# Patient Record
Sex: MALE | Race: WHITE | Employment: FULL TIME | URBAN - METROPOLITAN AREA
[De-identification: names, ages, dates, MRNs, and addresses within clinical notes are randomized per-mention and may not be internally consistent; named-entity substitution may affect disease eponyms.]

---

## 2021-10-22 ENCOUNTER — TRANSFERRED RECORDS (OUTPATIENT)
Dept: HEALTH INFORMATION MANAGEMENT | Facility: CLINIC | Age: 31
End: 2021-10-22
Payer: COMMERCIAL

## 2021-10-26 ENCOUNTER — TRANSFERRED RECORDS (OUTPATIENT)
Dept: HEALTH INFORMATION MANAGEMENT | Facility: CLINIC | Age: 31
End: 2021-10-26
Payer: COMMERCIAL

## 2021-10-26 LAB — EJECTION FRACTION: NORMAL %

## 2021-12-13 ENCOUNTER — TRANSFERRED RECORDS (OUTPATIENT)
Dept: HEALTH INFORMATION MANAGEMENT | Facility: CLINIC | Age: 31
End: 2021-12-13
Payer: COMMERCIAL

## 2021-12-30 DIAGNOSIS — Z01.810 PRE-OPERATIVE CARDIOVASCULAR EXAMINATION: ICD-10-CM

## 2021-12-30 DIAGNOSIS — I71.20 THORACIC AORTIC ANEURYSM WITHOUT RUPTURE (H): Primary | ICD-10-CM

## 2021-12-30 PROBLEM — Z85.47 HISTORY OF TESTICULAR CANCER: Status: ACTIVE | Noted: 2021-09-30

## 2021-12-30 PROBLEM — Q23.81 BICUSPID AORTIC VALVE: Status: ACTIVE | Noted: 2021-12-02

## 2021-12-30 PROBLEM — I10 HYPERTENSION: Status: ACTIVE | Noted: 2021-12-30

## 2021-12-30 RX ORDER — ASCORBIC ACID 100 MG
1 TABLET,CHEWABLE ORAL EVERY MORNING
Status: ON HOLD | COMMUNITY
End: 2022-03-01

## 2021-12-30 RX ORDER — METOPROLOL SUCCINATE 25 MG/1
25 TABLET, EXTENDED RELEASE ORAL EVERY MORNING
Status: ON HOLD | COMMUNITY
Start: 2021-10-27 | End: 2022-03-01

## 2021-12-30 NOTE — PROGRESS NOTES
Patient called regarding a referral for Dr. Velasco from Dr. Rohit Crooks DO [facility] from [place] for a ascending aortic aneurysm/aortic root repair valve sparring. Patient scheduled for a clinic with with Dr. Velasco on 1/10/21.   Patient informed about the length of surgery, hospital stay and length of recovery. Patient educated on sternal precautions.  Patient verbalized understanding and agreed to the plan.

## 2022-01-05 NOTE — TELEPHONE ENCOUNTER
FUTURE VISIT INFORMATION      SURGERY INFORMATION:    TBD // RECS in epic//  appt delores Manriquez from [facility]    Consult: ov 21    RECORDS REQUESTED FROM:       Primary Care Provider: Tejal Spicer CNP    Pertinent Medical History: bicuspid aortic valve, hypertension    Most recent EKG+ Tracin21    Most recent ECHO: 21-[facility]

## 2022-01-06 ENCOUNTER — PREP FOR PROCEDURE (OUTPATIENT)
Dept: CARDIOLOGY | Facility: CLINIC | Age: 32
End: 2022-01-06
Payer: COMMERCIAL

## 2022-01-06 DIAGNOSIS — Q25.43 AORTIC ROOT ANEURYSM: Primary | ICD-10-CM

## 2022-01-10 ENCOUNTER — OFFICE VISIT (OUTPATIENT)
Dept: CARDIOLOGY | Facility: CLINIC | Age: 32
End: 2022-01-10
Attending: SURGERY
Payer: COMMERCIAL

## 2022-01-10 ENCOUNTER — VIRTUAL VISIT (OUTPATIENT)
Dept: SURGERY | Facility: CLINIC | Age: 32
End: 2022-01-10
Payer: COMMERCIAL

## 2022-01-10 ENCOUNTER — PRE VISIT (OUTPATIENT)
Dept: SURGERY | Facility: CLINIC | Age: 32
End: 2022-01-10

## 2022-01-10 ENCOUNTER — LAB (OUTPATIENT)
Dept: LAB | Facility: CLINIC | Age: 32
End: 2022-01-10
Payer: COMMERCIAL

## 2022-01-10 ENCOUNTER — ANCILLARY PROCEDURE (OUTPATIENT)
Dept: ULTRASOUND IMAGING | Facility: CLINIC | Age: 32
End: 2022-01-10
Attending: SURGERY
Payer: COMMERCIAL

## 2022-01-10 ENCOUNTER — ANESTHESIA EVENT (OUTPATIENT)
Dept: SURGERY | Facility: CLINIC | Age: 32
End: 2022-01-10

## 2022-01-10 ENCOUNTER — ANCILLARY PROCEDURE (OUTPATIENT)
Dept: GENERAL RADIOLOGY | Facility: CLINIC | Age: 32
End: 2022-01-10
Attending: SURGERY
Payer: COMMERCIAL

## 2022-01-10 VITALS
DIASTOLIC BLOOD PRESSURE: 88 MMHG | SYSTOLIC BLOOD PRESSURE: 132 MMHG | HEIGHT: 75 IN | OXYGEN SATURATION: 95 % | HEART RATE: 71 BPM | BODY MASS INDEX: 39.17 KG/M2 | WEIGHT: 315 LBS

## 2022-01-10 DIAGNOSIS — Z01.810 PRE-OPERATIVE CARDIOVASCULAR EXAMINATION: ICD-10-CM

## 2022-01-10 DIAGNOSIS — Q25.43 AORTIC ROOT ANEURYSM: Primary | ICD-10-CM

## 2022-01-10 DIAGNOSIS — Z01.818 PRE-OP EVALUATION: Primary | ICD-10-CM

## 2022-01-10 LAB
ABO/RH(D): NORMAL
ALBUMIN SERPL-MCNC: 4.5 G/DL (ref 3.4–5)
ALBUMIN UR-MCNC: NEGATIVE MG/DL
ALP SERPL-CCNC: 64 U/L (ref 40–150)
ALT SERPL W P-5'-P-CCNC: 42 U/L (ref 0–70)
ANION GAP SERPL CALCULATED.3IONS-SCNC: 10 MMOL/L (ref 3–14)
ANTIBODY SCREEN: NEGATIVE
APPEARANCE UR: CLEAR
APTT PPP: 32 SECONDS (ref 22–38)
AST SERPL W P-5'-P-CCNC: 33 U/L (ref 0–45)
BACTERIA #/AREA URNS HPF: ABNORMAL /HPF
BILIRUB SERPL-MCNC: 0.5 MG/DL (ref 0.2–1.3)
BILIRUB UR QL STRIP: NEGATIVE
BUN SERPL-MCNC: 18 MG/DL (ref 7–30)
CALCIUM SERPL-MCNC: 9.4 MG/DL (ref 8.5–10.1)
CHLORIDE BLD-SCNC: 105 MMOL/L (ref 94–109)
CO2 SERPL-SCNC: 27 MMOL/L (ref 20–32)
COLOR UR AUTO: YELLOW
CREAT SERPL-MCNC: 1.02 MG/DL (ref 0.66–1.25)
ERYTHROCYTE [DISTWIDTH] IN BLOOD BY AUTOMATED COUNT: 11.9 % (ref 10–15)
GFR SERPL CREATININE-BSD FRML MDRD: >90 ML/MIN/1.73M2
GLUCOSE BLD-MCNC: 87 MG/DL (ref 70–99)
GLUCOSE UR STRIP-MCNC: NEGATIVE MG/DL
HCT VFR BLD AUTO: 49.4 % (ref 40–53)
HGB BLD-MCNC: 16.5 G/DL (ref 13.3–17.7)
HGB UR QL STRIP: NEGATIVE
INR PPP: 1 (ref 0.85–1.15)
KETONES UR STRIP-MCNC: NEGATIVE MG/DL
LEUKOCYTE ESTERASE UR QL STRIP: NEGATIVE
MCH RBC QN AUTO: 29.6 PG (ref 26.5–33)
MCHC RBC AUTO-ENTMCNC: 33.4 G/DL (ref 31.5–36.5)
MCV RBC AUTO: 89 FL (ref 78–100)
NITRATE UR QL: NEGATIVE
PH UR STRIP: 6 [PH] (ref 5–7)
PLATELET # BLD AUTO: 284 10E3/UL (ref 150–450)
POTASSIUM BLD-SCNC: 3.9 MMOL/L (ref 3.4–5.3)
PREALB SERPL IA-MCNC: 27 MG/DL (ref 15–45)
PROT SERPL-MCNC: 8 G/DL (ref 6.8–8.8)
RBC # BLD AUTO: 5.57 10E6/UL (ref 4.4–5.9)
RBC URINE: 2 /HPF
SODIUM SERPL-SCNC: 142 MMOL/L (ref 133–144)
SP GR UR STRIP: 1.02 (ref 1–1.03)
SPECIMEN EXPIRATION DATE: NORMAL
SQUAMOUS EPITHELIAL: <1 /HPF
UROBILINOGEN UR STRIP-MCNC: NORMAL MG/DL
WBC # BLD AUTO: 6.6 10E3/UL (ref 4–11)
WBC URINE: 1 /HPF

## 2022-01-10 PROCEDURE — 80053 COMPREHEN METABOLIC PANEL: CPT | Performed by: PATHOLOGY

## 2022-01-10 PROCEDURE — G0463 HOSPITAL OUTPT CLINIC VISIT: HCPCS

## 2022-01-10 PROCEDURE — 93880 EXTRACRANIAL BILAT STUDY: CPT | Performed by: RADIOLOGY

## 2022-01-10 PROCEDURE — 85027 COMPLETE CBC AUTOMATED: CPT | Performed by: PATHOLOGY

## 2022-01-10 PROCEDURE — 86901 BLOOD TYPING SEROLOGIC RH(D): CPT | Mod: 90 | Performed by: PATHOLOGY

## 2022-01-10 PROCEDURE — 85610 PROTHROMBIN TIME: CPT | Performed by: PATHOLOGY

## 2022-01-10 PROCEDURE — 81001 URINALYSIS AUTO W/SCOPE: CPT | Performed by: PATHOLOGY

## 2022-01-10 PROCEDURE — 71046 X-RAY EXAM CHEST 2 VIEWS: CPT | Mod: GC | Performed by: RADIOLOGY

## 2022-01-10 PROCEDURE — 99202 OFFICE O/P NEW SF 15 MIN: CPT | Mod: 95 | Performed by: PHYSICIAN ASSISTANT

## 2022-01-10 PROCEDURE — 86850 RBC ANTIBODY SCREEN: CPT | Mod: 90 | Performed by: PATHOLOGY

## 2022-01-10 PROCEDURE — 99000 SPECIMEN HANDLING OFFICE-LAB: CPT | Performed by: PATHOLOGY

## 2022-01-10 PROCEDURE — 99204 OFFICE O/P NEW MOD 45 MIN: CPT | Performed by: SURGERY

## 2022-01-10 PROCEDURE — 86900 BLOOD TYPING SEROLOGIC ABO: CPT | Mod: 90 | Performed by: PATHOLOGY

## 2022-01-10 PROCEDURE — 85730 THROMBOPLASTIN TIME PARTIAL: CPT | Performed by: PATHOLOGY

## 2022-01-10 PROCEDURE — 84134 ASSAY OF PREALBUMIN: CPT | Performed by: PATHOLOGY

## 2022-01-10 PROCEDURE — 36415 COLL VENOUS BLD VENIPUNCTURE: CPT | Performed by: PATHOLOGY

## 2022-01-10 RX ORDER — FAMOTIDINE 20 MG/1
20 TABLET, FILM COATED ORAL
Status: CANCELLED | OUTPATIENT
Start: 2022-01-10

## 2022-01-10 RX ORDER — ASPIRIN 81 MG/1
81 TABLET, CHEWABLE ORAL
Status: CANCELLED | OUTPATIENT
Start: 2022-01-10

## 2022-01-10 RX ORDER — DEXMEDETOMIDINE HYDROCHLORIDE 4 UG/ML
0.2-1.2 INJECTION, SOLUTION INTRAVENOUS CONTINUOUS
Status: CANCELLED | OUTPATIENT
Start: 2022-01-28

## 2022-01-10 RX ORDER — CEFAZOLIN SODIUM IN 0.9 % NACL 3 G/100 ML
3 INTRAVENOUS SOLUTION, PIGGYBACK (ML) INTRAVENOUS SEE ADMIN INSTRUCTIONS
Status: CANCELLED | OUTPATIENT
Start: 2022-01-10

## 2022-01-10 RX ORDER — ASPIRIN 81 MG/1
162 TABLET, CHEWABLE ORAL
Status: CANCELLED | OUTPATIENT
Start: 2022-01-10

## 2022-01-10 RX ORDER — LIDOCAINE 40 MG/G
CREAM TOPICAL
Status: CANCELLED | OUTPATIENT
Start: 2022-01-10

## 2022-01-10 RX ORDER — CEFAZOLIN SODIUM IN 0.9 % NACL 3 G/100 ML
3 INTRAVENOUS SOLUTION, PIGGYBACK (ML) INTRAVENOUS
Status: CANCELLED | OUTPATIENT
Start: 2022-01-10

## 2022-01-10 RX ORDER — GABAPENTIN 300 MG/1
300 CAPSULE ORAL
Status: CANCELLED | OUTPATIENT
Start: 2022-01-10

## 2022-01-10 RX ORDER — CHLORHEXIDINE GLUCONATE ORAL RINSE 1.2 MG/ML
10 SOLUTION DENTAL ONCE
Status: CANCELLED | OUTPATIENT
Start: 2022-01-10 | End: 2022-01-10

## 2022-01-10 RX ORDER — ACETAMINOPHEN 325 MG/1
975 TABLET ORAL ONCE
Status: CANCELLED | OUTPATIENT
Start: 2022-01-10 | End: 2022-01-10

## 2022-01-10 RX ORDER — PHENYLEPHRINE HCL IN 0.9% NACL 50MG/250ML
.1-6 PLASTIC BAG, INJECTION (ML) INTRAVENOUS CONTINUOUS
Status: CANCELLED | OUTPATIENT
Start: 2022-01-28

## 2022-01-10 ASSESSMENT — PAIN SCALES - GENERAL
PAINLEVEL: NO PAIN (0)
PAINLEVEL: NO PAIN (0)

## 2022-01-10 ASSESSMENT — MIFFLIN-ST. JEOR: SCORE: 2703.45

## 2022-01-10 ASSESSMENT — LIFESTYLE VARIABLES: TOBACCO_USE: 0

## 2022-01-10 NOTE — H&P
Pre-Operative H & P     CC:  Preoperative exam to assess for increased cardiopulmonary risk while undergoing surgery and anesthesia.    Date of Encounter: 1/10/2022  Primary Care Physician:  Tejal Spicer     Reason for visit:   Encounter Diagnosis   Name Primary?     Pre-op evaluation Yes         HPI  Chuy Varner is a 31 year old male who presents for pre-operative H & P in preparation for Aortic Valve Sparring root replacement possible aortic root replacement and any associated procedures with Dr. Velasco and Dr. Hannah on 22 at [facility]. Patient is being evaluated for comorbid conditions of obesity    Mr. Kuhn recently saw his PCP and reported family history of aortic disease. Imaging revealed normally functioning AV with aortic aneurysm measuring 53 mm. He was then referred to cardiology for further evaluation. Recommendation was made for valve sparing resection of the ascending aorta. The patient was then sent to the  for evaluation. He denies related symptoms.  He is now scheduled for the above procedure.     History is obtained from the patient and chart review      Hx of abnormal bleeding or anti-platelet use: denies    Menstrual history: No LMP for male patient.    Prior to Admission Medications  Current Outpatient Medications   Medication Sig Dispense Refill     metoprolol succinate ER (TOPROL-XL) 25 MG 24 hr tablet Take 25 mg by mouth every morning        Multiple Vitamin (QUINTABS) TABS Take 1 tablet by mouth every morning          Family History  Family History   Problem Relation Age of Onset     Anesthesia Reaction No family hx of      Deep Vein Thrombosis (DVT) No family hx of        The complete review of systems is negative other than noted in the HPI or here.     Anesthesia Pre-Procedure Evaluation    Patient: Chuy Varner   MRN: [id number] : 1990        Preoperative Diagnosis: * No surgery found *    Procedure :   Video  Visit       No past medical history on file.   No past surgical history on file.   No Known Allergies   Social History     Tobacco Use     Smoking status: Never Smoker     Smokeless tobacco: Never Used   Substance Use Topics     Alcohol use: Yes     Alcohol/week: 3.0 standard drinks     Types: 3 Standard drinks or equivalent per week      Wt Readings from Last 1 Encounters:   01/10/22 (!) 166.5 kg (367 lb)        Anesthesia Evaluation            ROS/MED HX  ENT/Pulmonary:  - neg pulmonary ROS  (-) tobacco use   Neurologic:  - neg neurologic ROS     Cardiovascular:     (+) -----valvular problems/murmurs bicuspid AV, aortic root aneurysm. Previous cardiac testing   Echo: Date: 11/2021 Results:    Stress Test: Date: Results:    ECG Reviewed: Date: Results:    Cath: Date: Results:      METS/Exercise Tolerance:     Hematologic:  - neg hematologic  ROS  (-) history of blood clots and history of blood transfusion   Musculoskeletal:  - neg musculoskeletal ROS     GI/Hepatic:  - neg GI/hepatic ROS  (-) GERD   Renal/Genitourinary:  - neg Renal ROS     Endo:     (+) Obesity (BMI 46),     Psychiatric/Substance Use:  - neg psychiatric ROS     Infectious Disease:  - neg infectious disease ROS     Malignancy:  - neg malignancy ROS     Other:               OUTSIDE LABS:  CBC:   Lab Results   Component Value Date    WBC 6.6 01/10/2022    HGB 16.5 01/10/2022    HCT 49.4 01/10/2022     01/10/2022     BMP:   Lab Results   Component Value Date     01/10/2022    POTASSIUM 3.9 01/10/2022    CHLORIDE 105 01/10/2022    CO2 27 01/10/2022    BUN 18 01/10/2022    CR 1.02 01/10/2022    GLC 87 01/10/2022     COAGS:   Lab Results   Component Value Date    PTT 32 01/10/2022    INR 1.00 01/10/2022     POC: No results found for: BGM, HCG, HCGS  HEPATIC:   Lab Results   Component Value Date    ALBUMIN 4.5 01/10/2022    PROTTOTAL 8.0 01/10/2022    ALT 42 01/10/2022    AST 33 01/10/2022    ALKPHOS 64 01/10/2022    BILITOTAL 0.5 01/10/2022      OTHER:   Lab Results   Component Value Date    MAYE 9.4 01/10/2022          Virtual visit -  No vitals were obtained       Physical Exam  Constitutional: Awake, alert, cooperative, no apparent distress, and appears stated age.  HENT: Normocephalic  Respiratory: non labored breathing   Neurologic: Awake, alert, oriented to name, place and time.   Neuropsychiatric: Calm, cooperative. Normal affect.       PRIOR LABS/DIAGNOSTIC STUDIES:   All labs and imaging personally reviewed       EKG/ stress test - if available please see in ROS above   No results found.  No flowsheet data found.    The patient's records and results personally reviewed by this provider.     Outside records reviewed from: care everywhere      ASSESSMENT and PLAN    Chuy Kuhn is a 31 year old male scheduled for Aortic Valve Sparring root replacement possible aortic root replacement and any associated procedures on 1/28/22 by Dr. Velasco and Dr. Hannah in treatment of aortic root aneurysm.  PAC referral for risk assessment and optimization for anesthesia:      Pre-operative considerations:   1.  Cardiac:  Functional status- METS 4 prior to aortic aneurysm diagnosis- reports since that time he has been restricting activity per cardiology recommendations.   ~aortic root aneurysm with the above procedure planned      2.  Pulm:   MICHELE risk: high, has not been tested in the past  ~non smoker      3.  GI:  Risk of PONV score = 2.  If > 2, anti-emetic intervention recommended.      4.   Endo: BMI 46    5. Access: h/o difficult IV access needing US    6. Onc: h/o testicular cancer s/p orchiectomy      VTE risk: 0.5%     Patient is optimized and is acceptable candidate for the proposed procedure.  No further diagnostic evaluation is needed.      Final plan per anesthesiologist on day of surgery.      ** Patient's visit was done virtually today.  A full physical exam was not completed.  Please refer to the physical examination documented by the  anesthesiologist in the anesthesia record on the day of surgery. **     Arrival time, NPO, shower and medication instructions provided by nursing staff today.      Video-Visit Details    Type of service:  Video Visit    Patient verbally consented to video service today: YES      Video Start Time: 1622  Video End Time (time video stopped): 1633    Originating Location (pt. Location): Home    Distant Location (provider location): home    Mode of Communication:  Video Conference via [employer]      On the day of service:     Prep time: 7 minutes  Visit time: 11 minutes  Documentation time: 8 minutes  ------------------------------------------  Total time: 26 minutes      Selene Nunez PA-C  Preoperative Assessment Center  [facility]  Clinic and Surgery Center  Phone: [phone]  Fax: [phone]

## 2022-01-10 NOTE — PATIENT INSTRUCTIONS
Preparing for Your Surgery      Name:  Chuy Varner   MRN:  [id number]   :  1990   Today's Date:  1/10/2022       Arriving for surgery:  Surgery date:  22  Arrival time:  5:30AM    Restrictions due to COVID 19:       One visitor is allowed in the Pre Op area.       When you go into surgery, one visitor is allowed to wait in the Surgery Waiting Room       (provided there is enough space to social distance).         In hospital patients are allowed 1 visitor per day       The visitor may change daily     Visiting Hours: 8 am - 8:30 pm   No ill visitors.   All visitors must wear face mask.    [employer] parking is available for anyone with mobility limitations or disabilities.  ([place]  24 hours/ 7 days a week; [place]  7 am- 3:30 pm, Mon- Fri)    Please come to:     [facility] [address]  [address] [place] [address]    -   Parking is available in the Patient Visitor Ramp on [facility].     -   When entering the hospital you will be asked COVID screening questions, you will then be directed to Registration.  Registration will direct you to the 3rd floor Surgery waiting room.     -   Please ask if you need an escort or a wheelchair to the Surgery Waiting Room.  Preop number- [phone]     What can I eat or drink?  -  You may eat and drink normally for up to 8 hours before your surgery. (Until 22, 11:30PM)  -  You may have clear liquids until 2 hours before surgery. (Until 22, 5:30AM)    Examples of clear liquids:  Water  Clear broth  Juices (apple, white grape, white cranberry  and cider) without pulp  Noncarbonated, powder based beverages  (lemonade and León-Aid)  Sodas (Sprite, 7-Up, ginger ale and seltzer)  Coffee or tea (without milk or cream)  Gatorade    -  No Alcohol for at least 24 hours before surgery     Which medicines can I take?     Hold Multivitamins for 10 days before surgery.  Hold Supplements  for 10 days before surgery.  Hold Ibuprofen (Advil, Motrin) for 10 days before surgery--unless otherwise directed by surgeon.  Hold Naproxen (Aleve) for 10 days before surgery.      -  PLEASE TAKE these medications the day of surgery:    Metoprolol    How do I prepare myself?  - Please take 2 showers before surgery using Scrubcare or Hibiclens soap.    Use this soap only from the neck to your toes.     Leave the soap on your skin for one minute--then rinse thoroughly.      You may use your own shampoo and conditioner; no other hair products.   - Please remove all jewelry and body piercings.  - No lotions, deodorants or fragrance.  - Bring your ID and insurance card.    -If you have a Deep Brain Stimulator, Spinal Cord Stimulator or any neuro stimulator device---you must bring the remote control to the hospital     - All patients are required to have a Covid-19 test within 4 days of surgery/procedure.      -Patients will be contacted by the [facility] scheduling team within 1 week of surgery to make an appointment.      - Patients may call the Scheduling team at [phone] if they have not been scheduled within 4 days of  surgery.          Questions or Concerns:    - For any questions regarding the day of surgery or your hospital stay, please contact the Pre Admission Nursing Office at [phone].       - If you have health changes between today and your surgery please call your surgeon.       For questions after surgery please call your surgeons office.

## 2022-01-10 NOTE — NURSING NOTE
Chief Complaint   Patient presents with     New Patient     Aortic root/ascending aortic aneurysm/valve sparring   Vitals were taken and medications reconciled.    Ace Grady, EMT  2:52 PM

## 2022-01-10 NOTE — ANESTHESIA PREPROCEDURE EVALUATION
Anesthesia Pre-Procedure Evaluation    Patient: Chuy Varner   MRN: [id number] : 1990        Preoperative Diagnosis: * No surgery found *    Procedure :   Video Visit       No past medical history on file.   No past surgical history on file.   No Known Allergies   Social History     Tobacco Use     Smoking status: Never Smoker     Smokeless tobacco: Never Used   Substance Use Topics     Alcohol use: Yes     Alcohol/week: 3.0 standard drinks     Types: 3 Standard drinks or equivalent per week      Wt Readings from Last 1 Encounters:   01/10/22 (!) 166.5 kg (367 lb)        Anesthesia Evaluation   Pt has had prior anesthetic.     No history of anesthetic complications       ROS/MED HX  ENT/Pulmonary:     (+) MICHELE risk factors, snores loudly, hypertension, obese,  (-) tobacco use   Neurologic:  - neg neurologic ROS     Cardiovascular:     (+) hypertension-----valvular problems/murmurs bicuspid AV, aortic root aneurysm. Previous cardiac testing   Echo: Date: 2021 Results:  Left Ventricle:    The left ventricle is normal in size.    There is no left ventricular hypertrophy.    Left ventricular systolic function is normal.      The estimated ejection fraction is 65-70%.      Left ventricular segmental wall motion is normal.    Right Ventricle:      The right ventricle is normal in size.      Normal right ventricular systolic function.    Left Atrial:      The left atrium is normal in size.      No mass or thrombus formation in the left atrium.    Right Atrial:      The right atrium is normal in size.    Atrial Septum:      There is no evidence of ASD/PFO.    Atrial Appendage:      Left atrial appendage is free of thrombus formation.    Aortic Valve:      The aortic valve is bicuspid.      There is no  aortic stenosis.      There is no aortic regurgitation.      There is fusion of the noncoronary and left coronary leaflets.    Pulmonary Valve:      The pulmonic valve is structurally normal.      There is mild  pulmonic regurgitation.    Mitral Valve:      The mitral valve leaflets are structurally normal.      No mitral annular calcification.      There is mild mitral regurgitation.      There is no mitral stenosis.    Pericardium:      There is no pericardial effusion visualized.    Aorta:      Moderate to severe dilatation of the ascending thoracic aorta with    diameter    up to 55 mm.   Stress Test:  Date: Results:    ECG Reviewed:  Date: Results:    Cath:  Date: Results:      METS/Exercise Tolerance: 4 - Raking leaves, gardening Comment: Normal activity up until diagnosis 10/2021, now limited per precautions   Hematologic:  - neg hematologic  ROS  (-) history of blood clots and history of blood transfusion   Musculoskeletal:  - neg musculoskeletal ROS     GI/Hepatic:  - neg GI/hepatic ROS  (-) GERD   Renal/Genitourinary:  - neg Renal ROS     Endo:     (+) Obesity (BMI 46),     Psychiatric/Substance Use:  - neg psychiatric ROS     Infectious Disease:  - neg infectious disease ROS     Malignancy:   (+) Malignancy, History of Other.Other CA testicular cancer Remission status post Surgery.    Other:               OUTSIDE LABS:  CBC:   Lab Results   Component Value Date    WBC 6.6 01/10/2022    HGB 16.5 01/10/2022    HCT 49.4 01/10/2022     01/10/2022     BMP:   Lab Results   Component Value Date     01/10/2022    POTASSIUM 3.9 01/10/2022    CHLORIDE 105 01/10/2022    CO2 27 01/10/2022    BUN 18 01/10/2022    CR 1.02 01/10/2022    GLC 87 01/10/2022     COAGS:   Lab Results   Component Value Date    PTT 32 01/10/2022    INR 1.00 01/10/2022     POC: No results found for: BGM, HCG, HCGS  HEPATIC:   Lab Results   Component Value Date    ALBUMIN 4.5 01/10/2022    PROTTOTAL 8.0 01/10/2022    ALT 42 01/10/2022    AST 33 01/10/2022    ALKPHOS 64 01/10/2022    BILITOTAL 0.5 01/10/2022     OTHER:   Lab Results   Component Value Date    MAYE 9.4 01/10/2022             PAC Discussion and Assessment    ASA Classification:  3  Case is suitable for: [place]  Anesthetic techniques and relevant risks discussed: GA                  PAC Resident/NP Anesthesia Assessment: Chuy Kuhn is a 31 year old male scheduled for Aortic Valve Sparring root replacement possible aortic root replacement and any associated procedures on 1/28/22 by Dr. Velasco and Dr. Hannah in treatment of aortic root aneurysm.  PAC referral for risk assessment and optimization for anesthesia:      Pre-operative considerations:   1.  Cardiac:  Functional status- METS 4 prior to aortic aneurysm diagnosis- reports since that time he has been restricting activity per cardiology recommendations.   ~aortic root aneurysm with the above procedure planned      2.  Pulm:   MICHELE risk: high, has not been tested in the past  ~non smoker      3.  GI:  Risk of PONV score = 2.  If > 2, anti-emetic intervention recommended.      4.   Endo: BMI 46    5. Access: h/o difficult IV access needing US    6. Onc: h/o testicular cancer s/p orchiectomy      VTE risk: 0.5%     Patient is optimized and is acceptable candidate for the proposed procedure.  No further diagnostic evaluation is needed.     **Physical exam and vital signs not completed today as this visit was scheduled as a virtual visit during Covid 19 pandemic. Physical exam should be completed the DOS in pre-op**     Final plan per anesthesiologist on day of surgery.       **For further details of assessment, testing, and physical exam please see H and P completed on same date.         NATIVIDAD Fischer PA-C

## 2022-01-10 NOTE — NURSING NOTE
Patient seen today for consultation for aortic valve sparing root vs aortic root repair.     Surgery procedure explained to patient and spouse and all questions and concerns were answered and addressed.     Valve choices were discussed with the patient, mechanical and bioprosthetic. If valve is to be replaced, mechanical valve will be the first choice; patient aware of long term anticoagulation that is associated with mechanical valves.     Reviewed pre surgery tests and procedures which were performed.       Pre surgery preparation folder with instructions for surgery preparation and recovery given to the patient during the clinical visit. Patient instructed to schedule a PCR covid test 4 days or less prior to surgery date; informed to have a dental clearance letter filled out by a dentist and faxed to [facility] prior to surgery date.      Patient and spouse verbalized understanding of all instructions and will call with any questions or concerns.       Enzo Wilson RNCC  Cardiothoracic Surgery   [facility]  O) [phone]  F) [phone]

## 2022-01-10 NOTE — LETTER
1/10/2022      RE: Chuy Varner  [address] [place] [facility] [address]       Dear Colleague,    Thank you for the opportunity to participate in the care of your patient, Chuy Varner, at the [facility] [place] at [facility]. Please see a copy of my visit note below.    HPI  Chuy Varner is a 31 year old male who was referred to em for evaluation of aortic and ascending aortic aneurysm.     Mr. Kuhn recently saw his PCP and reported family history of aortic disease. Imaging revealed normally functioning AV with aortic aneurysm measuring 53 mm. He was then referred to cardiology for further evaluation. Recommendation was made for valve sparing resection of the ascending aorta. The patient was then sent to the  for evaluation. He denies related symptoms.     History is obtained from the patient and chart review        Hx of abnormal bleeding or anti-platelet use: denies     Menstrual history: No LMP for male patient.     Prior to Admission Medications  Active Medications          Current Outpatient Medications   Medication Sig Dispense Refill     metoprolol succinate ER (TOPROL-XL) 25 MG 24 hr tablet Take 25 mg by mouth every morning          Multiple Vitamin (QUINTABS) TABS Take 1 tablet by mouth every morning                 Family History  Family History         Family History   Problem Relation Age of Onset     Anesthesia Reaction No family hx of       Deep Vein Thrombosis (DVT) No family hx of           Review of systems  10 point review of systems within normal other than mentioned elsewhere.         Physical Exam  Constitutional: Awake, alert, cooperative, no apparent distress, and appears stated age.  HENT: Normocephalic  Cardiac: S1 S2, no murmurs  Respiratory: non labored breathing , bilateral breath sounds  Neurologic: Awake, alert, oriented to name, place and time.   Neuropsychiatric: Calm, cooperative. Normal affect.   Skin:  normal  EYES: normal  ENT: normal     PRIOR LABS/DIAGNOSTIC STUDIES:   All labs and imaging personally reviewed         OUTSIDE LABS:  CBC:         Lab Results   Component Value Date     WBC 6.6 01/10/2022     HGB 16.5 01/10/2022     HCT 49.4 01/10/2022      01/10/2022      BMP:         Lab Results   Component Value Date      01/10/2022     POTASSIUM 3.9 01/10/2022     CHLORIDE 105 01/10/2022     CO2 27 01/10/2022     BUN 18 01/10/2022     CR 1.02 01/10/2022     GLC 87 01/10/2022      COAGS:         Lab Results   Component Value Date     PTT 32 01/10/2022     INR 1.00 01/10/2022      POC: No results found for: BGM, HCG, HCGS  HEPATIC:         Lab Results   Component Value Date     ALBUMIN 4.5 01/10/2022     PROTTOTAL 8.0 01/10/2022     ALT 42 01/10/2022     AST 33 01/10/2022     ALKPHOS 64 01/10/2022     BILITOTAL 0.5 01/10/2022      OTHER:         Lab Results   Component Value Date     MAYE 9.4 01/10/2022           EKG/ stress test - if available please see in ROS above   No results found.  No flowsheet data found.   ECHO: normal LV function, trileaflet aortic valve with no stenosis or regurgitation. Dilated aorta.    CT ANGIOGRAM  Thoracic aorta  Sinuses of Valsalva: moderately to severely dilated measuring 49 x 47 x 45 mm.    Sinotubular ridge: moderately dilated measuring  46 x 44 cm    Proximal ascendng aorta:  severely dilated measuring 55 x 55 CM at the level of the PA bifurcation.    Outside records reviewed from: care everywhere and ts personally reviewed by me.        ASSESSMENT and PLAN     Chuy Kuhn is a 31 year old male with an asymptomatic aortic root aneurysm (5 cm) and an ascending aortic aneurysm (5.5 cm) with a likely trileaflet aortic valve.  I agree with the recommendation to consider aortic valve sparing root replacement. I discussed the risks and benefits of surgery with patient including risks of death, bleeding, stroke, infection, renal failure and arrhythmias. He is also  willing to have a mechanical valve if needed.      Please do not hesitate to contact me if you have any questions/concerns.     Sincerely,     Nickolas Velasco MD

## 2022-01-10 NOTE — PROGRESS NOTES
Chuy is a 31 year old who is being evaluated via a billable video visit.      How would you like to obtain your AVS? Mail a copy  If the video visit is dropped, the invitation should be resent by: Text to cell phone: [phone]       HPI         Review of Systems         Physical Exam

## 2022-01-10 NOTE — PATIENT INSTRUCTIONS
You were seen today in the [facility]                     Cardiothoracic Surgery Clinic    Your surgeon was Dr Nickolas Velasco recommends:    1. Covid PCR test 4 days prior to surgery  2. Dental clearance faxed over to Enzo Wilson  [phone]      Please feel free to call me with any questions or concerns.    Enzo Wilson, RN Care Coordinator  Cardiothoracic Surgery Division  [facility]  [phone]

## 2022-01-17 DIAGNOSIS — Z11.59 ENCOUNTER FOR SCREENING FOR OTHER VIRAL DISEASES: Primary | ICD-10-CM

## 2022-01-26 ENCOUNTER — ANESTHESIA EVENT (OUTPATIENT)
Dept: SURGERY | Facility: CLINIC | Age: 32
End: 2022-01-26
Payer: COMMERCIAL

## 2022-01-26 ASSESSMENT — LIFESTYLE VARIABLES: TOBACCO_USE: 0

## 2022-01-26 NOTE — ANESTHESIA PREPROCEDURE EVALUATION
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   Past Surgical History:   Procedure Laterality Date     ORCHIECTOMY SCROTAL        No Known Allergies   Social History     Tobacco Use     Smoking status: Never Smoker     Smokeless tobacco: Never Used   Substance Use Topics     Alcohol use: Yes     Alcohol/week: 3.0 standard drinks     Types: 3 Standard drinks or equivalent per week      Wt Readings from Last 1 Encounters:   22 (!) 165.9 kg (365 lb 11.9 oz)        Anesthesia Evaluation   Pt has had prior anesthetic. Type: General.    No history of anesthetic complications       ROS/MED HX  ENT/Pulmonary:     (+) MICHELE risk factors, snores loudly, hypertension, obese,  (-) tobacco use   Neurologic:  - neg neurologic ROS  (-) no CVA and no TIA   Cardiovascular:     (+) hypertension-----valvular problems/murmurs bicuspid AV, aortic root aneurysm. Previous cardiac testing   Echo: Date: 2021 Results:  Left Ventricle:    The left ventricle is normal in size.    There is no left ventricular hypertrophy.      Left ventricular systolic function is normal.      The estimated ejection fraction is 65-70%.      Left ventricular segmental wall motion is normal.    Right Ventricle:      The right ventricle is normal in size.      Normal right ventricular systolic function.    Left Atrial:      The left atrium is normal in size.      No mass or thrombus formation in the left atrium.    Right Atrial:      The right atrium is normal in size.    Atrial Septum:      There is no evidence of ASD/PFO.    Atrial Appendage:      Left atrial appendage is free of thrombus formation.    Aortic Valve:      The aortic valve is bicuspid.      There is  no  aortic stenosis.      There is no aortic regurgitation.      There is fusion of the noncoronary and left coronary leaflets.    Pulmonary Valve:      The pulmonic valve is structurally normal.      There is mild pulmonic regurgitation.    Mitral Valve:      The mitral valve leaflets are structurally normal.      No mitral annular calcification.      There is mild mitral regurgitation.      There is no mitral stenosis.    Pericardium:      There is no pericardial effusion visualized.    Aorta:      Moderate to severe dilatation of the ascending thoracic aorta with    diameter    up to 55 mm.   Stress Test:  Date: Results:    ECG Reviewed:  Date: 1/28/22 Results:  NSR  Cath:  Date: Results:   (-) CAD   METS/Exercise Tolerance: 4 - Raking leaves, gardening Comment: Normal activity up until diagnosis 10/2021, now limited per precautions   Hematologic:  - neg hematologic  ROS  (-) history of blood clots and history of blood transfusion   Musculoskeletal:  - neg musculoskeletal ROS     GI/Hepatic:  - neg GI/hepatic ROS  (-) GERD   Renal/Genitourinary:  - neg Renal ROS     Endo:     (+) Obesity (BMI 46),  (-) Type II DM and thyroid disease   Psychiatric/Substance Use:  - neg psychiatric ROS     Infectious Disease: Comment: COVID NEGATIVE 1/25/22 - neg infectious disease ROS     Malignancy:   (+) Malignancy, History of Other.Other CA testicular cancer Remission status post Surgery.    Other:  - neg other ROS          Physical Exam    Airway        Mallampati: III   TM distance: > 3 FB   Neck ROM: full   Mouth opening: > 3 cm    Respiratory Devices and Support         Dental  no notable dental history         Cardiovascular   cardiovascular exam normal       Rhythm and rate: regular and normal     Pulmonary   pulmonary exam normal        breath sounds clear to auscultation           OUTSIDE LABS:  CBC:   Lab Results   Component Value Date    WBC 6.6 01/10/2022    HGB 16.5 01/10/2022    HCT 49.4 01/10/2022      01/10/2022     BMP:   Lab Results   Component Value Date     01/10/2022    POTASSIUM 3.9 01/10/2022    CHLORIDE 105 01/10/2022    CO2 27 01/10/2022    BUN 18 01/10/2022    CR 1.02 01/10/2022    GLC 89 01/28/2022    GLC 87 01/10/2022     COAGS:   Lab Results   Component Value Date    PTT 32 01/10/2022    INR 1.00 01/10/2022     POC: No results found for: BGM, HCG, HCGS  HEPATIC:   Lab Results   Component Value Date    ALBUMIN 4.5 01/10/2022    PROTTOTAL 8.0 01/10/2022    ALT 42 01/10/2022    AST 33 01/10/2022    ALKPHOS 64 01/10/2022    BILITOTAL 0.5 01/10/2022     OTHER:   Lab Results   Component Value Date    MAYE 9.4 01/10/2022       Anesthesia Plan    ASA Status:  4   NPO Status:  NPO Appropriate    Anesthesia Type: General.     - Airway: ETT   Induction: Intravenous.   Maintenance: Balanced.   Techniques and Equipment:     - Airway: Video-Laryngoscope     - Lines/Monitors: Arterial Line, Central Line, BIS, NIRS, LIVAN, PAC            LIVAN Absolute Contra-indication: NONE            LIVAN Relative Contra-indication: NONE     - Blood: Cell Saver, T&S     - Drips/Meds: Norepi, Epinephrine     Consents    Anesthesia Plan(s) and associated risks, benefits, and realistic alternatives discussed. Questions answered and patient/representative(s) expressed understanding.    - Discussed:     - Discussed with:  Patient      - Extended Intubation/Ventilatory Support Discussed: Yes.      - Patient is DNR/DNI Status: No    Use of blood products discussed: Yes.     - Discussed with: Patient.     - Consented: consented to blood products            Reason for refusal: other.     Postoperative Care    Pain management: Multi-modal analgesia.   PONV prophylaxis: Ondansetron (or other 5HT-3), Dexamethasone or Solumedrol     Comments:    Other Comments: 31 year old male with PMH of possible bicuspid aortic valve, thoracic aortic aneurysm without rupture, hypertension, obesity, and testicular cancer s/p orchiectomy presents to the OR  for valve-sparing aortic root replacement. Plan for pre-induction arterial line, GETA, LIVAN, CVC and PAC.            PAC Discussion and Assessment    ASA Classification: 3  Case is suitable for: [place]  Anesthetic techniques and relevant risks discussed: GA                  PAC Resident/NP Anesthesia Assessment: Chuy Kuhn is a 31 year old male scheduled for Aortic Valve Sparring root replacement possible aortic root replacement and any associated procedures on 1/28/22 by Dr. Velasco and Dr. Hannah in treatment of aortic root aneurysm.  PAC referral for risk assessment and optimization for anesthesia:      Pre-operative considerations:   1.  Cardiac:  Functional status- METS 4 prior to aortic aneurysm diagnosis- reports since that time he has been restricting activity per cardiology recommendations.   ~aortic root aneurysm with the above procedure planned      2.  Pulm:   MICHELE risk: high, has not been tested in the past  ~non smoker      3.  GI:  Risk of PONV score = 2.  If > 2, anti-emetic intervention recommended.      4.   Endo: BMI 46    5. Access: h/o difficult IV access needing US    6. Onc: h/o testicular cancer s/p orchiectomy      VTE risk: 0.5%     Patient is optimized and is acceptable candidate for the proposed procedure.  No further diagnostic evaluation is needed.     **Physical exam and vital signs not completed today as this visit was scheduled as a virtual visit during Covid 19 pandemic. Physical exam should be completed the DOS in pre-op**     Final plan per anesthesiologist on day of surgery.       **For further details of assessment, testing, and physical exam please see H and P completed on same date.                                               Adam Ford MD

## 2022-01-26 NOTE — PROGRESS NOTES
HPI  Chuy Varner is a 31 year old male who was referred to em for evaluation of aortic and ascending aortic aneurysm.     Mr. Kuhn recently saw his PCP and reported family history of aortic disease. Imaging revealed normally functioning AV with aortic aneurysm measuring 53 mm. He was then referred to cardiology for further evaluation. Recommendation was made for valve sparing resection of the ascending aorta. The patient was then sent to the U for evaluation. He denies related symptoms.     History is obtained from the patient and chart review        Hx of abnormal bleeding or anti-platelet use: denies     Menstrual history: No LMP for male patient.     Prior to Admission Medications  Active Medications          Current Outpatient Medications   Medication Sig Dispense Refill     metoprolol succinate ER (TOPROL-XL) 25 MG 24 hr tablet Take 25 mg by mouth every morning          Multiple Vitamin (QUINTABS) TABS Take 1 tablet by mouth every morning                 Family History  Family History         Family History   Problem Relation Age of Onset     Anesthesia Reaction No family hx of       Deep Vein Thrombosis (DVT) No family hx of           Review of systems  10 point review of systems within normal other than mentioned elsewhere.         Physical Exam  Constitutional: Awake, alert, cooperative, no apparent distress, and appears stated age.  HENT: Normocephalic  Cardiac: S1 S2, no murmurs  Respiratory: non labored breathing , bilateral breath sounds  Neurologic: Awake, alert, oriented to name, place and time.   Neuropsychiatric: Calm, cooperative. Normal affect.   Skin: normal  EYES: normal  ENT: normal     PRIOR LABS/DIAGNOSTIC STUDIES:   All labs and imaging personally reviewed         OUTSIDE LABS:  CBC:         Lab Results   Component Value Date     WBC 6.6 01/10/2022     HGB 16.5 01/10/2022     HCT 49.4 01/10/2022      01/10/2022      BMP:         Lab Results   Component Value Date       01/10/2022     POTASSIUM 3.9 01/10/2022     CHLORIDE 105 01/10/2022     CO2 27 01/10/2022     BUN 18 01/10/2022     CR 1.02 01/10/2022     GLC 87 01/10/2022      COAGS:         Lab Results   Component Value Date     PTT 32 01/10/2022     INR 1.00 01/10/2022      POC: No results found for: BGM, HCG, HCGS  HEPATIC:         Lab Results   Component Value Date     ALBUMIN 4.5 01/10/2022     PROTTOTAL 8.0 01/10/2022     ALT 42 01/10/2022     AST 33 01/10/2022     ALKPHOS 64 01/10/2022     BILITOTAL 0.5 01/10/2022      OTHER:         Lab Results   Component Value Date     MAYE 9.4 01/10/2022           EKG/ stress test - if available please see in ROS above   No results found.  No flowsheet data found.   ECHO: normal LV function, trileaflet aortic valve with no stenosis or regurgitation. Dilated aorta.    CT ANGIOGRAM  Thoracic aorta  Sinuses of Valsalva: moderately to severely dilated measuring 49 x 47 x 45 mm.    Sinotubular ridge: moderately dilated measuring  46 x 44 cm    Proximal ascendng aorta:  severely dilated measuring 55 x 55 CM at the level of the PA bifurcation.    Outside records reviewed from: [facility] everywhere and ts personally reviewed by me.        ASSESSMENT and PLAN     Chuy Kuhn is a 31 year old male with an asymptomatic aortic root aneurysm (5 cm) and an ascending aortic aneurysm (5.5 cm) with a likely trileaflet aortic valve.  I agree with the recommendation to consider aortic valve sparing root replacement. I discussed the risks and benefits of surgery with patient including risks of death, bleeding, stroke, infection, renal failure and arrhythmias. He is also willing to have a mechanical valve if needed.

## 2022-01-26 NOTE — H&P
CV ICU H&P  01/28/2022      CO-MORBIDITIES:   Patient Active Problem List   Diagnosis     Bicuspid aortic valve     History of testicular cancer     Hypertension     Thoracic aortic aneurysm without rupture (H)       ASSESSMENT: Chuy Varner is a 31 year old male with PMH of possible bicuspid aortic valve, thoracic aortic aneurysm without rupture, hypertension, obesity, and testicular cancer s/p orchiectomy who underwent Bentall procedure with mechanical valve on 1/28 with Dr. Velasco ad Dr. Hannah.    INTRAOPERATIVE REPORT:  Easy mask, easy intubation  BL ES catheters  R internal jugular MAC/[place]  L rad art line  Bypass 180 minutes  No issues on post bypass LIVAN  No pressors out of OR    PLAN:  Neuro/ pain/ sedation:  # Acute Postoperative pain  - Monitor neurological status. Notify the MD for any acute changes in exam.  - Pain: fentanyl gtt. Scheduled tylenol and gabapentin. PRN tylenol, oxycodone, robaxin  - Sedation: propofol gtt, precedex gtt    Pulmonary care:   # Postoperative ventilation management  - Intubated, ventilated  - Titrate supplemental oxygen to maintain saturation above 92%.  - Pulmonary hygiene: Incentive spirometer every 15- 30 minutes when awake, flutter valve, C&DB    Cardiovascular:    #  Thoracic aortic aneurysm s/p Bentall procedure on 1/28 with Dr. Velasco and Dr. Hannah  # History of hypertension  # Likely bicuspid aortic valve (12/  Recent echo on 10/26/21 with LVEF of 60-65%.  CT vascular 12/13/21 showing proximal ascending aorta is severely dilated (55 x 55mm in maximum dimension) and likely bicuspid aortic valve.  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  - Monitor hemodynamic status  - Goal MAP>65, SBP<110  - Statin hold  - BB hold  - ASA: start tomorrow  - Epi, norepi, vaso gtt; wean as tolerated  - Holding PTA meds:  metoprolol succinate 25mg daily  - Nicardipine gtt PRN    GI care/ Nutrition:   # Obesity (BMI 46)  - NPO  - PPI  - Continue bowel regimen: miralax, senna    Renal/ Fluid Balance/ Electrolytes:   BL creat appears to be ~ 1.0  - Strict I/O, daily weights  - Avoid/limit nephrotoxins as able  - Replete lytes PRN per protocol    Endocrine:    # Stress induced hyperglycemia  - Insulin gtt  - Goal BG <180 for optimal healing    ID/ Antibiotics:  # Stress induced leukocytosis  - To complete perioperative regimen  - Continue to monitor fever curve, WBC and inflammatory markers as appropriate    Heme/Onc:     # Stress induced leukocytosis  # Acute blood loss anemia  # Acute blood loss thrombocytopenia  # History of testicular cancer s/p orchiectomy  No s/sx active bleeding  - Continue to monitor  - CBC     MSK/ Skin:  # Sternotomy  # Surgical Incision  - Sternal precautions  - Postoperative incision management per protocol  - PT/OT/CR    Prophylaxis:    - Mechanical prophylaxis for DVT  - Chemical DVT prophylaxis - start subcutaneous heparin tomorrow  - PPI    Lines/ tubes/ drains:  - L radial arterial Line, ETT, CTs, PA catheter, RIJ Mac, durant, bilateral ES catheters    Disposition:  - CVICU    Patient seen, findings and plan discussed with CVICU staff Dr. Alonzo Poon, MS4    I was present with the medical student who participated in the service and in the documentation of the note. I have verified the history and personally performed the physical exam and medical decision making. I agree with the assessment and plan of care as documented in the note.    Tom Adair MD  Anesthesiology, PGY3  ====================================    HPI:   Chuy Varner is a 31 year old male with PMH of possible bicuspid aortic valve, thoracic aortic aneurysm without rupture, hypertension, obesity, and testicular cancer s/p orchiectomy. He recently saw his PCP and reported family history of aortic disease (he is  asymptomatic). Imaging revealed normally functioning AV with aortic aneurysm measuring 53 mm. He was then referred to cardiology for further evaluation with recommended surgical intervention. His cardiac functional status: METS 4 prior to aortic aneurysm diagnosis- reports since that time he has been restricting activity per cardiology recommendations. He underwent Bentall with mechanical valve on 1/28 with Dr. Rod Hannah.      CT vascular 12/13/21:  1. The proximal ascending aorta is severely dilated measuring 55 x 55mm in maximum dimension.  2. The aorta returns to normal size at the level of the arch.  3. No dissection, penetrating ulcer, or intramural hematoma in the visualized thoracic aorta.  4. No significant CAD in a right dominant coronary system.  5. Mild concentric LVH.  6. The aortic valve is likely bicuspid    TTE 10/26/21:  LV normal in size with normal systolic function. LVEF 60-65%.  LV segmental wall motion normal  Aortic valve likely tri leaflet with no stenosis or regurgitation.  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  Proximal ascending aorta is dilated, measuring 4.6cm with an index of 1.52cm/m2.      PAST MEDICAL HISTORY: No past medical history on file.    PAST SURGICAL HISTORY:   Past Surgical History:   Procedure Laterality Date     ORCHIECTOMY SCROTAL         FAMILY HISTORY:   Family History   Problem Relation Age of Onset     Anesthesia Reaction No family hx of      Deep Vein Thrombosis (DVT) No family hx of        SOCIAL HISTORY:   Social History     Tobacco Use     Smoking status: Never Smoker     Smokeless tobacco: Never Used   Substance Use Topics     Alcohol use: Yes     Alcohol/week: 3.0 standard drinks     Types: 3 Standard drinks or equivalent per week         OBJECTIVE:   1. VITAL SIGNS:      Vitals:    01/28/22 0714 01/28/22 0719 01/28/22 0724 01/28/22 0728   BP: (!) 129/95 (!) 135/94 124/88 124/88   Pulse:  93 89 92 88   Resp: 18 14 15 14   Temp:       TempSrc:       SpO2: 100% 99% 99% 99%   Weight:       Height:           2. INTAKE/ OUTPUT:     Intake/Output Summary (Last 24 hours) at 1/28/2022 1435  Last data filed at 1/28/2022 1403  Gross per 24 hour   Intake 3154 ml   Output 2100 ml   Net 1054 ml       3. PHYSICAL EXAMINATION:     General: sedated, comfortable  Neuro: sedated  Resp: intubated, ventilated  CV: S1, S2, RRR, no m/r/g   Abdomen: Soft, non-distended, non-tender  Incisions: c/d/i  Extremities: warm and well perfused  CT: To suction, serosang output, no airleak    4. INVESTIGATIONS:   Arterial Blood Gases   No lab results found in last 7 days.  Complete Blood Count   No lab results found in last 7 days.  Basic Metabolic Panel  No lab results found in last 7 days.  Liver Function Tests  No lab results found in last 7 days.  Pancreatic Enzymes  No lab results found in last 7 days.  Coagulation Profile  No lab results found in last 7 days.      5. RADIOLOGY:   No results found for this or any previous visit (from the past 24 hour(s)).    =========================================

## 2022-01-28 ENCOUNTER — APPOINTMENT (OUTPATIENT)
Dept: GENERAL RADIOLOGY | Facility: CLINIC | Age: 32
End: 2022-01-28
Attending: SURGERY
Payer: COMMERCIAL

## 2022-01-28 ENCOUNTER — HOSPITAL ENCOUNTER (INPATIENT)
Facility: CLINIC | Age: 32
LOS: 35 days | Discharge: NURSING, REHAB OR LONG-TERM CARE | End: 2022-03-04
Attending: SURGERY | Admitting: SURGERY
Payer: COMMERCIAL

## 2022-01-28 ENCOUNTER — ANESTHESIA (OUTPATIENT)
Dept: SURGERY | Facility: CLINIC | Age: 32
End: 2022-01-28
Payer: COMMERCIAL

## 2022-01-28 DIAGNOSIS — I63.40 CEREBRAL INFARCTION DUE TO EMBOLISM OF CEREBRAL ARTERY (H): ICD-10-CM

## 2022-01-28 DIAGNOSIS — I71.20 THORACIC AORTIC ANEURYSM WITHOUT RUPTURE (H): ICD-10-CM

## 2022-01-28 DIAGNOSIS — Z98.890 S/P AORTIC ANEURYSM REPAIR: Primary | ICD-10-CM

## 2022-01-28 DIAGNOSIS — Z86.79 S/P AORTIC ANEURYSM REPAIR: Primary | ICD-10-CM

## 2022-01-28 DIAGNOSIS — Q25.43 AORTIC ROOT ANEURYSM: ICD-10-CM

## 2022-01-28 LAB
ABO/RH(D): NORMAL
ALBUMIN SERPL-MCNC: 3.1 G/DL (ref 3.4–5)
ALP SERPL-CCNC: 44 U/L (ref 40–150)
ALT SERPL W P-5'-P-CCNC: 28 U/L (ref 0–70)
ANION GAP SERPL CALCULATED.3IONS-SCNC: 7 MMOL/L (ref 3–14)
ANTIBODY SCREEN: NEGATIVE
APTT PPP: 28 SECONDS (ref 22–38)
APTT PPP: 31 SECONDS (ref 22–38)
AST SERPL W P-5'-P-CCNC: 50 U/L (ref 0–45)
ATRIAL RATE - MUSE: 93 BPM
BASE EXCESS BLDA CALC-SCNC: -0.2 MMOL/L (ref -9–1.8)
BASE EXCESS BLDA CALC-SCNC: -0.2 MMOL/L (ref -9–1.8)
BASE EXCESS BLDA CALC-SCNC: -0.5 MMOL/L (ref -9.6–2)
BASE EXCESS BLDA CALC-SCNC: -0.6 MMOL/L (ref -9–1.8)
BASE EXCESS BLDA CALC-SCNC: -0.7 MMOL/L (ref -9.6–2)
BASE EXCESS BLDA CALC-SCNC: -0.7 MMOL/L (ref -9–1.8)
BASE EXCESS BLDA CALC-SCNC: -1.1 MMOL/L (ref -9–1.8)
BASE EXCESS BLDA CALC-SCNC: -1.2 MMOL/L (ref -9.6–2)
BASE EXCESS BLDA CALC-SCNC: -1.3 MMOL/L (ref -9–1.8)
BASE EXCESS BLDA CALC-SCNC: -2 MMOL/L (ref -9.6–2)
BASE EXCESS BLDA CALC-SCNC: -2.5 MMOL/L (ref -9.6–2)
BASE EXCESS BLDA CALC-SCNC: -2.7 MMOL/L (ref -9.6–2)
BASE EXCESS BLDA CALC-SCNC: -3.3 MMOL/L (ref -9.6–2)
BASE EXCESS BLDA CALC-SCNC: -3.4 MMOL/L (ref -9.6–2)
BASE EXCESS BLDA CALC-SCNC: -4.6 MMOL/L (ref -9–1.8)
BASE EXCESS BLDA CALC-SCNC: 0.4 MMOL/L (ref -9.6–2)
BASE EXCESS BLDV CALC-SCNC: -0.1 MMOL/L (ref -7.7–1.9)
BASE EXCESS BLDV CALC-SCNC: 1.1 MMOL/L (ref -7.7–1.9)
BASE EXCESS BLDV CALC-SCNC: 1.3 MMOL/L (ref -8.1–1.9)
BASE EXCESS BLDV CALC-SCNC: 1.7 MMOL/L (ref -7.7–1.9)
BASE EXCESS BLDV CALC-SCNC: 3.1 MMOL/L (ref -7.7–1.9)
BILIRUB SERPL-MCNC: 0.6 MG/DL (ref 0.2–1.3)
BLD PROD TYP BPU: NORMAL
BLOOD COMPONENT TYPE: NORMAL
BUN SERPL-MCNC: 18 MG/DL (ref 7–30)
CA-I BLD-MCNC: 4.2 MG/DL (ref 4.4–5.2)
CA-I BLD-MCNC: 4.4 MG/DL (ref 4.4–5.2)
CA-I BLD-MCNC: 4.5 MG/DL (ref 4.4–5.2)
CA-I BLD-MCNC: 4.6 MG/DL (ref 4.4–5.2)
CA-I BLD-MCNC: 4.7 MG/DL (ref 4.4–5.2)
CA-I BLD-MCNC: 4.7 MG/DL (ref 4.4–5.2)
CA-I BLD-MCNC: 4.9 MG/DL (ref 4.4–5.2)
CALCIUM SERPL-MCNC: 7.9 MG/DL (ref 8.5–10.1)
CF REDUC 30M P MA P HEP LENFR BLD TEG: 0.1 % (ref 0–8)
CF REDUC 60M P MA LENFR BLD TEG: 2.5 % (ref 0–15)
CF REDUC 60M P MA LENFR BLD TEG: 3.5 % (ref 0–15)
CF REDUC 60M P MA P HEPASE LENFR BLD TEG: 0 % (ref 0–15)
CFT BLD TEG: 1.7 MINUTE (ref 1–3)
CFT BLD TEG: 1.8 MINUTE (ref 1–3)
CFT P HPASE BLD TEG: 1.6 MINUTE (ref 1–3)
CHLORIDE BLD-SCNC: 112 MMOL/L (ref 94–109)
CI (COAGULATION INDEX)(Z) NON NATIVE: 0.4 (ref -3–3)
CI (COAGULATION INDEX)(Z) NON NATIVE: 0.6 (ref -3–3)
CI (COAGULATION INDEX)(Z): -1.1 (ref -3–3)
CLOT ANGLE BLD TEG: 65.9 DEGREES (ref 53–72)
CLOT ANGLE BLD TEG: 66.7 DEGREES (ref 53–72)
CLOT ANGLE P HPASE BLD TEG: 68.9 DEGREES (ref 53–72)
CLOT INIT BLD TEG: 4.7 MINUTE (ref 5–10)
CLOT INIT BLD TEG: 5.4 MINUTE (ref 5–10)
CLOT INIT P HPASE BLD TEG: 5 MINUTE (ref 5–10)
CLOT LYSIS 30M P MA LENFR BLD TEG: 0.2 % (ref 0–8)
CLOT LYSIS 30M P MA LENFR BLD TEG: 0.5 % (ref 0–8)
CLOT STRENGTH BLD TEG: 6.6 KD/SC (ref 4.5–11)
CLOT STRENGTH BLD TEG: 7.6 KD/SC (ref 4.5–11)
CLOT STRENGTH P HPASE BLD TEG: 3.9 KD/SC (ref 4.5–11)
CO2 SERPL-SCNC: 24 MMOL/L (ref 20–32)
CODING SYSTEM: NORMAL
CREAT SERPL-MCNC: 1.2 MG/DL (ref 0.66–1.25)
CROSSMATCH: NORMAL
CROSSMATCH: NORMAL
DIASTOLIC BLOOD PRESSURE - MUSE: NORMAL MMHG
ERYTHROCYTE [DISTWIDTH] IN BLOOD BY AUTOMATED COUNT: 12.1 % (ref 10–15)
ERYTHROCYTE [DISTWIDTH] IN BLOOD BY AUTOMATED COUNT: 12.2 % (ref 10–15)
FIBRINOGEN PPP-MCNC: 159 MG/DL (ref 170–490)
GFR SERPL CREATININE-BSD FRML MDRD: 83 ML/MIN/1.73M2
GLUCOSE BLD-MCNC: 111 MG/DL (ref 70–99)
GLUCOSE BLD-MCNC: 123 MG/DL (ref 70–99)
GLUCOSE BLD-MCNC: 125 MG/DL (ref 70–99)
GLUCOSE BLD-MCNC: 133 MG/DL (ref 70–99)
GLUCOSE BLD-MCNC: 133 MG/DL (ref 70–99)
GLUCOSE BLD-MCNC: 149 MG/DL (ref 70–99)
GLUCOSE BLD-MCNC: 166 MG/DL (ref 70–99)
GLUCOSE BLD-MCNC: 176 MG/DL (ref 70–99)
GLUCOSE BLD-MCNC: 192 MG/DL (ref 70–99)
GLUCOSE BLD-MCNC: 195 MG/DL (ref 70–99)
GLUCOSE BLD-MCNC: 200 MG/DL (ref 70–99)
GLUCOSE BLDC GLUCOMTR-MCNC: 123 MG/DL (ref 70–99)
GLUCOSE BLDC GLUCOMTR-MCNC: 125 MG/DL (ref 70–99)
GLUCOSE BLDC GLUCOMTR-MCNC: 134 MG/DL (ref 70–99)
GLUCOSE BLDC GLUCOMTR-MCNC: 135 MG/DL (ref 70–99)
GLUCOSE BLDC GLUCOMTR-MCNC: 146 MG/DL (ref 70–99)
GLUCOSE BLDC GLUCOMTR-MCNC: 146 MG/DL (ref 70–99)
GLUCOSE BLDC GLUCOMTR-MCNC: 167 MG/DL (ref 70–99)
GLUCOSE BLDC GLUCOMTR-MCNC: 89 MG/DL (ref 70–99)
GLUCOSE BLDC GLUCOMTR-MCNC: 95 MG/DL (ref 70–99)
GLUCOSE BLDC GLUCOMTR-MCNC: 98 MG/DL (ref 70–99)
HCO3 BLD-SCNC: 21 MMOL/L (ref 21–28)
HCO3 BLD-SCNC: 25 MMOL/L (ref 21–28)
HCO3 BLDA-SCNC: 23 MMOL/L (ref 21–28)
HCO3 BLDA-SCNC: 24 MMOL/L (ref 21–28)
HCO3 BLDA-SCNC: 25 MMOL/L (ref 21–28)
HCO3 BLDA-SCNC: 26 MMOL/L (ref 21–28)
HCO3 BLDA-SCNC: 26 MMOL/L (ref 21–28)
HCO3 BLDV-SCNC: 27 MMOL/L (ref 21–28)
HCO3 BLDV-SCNC: 28 MMOL/L (ref 21–28)
HCO3 BLDV-SCNC: 28 MMOL/L (ref 21–28)
HCO3 BLDV-SCNC: 29 MMOL/L (ref 21–28)
HCO3 BLDV-SCNC: 29 MMOL/L (ref 21–28)
HCT VFR BLD AUTO: 37.6 % (ref 40–53)
HCT VFR BLD AUTO: 40 % (ref 40–53)
HGB BLD-MCNC: 12.2 G/DL (ref 13.3–17.7)
HGB BLD-MCNC: 12.4 G/DL (ref 13.3–17.7)
HGB BLD-MCNC: 12.6 G/DL (ref 13.3–17.7)
HGB BLD-MCNC: 12.7 G/DL (ref 13.3–17.7)
HGB BLD-MCNC: 12.8 G/DL (ref 13.3–17.7)
HGB BLD-MCNC: 12.9 G/DL (ref 13.3–17.7)
HGB BLD-MCNC: 12.9 G/DL (ref 13.3–17.7)
HGB BLD-MCNC: 13.4 G/DL (ref 13.3–17.7)
HGB BLD-MCNC: 13.4 G/DL (ref 13.3–17.7)
HGB BLD-MCNC: 13.7 G/DL (ref 13.3–17.7)
HGB BLD-MCNC: 14.5 G/DL (ref 13.3–17.7)
HGB BLD-MCNC: 14.5 G/DL (ref 13.3–17.7)
HOLD SPECIMEN: NORMAL
INR PPP: 1.38 (ref 0.85–1.15)
INR PPP: 1.53 (ref 0.85–1.15)
INTERPRETATION ECG - MUSE: NORMAL
ISSUE DATE AND TIME: NORMAL
ISSUE DATE AND TIME: NORMAL
LACTATE BLD-SCNC: 1 MMOL/L
LACTATE BLD-SCNC: 1.2 MMOL/L
LACTATE BLD-SCNC: 1.3 MMOL/L
LACTATE BLD-SCNC: 1.4 MMOL/L
LACTATE BLD-SCNC: 1.4 MMOL/L
LACTATE BLD-SCNC: 1.7 MMOL/L
LACTATE BLD-SCNC: 1.8 MMOL/L
LACTATE BLD-SCNC: 2.2 MMOL/L
LACTATE BLD-SCNC: 3.6 MMOL/L
LACTATE BLD-SCNC: 4.1 MMOL/L
LACTATE SERPL-SCNC: 1.4 MMOL/L (ref 0.7–2)
LACTATE SERPL-SCNC: 3.1 MMOL/L (ref 0.7–2)
MAGNESIUM SERPL-MCNC: 2.8 MG/DL (ref 1.6–2.3)
MCF BLD TEG: 56.9 MM (ref 50–70)
MCF BLD TEG: 60.5 MM (ref 50–70)
MCF P HPASE BLD TEG: 43.6 MM (ref 50–70)
MCH RBC QN AUTO: 30.5 PG (ref 26.5–33)
MCH RBC QN AUTO: 30.6 PG (ref 26.5–33)
MCHC RBC AUTO-ENTMCNC: 33.5 G/DL (ref 31.5–36.5)
MCHC RBC AUTO-ENTMCNC: 34.3 G/DL (ref 31.5–36.5)
MCV RBC AUTO: 90 FL (ref 78–100)
MCV RBC AUTO: 91 FL (ref 78–100)
O2/TOTAL GAS SETTING VFR VENT: 100 %
O2/TOTAL GAS SETTING VFR VENT: 50 %
O2/TOTAL GAS SETTING VFR VENT: 60 %
O2/TOTAL GAS SETTING VFR VENT: 70 %
O2/TOTAL GAS SETTING VFR VENT: 85 %
O2/TOTAL GAS SETTING VFR VENT: 85 %
O2/TOTAL GAS SETTING VFR VENT: 95 %
O2/TOTAL GAS SETTING VFR VENT: 95 %
OXYHGB MFR BLD: 91 % (ref 92–100)
OXYHGB MFR BLD: 92 % (ref 92–100)
OXYHGB MFR BLD: 93 % (ref 92–100)
OXYHGB MFR BLD: 94 % (ref 92–100)
OXYHGB MFR BLD: 95 % (ref 92–100)
OXYHGB MFR BLD: 96 % (ref 92–100)
OXYHGB MFR BLD: 97 % (ref 92–100)
OXYHGB MFR BLDA: 95 % (ref 92–100)
OXYHGB MFR BLDA: 95 % (ref 92–100)
OXYHGB MFR BLDA: 98 % (ref 92–100)
OXYHGB MFR BLDV: 64 % (ref 70–75)
OXYHGB MFR BLDV: 64 % (ref 70–75)
OXYHGB MFR BLDV: 66 % (ref 70–75)
OXYHGB MFR BLDV: 74 % (ref 70–75)
OXYHGB MFR BLDV: 75 % (ref 92–100)
P AXIS - MUSE: 24 DEGREES
PCO2 BLD: 38 MM HG (ref 35–45)
PCO2 BLD: 42 MM HG (ref 35–45)
PCO2 BLD: 42 MM HG (ref 35–45)
PCO2 BLD: 44 MM HG (ref 35–45)
PCO2 BLD: 46 MM HG (ref 35–45)
PCO2 BLD: 48 MM HG (ref 35–45)
PCO2 BLD: 48 MM HG (ref 35–45)
PCO2 BLDA: 40 MM HG (ref 35–45)
PCO2 BLDA: 42 MM HG (ref 35–45)
PCO2 BLDA: 43 MM HG (ref 35–45)
PCO2 BLDA: 44 MM HG (ref 35–45)
PCO2 BLDA: 46 MM HG (ref 35–45)
PCO2 BLDA: 49 MM HG (ref 35–45)
PCO2 BLDA: 49 MM HG (ref 35–45)
PCO2 BLDA: 50 MM HG (ref 35–45)
PCO2 BLDA: 52 MM HG (ref 35–45)
PCO2 BLDV: 51 MM HG (ref 40–50)
PCO2 BLDV: 52 MM HG (ref 40–50)
PCO2 BLDV: 53 MM HG (ref 40–50)
PCO2 BLDV: 55 MM HG (ref 40–50)
PCO2 BLDV: 56 MM HG (ref 40–50)
PH BLD: 7.33 [PH] (ref 7.35–7.45)
PH BLD: 7.33 [PH] (ref 7.35–7.45)
PH BLD: 7.35 [PH] (ref 7.35–7.45)
PH BLD: 7.35 [PH] (ref 7.35–7.45)
PH BLD: 7.37 [PH] (ref 7.35–7.45)
PH BLD: 7.38 [PH] (ref 7.35–7.45)
PH BLD: 7.38 [PH] (ref 7.35–7.45)
PH BLDA: 7.29 [PH] (ref 7.35–7.45)
PH BLDA: 7.3 [PH] (ref 7.35–7.45)
PH BLDA: 7.31 [PH] (ref 7.35–7.45)
PH BLDA: 7.32 [PH] (ref 7.35–7.45)
PH BLDA: 7.33 [PH] (ref 7.35–7.45)
PH BLDA: 7.34 [PH] (ref 7.35–7.45)
PH BLDA: 7.35 [PH] (ref 7.35–7.45)
PH BLDA: 7.37 [PH] (ref 7.35–7.45)
PH BLDA: 7.41 [PH] (ref 7.35–7.45)
PH BLDV: 7.31 [PH] (ref 7.32–7.43)
PH BLDV: 7.32 [PH] (ref 7.32–7.43)
PH BLDV: 7.33 [PH] (ref 7.32–7.43)
PH BLDV: 7.35 [PH] (ref 7.32–7.43)
PH BLDV: 7.37 [PH] (ref 7.32–7.43)
PHOSPHATE SERPL-MCNC: 3.4 MG/DL (ref 2.5–4.5)
PLATELET # BLD AUTO: 154 10E3/UL (ref 150–450)
PLATELET # BLD AUTO: 205 10E3/UL (ref 150–450)
PO2 BLD: 129 MM HG (ref 80–105)
PO2 BLD: 65 MM HG (ref 80–105)
PO2 BLD: 68 MM HG (ref 80–105)
PO2 BLD: 70 MM HG (ref 80–105)
PO2 BLD: 76 MM HG (ref 80–105)
PO2 BLD: 79 MM HG (ref 80–105)
PO2 BLD: 98 MM HG (ref 80–105)
PO2 BLDA: 231 MM HG (ref 80–105)
PO2 BLDA: 260 MM HG (ref 80–105)
PO2 BLDA: 311 MM HG (ref 80–105)
PO2 BLDA: 342 MM HG (ref 80–105)
PO2 BLDA: 356 MM HG (ref 80–105)
PO2 BLDA: 424 MM HG (ref 80–105)
PO2 BLDA: 505 MM HG (ref 80–105)
PO2 BLDA: 80 MM HG (ref 80–105)
PO2 BLDA: 82 MM HG (ref 80–105)
PO2 BLDV: 33 MM HG (ref 25–47)
PO2 BLDV: 33 MM HG (ref 25–47)
PO2 BLDV: 36 MM HG (ref 25–47)
PO2 BLDV: 42 MM HG (ref 25–47)
PO2 BLDV: 42 MM HG (ref 25–47)
POTASSIUM BLD-SCNC: 3.9 MMOL/L (ref 3.4–5.3)
POTASSIUM BLD-SCNC: 4 MMOL/L (ref 3.5–5)
POTASSIUM BLD-SCNC: 4 MMOL/L (ref 3.5–5)
POTASSIUM BLD-SCNC: 4.1 MMOL/L (ref 3.5–5)
POTASSIUM BLD-SCNC: 4.2 MMOL/L (ref 3.5–5)
POTASSIUM BLD-SCNC: 4.3 MMOL/L (ref 3.5–5)
POTASSIUM BLD-SCNC: 4.5 MMOL/L (ref 3.4–5.3)
POTASSIUM BLD-SCNC: 4.5 MMOL/L (ref 3.4–5.3)
POTASSIUM BLD-SCNC: 4.5 MMOL/L (ref 3.5–5)
POTASSIUM BLD-SCNC: 4.6 MMOL/L (ref 3.5–5)
POTASSIUM BLD-SCNC: 4.7 MMOL/L (ref 3.5–5)
POTASSIUM BLD-SCNC: 4.8 MMOL/L (ref 3.5–5)
POTASSIUM BLD-SCNC: 5 MMOL/L (ref 3.5–5)
PR INTERVAL - MUSE: 164 MS
PROT SERPL-MCNC: 5.9 G/DL (ref 6.8–8.8)
QRS DURATION - MUSE: 110 MS
QT - MUSE: 366 MS
QTC - MUSE: 455 MS
R AXIS - MUSE: 9 DEGREES
RBC # BLD AUTO: 4.13 10E6/UL (ref 4.4–5.9)
RBC # BLD AUTO: 4.47 10E6/UL (ref 4.4–5.9)
SODIUM BLD-SCNC: 140 MMOL/L (ref 133–144)
SODIUM BLD-SCNC: 141 MMOL/L (ref 133–144)
SODIUM BLD-SCNC: 141 MMOL/L (ref 133–144)
SODIUM BLD-SCNC: 142 MMOL/L (ref 133–144)
SODIUM SERPL-SCNC: 143 MMOL/L (ref 133–144)
SPECIMEN EXPIRATION DATE: NORMAL
SYSTOLIC BLOOD PRESSURE - MUSE: NORMAL MMHG
T AXIS - MUSE: -1 DEGREES
UNIT ABO/RH: NORMAL
UNIT NUMBER: NORMAL
UNIT STATUS: NORMAL
UNIT TYPE ISBT: 6200
VENTRICULAR RATE- MUSE: 93 BPM
WBC # BLD AUTO: 13.7 10E3/UL (ref 4–11)
WBC # BLD AUTO: 14.4 10E3/UL (ref 4–11)

## 2022-01-28 PROCEDURE — 88305 TISSUE EXAM BY PATHOLOGIST: CPT | Mod: TC | Performed by: SURGERY

## 2022-01-28 PROCEDURE — 85027 COMPLETE CBC AUTOMATED: CPT

## 2022-01-28 PROCEDURE — 82330 ASSAY OF CALCIUM: CPT

## 2022-01-28 PROCEDURE — 272N000001 HC OR GENERAL SUPPLY STERILE: Performed by: SURGERY

## 2022-01-28 PROCEDURE — 258N000003 HC RX IP 258 OP 636: Performed by: STUDENT IN AN ORGANIZED HEALTH CARE EDUCATION/TRAINING PROGRAM

## 2022-01-28 PROCEDURE — 85396 CLOTTING ASSAY WHOLE BLOOD: CPT | Performed by: SURGERY

## 2022-01-28 PROCEDURE — 94002 VENT MGMT INPAT INIT DAY: CPT

## 2022-01-28 PROCEDURE — 250N000024 HC ISOFLURANE, PER MIN: Performed by: SURGERY

## 2022-01-28 PROCEDURE — 33863 ASCENDING AORTIC GRAFT: CPT | Performed by: SURGERY

## 2022-01-28 PROCEDURE — 250N000011 HC RX IP 250 OP 636: Performed by: STUDENT IN AN ORGANIZED HEALTH CARE EDUCATION/TRAINING PROGRAM

## 2022-01-28 PROCEDURE — 999N000045 HC STATISTIC DAILY SWAN MONITORING

## 2022-01-28 PROCEDURE — 250N000009 HC RX 250: Performed by: STUDENT IN AN ORGANIZED HEALTH CARE EDUCATION/TRAINING PROGRAM

## 2022-01-28 PROCEDURE — 250N000009 HC RX 250: Performed by: SURGERY

## 2022-01-28 PROCEDURE — 250N000013 HC RX MED GY IP 250 OP 250 PS 637: Performed by: SURGERY

## 2022-01-28 PROCEDURE — 99291 CRITICAL CARE FIRST HOUR: CPT | Mod: 24 | Performed by: INTERNAL MEDICINE

## 2022-01-28 PROCEDURE — C1763 CONN TISS, NON-HUMAN: HCPCS | Performed by: SURGERY

## 2022-01-28 PROCEDURE — 82805 BLOOD GASES W/O2 SATURATION: CPT

## 2022-01-28 PROCEDURE — 5A1221Z PERFORMANCE OF CARDIAC OUTPUT, CONTINUOUS: ICD-10-PCS | Performed by: SURGERY

## 2022-01-28 PROCEDURE — 82330 ASSAY OF CALCIUM: CPT | Performed by: SURGERY

## 2022-01-28 PROCEDURE — 250N000011 HC RX IP 250 OP 636: Performed by: SURGERY

## 2022-01-28 PROCEDURE — 85610 PROTHROMBIN TIME: CPT | Performed by: SURGERY

## 2022-01-28 PROCEDURE — 93010 ELECTROCARDIOGRAM REPORT: CPT | Mod: 59 | Performed by: INTERNAL MEDICINE

## 2022-01-28 PROCEDURE — 02RX0JZ REPLACEMENT OF THORACIC AORTA, ASCENDING/ARCH WITH SYNTHETIC SUBSTITUTE, OPEN APPROACH: ICD-10-PCS | Performed by: SURGERY

## 2022-01-28 PROCEDURE — 360N000079 HC SURGERY LEVEL 6, PER MIN: Performed by: SURGERY

## 2022-01-28 PROCEDURE — 85018 HEMOGLOBIN: CPT | Performed by: SURGERY

## 2022-01-28 PROCEDURE — 84132 ASSAY OF SERUM POTASSIUM: CPT

## 2022-01-28 PROCEDURE — 83605 ASSAY OF LACTIC ACID: CPT | Performed by: SURGERY

## 2022-01-28 PROCEDURE — 999N000157 HC STATISTIC RCP TIME EA 10 MIN

## 2022-01-28 PROCEDURE — 85730 THROMBOPLASTIN TIME PARTIAL: CPT | Performed by: SURGERY

## 2022-01-28 PROCEDURE — 93503 INSERT/PLACE HEART CATHETER: CPT

## 2022-01-28 PROCEDURE — 4A133B3 MONITORING OF ARTERIAL PRESSURE, PULMONARY, PERCUTANEOUS APPROACH: ICD-10-PCS | Performed by: STUDENT IN AN ORGANIZED HEALTH CARE EDUCATION/TRAINING PROGRAM

## 2022-01-28 PROCEDURE — 410N000004: Performed by: SURGERY

## 2022-01-28 PROCEDURE — 71045 X-RAY EXAM CHEST 1 VIEW: CPT | Mod: 26 | Performed by: RADIOLOGY

## 2022-01-28 PROCEDURE — 4A1239Z MONITORING OF CARDIAC OUTPUT, PERCUTANEOUS APPROACH: ICD-10-PCS | Performed by: STUDENT IN AN ORGANIZED HEALTH CARE EDUCATION/TRAINING PROGRAM

## 2022-01-28 PROCEDURE — 258N000003 HC RX IP 258 OP 636: Performed by: SURGERY

## 2022-01-28 PROCEDURE — 84132 ASSAY OF SERUM POTASSIUM: CPT | Performed by: SURGERY

## 2022-01-28 PROCEDURE — 271N000002 HC RX 271: Performed by: STUDENT IN AN ORGANIZED HEALTH CARE EDUCATION/TRAINING PROGRAM

## 2022-01-28 PROCEDURE — 85384 FIBRINOGEN ACTIVITY: CPT | Performed by: SURGERY

## 2022-01-28 PROCEDURE — 82803 BLOOD GASES ANY COMBINATION: CPT

## 2022-01-28 PROCEDURE — 85014 HEMATOCRIT: CPT | Performed by: SURGERY

## 2022-01-28 PROCEDURE — 83735 ASSAY OF MAGNESIUM: CPT | Performed by: SURGERY

## 2022-01-28 PROCEDURE — 84100 ASSAY OF PHOSPHORUS: CPT | Performed by: SURGERY

## 2022-01-28 PROCEDURE — 82805 BLOOD GASES W/O2 SATURATION: CPT | Performed by: SURGERY

## 2022-01-28 PROCEDURE — 200N000002 HC R&B ICU UMMC

## 2022-01-28 PROCEDURE — 74018 RADEX ABDOMEN 1 VIEW: CPT | Mod: 26 | Performed by: RADIOLOGY

## 2022-01-28 PROCEDURE — 84295 ASSAY OF SERUM SODIUM: CPT

## 2022-01-28 PROCEDURE — 84295 ASSAY OF SERUM SODIUM: CPT | Performed by: SURGERY

## 2022-01-28 PROCEDURE — P9041 ALBUMIN (HUMAN),5%, 50ML: HCPCS

## 2022-01-28 PROCEDURE — 02HQ32Z INSERTION OF MONITORING DEVICE INTO RIGHT PULMONARY ARTERY, PERCUTANEOUS APPROACH: ICD-10-PCS | Performed by: STUDENT IN AN ORGANIZED HEALTH CARE EDUCATION/TRAINING PROGRAM

## 2022-01-28 PROCEDURE — 82805 BLOOD GASES W/O2 SATURATION: CPT | Performed by: STUDENT IN AN ORGANIZED HEALTH CARE EDUCATION/TRAINING PROGRAM

## 2022-01-28 PROCEDURE — 999N000141 HC STATISTIC PRE-PROCEDURE NURSING ASSESSMENT: Performed by: SURGERY

## 2022-01-28 PROCEDURE — 999N000155 HC STATISTIC RAPCV CVP MONITORING

## 2022-01-28 PROCEDURE — 999N000015 HC STATISTIC ARTERIAL MONITORING DAILY

## 2022-01-28 PROCEDURE — 272N000085 HC PACK CELL SAVER CSP: Performed by: SURGERY

## 2022-01-28 PROCEDURE — 250N000009 HC RX 250

## 2022-01-28 PROCEDURE — 88305 TISSUE EXAM BY PATHOLOGIST: CPT | Mod: 26 | Performed by: PATHOLOGY

## 2022-01-28 PROCEDURE — 82810 BLOOD GASES O2 SAT ONLY: CPT

## 2022-01-28 PROCEDURE — C1713 ANCHOR/SCREW BN/BN,TIS/BN: HCPCS | Performed by: SURGERY

## 2022-01-28 PROCEDURE — P9037 PLATE PHERES LEUKOREDU IRRAD: HCPCS | Performed by: SURGERY

## 2022-01-28 PROCEDURE — 278N000051 HC OR IMPLANT GENERAL: Performed by: SURGERY

## 2022-01-28 PROCEDURE — 86923 COMPATIBILITY TEST ELECTRIC: CPT | Performed by: SURGERY

## 2022-01-28 PROCEDURE — 999N000065 XR ABDOMEN PORT 1 VIEWS

## 2022-01-28 PROCEDURE — 250N000011 HC RX IP 250 OP 636

## 2022-01-28 PROCEDURE — 93010 ELECTROCARDIOGRAM REPORT: CPT | Mod: 76 | Performed by: INTERNAL MEDICINE

## 2022-01-28 PROCEDURE — 410N000003 HC PER-PERFUSION 1ST 30 MIN: Performed by: SURGERY

## 2022-01-28 PROCEDURE — 93005 ELECTROCARDIOGRAM TRACING: CPT

## 2022-01-28 PROCEDURE — 36415 COLL VENOUS BLD VENIPUNCTURE: CPT | Performed by: STUDENT IN AN ORGANIZED HEALTH CARE EDUCATION/TRAINING PROGRAM

## 2022-01-28 PROCEDURE — 71045 X-RAY EXAM CHEST 1 VIEW: CPT

## 2022-01-28 PROCEDURE — 02RF0JZ REPLACEMENT OF AORTIC VALVE WITH SYNTHETIC SUBSTITUTE, OPEN APPROACH: ICD-10-PCS | Performed by: SURGERY

## 2022-01-28 PROCEDURE — 272N000088 HC PUMP APP ADULT PERFUSION: Performed by: SURGERY

## 2022-01-28 PROCEDURE — 83605 ASSAY OF LACTIC ACID: CPT | Performed by: STUDENT IN AN ORGANIZED HEALTH CARE EDUCATION/TRAINING PROGRAM

## 2022-01-28 PROCEDURE — 86901 BLOOD TYPING SEROLOGIC RH(D): CPT | Performed by: STUDENT IN AN ORGANIZED HEALTH CARE EDUCATION/TRAINING PROGRAM

## 2022-01-28 PROCEDURE — 999N000065 XR CHEST PORT 1 VIEW

## 2022-01-28 PROCEDURE — 83605 ASSAY OF LACTIC ACID: CPT

## 2022-01-28 PROCEDURE — 370N000017 HC ANESTHESIA TECHNICAL FEE, PER MIN: Performed by: SURGERY

## 2022-01-28 DEVICE — COR-KNOT MINI® COMBO KIT
Type: IMPLANTABLE DEVICE | Site: HEART | Status: FUNCTIONAL
Brand: COR-KNOT MINI®

## 2022-01-28 DEVICE — BILEAFLET MECHANICAL HEART VALVE ATTACHED TO A VASCULAR PROSTHESIS IMPLANTED IN THE AORTIC POSITION
Type: IMPLANTABLE DEVICE | Site: HEART | Status: FUNCTIONAL
Brand: ON-X ASCENDING AORTIC PROSTHESIS

## 2022-01-28 DEVICE — COR-KNOT® QUICK LOAD® SINGLES
Type: IMPLANTABLE DEVICE | Site: HEART | Status: FUNCTIONAL
Brand: COR-KNOT® QUICK LOAD®

## 2022-01-28 DEVICE — XENOSURE BIOLOGIC PATCH, 6CM X 8CM, EIFU
Type: IMPLANTABLE DEVICE | Site: HEART | Status: FUNCTIONAL
Brand: XENOSURE BIOLOGIC PATCH

## 2022-01-28 DEVICE — COR-KNOT® QUICK LOAD® 6-POUCH
Type: IMPLANTABLE DEVICE | Site: HEART | Status: FUNCTIONAL
Brand: COR-KNOT® QUICK LOAD®

## 2022-01-28 RX ORDER — PHENYLEPHRINE HCL IN 0.9% NACL 50MG/250ML
.1-6 PLASTIC BAG, INJECTION (ML) INTRAVENOUS CONTINUOUS
Status: DISCONTINUED | OUTPATIENT
Start: 2022-01-28 | End: 2022-01-28

## 2022-01-28 RX ORDER — CEFAZOLIN SODIUM IN 0.9 % NACL 3 G/100 ML
3 INTRAVENOUS SOLUTION, PIGGYBACK (ML) INTRAVENOUS
Status: COMPLETED | OUTPATIENT
Start: 2022-01-28 | End: 2022-01-28

## 2022-01-28 RX ORDER — HEPARIN SODIUM 5000 [USP'U]/.5ML
5000 INJECTION, SOLUTION INTRAVENOUS; SUBCUTANEOUS EVERY 8 HOURS
Status: DISCONTINUED | OUTPATIENT
Start: 2022-01-29 | End: 2022-01-30

## 2022-01-28 RX ORDER — HYDROMORPHONE HYDROCHLORIDE 1 MG/ML
0.2 INJECTION, SOLUTION INTRAMUSCULAR; INTRAVENOUS; SUBCUTANEOUS
Status: DISCONTINUED | OUTPATIENT
Start: 2022-01-28 | End: 2022-02-03

## 2022-01-28 RX ORDER — DEXMEDETOMIDINE HYDROCHLORIDE 4 UG/ML
0.2-1.2 INJECTION, SOLUTION INTRAVENOUS CONTINUOUS
Status: DISCONTINUED | OUTPATIENT
Start: 2022-01-28 | End: 2022-01-28

## 2022-01-28 RX ORDER — DEXMEDETOMIDINE HYDROCHLORIDE 4 UG/ML
.2-.7 INJECTION, SOLUTION INTRAVENOUS CONTINUOUS
Status: DISCONTINUED | OUTPATIENT
Start: 2022-01-28 | End: 2022-01-31

## 2022-01-28 RX ORDER — HEPARIN SODIUM 1000 [USP'U]/ML
INJECTION, SOLUTION INTRAVENOUS; SUBCUTANEOUS PRN
Status: DISCONTINUED | OUTPATIENT
Start: 2022-01-28 | End: 2022-01-28

## 2022-01-28 RX ORDER — NICARDIPINE HYDROCHLORIDE 0.2 MG/ML
0-15 INJECTION INTRAVENOUS CONTINUOUS
Status: DISCONTINUED | OUTPATIENT
Start: 2022-01-28 | End: 2022-01-30

## 2022-01-28 RX ORDER — ONDANSETRON 4 MG/1
4 TABLET, ORALLY DISINTEGRATING ORAL EVERY 6 HOURS PRN
Status: DISCONTINUED | OUTPATIENT
Start: 2022-01-28 | End: 2022-03-04 | Stop reason: HOSPADM

## 2022-01-28 RX ORDER — FLUMAZENIL 0.1 MG/ML
0.2 INJECTION, SOLUTION INTRAVENOUS
Status: DISCONTINUED | OUTPATIENT
Start: 2022-01-28 | End: 2022-01-28

## 2022-01-28 RX ORDER — NALOXONE HYDROCHLORIDE 0.4 MG/ML
0.2 INJECTION, SOLUTION INTRAMUSCULAR; INTRAVENOUS; SUBCUTANEOUS
Status: DISCONTINUED | OUTPATIENT
Start: 2022-01-28 | End: 2022-02-26

## 2022-01-28 RX ORDER — FIBRINOGEN (HUMAN) 700-1300MG
1100 KIT INTRAVENOUS
Status: DISCONTINUED | OUTPATIENT
Start: 2022-01-28 | End: 2022-01-28

## 2022-01-28 RX ORDER — FAMOTIDINE 20 MG/1
20 TABLET, FILM COATED ORAL
Status: DISCONTINUED | OUTPATIENT
Start: 2022-01-28 | End: 2022-01-28

## 2022-01-28 RX ORDER — CALCIUM GLUCONATE 20 MG/ML
2 INJECTION, SOLUTION INTRAVENOUS
Status: ACTIVE | OUTPATIENT
Start: 2022-01-28 | End: 2022-01-31

## 2022-01-28 RX ORDER — CALCIUM GLUCONATE 20 MG/ML
1 INJECTION, SOLUTION INTRAVENOUS
Status: ACTIVE | OUTPATIENT
Start: 2022-01-28 | End: 2022-01-31

## 2022-01-28 RX ORDER — CEFAZOLIN SODIUM 2 G/100ML
2 INJECTION, SOLUTION INTRAVENOUS EVERY 8 HOURS
Status: COMPLETED | OUTPATIENT
Start: 2022-01-28 | End: 2022-01-29

## 2022-01-28 RX ORDER — LIDOCAINE HYDROCHLORIDE 20 MG/ML
INJECTION, SOLUTION INFILTRATION; PERINEURAL PRN
Status: DISCONTINUED | OUTPATIENT
Start: 2022-01-28 | End: 2022-01-28

## 2022-01-28 RX ORDER — LABETALOL HYDROCHLORIDE 5 MG/ML
20 INJECTION, SOLUTION INTRAVENOUS
Status: DISCONTINUED | OUTPATIENT
Start: 2022-01-28 | End: 2022-02-02

## 2022-01-28 RX ORDER — PANTOPRAZOLE SODIUM 40 MG/1
40 TABLET, DELAYED RELEASE ORAL DAILY
Status: DISCONTINUED | OUTPATIENT
Start: 2022-01-29 | End: 2022-02-12

## 2022-01-28 RX ORDER — PROPOFOL 10 MG/ML
INJECTION, EMULSION INTRAVENOUS PRN
Status: DISCONTINUED | OUTPATIENT
Start: 2022-01-28 | End: 2022-01-28

## 2022-01-28 RX ORDER — POLYETHYLENE GLYCOL 3350 17 G/17G
17 POWDER, FOR SOLUTION ORAL DAILY
Status: DISCONTINUED | OUTPATIENT
Start: 2022-01-29 | End: 2022-02-09 | Stop reason: CLARIF

## 2022-01-28 RX ORDER — NALOXONE HYDROCHLORIDE 0.4 MG/ML
0.4 INJECTION, SOLUTION INTRAMUSCULAR; INTRAVENOUS; SUBCUTANEOUS
Status: DISCONTINUED | OUTPATIENT
Start: 2022-01-28 | End: 2022-02-26

## 2022-01-28 RX ORDER — ACETAMINOPHEN 325 MG/1
975 TABLET ORAL EVERY 8 HOURS
Status: DISPENSED | OUTPATIENT
Start: 2022-01-28 | End: 2022-01-31

## 2022-01-28 RX ORDER — ASPIRIN 81 MG/1
162 TABLET, CHEWABLE ORAL
Status: DISCONTINUED | OUTPATIENT
Start: 2022-01-28 | End: 2022-01-28

## 2022-01-28 RX ORDER — NALOXONE HYDROCHLORIDE 0.4 MG/ML
0.2 INJECTION, SOLUTION INTRAMUSCULAR; INTRAVENOUS; SUBCUTANEOUS
Status: DISCONTINUED | OUTPATIENT
Start: 2022-01-28 | End: 2022-01-28

## 2022-01-28 RX ORDER — NALOXONE HYDROCHLORIDE 0.4 MG/ML
0.4 INJECTION, SOLUTION INTRAMUSCULAR; INTRAVENOUS; SUBCUTANEOUS
Status: DISCONTINUED | OUTPATIENT
Start: 2022-01-28 | End: 2022-01-28

## 2022-01-28 RX ORDER — GABAPENTIN 300 MG/1
300 CAPSULE ORAL
Status: COMPLETED | OUTPATIENT
Start: 2022-01-28 | End: 2022-01-28

## 2022-01-28 RX ORDER — NICOTINE POLACRILEX 4 MG
15-30 LOZENGE BUCCAL
Status: DISCONTINUED | OUTPATIENT
Start: 2022-01-28 | End: 2022-02-09

## 2022-01-28 RX ORDER — HYDRALAZINE HYDROCHLORIDE 20 MG/ML
10 INJECTION INTRAMUSCULAR; INTRAVENOUS EVERY 30 MIN PRN
Status: DISCONTINUED | OUTPATIENT
Start: 2022-01-28 | End: 2022-02-02

## 2022-01-28 RX ORDER — METHOCARBAMOL 750 MG/1
750 TABLET, FILM COATED ORAL EVERY 6 HOURS PRN
Status: DISCONTINUED | OUTPATIENT
Start: 2022-01-28 | End: 2022-03-04 | Stop reason: HOSPADM

## 2022-01-28 RX ORDER — SODIUM CHLORIDE, SODIUM LACTATE, POTASSIUM CHLORIDE, CALCIUM CHLORIDE 600; 310; 30; 20 MG/100ML; MG/100ML; MG/100ML; MG/100ML
INJECTION, SOLUTION INTRAVENOUS CONTINUOUS PRN
Status: DISCONTINUED | OUTPATIENT
Start: 2022-01-28 | End: 2022-01-28

## 2022-01-28 RX ORDER — ACETAMINOPHEN 325 MG/1
975 TABLET ORAL ONCE
Status: COMPLETED | OUTPATIENT
Start: 2022-01-28 | End: 2022-01-28

## 2022-01-28 RX ORDER — OXYCODONE HYDROCHLORIDE 5 MG/1
5 TABLET ORAL EVERY 4 HOURS PRN
Status: DISCONTINUED | OUTPATIENT
Start: 2022-01-28 | End: 2022-02-26

## 2022-01-28 RX ORDER — ACETAMINOPHEN 325 MG/1
650 TABLET ORAL EVERY 4 HOURS PRN
Status: DISCONTINUED | OUTPATIENT
Start: 2022-01-31 | End: 2022-02-05

## 2022-01-28 RX ORDER — SODIUM CHLORIDE, SODIUM GLUCONATE, SODIUM ACETATE, POTASSIUM CHLORIDE AND MAGNESIUM CHLORIDE 526; 502; 368; 37; 30 MG/100ML; MG/100ML; MG/100ML; MG/100ML; MG/100ML
250 INJECTION, SOLUTION INTRAVENOUS EVERY 10 MIN PRN
Status: DISCONTINUED | OUTPATIENT
Start: 2022-01-28 | End: 2022-02-08

## 2022-01-28 RX ORDER — BISACODYL 10 MG
10 SUPPOSITORY, RECTAL RECTAL DAILY PRN
Status: DISCONTINUED | OUTPATIENT
Start: 2022-01-28 | End: 2022-03-04 | Stop reason: HOSPADM

## 2022-01-28 RX ORDER — MUPIROCIN 20 MG/G
0.5 OINTMENT TOPICAL 2 TIMES DAILY
Status: DISCONTINUED | OUTPATIENT
Start: 2022-01-28 | End: 2022-02-01 | Stop reason: CLARIF

## 2022-01-28 RX ORDER — PROCHLORPERAZINE MALEATE 5 MG
10 TABLET ORAL EVERY 6 HOURS PRN
Status: DISCONTINUED | OUTPATIENT
Start: 2022-01-28 | End: 2022-03-04 | Stop reason: HOSPADM

## 2022-01-28 RX ORDER — SODIUM CHLORIDE, SODIUM GLUCONATE, SODIUM ACETATE, POTASSIUM CHLORIDE AND MAGNESIUM CHLORIDE 526; 502; 368; 37; 30 MG/100ML; MG/100ML; MG/100ML; MG/100ML; MG/100ML
INJECTION, SOLUTION INTRAVENOUS CONTINUOUS PRN
Status: DISCONTINUED | OUTPATIENT
Start: 2022-01-28 | End: 2022-01-28

## 2022-01-28 RX ORDER — AMOXICILLIN 250 MG
1 CAPSULE ORAL 2 TIMES DAILY
Status: DISCONTINUED | OUTPATIENT
Start: 2022-01-28 | End: 2022-02-09 | Stop reason: CLARIF

## 2022-01-28 RX ORDER — FENTANYL CITRATE 50 UG/ML
25-50 INJECTION, SOLUTION INTRAMUSCULAR; INTRAVENOUS
Status: DISCONTINUED | OUTPATIENT
Start: 2022-01-28 | End: 2022-01-28

## 2022-01-28 RX ORDER — NOREPINEPHRINE BITARTRATE 0.06 MG/ML
.01-.6 INJECTION, SOLUTION INTRAVENOUS CONTINUOUS
Status: DISCONTINUED | OUTPATIENT
Start: 2022-01-28 | End: 2022-01-28

## 2022-01-28 RX ORDER — ASPIRIN 81 MG/1
81 TABLET, CHEWABLE ORAL DAILY
Status: DISCONTINUED | OUTPATIENT
Start: 2022-01-29 | End: 2022-03-04 | Stop reason: HOSPADM

## 2022-01-28 RX ORDER — DEXTROSE MONOHYDRATE 25 G/50ML
25-50 INJECTION, SOLUTION INTRAVENOUS
Status: DISCONTINUED | OUTPATIENT
Start: 2022-01-28 | End: 2022-02-09

## 2022-01-28 RX ORDER — CHLORHEXIDINE GLUCONATE ORAL RINSE 1.2 MG/ML
10 SOLUTION DENTAL ONCE
Status: COMPLETED | OUTPATIENT
Start: 2022-01-28 | End: 2022-01-28

## 2022-01-28 RX ORDER — PROPOFOL 10 MG/ML
5-75 INJECTION, EMULSION INTRAVENOUS CONTINUOUS
Status: DISCONTINUED | OUTPATIENT
Start: 2022-01-28 | End: 2022-02-13

## 2022-01-28 RX ORDER — OXYCODONE HYDROCHLORIDE 10 MG/1
10 TABLET ORAL EVERY 4 HOURS PRN
Status: DISCONTINUED | OUTPATIENT
Start: 2022-01-28 | End: 2022-02-26

## 2022-01-28 RX ORDER — ONDANSETRON 2 MG/ML
4 INJECTION INTRAMUSCULAR; INTRAVENOUS EVERY 6 HOURS PRN
Status: DISCONTINUED | OUTPATIENT
Start: 2022-01-28 | End: 2022-02-26

## 2022-01-28 RX ORDER — HYDROMORPHONE HYDROCHLORIDE 1 MG/ML
0.4 INJECTION, SOLUTION INTRAMUSCULAR; INTRAVENOUS; SUBCUTANEOUS
Status: DISCONTINUED | OUTPATIENT
Start: 2022-01-28 | End: 2022-02-03

## 2022-01-28 RX ORDER — PROTAMINE SULFATE 10 MG/ML
INJECTION, SOLUTION INTRAVENOUS PRN
Status: DISCONTINUED | OUTPATIENT
Start: 2022-01-28 | End: 2022-01-28

## 2022-01-28 RX ORDER — ASPIRIN 81 MG/1
81 TABLET, CHEWABLE ORAL
Status: DISCONTINUED | OUTPATIENT
Start: 2022-01-28 | End: 2022-01-28

## 2022-01-28 RX ORDER — LIDOCAINE 40 MG/G
CREAM TOPICAL
Status: DISCONTINUED | OUTPATIENT
Start: 2022-01-28 | End: 2022-03-04 | Stop reason: HOSPADM

## 2022-01-28 RX ORDER — FIBRINOGEN (HUMAN) 700-1300MG
1100 KIT INTRAVENOUS
Status: COMPLETED | OUTPATIENT
Start: 2022-01-28 | End: 2022-01-28

## 2022-01-28 RX ORDER — CEFAZOLIN SODIUM IN 0.9 % NACL 3 G/100 ML
3 INTRAVENOUS SOLUTION, PIGGYBACK (ML) INTRAVENOUS SEE ADMIN INSTRUCTIONS
Status: DISCONTINUED | OUTPATIENT
Start: 2022-01-28 | End: 2022-01-28

## 2022-01-28 RX ORDER — PROPOFOL 10 MG/ML
INJECTION, EMULSION INTRAVENOUS CONTINUOUS PRN
Status: DISCONTINUED | OUTPATIENT
Start: 2022-01-28 | End: 2022-01-28

## 2022-01-28 RX ORDER — LIDOCAINE 40 MG/G
CREAM TOPICAL
Status: DISCONTINUED | OUTPATIENT
Start: 2022-01-28 | End: 2022-01-28

## 2022-01-28 RX ADMIN — FENTANYL CITRATE 400 MCG: 50 INJECTION INTRAMUSCULAR; INTRAVENOUS at 07:47

## 2022-01-28 RX ADMIN — SODIUM CHLORIDE 1.25 G/HR: 900 INJECTION, SOLUTION INTRAVENOUS at 09:13

## 2022-01-28 RX ADMIN — SODIUM CHLORIDE, POTASSIUM CHLORIDE, SODIUM LACTATE AND CALCIUM CHLORIDE: 600; 310; 30; 20 INJECTION, SOLUTION INTRAVENOUS at 07:39

## 2022-01-28 RX ADMIN — ROCURONIUM BROMIDE 50 MG: 50 INJECTION, SOLUTION INTRAVENOUS at 12:11

## 2022-01-28 RX ADMIN — PROTAMINE SULFATE 50 MG: 10 INJECTION, SOLUTION INTRAVENOUS at 12:32

## 2022-01-28 RX ADMIN — PROTAMINE SULFATE 50 MG: 10 INJECTION, SOLUTION INTRAVENOUS at 12:35

## 2022-01-28 RX ADMIN — FENTANYL CITRATE 50 MCG: 50 INJECTION INTRAMUSCULAR; INTRAVENOUS at 07:17

## 2022-01-28 RX ADMIN — ROCURONIUM BROMIDE 100 MG: 50 INJECTION, SOLUTION INTRAVENOUS at 07:47

## 2022-01-28 RX ADMIN — NOREPINEPHRINE BITARTRATE 0.04 MCG/KG/MIN: 1 INJECTION, SOLUTION, CONCENTRATE INTRAVENOUS at 12:14

## 2022-01-28 RX ADMIN — Medication 3 G: at 12:15

## 2022-01-28 RX ADMIN — PHENYLEPHRINE HYDROCHLORIDE 100 MCG: 10 INJECTION INTRAVENOUS at 07:47

## 2022-01-28 RX ADMIN — SODIUM BICARBONATE 50 MEQ: 84 INJECTION INTRAVENOUS at 17:19

## 2022-01-28 RX ADMIN — PROTAMINE SULFATE 50 MG: 10 INJECTION, SOLUTION INTRAVENOUS at 12:36

## 2022-01-28 RX ADMIN — SODIUM CHLORIDE 7.5 G: 900 INJECTION, SOLUTION INTRAVENOUS at 07:57

## 2022-01-28 RX ADMIN — NICARDIPINE HYDROCHLORIDE 2.5 MG/HR: 0.2 INJECTION, SOLUTION INTRAVENOUS at 15:52

## 2022-01-28 RX ADMIN — NICARDIPINE HYDROCHLORIDE 2.5 MG/HR: 0.2 INJECTION INTRAVENOUS at 15:52

## 2022-01-28 RX ADMIN — ROCURONIUM BROMIDE 50 MG: 50 INJECTION, SOLUTION INTRAVENOUS at 09:23

## 2022-01-28 RX ADMIN — EPINEPHRINE 0.01 MCG/KG/MIN: 1 INJECTION PARENTERAL at 20:10

## 2022-01-28 RX ADMIN — ACETAMINOPHEN 975 MG: 325 TABLET, FILM COATED ORAL at 06:37

## 2022-01-28 RX ADMIN — PROTAMINE SULFATE 50 MG: 10 INJECTION, SOLUTION INTRAVENOUS at 12:34

## 2022-01-28 RX ADMIN — PROPOFOL 150 MG: 10 INJECTION, EMULSION INTRAVENOUS at 07:47

## 2022-01-28 RX ADMIN — FENTANYL CITRATE 100 MCG: 50 INJECTION INTRAMUSCULAR; INTRAVENOUS at 08:46

## 2022-01-28 RX ADMIN — PROTAMINE SULFATE 20 MG: 10 INJECTION, SOLUTION INTRAVENOUS at 12:26

## 2022-01-28 RX ADMIN — HYDROMORPHONE HYDROCHLORIDE 0.2 MG: 1 INJECTION, SOLUTION INTRAMUSCULAR; INTRAVENOUS; SUBCUTANEOUS at 17:47

## 2022-01-28 RX ADMIN — FENTANYL CITRATE 250 MCG: 50 INJECTION INTRAMUSCULAR; INTRAVENOUS at 12:11

## 2022-01-28 RX ADMIN — HUMAN INSULIN 3 UNITS/HR: 100 INJECTION, SOLUTION SUBCUTANEOUS at 12:17

## 2022-01-28 RX ADMIN — DEXMEDETOMIDINE HYDROCHLORIDE 0.5 MCG/KG/HR: 4 INJECTION, SOLUTION INTRAVENOUS at 15:38

## 2022-01-28 RX ADMIN — PROTAMINE SULFATE 50 MG: 10 INJECTION, SOLUTION INTRAVENOUS at 12:30

## 2022-01-28 RX ADMIN — DEXMEDETOMIDINE HYDROCHLORIDE 0.7 MCG/KG/HR: 4 INJECTION, SOLUTION INTRAVENOUS at 19:30

## 2022-01-28 RX ADMIN — CHLORHEXIDINE GLUCONATE 10 ML: 1.2 SOLUTION ORAL at 06:38

## 2022-01-28 RX ADMIN — PROTAMINE SULFATE 50 MG: 10 INJECTION, SOLUTION INTRAVENOUS at 12:31

## 2022-01-28 RX ADMIN — HYDROMORPHONE HYDROCHLORIDE 0.5 MG: 1 INJECTION, SOLUTION INTRAMUSCULAR; INTRAVENOUS; SUBCUTANEOUS at 13:47

## 2022-01-28 RX ADMIN — DEXMEDETOMIDINE HYDROCHLORIDE 0.3 MCG/KG/HR: 400 INJECTION INTRAVENOUS at 12:15

## 2022-01-28 RX ADMIN — PROTAMINE SULFATE 50 MG: 10 INJECTION, SOLUTION INTRAVENOUS at 12:28

## 2022-01-28 RX ADMIN — CEFAZOLIN SODIUM 2 G: 2 INJECTION, SOLUTION INTRAVENOUS at 19:21

## 2022-01-28 RX ADMIN — PROTAMINE SULFATE 50 MG: 10 INJECTION, SOLUTION INTRAVENOUS at 12:29

## 2022-01-28 RX ADMIN — EPINEPHRINE 0.03 MCG/KG/MIN: 1 INJECTION PARENTERAL at 12:14

## 2022-01-28 RX ADMIN — MIDAZOLAM 2 MG: 1 INJECTION INTRAMUSCULAR; INTRAVENOUS at 07:16

## 2022-01-28 RX ADMIN — LIDOCAINE HYDROCHLORIDE 100 MG: 20 INJECTION, SOLUTION INFILTRATION; PERINEURAL at 07:47

## 2022-01-28 RX ADMIN — PROPOFOL 50 MCG/KG/MIN: 10 INJECTION, EMULSION INTRAVENOUS at 14:00

## 2022-01-28 RX ADMIN — Medication 3 G: at 08:20

## 2022-01-28 RX ADMIN — PROPOFOL 10 MCG/KG/MIN: 10 INJECTION, EMULSION INTRAVENOUS at 20:10

## 2022-01-28 RX ADMIN — FENTANYL CITRATE 250 MCG: 50 INJECTION INTRAMUSCULAR; INTRAVENOUS at 12:40

## 2022-01-28 RX ADMIN — HYDROMORPHONE HYDROCHLORIDE 0.4 MG: 1 INJECTION, SOLUTION INTRAMUSCULAR; INTRAVENOUS; SUBCUTANEOUS at 19:42

## 2022-01-28 RX ADMIN — Medication: at 16:14

## 2022-01-28 RX ADMIN — ROCURONIUM BROMIDE 50 MG: 50 INJECTION, SOLUTION INTRAVENOUS at 08:43

## 2022-01-28 RX ADMIN — SODIUM CHLORIDE, SODIUM GLUCONATE, SODIUM ACETATE, POTASSIUM CHLORIDE AND MAGNESIUM CHLORIDE: 526; 502; 368; 37; 30 INJECTION, SOLUTION INTRAVENOUS at 07:46

## 2022-01-28 RX ADMIN — PROTAMINE SULFATE 100 MG: 10 INJECTION, SOLUTION INTRAVENOUS at 12:37

## 2022-01-28 RX ADMIN — MUPIROCIN 0.5 G: 20 OINTMENT TOPICAL at 20:20

## 2022-01-28 RX ADMIN — GABAPENTIN 300 MG: 300 CAPSULE ORAL at 06:37

## 2022-01-28 RX ADMIN — PROTAMINE SULFATE 50 MG: 10 INJECTION, SOLUTION INTRAVENOUS at 12:33

## 2022-01-28 RX ADMIN — FIBRINOGEN (HUMAN) 1100 MG: KIT INTRAVENOUS at 13:36

## 2022-01-28 RX ADMIN — HUMAN INSULIN 1 UNITS/HR: 100 INJECTION, SOLUTION SUBCUTANEOUS at 15:38

## 2022-01-28 RX ADMIN — ROCURONIUM BROMIDE 50 MG: 50 INJECTION, SOLUTION INTRAVENOUS at 13:12

## 2022-01-28 RX ADMIN — DEXMEDETOMIDINE HYDROCHLORIDE 0.7 MCG/KG/HR: 4 INJECTION, SOLUTION INTRAVENOUS at 22:50

## 2022-01-28 RX ADMIN — FENTANYL CITRATE 200 MCG: 50 INJECTION INTRAMUSCULAR; INTRAVENOUS at 13:27

## 2022-01-28 RX ADMIN — HEPARIN SODIUM 60000 UNITS: 1000 INJECTION INTRAVENOUS; SUBCUTANEOUS at 08:59

## 2022-01-28 RX ADMIN — PROTAMINE SULFATE 30 MG: 10 INJECTION, SOLUTION INTRAVENOUS at 12:27

## 2022-01-28 RX ADMIN — HYDROMORPHONE HYDROCHLORIDE 0.5 MG: 1 INJECTION, SOLUTION INTRAMUSCULAR; INTRAVENOUS; SUBCUTANEOUS at 13:45

## 2022-01-28 RX ADMIN — SUGAMMADEX 400 MG: 100 INJECTION, SOLUTION INTRAVENOUS at 14:22

## 2022-01-28 ASSESSMENT — ACTIVITIES OF DAILY LIVING (ADL)
ADLS_ACUITY_SCORE: 3
ADLS_ACUITY_SCORE: 11
ADLS_ACUITY_SCORE: 3
ADLS_ACUITY_SCORE: 11
ADLS_ACUITY_SCORE: 3
ADLS_ACUITY_SCORE: 7
ADLS_ACUITY_SCORE: 11
ADLS_ACUITY_SCORE: 3
ADLS_ACUITY_SCORE: 11

## 2022-01-28 ASSESSMENT — MIFFLIN-ST. JEOR: SCORE: 2699.63

## 2022-01-28 NOTE — OR NURSING
Pt received a preop block with no s/s complications.  VSS on 2L nasal cannula.  Pt received 2mg Versed and 50mcg Fentanyl.

## 2022-01-28 NOTE — ANESTHESIA PROCEDURE NOTES
Erector spinae Procedure Note    Pre-Procedure   Staff -        Anesthesiologist:  Bereket Rios MD       Resident/Fellow: Michael De La Cruz MD       Performed By: resident       Location: pre-op       Procedure Start/Stop Times: 1/28/2022 7:07 AM and 1/28/2022 7:30 AM       Pre-Anesthestic Checklist: patient identified, IV checked, site marked, risks and benefits discussed, informed consent, monitors and equipment checked, pre-op evaluation, at physician/surgeon's request and post-op pain management  Timeout:       Correct Patient: Yes        Correct Procedure: Yes        Correct Site: Yes        Correct Position: Yes        Correct Laterality: Yes        Site Marked: Yes  Procedure Documentation  Procedure: Erector spinae       Diagnosis: POST OPERATIVE PAIN       Laterality: bilateral       Patient Position: prone       Skin prep: Chloraprep       Insertion Site: T5-6.       Needle Type: Touhy needle       Needle Gauge: 20.        Needle Length (Inches): 3.13        Catheter: 18 G.         Catheter threaded easily.         Threaded 11 cm at skin.         Ultrasound guided       1. Ultrasound was used to identify targeted nerve, plexus, vascular marker, or fascial plane and place a needle adjacent to it in real-time.       2. Ultrasound was used to visualize the spread of anesthetic in close proximity to the above referenced structure.       3. A permanent image is entered into the patient's record.    Assessment/Narrative         The placement was negative for: blood aspirated, painful injection and site bleeding       Paresthesias: No.     Bolus given via..        Secured via Tegaderm and Dermabond.        Complications: none     Comments:  Bilateral erector spinae catheters at T5-6 threaded to 11cm, in space 3cm

## 2022-01-28 NOTE — LETTER
Health Information Management Services               Recipient:  ROHIT THOMASON  [facility]      Sender:  ECHO Davalos  [phone]        Date: February 25, 2022  Patient Name:  Chuy Varner  Routing Message:    Please review for TCU placement. Pt will be medically ready to discharge early next week.  IV bivalirudin will be discontinued prior to discharge; bridging to warafin. Pt will have 24 hr supervision/assistance once he returns home. Thank you!          The documents accompanying this notice contain confidential information belonging to the sender.  This information is intended only for the use of the individual or entity named above.  The authorized recipient of this information is prohibited from disclosing this information to any other party and is required to destroy the information after its stated need has been fulfilled, unless otherwise required by state law.      If you are not the intended recipient, you are hereby notified that any disclosure, copy, distribution or action taken in reliance on the contents of these documents is strictly prohibited.  If you have received this document in error, please return it by fax to [phone] with a note on the cover sheet explaining why you are returning it (e.g. not your patient, not your provider, etc.).  If you need further assistance, please call [facility] Centralized Transcription at [phone].  Documents may also be returned by mail to [employer], , [address], [place], [place] [address].

## 2022-01-28 NOTE — PLAN OF CARE
Admitted/transferred from: OR  Reason for admission/transfer: Post-op care  2 RN skin assessment: completed by Wen CAMARA and Luis Carlos REID   Result of skin assessment and interventions/actions: Chest incision, rash to FA, back and chest, chest tubes, and lines.   Height, weight, drug calc weight: Done  Patient belongings (see Flowsheet)  MDRO education added to care plan: N/A  ?     Major Shift Events:  Pt admitted to 4E following AVR and aortic root replacement.     Labs/Protocols: High K, Mag and Phos protocols. No replacement needed this shift. Lactic acid down trending. 1 amp bicarb given per MD order.   Neuro: Sedation weaned to off. Follows commands. Moves all extremities. PERRLA. Afebrile. Bilateral ES catheters at 7. PRN dilaudid given x1 with improvement.   Cardiac: SR. SBP goal <110. Mechanical valve click. Palpable pulses throughout. FREDA Numbers CVP: 12, PA 27/13, SVO2 66, CI 2.7, SVR: 785.   Respiratory: CMV 60%, 600, 16, 8. Pressure support 7/5 1720-present. Lung sounds clear/diminished. Chest tubes x3, all to -20 suction with minimal dark red output.   GI/: NPO. Bowel sounds not audible. God[place] in place with adequate urine output.   Skin: Chest incision with wound vac. Chest tube sites. Rash to chest, forearms and back, baseline per pt's S.O.   LDAs: R internal jugular MAC with [place]. L PIV. L radial A line.   GTTs: None.  Diet: NPO pending bedside swallow eval.   Activity: Bedrest.   Teaching: Patient and girlfriend Debbie educated on plan of care and cares throughout shift.     Plan: Continue to pressure support with hopes to extubate this evening.   For vital signs and complete assessments, please see documentation flowsheets.

## 2022-01-28 NOTE — PROGRESS NOTES
S: pt post op from procedure, plan for fast track, has not received bolus intraop.    O: patient intubated but sedation weaning.    A/P: 10ml 0.25% bupivacaine per catheter bolus, 20ml total at 1500

## 2022-01-28 NOTE — LETTER
Health Information Management Services               Recipient:  ROHIT GARIBAY  [facility]        Sender:  ECHO Davalos  [phone]        Date: February 25, 2022  Patient Name:  Chuy Varner  Routing Message:    Please review for TCU placement. Pt will be medically ready to discharge early next week.  IV bivalirudin will be discontinued prior to discharge; bridging to warafin. Pt will have 24 hr supervision/assistance once he returns home. Thank you!          The documents accompanying this notice contain confidential information belonging to the sender.  This information is intended only for the use of the individual or entity named above.  The authorized recipient of this information is prohibited from disclosing this information to any other party and is required to destroy the information after its stated need has been fulfilled, unless otherwise required by state law.      If you are not the intended recipient, you are hereby notified that any disclosure, copy, distribution or action taken in reliance on the contents of these documents is strictly prohibited.  If you have received this document in error, please return it by fax to [phone] with a note on the cover sheet explaining why you are returning it (e.g. not your patient, not your provider, etc.).  If you need further assistance, please call [facility] Centralized Transcription at [phone].  Documents may also be returned by mail to [employer], , [address], [place], [place] [address].

## 2022-01-28 NOTE — ANESTHESIA PROCEDURE NOTES
Arterial Line Procedure Note    Pre-Procedure   Staff -        Anesthesiologist:  Adam Ford MD       Resident/Fellow: Suad Julien MD       Performed By: resident       Location: OR       Pre-Anesthestic Checklist: patient identified, IV checked, risks and benefits discussed, informed consent, monitors and equipment checked, pre-op evaluation and at physician/surgeon's request  Procedure   Procedure: arterial line       Laterality: left       Insertion Site: radial.  Sterile Prep        Standard elements of sterile barrier followed       Skin prep: Chloraprep  Insertion/Injection        Technique: ultrasound guided        1. Ultrasound was used to evaluate the access site.       2. Artery evaluated via ultrasound for patency/adequacy.       3. Using real-time ultrasound the needle/catheter was observed entering the artery/vein.       Catheter Type/Size: 20 G, 12 cm  Narrative        Tegaderm dressing used.       Complications: None apparent,        Arterial waveform: Yes        IBP within 10% of NIBP: Yes

## 2022-01-28 NOTE — LETTER
Health Information Management Services               Recipient:  ROHIT BECKWITH  [facility]        Sender:  ECHO Davalos [phone]        Date: February 25, 2022  Patient Name:  Chuy Varner  Routing Message:    Please review for TCU placement. Pt will be medically ready to discharge early next week.  IV bivalirudin will be discontinued prior to discharge; bridging to warafin. Pt will have 24 hr supervision/assistance once he returns home. Thank you!          The documents accompanying this notice contain confidential information belonging to the sender.  This information is intended only for the use of the individual or entity named above.  The authorized recipient of this information is prohibited from disclosing this information to any other party and is required to destroy the information after its stated need has been fulfilled, unless otherwise required by state law.      If you are not the intended recipient, you are hereby notified that any disclosure, copy, distribution or action taken in reliance on the contents of these documents is strictly prohibited.  If you have received this document in error, please return it by fax to [phone] with a note on the cover sheet explaining why you are returning it (e.g. not your patient, not your provider, etc.).  If you need further assistance, please call [facility] Centralized Transcription at [phone].  Documents may also be returned by mail to [employer], , [address], [place], [place] [address].

## 2022-01-28 NOTE — ANESTHESIA PROCEDURE NOTES
Perioperative LIVAN Procedure Note    Staff -        Anesthesiologist:  Adam Ford MD       Resident/Fellow: Stanislaw Robles MD       Performed By: anesthesiologist and fellow  Preanesthesia Checklist:  Patient identified, IV assessed, risks and benefits discussed, monitors and equipment assessed, procedure being performed at surgeon's request and anesthesia consent obtained.    LIVAN Probe Insertion    Probe Status PRE Insertion: NO obvious damage  Probe type:  Adult 3D  Bite block used:   Oral Airway  Insertion Technique: Easy, no oropharyngeal manipulation  Insertion complications: None obvious  Billing Report:LIVAN report by Anesthesiologist (See Separate Report note)  Probe Status POST Removal: NO obvious damage    LIVAN Report  General Procedure Information  Images for this study have been archived.  Modalities: 2D, 3D, CW Doppler, PW Doppler and Color flow mapping  Echocardiographic and Doppler Measurements  Right Ventricle:  Cavity size normal.   Hypertrophy not present.   Thrombus not present.    Global function normal.     Left Ventricle:  Cavity size normal.   Hypertrophy not present.   Thrombus not present.   Global Function normal.   Ejection Fraction 60%.      Ventricular Regional Function:  1- Basal Anteroseptal:  normal  2- Basal Anterior:  normal  3- Basal Anterolateral:  normal  4- Basal Inferolateral:  normal  5- Basal Inferior:  normal  6- Basal Inferoseptal:  normal  7- Mid Anteroseptal:  normal  8- Mid Anterior:  normal  9- Mid Anterolateral:  normal  10- Mid Inferolateral:  normal  11- Mid Inferior:  normal  12- Mid Inferoseptal:  normal  13- Apical Anterior:  normal  14- Apical Lateral:  normal  15- Apical Inferior:  normal  16- Apical Septal:  normal  17- Independence:  normal    Valves  Aortic Valve: Annulus normal.  Stenosis not present.  Area: 4.6 cm .  Mean Gradient: 2 mmHg.  Regurgitation absent.  Leaflets bicuspid.  Leaflet motions normal.    Mitral Valve: Annulus normal.  Stenosis not  present.  Regurgitation absent.  Leaflets normal.  Leaflet motions normal.    Tricuspid Valve: Annulus normal.  Stenosis not present.  Regurgitation absent.  Leaflets normal.  Leaflet motions normal.    Pulmonic Valve: Annulus normal.  Stenosis not present.  Regurgitation absent.      Aorta: Ascending Aorta: Size aneurysmal.  Diameter 5.4 cm.  Dissection not present.  Plaque thickness less than 3 mm.  Mobile plaque not present.    Aortic Arch: Size dilated.   Dissection not present.   Plaque thickness less than 3 mm.   Mobile plaque not present.    Descending Aorta: Size normal.   Dissection not present.   Plaque thickness less than 3 mm.   Mobile plaque not present.        Right Atrium:  Size normal.   Spontaneous echo contrast not present.   Thrombus not present.   Tumor not present.   Device not present.     Left Atrium: Size normal.  Spontaneous echo contrast not present.  Thrombus not present.  Tumor not present.  Device not present.    Left atrial appendage normal.   Other Atria Findings:  MICHELE velocity > 40 cm/s  Atrial Septum: Intra-atrial septal morphology normal.     Ventricular Septum: Intra-ventricular septum morphology normal.     Diastolic Function Measurements:  Diastolic Dysfunction Grade= indeterminate. E= ms. A= ms.  E/A Ratio=  2.  DT=  151 ms.  S/D= .  IVRT= ms.  Other Findings:   Pericardium:  normal. Pleural Effusion:  none. Pulmonary Arteries:  normal. Pulmonary Venous Flow:  normal. No coronary sinus catheter present.   Post Intervention Findings  Procedure(s) performed:  Ascending Aorta Repair and Aortic Valve Repair/Replace. Global function:  Unchanged. Regional wall motion: Unchanged. Surgeon(s) notified of all postintervention findings: Yes. Aortic Valve: Valve replaced with mechanical valve.  Ascending Aorta:  Ascending aorta repaired with graft.       Echocardiogram Comments  Echocardiogram comments:   PRE-CPB:  Normal biventricular systolic function. Normal diastolic function for age.  Bicuspid AV with fusion of the RCC and NCC without stenosis or regurgitation. Severely aneursymal ascending aorta. No pericardial effusion. No aortic dissection. All findings communicated with surgical team.    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 Valve leaflets open appropriately; mean gradient 7mmHg. Global LV/RV function unchanged. No new RWMAs. No pericardial effusion. All findings communicated with surgical team.  .

## 2022-01-28 NOTE — LETTER
180 Medical Catheter Order    ATTENTION: Del[facility] Program: Medical Prescription [facility]    Tu Dean male 1990  [address]  [facility] [address]  [phone]    Start Date: 2022  Length of Need: {Length of Need:594190}  Primary ICD-10 Diagnosis: {Primary ICD-10 :808261}  HCPC Code/Product Description: {HCPC Code/Product Description :377641}  Lubricant HCPC Code: {Lubricant HCPC Code :594482}  FR Size: {FR Size :109538}  Length:{Length :146269}  Frequency of Use: {Frequency of Use :514235}  Quantity to Dispense for Catheters: {Quantity to Dispense for Catheters :087025}  Qnty to Dispense for Lubricant:{Quantity to Dispense for Lubricant :163734}  90 Day Supply: {90 Day Supply :586915}  Catheter Brand: {Catheter Brand :979468}  Number of Refills: {Number of Refills :774089}  Other Products or Special Instructions: ***    (Signed electronically to expedite mailing)  Electronically Approved By: 2022 1:05 PM Data Unavailable    Insurance:   Payor/Plan Subscr  Sex Relation Sub. Ins. ID Effective Group Num   1. BCBS - BCBS O* TU DEAN 1990 Male Self [id number]                                   [address] [address]         Additional Notes: S: pt post op from procedure, plan for fast track, has not received bolus intraop.    O: patient intubated but sedation weaning.    A/P: 10ml 0.25% bupivacaine per catheter bolus, 20ml total at 1500    Pain Service Progress Note  [facility]  Date: 2022      Patient Name: Tu Dean  MRN: [id number]  Age: 31 year old  Sex: male      Assessment: 31 year old male with PMH of possible bicuspid aortic valve, thoracic aortic aneurysm without rupture, hypertension, obesity, and testicular cancer s/p orchiectomy    Procedure: Bentall procedure with mechanical valve     Date of Surgery: 2022    Date of T5-6 Erector Spinae Catheter Placement:  "1/28/2022    Plan/Recommendations:  1. Regional Anesthesia/Analgesia  -Continuous Catheter Type/Site: bilateral erector spinae (ES) T5-6  Continue 0.2% ropivacaine  Continuous Infusion at 14 mL/hr    Plan to maintain catheters, max of 14 days    2. Anticoagulation  Heparin 5,000 units subcutaneous Q8H  -Please contact Inpatient Pain Service (pager [phone]) before ordering or making any medication changes       3. Multimodal Analgesia  - Acetaminophen  - Consider Robaxin   For breakthrough  - Oxycodone 5-10mg q4  - Hydromorphone 0.2-0.4 q2    Pain Service will continue to follow.    Discussed with attending anesthesiologist      Overnight Events: NAEO    Tubes/Drains: Yes      Subjective: intubated, sedated, nods when asked if he is in pain  Nausea: No  Vomiting: No  Pruritus: No  Symptoms of LAST: No    Pain Location:  incision    Pain Intensity:    Pain at Rest: NA   Pain with Activity: NA  Comfort Goal: NA   Baseline Pain: NA   Satisfied with your level of pain control: NA    Diet: NPO for Medical/Clinical Reasons Except for: Other; Specify: NPO until Extubated  Advance Diet as Tolerated: Clear Liquid Diet    Relevant Labs:  Recent Labs   Lab Test 01/29/22  0323 01/28/22  1447   INR  --  1.38*    205   PTT  --  31   BUN 21 18       Physical Exam:  Vitals: /88   Pulse 63   Temp 36.7  C (98  F) (Oral)   Resp 17   Ht 1.905 m (6' 3\")   Wt (!) 165.2 kg (364 lb 3.2 oz)   SpO2 94%   BMI 45.52 kg/m      Physical Exam:   Orientation:  Alert, oriented, and in no acute distress: No  Sedation: Yes    Motor Examination:  5/5 Strength in lower extremities: Yes    Sensory Level:   Decrease in sensation: Yes    Catheter Site:   Catheter entry site is clean/dry/intact: Yes    Tender: No      Relevant Medications:  Current Pain Medications:  Medications related to Pain Management (From now, onward)    Start     Dose/Rate Route Frequency Ordered Stop    01/31/22 0000  acetaminophen (TYLENOL) tablet 650 mg         " "650 mg Oral EVERY 4 HOURS PRN 01/28/22 1435      01/29/22 0800  polyethylene glycol (MIRALAX) Packet 17 g         17 g Oral DAILY 01/28/22 1435 01/29/22 0800  aspirin (ASA) chewable tablet 81 mg         81 mg Oral or NG Tube DAILY 01/28/22 1435 01/28/22 2000  senna-docusate (SENOKOT-S/PERICOLACE) 8.6-50 MG per tablet 1 tablet         1 tablet Oral 2 TIMES DAILY 01/28/22 1435 01/28/22 2000  propofol (DIPRIVAN) infusion         5-75 mcg/kg/min × 165.9 kg (Dosing Weight)  5-74.7 mL/hr  Intravenous CONTINUOUS 01/28/22 1958 01/28/22 1600  ropivacaine 0.2% (NAROPIN) 750 mL in ON-Q C-Bloc select flow ([id number] holds 600-750 mL) dual cath disposable pump         14 mL/hr  Irrigation CONTINUOUS 01/28/22 1533      01/28/22 1500  dexmedetomidine (PRECEDEX) 400 mcg in 0.9% sodium chloride 100 mL         0.2-0.7 mcg/kg/hr × 165.9 kg  8.3-29 mL/hr  Intravenous CONTINUOUS 01/28/22 1435 01/28/22 1500  acetaminophen (TYLENOL) tablet 975 mg         975 mg Oral EVERY 8 HOURS 01/28/22 1435 01/31/22 1559    01/28/22 1500  fentaNYL (SUBLIMAZE) infusion          mcg/hr  0.5-2 mL/hr  Intravenous CONTINUOUS 01/28/22 1438      01/28/22 1435  HYDROmorphone (PF) (DILAUDID) injection 0.2 mg        \"Or\" Linked Group Details    0.2 mg Intravenous EVERY 2 HOURS PRN 01/28/22 1435      01/28/22 1435  HYDROmorphone (PF) (DILAUDID) injection 0.4 mg        \"Or\" Linked Group Details    0.4 mg Intravenous EVERY 2 HOURS PRN 01/28/22 1435      01/28/22 1435  oxyCODONE (ROXICODONE) tablet 5 mg        \"Or\" Linked Group Details    5 mg Oral EVERY 4 HOURS PRN 01/28/22 1435      01/28/22 1435  oxyCODONE IR (ROXICODONE) tablet 10 mg        \"Or\" Linked Group Details    10 mg Oral EVERY 4 HOURS PRN 01/28/22 1435      01/28/22 1435  magnesium hydroxide (MILK OF MAGNESIA) suspension 30 mL         30 mL Oral DAILY PRN 01/28/22 1435      01/28/22 1435  bisacodyl (DULCOLAX) Suppository 10 mg         10 mg Rectal DAILY PRN 01/28/22 1435      " 01/28/22 1435  methocarbamol (ROBAXIN) tablet 750 mg         750 mg Oral EVERY 6 HOURS PRN 01/28/22 1435      01/28/22 1434  lidocaine 1 % 0.1-1 mL         0.1-1 mL Other EVERY 1 HOUR PRN 01/28/22 1435      01/28/22 1434  lidocaine (LMX4) kit          Topical EVERY 1 HOUR PRN 01/28/22 1435            Primary Service Contacted with Recommendations? No    Eliana Mcbride MD  1/29/2022    Time/Communication:  I personally spent 20 minutes with greater than 50% in consultation, education, counseliing and coordination of care.  Also discussed with RN.    Contact Info (24 hour job code pager is the last 4 digits) For in-house use only:   Job code ID: [id number]   [place] 0599  Peds [phone]  [place] phone: dial * * * [phone], enter jobcode ID, then enter call-back number.    Text: Use [email or web address] on the Intranet <Paging/Directory> tab and enter Jobcode ID.   If no call back at any time, contact the hospital  and ask for Regional Anesthesia attending or backup     Attestation signed by Adi Barrera DO at 1/29/2022  2:08 PM:  Agree with the note above for this shared patient visit  Adi Barrera DO    CLINICAL NUTRITION SERVICES - BRIEF NOTE    Received provider consult for nutrition education with comments post op cardiovascular surgery (automatic consult on post-op order set). S/p Bentall procedure with mechanical valve on 1/28. Nutrition education not indicated.    RD will follow per LOS protocol or if re-consulted.     Margret Whaley RD, , LD  Weekend/Holiday RD Pager: [phone]         CV ICU PROGRESS NOTE  01/29/2022      CO-MORBIDITIES:   Aortic root aneurysm (H)    ASSESSMENT: Chuy Varner is a 31 year old male with a PMHx s/f bicuspid aortic valve, thoracic aortic aneurysm without rupture, hypertension, obesity, and testicular cancer s/p orchiectomy who underwent Bentall procedure with mechanical valve on 1/28 with Dr. Rod Hannah.    TODAY'S PROGRESS:   - Lasix 20 BID  - PS and  extubate to BiPAP if FiO2 needs improve with diuresis  - SubQ heparin  - Aspirin    PLAN:  Neuro/ pain/ sedation:  # Acute Postoperative pain  - Monitor neurological status. Notify the MD for any acute changes in exam.  - Pain: fentanyl gtt. Scheduled tylenol and gabapentin. PRN tylenol, oxycodone, robaxin  - Sedation: propofol gtt, precedex gtt     Pulmonary care:   # Postoperative ventilation management  # Hypoxemic respiratory failure  Initially hypercarbic postoperatively, increasing FiO2 requirements. CXR w/ pulmonary edema.  - Intubated, ventilated  - Titrate supplemental oxygen to maintain saturation above 92%.  - Pulmonary hygiene: Incentive spirometer every 15- 30 minutes when awake, flutter valve, C&DB  - Lasix 20mg BID today  - PS and possible extubation pending improving FiO2 requirements     Cardiovascular:    #  Thoracic aortic aneurysm s/p Bentall w/ Jerry valve on 1/28 with Dr. Velasco and Dr. Hannah  # History of hypertension  # Bicuspid aortic valve  Recent echo on 10/26/21 with LVEF of 60-65%.  CT vascular 12/13/21 showing proximal ascending aorta is severely dilated (55 x 55mm in maximum dimension) and likely bicuspid aortic valve.  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  - Monitor hemodynamic status  - Goal MAP>65, SBP<110  - Statin hold  - BB hold  - ASA  - Off all pressors  - Holding PTA meds: metoprolol succinate 25mg daily  - Labetalol PRN to maintain SBP <110     GI care/ Nutrition:   # Obesity (BMI 46)  - NPO  - PPI  - Continue bowel regimen: miralax, senna    Renal/ Fluid Balance/ Electrolytes:   BL creat appears to be ~ 1.0  - Strict I/O, daily weights  - Avoid/limit nephrotoxins as able  - Replete lytes PRN per protocol     Endocrine:    # Stress induced hyperglycemia  - Insulin gtt  - Goal BG <180 for optimal healing     ID/  Antibiotics:  # Stress induced leukocytosis  - To complete perioperative regimen  - Continue to monitor fever curve, WBC and inflammatory markers as appropriate     Heme/Onc:     # Stress induced leukocytosis  # Acute blood loss anemia  # Acute blood loss thrombocytopenia  # History of testicular cancer s/p orchiectomy  No s/sx active bleeding  - Continue to monitor  - CBC      MSK/ Skin:  # Sternotomy  # Surgical Incision  - Sternal precautions  - Postoperative incision management per protocol  - PT/OT/CR     Prophylaxis:    - Mechanical prophylaxis for DVT  - Chemical DVT prophylaxis - start subcutaneous heparin tomorrow  - PPI     Lines/ tubes/ drains:  - L radial arterial Line, ETT, CTs, PA catheter, RIJ Mac, durant, bilateral ES catheters     Disposition:  - CVICU   Patient seen, findings and plan discussed with CV ICU staff, Dr. Rosado and CVTS staff Dr. Hannah.    -----------------------------------  Tom Adair MD  Anesthesia Resident, PGY3    ====================================    SUBJECTIVE:   Worsening FiO2 requirements overnight, now on FiO2 of 80%. No other acute events.    OBJECTIVE:   1. VITAL SIGNS:   Temp:  [98  F (36.7  C)-99.2  F (37.3  C)] 99  F (37.2  C)  Pulse:  [61-90] 63  Resp:  [13-26] 17  MAP:  [65 mmHg-94 mmHg] 84 mmHg  Arterial Line BP: ()/(57-80) 114/69  FiO2 (%):  [50 %-100 %] 90 %  SpO2:  [90 %-100 %] 95 %  Ventilation Mode: CMV/AC  (Continuous Mandatory Ventilation/ Assist Control)  FiO2 (%): 90 %  Rate Set (breaths/minute): 16 breaths/min  Tidal Volume Set (mL): 600 mL  PEEP (cm H2O): 8 cmH2O  Pressure Support (cm H2O): 7 cmH2O  Oxygen Concentration (%): 80 %  Resp: 17      2. INTAKE/ OUTPUT:   I/O last 3 completed shifts:  In: 4208.92 [I.V.:2919.92; Other:546; NG/GT:135; IV Piggyback:150]  Out: 3960 [Urine:2580; Blood:1000; Chest Tube:380]    3. PHYSICAL EXAMINATION:   General: Sedated, arouses to voice  Neuro: Sedated, follows commands and moves all  extremities  Resp: Lungs CTA bilaterally. Mechanically ventilated  CV: RRR  Abdomen: Soft, Non-distended, Non-tender  Incisions: C/D/I  Extremities: warm and well perfused    4. INVESTIGATIONS:   Arterial Blood Gases   Recent Labs   Lab 01/29/22  0655 01/29/22  0323 01/28/22  2326 01/28/22  2211   PH 7.41 7.39 7.38 7.38   PCO2 43 46* 42 42   PO2 84 73* 68* 65*   HCO3 27 28 25 25     Complete Blood Count   Recent Labs   Lab 01/29/22  0323 01/28/22  1447 01/28/22  1346 01/28/22  1246   WBC 11.0 13.7*  --  14.4*   HGB 13.6 13.7 13.4 12.6*    205  --  154     Basic Metabolic Panel  Recent Labs   Lab 01/29/22  0743 01/29/22  0659 01/29/22  0608 01/29/22  0331 01/29/22 0323 01/28/22  2114 01/28/22  2113 01/28/22  1451 01/28/22  1447 01/28/22  1346 01/28/22  1239   0000   NA  --   --   --   --  143  --   --   --  143 142 142  --    POTASSIUM  --   --   --   --  3.9  --  3.9  --  4.5  4.5 4.3 4.2  --    CHLORIDE  --   --   --   --  114*  --   --   --  112*  --   --   --    CO2  --   --   --   --  27  --   --   --  24  --   --   --    BUN  --   --   --   --  21  --   --   --  18  --   --   --    CR  --   --   --   --  0.83  --   --   --  1.20  --   --   --    * 137* 150* 144* 144*   < >  --    < > 133* 133* 166*   < >    < > = values in this interval not displayed.     Liver Function Tests  Recent Labs   Lab 01/29/22  0323 01/28/22  1447 01/28/22  1246   AST 45 50*  --    ALT 26 28  --    ALKPHOS 42 44  --    BILITOTAL 0.5 0.6  --    ALBUMIN 3.0* 3.1*  --    INR  --  1.38* 1.53*     Pancreatic Enzymes  No lab results found in last 7 days.  Coagulation Profile  Recent Labs   Lab 01/28/22  1447 01/28/22  1246   INR 1.38* 1.53*   PTT 31 28         5. RADIOLOGY:   Recent Results (from the past 24 hour(s))   XR Chest Port 1 View    Narrative    Exam: XR CHEST PORT 1 VIEW, 1/28/2022 3:53 PM    Indication: Post Op CVTS Surgery    Comparison: 1/10/2022    Findings:   Median sternotomy. Endotracheal tube in the upper  thoracic trachea.  [place]elda catheter tip in the central right pulmonary artery.  Prosthetic aortic valve is in place. Mediastinal chest tube is in  place. Left basilar chest tube in place.     Small amount of pneumopericardium. No significant pneumothorax. Low  lung volume with bibasilar atelectasis. Heart within normal limits in  size. No significant pulmonary edema.      Impression    Impression: Postoperative chest.  1. Support devices as above.  2. Likely postoperative pneumopericardium.  3. No pneumothorax.  4. Bibasilar atelectasis.    BENTLEY ANTONIO MD         SYSTEM ID:  [id number]   XR Abdomen Port 1 View    Narrative    Exam: XR ABDOMEN PORT 1 VIEWS, 1/29/2022 12:18 AM    Indication: OG placement    Comparison: Serial same day radiographs.    Findings:   Portable semiupright AP view of the abdomen. Lung bases with bibasilar  atelectasis, prominent cardiac silhouette. Partially visualized median  sternotomy with intact wires. Enteric tube tip and sidehole project  over the gastric bubble. Mediastinal drains.  No evidence of large volume intra-abdominal free air. Nonobstructive  bowel gas pattern. No definite pneumatosis or portal venous gas. No  suspicious calcifications.      Impression    Impression:   1. Enteric tube tip and sidehole project over the gastric bubble.  2. Stable post surgical changes of the chest and additional support  devices.  3. Partially visualized bibasilar atelectasis.    I have personally reviewed the examination and initial interpretation  and I agree with the findings.    BENTLEY ANTONIO MD         SYSTEM ID:  [id number]   XR Chest Port 1 View    Narrative    Exam: XR CHEST PORT 1 VIEW, 1/29/2022 12:20 AM    Indication: Post Op CVTS Surgery    Comparison: 1/28/2022    Findings:   Portable semiupright AP view of the chest. Stable postsurgical changes  with intact median sternotomy. Endotracheal tube extends over the  midthoracic trachea, unchanged. Right IJ [place]elda catheter  tip  projects over the main pulmonary artery. Prosthetic aortic valve.  Mediastinal drain. Enteric tube courses below left hemidiaphragm with  tip collimated out of field of view.    Midline trachea. Stable mildly enlarged cardiac silhouette with small  amount of pneumopericardium. Low lung volumes with mildly decreased  bibasilar atelectasis. No appreciable pneumothorax. Mixed perihilar  opacities are stable. No acute findings in the upper abdomen.      Impression    Impression:   1. No significant change in postoperative pneumopericardium. No  pneumothorax.  2. Perihilar pulmonary edema and/or atelectasis. Infection cannot be  excluded.  3. Bibasilar atelectasis.  4. Stable satisfactory position of support devices.    I have personally reviewed the examination and initial interpretation  and I agree with the findings.    BENTLEY ANTONIO MD         SYSTEM ID:  [id number]   XR Chest Port 1 View    Impression    RESIDENT PRELIMINARY INTERPRETATION  IMPRESSION:  1. Stable endotracheal tube and right IJ [place]-Nelda catheter.  2. Low lung volumes with unchanged perihilar interstitial prominent  opacities, likely pulmonary edema and/or atelectasis.  3. Increased small left and possible trace right pleural effusions.       =========================================          Attestation signed by Samanta Rosado MD at 1/30/2022 12:41 PM:  Physician Attestation   Chuy was seen and evaluated by me on 1/29/22.  He was in critical condition as the result of acute hypoxic respiratory fialure.    His condition is now Critical.      Supportive interventions provided and/or ordered by me include reviewing vital signs, laboratory, hemodynamic and imaging data, adjusting life saving medications and serially assessing the patient, managing airway and vent    I agree with the resident/fellow's assessment and plan of care.     Total Critical Care time spent by me, excluding procedures, was  40 minutes    Samanta Rosado MD    Pain  Service Progress Note  [facility]  Date: January 30, 2022    Patient Name: Chuy Varner  MRN: [id number]  Age: 31 year old  Sex: male    Assessment: 31 year old male with PMH of possible bicuspid aortic valve, thoracic aortic aneurysm without rupture, hypertension, obesity, and testicular cancer s/p orchiectomy     Procedure: Bentall procedure with mechanical valve      Date of Surgery: 1/28/2022     Date of T5-6 Erector Spinae Catheter Placement: 1/28/2022     Plan/Recommendations:  1. Regional Anesthesia/Analgesia  - Continuous Catheter Type/Site: bilateral erector spinae (ES) T5-6  - Continue 0.2% ropivacaine  - Continuous Infusion at 14 mL/hr  - Plan to maintain catheters, max of 14 days  - No AM bolus administered     2. Anticoagulation  - Heparin 5,000 units subcutaneous Q8H  - Please contact Inpatient Pain Service (pager [phone]) before ordering or making any medication changes        3. Multimodal Analgesia  - Acetaminophen  - Consider Robaxin   - For breakthrough:      - Oxycodone 5-10mg q4      - Hydromorphone 0.2-0.4 q2     Pain Service will continue to follow.  Discussed with attending anesthesiologist, Dr. Adi Barrera    Overnight Events: Development of ARDS and fever (102.2F). CT PE negative.     Tubes/Drains: Yes  CT    Subjective: Intubated, sedated  Nausea: No  Vomiting: No  Pruritus: No  Symptoms of LAST: No    Pain Location:  N/A    Pain Intensity:    Pain at Rest: NA   Pain with Activity: NA  Comfort Goal: NA   Baseline Pain: NA   Satisfied with your level of pain control: NA    Diet: NPO for Medical/Clinical Reasons Except for: Other; Specify: NPO until Extubated  Advance Diet as Tolerated: Clear Liquid Diet    Relevant Labs:  Recent Labs   Lab Test 01/30/22  0312 01/29/22  0323 01/28/22  1447   INR  --   --  1.38*      < > 205   PTT  --   --  31   BUN 26   < > 18    < > = values in this interval not displayed.       Physical Exam:  Vitals: /88   Pulse 93   " Temp (!) 39  C (102.2  F)   Resp 16   Ht 1.905 m (6' 3\")   Wt (!) 165.2 kg (364 lb 3.2 oz)   SpO2 99%   BMI 45.52 kg/m      Physical Exam:   Orientation:  Alert, oriented, and in no acute distress: No  Sedation: Yes    Motor Examination:  5/5 Strength in lower extremities: No    Sensory Level:   Decrease in sensation: No    Catheter Site:   Catheter entry site is clean/dry/intact: Yes    Tender: No      Relevant Medications:  Current Pain Medications:  Medications related to Pain Management (From now, onward)    Start     Dose/Rate Route Frequency Ordered Stop    01/31/22 0000  acetaminophen (TYLENOL) tablet 650 mg         650 mg Oral EVERY 4 HOURS PRN 01/28/22 1435      01/30/22 0530  fentaNYL (SUBLIMAZE) infusion          mcg/hr  0.5-4 mL/hr  Intravenous CONTINUOUS 01/30/22 0513      01/29/22 0800  polyethylene glycol (MIRALAX) Packet 17 g         17 g Oral DAILY 01/28/22 1435      01/29/22 0800  aspirin (ASA) chewable tablet 81 mg         81 mg Oral or NG Tube DAILY 01/28/22 1435      01/28/22 2000  senna-docusate (SENOKOT-S/PERICOLACE) 8.6-50 MG per tablet 1 tablet         1 tablet Oral 2 TIMES DAILY 01/28/22 1435      01/28/22 2000  propofol (DIPRIVAN) infusion         5-75 mcg/kg/min × 165.9 kg (Dosing Weight)  5-74.7 mL/hr  Intravenous CONTINUOUS 01/28/22 1958      01/28/22 1600  ropivacaine 0.2% (NAROPIN) 750 mL in ON-Q C-Bloc select flow ([id number] holds 600-750 mL) dual cath disposable pump         14 mL/hr  Irrigation CONTINUOUS 01/28/22 1533      01/28/22 1500  dexmedetomidine (PRECEDEX) 400 mcg in 0.9% sodium chloride 100 mL         0.2-0.7 mcg/kg/hr × 165.9 kg  8.3-29 mL/hr  Intravenous CONTINUOUS 01/28/22 1435      01/28/22 1500  acetaminophen (TYLENOL) tablet 975 mg         975 mg Oral EVERY 8 HOURS 01/28/22 1435 01/31/22 1559    01/28/22 1435  HYDROmorphone (PF) (DILAUDID) injection 0.2 mg        \"Or\" Linked Group Details    0.2 mg Intravenous EVERY 2 HOURS PRN 01/28/22 1435      01/28/22 " "1435  HYDROmorphone (PF) (DILAUDID) injection 0.4 mg        \"Or\" Linked Group Details    0.4 mg Intravenous EVERY 2 HOURS PRN 01/28/22 1435      01/28/22 1435  oxyCODONE (ROXICODONE) tablet 5 mg        \"Or\" Linked Group Details    5 mg Oral EVERY 4 HOURS PRN 01/28/22 1435      01/28/22 1435  oxyCODONE IR (ROXICODONE) tablet 10 mg        \"Or\" Linked Group Details    10 mg Oral EVERY 4 HOURS PRN 01/28/22 1435      01/28/22 1435  magnesium hydroxide (MILK OF MAGNESIA) suspension 30 mL         30 mL Oral DAILY PRN 01/28/22 1435      01/28/22 1435  bisacodyl (DULCOLAX) Suppository 10 mg         10 mg Rectal DAILY PRN 01/28/22 1435      01/28/22 1435  methocarbamol (ROBAXIN) tablet 750 mg         750 mg Oral EVERY 6 HOURS PRN 01/28/22 1435      01/28/22 1434  lidocaine 1 % 0.1-1 mL         0.1-1 mL Other EVERY 1 HOUR PRN 01/28/22 1435      01/28/22 1434  lidocaine (LMX4) kit          Topical EVERY 1 HOUR PRN 01/28/22 1435            Primary Service Contacted with Recommendations? No    Jean Angulo MD  1/30/2022    Time/Communication:  I personally spent 10 minutes with greater than 50% in consultation, education, counseliing and coordination of care.  Also discussed with RN.    Contact Info (24 hour job code pager is the last 4 digits) For in-house use only:   Job code ID: [id number]   [facility] 0599  Peds 0602  [employer] phone: dial * * * [phone], enter jobcode ID, then enter call-back number.    Text: Use [email or web address] on the Intranet <Paging/Directory> tab and enter Jobcode ID.   If no call back at any time, contact the hospital  and ask for Regional Anesthesia attending or backup     Attestation signed by Adi Barrera DO at 1/30/2022 12:25 PM:  Physician Attestation   I agree with the information in this note.    Adi Barrera      CV ICU PROGRESS NOTE  01/30/2022      CO-MORBIDITIES:   Aortic root aneurysm (H)    ASSESSMENT: Chuy YESSICA EdmondsTelly is a 31 year old male with a PMHx s/f bicuspid aortic " valve, thoracic aortic aneurysm without rupture, hypertension, obesity, and testicular cancer s/p orchiectomy who underwent Bentall procedure with mechanical valve on 1/28 with Dr. Velasco and Dr. Hannah.     TODAY'S PROGRESS:   - Wean flolan and FiO2 as able  - Follow cultures  - COVID negative, follow up respiratory panel  - Vancomycin/Zosyn  - Straight rate heparin  - Goal I=O    PLAN:  Neuro/ pain/ sedation:  # Acute Postoperative pain  - Monitor neurological status. Notify the MD for any acute changes in exam.  - Pain: fentanyl gtt. Scheduled tylenol and gabapentin. PRN tylenol, oxycodone, robaxin  - Sedation: propofol gtt, precedex gtt     Pulmonary care:   # Postoperative ventilation management  # Acute hypoxemic respiratory failure  Worsening FiO2 requirements overnight, started on Flolan  - Intubated, ventilated  - Titrate supplemental oxygen to maintain saturation above 90%.  - Pulmonary hygiene: Incentive spirometer every 15- 30 minutes when awake, flutter valve, C&DB  - Wean flolan and FiO2 as able  - Lung protective ventilation     Cardiovascular:    # Thoracic aortic aneurysm s/p Bentall w/ [place] valve on 1/28 with Dr. Velasco and Dr. Hannah  # History of hypertension  # Bicuspid aortic valve  Recent echo on 10/26/21 with LVEF of 60-65%.  CT vascular 12/13/21 showing proximal ascending aorta is severely dilated (55 x 55mm in maximum dimension) and likely bicuspid aortic valve.  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  - Monitor hemodynamic status  - Goal MAP>65, SBP<110  - Statin hold  - BB hold  - ASA  - Wean NE gtt as able  - Holding PTA meds: metoprolol succinate 25mg daily  - CT PE 1/30 - negative for acute PE, marked dependent atelectasis R>L  - Labetalol PRN to maintain SBP <110     GI care/ Nutrition:   # Obesity (BMI 46)  - Trickle  feeds  - PPI  - Continue bowel regimen: miralax, senna    Renal/ Fluid Balance/ Electrolytes:   BL creat appears to be ~ 1.0  - Strict I/O, daily weights  - Avoid/limit nephrotoxins as able  - Replete lytes PRN per protocol     Endocrine:    # Stress induced hyperglycemia  - Insulin gtt  - Goal BG <180 for optimal healing     ID/ Antibiotics:  # Stress induced leukocytosis  # Possible ventilator associated pneumonia  Febrile to 102 overnight and some hypotension but in the setting of diuresis.  - To complete perioperative regimen  - Continue to monitor fever curve, WBC and inflammatory markers as appropriate  - Started on Vancomycin and Zosyn 1/30  - COVID pcr negative 1/30  - Viral panel pending  - Respiratory, blood, and urine cultures 1/30 pending     Heme/Onc:     # Stress induced leukocytosis  # Acute blood loss anemia  # Acute blood loss thrombocytopenia  # History of testicular cancer s/p orchiectomy  No s/sx active bleeding  - Continue to monitor  - CBC daily     MSK/ Skin:  # Sternotomy  # Surgical Incision  - Sternal precautions  - Postoperative incision management per protocol  - PT/OT/CR     Prophylaxis:    - Mechanical prophylaxis for DVT  - Chemical DVT prophylaxis - start subcutaneous heparin tomorrow  - PPI     Lines/ tubes/ drains:  - L radial arterial Line, ETT, CTs, PA catheter, RIJ Mac, durant, bilateral ES catheters     Disposition:  - CVICU     Patient seen, findings and plan discussed with CV ICU staff, Dr. Rosado and CVTS staff Dr. Velasco.    -----------------------------------  Tom Adair MD  Anesthesia Resident, PGY3    ====================================    SUBJECTIVE:   Worsening FiO2 requirements overnight, now on FiO2 of 100%, flolan. Started broad spectrum abx and norepi gtt.    OBJECTIVE:   1. VITAL SIGNS:   Temp:  [99  F (37.2  C)-102.4  F (39.1  C)] 102.4  F (39.1  C)  Pulse:  [62-96] 91  Resp:  [16-30] 19  MAP:  [57 mmHg-87 mmHg] 65 mmHg  Arterial Line BP: ()/(42-72)  103/48  FiO2 (%):  [60 %-100 %] 100 %  SpO2:  [85 %-100 %] 100 %  Ventilation Mode: CMV/AC  (Continuous Mandatory Ventilation/ Assist Control)  FiO2 (%): 100 %  Rate Set (breaths/minute): 16 breaths/min  Tidal Volume Set (mL): (S) 550 mL  PEEP (cm H2O): 10 cmH2O  Pressure Support (cm H2O): 7 cmH2O  Oxygen Concentration (%): 100 %  Resp: 19      2. INTAKE/ OUTPUT:   I/O last 3 completed shifts:  In: 2229.68 [I.V.:1429.68; NG/GT:550; IV Piggyback:250]  Out: 3934 [Urine:3064; Emesis/NG output:500; Chest Tube:370]    3. PHYSICAL EXAMINATION:   General: Sedated  Neuro: Sedated, follows commands and moves all extremities when weaned  Resp: Mechanical breath sounds  CV: RRR  Abdomen: Soft, Non-distended, Non-tender  Incisions: C/D/I  Extremities: warm and well perfused    4. INVESTIGATIONS:   Arterial Blood Gases   Recent Labs   Lab 01/30/22  0636 01/30/22  0547 01/30/22  0515 01/30/22  0313   PH 7.44 7.45 7.46* 7.48*   PCO2 36 40 36 40   PO2 91 63* 51* 52*   HCO3 25 28 26 30*     Complete Blood Count   Recent Labs   Lab 01/30/22  0312 01/29/22  0323 01/28/22  1447 01/28/22  1346 01/28/22  1246   WBC 11.5* 11.0 13.7*  --  14.4*   HGB 13.0* 13.6 13.7 13.4 12.6*    189 205  --  154     Basic Metabolic Panel  Recent Labs   Lab 01/30/22  0607 01/30/22  0514 01/30/22  0315 01/30/22  0312 01/29/22  1658 01/29/22  1656 01/29/22  0331 01/29/22  0323 01/28/22  2114 01/28/22  2113 01/28/22  1451 01/28/22  1447 01/28/22  1346   0000   NA  --   --   --  144  --   --   --  143  --   --   --  143 142  --    POTASSIUM  --   --   --  3.3*  --  3.6  --  3.9  --  3.9  --  4.5  4.5 4.3  --    CHLORIDE  --   --   --  113*  --   --   --  114*  --   --   --  112*  --   --    CO2  --   --   --  26  --   --   --  27  --   --   --  24  --   --    BUN  --   --   --  26  --   --   --  21  --   --   --  18  --   --    CR  --   --   --  1.15  --   --   --  0.83  --   --   --  1.20  --   --    GLC 96 103* 125* 130*   < >  --    < > 144*   < >   --    < > 133* 133*   < >    < > = values in this interval not displayed.     Liver Function Tests  Recent Labs   Lab 01/30/22  0312 01/29/22  0323 01/28/22  1447 01/28/22  1246   AST 31 45 50*  --    ALT 24 26 28  --    ALKPHOS 40 42 44  --    BILITOTAL 0.7 0.5 0.6  --    ALBUMIN 2.8* 3.0* 3.1*  --    INR  --   --  1.38* 1.53*     Pancreatic Enzymes  No lab results found in last 7 days.  Coagulation Profile  Recent Labs   Lab 01/28/22  1447 01/28/22  1246   INR 1.38* 1.53*   PTT 31 28         5. RADIOLOGY:   Recent Results (from the past 24 hour(s))   XR Chest Port 1 View    Narrative    EXAM: XR CHEST PORT 1 VIEW  1/29/2022 8:45 AM     HISTORY:  increasing FiO2 requirements       COMPARISON:  Chest x-ray 1/28/2022    FINDINGS: AP radiograph of the chest. Endotracheal tube projecting  over the high thoracic trachea. Right IJ [place]elda catheter with tip  overlying the main pulmonary artery. Partially visualized enteric tube  with tip overlying the stomach. Postoperative chest with intact median  sternotomy wires.    Stable borderline enlargement of the cardiomediastinal silhouette. Low  lung volumes. Small left and possible trace right pleural effusions.  Pulmonary vasculature is indistinct. Perihilar interstitial prominent  opacities. No pneumothorax. Visualized upper abdomen is unremarkable.      Impression    IMPRESSION:  1. Stable endotracheal tube and right IJ [place]-Nelda catheter.  2. Low lung volumes with unchanged perihilar interstitial prominent  opacities, likely pulmonary edema and/or atelectasis.  3. Increased small left and possible trace right pleural effusions.    I have personally reviewed the examination and initial interpretation  and I agree with the findings.    BENTLEY ANTONIO MD         SYSTEM ID:  [id number]   XR Chest Port 1 View    Impression    RESIDENT PRELIMINARY INTERPRETATION  Impression:   1. Worsening of low lung volumes, small left greater than right  pleural effusions, and perihilar  opacities compatible with pulmonary  edema and/or atelectasis. Infection cannot be excluded.  2. Endotracheal tube has been advanced with tip projecting over mid  thoracic trachea. Good position.  3. Stable additional support devices and postsurgical changes.   CT Chest Pulmonary Embolism w Contrast    Impression    RESIDENT PRELIMINARY INTERPRETATION  IMPRESSION:   1. Exam is negative for acute pulmonary embolism.  No evidence of  right heart strain or increased right heart pressures.       2. Marked dependent atelectasis in the right than left lungs. Occult  infection cannot be excluded.    3. Postsurgical changes of median sternotomy and aortic valve repair.  Satisfactory position of support devices as detailed.       In the event of a positive result for acute pulmonary embolism  resulting in right heart strain, consider calling the   [facility] hospital  for PERT (Pulmonary Embolism Response Team)  Activation?    PERT -- Pulmonary Embolism Response Team (Multidisciplinary team  including cardiology, interventional radiology, critical care,  hematology)       =========================================          Attestation signed by Samanta Rosado MD at 2/7/2022 11:39 PM:  Physician Attestation   Chuy was seen and evaluated by me on 1/30/2022.  He was in critical condition as the result of acute hypoxic respiratory failure.    His condition is now Critical.      Supportive interventions provided and/or ordered by me include reviewing vital signs, laboratory, hemodynamic and imaging data, adjusting life saving medications and serially assessing the patient, managing airway and vent    I agree with the resident/fellow's assessment and plan of care.     Total Critical Care time spent by me, excluding procedures, was  40 minutes    Samanta Rosado MD      CV ICU PROGRESS NOTE  01/31/2022      CO-MORBIDITIES:   Aortic root aneurysm (H)    ASSESSMENT: Chuy Varner is a 31 year old male with a PMHx s/f  bicuspid aortic valve, thoracic aortic aneurysm without rupture, hypertension, obesity, and testicular cancer s/p orchiectomy who underwent Bentall procedure with mechanical valve on 1/28 with Dr. Velasco and Dr. Hannah.    OVERNIGHT EVENTS:  TMax of 102.4 with FiO2 requirements of 90%. CXR showing worsening bilateral pleural effusions and perihilar opacities. Respiratory panel negative. Awaiting blood, respiratory, and urine cultures. Urine is cloudy and pink-tinged with sediment. Vent: CMV/AC 16, 550, 10, 90%. Propofol @ 50, fentanyl @ 100, levo @ 0.01.    TODAY'S PROGRESS:   - Add Nitric  - Wean levo as able  - Follow blood, respiratory, and urine cultures from 1/30  - Repeat cultures today  - Continue Vancomycin, transition from zosyn to meropenem  - Start Dexamethasone 20 mg daily x5 days (followed by 10mg x5 days snd taper)  - IV lasix 40 BID with goal net 500 to 1L negative I/O  - Low intensity heparin gtt today  - NJ placement  - Daily lactate  - Trial APRV ventilation  - Q6h ABG    PLAN:  Neuro/ pain/ sedation:  # Acute Postoperative pain  - Monitor neurological status. Notify the MD for any acute changes in exam.  - Pain: fentanyl gtt. Scheduled tylenol. PRN tylenol, oxycodone, robaxin  - Sedation: propofol gtt     Pulmonary care:   # Postoperative ventilation management  # Acute hypoxemic respiratory failure  # ARDS  Worsening FiO2 requirements , started on Flolan 1/30  - Intubated, ventilated  - Titrate supplemental oxygen to maintain saturation above 90%  - Pulmonary hygiene: Incentive spirometer every 15- 30 minutes when awake, flutter valve, C&DB  - Wean flolan and FiO2 as able  - Lung protective ventilation   - Trialing APRV w/ FiO2 70%, [place] 0.2, pLow 0.2, pHigh 2.8, T Low 0.7  - Dexamethasone 20 mg daily x5 days, 10 mg daily x5, slow taper following     Cardiovascular:    # Ascending aortic aneurysm s/p Bentall w/ [place] valve on 1/28 with Dr. Velasco and Dr. Hannah  # Bicuspid aortic valve  #  History of thoracic aortic aneurysm without rupture  # History of hypertension  Recent echo on 10/26/21 with LVEF of 60-65%.  CT vascular 12/13/21 showing proximal ascending aorta is severely dilated (55 x 55mm in maximum dimension) and likely bicuspid aortic valve.  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  CT PE 1/30 - negative for acute PE, marked dependent atelectasis R>L.  - Monitor hemodynamic status  - Goal MAP>65, SBP<110  - Start statin  - BB hold  - ASA  - Wean levo gtt as able  - Holding PTA meds: metoprolol succinate 25mg daily  - Labetalol PRN to maintain SBP <110     GI care/ Nutrition:   # Obesity (BMI 46)  - NJ placement, titrate tube feeds to goal following  - PPI  - Continue bowel regimen: miralax, senna    Renal/ Fluid Balance/ Electrolytes:   BL creat appears to be ~ 1.0  - Strict I/O, daily weights  - Goal net -1L today- diurese  - Avoid/limit nephrotoxins as able  - Replete lytes PRN per protocol  - Lasix 40 BID, goal -500 to -1L net     Endocrine:    # Stress induced hyperglycemia, improving  - Insulin gtt  - Goal BG <180 for optimal healing     ID/ Antibiotics:  # Stress induced leukocytosis, resolved  # Possible ventilator associated pneumonia  Febrile to 102 overnight. Covid pcr, respiratory panel, and respiratory cultures negative 1/30.  - Continue to monitor fever curve, WBC and inflammatory markers  - Started on Vancomycin and Zosyn 1/30, transition off zosyn to meropenem w/ concern for further kidney injury and drug fever  - Blood and urine cultures 1/30 pending     Heme/Onc:     # Stress induced leukocytosis, resolved  # Acute blood loss anemia  # Acute blood loss thrombocytopenia  # History of testicular cancer s/p orchiectomy  No s/sx active bleeding  - Continue to monitor  - CBC daily  - Increase to low intensity heparin  gtt today     MSK/ Skin:  # Sternotomy  # Surgical Incision  - Sternal precautions  - Postoperative incision management per protocol  - PT/OT/CR     Prophylaxis:    - Mechanical prophylaxis for DVT  - Chemical DVT prophylaxis- heparin gtt  - PPI     Lines/ tubes/ drains:  - PA catheter (1/28)  - RIJ MAC (1/28)  - Left radial arterial line (1/28)  - Left PIV (1/28)  - Godfrey (1/28)  - 3 CTs (2 anterior mediastinal, 1 left mediastinal) (1/28)  - OG (2 days)  - ETT (1/28)  - Bilateral ES catheters (1/28)     Disposition:  - CVICU     Patient seen, findings and plan discussed with CV ICU staff, Dr. Valles, CVTS fellow Dr. Pichardo and CVTS staff Dr. Velasco.    Michelle Poon, MS4    I was present with the medical student who participated in the service and in the documentation of the note. I have verified the history and personally performed the physical exam and medical decision making. I agree with the assessment and plan of care as documented in the note.    Tom Adair MD  Anesthesiology, PGY3    ====================================    SUBJECTIVE:   Worsening FiO2 requirements and findings on CXR, now on FiO2 of 90%, flolan. Continuing broad spectrum abx. Awaiting blood and urine cultures.    OBJECTIVE:   1. VITAL SIGNS:   Temp:  [99.3  F (37.4  C)-102.7  F (39.3  C)] 102.6  F (39.2  C)  Pulse:  [] 100  Resp:  [16-18] 16  MAP:  [62 mmHg-80 mmHg] 68 mmHg  Arterial Line BP: ()/(47-66) 99/54  FiO2 (%):  [75 %-100 %] 100 %  SpO2:  [89 %-100 %] 95 %  Ventilation Mode: CMV/AC  (Continuous Mandatory Ventilation/ Assist Control)  FiO2 (%): 100 %  Rate Set (breaths/minute): 16 breaths/min  Tidal Volume Set (mL): 550 mL  PEEP (cm H2O): 10 cmH2O  Oxygen Concentration (%): 90 %  Resp: 16      2. INTAKE/ OUTPUT:   I/O last 3 completed shifts:  In: 4208.83 [I.V.:2903.83; NG/GT:1305]  Out: 2220 [Urine:1960; Emesis/NG output:50; Chest Tube:210]    3. PHYSICAL EXAMINATION:   General: Sedated  Neuro: Sedated, opening  eyes to voice, tracking, pupils equal and reactive  Resp: Coarse BS bilaterally, decreased BS over RLL  CV: RRR, no r/m/g apprecaited, temporary pacemaker on standby  Abdomen: Soft, Non-distended  Incisions: C/D/I  Extremities: warm and well perfused    4. INVESTIGATIONS:   Arterial Blood Gases   Recent Labs   Lab 01/31/22  0439 01/31/22 0000 01/30/22 2201 01/30/22  1756   PH 7.41 7.40 7.41 7.40   PCO2 38 40 44 37   PO2 64* 72* 60* 80   HCO3 25 25 28 23     Complete Blood Count   Recent Labs   Lab 01/31/22  0332 01/30/22  0312 01/29/22  0323 01/28/22  1447   WBC 10.1 11.5* 11.0 13.7*   HGB 11.5* 13.0* 13.6 13.7   * 165 189 205     Basic Metabolic Panel  Recent Labs   Lab 01/31/22  0338 01/31/22 0332 01/30/22  2359 01/30/22 2202 01/30/22  2005 01/30/22  1759 01/30/22  1730 01/30/22  1455 01/30/22 0315 01/30/22 0312 01/29/22  0331 01/29/22  0323 01/28/22  1451 01/28/22  1447   NA  --  143  --   --   --   --   --   --   --  144  --  143  --  143   POTASSIUM  --  3.9  --  3.4  --   --  3.8 3.4   < > 3.3*   < > 3.9   < > 4.5  4.5   CHLORIDE  --  111*  --   --   --   --   --   --   --  113*  --  114*  --  112*   CO2  --  27  --   --   --   --   --   --   --  26  --  27  --  24   BUN  --  26  --   --   --   --   --   --   --  26  --  21  --  18   CR  --  1.20  --   --   --   --   --   --   --  1.15  --  0.83  --  1.20   * 108* 99  --  106*   < >  --   --    < > 130*   < > 144*   < > 133*    < > = values in this interval not displayed.     Liver Function Tests  Recent Labs   Lab 01/31/22  0332 01/30/22  0312 01/29/22  0323 01/28/22  1447 01/28/22  1246   AST 27 31 45 50*  --    ALT 20 24 26 28  --    ALKPHOS 46 40 42 44  --    BILITOTAL 0.7 0.7 0.5 0.6  --    ALBUMIN 2.4* 2.8* 3.0* 3.1*  --    INR  --   --   --  1.38* 1.53*     Pancreatic Enzymes  No lab results found in last 7 days.  Coagulation Profile  Recent Labs   Lab 01/28/22  1447 01/28/22  1246   INR 1.38* 1.53*   PTT 31 28         5. RADIOLOGY:    Recent Results (from the past 24 hour(s))   XR Chest Port 1 View    Narrative    EXAM: XR CHEST PORT 1 VIEW  1/29/2022 8:45 AM     HISTORY:  increasing FiO2 requirements       COMPARISON:  Chest x-ray 1/28/2022    FINDINGS: AP radiograph of the chest. Endotracheal tube projecting  over the high thoracic trachea. Right IJ [place]-Nelda catheter with tip  overlying the main pulmonary artery. Partially visualized enteric tube  with tip overlying the stomach. Postoperative chest with intact median  sternotomy wires.    Stable borderline enlargement of the cardiomediastinal silhouette. Low  lung volumes. Small left and possible trace right pleural effusions.  Pulmonary vasculature is indistinct. Perihilar interstitial prominent  opacities. No pneumothorax. Visualized upper abdomen is unremarkable.      Impression    IMPRESSION:  1. Stable endotracheal tube and right IJ [place]-Nelda catheter.  2. Low lung volumes with unchanged perihilar interstitial prominent  opacities, likely pulmonary edema and/or atelectasis.  3. Increased small left and possible trace right pleural effusions.    I have personally reviewed the examination and initial interpretation  and I agree with the findings.    BENTLEY ANTONIO MD         SYSTEM ID:  [id number]   XR Chest Port 1 View    Impression    RESIDENT PRELIMINARY INTERPRETATION  Impression:   1. Worsening of low lung volumes, small left greater than right  pleural effusions, and perihilar opacities compatible with pulmonary  edema and/or atelectasis. Infection cannot be excluded.  2. Endotracheal tube has been advanced with tip projecting over mid  thoracic trachea. Good position.  3. Stable additional support devices and postsurgical changes.   CT Chest Pulmonary Embolism w Contrast    Impression    RESIDENT PRELIMINARY INTERPRETATION  IMPRESSION:   1. Exam is negative for acute pulmonary embolism.  No evidence of  right heart strain or increased right heart pressures.       2. Marked dependent  atelectasis in the right than left lungs. Occult  infection cannot be excluded.    3. Postsurgical changes of median sternotomy and aortic valve repair.  Satisfactory position of support devices as detailed.       In the event of a positive result for acute pulmonary embolism  resulting in right heart strain, consider calling the   [facility] hospital  for PERT (Pulmonary Embolism Response Team)  Activation?    PERT -- Pulmonary Embolism Response Team (Multidisciplinary team  including cardiology, interventional radiology, critical care,  hematology)       =========================================          Attestation signed by Josselin Valles MD at 2/13/2022  6:15 PM (Updated):  Physician Attestation   I, Josselin Valles MD, saw this patient with the resident and agree with the resident/fellow's findings and plan of care as documented in the note.      I personally reviewed vital signs, medications, labs, and imaging.    Parekh findings: Chuy Varner is a 31 year old male with a PMHx s/f bicuspid aortic valve, thoracic aortic aneurysm without rupture, hypertension, obesity, and testicular cancer s/p orchiectomy who underwent Bentall procedure with mechanical valve on 1/28 with Dr. Velasco and Dr. Hannah.    Active problems and current treatments include:    1. Vaso-plegic versus cardiogenic versus hypovolemic shock s/p Bentall procedure with mechanical valve on 1/28. H/o HTN. On low dose NE for low MAP and low SVR. CI adequate. Plan: Wean NE as possible. TTE to asesss for cardiac causes of hypoxemia.     2. Hypoxemic respiratory failure likely ARDS, versus PNA, versus hypervolemia, versus TRALI versus intracardiac Right to left shunt. Morbid obesity with probably untreated MICHELE history. COVID-19.Mycoplasma, Parainfluenza and Influenza Negative. CTA negative for PE on 1/30. Was switched to APRV by RT this AM with minimal to no improvement and possible worsening of oxygenation. On Flolan, Christian addedd this  AM. Plan: Conventional ARDS mechanical ventilation,  (6 ml/kg ideal BW) , RR 18, PEEP 14, recruitment maneuvers Q 4 hours, add scheduled Lasix, Start dexamethasone 20 mg daily for 5 days with slow taper, recheck Tracheal aspirate, escalate to meropenem and vancomycin, albuterol and mucomyst. Continue Flolan and Christian. Agree with no prone position yet in setting of recent open heart surgery.     3. Suspicion new infection. Febrile and pan-cultured on 22. Respiratory viral panel negative. Negative MRS.  Febrile this AM. Plan: Pan-culture again this AM. Escalete to Meropenem from Zosyn this AM. Continue Vancomycin until all cultures are resulted. Monitor Lactic acid.     4. Anemia and thrombocytopenia of surgical blood loss. No current signs of bleeding. On straight rate Heparin infusion. Plan: Increase to low intensity Heparin infusion. Monitor CBC and CT output.     5. Anasarca and oliguria. Good BUN/Cr. Mild metabolic alkalosis, probably chronic CO2 retention in untreated MICHELE. Plan: Increase diuretic dose. Goal negative 500 to 1 L daily.     6. Mild protein caloric malnutrition.  po intake and signs of generalized edema, including pulmonary edema. Plan: Place NJ. Nutrition consult. Start enteral feeding.     I personally managed the ventilator, hemodynamics, sedation,  analgesia, metabolic abnormalities and nutritional status. The patient has risks of imminent respiratory or hemodynamic deterioration that requires my close attention and intervention.      The history and the 10 points review of systems are included in the note below.    I agree with the resident assessment and plan. I spent 72 minutes of critical care time exclusive of the procedures time, evaluating and managing this patient, discussing with the consultants and the patient's family.       Josselin Valles MD  Date of Service (when I saw the patient): 22    CLINICAL NUTRITION SERVICES - ASSESSMENT NOTE     Nutrition  Prescription  Recommendations:  Total fluids/water flushes per MD    Malnutrition Status:  Patient does not meet two of the established criteria necessary for diagnosing malnutrition    Interventions by Registered Dietitian (RD):  Vital high protein via nasoduodenal tube @ 55 mL/hr for 24 hrs + 2  pkts Prosource QID providing 1320 mL formula, 1640 kcal (15 kcal/kg), 203 g protein (1.9 g/kg), 147 g CHO, 30 g fat, 0 g fiber, and 1104 mL free water. (note, pt receiving addtl kcal from propofol)     - 30 mL water flushes q 4 hrs for patency  - Initiate @ 15 mL/hr for 8 hours. Advance by 15 mL every 8 hours to goal rate if K>3, phos >2, and Mg >1.5  - Certavite multivitamin    Future Recommendations:  If pt's full needs being met via tube feeding (ie when propofol discontinued), recommend Osmolite 1.5 @ 65 mL/hr for 24 hours + 2 pkts Prosource five times daily providing 1560 mL, 2740 kcal (25 kcal/kg), 208 g protein (1.9 g/kg), 318 g CHO, 76 g fat, 0 g fiber, and 1189 mL free water.     REASON FOR ASSESSMENT  Chuy Varner is a/an 31 year old male assessed by the dietitian for Provider Order - Registered Dietitian to Assess and Order TF per Medical Nutrition Therapy Protocol and place a post-pyloric feeding tube    CLINICAL HISTORY  PMH of possible bicuspid aortic valve, thoracic aortic aneurysm without rupture, hypertension, obesity, and testicular cancer s/p orchiectomy who underwent Bentall procedure with mechanical valve on 1/28    NUTRITION HISTORY   Pt not known to clinical nutrition services prior to this admission.    OGT placed 1/28/22. Nasoduodenal tube placed 1/31.    Pt's significant other, Debbie, reported no recent weight loss or reduction in appetite.    CURRENT NUTRITION ORDERS  Diet: NPO  Intake/Tolerance: NA    LABS  Labs reviewed  K 3.9 (WNL)  Phos 3.2 (WNL)  Mg 2.6 (H)  Lactic acid 0.7 (trending down from 1.3 on 1/29)  Triglycerides 220 (H)  Creatinine 1.2 (WNL)    MEDICATIONS  Medications  "reviewed  Propofol (if at continuous rate of 35 mL, providing ~900 kcal/day)  Ancef  Lasix  Miralax and senokot  Neutra-phos  Klor-con  Vancomycin  Norepinephrine    ANTHROPOMETRICS  Height: 190.5 cm (6' 3\")  Most Recent Weight: (!) 165.2 kg (364 lb 3.2 oz)  IBW: 89 kg  BMI: Obesity Grade III BMI >40  Weight History:  Wt Readings from Last 10 Encounters:   01/29/22 (!) 165.2 kg (364 lb 3.2 oz)   01/10/22 (!) 166.5 kg (367 lb)   10/27/21 164.7 kg (363 lb)    Dosing Weight: 108 kg (adjusted based on current weight 165.2 kg and IBW)    ASSESSED NUTRITION NEEDS  Estimated Energy Needs: 2150 - 2700 kcals/day (20 - 25+ kcals/kg)  Justification: Maintenance and Post-op  Estimated Protein Needs: 160 - 215 grams protein/day (1.5 - 2 grams of pro/kg)  Justification: Increased needs  Estimated Fluid Needs: 1 mL/kcal  Justification: Maintenance and Per provider pending fluid status    PHYSICAL FINDINGS  See malnutrition section below.    MALNUTRITION  % Intake: No decreased intake noted  % Weight Loss: None noted  Subcutaneous Fat Loss: None observed  Muscle Loss: None observed  Fluid Accumulation/Edema: pulmonary edema   Malnutrition Diagnosis: Patient does not meet two of the established criteria necessary for diagnosing malnutrition    NUTRITION DIAGNOSIS  Inadequate oral intake vs increased needs related to intubation s/p Bentall procedure as evidenced by pt reliant on TF to meet 100% assessed needs    INTERVENTIONS  Implementation  Collaboration with other providers regarding feeding tube placement  Enteral Nutrition - Initiate  Feeding tube flush    Goals  Total avg nutritional intake to meet a minimum of 20 kcal/kg and 1.5 g PRO/kg daily (per dosing wt 108 kg).    Monitoring/Evaluation  Progress toward goals will be monitored and evaluated per protocol.    Briana Palmer  Dietetic Intern    I have read and agree with the above assessment and recommendations.   Nannette Silva, RD, LD  [id number]  Pgr: [phone]         Pain Service " "Progress Note  [facility]  Date: January 31, 2022      Patient Name: Chuy Varner  MRN: [id number]  Age: 31 year old  Sex: male      Assessment:  31 year old male with PMH of possible bicuspid aortic valve, thoracic aortic aneurysm without rupture, hypertension, obesity, testicular cancer s/p orchiectomy    Procedure: Bentall procedure with mechanical valve    Date of Surgery: 1/28/2022    Date of bilateral erector spinae (ES) T5-6 Placement: 1/28/2022    Plan/Recommendations:  1. Regional Anesthesia/Analgesia  -Continuous Catheter Type/Site: bilateral erector spinae (ES) T5-6    Continue 0.2% ropivacaine     Continuous Infusion at 7 mL/hr via each catheter, total infusion rate of 14 mL/hr    Plan to maintain catheters while chest tubes in place, max of 7 days    2. Anticoagulation  Receiving heparin IV infusion at 1,200 units/hr and \"no plan for warfarin in the near future\" per primary service  -Please contact Inpatient Pain Service (pager [phone]) before ordering or making any medication changes     3. Multimodal Analgesia  - per primary service    Pain Service will continue to follow.    Discussed with attending anesthesiologist      Overnight Events: CMV, Tmax (24hrs) 102.7      Tubes/Drains: Yes  Chest tubes x 3, Godfrey catheter    Subjective: On propofol and fentanyl. Opens eyes to voice. Follows commands. No pain behaviors with repositioning    Symptoms of LAST: No objective symptoms      Diet: NPO for Medical/Clinical Reasons Except for: Other; Specify: NPO until Extubated  Advance Diet as Tolerated: Clear Liquid Diet    Relevant Labs:  Recent Labs   Lab Test 01/31/22  0939 01/31/22  0332 01/29/22  0323 01/28/22  1447   INR  --   --   --  1.38*   * 133*   < > 205   PTT  --   --   --  31   BUN  --  26   < > 18    < > = values in this interval not displayed.     CBC RESULTS: Recent Labs   Lab Test 01/31/22  0939   WBC 10.9   RBC 3.77*   HGB 11.5*   HCT 36.1*   MCV 96   MCH 30.5 " "  MCHC 31.9   RDW 12.5   *       Physical Exam:  Vitals: /88   Pulse 94   Temp (!) 102.7  F (39.3  C)   Resp 16   Ht 1.905 m (6' 3\")   Wt (!) 165.2 kg (364 lb 3.2 oz)   SpO2 90%   BMI 45.52 kg/m      Physical Exam:   Orientation:  Opens eyes, cooperative with repositioning, NAD    Motor Examination:  5/5 Strength in lower extremities: Yes moves all extremities    Catheter Site:   Catheter entry site is clean/dry/intact: Yes    Tender: No      Relevant Medications:  Current Pain Medications:  Medications related to Pain Management (From now, onward)    Start     Dose/Rate Route Frequency Ordered Stop    01/31/22 0000  acetaminophen (TYLENOL) tablet 650 mg         650 mg Oral EVERY 4 HOURS PRN 01/28/22 1435      01/30/22 1129  fentaNYL (SUBLIMAZE) 50 mcg/mL bolus from infusion pump 25-50 mcg         25-50 mcg Intravenous EVERY 1 HOUR PRN 01/30/22 1129      01/30/22 0530  fentaNYL (SUBLIMAZE) infusion          mcg/hr  0.5-4 mL/hr  Intravenous CONTINUOUS 01/30/22 0513      01/29/22 0800  polyethylene glycol (MIRALAX) Packet 17 g         17 g Oral DAILY 01/28/22 1435      01/29/22 0800  aspirin (ASA) chewable tablet 81 mg         81 mg Oral or NG Tube DAILY 01/28/22 1435      01/28/22 2000  senna-docusate (SENOKOT-S/PERICOLACE) 8.6-50 MG per tablet 1 tablet         1 tablet Oral 2 TIMES DAILY 01/28/22 1435 01/28/22 2000  propofol (DIPRIVAN) infusion         5-75 mcg/kg/min × 165.9 kg (Dosing Weight)  5-74.7 mL/hr  Intravenous CONTINUOUS 01/28/22 1958      01/28/22 1600  ropivacaine 0.2% (NAROPIN) 750 mL in ON-Q C-Bloc select flow ([id number] holds 600-750 mL) dual cath disposable pump         14 mL/hr  Irrigation CONTINUOUS 01/28/22 1533      01/28/22 1435  HYDROmorphone (PF) (DILAUDID) injection 0.2 mg        \"Or\" Linked Group Details    0.2 mg Intravenous EVERY 2 HOURS PRN 01/28/22 1435      01/28/22 1435  HYDROmorphone (PF) (DILAUDID) injection 0.4 mg        \"Or\" Linked Group Details    0.4 " "mg Intravenous EVERY 2 HOURS PRN 01/28/22 1435      01/28/22 1435  oxyCODONE (ROXICODONE) tablet 5 mg        \"Or\" Linked Group Details    5 mg Oral EVERY 4 HOURS PRN 01/28/22 1435      01/28/22 1435  oxyCODONE IR (ROXICODONE) tablet 10 mg        \"Or\" Linked Group Details    10 mg Oral EVERY 4 HOURS PRN 01/28/22 1435      01/28/22 1435  magnesium hydroxide (MILK OF MAGNESIA) suspension 30 mL         30 mL Oral DAILY PRN 01/28/22 1435      01/28/22 1435  bisacodyl (DULCOLAX) Suppository 10 mg         10 mg Rectal DAILY PRN 01/28/22 1435      01/28/22 1435  methocarbamol (ROBAXIN) tablet 750 mg         750 mg Oral EVERY 6 HOURS PRN 01/28/22 1435      01/28/22 1434  lidocaine 1 % 0.1-1 mL         0.1-1 mL Other EVERY 1 HOUR PRN 01/28/22 1435      01/28/22 1434  lidocaine (LMX4) kit          Topical EVERY 1 HOUR PRN 01/28/22 1435            Primary Service Contacted with Recommendations? Yes    Bree Patricio, APRN CNP  1/31/2022    Time/Communication:  I personally spent 15 minutes with greater than 50% in consultation, education, counseliing and coordination of care.  Also discussed with RN, CVICU Service    Contact Info (24 hour job code pager is the last 4 digits) For in-house use only:   Job code ID: [id number]   [facility] [place] [id number]  [facility] [address]  [email or web address] phone: dial * * * [phone], enter jobcode ID, then enter call-back number.    Text: Use [employer] on the Intranet <Paging/Directory> tab and enter Jobcode ID.   If no call back at any time, contact the hospital  and ask for Regional Anesthesia attending or backup         CV ICU PROGRESS NOTE  02/01/2022      CO-MORBIDITIES:   Aortic root aneurysm (H)    ASSESSMENT: Chuy Varner is a 31 year old male with a PMHx s/f bicuspid aortic valve, thoracic aortic aneurysm without rupture, hypertension, obesity, and testicular cancer s/p orchiectomy who underwent Bentall procedure with mechanical valve on 1/28 with Dr. Velasco and Dr. Hannah.    OVERNIGHT " EVENTS:  TMax of 102.7. Repeat blood and respiratory cultures pending. CXR showing improved pulmonary edema. Was net +1.6L yesterday (received a total of 140 IV lasix) with a urine output of 1.98L. Lung re-recruitment by RT x3 with improved saturation during these times. Vent CMV 18, 500, 14, 65%. NE @ 0.01, propofol @ 40, fentanyl @ 100, heparin @ 1800, insulin @ 3 U/hr.    TODAY'S PROGRESS:   - Wean levo as able  - Wean O2 as able  - Follow blood and respiratory cultures from 1/31  - IV lasix 80 TID with goal net negative 500 to 1L  - Advance TFs  - Wean nitric and FiO2 as able  - Continue Dexamethasone 20 mg daily x5 days (followed by 10mg x5 days snd taper)  - Continue meropenem x7 days, discontinue vancomycin  - Daily lactate    PLAN:  Neuro/ pain/ sedation:  # Acute Postoperative pain  - Monitor neurological status. Notify the MD for any acute changes in exam.  - Pain: fentanyl gtt. Scheduled tylenol. PRN tylenol, oxycodone, robaxin  - Sedation: propofol gtt  - RAAS -1 to -2 for better vent compliance     Pulmonary care:   # Postoperative ventilation management  # Acute hypoxemic respiratory failure likely ARDS vs. PNA vs. Hypervolemia vs. TRALI  Started on Flolan 1/30. COVID-19.Mycoplasma, Parainfluenza and Influenza Negative. CTA negative for PE on 1/30.  - Lung protective ventilation   - CMV: 18, 500, 14, 65%  - Titrate supplemental oxygen to maintain saturation above 90%  - Wean flolan and FiO2 as able  - Continue Dexamethasone 20 mg daily x5 days, 10 mg daily x5, slow taper following (started 1/31)  - Pulmonary hygiene: Incentive spirometer every 15- 30 minutes when awake, flutter valve, C&DB  - Wean nitric     Cardiovascular:    # Ascending aortic aneurysm s/p Bentall w/ Jerry valve on 1/28 with Dr. Velasco and Dr. Hannah  # Bicuspid aortic valve  # History of thoracic aortic aneurysm without rupture  # History of hypertension  Recent echo on 10/26/21 with LVEF of 60-65%.  CT vascular 12/13/21 showing  proximal ascending aorta is severely dilated (55 x 55mm in maximum dimension) and likely bicuspid aortic valve.  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  CT PE 1/30 - negative for acute PE, marked dependent atelectasis R>L.  - Monitor hemodynamic status  - Goal MAP>65, SBP<110  - Start statin  - BB hold  - ASA  - Wean levo gtt as able  - Holding PTA meds: metoprolol succinate 25mg daily  - Labetalol PRN to maintain SBP <110 once off pressors     GI care/ Nutrition:   # Obesity (BMI 46)  - NJ placement, advance to goal  - PPI  - Continue bowel regimen: miralax, senna    Renal/ Fluid Balance/ Electrolytes:   BL creat appears to be ~ 1.0  - Strict I/O, daily weights  - Lasix 80 TID, goal -500 to -1L net  - Avoid/limit nephrotoxins as able  - Replete lytes PRN per protocol     Endocrine:    # Stress induced hyperglycemia, improving  - Insulin gtt  - Goal BG <180 for optimal healing     ID/ Antibiotics:  # Stress induced leukocytosis, resolved  # Possible ventilator associated pneumonia  Febrile and pan-cultured on 1/30/22. Covid pcr, respiratory panel, and respiratory cultures negative 1/30.  - Continue to monitor fever curve, WBC and inflammatory markers  - Continue meropenem x7 days  - Discontinue vancomycin  - Blood and respiratory cultures 1/31 pending     Heme/Onc:     # Stress induced leukocytosis, resolved  # Acute blood loss anemia  # Acute blood loss thrombocytopenia, improving  # History of testicular cancer s/p orchiectomy  No s/sx active bleeding  - Continue to monitor  - CBC daily  - Continue low intensity heparin gtt     MSK/ Skin:  # Sternotomy  # Surgical Incision  - Sternal precautions  - Postoperative incision management per protocol  - PT/OT/CR     Prophylaxis:    - Mechanical prophylaxis for DVT  - Chemical DVT prophylaxis- heparin gtt  -  PPI     Lines/ tubes/ drains:  - PA catheter (1/28)  - RIJ MAC (1/28)  - Left radial arterial line (1/28)  - Left PIV (1/28)  - Godfrey (1/28)  - 3 CTs (2 anterior mediastinal, 1 left mediastinal) (1/28)  - OG (2 days)  - ETT (1/28)  - Bilateral ES catheters (1/28)  - Nasoduodenal (1/31)     Disposition:  - CVICU     Patient seen, findings and plan discussed with CV ICU staff, Dr. Valles and CVTS staff Dr. Velasco.    Michelle Poon, MS4    I was present with the medical student who participated in the service and in the documentation of the note. I have verified the history and personally performed the physical exam and medical decision making. I agree with the assessment and plan of care as documented in the note.    Tom Adair MD  Anesthesiology, PGY3    ====================================    SUBJECTIVE:   Improving FiO2 requirements and findings on CXR, now on FiO2 of 65%. Continuing broad spectrum abx. Awaiting repeat blood and respiratory cultures.      OBJECTIVE:   1. VITAL SIGNS:   Temp:  [97  F (36.1  C)-102.7  F (39.3  C)] 99.7  F (37.6  C)  Pulse:  [] 89  Resp:  [11-20] 19  MAP:  [59 mmHg-108 mmHg] 77 mmHg  Arterial Line BP: ()/(47-85) 117/61  FiO2 (%):  [65 %-100 %] 65 %  SpO2:  [88 %-100 %] 95 %  Ventilation Mode: CMV/AC  (Continuous Mandatory Ventilation/ Assist Control)  FiO2 (%): 65 %  Rate Set (breaths/minute): 18 breaths/min  Tidal Volume Set (mL): 500 mL  PEEP (cm H2O): 14 cmH2O  Oxygen Concentration (%): 65 %  Resp: 19      2. INTAKE/ OUTPUT:   I/O last 3 completed shifts:  In: 3753.14 [I.V.:2833.14; NG/GT:675]  Out: 2812 [Urine:2332; Emesis/NG output:350; Chest Tube:130]    3. PHYSICAL EXAMINATION:   General: Sedated, intubated, laying in bed on back  Neuro: Sedated, opening eyes to voice, tracking  Resp: Intubated, at 65% FIO2  CV: RRR, no r/m/g apprecaited, temporary pacemaker on standby  Abdomen: Soft, Non-distended  Incisions: C/D/I  Extremities: warm and well perfused    4.  INVESTIGATIONS:   Arterial Blood Gases   Recent Labs   Lab 02/01/22  0330 01/31/22  2210 01/31/22  2046 01/31/22  1642   PH 7.38 7.35 7.36 7.35   PCO2 50* 50* 46* 47*   PO2 110* 72* 66* 83   HCO3 30* 28 26 26     Complete Blood Count   Recent Labs   Lab 02/01/22  0326 01/31/22  0939 01/31/22  0332 01/30/22 0312   WBC 12.5* 10.9 10.1 11.5*   HGB 10.1* 11.5* 11.5* 13.0*    139* 133* 165     Basic Metabolic Panel  Recent Labs   Lab 02/01/22  0805 02/01/22  0702 02/01/22  0608 02/01/22  0501 02/01/22  0344 02/01/22 0326 01/31/22 2053 01/31/22  1933 01/31/22  1642 01/31/22  1347 01/31/22  1033 01/31/22  0939 01/31/22  0338 01/31/22 0332 01/30/22  0315 01/30/22  0312   NA  --   --   --   --   --  142  --  142  --   --   --   --   --  143  --  144   POTASSIUM  --   --   --   --   --  3.9  --  4.0  --  3.8  --  3.9  --  3.9   < > 3.3*   CHLORIDE  --   --   --   --   --  109  --  111*  --   --   --   --   --  111*  --  113*   CO2  --   --   --   --   --  28  --  26  --   --   --   --   --  27  --  26   BUN  --   --   --   --   --  34*  --  28  --   --   --   --   --  26  --  26   CR  --   --   --   --   --  1.14  --  1.26*  --   --   --   --   --  1.20  --  1.15   * 126* 143* 132*   < > 156*   < > 196*   < >  --    < >  --    < > 108*   < > 130*    < > = values in this interval not displayed.     Liver Function Tests  Recent Labs   Lab 02/01/22  0326 01/31/22  0332 01/30/22  0312 01/29/22  0323 01/28/22  1447 01/28/22  1447 01/28/22  1246   AST 18 27 31 45   < > 50*  --    ALT 20 20 24 26   < > 28  --    ALKPHOS 54 46 40 42   < > 44  --    BILITOTAL 0.3 0.7 0.7 0.5   < > 0.6  --    ALBUMIN 2.3* 2.4* 2.8* 3.0*   < > 3.1*  --    INR  --   --   --   --   --  1.38* 1.53*    < > = values in this interval not displayed.     Pancreatic Enzymes  No lab results found in last 7 days.  Coagulation Profile  Recent Labs   Lab 01/28/22  1447 01/28/22  1246   INR 1.38* 1.53*   PTT 31 28         5. RADIOLOGY:   Recent  Results (from the past 24 hour(s))   XR Chest Port 1 View    Narrative    EXAM: XR CHEST PORT 1 VIEW  1/29/2022 8:45 AM     HISTORY:  increasing FiO2 requirements       COMPARISON:  Chest x-ray 1/28/2022    FINDINGS: AP radiograph of the chest. Endotracheal tube projecting  over the high thoracic trachea. Right IJ Redway-Nelda catheter with tip  overlying the main pulmonary artery. Partially visualized enteric tube  with tip overlying the stomach. Postoperative chest with intact median  sternotomy wires.    Stable borderline enlargement of the cardiomediastinal silhouette. Low  lung volumes. Small left and possible trace right pleural effusions.  Pulmonary vasculature is indistinct. Perihilar interstitial prominent  opacities. No pneumothorax. Visualized upper abdomen is unremarkable.      Impression    IMPRESSION:  1. Stable endotracheal tube and right IJ Redway-Nelda catheter.  2. Low lung volumes with unchanged perihilar interstitial prominent  opacities, likely pulmonary edema and/or atelectasis.  3. Increased small left and possible trace right pleural effusions.    I have personally reviewed the examination and initial interpretation  and I agree with the findings.    BENTLEY ANTONIO MD         SYSTEM ID:  [id number]   XR Chest Port 1 View    Impression    RESIDENT PRELIMINARY INTERPRETATION  Impression:   1. Worsening of low lung volumes, small left greater than right  pleural effusions, and perihilar opacities compatible with pulmonary  edema and/or atelectasis. Infection cannot be excluded.  2. Endotracheal tube has been advanced with tip projecting over mid  thoracic trachea. Good position.  3. Stable additional support devices and postsurgical changes.   CT Chest Pulmonary Embolism w Contrast    Impression    RESIDENT PRELIMINARY INTERPRETATION  IMPRESSION:   1. Exam is negative for acute pulmonary embolism.  No evidence of  right heart strain or increased right heart pressures.       2. Marked dependent  atelectasis in the right than left lungs. Occult  infection cannot be excluded.    3. Postsurgical changes of median sternotomy and aortic valve repair.  Satisfactory position of support devices as detailed.       In the event of a positive result for acute pulmonary embolism  resulting in right heart strain, consider calling the   [facility] hospital  for PERT (Pulmonary Embolism Response Team)  Activation?    PERT -- Pulmonary Embolism Response Team (Multidisciplinary team  including cardiology, interventional radiology, critical care,  hematology)       =========================================          Attestation signed by Josselin Valles MD at 2/13/2022  6:15 PM (Updated):  Physician Attestation   I, Josselin Valles MD, saw this patient with the resident and agree with the resident/fellow's findings and plan of care as documented in the note.      I personally reviewed vital signs, medications, labs, and imaging.    Parekh findings:  Chuy Varner is a 31 year old male with a PMHx s/f bicuspid aortic valve, thoracic aortic aneurysm without rupture, hypertension, obesity, and testicular cancer s/p orchiectomy who underwent Bentall procedure with mechanical valve on 1/28 with Dr. Velasco and Dr. Hannah.     Active problems and current treatments include:     1. Vaso-plegic versus cardiogenic versus hypovolemic shock s/p Bentall procedure with mechanical valve on 1/28. H/o HTN. On low dose NE for low MAP and low SVR. CI adequate. Plan: Wean NE as possible to gaola CI>2, SvO2 >50%.      2. Hypoxemic respiratory failure likely ARDS, versus PNA, versus hypervolemia, versus TRALI versus intracardiac Right to left shunt. Morbid obesity with probably untreated MICHELE history. COVID-19.Mycoplasma, Parainfluenza and Influenza Negative. CTA negative for PE on 1/30. On Flolan and Christian. Attempted to wean Christian on 2/1/22, unable to due to poor oxygenation. Plan: Conventional ARDS mechanical ventilation,  (6 ml/kg  ideal BW) , RR 18, recruitment maneuvers Q 4 hours, increase PEEP 16 and Lasix dose.  Continue Dexamethasone 20 mg daily for 5 days with slow taper, albuterol and mucomyst. Continue Meropenem and stop Vancomycin,  Agree with no prone position yet in setting of recent open heart surgery.      3. Suspicion new infection. Febrile on 22 and 22, pan-cultured. Respiratory viral panel negative. Negative MRSA nasal swab. All cultures negative to date. Plan: Continue Meropenem. Stop Vancomycin.     4. Anemia and thrombocytopenia of surgical blood loss. No current signs of bleeding. On Heparin infusion. Plan: Continue low intensity Heparin infusion. Monitor CBC and CT output.      5. Anasarca and oliguria. Good BUN/Cr. Mild metabolic alkalosis, probably chronic CO2 retention in untreated MICHELE. Plan: Increase diuretic dose. Goal negative 500 to 1 L daily. Monitor kidney function.     6. Mild protein caloric malnutrition.  po intake and signs of generalized edema, including pulmonary edema. Has NJ. Plan: Advance enteral feeding.      I personally managed the ventilator, hemodynamics, sedation,  analgesia, metabolic abnormalities and nutritional status. The patient has risks of imminent respiratory or hemodynamic deterioration that requires my close attention and intervention.       The history and the 10 points review of systems are included in the note below.     I agree with the resident assessment and plan. I spent 63 minutes of critical care time exclusive of the procedures time, evaluating and managing this patient, discussing with the consultants and the patient's family.        Josselin Valles MD  Date of Service (when I saw the patient): 22    Pain Service Progress Note  [facility]  Date: 2022      Patient Name: Chuy Varner  MRN: [id number]  Age: 31 year old  Sex: male      Assessment:  31 year old male with PMH of possible bicuspid aortic valve,  "thoracic aortic aneurysm without rupture, hypertension, obesity, testicular cancer s/p orchiectomy    Procedure: Bentall procedure with mechanical valve    Date of Surgery: 1/28/2022    Date of bilateral erector spinae (ES) T5-6 Placement: 1/28/2022    Plan/Recommendations:  1. Regional Anesthesia/Analgesia  -Continuous Catheter Type/Site: bilateral erector spinae (ES) T5-6    Continue 0.2% ropivacaine    Continuous Infusion at 7 mL/hr via each catheter, total infusion rate of 14 mL/hr    Plan to maintain catheter while chest tubes in place, max of 7 days    2. Anticoagulation    Heparin IV infusion at 1950 units/hr per primary servicre    Please contact Inpatient Pain Service (pager [phone]) before ordering or making any medication changes     3. Multimodal Analgesia  - per CVICU team    Pain Service will continue to follow.    Discussed with attending anesthesiologist      Overnight Events: remains intubated and sedated    Tubes/Drains: Yes  Chest tubes x 3    Subjective:  Bedside RN reports when sedation lightened Chuy opened eyes, followed commands, and is moving upper and lower extremities. No pain behaviors at rest and with movement  Symptoms of LAST: No objective symptoms of LAST      Diet: NPO for Medical/Clinical Reasons Except for: Other; Specify: NPO until Extubated  Advance Diet as Tolerated: Clear Liquid Diet  Adult Formula Drip Feeding: Continuous Vital High Protein; Nasoduodenal tube; Goal Rate: 65; mL/hr; Medication - Feeding Tube Flush Frequency: At least 15-30 mL water before and after medication administration and with tube clogging; Amount to Sen...    Relevant Labs:  Recent Labs   Lab Test 02/01/22  0326 01/29/22  0323 01/28/22  1447   INR  --   --  1.38*      < > 205   PTT  --   --  31   BUN 34*   < > 18    < > = values in this interval not displayed.       Physical Exam:  Vitals: /88   Pulse 93   Temp 100.2  F (37.9  C)   Resp 21   Ht 1.905 m (6' 3\")   Wt (!) 165.5 kg (364 lb " "13.8 oz)   SpO2 94%   BMI 45.60 kg/m      Physical Exam:   Orientation:  Intubated, on CMV, sedated on propofol drip, IV fentanyl drip for pain    Motor Examination:  Strength in lower extremities: Yes, moving all extremities     Catheter Site:   Catheter entry site is clean/dry/intact: Yes    Tender: No      Relevant Medications:  Current Pain Medications:  Medications related to Pain Management (From now, onward)    Start     Dose/Rate Route Frequency Ordered Stop    01/31/22 0000  acetaminophen (TYLENOL) tablet 650 mg         650 mg Oral EVERY 4 HOURS PRN 01/28/22 1435      01/30/22 1129  fentaNYL (SUBLIMAZE) 50 mcg/mL bolus from infusion pump 25-50 mcg         25-50 mcg Intravenous EVERY 1 HOUR PRN 01/30/22 1129      01/30/22 0530  fentaNYL (SUBLIMAZE) infusion          mcg/hr  0.5-4 mL/hr  Intravenous CONTINUOUS 01/30/22 0513      01/29/22 0800  polyethylene glycol (MIRALAX) Packet 17 g         17 g Oral DAILY 01/28/22 1435      01/29/22 0800  aspirin (ASA) chewable tablet 81 mg         81 mg Oral or NG Tube DAILY 01/28/22 1435      01/28/22 2000  senna-docusate (SENOKOT-S/PERICOLACE) 8.6-50 MG per tablet 1 tablet         1 tablet Oral 2 TIMES DAILY 01/28/22 1435 01/28/22 2000  propofol (DIPRIVAN) infusion         5-75 mcg/kg/min × 165.9 kg (Dosing Weight)  5-74.7 mL/hr  Intravenous CONTINUOUS 01/28/22 1958 01/28/22 1600  ropivacaine 0.2% (NAROPIN) 750 mL in ON-Q C-Bloc select flow ([id number] holds 600-750 mL) dual cath disposable pump         14 mL/hr  Irrigation CONTINUOUS 01/28/22 1533      01/28/22 1435  HYDROmorphone (PF) (DILAUDID) injection 0.2 mg        \"Or\" Linked Group Details    0.2 mg Intravenous EVERY 2 HOURS PRN 01/28/22 1435      01/28/22 1435  HYDROmorphone (PF) (DILAUDID) injection 0.4 mg        \"Or\" Linked Group Details    0.4 mg Intravenous EVERY 2 HOURS PRN 01/28/22 1435      01/28/22 1435  oxyCODONE (ROXICODONE) tablet 5 mg        \"Or\" Linked Group Details    5 mg Oral EVERY " "4 HOURS PRN 01/28/22 1435      01/28/22 1435  oxyCODONE IR (ROXICODONE) tablet 10 mg        \"Or\" Linked Group Details    10 mg Oral EVERY 4 HOURS PRN 01/28/22 1435      01/28/22 1435  magnesium hydroxide (MILK OF MAGNESIA) suspension 30 mL         30 mL Oral DAILY PRN 01/28/22 1435      01/28/22 1435  bisacodyl (DULCOLAX) Suppository 10 mg         10 mg Rectal DAILY PRN 01/28/22 1435      01/28/22 1435  methocarbamol (ROBAXIN) tablet 750 mg         750 mg Oral EVERY 6 HOURS PRN 01/28/22 1435      01/28/22 1434  lidocaine 1 % 0.1-1 mL         0.1-1 mL Other EVERY 1 HOUR PRN 01/28/22 1435      01/28/22 1434  lidocaine (LMX4) kit          Topical EVERY 1 HOUR PRN 01/28/22 1435            Primary Service Contacted with Recommendations? No    Bree Patricio, APRN CNP  2/1/2022    Time/Communication:  I personally spent 15 minutes with greater than 50% in consultation, evaluation, coordination of care.  Discussed with significant other at bedside and RN      Contact Info (24 hour job code pager is the last 4 digits) For in-house use only:   Job code ID: [id number]   [facility] 0599  [facility] 0602  [employer] phone: dial * * * [phone], enter jobcode ID, then enter call-back number.    Text: Use [employer] on the Intranet <Paging/Directory> tab and enter Jobcode ID.   If no call back at any time, contact the hospital  and ask for Regional Anesthesia attending or backup       SPIRITUAL HEALTH SERVICES  SPIRITUAL ASSESSMENT Progress Note  [facility] ([place]) 4E     REFERRAL SOURCE: LOS    Pt unable to communicate d/t intubation.  Pt's girlfriend Debbie at bedside, and was receptive to  visit.  Debbie reported pt is \"getting better,\" and talked about her hopes for his recovery.   introduced spiritual health services and let her know that we would support her and pt as much as they needed throughout stay.  Debbie said she would reach out if she had further needs.    PLAN: SHS will remain available "     Nandini Coleman  Pager: [phone]      Antimicrobial Stewardship Team Note    Antimicrobial Stewardship Program - A joint venture between [facility] and  Physicians to optimize antibiotic management.  NOT a formal consult - Restricted Antimicrobial Review     Patient: Chuy Varner  MRN: [id number]  Allergies: Patient has no known allergies.    Brief Summary: Chuy Varner is a 31 year old male with history of possible bicuspid aortic valve, thoracic aortic aneurysm without rupture, HTN, obesity, and testicular cancer s/p orchiectomy. 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 Patient was admitted to CVICU for post-operative management.     Post-op course complicated by acute hypoxic respiratory failure with CXR showing pulmonary edema. Patient developed fever (tmax 102.4F) with worsening respiratory failure on 1/30. Cultures were sent and empiric Zosyn and vancomycin were initiated for possible ventilator associated pneumonia. CT chest negative for PE, with lower lung volumes and moderate bibasilar atelectasis/consolidation. Occult infection cannot be excluded. Blood cultures NGTD x2 days, ET sputum culture with normal michele, nasal MRSA PCR, COVID-19 PCR, and respiratory panel negative. UA negative. Zosyn was escalated to meropenem on 1/31 due to ongoing fever and concern for kidney injury with vancomycin. Repeat blood and sputum cultures pending. UA negative.     ARDS being managed with lung protective ventilation, epoprostenol, dexamethasone slow taper, and diuresis. 2/1 CXR shows mildly improved perihilar and basilar prominent interstitial and airspace opacities with stable small bilateral pleural effusions and left basilar opacity, likely a combination of atelectasis and pleural fluid. Vancomycin stopped today.         Active Anti-infective Medications   (From admission, onward)                 Start     Stop    01/31/22 0900  meropenem  1  g,   Intravenous,   EVERY 8 HOURS        Ventilator-Associated Pneumonia        --                  Assessment: Possible ventilator associated pneumonia  Patient remains in critical condition on mechanical ventilation as the result of acute hypoxic respiratory failure: source likely ARDS vs. VAP vs. hypervolemia vs. TRALI. Empiric broad spectrum antibiotics were initiated for possible VAP given fever and worsening respiratory failure. Blood and endotracheal sputum culture are negative for bacteria with repeat cultures pending. Vancomycin was stopped today given negative nares MRSA PCR test and normal michele in sputum culture. Reasonable to continue empiric GNR coverage for possible VAP given improving fever curve and pressor support. However, patient does not have an indication for carbapenem therapy (i.e. current or prior history of MDR Pseudomonas or ESBL-producing GNRs). We recommend deescalating meropenem back to Zosyn to complete a duration of 7 days for possible VAP. This would not be considered Zosyn failure given < 48-hour exposure. Patient is now off vancomycin reducing risk for DIA with combination therapy with Zosyn.     Recommendations:  1. Deescalate meropenem to Zosyn to complete 7 days (~end date 2/6) for possible VAP     Pharmacy took the following actions: Called/paged provider,Electronic note created.    Discussed with ID Staff: DO Ly Dumont, PharmD, [facility]  Pager: [phone]    Vital Signs/Clinical Features:  Vitals  Report        01/30 0700  01/31 0659 01/31 0700  02/01 0659 02/01 0700  02/01 1200   Most Recent      Temp ( F) 99.3 -  102.7    97 -  102.7    99.1 -  100.4     100.2 (37.9) 02/01 1145    Pulse 78 -  103    79 -  111    79 -  96     81 02/01 1145    Resp 16 -  19    11 -  20    19 -  22     19 02/01 1200    SpO2 (%) 89 -  100    88 -  100    90 -  97     90 02/01 1145            Labs  Estimated Creatinine Clearance: 155.2 mL/min (based on SCr of 1.14  mg/dL).  Recent Labs   Lab Test 01/28/22  1447 01/29/22  0323 01/30/22  0312 01/31/22  0332 01/31/22  1933 02/01/22  0326   CR 1.20 0.83 1.15 1.20 1.26* 1.14       Recent Labs   Lab Test 01/28/22  1447 01/29/22  0323 01/30/22  0312 01/31/22  0332 01/31/22  0939 02/01/22  0326   WBC 13.7* 11.0 11.5* 10.1 10.9 12.5*   HGB 13.7 13.6 13.0* 11.5* 11.5* 10.1*   HCT 40.0 40.7 39.4* 35.9* 36.1* 31.7*   MCV 90 90 91 95 96 95    189 165 133* 139* 165       Recent Labs   Lab Test 01/10/22  1216 01/28/22  1447 01/29/22  0323 01/30/22  0312 01/31/22  0332 02/01/22  0326   BILITOTAL 0.5 0.6 0.5 0.7 0.7 0.3   ALKPHOS 64 44 42 40 46 54   ALBUMIN 4.5 3.1* 3.0* 2.8* 2.4* 2.3*   AST 33 50* 45 31 27 18   ALT 42 28 26 24 20 20       Recent Labs   Lab Test 01/29/22  0323 01/30/22  0313 01/30/22  0547 01/30/22  1545 01/31/22  0332 02/01/22  0326   LACT 1.3 1.0 1.1 0.8 0.7 1.4       Recent Labs   Lab Test 01/31/22  0939   VANCOMYCIN 4.2       Culture Results:  7-Day Micro Results       Procedure Component Value Units Date/Time    Respiratory Aerobic Bacterial Culture with Gram Stain [[id number]] Collected: 01/31/22 1147    Order Status: Completed Lab Status: Preliminary result Updated: 02/01/22 1009    Specimen: Aspirate from Endotracheal      Culture 50,000-100,000 CFU/mL Normal michele    Blood Culture Hand, Right [[id number]]  (Normal) Collected: 01/31/22 1102    Order Status: Completed Lab Status: Preliminary result Updated: 02/01/22 0105    Specimen: Blood from Hand, Right      Culture No growth after 12 hours    Blood Culture Line, venous     Order Status: Sent Lab Status: No result     Specimen: Blood from Line, venous     Blood Culture Line, venous     Order Status: Canceled Lab Status: No result     Specimen: Blood from Line, venous     Blood Culture Peripheral Blood     Order Status: Canceled Lab Status: No result     Specimen: Peripheral Blood     Blood Culture Peripheral Blood     Order Status: Canceled Lab Status: No  result     Specimen: Peripheral Blood     Blood Culture Peripheral Blood     Order Status: Canceled Lab Status: No result     Specimen: Peripheral Blood     Respiratory Panel PCR - NP Swab [[id number]]  (Normal) Collected: 01/30/22 0951    Order Status: Completed Lab Status: Final result Updated: 01/30/22 1449    Specimen: Swab from Nasopharyngeal      Adenovirus Not Detected     Coronavirus Not Detected     Comment: This test detects Coronavirus 229E, HKU1, NL63 and OC43 but does not distinguish between them. It does not detect MERS ( Respiratory Syndrome), SARS (Severe Acute Respiratory Syndrome) or 2019-nCoV (Novel 2019) Coronavirus.        Human Metapneumovirus Not Detected     Human Rhin/Enterovirus Not Detected     Influenza A Not Detected     Influenza A, H1 Not Detected     Influenza A 2009 H1N1 Not Detected     Influenza A, H3 Not Detected     Influenza B Not Detected     Parainfluenza Virus 1 Not Detected     Parainfluenza Virus 2 Not Detected     Parainfluenza Virus 3 Not Detected     Parainfluenza Virus 4 Not Detected     Respiratory Syncytial Virus A Not Detected     Respiratory Syncytial Virus B Not Detected     Chlamydia Pneumoniae Not Detected     Mycoplasma Pneumoniae Not Detected    Narrative:      The ePlex Respiratory Viral Panel is a qualitative nucleic acid, multiplex, in vitro diagnostic test for the simultaneous detection and identification of multiple respiratory viral and bacterial nucleic acids in nasopharyngeal swabs collected in viral transport media from individual exhibiting signs and symptoms of respiratory infection. The assay has received FDA approval for the testing of nasopharyngeal (NP) swabs only. This test has been verified and is performed by the Infectious Diseases Diagnostic Laboratory at [facility]. This test is used for clinical purposes and should not be regarded as investigational or for research. This laboratory is certified under the Clinical  Laboratory Improvement Amendments of 1988 (CLIA-88) as qualified to perform high complexity clinical laboratory testing.    MRSA/MSSA PCR [[id number]] Collected: 01/30/22 0543    Order Status: Completed Lab Status: Final result Updated: 01/30/22 0725    Specimen: Swab from Nares, Bilateral      MRSA Target DNA Negative     SA Target DNA Positive    Narrative:      The XMPie  Xpert SA Nasal Complete assay performed in the [employer]  Dx System is a qualitative in vitro diagnostic test designed for rapid detection of Staphylococcus aureus (SA) and methicillin-resistant Staphylococcus aureus (MRSA) from nasal swabs in patients at risk for nasal colonization. The test utilizes automated real-time polymerase chain reaction (PCR) to detect MRSA/SA DNA. The Xpert SA Nasal Complete assay is intended to aid in the prevention and control of MRSA/SA infections in healthcare settings. The assay is not intended to diagnose, guide or monitor treatment for MRSA/SA infections, or provide results of susceptibility to methicillin. A negative result does not preclude MRSA/SA nasal colonization.     Blood Culture Peripheral Blood [[id number]]  (Normal) Collected: 01/30/22 0534    Order Status: Completed Lab Status: Preliminary result Updated: 02/01/22 0702    Specimen: Peripheral Blood      Culture No growth after 2 days    Blood Culture Peripheral Blood [[id number]]  (Normal) Collected: 01/30/22 0534    Order Status: Completed Lab Status: Preliminary result Updated: 02/01/22 0702    Specimen: Peripheral Blood      Culture No growth after 2 days    Respiratory Aerobic Bacterial Culture with Gram Stain [[id number]] Collected: 01/30/22 0528    Order Status: Completed Lab Status: Final result Updated: 02/01/22 0929    Specimen: Sputum from Endotracheal      Culture 1+ Normal michele     Gram Stain Result >25 PMNs/low power field      3+ Mixed michele            Recent Labs   Lab Test 01/10/22  1154 01/30/22  0519 01/31/22  1038    URINEPH 6.0 6.0 5.0   NITRITE Negative Negative Negative   LEUKEST Negative Negative Negative   WBCU 1 0 1             Recent Labs   Lab Test 22  0951   IFLUA Not Detected   FLUAH1 Not Detected   FLUAH3 Not Detected   FZ8512 Not Detected   IFLUB Not Detected   RSVA Not Detected   RSVB Not Detected   PIV1 Not Detected   PIV2 Not Detected   PIV3 Not Detected   HMPV Not Detected             Imaging: Echo Complete    Result Date: 2022  [id number]^ABRIL^ROLA^TOBIN  [facility] [address] [place] [id number]  Name: TU DEAN MRN: [id number] : 1990 Study Date: 2022 12:24 PM Age: 31 yrs Gender: Male Patient Location: [facility] Reason For Study: Shock Ordering Physician: RLOA SAMUELS Performed By: Berto Gómez  BSA: 2.8 m2 Height: 75 in Weight: 364 lb HR: 107 BP: 110/63 mmHg ______________________________________________________________________________ Procedure Complete Portable Echo Adult. Technically difficult study.Extremely poor acoustic windows. Optison (NDC #2569-8033-76) given intravenously. Patient was given 9 ml mixture of 3 ml Optison and 6 ml saline. 0 ml wasted. ______________________________________________________________________________ Interpretation Summary Technically difficult study.Extremely poor acoustic windows. S/p Bentall procedure using On-X Ascending Aortic Prosthesis with Vascutek Gelweave Valsa Graft size 27-29MM on 2022. Global and regional left ventricular function is hyperkinetic with an EF of 65-70%. Right ventricular function, chamber size, wall motion, and thickness are normal. The mean gradient across the aortic valve is11 mmHg. Dilation of the inferior vena cava is present with abnormal respiratory variation in diameter. No pericardial fluid.Organizing material noted along RV free wall.  ______________________________________________________________________________ Left Ventricle S/p Bentall procedure using On-X Ascending Aortic Prosthesis with Vascutek Gelweave Valsa Graft size 27-29MM on . Global and regional left ventricular function is hyperkinetic with an EF of 65-70%. Left ventricular size is normal. Mild concentric wall thickening consistent with left ventricular hypertrophy is present. Diastolic function not assessed due to tachycardia. No regional wall motion abnormalities are seen.  Right Ventricle Right ventricular function, chamber size, wall motion, and thickness are normal.  Atria The atria cannot be assessed.  Mitral Valve The mitral valve is normal.  Aortic Valve The valve leaflets are not well visualized. The mean gradient across the aortic valve is11 mmHg.  Tricuspid Valve The tricuspid valve is normal.  Vessels Dilation of the inferior vena cava is present with abnormal respiratory variation in diameter.  Pericardium No pericardial fluid.Organizing material noted along RV free wall.  ______________________________________________________________________________ MMode/2D Measurements & Calculations IVSd: 1.2 cm LVIDd: 4.2 cm LVIDs: 2.5 cm LVPWd: 1.5 cm FS: 40.0 % LV mass(C)d: 206.2 grams LV mass(C)dI: 72.9 grams/m2 LVOT diam: 2.5 cm LVOT area: 4.7 cm2 RWT: 0.73  Doppler Measurements & Calculations Ao V2 max: 220.0 cm/sec Ao max P.0 mmHg Ao V2 mean: 151.0 cm/sec Ao mean P.0 mmHg Ao V2 VTI: 28.8 cm HOLLIE(I,D): 2.6 cm2 HOLLIE(V,D): 2.5 cm2 LV V1 max P.6 mmHg LV V1 max: 118.0 cm/sec LV V1 VTI: 15.9 cm SV(LVOT): 75.0 ml SI(LVOT): 26.5 ml/m2 AV Aguila Ratio (DI): 0.54 HOLLIE Index (cm2/m2): 0.92  ______________________________________________________________________________ Report approved by: Beverly Roach 2022 02:06 PM       XR Chest Port 1 View    Result Date: 2022  EXAM: XR CHEST PORT 1 VIEW  2022 1:05 AM HISTORY:  S/P AVR, CT in place   COMPARISON:   Chest radiograph 1/31/2022 FINDINGS: Single view of the chest. Postsurgical changes of the chest with intact median sternotomy wires. Aortic valve replacement. Endotracheal tube tip projects over the midthoracic trachea. Gastric tube tip projects over the stomach. Second enteric tube courses below the level of the diaphragm and projects below the field-of-view. Right IJ [place]-Nelda catheter tip projects over the proximal right pulmonary artery. Mediastinal drains and left basilar chest tube in place. Stable partially obscured cardiac silhouette due to pleural pulmonary pathology. Small bilateral pleural effusions. No pneumothorax. Mildly improved perihilar and bibasilar predominant mixed interstitial and airspace opacities. Left basilar opacity. Osseous structures are unchanged.     IMPRESSION: 1. Mildly improved perihilar and basilar prominent interstitial and airspace opacities. 2. Stable small bilateral pleural effusions. 3. Stable support devices. 4. Stable left basilar opacity, likely a combination of atelectasis and pleural fluid. I have personally reviewed the examination and initial interpretation and I agree with the findings. KIMMIE CANTRELL MD   SYSTEM ID:  [id number]    XR Chest Port 1 View    Result Date: 1/31/2022  Chest one view portable HISTORY: Evaluate worsening hypoxemia COMPARISON STUDY: Same day at 01 40 FINDINGS: Cardiac silhouette is borderline enlarged. Aortic valve replacement. 3 mediastinal drains . Right IJ Topmost-Nelda catheter tip in the right pulmonary artery. Endotracheal tube tip in the mid trachea. Median sternotomy wires. Interstitial opacities bilaterally. Bibasilar atelectasis and consolidation. Enteric tube tip in stomach.     IMPRESSION: Increased pulmonary edema and bibasilar atelectasis and consolidation. TAE BRISCOE MD   SYSTEM ID:  [id number]    XR Chest Port 1 View    Result Date: 1/31/2022  EXAM: XR CHEST PORT 1 VIEW  1/31/2022 1:50 AM HISTORY:  S/P AVR, CT in place    COMPARISON:  Chest radiograph 1/30/2022 FINDINGS: Single view of the chest. Postsurgical change of the chest with intact median sternotomy wires. Prosthetic aortic valve. Endotracheal tube tip projects over the midthoracic trachea. Enteric tube sidehole and tip project over the stomach. Right IJ [place]-Nelda catheter tip projects over the mid pulmonary artery. Stable mediastinal drains and left basilar chest tube. Trachea is midline. Stable partially obscured cardiomediastinal silhouette. Small bilateral pleural effusions, similar to prior. No pneumothorax. Low lung volumes. Bilateral perihilar and bibasilar predominant mixed interstitial and airspace opacities. The visualized upper abdomen is unremarkable.     IMPRESSION: 1. Stable perihilar and bibasilar predominant mixed interstitial and airspace opacities likely representing edema and/or atelectasis. 2. Stable small bilateral pleural effusions. 3. Stable support devices. I have personally reviewed the examination and initial interpretation and I agree with the findings. АННА SMITH MD   SYSTEM ID:  [id number]    XR Chest Port 1 View    Result Date: 1/30/2022  Exam: XR CHEST PORT 1 VIEW, 1/30/2022 3:19 AM Indication: S/P AVR, CT in place Comparison: 1/29/2022 Findings: Portable semiupright AP view of the chest. Endotracheal tube projecting over the midthoracic trachea, slightly advanced. Right IJ [place]-Nelda catheter tip projects over the main pulmonary artery. Partially visualized enteric tube, tip and sidehole projecting over left upper quadrant. Stable post surgical changes with intact median sternotomy. Mediastinal drains. Cardiac silhouette is prominent midline trachea. Increased small left greater than right pleural effusions with adjacent streaky opacities. Increased perihilar and left retrocardiac mixed interstitial and patchy airspace opacities. No appreciable pneumothorax. No acute findings in the upper abdomen. Low lung volumes.     Impression: 1.  Lower lung volumes, small left greater than right pleural effusions, and perihilar opacities compatible with pulmonary edema and/or atelectasis. 2. Endotracheal tube has been advanced with tip projecting over mid thoracic trachea, about 3.7 cm above the deysi. 3. Stable additional support devices and postsurgical changes. I have personally reviewed the examination and initial interpretation and I agree with the findings. MICAH VIERA MD   SYSTEM ID:  [id number]    XR Chest Port 1 View    Result Date: 1/29/2022  EXAM: XR CHEST PORT 1 VIEW  1/29/2022 8:45 AM HISTORY:  increasing FiO2 requirements   COMPARISON:  Chest x-ray 1/28/2022 FINDINGS: AP radiograph of the chest. Endotracheal tube projecting over the high thoracic trachea. Right IJ [place]-Nelda catheter with tip overlying the main pulmonary artery. Partially visualized enteric tube with tip overlying the stomach. Postoperative chest with intact median sternotomy wires. Stable borderline enlargement of the cardiomediastinal silhouette. Low lung volumes. Small left and possible trace right pleural effusions. Pulmonary vasculature is indistinct. Perihilar interstitial prominent opacities. No pneumothorax. Visualized upper abdomen is unremarkable.     IMPRESSION: 1. Stable endotracheal tube and right IJ [place]-Nelda catheter. 2. Low lung volumes with unchanged perihilar interstitial prominent opacities, likely pulmonary edema and/or atelectasis. 3. Increased small left and possible trace right pleural effusions. I have personally reviewed the examination and initial interpretation and I agree with the findings. BENTLEY ANTONIO MD   SYSTEM ID:  [id number]    XR Chest Port 1 View    Result Date: 1/29/2022  Exam: XR CHEST PORT 1 VIEW, 1/29/2022 12:20 AM Indication: Post Op CVTS Surgery Comparison: 1/28/2022 Findings: Portable semiupright AP view of the chest. Stable postsurgical changes with intact median sternotomy. Endotracheal tube extends over the midthoracic trachea,  unchanged. Right IJ [place]-Nelda catheter tip projects over the main pulmonary artery. Prosthetic aortic valve. Mediastinal drain. Enteric tube courses below left hemidiaphragm with tip collimated out of field of view. Midline trachea. Stable mildly enlarged cardiac silhouette with small amount of pneumopericardium. Low lung volumes with mildly decreased bibasilar atelectasis. No appreciable pneumothorax. Mixed perihilar opacities are stable. No acute findings in the upper abdomen.     Impression: 1. No significant change in postoperative pneumopericardium. No pneumothorax. 2. Perihilar pulmonary edema and/or atelectasis. Infection cannot be excluded. 3. Bibasilar atelectasis. 4. Stable satisfactory position of support devices. I have personally reviewed the examination and initial interpretation and I agree with the findings. BENTLEY ANTONIO MD   SYSTEM ID:  [id number]    XR Chest Port 1 View    Result Date: 1/28/2022  Exam: XR CHEST PORT 1 VIEW, 1/28/2022 3:53 PM Indication: Post Op CVTS Surgery Comparison: 1/10/2022 Findings: Median sternotomy. Endotracheal tube in the upper thoracic trachea. [place]-Nelda catheter tip in the central right pulmonary artery. Prosthetic aortic valve is in place. Mediastinal chest tube is in place. Left basilar chest tube in place. Small amount of pneumopericardium. No significant pneumothorax. Low lung volume with bibasilar atelectasis. Heart within normal limits in size. No significant pulmonary edema.     Impression: Postoperative chest. 1. Support devices as above. 2. Likely postoperative pneumopericardium. 3. No pneumothorax. 4. Bibasilar atelectasis. BENTLEY ANTONIO MD   SYSTEM ID:  [id number]    XR Abdomen Port 1 View    Result Date: 1/31/2022  Exam: XR ABDOMEN PORT 1 VIEWS, 1/31/2022 2:41 PM Indication: Verify small bowel feeding tube bedside placement Comparison: Abdominal x-ray 1/28/2022, chest x-ray 1/31/2022, CT 1/30/2022 Findings: Portable supine AP radiograph of the abdomen. The  tip of the feeding tube projects over the distal duodenum. The tip and sidehole of the NG/OG tube project over the stomach. Nonobstructive bowel gas pattern. No visualized pneumatosis or portal venous gas. Partially visualized chest tube/mediastinal drains, median sternotomy wires, and [place]-Nelda catheter. Bibasilar opacities are better evaluated on same-day chest x-ray.     Impression: 1. The tip of the feeding tube projects over the distal duodenum. 2. Nonobstructive bowel gas pattern. I have personally reviewed the examination and initial interpretation and I agree with the findings. KAREN FUNEZ MD   SYSTEM ID:  [id number]    XR Abdomen Port 1 View    Result Date: 1/29/2022  Exam: XR ABDOMEN PORT 1 VIEWS, 1/29/2022 12:18 AM Indication: OG placement Comparison: Serial same day radiographs. Findings: Portable semiupright AP view of the abdomen. Lung bases with bibasilar atelectasis, prominent cardiac silhouette. Partially visualized median sternotomy with intact wires. Enteric tube tip and sidehole project over the gastric bubble. Mediastinal drains. No evidence of large volume intra-abdominal free air. Nonobstructive bowel gas pattern. No definite pneumatosis or portal venous gas. No suspicious calcifications.     Impression: 1. Enteric tube tip and sidehole project over the gastric bubble. 2. Stable post surgical changes of the chest and additional support devices. 3. Partially visualized bibasilar atelectasis. I have personally reviewed the examination and initial interpretation and I agree with the findings. BENTLEY ANTONIO MD   SYSTEM ID:  [id number]    CT Chest Pulmonary Embolism w Contrast    Result Date: 1/30/2022  EXAM: CTA pulmonary angiogram, 1/30/2022 4:35 AM HISTORY: PE suspected, high prob; [id number] procedure with mechanical valve. PE vs COVID pneumonitis? COMPARISON: 1/30/2020. However 1/29/2022 radiograph. TECHNIQUE: Volumetric CT images obtained through the chest with contrast. Coronal and axial MIP  reformatted images obtained. Three-dimensional (3D) post-processed angiographic images were reconstructed, archived to PACS and used in interpretation of this study. CONTRAST: iopamidol (ISOVUE-370) solution 77 mL    ml isovue 370 IV. FINDINGS: Lines and tubes: Right IJ [place]-Nelda catheter tip terminates in the left main pulmonary artery. 2 anterior mediastinal drains. Left/pericardial drain. Enteric tube tip and sidehole terminate in the stomach. Endotracheal tube tip terminates in the mid thoracic trachea. Vascular: There is good contrast opacification of the pulmonary arterial vasculature. No pulmonary embolus.  The heart is prominent q. Prosthetic aortic valve. No thoracic aortic aneurysm. Remaining Chest: Inspissated secretions about the distal endotracheal tube. No significant peripheral mucous plugging is appreciated. Significant right greater than left dependent atelectasis with air bronchograms. Mild superimposed patchy groundglass and micronodular opacities. No pleural effusion or pneumothorax. No significant thoracic lymphadenopathy. Expected postoperative gas in the anterior mediastinum. Median sternotomy with intact wires. Upper Abdomen: Limited evaluation demonstrates no acute findings. Bones/Soft Tissues: No acute abnormalities or suspicious lesions.     IMPRESSION: 1. Exam is negative for acute pulmonary embolism.  No evidence of right heart strain or increased right heart pressures. 2. Lower lung volumes with moderate bibasilar atelectasis/consolidation. Occult infection cannot be excluded. 3. Postsurgical changes of median sternotomy and cardiothoracic surgery with pneumomediastinum. Support devices as detailed.  In the event of a positive result for acute pulmonary embolism resulting in right heart strain, consider calling the [facility] hospital  for PERT (Pulmonary Embolism Response Team) Activation? PERT -- Pulmonary Embolism Response Team (Multidisciplinary team including cardiology,  interventional radiology, critical care, hematology) I have personally reviewed the examination and initial interpretation and I agree with the findings. MICAH VIERA MD   SYSTEM ID:  [id number]    POC US Guidance Needle Placement    Result Date: 1/28/2022  Bilateral erector spinae catheters        CV ICU PROGRESS NOTE  02/02/2022      CO-MORBIDITIES:   Aortic root aneurysm (H)    ASSESSMENT: Chuy Varner is a 31 year old male with a PMHx s/f bicuspid aortic valve, thoracic aortic aneurysm without rupture, hypertension, obesity, and testicular cancer s/p orchiectomy who underwent Bentall procedure with mechanical valve on 1/28 with Dr. Velasco and Dr. Hannah.    OVERNIGHT EVENTS:  NAEOs. CMV: 18, 500, 18, 75%. Total of 260 IV lasix with 3L urine output (net even). CXR improved lung volumes with mild perihilar and retrocardiac opacities. Fentanyl @ 125, propofol @ 50, heparin @ 2550.    TODAY'S PROGRESS:   - Consider Proning (RAAS goal -4 to -5) vs prone  - Wean flolan and FiO2 as able  - Follow blood and respiratory cultures from 1/31  - Continue IV lasix 80 TID with goal net negative 500 to 1L  - Continue Dexamethasone 20 mg daily x5 days (followed by 10mg x5 days snd taper)  - Continue meropenem x 7 days  - Daily lactate  - Afternoon BMP, Mag, Phos    PLAN:  Neuro/ pain/ sedation:  # Acute Postoperative pain  - Monitor neurological status. Notify the MD for any acute changes in exam.  - Pain: fentanyl gtt. Scheduled tylenol. PRN tylenol, oxycodone, robaxin  - Sedation: propofol gtt  - RAAS -4 to -5 if proning      Pulmonary care:   # Postoperative ventilation management  # Acute hypoxemic respiratory failure likely ARDS vs. PNA vs. Hypervolemia vs. TRALI  Started on Flolan 1/30. COVID-19.Mycoplasma, Parainfluenza and Influenza Negative. CTA negative for PE on 1/30.  - Prone today  - Possible bronch today  - Lung protective ventilation   - CMV: 18, 500, 18, 75%  - Titrate supplemental oxygen to maintain  saturation above 90%  - Wean flolan and FiO2 as able  - Continue Dexamethasone 20 mg daily x5 days, 10 mg daily x5, slow taper following (started 1/31)  - Pulmonary hygiene: Incentive spirometer every 15- 30 minutes when awake, flutter valve, C&DB     Cardiovascular:    # Ascending aortic aneurysm s/p Bentall w/ Jerry valve on 1/28 with Dr. Velasco and Dr. Hannah  # Bicuspid aortic valve  # History of thoracic aortic aneurysm without rupture  # History of hypertension  Recent echo on 10/26/21 with LVEF of 60-65%.  CT vascular 12/13/21 showing proximal ascending aorta is severely dilated (55 x 55mm in maximum dimension) and likely bicuspid aortic valve.  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  CT PE 1/30 - negative for acute PE, marked dependent atelectasis R>L.  - Monitor hemodynamic status  - Goal MAP>65, SBP<110  - Start statin  - BB hold  - ASA  - Holding PTA meds: metoprolol succinate 25mg daily  - Labetalol PRN to maintain SBP <110 once off pressors     GI care/ Nutrition:   # Obesity (BMI 46)  - NJ placement, advance to goal  - PPI  - Continue bowel regimen: miralax, senna    Renal/ Fluid Balance/ Electrolytes:   BL creat appears to be ~ 1.0  - Strict I/O, daily weights  - Lasix 80 TID, goal -500 to -1L net  - Avoid/limit nephrotoxins as able  - Replete lytes PRN per protocol  - Scheduled afternoon lytes     Endocrine:    # Stress induced hyperglycemia, improving  - Insulin gtt  - Goal BG <180 for optimal healing     ID/ Antibiotics:  # Stress induced leukocytosis, resolved  # Possible ventilator associated pneumonia  Febrile and pan-cultured on 1/30/22. Covid pcr, respiratory panel, and respiratory cultures negative 1/30. Re-cultured 1/31: respiratory and blood cultures NGTD.  - Continue to monitor fever curve, WBC and inflammatory markers  -  Continue meropenem x7 days (started 1/31)  - Blood and respiratory cultures 1/31 pending     Heme/Onc:     # Stress induced leukocytosis, resolved  # Acute blood loss anemia  # Acute blood loss thrombocytopenia, resolved  # History of testicular cancer s/p orchiectomy  No s/sx active bleeding  - Continue to monitor  - CBC daily  - Continue low intensity heparin gtt     MSK/ Skin:  # Sternotomy  # Surgical Incision  - Sternal precautions  - Postoperative incision management per protocol  - PT/OT/CR     Prophylaxis:    - Mechanical prophylaxis for DVT  - Chemical DVT prophylaxis- heparin gtt  - PPI     Lines/ tubes/ drains:  - PA catheter (1/28)  - RIJ MAC (1/28)  - Left radial arterial line (1/28)  - Left PIV (1/28)  - Godfrey (1/28)  - 3 CTs (2 anterior mediastinal, 1 left mediastinal) (1/28)  - OG (1/28)  - Nasoduodenal (1/31)  - ETT (1/28)  - Bilateral ES catheters (1/28)     Disposition:  - CVICU     Patient seen, findings and plan discussed with CV ICU staff, Dr. Valles and CVTS staff Dr. Velasco.    Michelle Poon, MS4    I was present with the medical student who participated in the service and in the documentation of the note. I have verified the history and personally performed the physical exam and medical decision making. I agree with the assessment and plan of care as documented in the note.    Tom Adair MD  Anesthesiology, PGY3    ====================================    SUBJECTIVE:   Improving CXR, now on CMV: 18, 500, 18, 75%. Continuing broad spectrum abx. Awaiting repeat blood and respiratory cultures.      OBJECTIVE:   1. VITAL SIGNS:   Temp:  [99.5  F (37.5  C)-100.9  F (38.3  C)] 99.5  F (37.5  C)  Pulse:  [] 68  Resp:  [18-24] 18  MAP:  [61 mmHg-100 mmHg] 68 mmHg  Arterial Line BP: ()/(46-77) 110/51  FiO2 (%):  [65 %-100 %] 75 %  SpO2:  [83 %-100 %] 96 %  Ventilation Mode: CMV/AC  (Continuous Mandatory Ventilation/ Assist Control)  FiO2 (%): 75 %  Rate Set (breaths/minute): 18  breaths/min  Tidal Volume Set (mL): 500 mL  PEEP (cm H2O): 18 cmH2O  Oxygen Concentration (%): 75 %  Resp: 18      2. INTAKE/ OUTPUT:   I/O last 3 completed shifts:  In: 4215.2 [I.V.:2495.2; NG/GT:720]  Out: 4509 [Urine:3739; Emesis/NG output:650; Chest Tube:120]    3. PHYSICAL EXAMINATION:   General: Sedated, intubated, laying in bed on back  Neuro: Sedated, opening eyes to voice  Resp: Intubated, at 75% FIO2  CV: RRR, temporary pacemaker on standby  Abdomen: Soft, Non-distended  Incisions: C/D/I  Extremities: warm and well perfused, SCDs in place    4. INVESTIGATIONS:   Arterial Blood Gases   Recent Labs   Lab 02/02/22  0358 02/01/22  2201 02/01/22  1846 02/01/22  1624   PH 7.41 7.40 7.40 7.39   PCO2 50* 49* 46* 49*   PO2 71* 75* 77* 59*   HCO3 32* 30* 29* 29*     Complete Blood Count   Recent Labs   Lab 02/02/22  0357 02/01/22  0326 01/31/22  0939 01/31/22  0332   WBC 12.8* 12.5* 10.9 10.1   HGB 9.4* 10.1* 11.5* 11.5*    165 139* 133*     Basic Metabolic Panel  Recent Labs   Lab 02/02/22  0804 02/02/22  0704 02/02/22  0610 02/02/22  0452 02/02/22  0357 02/01/22  1629 02/01/22  1624 02/01/22  1453 02/01/22  1354 02/01/22  0805 02/01/22  0748 02/01/22  0344 02/01/22  0326   NA  --   --   --   --  143  --  143  --  142  --   --   --  142   POTASSIUM  --   --   --   --  3.7  --  4.0  --  4.0  --  3.8  --  3.9   CHLORIDE  --   --   --   --  109  --  110*  --  108  --   --   --  109   CO2  --   --   --   --  29  --  27  --  28  --   --   --  28   BUN  --   --   --   --  47*  --  44*  --  43*  --   --   --  34*   CR  --   --   --   --  1.24  --  1.35*  --  1.37*  --   --   --  1.14   * 129* 132* 126* 138*   < > 166*   < > 159*   < >  --    < > 156*    < > = values in this interval not displayed.     Liver Function Tests  Recent Labs   Lab 02/02/22  0357 02/01/22  0326 01/31/22  0332 01/30/22  0312 01/29/22  0323 01/28/22  1447 01/28/22  1246   AST 15 18 27 31   < > 50*  --    ALT 20 20 20 24   < > 28  --     ALKPHOS 51 54 46 40   < > 44  --    BILITOTAL 0.2 0.3 0.7 0.7   < > 0.6  --    ALBUMIN 2.2* 2.3* 2.4* 2.8*   < > 3.1*  --    INR  --   --   --   --   --  1.38* 1.53*    < > = values in this interval not displayed.     Pancreatic Enzymes  No lab results found in last 7 days.  Coagulation Profile  Recent Labs   Lab 01/28/22  1447 01/28/22  1246   INR 1.38* 1.53*   PTT 31 28         5. RADIOLOGY:   Recent Results (from the past 24 hour(s))   XR Chest Port 1 View    Narrative    EXAM: XR CHEST PORT 1 VIEW  1/29/2022 8:45 AM     HISTORY:  increasing FiO2 requirements       COMPARISON:  Chest x-ray 1/28/2022    FINDINGS: AP radiograph of the chest. Endotracheal tube projecting  over the high thoracic trachea. Right IJ [place]-Nelda catheter with tip  overlying the main pulmonary artery. Partially visualized enteric tube  with tip overlying the stomach. Postoperative chest with intact median  sternotomy wires.    Stable borderline enlargement of the cardiomediastinal silhouette. Low  lung volumes. Small left and possible trace right pleural effusions.  Pulmonary vasculature is indistinct. Perihilar interstitial prominent  opacities. No pneumothorax. Visualized upper abdomen is unremarkable.      Impression    IMPRESSION:  1. Stable endotracheal tube and right IJ [place]-Nelda catheter.  2. Low lung volumes with unchanged perihilar interstitial prominent  opacities, likely pulmonary edema and/or atelectasis.  3. Increased small left and possible trace right pleural effusions.    I have personally reviewed the examination and initial interpretation  and I agree with the findings.    BENTLEY ANTONIO MD         SYSTEM ID:  [id number]   XR Chest Port 1 View    Impression    RESIDENT PRELIMINARY INTERPRETATION  Impression:   1. Worsening of low lung volumes, small left greater than right  pleural effusions, and perihilar opacities compatible with pulmonary  edema and/or atelectasis. Infection cannot be excluded.  2. Endotracheal tube has  been advanced with tip projecting over mid  thoracic trachea. Good position.  3. Stable additional support devices and postsurgical changes.   CT Chest Pulmonary Embolism w Contrast    Impression    RESIDENT PRELIMINARY INTERPRETATION  IMPRESSION:   1. Exam is negative for acute pulmonary embolism.  No evidence of  right heart strain or increased right heart pressures.       2. Marked dependent atelectasis in the right than left lungs. Occult  infection cannot be excluded.    3. Postsurgical changes of median sternotomy and aortic valve repair.  Satisfactory position of support devices as detailed.       In the event of a positive result for acute pulmonary embolism  resulting in right heart strain, consider calling the   [facility] hospital  for PERT (Pulmonary Embolism Response Team)  Activation?    PERT -- Pulmonary Embolism Response Team (Multidisciplinary team  including cardiology, interventional radiology, critical care,  hematology)       =========================================          Attestation signed by Josselin Valles MD at 2/13/2022  6:16 PM (Updated):  Physician Attestation   I, Josselin Valles MD, saw this patient with the resident and agree with the resident/fellow's findings and plan of care as documented in the note.      I personally reviewed vital signs, medications, labs, and imaging.    Parekh findings: Chuy Varner is a 31 year old male with a PMHx s/f bicuspid aortic valve, thoracic aortic aneurysm without rupture, hypertension, obesity, and testicular cancer s/p orchiectomy who underwent Bentall procedure with mechanical valve on 1/28 with Dr. Velasco and Dr. Hannah.     Active problems and current treatments include:     1. S/p Bentall procedure with mechanical valve on 1/28. H/o HTN. Vaso plegic shock resolved. On low dose NE for low MAP and low SVR. CI adequate. Plan: Continue Heparin infusion.      2. Severe hypoxemic respiratory failure likely ARDS, versus PNA, versus  hypervolemia, versus TRALI versus intracardiac Right to left shunt. Morbid obesity with probably untreated MICHELE history. COVID-19, Mycoplasma, Parainfluenza and Influenza Negative. CTA negative for PE on . On Flolan and Christian. Attempted to wean Christian on 22, unable to due to poor oxygenation. Plan: Prone positioning. ARDS mechanical ventilation,  (6 ml/kg ideal BW) , RR 18, recruitment maneuvers Q 4 hours, PEEP 16 and Lasix dose.  Continue Dexamethasone 20 mg daily for 5 days with slow taper, albuterol and mucomyst. Continue Meropenem for a 7 day empiric course. Consult Pulmonary for additional suggestions. Continue Flolan and Christian.      3. Suspicion new infection. Febrile on 22 and 22, pan-cultured. Respiratory viral panel negative. Negative MRSA nasal swab. All cultures negative to date. Vancomycin stopped on 22. Plan: Continue Meropenem. n.     4. Anemia and thrombocytopenia of surgical blood loss. No current signs of bleeding. On Heparin infusion. Plan: Continue low intensity Heparin infusion. Monitor CBC and CT output.      5. Anasarca and oliguria. Good BUN/Cr. Mild metabolic alkalosis, probably chronic CO2 retention in untreated MICHELE. Plan: Continue loop diuretic to goal negative 500 to 1 L daily. Monitor kidney function.     6. Mild protein caloric malnutrition.  po intake and signs of generalized edema, including pulmonary edema. Has NJ. Plan: Continue enteral feeding.      I personally managed the ventilator, hemodynamics, sedation,  analgesia, metabolic abnormalities and nutritional status. The patient has risks of imminent respiratory or hemodynamic deterioration that requires my close attention and intervention.       The history and the 10 points review of systems are included in the note below.     I agree with the resident assessment and plan. I spent 58 minutes of critical care time exclusive of the procedures time, evaluating and managing this patient, discussing with  the consultants and the patient's family.     Josselin Valles MD  Date of Service (when I saw the patient): 02/02/22    Pt flipped to prone position at 09:30 Am, and tolerated well.     Pain Service Progress Note  [facility]  Date: February 2, 2022      Patient Name: Chuy Varner  MRN: [id number]  Age: 31 year old  Sex: male      Assessment:  31 year old male with PMH of possible bicuspid aortic valve, thoracic aortic aneurysm without rupture, hypertension, obesity, testicular cancer s/p orchiectomy    Procedure: Bentall procedure with mechanical valve    Date of Surgery: 1/28/2022    Date of bilateral erector spinae (ES) T5-6 Placement: 1/28/2022  Catheter Day #5.     Plan/Recommendations:  1. Regional Anesthesia/Analgesia  -Continuous Catheter Type/Site: bilateral erector spinae (ES) T5-6    Plan for catheter as soon as able Okay to continue Ropivacaine 0.2% at 7 mL/hr each catheter.      Recommendation to primary team: Pause heparin drip for 5 hours as soon as appropriate.  Plan to remove nerve catheters 4 hrs after infusion paused, then one hour later the bedside RN can resume the infusion. Discussed with CVICU and they will update our service when planning to pause heparin drip.         3. Multimodal Analgesia  - per primary service    Pain Service will continue to follow.    Discussed with attending anesthesiologist      Overnight Events: Remains intubated, sedated    Tubes/Drains: Yes  Chest tubes x 3, Godfrey    Subjective: No pain behaviors with repositioned to prone position this AM. No objective symptoms of LAST    Diet: NPO for Medical/Clinical Reasons Except for: Other; Specify: NPO until Extubated  Advance Diet as Tolerated: Clear Liquid Diet  Adult Formula Drip Feeding: Continuous Vital High Protein; Nasoduodenal tube; Goal Rate: 55; mL/hr; Medication - Feeding Tube Flush Frequency: At least 15-30 mL water before and after medication administration and with tube  "clogging; Amount to Sen...    Relevant Labs:  Recent Labs   Lab Test 02/02/22  0357 01/29/22  0323 01/28/22  1447   INR  --   --  1.38*      < > 205   PTT  --   --  31   BUN 47*   < > 18    < > = values in this interval not displayed.       Physical Exam:  Vitals: /88   Pulse 75   Temp 99.7  F (37.6  C)   Resp 18   Ht 1.905 m (6' 3\")   Wt (!) 168.7 kg (371 lb 14.7 oz)   SpO2 97%   BMI 46.49 kg/m      Physical Exam:   Orientation:  Sedation: Yes, on Propofol 75 mcg/kg/min and fentanyl 125 mcg/hr    Motor Examination:  Unable to assess due to sedation    Catheter Site:   Catheter entry site is clean/dry/intact: Yes        Relevant Medications:  Current Pain Medications:  Medications related to Pain Management (From now, onward)    Start     Dose/Rate Route Frequency Ordered Stop    01/31/22 0000  acetaminophen (TYLENOL) tablet 650 mg         650 mg Oral EVERY 4 HOURS PRN 01/28/22 1435      01/30/22 1129  fentaNYL (SUBLIMAZE) 50 mcg/mL bolus from infusion pump 25-50 mcg         25-50 mcg Intravenous EVERY 1 HOUR PRN 01/30/22 1129      01/30/22 0530  fentaNYL (SUBLIMAZE) infusion          mcg/hr  0.5-4 mL/hr  Intravenous CONTINUOUS 01/30/22 0513      01/29/22 0800  polyethylene glycol (MIRALAX) Packet 17 g         17 g Oral DAILY 01/28/22 1435      01/29/22 0800  aspirin (ASA) chewable tablet 81 mg         81 mg Oral or NG Tube DAILY 01/28/22 1435      01/28/22 2000  senna-docusate (SENOKOT-S/PERICOLACE) 8.6-50 MG per tablet 1 tablet         1 tablet Oral 2 TIMES DAILY 01/28/22 1435      01/28/22 2000  propofol (DIPRIVAN) infusion         5-75 mcg/kg/min × 165.9 kg (Dosing Weight)  5-74.7 mL/hr  Intravenous CONTINUOUS 01/28/22 1958 01/28/22 1600  ropivacaine 0.2% (NAROPIN) 750 mL in ON-Q C-Bloc select flow ([id number] holds 600-750 mL) dual cath disposable pump         14 mL/hr  Irrigation CONTINUOUS 01/28/22 1533      01/28/22 1435  HYDROmorphone (PF) (DILAUDID) injection 0.2 mg        \"Or\" " "Linked Group Details    0.2 mg Intravenous EVERY 2 HOURS PRN 01/28/22 1435      01/28/22 1435  HYDROmorphone (PF) (DILAUDID) injection 0.4 mg        \"Or\" Linked Group Details    0.4 mg Intravenous EVERY 2 HOURS PRN 01/28/22 1435      01/28/22 1435  oxyCODONE (ROXICODONE) tablet 5 mg        \"Or\" Linked Group Details    5 mg Oral EVERY 4 HOURS PRN 01/28/22 1435      01/28/22 1435  oxyCODONE IR (ROXICODONE) tablet 10 mg        \"Or\" Linked Group Details    10 mg Oral EVERY 4 HOURS PRN 01/28/22 1435      01/28/22 1435  magnesium hydroxide (MILK OF MAGNESIA) suspension 30 mL         30 mL Oral DAILY PRN 01/28/22 1435      01/28/22 1435  bisacodyl (DULCOLAX) Suppository 10 mg         10 mg Rectal DAILY PRN 01/28/22 1435      01/28/22 1435  methocarbamol (ROBAXIN) tablet 750 mg         750 mg Oral EVERY 6 HOURS PRN 01/28/22 1435      01/28/22 1434  lidocaine 1 % 0.1-1 mL         0.1-1 mL Other EVERY 1 HOUR PRN 01/28/22 1435      01/28/22 1434  lidocaine (LMX4) kit          Topical EVERY 1 HOUR PRN 01/28/22 1435            Primary Service Contacted with Recommendations? Yes    Bree Patricio, APRN CNP  2/2/2022    Time/Communication:  I personally spent 15 minutes with greater than 50% in consultation and coordination of care.  Also discussed with RN, CVICU team      Contact Info (24 hour job code pager is the last 4 digits) For in-house use only:   Job code ID: [email or web address] [id number]   [place] 0599  Peds [id number]  [place] phone: dial * * * [phone], enter jobcode ID, then enter call-back number.    Text: Use [employer] on the Intranet <Paging/Directory> tab and enter Jobcode ID.   If no call back at any time, contact the hospital  and ask for Regional Anesthesia attending or backup         CV ICU PROGRESS NOTE  02/03/2022      CO-MORBIDITIES:   Aortic root aneurysm (H)    ASSESSMENT: Chuy Varner is a 31 year old male with a PMHx s/f bicuspid aortic valve, thoracic aortic aneurysm without rupture, hypertension, " obesity, and testicular cancer s/p orchiectomy who underwent Bentall procedure with mechanical valve on 1/28 with Dr. Velasco and Dr. Hannah.    OVERNIGHT EVENTS:  NAEOs. CMV: 18, 500, 14, 30% when proned. Supine around 0400 with increase in PEEP to 18. Total of 300 IV lasix with 4.6L urine output (net 0.4 L). Fentanyl @ 200, propofol @ 75, heparin @ 2850.    TODAY'S PROGRESS:   - Prone 14-18 hours a day (PEEP of 14 when prone)  - Consider changing propofol to versed in setting of elevated triglycerides  - Wean flolan and FiO2 as able  - Wean nitric oxide to 10, then down one q2h w/ abg  - Continue Advair, mucomyst, and dubonebs  - Continue IV lasix 80 q6h with goal net negative 500 to 1L  - Continue Dexamethasone 20 mg daily x5 days (followed by 10mg x5 days snd taper)  - Continue meropenem x 7 days  - Daily lactate    PLAN:  Neuro/ pain/ sedation:  # Acute Postoperative pain  - Monitor neurological status. Notify the MD for any acute changes in exam.  - Pain: fentanyl gtt. Scheduled tylenol. PRN tylenol, oxycodone, robaxin  - Sedation: propofol gtt, consider changing to versed in setting of elevated triglycerides  - RAAS -4 to -5 if proning      Pulmonary care:   # Postoperative ventilation management  # Acute hypoxemic respiratory failure likely ARDS vs. PNA vs. Hypervolemia vs. TRALI  Started on Flolan 1/30. COVID-19.Mycoplasma, Parainfluenza and Influenza Negative. CTA negative for PE on 1/30.  - Pulmonology saw 2/2, appreciate recs   > prone (14 to 18 hrs per day)   > PEEP 14   > may need to increase PEEP slightly when supine   > as he wakes up more, may benefit from volumes closer to 600 ml for comfort and synchrony (8 ml/kg of his IBW), for now we set it to 530   > pulmonary toilet ie QID duonebs and percussive therapy ie metanebs if possible (may be difficult with recent chest surgery)   > agree with borad antibiotics to treat pneumonia per the CV ICU team  - Wean nitric oxide  - Titrate supplemental  oxygen to maintain saturation above 90%  - Wean nitric and FiO2 as able  - Continue flolan  - Continue Dexamethasone 20 mg daily x5 days, 10 mg daily x5, slow taper following (started 1/31)  - Pulmonary hygiene: Incentive spirometer every 15- 30 minutes when awake, flutter valve, C&DB     Cardiovascular:    # Ascending aortic aneurysm s/p Bentall w/ [place] valve on 1/28 with Dr. Velasco and Dr. Hannah  # Bicuspid aortic valve  # History of thoracic aortic aneurysm without rupture  # History of hypertension  Recent echo on 10/26/21 with LVEF of 60-65%.  CT vascular 12/13/21 showing proximal ascending aorta is severely dilated (55 x 55mm in maximum dimension) and likely bicuspid aortic valve.  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  CT PE 1/30 - negative for acute PE, marked dependent atelectasis R>L.  - Monitor hemodynamic status  - Goal MAP>65, SBP<110  - Statin hold  - BB hold  - ASA  - Holding PTA meds: metoprolol succinate 25mg daily  - Labetalol PRN to maintain SBP <110 once off pressors     GI care/ Nutrition:   # Obesity (BMI 46)  - NJ, advance to goal  - PPI  - Continue bowel regimen: miralax, senna    Renal/ Fluid Balance/ Electrolytes:   BL creat appears to be ~ 1.0  - Strict I/O, daily weights  - Lasix 80 q6h, goal -500 to -1L net  - Avoid/limit nephrotoxins as able  - Replete lytes PRN per protocol  - Scheduled afternoon lytes  - Scheduled potassium replacement     Endocrine:    # Stress induced hyperglycemia, improving  - Insulin gtt  - Goal BG <180 for optimal healing     ID/ Antibiotics:  # Stress induced leukocytosis, resolved  # Possible ventilator associated pneumonia  Febrile and pan-cultured on 1/30/22. Covid pcr, respiratory panel, and respiratory cultures negative 1/30. Re-cultured 1/31: respiratory and blood cultures NGTD.  - Continue  to monitor fever curve, WBC and inflammatory markers  - Continue meropenem x7 days (started 1/31)     Heme/Onc:     # Stress induced leukocytosis, resolved  # Acute blood loss anemia  # Acute blood loss thrombocytopenia, resolved  # History of testicular cancer s/p orchiectomy  No s/sx active bleeding  - Continue to monitor  - CBC daily  - Continue low intensity heparin gtt     MSK/ Skin:  # Sternotomy  # Surgical Incision  - Sternal precautions  - Postoperative incision management per protocol  - PT/OT/CR     Prophylaxis:    - Mechanical prophylaxis for DVT  - Chemical DVT prophylaxis- heparin gtt  - PPI     Lines/ tubes/ drains:  - PA catheter (1/28)  - RIJ MAC (1/28)  - Left radial arterial line (1/28)  - Left PIV (1/28)  - Godfrey (1/28)  - 3 CTs (2 anterior mediastinal, 1 left mediastinal) (1/28)  - OG (1/28)  - Nasoduodenal (1/31)  - ETT (1/28)  - Bilateral ES catheters (1/28)     Disposition:  - CVICU    Patient seen, findings and plan discussed with CV ICU staff, Dr. Valles and CVTS staff Dr. Velasco.    Michelle Poon, MS4    I was present with the medical student who participated in the service and in the documentation of the note. I have verified the history and personally performed the physical exam and medical decision making. I agree with the assessment and plan of care as documented in the note.    Tom Adair MD  Anesthesiology, PGY3    ====================================    SUBJECTIVE:   NAEOs. Proned yesterday, plan to prone for 14-18 hours per day. Continuing Advair, mucomyst, and duonebs. Continue to diurese.      OBJECTIVE:   1. VITAL SIGNS:   Temp:  [99.5  F (37.5  C)-100.9  F (38.3  C)] 100.4  F (38  C)  Pulse:  [69-96] 94  Resp:  [18-22] 20  MAP:  [65 mmHg-90 mmHg] 80 mmHg  Arterial Line BP: (103-137)/(46-70) 137/57  FiO2 (%):  [30 %-75 %] 30 %  SpO2:  [91 %-100 %] 96 %  Ventilation Mode: CMV/AC  (Continuous Mandatory Ventilation/ Assist Control)  FiO2 (%): 30 %  Rate Set  (breaths/minute): 18 breaths/min  Tidal Volume Set (mL): 500 mL  PEEP (cm H2O): 14 cmH2O  Oxygen Concentration (%): 50 %  Resp: 20      2. INTAKE/ OUTPUT:   I/O last 3 completed shifts:  In: 5272.72 [I.V.:3162.72; NG/GT:695]  Out: 5905 [Urine:4735; Emesis/NG output:1050; Chest Tube:120]    3. PHYSICAL EXAMINATION:   General: Sedated, intubated, laying supine  Neuro: Sedated, opening eyes to voice  Resp: Intubated, at 50% FIO2  CV: RRR, temporary pacemaker on standby  Abdomen: Soft, Non-distended  Incisions: C/D/I  Extremities: warm and well perfused, SCDs in place    4. INVESTIGATIONS:   Arterial Blood Gases   Recent Labs   Lab 02/03/22  0549 02/03/22  0313 02/02/22  2140 02/02/22  1527   PH 7.43 7.45 7.43 7.41   PCO2 52* 50* 50* 51*   PO2 79* 62* 64* 223*   HCO3 34* 34* 33* 32*     Complete Blood Count   Recent Labs   Lab 02/03/22  0312 02/02/22  0357 02/01/22  0326 01/31/22  0939   WBC 14.9* 12.8* 12.5* 10.9   HGB 9.8* 9.4* 10.1* 11.5*    210 165 139*     Basic Metabolic Panel  Recent Labs   Lab 02/03/22  0758 02/03/22  0550 02/03/22  0321 02/03/22  0312 02/02/22  2354 02/02/22  2345 02/02/22  1159 02/02/22  1102 02/02/22  0452 02/02/22  0357 02/01/22  1629 02/01/22  1624   NA  --   --   --  143  --   --   --  142  --  143  --  143   POTASSIUM  --   --   --  3.5  --  3.4  --  3.9  3.9  --  3.7  --  4.0   CHLORIDE  --   --   --  107  --   --   --  107  --  109  --  110*   CO2  --   --   --  32  --   --   --  31  --  29  --  27   BUN  --   --   --  47*  --   --   --  50*  --  47*  --  44*   CR  --   --   --  1.07  --   --   --  1.24  --  1.24  --  1.35*   GLC 98 113* 117* 123*   < >  --    < > 157*   < > 138*   < > 166*    < > = values in this interval not displayed.     Liver Function Tests  Recent Labs   Lab 02/03/22  0312 02/02/22  0357 02/01/22  0326 01/31/22  0332 01/29/22  0323 01/28/22  1447 01/28/22  1246   AST 26 15 18 27   < > 50*  --    ALT 34 20 20 20   < > 28  --    ALKPHOS 67 51 54 46   < > 44   --    BILITOTAL 0.3 0.2 0.3 0.7   < > 0.6  --    ALBUMIN 2.3* 2.2* 2.3* 2.4*   < > 3.1*  --    INR  --   --   --   --   --  1.38* 1.53*    < > = values in this interval not displayed.     Pancreatic Enzymes  No lab results found in last 7 days.  Coagulation Profile  Recent Labs   Lab 01/28/22  1447 01/28/22  1246   INR 1.38* 1.53*   PTT 31 28         5. RADIOLOGY:   Recent Results (from the past 24 hour(s))   XR Chest Port 1 View    Narrative    EXAM: XR CHEST PORT 1 VIEW  1/29/2022 8:45 AM     HISTORY:  increasing FiO2 requirements       COMPARISON:  Chest x-ray 1/28/2022    FINDINGS: AP radiograph of the chest. Endotracheal tube projecting  over the high thoracic trachea. Right IJ [place] catheter with tip  overlying the main pulmonary artery. Partially visualized enteric tube  with tip overlying the stomach. Postoperative chest with intact median  sternotomy wires.    Stable borderline enlargement of the cardiomediastinal silhouette. Low  lung volumes. Small left and possible trace right pleural effusions.  Pulmonary vasculature is indistinct. Perihilar interstitial prominent  opacities. No pneumothorax. Visualized upper abdomen is unremarkable.      Impression    IMPRESSION:  1. Stable endotracheal tube and right IJ [place] catheter.  2. Low lung volumes with unchanged perihilar interstitial prominent  opacities, likely pulmonary edema and/or atelectasis.  3. Increased small left and possible trace right pleural effusions.    I have personally reviewed the examination and initial interpretation  and I agree with the findings.    BENTLEY ANTONIO MD         SYSTEM ID:  [id number]   XR Chest Port 1 View    Impression    RESIDENT PRELIMINARY INTERPRETATION  Impression:   1. Worsening of low lung volumes, small left greater than right  pleural effusions, and perihilar opacities compatible with pulmonary  edema and/or atelectasis. Infection cannot be excluded.  2. Endotracheal tube has been advanced with tip  projecting over mid  thoracic trachea. Good position.  3. Stable additional support devices and postsurgical changes.   CT Chest Pulmonary Embolism w Contrast    Impression    RESIDENT PRELIMINARY INTERPRETATION  IMPRESSION:   1. Exam is negative for acute pulmonary embolism.  No evidence of  right heart strain or increased right heart pressures.       2. Marked dependent atelectasis in the right than left lungs. Occult  infection cannot be excluded.    3. Postsurgical changes of median sternotomy and aortic valve repair.  Satisfactory position of support devices as detailed.       In the event of a positive result for acute pulmonary embolism  resulting in right heart strain, consider calling the   [facility] hospital  for PERT (Pulmonary Embolism Response Team)  Activation?    PERT -- Pulmonary Embolism Response Team (Multidisciplinary team  including cardiology, interventional radiology, critical care,  hematology)       =========================================          Attestation signed by Josselin Valles MD at 2/13/2022  6:16 PM (Updated):  Physician Attestation   I, Josselin Valles MD, saw this patient with the resident and agree with the resident/fellow's findings and plan of care as documented in the note.      I personally reviewed vital signs, medications, labs, and imaging.    Parekh findings: Chuy Varner is a 31 year old male with a PMHx s/f bicuspid aortic valve, thoracic aortic aneurysm without rupture, hypertension, obesity, and testicular cancer s/p orchiectomy who underwent Bentall procedure with mechanical valve on 1/28 with Dr. Velasco and Dr. Hannah.     Active problems and current treatments include:     1. S/p Bentall procedure with mechanical valve on 1/28. H/o HTN. Vaso plegic shock resolved. Plan: Continue Heparin infusion.      2. Severe hypoxemic respiratory failure likely ARDS, versus PNA, versus hypervolemia, versus TRALI versus intracardiac Right to left shunt versus  severe V/Q missmatch. Morbid obesity with probably untreated MICHELE history. COVID-19, Mycoplasma, Parainfluenza and Influenza Negative. CTA negative for PE on . On Flolan and Christian. Attempted to wean Christian on 22, unable to due to poor oxygenation. On Dexamethasone 20 mg daily for 5 days with slow taper, albuterol and mucomyst.Plan: Prone positioning. ARDS mechanical ventilation,  (6 ml/kg ideal BW) , RR 18, recruitment maneuvers Q 4 hours, PEEP 114 when prone, 16 when supine.    Continue Meropenem for a 7 day empiric course. Continue Flolan. Wean Christian. Increase Lasix dose. Keep RASS goal -4-5 when prone.      3. Suspicion new infection. Febrile on 22 and 22, pan-cultured. Respiratory viral panel negative. Negative MRSA nasal swab. All cultures negative to date. Vancomycin stopped on 22. Plan: Continue Meropenem.      4. Anemia and thrombocytopenia of surgical blood loss. No current signs of bleeding. On Heparin infusion. Plan: Continue low intensity Heparin infusion. Monitor CBC and CT output.      5. Anasarca. Good BUN/Cr. Mild metabolic alkalosis, probably chronic CO2 retention in untreated MICHELE. Plan: Increase loop diuretic to goal negative 500 to 1 L daily. Monitor kidney function.     6. Mild protein caloric malnutrition.  po intake and signs of generalized edema, including pulmonary edema. Has NJ. Plan: Continue enteral feeding.      I personally managed the ventilator, hemodynamics, sedation,  analgesia, metabolic abnormalities and nutritional status. The patient has risks of imminent respiratory or hemodynamic deterioration that requires my close attention and intervention.       The history and the 10 points review of systems are included in the note below.     I agree with the resident assessment and plan. I spent 57 minutes of critical care time exclusive of the procedures time, evaluating and managing this patient, discussing with the consultants and the patient's family.  "    Josseiln Valles MD  Date of Service (when I saw the patient): 02/03/22    Pulmonary Progress Note    Assessment  # Acute Hypoxemic Respiratory Failure due to Shunt Physiology  # Pulmonary Shunting from Bilateral Lower Lobe Atelectasis vs. Pneumonia    31 year old male with hx of of likely bicuspid aortic valve, thoracic aortic aneurysm without rupture, underwent Bentall procedure with mechanical valve on 1/28. Post-op course c/b hypoxic respiratory failure despite PEEP titration. CTA (1/30) with no PE but showed bilateral lower lobe consolidations vs atelectasis.    Profound hypoxemia is due to shunt physiology (was unresponsive to volume and PEEP changes). He does not meet criteria for ARDS, the remainder of his lung tissue is normal. Improving now with prone positioning.     Recommendations  - continue to prone per protocol  - vent management per intensive care team  - pulmonary to sign off    Discussed with Dr. Mike Neri MD  Pulmonary & Critical Care Fellow    -----------------------------------------------    Subjective / Interval Events : Still having fevers. CI up to 2.8-3. Micro with normal michele.    Objective  /88   Pulse 92   Temp 100.2  F (37.9  C)   Resp 20   Ht 1.905 m (6' 3\")   Wt (!) 168 kg (370 lb 6 oz)   SpO2 94%   BMI 46.29 kg/m      General: Sedated, intubated, laying in bed on stomach  Lungs: Intubated - coarse at bases  Heart: Regular rate and rhythm  Abdomen: Unable to assess  Extremities: Warm and well perfused  Skin: no concerning rashes or lesions  Neurologic: Sedated, not responsive to voice     Labs and imaging reviewed      Attestation signed by Zena Mckeon MD at 2/15/2022  2:00 PM:  Pulmonary Attending Staff:  I have discussed Mr. Varner's case with Dr. Neri-Pulmonary/Critical Care Fellow; reviewed the patient's available radiographic and laboratory data and examined him.  I agree with the findings, assessment and recommendations as outlined " "below by Dr. Neri.    Patient improved. Good lung compliance and lung process not c/w ARDS.  Will sign off.      Zena Mckeon MD  #[id number]  2/2/2022    Pain Service Progress Note  [facility]  Date: February 3, 2022      Patient Name: Chuy Varner  MRN: [id number]  Age: 31 year old  Sex: male      Assessment:  31 year old male with PMH of possible bicuspid aortic valve, thoracic aortic aneurysm without rupture, hypertension, obesity, testicular cancer s/p orchiectomy    Procedure: Bentall procedure with mechanical valve    Date of Surgery: 1/28/2022    Date of bilateral erector spinae (ES) T5-6 Placement: 1/28/2022  Catheter Day #6    Plan/Recommendations:  1. Regional Anesthesia/Analgesia  - Heparin IV infusion held at 1145  - Bilateral erector spinae (ES) T5-6 catheters removed without complication,dark tips intact.  Insertion sites c/d/i. Small bandages applied.        2. Anticoagulation  Okay to resume heparin drip in one hour - bedside RN aware    Pain Service will sign off.    Discussed with attending anesthesiologist      Overnight Events: remains intubated, sedated.      Interval history: On CMV, sedated.  No pain behaviors, no objective symptoms of LAST.      Diet: NPO for Medical/Clinical Reasons Except for: Other; Specify: NPO until Extubated  Adult Formula Drip Feeding: Continuous Vital High Protein; Nasoduodenal tube; Goal Rate: 55; mL/hr; Medication - Feeding Tube Flush Frequency: At least 15-30 mL water before and after medication administration and with tube clogging; Amount to Sen...    Relevant Labs:  Recent Labs   Lab Test 02/03/22  1351 02/03/22  0312 01/29/22  0323 01/28/22  1447   INR  --   --   --  1.38*   PLT  --  250   < > 205   PTT  --   --   --  31   BUN 43* 47*   < > 18    < > = values in this interval not displayed.       Physical Exam:  Vitals: /88   Pulse 79   Temp 100  F (37.8  C)   Resp 19   Ht 1.905 m (6' 3\")   Wt (!) 168 kg (370 lb 6 oz)   " "SpO2 94%   BMI 46.29 kg/m      Physical Exam:   Orientation:  Relevant Medications:  Current Pain Medications:  Medications related to Pain Management (From now, onward)    Start     Dose/Rate Route Frequency Ordered Stop    02/03/22 1450  HYDROmorphone (DILAUDID) injection 0.2 mg        \"Or\" Linked Group Details    0.2 mg Intravenous EVERY 2 HOURS PRN 02/03/22 1450      02/03/22 1450  HYDROmorphone (DILAUDID) injection 0.4 mg        \"Or\" Linked Group Details    0.4 mg Intravenous EVERY 2 HOURS PRN 02/03/22 1450      01/31/22 0000  acetaminophen (TYLENOL) tablet 650 mg         650 mg Oral EVERY 4 HOURS PRN 01/28/22 1435      01/30/22 1129  fentaNYL (SUBLIMAZE) 50 mcg/mL bolus from infusion pump 25-50 mcg         25-50 mcg Intravenous EVERY 1 HOUR PRN 01/30/22 1129      01/30/22 0530  fentaNYL (SUBLIMAZE) infusion          mcg/hr  0.5-4 mL/hr  Intravenous CONTINUOUS 01/30/22 0513      01/29/22 0800  polyethylene glycol (MIRALAX) Packet 17 g         17 g Oral DAILY 01/28/22 1435      01/29/22 0800  aspirin (ASA) chewable tablet 81 mg         81 mg Oral or NG Tube DAILY 01/28/22 1435      01/28/22 2000  senna-docusate (SENOKOT-S/PERICOLACE) 8.6-50 MG per tablet 1 tablet         1 tablet Oral 2 TIMES DAILY 01/28/22 1435      01/28/22 2000  propofol (DIPRIVAN) infusion         5-75 mcg/kg/min × 165.9 kg (Dosing Weight)  5-74.7 mL/hr  Intravenous CONTINUOUS 01/28/22 1958      01/28/22 1600  [Held by provider]  ropivacaine 0.2% (NAROPIN) 750 mL in ON-Q C-Bloc select flow ([id number] holds 600-750 mL) dual cath disposable pump        (Held by provider since Thu 2/3/2022 at 1253 by Bree Patricio APRN CNP.Hold Reason: Other.Hold Comments: Bilateral nerve block catheter infusions stopped at 1030 AM today.)    14 mL/hr  Irrigation CONTINUOUS 01/28/22 1533      01/28/22 1435  oxyCODONE (ROXICODONE) tablet 5 mg        \"Or\" Linked Group Details    5 mg Oral EVERY 4 HOURS PRN 01/28/22 1435      01/28/22 1435  oxyCODONE " "IR (ROXICODONE) tablet 10 mg        \"Or\" Linked Group Details    10 mg Oral EVERY 4 HOURS PRN 01/28/22 1435      01/28/22 1435  magnesium hydroxide (MILK OF MAGNESIA) suspension 30 mL         30 mL Oral DAILY PRN 01/28/22 1435      01/28/22 1435  bisacodyl (DULCOLAX) Suppository 10 mg         10 mg Rectal DAILY PRN 01/28/22 1435      01/28/22 1435  methocarbamol (ROBAXIN) tablet 750 mg         750 mg Oral EVERY 6 HOURS PRN 01/28/22 1435      01/28/22 1434  lidocaine 1 % 0.1-1 mL         0.1-1 mL Other EVERY 1 HOUR PRN 01/28/22 1435      01/28/22 1434  lidocaine (LMX4) kit          Topical EVERY 1 HOUR PRN 01/28/22 1435            Primary Service Contacted with Recommendations? Yes    Bree Patricio, APRN CNP  2/3/2022      Contact Info (24 hour job code pager is the last 4 digits) For in-house use only:   Job code ID: [email or web address] [id number]   [place] [address]  [facility] [address]  Opternative phone: dial [phone], enter jobcode ID, then enter call-back number.    Text: Use [employer] on the Intranet <Paging/Directory> tab and enter Jobcode ID.   If no call back at any time, contact the hospital  and ask for Regional Anesthesia attending or backup         CV ICU PROGRESS NOTE  02/04/2022      CO-MORBIDITIES:   Aortic root aneurysm (H)    ASSESSMENT: Chuy Varner is a 31 year old male with a PMHx s/f bicuspid aortic valve, thoracic aortic aneurysm without rupture, hypertension, obesity, and testicular cancer s/p orchiectomy who underwent Bentall procedure with mechanical valve on 1/28 with Dr. Velasco and Dr. Hannah.    OVERNIGHT EVENTS:  Hypertensive when proned. Given PRN labetalol and hydralazine without improvement. Started on nicardipine gtt and is now maxed out. Was proned at at 1000 2/3 until 0400 2/4. PEEP of 14 when proned, 18 when supine. WBC increased to 23 from 14.9 yesterday and he had a Tmax of 102. CXR, respiratory panel, and BCs ordered. Fentanyl @ 200, propofol @ 75, heparin @ 3000.    TODAY'S PROGRESS: "   - Consult ID  - Start esmolol gtt, stop nicardipine  - Continue PRN hydralazine and labetalol to keep SBP<130  - Prone 14-18 hours a day (PEEP of 14 when prone)  - Pull CTs  - Wean Nitric, consider flolan wean after  - Continue Advair, mucomyst, and dubonebs  - Continue IV lasix 80 q6h with goal net negative 500 to 1L  - Continue Dexamethasone 20 mg daily x5 days (followed by 10mg x5 days snd taper)  - Continue meropenem x 7 days  - Daily lactate    PLAN:  Neuro/ pain/ sedation:  # Acute Postoperative pain  - Monitor neurological status. Notify the MD for any acute changes in exam.  - Pain: fentanyl gtt. Scheduled tylenol. PRN tylenol, oxycodone, robaxin  - Sedation: propofol gtt, consider changing to versed in setting of elevated triglycerides  - RAAS -4 to -5 if proning      Pulmonary care:   # Postoperative ventilation management  # Acute hypoxemic respiratory failure likely ARDS vs. PNA vs. Hypervolemia vs. TRALI  Started on Flolan 1/30. COVID-19.Mycoplasma, Parainfluenza and Influenza Negative. CTA negative for PE on 1/30.  WBC 23.0 2/4 (14.9 the day before). CXR, respiratory panel, and BCs ordered.  - Follow BCs and respiratory panel (2/4)  - Pulmonology saw 2/2, appreciate recs   > prone (14 to 18 hrs per day)   > PEEP 14   > may need to increase PEEP slightly when supine   > as he wakes up more, may benefit from volumes closer to 600 ml for comfort and synchrony (8 ml/kg of his IBW), for now we set it to 530   > pulmonary toilet ie QID duonebs and percussive therapy ie metanebs if possible (may be difficult with recent chest surgery)   > agree with borad antibiotics to treat pneumonia per the CV ICU team  - Stop nitric oxide  - Titrate supplemental oxygen to maintain saturation above 90%  - Wean flolan and FiO2 as able  - Continue Dexamethasone 20 mg daily x5 days, 10 mg daily x5, slow taper following (started 1/31)  - Pulmonary hygiene: Incentive spirometer every 15- 30 minutes when awake, flutter valve,  C&DB     Cardiovascular:    # Ascending aortic aneurysm s/p Bentall w/ [place] valve on 1/28 with Dr. Velasco and Dr. Hannah  # Bicuspid aortic valve  # History of thoracic aortic aneurysm without rupture  # History of hypertension  Recent echo on 10/26/21 with LVEF of 60-65%.  CT vascular 12/13/21 showing proximal ascending aorta is severely dilated (55 x 55mm in maximum dimension) and likely bicuspid aortic valve.  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  CT PE 1/30 - negative for acute PE, marked dependent atelectasis R>L.  - Start epmolol gtt, stop nicardipine gtt  - Monitor hemodynamic status  - Goal MAP>65, SBP <`30  - Statin hold  - BB hold  - ASA  - Holding PTA meds: metoprolol succinate 25mg daily  - Labetalol and hydralazine PRN to maintain SBP <130     GI care/ Nutrition:   # Obesity (BMI 46)  - NJ, advance to goal  - PPI  - Continue bowel regimen: miralax, senna    Renal/ Fluid Balance/ Electrolytes:   BL creat appears to be ~ 1.0  - Strict I/O, daily weights  - Lasix 80 q6h, goal -500 to -1L net  - Avoid/limit nephrotoxins as able  - Replete lytes PRN per protocol  - Scheduled afternoon lytes  - Scheduled potassium replacement     Endocrine:    # Stress induced hyperglycemia, improving  - Insulin gtt  - Goal BG <180 for optimal healing     ID/ Antibiotics:  # Stress induced leukocytosis, resolved  # Possible ventilator associated pneumonia  Febrile and pan-cultured on 1/30/22. Covid pcr, respiratory panel, and respiratory cultures negative 1/30. Re-cultured 1/31: respiratory and blood cultures NGTD. Re-cultured again 2/4 when WBC increased to 23.  - Follow respiratory panel and BC results 2/4  - Continue to monitor fever curve, WBC and inflammatory markers  - Continue meropenem x7 days (started 1/31)  - Consult ID     Heme/Onc:     # Stress  induced leukocytosis  # Acute blood loss anemia  # Acute blood loss thrombocytopenia, resolved  # History of testicular cancer s/p orchiectomy  No s/sx active bleeding  - Continue to monitor  - CBC daily  - Continue heparin gtt (hold for CT removal)     MSK/ Skin:  # Sternotomy  # Surgical Incision  - Sternal precautions  - Postoperative incision management per protocol  - PT/OT/CR     Prophylaxis:    - Mechanical prophylaxis for DVT  - Chemical DVT prophylaxis- heparin gtt (hold for CT removal)  - PPI     Lines/ tubes/ drains:  - PA catheter (1/28)  - RIJ MAC (1/28)  - Left radial arterial line (1/28)  - Left PIV (1/28)  - Godfrey (1/28)  - 3 CTs (2 anterior mediastinal, 1 left mediastinal) (1/28) to pull today  - OG (1/28)  - Nasoduodenal (1/31)  - ETT (1/28)  - Bilateral ES catheters (1/28)     Disposition:  - CVICU    Patient seen, findings and plan discussed with CV ICU staff, Dr. Han and CVTS staff Dr. Velasco.    Michelle Poon, MS4    I was present with the medical student who participated in the service and in the documentation of the note. I have verified the history and personally performed the physical exam and medical decision making. I agree with the assessment and plan of care as documented in the note.    Tom Adair MD  Anesthesiology, PGY3    ====================================    SUBJECTIVE:   Hypertensive when proned. Treating with esmolol gtt and PRN labetalol and hydralazine. Was proned for 18 hours yesterday. Elevated WBCs, CXR, respiratory panel, and BCs ordered. Continue to diurese.      OBJECTIVE:   1. VITAL SIGNS:   Temp:  [98.8  F (37.1  C)-102  F (38.9  C)] 99.3  F (37.4  C)  Pulse:  [] 96  Resp:  [19-23] 21  BP: (104)/(59) 104/59  MAP:  [52 mmHg-115 mmHg] 83 mmHg  Arterial Line BP: ()/(42-87) 143/58  FiO2 (%):  [40 %-60 %] 60 %  SpO2:  [89 %-100 %] 99 %  Ventilation Mode: CMV/AC  (Continuous Mandatory Ventilation/ Assist Control)  FiO2 (%): 60 %  Rate Set  (breaths/minute): 18 breaths/min  Tidal Volume Set (mL): 530 mL  PEEP (cm H2O): (S) 16 cmH2O  Oxygen Concentration (%): (S) 60 %  Resp: 21      2. INTAKE/ OUTPUT:   I/O last 3 completed shifts:  In: 5197.42 [I.V.:3007.42; NG/GT:870]  Out: 5930 [Urine:4450; Emesis/NG output:1350; Stool:100; Chest Tube:30]    3. PHYSICAL EXAMINATION:   General: Sedated, intubated, laying supine  Neuro: Sedated, opening eyes to voice  Resp: Intubated, at 60% FIO2  CV: RRR, temporary pacemaker on standby  Abdomen: Soft, Non-distended  Incisions: C/D/I  Extremities: warm and well perfused, SCDs in place    4. INVESTIGATIONS:   Arterial Blood Gases   Recent Labs   Lab 02/04/22  0502 02/03/22  2357 02/03/22  2203 02/03/22  1556   PH 7.47* 7.49* 7.45 7.42   PCO2 46* 44 50* 54*   PO2 89 85 66* 80   HCO3 33* 33* 35* 35*     Complete Blood Count   Recent Labs   Lab 02/04/22  0545 02/04/22  0132 02/03/22  0312 02/02/22  0357   WBC 21.1* 23.0* 14.9* 12.8*   HGB 10.0* 11.0* 9.8* 9.4*    289 250 210     Basic Metabolic Panel  Recent Labs   Lab 02/04/22  0809 02/04/22  0658 02/04/22  0619 02/04/22  0545 02/04/22  0156 02/04/22  0132 02/03/22  2309 02/03/22  2204 02/03/22  1358 02/03/22  1351 02/03/22  0321 02/03/22  0312   NA  --   --   --  145*  145*  --  144  --   --   --  143  --  143   POTASSIUM  --   --   --  3.6  3.6  --  3.4  --  3.7  --  3.9  --  3.5   CHLORIDE  --   --   --  108  108  --  107  --   --   --  106  --  107   CO2  --   --   --  30  30  --  29  --   --   --  33*  --  32   BUN  --   --   --  51*  51*  --  52*  --   --   --  43*  --  47*   CR  --   --   --  1.03  1.03  --  1.07  --   --   --  0.96  --  1.07   * 121* 100* 126*  126*   < > 137*   < >  --    < > 157*   < > 123*    < > = values in this interval not displayed.     Liver Function Tests  Recent Labs   Lab 02/04/22  0545 02/04/22  0132 02/03/22  0312 02/02/22  0357 01/29/22  0323 01/28/22  1447 01/28/22  1246   AST 52* 55* 26 15   < > 50*  --    ALT  55 54 34 20   < > 28  --    ALKPHOS 71 81 67 51   < > 44  --    BILITOTAL 0.5 0.5 0.3 0.2   < > 0.6  --    ALBUMIN 2.2* 2.5* 2.3* 2.2*   < > 3.1*  --    INR  --   --   --   --   --  1.38* 1.53*    < > = values in this interval not displayed.     Pancreatic Enzymes  No lab results found in last 7 days.  Coagulation Profile  Recent Labs   Lab 01/28/22  1447 01/28/22  1246   INR 1.38* 1.53*   PTT 31 28         5. RADIOLOGY:   Recent Results (from the past 24 hour(s))   XR Chest Port 1 View    Narrative    EXAM: XR CHEST PORT 1 VIEW  1/29/2022 8:45 AM     HISTORY:  increasing FiO2 requirements       COMPARISON:  Chest x-ray 1/28/2022    FINDINGS: AP radiograph of the chest. Endotracheal tube projecting  over the high thoracic trachea. Right IJ [place]-Nelda catheter with tip  overlying the main pulmonary artery. Partially visualized enteric tube  with tip overlying the stomach. Postoperative chest with intact median  sternotomy wires.    Stable borderline enlargement of the cardiomediastinal silhouette. Low  lung volumes. Small left and possible trace right pleural effusions.  Pulmonary vasculature is indistinct. Perihilar interstitial prominent  opacities. No pneumothorax. Visualized upper abdomen is unremarkable.      Impression    IMPRESSION:  1. Stable endotracheal tube and right IJ [place]-Nelda catheter.  2. Low lung volumes with unchanged perihilar interstitial prominent  opacities, likely pulmonary edema and/or atelectasis.  3. Increased small left and possible trace right pleural effusions.    I have personally reviewed the examination and initial interpretation  and I agree with the findings.    BENTLEY ANTONIO MD         SYSTEM ID:  [id number]   XR Chest Port 1 View    Impression    RESIDENT PRELIMINARY INTERPRETATION  Impression:   1. Worsening of low lung volumes, small left greater than right  pleural effusions, and perihilar opacities compatible with pulmonary  edema and/or atelectasis. Infection cannot be  excluded.  2. Endotracheal tube has been advanced with tip projecting over mid  thoracic trachea. Good position.  3. Stable additional support devices and postsurgical changes.   CT Chest Pulmonary Embolism w Contrast    Impression    RESIDENT PRELIMINARY INTERPRETATION  IMPRESSION:   1. Exam is negative for acute pulmonary embolism.  No evidence of  right heart strain or increased right heart pressures.       2. Marked dependent atelectasis in the right than left lungs. Occult  infection cannot be excluded.    3. Postsurgical changes of median sternotomy and aortic valve repair.  Satisfactory position of support devices as detailed.       In the event of a positive result for acute pulmonary embolism  resulting in right heart strain, consider calling the   [facility] hospital  for PERT (Pulmonary Embolism Response Team)  Activation?    PERT -- Pulmonary Embolism Response Team (Multidisciplinary team  including cardiology, interventional radiology, critical care,  hematology)       =========================================          Attestation signed by Toribio Han MD at 2/4/2022  4:05 PM:  Critical Care Services Progress Note:     Chuy Varner remains critically ill with acute respiratory failure s/p aortic aneurysm repair, was proned for ARDS     I personally examined and evaluated the patient today.   The patient s prognosis today is critical  I have evaluated all laboratory values and imaging studies from the past 24 hours.  Key findings and decisions made today included obese lying in bed supine, CV RRR  I personally managed the will obtain pm ABG, before considering proning wean NO as tolerated, also wean PEEP and FiO2 as tolerated  Consults ongoing and ordered none  Procedures that will happen today are: none  All treatments were placed at my direction.  I formulated today s plan with the house staff team or resident(s) and agree with the findings and plan in the associated note.       The above plans  and care have been discussed with wife and all questions and concerns were addressed.     I spent a total of 32 minutes (excluding procedure time) personally providing and directing critical care services at the bedside and on the critical care unit for Chuy Varner.        Toribio Han MD         Care Management Follow Up    Length of Stay (days): 7    Expected Discharge Date:  TBD     Patient plan of care discussed at interdisciplinary rounds: Yes    Anticipated Discharge Disposition: TBD     Anticipated Discharge Services: TBD  Anticipated Discharge DME:  TBD    Additional Information:  Pt remain in ICU vented and sedated.  RNCC visited pt soDebbie for support.  Debbie stated the team have been updating her well about the plan of care.  Pt and Debbie lives in [facility].  Debbie is staying locally at the [facility].  RNCC will cont to follow plan of care.      Claude Owen RN, PHN, BSN  4A and 4E/ ICU  Care Coordinator  Phone: [phone]  Pager: [phone]    To get in touch with weekend & Holiday on call RN Care Coordinator  Page [phone] or Care Coordinator Job code/pager- [phone]        CVTS Progress Note    Re: Chest tube management      Heparin held for two hours.   Removed TPW without resistance.  Waited 30 minutes and pulled Meds and pleural without any complications.   Okay to resume heparin in one hour.   Follow up CXR without any pneumo and improving diffuse opacities.     Qi Umaña DNP, CNP  [facility] Cardiothoracic Surgery  O: [phone]  Pgr [phone]         CV ICU PROGRESS NOTE  02/05/2022      CO-MORBIDITIES:   Aortic root aneurysm (H)    ASSESSMENT: Chuy Varner is a 31 year old male with a PMHx s/f bicuspid aortic valve, thoracic aortic aneurysm without rupture, hypertension, obesity, and testicular cancer s/p orchiectomy who underwent Bentall procedure with mechanical valve on 1/28 with Dr. Velasco and Dr. Hannah.    OVERNIGHT EVENTS:  Hypertensive when proned. Given PRN  labetalol and hydralazine without improvement. Started on nicardipine gtt and is now maxed out. Was proned at at 1000 2/3 until 0400 2/4. PEEP of 14 when proned, 18 when supine. WBC increased to 23 from 14.9 yesterday and he had a Tmax of 102. CXR, respiratory panel, and BCs ordered. Fentanyl @ 200, propofol @ 75, heparin @ 3000.    TODAY'S PROGRESS:   - Consult ID  - Decrease lasix to 40  - Supinate at 8 pm, leave supine overnight if tolerating  - Goal net even  - Versed gtt  - Veletri decreased to 15    PLAN:  Neuro/ pain/ sedation:  # Acute Postoperative pain  - Monitor neurological status. Notify the MD for any acute changes in exam.  - Pain: fentanyl gtt. Scheduled tylenol. PRN tylenol, oxycodone, robaxin  - Sedation: propofol gtt, versed gtt  - RAAS -4 to -5 if proning      Pulmonary care:   # Postoperative ventilation management  # Acute hypoxemic respiratory failure likely ARDS vs. PNA vs. Hypervolemia vs. TRALI  Started on Flolan 1/30. COVID-19.Mycoplasma, Parainfluenza and Influenza Negative. CTA negative for PE on 1/30.  WBC 23.0 2/4 (14.9 the day before). CXR, respiratory panel, and BCs ordered.  - Follow BCs and respiratory panel (2/4)  - Pulmonology saw 2/2, appreciate recs   > prone (14 to 18 hrs per day)   > PEEP 14   > may need to increase PEEP slightly when supine   > as he wakes up more, may benefit from volumes closer to 600 ml for comfort and synchrony (8 ml/kg of his IBW), for now we set it to 530   > pulmonary toilet ie QID duonebs and percussive therapy ie metanebs if possible (may be difficult with recent chest surgery)   > agree with borad antibiotics to treat pneumonia per the CV ICU team  - Titrate supplemental oxygen to maintain saturation above 90%  - Wean flolan and FiO2 as able  - Continue Dexamethasone 20 mg daily x5 days, 10 mg daily x5, slow taper following (started 1/31)  - Prone today, supinate tonight     Cardiovascular:    # Ascending aortic aneurysm s/p Bentall w/ [place] valve  on 1/28 with Dr. Velasco and Dr. Hannah  # Bicuspid aortic valve  # History of thoracic aortic aneurysm without rupture  # History of hypertension  Recent echo on 10/26/21 with LVEF of 60-65%.  CT vascular 12/13/21 showing proximal ascending aorta is severely dilated (55 x 55mm in maximum dimension) and likely bicuspid aortic valve.  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  CT PE 1/30 - negative for acute PE, marked dependent atelectasis R>L.  - Start epmolol gtt, stop nicardipine gtt  - Monitor hemodynamic status  - Goal MAP>65, SBP <`30  - Statin hold  - BB hold  - ASA  - Holding PTA meds: metoprolol succinate 25mg daily  - Labetalol and hydralazine PRN to maintain SBP <130     GI care/ Nutrition:   # Obesity (BMI 46)  - NJ, advance to goal  - PPI  - Continue bowel regimen: miralax, senna    Renal/ Fluid Balance/ Electrolytes:   BL creat appears to be ~ 1.0  - Strict I/O, daily weights  - Lasix 40 q6h, goal net even  - Avoid/limit nephrotoxins as able  - Replete lytes PRN per protocol  - Scheduled potassium replacement     Endocrine:    # Stress induced hyperglycemia, improving  - Insulin gtt  - Goal BG <180 for optimal healing     ID/ Antibiotics:  # Stress induced leukocytosis, resolved  # Possible ventilator associated pneumonia  Febrile and pan-cultured on 1/30/22. Covid pcr, respiratory panel, and respiratory cultures negative 1/30. Re-cultured 1/31: respiratory and blood cultures NGTD. Re-cultured again 2/4 when WBC increased to 23.  - Follow respiratory panel and BC results 2/4  - Continue to monitor fever curve, WBC and inflammatory markers  - Continue meropenem x7 days (started 1/31)  - Consult ID 2/4: send C dif, blood cultures, repeat COVID, keep meropenem through tomorrow     Heme/Onc:     # Stress induced leukocytosis  # Acute blood loss  anemia  # Acute blood loss thrombocytopenia, resolved  # History of testicular cancer s/p orchiectomy  No s/sx active bleeding  - Continue to monitor  - CBC daily  - Continue heparin gtt (hold for CT removal)     MSK/ Skin:  # Sternotomy  # Surgical Incision  - Sternal precautions  - Postoperative incision management per protocol  - PT/OT/CR     Prophylaxis:    - Mechanical prophylaxis for DVT  - Chemical DVT prophylaxis- heparin gtt (hold for CT removal)  - PPI     Lines/ tubes/ drains:  - RIJ MAC (1/28)  - Left radial arterial line (1/28)  - Left PIV (1/28)  - Godfrey (1/28)  - OG (1/28)  - Nasoduodenal (1/31)  - ETT (1/28)  - Bilateral ES catheters (1/28)     Disposition:  - CVICU    Patient seen, findings and plan discussed with CV ICU staff, Dr. Han and CVTS staff Dr. Miller.    Richard Shannon MD, PhD, PGY-3  General Surgery    ====================================    SUBJECTIVE:   Proned overnight for low O2, doing better this morning.     OBJECTIVE:   1. VITAL SIGNS:   Temp:  [96.8  F (36  C)-100  F (37.8  C)] 99.7  F (37.6  C)  Pulse:  [72-96] 88  Resp:  [19-22] 21  BP: (105-108)/(54-58) 107/58  MAP:  [59 mmHg-99 mmHg] 78 mmHg  Arterial Line BP: ()/(37-75) 116/64  FiO2 (%):  [50 %-100 %] 50 %  SpO2:  [22 %-100 %] 97 %  Ventilation Mode: CMV/AC  (Continuous Mandatory Ventilation/ Assist Control)  FiO2 (%): 50 %  Rate Set (breaths/minute): 18 breaths/min  Tidal Volume Set (mL): 530 mL  PEEP (cm H2O): 14 cmH2O  Oxygen Concentration (%): 50 %  Resp: 21      2. INTAKE/ OUTPUT:   I/O last 3 completed shifts:  In: 5496.02 [I.V.:3231.02; NG/GT:750]  Out: 4320 [Urine:3570; Emesis/NG output:650; Stool:100]    3. PHYSICAL EXAMINATION:   General: Sedated, intubated, laying prone  Neuro: Sedated  Resp: Intubated, mechanically ventilated  CV: RRR on telemetry, temporary pacemaker on standby  Abdomen: Soft, Non-distended  Incisions: C/D/I  Extremities: warm and well perfused, SCDs in place    4. INVESTIGATIONS:    Arterial Blood Gases   Recent Labs   Lab 02/05/22  0547 02/05/22  0400 02/05/22  0231 02/04/22  2356   PH 7.43 7.41 7.43 7.44   PCO2 51* 54* 48* 46*   PO2 100 125* 94 64*   HCO3 34* 34* 32* 32*     Complete Blood Count   Recent Labs   Lab 02/05/22  0359 02/04/22 2000 02/04/22  0545 02/04/22  0132   WBC 21.3* 17.8* 21.1* 23.0*   HGB 9.6* 9.3* 10.0* 11.0*    249 271 289     Basic Metabolic Panel  Recent Labs   Lab 02/05/22  0703 02/05/22  0547 02/05/22 0359 02/04/22  2103 02/04/22  1946 02/04/22  1418 02/04/22  1406 02/04/22  0619 02/04/22  0545 02/04/22  0156 02/04/22 0132   NA  --   --  144  144  --   --   --  143  --  145*  145*  --  144   POTASSIUM  --   --  3.8  3.8  --  3.9  --  3.8  --  3.6  3.6  --  3.4   CHLORIDE  --   --  106  106  --   --   --  106  --  108  108  --  107   CO2  --   --  32  32  --   --   --  31  --  30  30  --  29   BUN  --   --  72*  72*  --   --   --  54*  --  51*  51*  --  52*   CR  --   --  1.34*  1.34*  --   --   --  1.00  --  1.03  1.03  --  1.07   * 114* 131*  131*  119*   < > 140*   < > 173*   < > 126*  126*   < > 137*    < > = values in this interval not displayed.     Liver Function Tests  Recent Labs   Lab 02/05/22 0359 02/04/22 0545 02/04/22 0132 02/03/22  0312   AST 41 52* 55* 26   ALT 62 55 54 34   ALKPHOS 68 71 81 67   BILITOTAL 0.4 0.5 0.5 0.3   ALBUMIN 2.5* 2.2* 2.5* 2.3*     Pancreatic Enzymes  No lab results found in last 7 days.  Coagulation Profile  No lab results found in last 7 days.    Attestation signed by Toribio Han MD at 2/5/2022  2:02 PM:  Critical Care Services Progress Note:     Chuy Varner remains critically ill with acute postprocedural respiratory failure, hypotension, acute lung injury, and hypokalemia.  Of note the patient was placed in the prone position overnight for hypoxemia.  His oxygenation has improved in the prone position and the patient will remain in the prone position throughout the day.     I  personally examined and evaluated the patient today.   The patient s prognosis today is critical  I have evaluated all laboratory values and imaging studies from the past 24 hours.  Key findings and decisions made today included obese male lying in the prone position, CV tachycardic but regular.  Currently on full ventilatory support.  I personally managed the weaning of the patient's FiO2 as titrating of PEEP.  Given patient's marginal PF ratio decision was made to increase PEEP and wean FiO2 as tolerated.  The patient will remain in the prone position today.  Patient's triglycerides are noted to be elevated.  We will start the patient on Versed infusion and titrate up while weaning propofol down.  Consults ongoing and ordered are appreciate infectious disease consult, will repeat Covid, blood cultures x2.  Procedures that will happen today are: None  All treatments were placed at my direction.  I formulated today s plan with the house staff team or resident(s) and agree with the findings and plan in the associated note.       The above plans and care have been discussed with patient's wife including the need for prone position and starting the patient on Versed.  And all questions and concerns were addressed.     I spent a total of 32 minutes (excluding procedure time) personally providing and directing critical care services at the bedside and on the critical care unit for Chuy Varner.        Toribio Han MD           CV ICU PROGRESS NOTE  02/06/2022      CO-MORBIDITIES:   Aortic root aneurysm (H)    ASSESSMENT: Chuy Varner is a 31 year old male with a PMHx s/f bicuspid aortic valve, thoracic aortic aneurysm without rupture, hypertension, obesity, and testicular cancer s/p orchiectomy who underwent Bentall procedure with mechanical valve on 1/28 with Dr. Velasco and Dr. Hannah.    TODAY'S PROGRESS:   - Decrease lasix to 40 q12  - Metoprolol 12.5 BID  - Veletri down to 5  - May be able to wean PEEP  -  Lighten versed  - Fluid goal net even    PLAN:  Neuro/ pain/ sedation:  # Acute Postoperative pain  - Monitor neurological status. Notify the MD for any acute changes in exam.  - Pain: fentanyl gtt. Scheduled tylenol. PRN tylenol, oxycodone, robaxin  - Sedation: propofol gtt, versed gtt  - RAAS -4 to -5 if proning, 0 to -1 when supine     Pulmonary care:   # Postoperative ventilation management  # Acute hypoxemic respiratory failure likely ARDS vs. PNA vs. Hypervolemia vs. TRALI  COVID-19.Mycoplasma, Parainfluenza and Influenza Negative. CTA negative for PE on 1/30.  WBC 23.0 2/4 (14.9 the day before). CXR, respiratory panel, and BCs ordered.  - Follow BCs and respiratory panel (2/4)  - Pulmonology saw 2/2, appreciate recs   > prone (14 to 18 hrs per day)   > PEEP 14   > may need to increase PEEP slightly when supine   > as he wakes up more, may benefit from volumes closer to 600 ml for comfort and synchrony (8 ml/kg of his IBW), for now we set it to 530   > pulmonary toilet ie QID duonebs and percussive therapy ie metanebs if possible (may be difficult with recent chest surgery)   > agree with borad antibiotics to treat pneumonia per the CV ICU team  - Titrate supplemental oxygen to maintain saturation above 90%  - Wean veletri to 5  - Continue Dexamethasone 20 mg daily x5 days, 10 mg daily x5, slow taper following (started 1/31)  - Prone today, supinate tonight     Cardiovascular:    # Ascending aortic aneurysm s/p Bentall w/ [place] valve on 1/28 with Dr. Velasco and Dr. Hannah  # Bicuspid aortic valve  # History of thoracic aortic aneurysm without rupture  # History of hypertension  Recent echo on 10/26/21 with LVEF of 60-65%.  CT vascular 12/13/21 showing proximal ascending aorta is severely dilated (55 x 55mm in maximum dimension) and likely bicuspid aortic valve.  TTE 10/26/21 showing dilation of the arotic root at the sinus of Valsalva (4.3cm with an index of 1.42 cm/m2) and dilated proximal ascending aorta is  dilated, measuring 4.6cm with an index of 1.52cm/m2.  CT PE 1/30 - negative for acute PE, marked dependent atelectasis R>L.  - Start epmolol gtt, stop nicardipine gtt  - Monitor hemodynamic status  - Goal MAP>65, SBP <`30  - Statin hold  - ASA 81  - Holding PTA meds: metoprolol succinate 25mg daily- resumed at 12.5 BID  - Labetalol and hydralazine PRN to maintain SBP <130     GI care/ Nutrition:   # Obesity (BMI 46)  - NJ, advance to goal  - PPI  - Continue bowel regimen: miralax, senna    Renal/ Fluid Balance/ Electrolytes:   BL creat appears to be ~ 1.0  - Strict I/O, daily weights  - Lasix 40 q12h, goal net even  - Avoid/limit nephrotoxins as able  - Replete lytes PRN per protocol  - Scheduled potassium replacement     Endocrine:    # Stress induced hyperglycemia, improving  - Insulin gtt  - Goal BG <180 for optimal healing     ID/ Antibiotics:  # Stress induced leukocytosis, resolved  # Possible ventilator associated pneumonia  Febrile and pan-cultured on 1/30/22. Covid pcr, respiratory panel, and respiratory cultures negative 1/30. Re-cultured 1/31: respiratory and blood cultures NGTD. Re-cultured again 2/4 when WBC increased to 23.  - Follow respiratory panel and BC results 2/4  - Continue to monitor fever curve, WBC and inflammatory markers  - Continue meropenem x7 days (started 1/31)  - Consult ID 2/4: send C dif, blood cultures, repeat COVID, keep meropenem through today     Heme/Onc:     # Stress induced leukocytosis  # Acute blood loss anemia  # Acute blood loss thrombocytopenia, resolved  # History of testicular cancer s/p orchiectomy  No s/sx active bleeding  - Continue to monitor  - CBC daily  - Continue heparin gtt     MSK/ Skin:  # Sternotomy  # Surgical Incision  - Sternal precautions  - Postoperative incision management per protocol  - PT/OT/CR     Prophylaxis:    - Mechanical prophylaxis for DVT  - Chemical DVT prophylaxis- heparin gtt  - PPI     Lines/ tubes/ drains:  - TOMEKA LANG (1/28)  - Left  radial arterial line (1/28)  - Left PIV (1/28)  - Godfrey (1/28)  - OG (1/28)  - Nasoduodenal (1/31)  - ETT (1/28)  - Bilateral ES catheters (1/28)     Disposition:  - CVICU    Patient seen, findings and plan discussed with CV ICU staff, Dr. Han and CVTS staff Dr. Miller.    Richard Shannon MD, PhD, PGY-3  General Surgery    ====================================    SUBJECTIVE:   Did well supine overnight. Tolerated a lung recruitment maneuver yesterday.     OBJECTIVE:   1. VITAL SIGNS:   Temp:  [100.4  F (38  C)-101.5  F (38.6  C)] 100.4  F (38  C)  Pulse:  [] 100  Resp:  [15-22] 20  BP: ()/(54-66) 93/54  MAP:  [65 mmHg-98 mmHg] 90 mmHg  Arterial Line BP: ()/(58-84) 132/70  FiO2 (%):  [40 %-50 %] 40 %  SpO2:  [86 %-100 %] 87 %  Ventilation Mode: CMV/AC  (Continuous Mandatory Ventilation/ Assist Control)  FiO2 (%): 40 %  Rate Set (breaths/minute): 18 breaths/min  Tidal Volume Set (mL): 530 mL  PEEP (cm H2O): 14 cmH2O  Oxygen Concentration (%): 40 %  Resp: 20      2. INTAKE/ OUTPUT:   I/O last 3 completed shifts:  In: 4639.96 [I.V.:2564.96; NG/GT:810]  Out: 6210 [Urine:5435; Emesis/NG output:725; Stool:50]    3. PHYSICAL EXAMINATION:   General: Sedated, intubated, laying supine  Neuro: Sedated  Resp: Intubated, mechanically ventilated  CV: RRR on telemetry, temporary pacemaker on standby  Abdomen: Soft, Non-distended  Incisions: C/D/I   Extremities: warm and well perfused, SCDs in place    4. INVESTIGATIONS:   Arterial Blood Gases   Recent Labs   Lab 02/06/22  0857 02/06/22  0354 02/06/22  0129 02/05/22 2206   PH 7.48* 7.49* 7.46* 7.41   PCO2 49* 48* 51* 57*   PO2 87 64* 81 93   HCO3 36* 37* 36* 36*     Complete Blood Count   Recent Labs   Lab 02/06/22  0354 02/05/22  0359 02/04/22 2000 02/04/22  0545   WBC 17.1* 21.3* 17.8* 21.1*   HGB 9.0* 9.6* 9.3* 10.0*    292 249 271     Basic Metabolic Panel  Recent Labs   Lab 02/06/22  0906 02/06/22  0654 02/06/22  0603 02/06/22  0510  02/06/22  0354 02/05/22  2359 02/05/22  2206 02/05/22  1625 02/05/22  1328 02/05/22  1055 02/05/22  0849 02/05/22  0547 02/05/22  0359 02/04/22  1418 02/04/22  1406   NA  --   --   --   --  146*  146*  --   --   --  144  --   --   --  144  144  --  143   POTASSIUM  --   --   --   --  4.1  4.1  --  4.8  --  4.5  --  4.3  --  3.8  3.8   < > 3.8   CHLORIDE  --   --   --   --  110*  110*  --   --   --  107  --   --   --  106  106  --  106   CO2  --   --   --   --  33*  33*  --   --   --  34*  --   --   --  32  32  --  31   BUN  --   --   --   --  56*  56*  --   --   --  68*  --   --   --  72*  72*  --  54*   CR  --   --   --   --  1.07  1.07  --   --   --  1.26*  --   --   --  1.34*  1.34*  --  1.00   * 133* 124* 114* 128*  128*  120*   < > 111*   < > 170*   < >  --    < > 131*  131*  119*   < > 173*    < > = values in this interval not displayed.     Liver Function Tests  Recent Labs   Lab 02/06/22 0354 02/05/22 0359 02/04/22  0545 02/04/22  0132   AST 52* 44  41 52* 55*   ALT 80* 62  62 55 54   ALKPHOS 67 69  68 71 81   BILITOTAL 0.4 0.6  0.4 0.5 0.5   ALBUMIN 2.4* 2.6*  2.5* 2.2* 2.5*     Pancreatic Enzymes  No lab results found in last 7 days.  Coagulation Profile  No lab results found in last 7 days.    Attestation signed by Toribio Han MD at 2/6/2022  2:14 PM:  Critical Care Services Progress Note:     Chuy Varner remains critically ill with postoperative respiratory failure with hypoxemia, hyperkalemia, acute blood loss anemia     I personally examined and evaluated the patient today.   The patient s prognosis today is critical  I have evaluated all laboratory values and imaging studies from the past 24 hours.  Key findings and decisions made today included obese man lying in bed, CV regular rate and rhythm remains on full ventilatory support 50% 14 of PEEP.  I personally managed the {attempted to wean patient's PEEP today.  I personally decrease the patient's PEEP twice to 12  however the patient desaturated both times requiring high levels of PEEP.  Would leave the patient on 14 of PEEP at this time and continue with aggressive diuresis.  Would recommend standing dose of Lasix.  Consults ongoing and ordered are none  Procedures that will happen today are: None  All treatments were placed at my direction.  I formulated today s plan with the house staff team or resident(s) and agree with the findings and plan in the associated note.       The above plans and care have been discussed with with the wife discussing attempts to wean patient PEEP today and need for possible increased diuresis and all questions and concerns were addressed.     I spent a total of 32 minutes (excluding procedure time) personally providing and directing critical care services at the bedside and on the critical care unit for Chuy Varner.        Toribio Han MD           CV ICU PROGRESS NOTE  02/07/2022      CO-MORBIDITIES:   Aortic root aneurysm (H)    ASSESSMENT: Chuy Varner is a 31 year old male with a PMHx s/f bicuspid aortic valve, thoracic aortic aneurysm without rupture, hypertension, obesity, and testicular cancer s/p orchiectomy who underwent Bentall procedure with mechanical valve on 1/28 with Dr. Velasco and Dr. Hannah. Course complicated by postop hypoxemic respiratory failure.    TODAY'S PROGRESS:   - Turn flolan off  - Wean FiO2 and PEEP as able  - Lighten versed as able  - Fluid goal net even - will diurese as necessary  - Lantus 30    PLAN:  Neuro/ pain/ sedation:  # Acute Postoperative pain  - Monitor neurological status. Notify the MD for any acute changes in exam.  - Pain: fentanyl gtt. Scheduled tylenol. PRN tylenol, oxycodone, robaxin  - Sedation: propofol gtt, versed gtt  - RAAS -4 to -5 if proning, 0 to -1 when supine     Pulmonary care:   # Postoperative ventilation management  # Acute hypoxemic respiratory failure ARDS vs. PNA vs. Hypervolemia vs. TRALI  COVID-19.Mycoplasma,  Parainfluenza and Influenza Negative. CTA negative for PE on 1/30.  WBC 23.0 2/4 (14.9 the day before). CXR, respiratory panel, and BCs ordered.  CXR not consistent with ARDS. Likely element of severe V/Q mismatch and atelectasis.  - Follow BCs and respiratory panel (2/4)  - Pulmonology saw 2/2, appreciate recs   > prone (14 to 18 hrs per day)   > PEEP 14   > may need to increase PEEP slightly when supine   > as he wakes up more, may benefit from volumes closer to 600 ml for comfort and synchrony (8 ml/kg of his IBW), for now we set it to 530   > pulmonary toilet ie QID duonebs and percussive therapy ie metanebs if possible (may be difficult with recent chest surgery)   > agree with borad antibiotics to treat pneumonia per the CV ICU team  - Titrate supplemental oxygen to maintain saturation above 90%  - Wean off flolan  - Continue Dexamethasone 20 mg daily x5 days, 10 mg daily x5, slow taper following (started 1/31)       Cardiovascular:    # Ascending aortic aneurysm s/p Bentall w/ Jerry valve on 1/28 with Dr. Velasco and Dr. Hannah  # Bicuspid aortic valve  # History of thoracic aortic aneurysm without rupture  # History of hypertension  Recent echo on 10/26/21 with LVEF of 60-65%.  CT vascular 12/13/21 showing proximal ascending aorta is severely dilated (55 x 55mm in maximum dimension) and likely bicuspid aortic valve.  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  CT PE 1/30 - negative for acute PE, marked dependent atelectasis R>L.  - Start epmolol gtt, stop nicardipine gtt  - Monitor hemodynamic status  - Goal MAP>65, SBP <130  - Statin hold  - ASA 81  - Holding PTA meds: metoprolol succinate 25mg daily- resumed at 12.5 BID  - Labetalol and hydralazine PRN to maintain SBP <130     GI care/ Nutrition:   # Obesity (BMI 46)  - NJ, advance to goal  - PPI  -  Continue bowel regimen: miralax, senna    Renal/ Fluid Balance/ Electrolytes:   BL creat appears to be ~ 1.0  - Strict I/O, daily weights  - Lasix 40 q12h, goal net even  - Avoid/limit nephrotoxins as able  - Replete lytes PRN per protocol  - Scheduled potassium replacement     Endocrine:    # Stress induced hyperglycemia, improving  - Insulin gtt  - Lantus 30  - Goal BG <180 for optimal healing     ID/ Antibiotics:  # Stress induced leukocytosis, resolved  # Possible ventilator associated pneumonia  Febrile and pan-cultured on 1/30/22. Covid pcr, respiratory panel, and respiratory cultures negative 1/30. Re-cultured 1/31: respiratory and blood cultures NGTD. Re-cultured again 2/4 when WBC increased to 23.  - Follow respiratory panel and BC results 2/4  - Continue to monitor fever curve, WBC and inflammatory markers  - Continue meropenem x7 days (started 1/31)  - Consult ID 2/4: send C dif, blood cultures, repeat COVID, keep meropenem through today  - Consider pan scan tomorrow     Heme/Onc:     # Stress induced leukocytosis  # Acute blood loss anemia  # Acute blood loss thrombocytopenia, resolved  # History of testicular cancer s/p orchiectomy  No s/sx active bleeding  - Continue to monitor  - CBC daily  - Continue heparin gtt     MSK/ Skin:  # Sternotomy  # Surgical Incision  - Sternal precautions  - Postoperative incision management per protocol  - PT/OT/CR     Prophylaxis:    - Mechanical prophylaxis for DVT  - Chemical DVT prophylaxis- heparin gtt  - PPI     Lines/ tubes/ drains:  - RIJ MAC (1/28)  - Left radial arterial line (1/28)  - Left PIV (1/28)  - Godfrey (1/28)  - OG (1/28)  - Nasoduodenal (1/31)  - ETT (1/28)     Disposition:  - CVICU    Patient seen, findings and plan discussed with CV ICU staff, Dr. Rosado and CVTS staff Dr. Brownlee.    Tom Adair MD  Anesthesiology PGY3    ====================================    SUBJECTIVE:   Did well supine overnight. Tolerated a lung recruitment  maneuver yesterday.     OBJECTIVE:   1. VITAL SIGNS:   Temp:  [100.4  F (38  C)-102  F (38.9  C)] 100.6  F (38.1  C)  Pulse:  [] 92  Resp:  [20-27] 22  BP: ()/(54-71) 123/66  MAP:  [65 mmHg-98 mmHg] 80 mmHg  Arterial Line BP: ()/(58-80) 121/63  FiO2 (%):  [40 %-50 %] 50 %  SpO2:  [87 %-100 %] 95 %  Ventilation Mode: CMV/AC  (Continuous Mandatory Ventilation/ Assist Control)  FiO2 (%): 50 %  Rate Set (breaths/minute): 18 breaths/min  Tidal Volume Set (mL): 530 mL  PEEP (cm H2O): 14 cmH2O  Oxygen Concentration (%): 50 %  Resp: 22      2. INTAKE/ OUTPUT:   I/O last 3 completed shifts:  In: 4455.4 [I.V.:2365.4; NG/GT:770]  Out: 5790 [Urine:5090; Emesis/NG output:450; Stool:250]    3. PHYSICAL EXAMINATION:   General: Sedated, intubated, laying supine  Neuro: Sedated  Resp: Intubated, mechanically ventilated  CV: RRR on telemetry, temporary pacemaker on standby  Abdomen: Soft, Non-distended  Incisions: C/D/I   Extremities: warm and well perfused, SCDs in place    4. INVESTIGATIONS:   Arterial Blood Gases   Recent Labs   Lab 02/07/22  0334 02/06/22  2141 02/06/22  1630 02/06/22  0857   PH 7.48* 7.51* 7.48* 7.48*   PCO2 43 42 45 49*   PO2 72* 70* 71* 87   HCO3 32* 33* 33* 36*     Complete Blood Count   Recent Labs   Lab 02/07/22  0334 02/06/22  0354 02/05/22  0359 02/04/22 2000   WBC 18.8* 17.1* 21.3* 17.8*   HGB 9.4* 9.0* 9.6* 9.3*    276 292 249     Basic Metabolic Panel  Recent Labs   Lab 02/07/22  0547 02/07/22  0339 02/07/22  0334 02/07/22  0144 02/06/22  1615 02/06/22  1352 02/06/22  0510 02/06/22  0354 02/05/22  2359 02/05/22  2206 02/05/22  1625 02/05/22  1328   NA  --   --  147*  --   --  146*  --  146*  146*  --   --   --  144   POTASSIUM  --   --  4.3  --   --  4.6  --  4.1  4.1  --  4.8  --  4.5   CHLORIDE  --   --  113*  --   --  111*  --  110*  110*  --   --   --  107   CO2  --   --  31  --   --  32  --  33*  33*  --   --   --  34*   BUN  --   --  56*  --   --  56*  --  56*  56*   --   --   --  68*   CR  --   --  0.85  --   --  0.95  --  1.07  1.07  --   --   --  1.26*   * 125* 129* 119*   < > 156*  148*   < > 128*  128*  120*   < > 111*   < > 170*    < > = values in this interval not displayed.     Liver Function Tests  Recent Labs   Lab 02/07/22  0334 02/06/22  0354 02/05/22  0359 02/04/22  0545   AST 58* 52* 44  41 52*   * 80* 62  62 55   ALKPHOS 80 67 69  68 71   BILITOTAL 0.5 0.4 0.6  0.4 0.5   ALBUMIN 2.6* 2.4* 2.6*  2.5* 2.2*     Pancreatic Enzymes  No lab results found in last 7 days.  Coagulation Profile  No lab results found in last 7 days.    Attestation signed by Samanta Rosado MD at 2/7/2022 11:37 PM:  Physician Attestation   Chuy was seen and evaluated by me on 02/07/22.  He was in critical condition as the result of acute hypoxic respiratory failure.    His condition is now Critical.      Supportive interventions provided and/or ordered by me include reviewing vital signs, laboratory, hemodynamic and imaging data, adjusting life saving medications and serially assessing the patient, managing airway and vent    I agree with the resident/fellow's assessment and plan of care.    Currently not c/w ARDS. Hypoxia likely was 2/2 shunting with significant bibasilar atelectasis. Other areas of the lung well aerated. This improved signficiantly with proning. Now supine on decreasing O2 and PEEP requirements. Today PEEP down to 7 and FiO2 at 45% with a SaO2 of 93%. Plan to wake up and trial spontaneous breathing/extubation. Flolan off today. If fever does not carroll overnight will consider CT scan tomorrow.     Total Critical Care time spent by me, excluding procedures, was  40 minutes    Samanta Rosado MD    CLINICAL NUTRITION SERVICES - REASSESSMENT NOTE     Nutrition Prescription    RECOMMENDATIONS FOR MDs/PROVIDERS TO ORDER:  None at this time    Malnutrition Status:  Patient does not meet two of the established criteria necessary for diagnosing  malnutrition    Recommendations already ordered by Registered Dietitian (RD):  Continue EN as ordered    Future/Additional Recommendations:  Monitor need to meet 100% needs via EN - recommendations in note form 1/31     EVALUATION OF THE PROGRESS TOWARD GOALS   Diet: NPO  Nutrition Support: Vital HP @ 55 mL/hr + 2  pkts Prosource QID via NDT to provide 1320 mL formula, 1640 kcal (15 kcal/kg), 203 g protein (1.9 g/kg), 147 g CHO, 30 g fat, 0 g fiber, and 1104 mL free water. (note, pt receiving addtl kcal from propofol)  Intake: TF meeting 100% needs     NEW FINDINGS  GI/Nutrition: Propofol running 30 mL/hr providing ~800 kcal/day - per rounds, likely increasing propofol. No changes to tube feeding regimen. Last BM 2/7.  - Lantus started.    Weight:  02/07/22  163.3 kg (360 lb) Abnormal    02/06/22  164.1 kg (361 lb 11.2 oz) Abnormal    02/05/22  164.7 kg (363 lb) Abnormal    02/03/22  168 kg (370 lb 6 oz) Abnormal    02/02/22  168.7 kg (371 lb 14.7 oz) Abnormal    02/01/22  165.5 kg (364 lb 13.8 oz) Abnormal    01/31/22  164.8 kg (363 lb 5.1 oz) Abnormal    1/10/22 166.5 kg  10/27/21 164.7 kg    Labs:  K 4.3, phos 3.3, Mg 2.5 - all WNL  Urea nitrogen 56 (H)  , AST 58 - both high  Creatinine 0.85 (WNL) - trending down  GFR <90 (WNL)  2/5: lactic acid 1.1 - consistent    Skin: Per WOC note on 2/4, Pressure Injury: on columbella bilateral nares, hospital acquired, This is a Medical Device Related Pressure Injury (MDRPI) due to Bridle string. Pressure Injury is Stage Deep Tissue Pressure Injury (DTPI)  And mucosal in bilateral nares. Contributing factor of the pressure injury: pressure, immobility and moisture    MALNUTRITION  % Intake: No decreased intake noted - EN  % Weight Loss: Weight loss does not meet criteria  Subcutaneous Fat Loss: None observed  Muscle Loss: None observed  Fluid Accumulation/Edema: pulmonary edema  Malnutrition Diagnosis: Patient does not meet two of the established criteria  necessary for diagnosing malnutrition    Previous Goals   Total avg nutritional intake to meet a minimum of 20 kcal/kg and 1.5 g PRO/kg daily (per dosing wt 108 kg).  Evaluation: Met    Previous Nutrition Diagnosis  Inadequate oral intake vs increased needs related to intubation s/p Bentall procedure as evidenced by pt reliant on TF to meet 100% assessed needs  Evaluation: No change    CURRENT NUTRITION DIAGNOSIS  Inadequate oral intake vs increased needs related to intubation s/p Bentall procedure as evidenced by pt reliant on TF to meet 100% assessed needs    INTERVENTIONS  Implementation  Enteral Nutrition - continue as ordered    Goals  Total avg nutritional intake to meet a minimum of 20 kcal/kg and 1.5 g PRO/kg daily (per dosing wt 108 kg).    Monitoring/Evaluation  Progress toward goals will be monitored and evaluated per protocol.    Briana Palmer  Dietetic Intern    Attestation signed by Nandini oHlguin RD at 2/7/2022  2:48 PM:  I have read and agree with the interventions and recommendations below.     Nannette Holguin RD, LD  [id number]  Pgr: [phone]       [facility] General Infectious Disease Progress Note      Patient:  Chuy Varner, Date of birth 1990, Medical record number [id number]  Date of Visit:  02/07/2022         Assessment and Recommendations:   Recommendations:  -   Discontinue the empiric meropenem -- he has completed a ful one week+ day course without evident benefit or positive Microbiology.  (He is not pre-operatively immunosuppressed, so ongoing broad spectrum empiric antimicrobial coverage in the absence of severe sepsis or a focal indication / specific pathogen is unneeded.)  - Agree with plan to repeat CT scan to re-evaluate his post-operative thorax soon.  - Would not give additional empiric antimicrobial drugs without a well-defined specific target (in the absence of galloping septic hemodynamic decompensation).    General ID will follow with  you.    Axel Castillo MD  Pager [phone]    Assessment:  A 31 year old gentleman with a history of possible bicuspid aortic valve, thoracic aortic aneurysm without rupture, hypertension, obesity (with a BMI of 46), and testicular cancer s/p orchiectomy who was admitted to the [facility] Cardiovascular Thoracic Surgery service on 1/28/22 following scheduled Bentall procedure with mechanical valve replacement on 1/28/22 for an incidentally discovered 5.3 cm aortic aneurysm.  He has remained post-operatively intubated and sedated on a ventilator at moderately high settings in the CVICU since that surgery, with acute respiratory failure and chest radiology showing persistent pulmonary edema / ARDS like changes.  He has also been persistently febrile (in the 100 - 103+ degree F range) since 1/29/22 midday with a recently rising leukocytosis up to 21.1 today (versus a markel WBC of 10.9 on 1/31/22) despite ongoing empiric meropenem / broad-spectrum antibiotic therapy since 1/30/22 -- both of uncertain cause.  Transplant ID was consulted 2/4/22 regarding his persistent fever and leukocytosis.    ID issues:    - Cryptogenic persistent fever and rising leukocytosis:  There is no present evidence for an active bacterial infection beyond the persisting fever and the rising leukocytosis.  He lacks evidence for an active bacteremia (with negative blood cultures x 5 -- all drawn peripherally), pneumonia (with negative ETT sputum Gram stains / cultures x 3 and chest x-rays without new focal infiltrates), or UTI (with negative urinalyses x 3), although he is a potential set-up for any of these nosocomial conditions given a central line, intubation / sedation, and the presence of a Godfrey catheter.  He is broadly covered for any bacterial infection anywhere with meropenem (which leaves little by way of holes in its bacterial spectrum of coverage).  Given the time course and rising leukocytosis despite broad antibiotic coverage, a  loculated infection within the surgical field is possible.  With a rising WBC, recent extensive antibiotic therapy, and a rectal tube, C difficile infection needs to be ruled out.  There is no evidence of a candidemia by blood cultures.  With the rising leukocytosis and in the absence of any underlying immunocompromise, other infectious causes of fever beyond bacterial causes or candidemia are unlikely, including other fungal, atypical bacterial, mycobacterial, viral, tick borne, zoonotic, or STD infections are quite unlikely.  (There is no likely recent pertinent exposure history.)  The chest radiology is not suggestive of any invasive fungal or other atypical infection and a 1/30/22 nares respiratory virus PCR was also negative.  Non-infectious causes of fever might include a drug fever (perhaps meropenem or propofol) and daily dexamethasone 20 mg doses could still in part drive the leukocytosis.  The meropenem which was empirically started on 1/30/22 (preceded by 24 hours of Zosyn / vancomycin which has roughly the same broad antimicrobial spectrum) does not seem to be providing any benefit in regard to his fever and leukocytosis -- holding that to a seven day course would be optimal.  So far, there has been no positive Microbiology:  Blood cultures from 2/4/22 x 2, 2/5/22 x 2, and 2/7/22 x 2 are without growth to date.  2/5/22 C difficile and influenza / RSV / SARS CoV-2 PCR assays were negative.  An ETT sputum culture from this 2/7/22 is pending but one from 2/4/22 was without growth.    - Diffuse bilateral pulmonary interstitial infiltrates, acute respiratory failure:  The chest radiology seems most consistent with ARDS plus pulmonary edema.    - QTc interval:  356 msec on 2/4/22 EKG.  - Immunization status:  Tdap received 4/30/17.  No [facility] recorded Covid-19 vaccination but negative screening PCRs on 1/22/22 and 1/30/22.  Otherwise not up to date per [facility] registry.  - Isolation status: Routine.        Interval  History:   Mr. Varner remained febrile (in the 101 / 102 degree range) / hypothermic through 2/6/22 midnight but may hae defervesced to some degree (T max 100.6 degrees F) over the past 24 hours, although ongoing proning until yesterday makes that uncertain.  His leukocytosis persists at 17.8 today,  His CRP this AM is down to 86.0, improved from 150 on 2/5/22.  He remains on empiric meropenem since 2/1/22.  For ARDS / post-operative respiratory failure he remains sedated and intubated on high ventilator settings (FiO2 0.50, PEEP 14) but those have improved and he has tolerated being supine for longer periods over the past 24 hours.  Flolan is weaning.  Review of systems is unobtainable.  He has been hemodynamically stable over the past 24 hours.  There has been no positive Microbiology:  Blood cultures from 2/4/22 x 2, 2/5/22 x 2, and 2/7/22 x 2 are without growth to date.  2/5/22 C difficile and influenza / RSV / SARS CoV-2 PCR assays were negative.  An ETT sputum culture from this AM is pending but one from 2/4/22 was without growth.  Chest x-ray today shows unchanged ARDS versus the past several days.        History of Infectious Disease Illness (copied from the 2/4/22 General ID Consult Note):   Mr. Varner is a 31 year old gentleman with a history of possible bicuspid aortic valve, thoracic aortic aneurysm without rupture, hypertension, obesity (with a BMI of 46), and testicular cancer s/p orchiectomy. With a family history of aortic valve disease, although he was asymptomatic, his primary physician obtained cardiac imaging which revealed a normally functioning aortic valve but with an aortic aneurysm measuring 53 mm  for which he was referred to Cardiology who recommended surgery for the aneurysm. He was underwent a scheduled surgical Bentall procedure with mechanical valve replacement on 1/28/22 and has been post-operatively hospitalized in the CVICU since then on the Cardiovascular Thoracic Surgery service.   His initial early post-operative course was unremarkable, but his initial chest radiology showed pulmonary edema and he developed worsened hypoxia later the night after the surgery which persisted and required nitric oxide and Flolan.  He required proning while on the ventilator on 2/2/22 AM and that has continued intermittently every day.  He also received an initial five days of dexamethasone 20 mg daily followed by taper as well as multiple furosemide doses and has required ongoing pain control management by continuous catheter through the Anesthesiology Pain Service.  Pulmonary Consult was consulted on 2/2/22 for management of ventilation in the setting of ARDS.  In the past ~ 48 hours, he has had difficulties with hypertension when proned.  He began spiking low-grade post-operative fevers in the 100 degree range on 1/29/22 afternoon which catherine to 102+ degrees on 1/30 - 31/22.  The fevers remitted into the 100-degree range again 2/1 - 3/22 (while perhaps on a cooling blanket) but he spiked another fever of 103.0 degrees F again at 3 AM this morning.  Meanwhile, his peripheral WBC has climbed from a markel of 10.1 on 1/31/22 AM (after initial post-operative steroid rise) up to 23.0 this early morning, despite ongoing broad-spectrum empiric antibiotic coverage with meropenem (since 1/31/22 AM, preceded by 24 hours of Zosyn / IV vancomycin) for a concern for possible sepsis.  The infectious work-up to date has included five (1/30/22 x 2, 1/31/22, 2/4/22 x 2) blood cultures without growth, a negative 1/30/22 nares MRSA PCR screen, negative 1/30/22 and 1/31/22 ETT sputum cultures with a 2/4/22 ETT sputum culture pending (Gram stain > 25 PMNs / lpf, no organisms seen), negative 1/30/22 nares respiratory virus PCR panel and SARS Cov-2 PCR, negative 1/30/22, 1/31/22, and 2/4/22 urinalyses, and recurrently negative (essentially daily) lactic acid assays.  Serial chest x-rays show diffuse interstitial opacities most  suggestive for pulmonary edema (versus extensive atelectasis versus ARDS).  He has some bilateral nares mucosal injury due to a bridle string for which he was evaluated by the [facility] RN today.  This afternoon, he has newly had some hypothermia.  A serum creatinine kinase level this morning was elevated at 1,711 and AST has been slightly elevated (52 this AM), but other LFTs have been normal.  Infectious Disease is consulted regarding the fever and leukocytosis.    Review of Systems:  Patient is present intubated, sedated, proned, so current ROS is unobtainable.    CONSTITUTIONAL:  Post-operative fevers 100 - 102+ degrees starting 1/29/22, with leukocytosis..  No reported pre-admission fatigue or anorexia.  EYES: No eye pain, visual changes, or scleral icterus.  ENT:  No recorded preadmission rhinorrhea, sinus pain, otalgia, hearing loss, tinnitus, sore throat, or oral pain.  LYMPH:  Anasarca.  RESPIRATORY:  As per HPI -- acute respiratory failure, ventilated / proned.  CARDIOVASCULAR:  As per HPI.  Hypertensive when proned.  GASTROINTESTINAL:  No known abdominal pain, nausea, vomiting, diarrhea or constipation.  GENITOURINARY:  Godfrey.  HEME:  Acute leukocytosis.  Mild post-operative anemia following 1/28/22 surgery.  No easy bruising.  ENDOCRINE:  On insulin drip.  MUSCULOSKELETAL:  No recorded pre-admission myalgias / arthralgias.  SKIN:  No rash or pruritus.  NEURO:  Sedated.  PSYCH:  Sedated.    Past Medical History:    Bicuspid aortic valve     History of testicular cancer     Hypertension     Thoracic aortic aneurysm without rupture (H)     Past Surgical History:   Procedure Laterality Date     ORCHIECTOMY SCROTAL       REPLACE VALVE AORTIC N/A 1/28/2022    Procedure: Median Sternotomy.  Cardiopulmonary bypass pump.  Bentall procedure using On-X Ascending Aortic Prosthesis with Vascutek Gelweave Valsa Graft  size 27-29MM .  Intraoperative transesophageal echocardiogram per anesthesia;  Surgeon: Nickolas Velasco MD;   Location: [facility] OR     Family History   Problem Relation Age of Onset     Anesthesia Reaction No family hx of      Deep Vein Thrombosis (DVT) No family hx of      Social History     Tobacco Use     Smoking status: Never Smoker     Smokeless tobacco: Never Used   Substance Use Topics     Alcohol use: Yes     Alcohol/week: 3.0 standard drinks     Types: 3 Standard drinks or equivalent per week     Drug use: Never     Immunization History   Administered Date(s) Administered     Tdap (Adacel,Boostrix) 04/30/2017     Patient Active Problem List   Diagnosis     Bicuspid aortic valve     History of testicular cancer     Hypertension     Thoracic aortic aneurysm without rupture (H)     Aortic root aneurysm (H)          Current Medications & Allergies:       aspirin  81 mg Oral or NG Tube Daily     dexamethasone  10 mg Intravenous Daily     [Held by provider] furosemide  40 mg Intravenous Q12H     insulin glargine  30 Units Subcutaneous QAM AC     metoprolol tartrate  12.5 mg Oral BID     multivitamins w/minerals  15 mL Per Feeding Tube Daily     pantoprazole  40 mg Oral or NG Tube Daily    Or     pantoprazole  40 mg Oral Daily     polyethylene glycol  17 g Oral Daily     potassium chloride  40 mEq Oral BID     protein modular  2 packet Per Feeding Tube 4x Daily     senna-docusate  1 tablet Oral BID     sodium chloride (PF)  3 mL Intracatheter Q8H     Infusions/Drips:      dextrose       epoprostenol (VELETRI) 20 mcg/mL in sterile water inhalation solution Stopped (02/07/22 1154)     fentaNYL 150 mcg/hr (02/07/22 1804)     heparin 3,150 Units/hr (02/07/22 1804)     insulin regular 4 Units/hr (02/07/22 1804)     midazolam Stopped (02/07/22 1154)     propofol (DIPRIVAN) infusion 40 mcg/kg/min (02/07/22 1804)     BETA BLOCKER NOT PRESCRIBED       No Known Allergies         Physical Exam:     Patient Vitals for the past 24 hrs:   Temp Temp src Pulse Resp SpO2 Weight   02/07/22 1800 100  F (37.8  C) -- 86 -- 93 % --   02/07/22 5890  99.9  F (37.7  C) -- 87 -- 93 % --   02/07/22 1730 100  F (37.8  C) -- 89 -- 93 % --   02/07/22 1715 100  F (37.8  C) -- 87 -- 93 % --   02/07/22 1700 100  F (37.8  C) -- 89 -- 93 % --   02/07/22 1645 100  F (37.8  C) -- 90 -- 93 % --   02/07/22 1630 100  F (37.8  C) -- 86 -- 99 % --   02/07/22 1615 100  F (37.8  C) -- 89 -- 94 % --   02/07/22 1600 100  F (37.8  C) Bladder 90 20 95 % --   02/07/22 1545 100  F (37.8  C) -- 87 -- 94 % --   02/07/22 1530 100.2  F (37.9  C) -- 85 -- 92 % --   02/07/22 1515 100.2  F (37.9  C) -- 87 -- 93 % --   02/07/22 1500 100.4  F (38  C) -- 88 -- 92 % --   02/07/22 1445 100.4  F (38  C) -- 88 -- 92 % --   02/07/22 1430 (!) 100.6  F (38.1  C) -- 90 -- 92 % --   02/07/22 1415 (!) 100.6  F (38.1  C) -- 91 -- 92 % --   02/07/22 1400 (!) 100.6  F (38.1  C) -- 91 -- 93 % --   02/07/22 1345 (!) 100.8  F (38.2  C) -- 90 -- 92 % --   02/07/22 1330 (!) 100.8  F (38.2  C) -- 92 -- 92 % --   02/07/22 1315 (!) 100.9  F (38.3  C) -- 93 -- 93 % --   02/07/22 1300 (!) 100.9  F (38.3  C) -- 93 -- 93 % --   02/07/22 1245 (!) 100.9  F (38.3  C) -- 93 -- 93 % --   02/07/22 1230 (!) 100.8  F (38.2  C) -- 92 -- 93 % --   02/07/22 1215 (!) 100.8  F (38.2  C) -- 94 -- 93 % --   02/07/22 1200 (!) 100.8  F (38.2  C) Bladder 87 25 99 % --   02/07/22 1145 (!) 100.6  F (38.1  C) -- 92 -- 94 % --   02/07/22 1130 (!) 100.6  F (38.1  C) -- 94 -- 94 % --   02/07/22 1115 (!) 100.8  F (38.2  C) -- 86 -- 94 % --   02/07/22 1100 (!) 100.8  F (38.2  C) -- 86 -- 93 % --   02/07/22 1045 (!) 100.6  F (38.1  C) -- 88 -- 93 % --   02/07/22 1030 (!) 100.6  F (38.1  C) -- 90 -- 92 % --   02/07/22 1015 100.4  F (38  C) -- 93 -- 90 % --   02/07/22 1000 (!) 100.6  F (38.1  C) -- 95 -- 90 % --   02/07/22 0945 (!) 100.6  F (38.1  C) -- 92 -- 92 % --   02/07/22 0930 (!) 100.6  F (38.1  C) -- 93 -- 92 % --   02/07/22 0915 (!) 100.6  F (38.1  C) -- 92 -- 92 % --   02/07/22 0900 (!) 100.6  F (38.1  C) -- 92 -- 93 % --   02/07/22 0845  100.4  F (38  C) -- 92 -- 94 % --   02/07/22 0830 100.4  F (38  C) -- 96 -- 95 % --   02/07/22 0815 100.4  F (38  C) -- 101 -- 94 % --   02/07/22 0800 100.4  F (38  C) Bladder 93 -- 95 % --   02/07/22 0745 (!) 100.6  F (38.1  C) -- 92 -- 95 % --   02/07/22 0730 (!) 100.6  F (38.1  C) -- 93 -- 94 % --   02/07/22 0715 (!) 100.8  F (38.2  C) -- 93 -- 94 % --   02/07/22 0700 (!) 100.8  F (38.2  C) -- 93 22 94 % --   02/07/22 0600 (!) 101.1  F (38.4  C) -- 97 21 95 % --   02/07/22 0507 -- -- -- -- 100 % --   02/07/22 0500 (!) 101.3  F (38.5  C) -- 109 26 95 % --   02/07/22 0400 (!) 101.3  F (38.5  C) Bladder 108 22 94 % --   02/07/22 0300 (!) 101.3  F (38.5  C) -- 111 25 94 % --   02/07/22 0200 (!) 101.5  F (38.6  C) -- 109 26 94 % --   02/07/22 0114 -- -- -- 25 -- --   02/07/22 0100 (!) 101.7  F (38.7  C) -- 112 25 96 % --   02/07/22 0000 (!) 101.8  F (38.8  C) Bladder 114 27 94 % (!) 163.3 kg (360 lb)   02/06/22 2300 (!) 102  F (38.9  C) -- 113 27 95 % --   02/06/22 2200 (!) 102  F (38.9  C) -- 115 27 94 % --   02/06/22 2100 (!) 101.5  F (38.6  C) -- 109 26 95 % --   02/06/22 2000 (!) 101.1  F (38.4  C) Bladder 109 26 91 % --   02/06/22 1900 (!) 101.1  F (38.4  C) -- 106 24 93 % --     Ranges for vital signs over the past 24 hours:   Temp:  [99.9  F (37.7  C)-102  F (38.9  C)] 100  F (37.8  C)  Pulse:  [] 86  Resp:  [20-27] 20  MAP:  [66 mmHg-97 mmHg] 79 mmHg  Arterial Line BP: (100-143)/(53-75) 121/62  FiO2 (%):  [45 %-50 %] 50 %  SpO2:  [90 %-100 %] 93 %  Vitals:    02/05/22 0400 02/06/22 0400 02/07/22 0000   Weight: (!) 164.7 kg (363 lb) (!) 164.1 kg (361 lb 11.2 oz) (!) 163.3 kg (360 lb)   Ventilation Mode: CMV/AC  (Continuous Mandatory Ventilation/ Assist Control)  FiO2 (%): 50 %  Rate Set (breaths/minute): 18 breaths/min  Tidal Volume Set (mL): 530 mL  PEEP (cm H2O): 14 cmH2O  Oxygen Concentration (%): 50 %  Resp: 22    Intake/Output Summary (Last 24 hours) at 2/7/2022 1850  Last data filed at 2/7/2022  1800  Gross per 24 hour   Intake 3227.7 ml   Output 5035 ml   Net -1807.3 ml     Physical Examination:  GENERAL:  Intubated, sedated, WDWN, 31 year old man supine in NAD on the ventilator.  HEAD:  NCAT.  EYES:  PERRL, anicteric sclerae.  ENT:  No otorrhea.  Oral ETT.  Right nares NJ and OG tubes present.  Mild nares pressure wounds healing.  NECK:  Passively supple.  Right internal jugular central catheter line site lacks inflammation.  LYMPH:  No cervical lymphadenopathy.  LUNGS:  Coarse rhonchi / UAW noise to auscultation bilaterally.  CARDIOVASCULAR:  RRR, normal S1, S2, without murmur. Sternotomy incision.  Old chest tube sites lack inflammation.  ABDOMEN:  Normal bowel sounds, soft, nondistended.  Rectal tube present.  :  Godrfey with sarah urine.  EXTREMITIES:  Distally warm, no edema.  SKIN:  No acute rash or lesion.  Right internal jugular, arterial, and peripheral IV line sites lack inflammation.  NEUROLOGIC:  Sedated, unresponsive.         Laboratory Data:     Metabolic Studies       Recent Labs   Lab Test 02/07/22  1757 02/07/22  1541 02/07/22  1359 02/07/22  0339 02/07/22  0334 02/05/22  1055 02/05/22  0849 02/05/22  0547 02/05/22  0400 02/05/22  0359 02/04/22  0619 02/04/22  0545   NA  --   --  147*  --  147*   < >  --   --   --  144  144   < > 145*  145*   POTASSIUM  --   --  5.0  --  4.3   < > 4.3  --   --  3.8  3.8   < > 3.6  3.6   CHLORIDE  --   --  112*  --  113*   < >  --   --   --  106  106   < > 108  108   CO2  --   --  30  --  31   < >  --   --   --  32  32   < > 30  30   ANIONGAP  --   --  5  --  3   < >  --   --   --  6  6   < > 7  7   BUN  --   --  61*  --  56*   < >  --   --   --  72*  72*   < > 51*  51*   CR  --   --  0.95  --  0.85   < >  --   --   --  1.34*  1.34*   < > 1.03  1.03   GFRESTIMATED  --   --  >90  --  >90   < >  --   --   --  73  73   < > >90  >90   *   < > 148*   < > 129*   < >  --    < >  --  131*  131*  119*   < > 126*  126*   MAYE  --   --  8.8   --  9.4   < >  --   --   --  8.9  8.9   < > 8.6  8.6   PHOS  --   --   --   --  3.3   < >  --   --   --  5.6*  --  4.0   MAG  --   --   --   --  2.5   < >  --   --   --  2.8*   < > 2.9*   LACT  --   --   --   --   --   --  1.1  --  0.7  --   --   --    CKT  --   --   --   --   --   --   --   --   --  1,371*  --  1,711*    < > = values in this interval not displayed.     Hepatic Studies    Recent Labs   Lab Test 02/07/22 0334 02/06/22 0354 02/05/22 0359   BILITOTAL 0.5 0.4 0.6  0.4   DBIL  --   --  0.2   ALKPHOS 80 67 69  68   PROTTOTAL 7.0 6.5* 6.7*  6.7*   ALBUMIN 2.6* 2.4* 2.6*  2.5*   AST 58* 52* 44  41   * 80* 62  62     Pancreatitis testing    Recent Labs   Lab Test 02/05/22  0359   TRIG 208*     Hematology Studies   Recent Labs   Lab Test 02/07/22 0334 02/06/22  0354 02/05/22  0359 02/04/22 2000   WBC 18.8* 17.1* 21.3* 17.8*   HGB 9.4* 9.0* 9.6* 9.3*   HCT 30.8* 29.5* 31.3* 29.5*    276 292 249     Clotting Studies    Recent Labs   Lab Test 01/28/22  1447 01/28/22  1246 01/10/22  1216   INR 1.38* 1.53* 1.00   PTT 31 28 32     Arterial Blood Gas Testing    Recent Labs   Lab Test 02/07/22  1534 02/07/22  0958 02/07/22  0334 02/06/22  2141 02/06/22  1630   PH 7.43 7.43 7.48* 7.51* 7.48*   PCO2 48* 45 43 42 45   PO2 71* 105 72* 70* 71*   HCO3 32* 30* 32* 33* 33*   O2PER 45 50 50 50 50     Urine Studies     Recent Labs   Lab Test 02/07/22  0804 02/04/22  0502 01/31/22  1038 01/30/22  0519 01/10/22  1154   URINEPH 5.5 5.5 5.0 6.0 6.0   NITRITE Negative Negative Negative Negative Negative   LEUKEST Negative Small* Negative Negative Negative   WBCU  --  5 1 0 1     Medication levels    Recent Labs   Lab Test 01/31/22  0939   VANCOMYCIN 4.2     Microbiology:    Last Culture results with specimen source  Culture   Date Value Ref Range Status   02/05/2022 No growth after 2 days  Preliminary   02/05/2022 No growth after 2 days  Preliminary   02/04/2022 No growth after 3 days  Preliminary    02/04/2022 No growth after 3 days  Preliminary   02/04/2022 No Growth  Final   01/31/2022 50,000-100,000 CFU/mL Normal michele  Final   01/31/2022 No Growth  Final   01/30/2022 No Growth  Final   01/30/2022 No Growth  Final   01/30/2022 1+ Normal michele  Final         Last check of C difficile  C Difficile Toxin B by PCR   Date Value Ref Range Status   02/05/2022 Negative Negative Final     Comment:     A negative result does not exclude actual disease due to C. difficile and may be due to improper collection, handling and storage of the specimen or the number of organisms in the specimen is below the detection limit of the assay.       Virology:    Coronavirus-19 testing    Recent Labs   Lab Test 02/05/22  1313 01/30/22  0311 01/25/22  0852 11/16/21  0856 09/29/21  1541   [id number] Negative Negative  --   --   --    COVIDPCREXT  --   --  Negative Negative Negative     Respiratory virus (non-coronavirus-19) testing    Recent Labs   Lab Test 02/05/22  1313 01/30/22  0951   IFLUA  --  Not Detected   INFZA Negative  --    FLUAH1  --  Not Detected   LE0311  --  Not Detected   FLUAH3  --  Not Detected   IFLUB  --  Not Detected   INFZB Negative  --    PIV1  --  Not Detected   PIV2  --  Not Detected   PIV3  --  Not Detected   PIV4  --  Not Detected   IRSV Negative  --    RSVA  --  Not Detected   RSVB  --  Not Detected   HMPV  --  Not Detected   ADENOV  --  Not Detected   CORONA  --  Not Detected     Imaging:  Recent Results (from the past 48 hour(s))   XR Chest Port 1 View    Narrative    EXAM: XR CHEST PORT 1 VIEW  2/6/2022 4:25 AM     HISTORY:  ards       COMPARISON:  2/5/2022    FINDINGS: Single view of the chest. Postsurgical changes of the chest  with intact median sternotomy wires. Prosthetic aortic valve.  Endotracheal tube tip projects over the midthoracic trachea.  Gastric  tube tip projects over the stomach. Second enteric tube courses below  the level of the diaphragm and projects beyond the  field-of-view.  Esophageal temperature probe projects over the mid esophagus. Right IJ  central venous catheter tip projects near the brachiocephalic  confluence.    Trachea is midline. Stable size of the cardiac silhouette. No  significant pleural effusion or pneumothorax. Increased right basilar  airspace opacity. Persistent retrocardiac opacification the visualized  upper abdomen is unremarkable..      Impression    IMPRESSION:   1.  Increased right basilar opacity which may represent atelectasis,  edema and/or infection. Persistent retrocardiac opacification.  2. Stable support devices.    I have personally reviewed the examination and initial interpretation  and I agree with the findings.    DUNG JOE MD         SYSTEM ID:  [id number]   XR Chest Port 1 View    Narrative    Exam: XR CHEST PORT 1 VIEW, 2/7/2022 1:25 AM    Indication: ards    Comparison: Chest x-ray 2/6/2022    Findings:   Surgical changes of the chest with intact median sternotomy wires. ET  tube tip projects over the midthoracic trachea. Esophageal temperature  probe projects over the high thoracic esophagus. Gastric tube tip  projects over the stomach. Feeding tube courses past the level of the  diaphragm and excluded from the field-of-view. Unchanged right IJ  central venous catheter tip near the innominate confluence. Aortic  valve prosthesis noted.    Unchanged cardiac mediastinal silhouette. No pneumothorax or pleural  effusions. Pulmonary vasculature remains indistinct. Decrease in right  basilar bandlike atelectasis. Additional improved aeration in the left  lung base. Unchanged mid to upper lung field perihilar interstitial  opacities.      Impression    Impression:   1. Support devices as above including right IJ catheter tip at the  innominate vein confluence.  2. Decreased bibasilar atelectasis.  3. Unchanged perihilar edema/atelectasis.    I have personally reviewed the examination and initial interpretation  and I agree with  the findings.    АННА SMITH MD         SYSTEM ID:  [id number]     2/7 CXR:  1. Support devices as above including right IJ catheter tip at the innominate vein confluence.  2. Decreased bibasilar atelectasis.  3. Unchanged perihilar edema/atelectasis.  2/6 CXR:  Increased right basilar opacity which may represent atelectasis,   edema and/or infection. Persistent retrocardiac opacification.  2/5 CXR:  Decreased perihilar and bibasilar predominant pulmonary opacities likely representing improving pulmonary edema and/or atelectasis.  2/4 CXR:  1. No appreciable pneumothorax or pneumomediastinum following mediastinal drain removal.  2. Decreased diffuse interstitial opacities, likely representing improving atelectasis versus pulmonary edema.  3. Unchanged streaky bibasilar atelectasis and trace left pleural   Effusion.  2/4 CXR:  Endotracheal tube.  Continued low lung volumes with streaky bibasilar opacities, likely atelectasis.  Indistinct pulmonary vasculature with diffuse interstitial opacities, likely pulmonary edema.  2/3 CXR:  Stable small bilateral pleural effusions.  Stable perihilar/retrocardiac and left basilar opacities, slightly increased in the right lung opacities, representing an overall stable mild increase in postsurgical atelectasis/edema. Continued follow-up to exclude infectious component.     1/31 TTE:  Technically difficult study. Extremely poor acoustic windows.  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  Global and regional left ventricular function is hyperkinetic with an EF of 65-70%.  Right ventricular function, chamber size, wall motion, and thickness are normal.  The mean gradient across the aortic valve is11 mmHg.  Dilation of the inferior vena cava is present with abnormal respiratory variation in diameter.  No pericardial fluid.Organizing material noted along RV free wall.    12/13/21 Chest / abdm CT  angiogram:  1. The proximal ascending aorta is severely dilated measuring 55 x 55mm in maximum dimension.  2. The aorta returns to normal size at the level of the arch.  3. No dissection, penetrating ulcer, or intramural hematoma in the visualized thoracic aorta.  4. No significant CAD in a right dominant coronary system.  5. Mild concentric LVH.  6. The aortic valve is likely bicuspid.  10/26/21 TTE:  LV normal in size with normal systolic function. LVEF 60-65%.  LV segmental wall motion normal.  Aortic valve likely tri leaflet with no stenosis or regurgitation.  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  Proximal ascending aorta is dilated, measuring 4.6cm with an index of 1.52cm/m2.    Major Shift Events:  Increase in oxygen requirements. On 80% FiO2 and PEEP of 10, up from 50% PEEP 7 from beginning of shift. Dilaudid 0.4 mg x1 while decreasing propofol to attempt spontaneous breathing trial. Propofol increased when oxygen requirements increased as we are unable to perform spontaneous breathing trial. Night physician made aware of increased oxygen demand. Labetalol 20 mg given x1 for SBP in 150s with improvement to goal of <130 SBP from labetalol order.     Neuro: Still not following commands. Pupils equal, round, and briskly reactive. Opens eyes to pain. Does not withdraw extremities to pain but does cough occasionally with stimulation like turns or suctioning.   Cardiac: Sinus rhythm throughout night, briefly sinus tach before labetalol administration. Midline incision looks good with wound vac still intact. No drainage noted in wound vac cannister over night.   Respiratory: Increased oxygen demand. CMV 80%/PEEP 10/Rate 18/Vt 530 with peak pressures riding around 20-25. Secretions are thicker than before but remain clear. Able to suction moderate amount of secretions both orally and through the ETT.   GI: Normoactive bowel sounds. Rectal tube in place  with 200 mL output overnight of liquid stool. Small amount of leakage around rectal tube noted during bath. Tube feeding continues at 55 mL/hr through NJ tube and patient is tolerating well. OG hooked to low intermittent suction.   : Godfrey in place with good urine output throughout the night. Dark yellow, clear.   Vascular: Radial, post tibial, and dorsalis pedis pulses are strong. Extremities are warm and appropriately colored.   Skin: Midline incision to chest with wound vac in place. Pressure sore on nose near right nare from NJ tube. Pressure sore right cheek. Covered old chest tube sites. Coccyx looks good, no blanchable redness noted.   Endocrine: Patient remains on insulin drip. Currently running at 2.5 units/hr. Blood glucose ranging from  throughout night, mostly in the low 100 range. Will receive Lantus 30 units this AM with morning meds.   Lines: 3 new PIVs started overnight in right forearm & right wrist (20G x2 in forearm & 18G in wrist). Prior 20G PIV in right forearm discontinued due to bloody drainage from site. 18G PIV remains in left forearm, dressing changed as it was not intact. Double lumen CVC in RIJ. Left radial arterial line, dressing changed as it was not fully intact. NJ @ 98 for enteral feedings. OG to LIS. ETT 26 @ lip. Godfrey catheter. Rectal tube.     Plan: Wean O2 as tolerated. Possibly pull CVC as patient is not on any vasopressors or vesicants. Decrease sedation as tolerated to perform neuro assessment. Discuss discontinuing bowel meds with day team due to diarrhea requiring rectal tube.     For vital signs and complete assessments, please see documentation flowsheets.       CV ICU PROGRESS NOTE  02/08/2022      CO-MORBIDITIES:   Aortic root aneurysm (H)    ASSESSMENT: Chuy Varner is a 31 year old male with a PMHx s/f bicuspid aortic valve, thoracic aortic aneurysm without rupture, hypertension, obesity, and testicular cancer s/p orchiectomy who underwent Bentall procedure  with mechanical valve on 1/28 with Dr. Velasco and Dr. Hannah. Course complicated by postop hypoxemic respiratory failure.    TODAY'S PROGRESS:   - Wean FiO2 and PEEP as able  - Lighten versed as able  - Fluid goal net even - will diurese as necessary. To reassess fluid status this afternoon.  - Lantus 30 BID  - RAAS goal -3 to -4    PLAN:  Neuro/ pain/ sedation:  # Acute Postoperative pain  - Monitor neurological status. Notify the MD for any acute changes in exam.  - Pain: fentanyl gtt. Scheduled tylenol. PRN tylenol, oxycodone, robaxin  - Sedation: propofol gtt, versed gtt  - RAAS -3 to -4     Pulmonary care:   # Postoperative ventilation management  # Acute hypoxemic respiratory failure ARDS vs. PNA vs. Hypervolemia vs. TRALI  COVID-19.Mycoplasma, Parainfluenza and Influenza Negative. CTA negative for PE on 1/30.  WBC 23.0 2/4 (14.9 the day before). CXR, respiratory panel, and BCs ordered.  CXR not consistent with ARDS. Likely element of severe V/Q mismatch and atelectasis.  Sudden drop in sats overnight 2/8 requiring FiO2 80%  - Follow BCs and respiratory panel (2/4)  - Pulmonology saw 2/2, appreciate recs   > prone (14 to 18 hrs per day)   > PEEP 14   > may need to increase PEEP slightly when supine   > as he wakes up more, may benefit from volumes closer to 600 ml for comfort and synchrony (8 ml/kg of his IBW), for now we set it to 530   > pulmonary toilet ie QID duonebs and percussive therapy ie metanebs if possible (may be difficult with recent chest surgery)   > agree with borad antibiotics to treat pneumonia per the CV ICU team  - Titrate supplemental oxygen to maintain saturation above 90%  - Continue Dexamethasone 20 mg daily x5 days, 10 mg daily x5, slow taper following (started 1/31)       Cardiovascular:    # Ascending aortic aneurysm s/p Bentall w/ Jerry valve on 1/28 with Dr. Velasco and Dr. Hannah  # Bicuspid aortic valve  # History of thoracic aortic aneurysm without rupture  # History of  hypertension  Recent echo on 10/26/21 with LVEF of 60-65%.  CT vascular 12/13/21 showing proximal ascending aorta is severely dilated (55 x 55mm in maximum dimension) and likely bicuspid aortic valve.  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  CT PE 1/30 - negative for acute PE, marked dependent atelectasis R>L.  - Monitor hemodynamic status  - Goal MAP>65, SBP <130  - Statin hold  - ASA 81  - Holding PTA meds: metoprolol succinate 25mg daily- resumed at 12.5 BID  - Labetalol and hydralazine PRN to maintain SBP <130     GI care/ Nutrition:   # Obesity (BMI 46)  - NJ, advance to goal  - PPI  - Continue bowel regimen: miralax, senna    Renal/ Fluid Balance/ Electrolytes:   BL creat appears to be ~ 1.0  - Strict I/O, daily weights  - Lasix 40 q12h, goal net even  - Avoid/limit nephrotoxins as able  - Replete lytes PRN per protocol  - Scheduled potassium replacement     Endocrine:    # Stress induced hyperglycemia, improving  - Insulin gtt  - Lantus 30  - Goal BG <180 for optimal healing     ID/ Antibiotics:  # Stress induced leukocytosis, resolved  # Possible ventilator associated pneumonia  Febrile and pan-cultured on 1/30/22. Covid pcr, respiratory panel, and respiratory cultures negative 1/30. Re-cultured 1/31: respiratory and blood cultures NGTD. Re-cultured again 2/4 when WBC increased to 23.  - Follow respiratory panel and BC results 2/4  - Continue to monitor fever curve, WBC and inflammatory markers  - Meropenem x7 days - completed 2/7  - Consult ID 2/4: send C dif, blood cultures, repeat COVID  - Consider pan scan if fever worsening     Heme/Onc:     # Stress induced leukocytosis  # Acute blood loss anemia  # Acute blood loss thrombocytopenia, resolved  # History of testicular cancer s/p orchiectomy  No s/sx active bleeding  - Continue to monitor  -  CBC daily  - Continue heparin gtt     MSK/ Skin:  # Sternotomy  # Surgical Incision  - Sternal precautions  - Postoperative incision management per protocol  - PT/OT/CR     Prophylaxis:    - Mechanical prophylaxis for DVT  - Chemical DVT prophylaxis- heparin gtt  - PPI     Lines/ tubes/ drains:  - RIJ MAC (1/28)  - Left radial arterial line (1/28)  - Left PIV (1/28)  - Godfrey (1/28)  - OG (1/28)  - Nasoduodenal (1/31)  - ETT (1/28)     Disposition:  - CVICU    Patient seen, findings and plan discussed with CV ICU staff, Dr. Rosado and CVTS staff Dr. Velasco.    Tom Adair MD  Anesthesiology PGY3    ====================================    SUBJECTIVE:   Sudden drop in sats overnight. Initial concern for mucus plug w/ increasing secretions - no evidence on CXR.     OBJECTIVE:   1. VITAL SIGNS:   Temp:  [99.9  F (37.7  C)-100.9  F (38.3  C)] 100.8  F (38.2  C)  Pulse:  [84-99] 97  Resp:  [18-25] 19  MAP:  [66 mmHg-103 mmHg] 83 mmHg  Arterial Line BP: (100-162)/(53-81) 120/66  FiO2 (%):  [45 %-80 %] 70 %  SpO2:  [88 %-99 %] 91 %  Ventilation Mode: CMV/AC  (Continuous Mandatory Ventilation/ Assist Control)  FiO2 (%): 70 %  Rate Set (breaths/minute): 18 breaths/min  Tidal Volume Set (mL): 530 mL  PEEP (cm H2O): 10 cmH2O  Oxygen Concentration (%): 80 %  Resp: 19      2. INTAKE/ OUTPUT:   I/O last 3 completed shifts:  In: 4152.25 [I.V.:2012.25; NG/GT:930]  Out: 4190 [Urine:3540; Emesis/NG output:450; Stool:200]    3. PHYSICAL EXAMINATION:   General: Sedated, intubated, laying supine  Neuro: Sedated  Resp: Intubated, mechanically ventilated  CV: RRR on telemetry, temporary pacemaker on standby  Abdomen: Soft, Non-distended  Incisions: C/D/I   Extremities: warm and well perfused, SCDs in place    4. INVESTIGATIONS:   Arterial Blood Gases   Recent Labs   Lab 02/08/22  1045 02/08/22  0841 02/08/22  0634 02/08/22  0416   PH 7.40 7.40 7.39 7.40   PCO2 50* 49* 51* 48*   PO2 73* 65* 85 70*   HCO3 31* 30* 31* 30*      Complete Blood Count   Recent Labs   Lab 02/08/22 0415 02/07/22  0334 02/06/22  0354 02/05/22  0359   WBC 17.8* 18.8* 17.1* 21.3*   HGB 9.3* 9.4* 9.0* 9.6*    320 276 292     Basic Metabolic Panel  Recent Labs   Lab 02/08/22  0959 02/08/22  0840 02/08/22  0617 02/08/22  0420 02/08/22 0415 02/07/22  2352 02/07/22  2235 02/07/22  1541 02/07/22  1359 02/07/22  0339 02/07/22  0334   NA  --   --   --   --  144  --  146*  --  147*  --  147*   POTASSIUM  --   --   --   --  4.4  --  4.8  --  5.0  --  4.3   CHLORIDE  --   --   --   --  112*  --  112*  --  112*  --  113*   CO2  --   --   --   --  31  --  29  --  30  --  31   BUN  --   --   --   --  52*  --  60*  --  61*  --  56*   CR  --   --   --   --  0.79  --  0.82  --  0.95  --  0.85   * 122* 128* 116* 123*   < > 119*   < > 148*   < > 129*    < > = values in this interval not displayed.     Liver Function Tests  Recent Labs   Lab 02/08/22 0415 02/07/22 0334 02/06/22 0354 02/05/22  0359   AST 46* 58* 52* 44  41   * 128* 80* 62  62   ALKPHOS 80 80 67 69  68   BILITOTAL 0.4 0.5 0.4 0.6  0.4   ALBUMIN 2.5* 2.6* 2.4* 2.6*  2.5*     Pancreatic Enzymes  No lab results found in last 7 days.  Coagulation Profile  No lab results found in last 7 days.    Attestation signed by Samanta Rosado MD at 2/10/2022  9:07 PM:  Physician Attestation   Chuy was seen and evaluated by me on 2/8/2022.  He was in critical condition as the result of acute respiratory failure.    His condition is now Critical.      Supportive interventions provided and/or ordered by me include reviewing vital signs, laboratory, hemodynamic and imaging data, adjusting life saving medications and serially assessing the patient, managing airway and vent    I agree with the resident/fellow's assessment and plan of care.     Total Critical Care time spent by me, excluding procedures, was  40 minutes    Samanta Rosado MD    Admitted/transferred from: 4E  Reason for  admission/transfer: lateral tx  Patient status upon admission/transfer: on ventilator 70%, vitally stable, sedated  Interventions: settled in room, checked drips, skin check  Plan: wean sedation and ventilator as tolerated  2 RN skin assessment: completed by Marni LIM and Pradeep LIM  Result of skin assessment and interventions/actions: R radha PI, R cheek skin tear, medial sternotomy wound vac with black foam, CT sites covered with dressings that are CDI  Height, weight, drug calc weight: Done  Patient belongings (see Flowsheet - Adult Profile for details): sent with patient, in room  MDRO education (if applicable): educated girlfrienmony Lewis of reason for transfer, plan for stay        CV ICU PROGRESS NOTE  02/09/2022      CO-MORBIDITIES:   Aortic root aneurysm (H)    ASSESSMENT: Chuy Varner is a 31 year old male with a PMHx s/f bicuspid aortic valve, thoracic aortic aneurysm without rupture, hypertension, obesity, and testicular cancer s/p orchiectomy who underwent Bentall procedure with mechanical valve on 1/28 with Dr. Velasco and Dr. Hannah. Course complicated by postop hypoxemic respiratory failure.    TODAY'S PROGRESS:   - Wean FiO2 and PEEP as able  - 40 Lasix BID  - RAAS 0 -1  - Discontinue decadron  - Discontinue metoprolol, start Coreg 6.25 BID  - Decrease free water to 30 q4h  - Remove A line and central line  - Pan scan including sinuses    PLAN:  Neuro/ pain/ sedation:  # Acute Postoperative pain  - Monitor neurological status. Notify the MD for any acute changes in exam.  - Pain: fentanyl gtt. Scheduled tylenol. PRN tylenol, oxycodone, robaxin  - Sedation: propofol gtt  - RAAS -0 to -1     Pulmonary care:   # Postoperative ventilation management  # Acute hypoxemic respiratory failure ARDS vs. PNA vs. Hypervolemia vs. TRALI  COVID-19.Mycoplasma, Parainfluenza and Influenza Negative. CTA negative for PE on 1/30.  WBC 23.0 2/4 (14.9 the day before). CXR, respiratory panel, and BCs ordered.  CXR not  consistent with ARDS. Likely element of severe V/Q mismatch and atelectasis.  Sudden drop in sats overnight 2/8 requiring FiO2 80%  - Follow BCs and respiratory panel (2/4)  - Pulmonology saw 2/2, appreciate recs   > prone (14 to 18 hrs per day)   > PEEP 14   > may need to increase PEEP slightly when supine   > as he wakes up more, may benefit from volumes closer to 600 ml for comfort and synchrony (8 ml/kg of his IBW), for now we set it to 530   > pulmonary toilet ie QID duonebs and percussive therapy ie metanebs if possible (may be difficult with recent chest surgery)   > agree with borad antibiotics to treat pneumonia per the CV ICU team  - Titrate supplemental oxygen to maintain saturation above 90%  - Discontinue Dexamethasone  - Up to chair today    Cardiovascular:    # Ascending aortic aneurysm s/p Bentall w/ Molt valve on 1/28 with Dr. Velasco and Dr. Hannah  # Bicuspid aortic valve  # History of thoracic aortic aneurysm without rupture  # History of hypertension  Recent echo on 10/26/21 with LVEF of 60-65%.  CT vascular 12/13/21 showing proximal ascending aorta is severely dilated (55 x 55mm in maximum dimension) and likely bicuspid aortic valve.  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  CT PE 1/30 - negative for acute PE, marked dependent atelectasis R>L.  - Monitor hemodynamic status  - Goal MAP>65, SBP <130  - Statin hold  - ASA 81  - Holding PTA meds: metoprolol succinate 25mg daily- transition to Coreg 6.25  - Labetalol and hydralazine PRN to maintain SBP <130     GI care/ Nutrition:   # Obesity (BMI 46)  - NJ, advance to goal  - PPI  - Continue bowel regimen: miralax, senna    Renal/ Fluid Balance/ Electrolytes:   BL creat appears to be ~ 1.0  - Strict I/O, daily weights  - Lasix 40 q12h, goal net even  - Avoid/limit nephrotoxins as able  -  Replete lytes PRN per protocol  - Scheduled potassium replacement     Endocrine:    # Stress induced hyperglycemia, improving  - HDSSI  - Lantus 30  - Goal BG <180 for optimal healing     ID/ Antibiotics:  # Stress induced leukocytosis, resolved  # Possible ventilator associated pneumonia  Febrile and pan-cultured on 1/30/22. Covid pcr, respiratory panel, and respiratory cultures negative 1/30. Re-cultured 1/31: respiratory and blood cultures NGTD. Re-cultured again 2/4 when WBC increased to 23.  - Follow respiratory panel and BC results 2/4  - Continue to monitor fever curve, WBC and inflammatory markers  - Meropenem x7 days - completed 2/7  - Consult ID 2/4: send C dif, blood cultures, repeat COVID  - CT C/A/P and sinuses   - Remove A line and central line for at least 24 hours     Heme/Onc:     # Stress induced leukocytosis  # Acute blood loss anemia  # Acute blood loss thrombocytopenia, resolved  # History of testicular cancer s/p orchiectomy  No s/sx active bleeding  - Continue to monitor  - CBC daily  - Continue heparin gtt     MSK/ Skin:  # Sternotomy  # Surgical Incision  - Sternal precautions  - Postoperative incision management per protocol  - PT/OT/CR     Prophylaxis:    - Mechanical prophylaxis for DVT  - Chemical DVT prophylaxis- heparin gtt  - PPI     Lines/ tubes/ drains:  - Left PIV (1/28)  - Godfrey (1/28)  - OG (1/28)  - Nasoduodenal (1/31)  - ETT (1/28)     Disposition:  - CVICU    Patient seen, findings and plan discussed with CV ICU staff, Dr. Rosado and CVTS staff Dr. Velasco.    Tom Adair MD  Anesthesiology PGY3    ====================================    SUBJECTIVE:   NAEO. Stable O2 requirements while supine.    OBJECTIVE:   1. VITAL SIGNS:   Temp:  [100  F (37.8  C)-101.3  F (38.5  C)] 100  F (37.8  C)  Pulse:  [] 100  Resp:  [19-23] 20  BP: (148)/(76) 148/76  MAP:  [69 mmHg-96 mmHg] 93 mmHg  Arterial Line BP: ()/(56-82) 118/82  FiO2 (%):  [60 %-70 %] 65 %  SpO2:  [91  %-98 %] 98 %  Ventilation Mode: CMV/AC  (Continuous Mandatory Ventilation/ Assist Control)  FiO2 (%): 65 %  Rate Set (breaths/minute): 18 breaths/min  Tidal Volume Set (mL): 620 mL  PEEP (cm H2O): 10 cmH2O  Oxygen Concentration (%): 65 %  Resp: 20      2. INTAKE/ OUTPUT:   I/O last 3 completed shifts:  In: 4473.17 [I.V.:2128.17; NG/GT:1080]  Out: 5335 [Urine:4935; Emesis/NG output:350; Stool:50]    3. PHYSICAL EXAMINATION:   General: Sedated, intubated  Neuro: Sedated, following commands when weaned  Resp: Intubated, mechanically ventilated  CV: RRR on telemetry  Abdomen: Soft, Non-distended  Incisions: C/D/I   Extremities: warm and well perfused, SCDs in place    4. INVESTIGATIONS:   Arterial Blood Gases   Recent Labs   Lab 02/09/22  0357 02/08/22  2211 02/08/22  1622 02/08/22  1045   PH 7.47* 7.46* 7.46* 7.40   PCO2 41 42 42 50*   PO2 80 71* 72* 73*   HCO3 30* 30* 30* 31*     Complete Blood Count   Recent Labs   Lab 02/09/22  0333 02/08/22  0415 02/07/22  0334 02/06/22  0354   WBC 18.6* 17.8* 18.8* 17.1*   HGB 9.5* 9.3* 9.4* 9.0*    246 320 276     Basic Metabolic Panel  Recent Labs   Lab 02/09/22  0431 02/09/22  0333 02/09/22  0330 02/09/22  0229 02/08/22  1509 02/08/22  1353 02/08/22  0420 02/08/22  0415 02/07/22  2352 02/07/22  2235   NA  --  141  141  --   --   --  142  --  144  --  146*   POTASSIUM  --  3.9  3.9  --   --   --  5.0  --  4.4  --  4.8   CHLORIDE  --  106  106  --   --   --  108  --  112*  --  112*   CO2  --  27  27  --   --   --  30  --  31  --  29   BUN  --  52*  52*  --   --   --  51*  --  52*  --  60*   CR  --  0.75  0.75  --   --   --  0.85  --  0.79  --  0.82   * 123*  123* 125* 107*   < > 152*   < > 123*   < > 119*    < > = values in this interval not displayed.     Liver Function Tests  Recent Labs   Lab 02/09/22  0333 02/08/22  0415 02/07/22  0334 02/06/22  0354   AST 32 46* 58* 52*   * 141* 128* 80*   ALKPHOS 79 80 80 67   BILITOTAL 0.4 0.4 0.5 0.4   ALBUMIN  2.7* 2.5* 2.6* 2.4*     Pancreatic Enzymes  No lab results found in last 7 days.  Coagulation Profile  No lab results found in last 7 days.    Attestation signed by Samanta Rosado MD at 2/10/2022  9:02 PM:  Physician Attestation   Chuy was seen and evaluated by me on 2/9/22.  He was in critical condition as the result of acute respiratory failure.    His condition is now Stable.    Supportive interventions provided and/or ordered by me include reviewing vital signs, laboratory, hemodynamic and imaging data, adjusting life saving medications and serially assessing the patient, managing airway and vent    I agree with the resident/fellow's assessment and plan of care.    Hypoxia secondary to shunting with bibasilar atelectasis. Proning seemed to be helpful. No indication to prone now. Not c/w ARDS. Plan is aggressive mobilization into chair over the next several days to recruit basilar alveoli in the hope that the patient can be extubated rather than go to trach as bridge to liberation from the vent. Stable hemodynamically but still with fevers. Abx holiday. If febrile tomorrow pan CT scan.     Total Critical Care time spent by me, excluding procedures, was  40 minutes    Samanta Rosado MD    [facility] General Infectious Disease Progress Note -- Sign Off     Patient:  Chuy Varner, Date of birth 1990, Medical record number [id number]  Date of Visit:  02/09/2022         Assessment and Recommendations:   Recommendations:  - Since he is not immunosuppressed and lacks evidence of active infection at present, would not give additional empiric antimicrobial drugs without a well-defined focal target / isolated pathogen (in the absence of galloping septic hemodynamic decompensation), especially since his is not .    Since he appears to be free of active infection at this time, General ID will sign off now.  Thanks for allowing us to participate in the care of this gentleman.   Please page if additional ID related questions or concerns arise.    Axel Castillo MD  Pager [phone]    Assessment:  A 31 year old gentleman with a history of possible bicuspid aortic valve, thoracic aortic aneurysm without rupture, hypertension, obesity (with a BMI of 46), and testicular cancer s/p orchiectomy who was admitted to the [facility] Cardiovascular Thoracic Surgery service on 1/28/22 following scheduled Bentall procedure with mechanical valve replacement on 1/28/22 for an incidentally discovered 5.3 cm aortic aneurysm.  He has remained post-operatively intubated and sedated on a ventilator at moderately high settings in the CVICU since that surgery, with acute respiratory failure and chest radiology showing persistent pulmonary edema / ARDS like changes.  He has also been persistently febrile (in the 100 - 103+ degree F range) since 1/29/22 midday with a recently rising leukocytosis up to 21.1 today (versus a markel WBC of 10.9 on 1/31/22) despite ongoing empiric meropenem / broad-spectrum antibiotic therapy since 1/30/22 -- both of uncertain cause.  Transplant ID was consulted 2/4/22 regarding his persistent fever and leukocytosis.    ID issues:    - Cryptogenic persistent fever and rising leukocytosis:  There is no present evidence for an active bacterial infection beyond the persisting fever and the rising leukocytosis.  He lacks evidence for an active bacteremia (with negative blood cultures x 5 -- all drawn peripherally), pneumonia (with negative ETT sputum Gram stains / cultures x 3 and chest x-rays without new focal infiltrates), or UTI (with negative urinalyses x 3), although he is a potential set-up for any of these nosocomial conditions given a central line, intubation / sedation, and the presence of a Godfrey catheter.  He was broadly covered for any bacterial infection anywhere with meropenem through 2/7/22 (which left little by way of holes in its bacterial spectrum of coverage) without any  evident benefit.  Given the time course and rising leukocytosis despite broad antibiotic coverage, a loculated infection within the surgical field is possible -- a repeat chest CT scan is pending from 2/9/22.  With a rising WBC, recent extensive antibiotic therapy, and a rectal tube, C difficile infection was ruled out but a negative 2/5/22 PCR  There is no evidence of a candidemia by blood cultures.  With the rising leukocytosis and in the absence of any underlying immunocompromise, other infectious causes of fever beyond bacterial causes or candidemia are unlikely, including other fungal, atypical bacterial, mycobacterial, viral, tick borne, zoonotic, or STD infections are quite unlikely.  (There is no likely recent pertinent exposure history.)  His chest radiology is not suggestive of any invasive fungal or other atypical infection and a 1/30/22 nares respiratory virus PCR was also negative.  Non-infectious causes of fever might include a drug fever (perhaps propofol) and daily dexamethasone 20 mg doses could still in part be driving the leukocytosis.  Empiric meropenem was started on 1/30/22 (preceded by 24 hours of Zosyn / vancomycin which has roughly the same broad antimicrobial spectrum) and given for a one week course through 2/7/22 without any evident benefit in regard to his fever and leukocytosis.  So far, there has been no positive Microbiology:  Blood cultures from 2/4/22 x 2, 2/5/22 x 2, and 2/7/22 x 2 are without growth to date.  2/5/22 C difficile and influenza / RSV / SARS CoV-2 PCR assays were negative.  ETT sputum cultures from 2/7/22 and 2/4/22 are without growth.  Overall, he appears to lacks any identifiable active infection.    - Diffuse bilateral pulmonary interstitial infiltrates, acute respiratory failure:  The chest radiology seems most consistent with pulmonary edema plus atelectasis (and perhaps some component of ARDS) -- diuresis is being pursued.    - QTc interval:  356 msec on 2/4/22  EKG.  - Immunization status:  Tdap received 4/30/17.  No [facility] recorded Covid-19 vaccination but negative screening PCRs on 1/22/22 and 1/30/22.  Otherwise not up to date per [facility] registry.  - Isolation status: Routine.        Interval History:   Mr. Varner is less febrile (T max 101.3 degrees F) over the past 48 hours, with possibly a further decreasing temperature today, despite being off all antimicrobials since 2/7/22 PM when empiric meropenem (1/31 - 2/7/22) was discontinued.  His peripheral leukocytosis is stabel in the 17 - 18K range over the past four days.  He remains intubated and quite sedated although now consistently supine, but his ventilator settings are being weaned (FiO2 0.55, PEEP 5), although he did desaturate last night requiring an acute increase in his FiO2 (which has come down since).  He has been hemodynamically stable without pressor support.  He is being diuresed but his weight remains at about his admission weight.  A review of systems is unobtainable.  There is no positive Microbiology:  Blood cultures from 2/4/22 x 2, 2/5/22 x 2, and 2/7/22 x 2 are without growth to date.  2/5/22 C difficile and influenza / RSV / SARS CoV-2 PCR assays were negative.  Both 2/4/22 and 2/7/22 ETT sputum cultures are without growth.  Chest x-ray today shows unchanged ARDS versus the past several days.  Today's 2/9/22 chest x-ray shows unchanged diffuse (more lower bilateral) infiltrates of atelectasis or edema.  A 2/9/22 sinus CT scan does not show obvious sinusitis.  A 2/9/22 chest / abdomen CT is pending.        History of Infectious Disease Illness (copied from the 2/4/22 General ID Consult Note):   Mr. Varner is a 31 year old gentleman with a history of possible bicuspid aortic valve, thoracic aortic aneurysm without rupture, hypertension, obesity (with a BMI of 46), and testicular cancer s/p orchiectomy. With a family history of aortic valve disease, although he was asymptomatic, his primary physician  obtained cardiac imaging which revealed a normally functioning aortic valve but with an aortic aneurysm measuring 53 mm  for which he was referred to Cardiology who recommended surgery for the aneurysm. He was underwent a scheduled surgical Bentall procedure with mechanical valve replacement on 1/28/22 and has been post-operatively hospitalized in the CVICU since then on the Cardiovascular Thoracic Surgery service.  His initial early post-operative course was unremarkable, but his initial chest radiology showed pulmonary edema and he developed worsened hypoxia later the night after the surgery which persisted and required nitric oxide and Flolan.  He required proning while on the ventilator on 2/2/22 AM and that has continued intermittently every day.  He also received an initial five days of dexamethasone 20 mg daily followed by taper as well as multiple furosemide doses and has required ongoing pain control management by continuous catheter through the Anesthesiology Pain Service.  Pulmonary Consult was consulted on 2/2/22 for management of ventilation in the setting of ARDS.  In the past ~ 48 hours, he has had difficulties with hypertension when proned.  He began spiking low-grade post-operative fevers in the 100 degree range on 1/29/22 afternoon which catherine to 102+ degrees on 1/30 - 31/22.  The fevers remitted into the 100-degree range again 2/1 - 3/22 (while perhaps on a cooling blanket) but he spiked another fever of 103.0 degrees F again at 3 AM this morning.  Meanwhile, his peripheral WBC has climbed from a markel of 10.1 on 1/31/22 AM (after initial post-operative steroid rise) up to 23.0 this early morning, despite ongoing broad-spectrum empiric antibiotic coverage with meropenem (since 1/31/22 AM, preceded by 24 hours of Zosyn / IV vancomycin) for a concern for possible sepsis.  The infectious work-up to date has included five (1/30/22 x 2, 1/31/22, 2/4/22 x 2) blood cultures without growth, a negative  1/30/22 nares MRSA PCR screen, negative 1/30/22 and 1/31/22 ETT sputum cultures with a 2/4/22 ETT sputum culture pending (Gram stain > 25 PMNs / lpf, no organisms seen), negative 1/30/22 nares respiratory virus PCR panel and SARS Cov-2 PCR, negative 1/30/22, 1/31/22, and 2/4/22 urinalyses, and recurrently negative (essentially daily) lactic acid assays.  Serial chest x-rays show diffuse interstitial opacities most suggestive for pulmonary edema (versus extensive atelectasis versus ARDS).  He has some bilateral nares mucosal injury due to a bridle string for which he was evaluated by the [facility] RN today.  This afternoon, he has newly had some hypothermia.  A serum creatinine kinase level this morning was elevated at 1,711 and AST has been slightly elevated (52 this AM), but other LFTs have been normal.  Infectious Disease is consulted regarding the fever and leukocytosis.    Review of Systems:  ROS is unobtainable due to intubattion and sedated.    Past Medical History:    Bicuspid aortic valve     History of testicular cancer     Hypertension     Thoracic aortic aneurysm without rupture (H)     Past Surgical History:   Procedure Laterality Date     ORCHIECTOMY SCROTAL       REPLACE VALVE AORTIC N/A 1/28/2022    Procedure: Median Sternotomy.  Cardiopulmonary bypass pump.  Bentall procedure using On-X Ascending Aortic Prosthesis with Vascutek Gelweave Valsa Graft  size 27-29MM .  Intraoperative transesophageal echocardiogram per anesthesia;  Surgeon: Nickolas Velasco MD;  Location:  OR     Social History     Tobacco Use     Smoking status: Never Smoker     Smokeless tobacco: Never Used   Substance Use Topics     Alcohol use: Yes     Alcohol/week: 3.0 standard drinks     Types: 3 Standard drinks or equivalent per week     Drug use: Never          Current Medications & Allergies:  No present antimicrobials.       aspirin  81 mg Oral or NG Tube Daily     carvedilol  6.25 mg Oral or Feeding Tube BID w/meals     furosemide  40  mg Intravenous Q8H     insulin aspart  1-10 Units Subcutaneous Q4H     insulin glargine  30 Units Subcutaneous BID     multivitamins w/minerals  15 mL Per Feeding Tube Daily     pantoprazole  40 mg Oral or NG Tube Daily    Or     pantoprazole  40 mg Oral Daily     polyethylene glycol  17 g Oral Daily     protein modular  2 packet Per Feeding Tube 4x Daily     senna-docusate  1 tablet Oral BID     sodium chloride (PF)  3 mL Intracatheter Q8H     Infusions/Drips:      dextrose       fentaNYL 100 mcg/hr (02/09/22 1039)     heparin 3,300 Units/hr (02/09/22 1145)     propofol (DIPRIVAN) infusion 50 mcg/kg/min (02/09/22 1144)     BETA BLOCKER NOT PRESCRIBED       No Known Allergies         Physical Exam:     Patient Vitals for the past 24 hrs:   BP Temp Temp src Pulse Resp SpO2 Weight   02/09/22 1217 (!) 158/85 -- -- 101 -- -- --   02/09/22 1100 (!) 151/87 99.7  F (37.6  C) -- 101 14 100 % --   02/09/22 1030 (!) 148/88 -- -- -- -- -- --   02/09/22 1000 -- 100  F (37.8  C) -- 108 25 90 % --   02/09/22 0900 -- -- -- 101 -- 93 % --   02/09/22 0800 -- 99.5  F (37.5  C) Bladder 100 19 97 % --   02/09/22 0751 (!) 148/76 -- -- 100 -- -- --   02/09/22 0600 -- 100  F (37.8  C) -- 89 20 98 % --   02/09/22 0500 -- 100.4  F (38  C) -- 92 20 97 % --   02/09/22 0400 -- (!) 100.6  F (38.1  C) Bladder 94 20 95 % --   02/09/22 0300 -- (!) 100.6  F (38.1  C) -- 95 20 98 % (!) 165.1 kg (364 lb)   02/09/22 0200 -- (!) 100.9  F (38.3  C) -- 98 20 98 % --   02/09/22 0100 -- (!) 101.3  F (38.5  C) -- 99 20 96 % --   02/09/22 0000 -- (!) 101.3  F (38.5  C) Bladder 100 20 97 % --   02/08/22 2300 -- (!) 100.8  F (38.2  C) -- 98 -- 96 % --   02/08/22 2200 -- (!) 100.6  F (38.1  C) -- 97 -- 96 % --   02/08/22 2100 -- 100.4  F (38  C) -- 87 -- 95 % --   02/08/22 2000 -- 100.2  F (37.9  C) Bladder 85 20 95 % --   02/08/22 1900 -- 100  F (37.8  C) -- 91 -- 97 % --   02/08/22 1800 -- 100.4  F (38  C) -- 86 -- 96 % --   02/08/22 1700 -- 100.4  F (38  C)  -- 87 -- 95 % --   02/08/22 1600 -- (!) 100.6  F (38.1  C) Bladder 87 20 94 % --   02/08/22 1500 -- 100  F (37.8  C) -- 92 -- 94 % --   02/08/22 1400 -- (!) 100.8  F (38.2  C) -- 92 21 92 % --   02/08/22 1300 -- (!) 100.9  F (38.3  C) -- 96 22 91 % --     Ranges for vital signs over the past 24 hours:   Temp:  [99.5  F (37.5  C)-101.3  F (38.5  C)] 99.7  F (37.6  C)  Pulse:  [] 101  Resp:  [14-25] 14  BP: (148-158)/(76-88) 158/85  MAP:  [69 mmHg-95 mmHg] 95 mmHg  Arterial Line BP: ()/(56-82) 134/73  FiO2 (%):  [65 %] 65 %  SpO2:  [90 %-100 %] 100 %  Vitals:    02/07/22 0000 02/08/22 0600 02/09/22 0300   Weight: (!) 163.3 kg (360 lb) (!) 165.3 kg (364 lb 6.7 oz) (!) 165.1 kg (364 lb)   Ventilation Mode: (S) CPAP/PS  (Continuous positive airway pressure with Pressure Support)  FiO2 (%): 65 %  Rate Set (breaths/minute): 18 breaths/min  Tidal Volume Set (mL): 620 mL  PEEP (cm H2O): 5 cmH2O  Pressure Support (cm H2O): 10 cmH2O  Oxygen Concentration (%): 65 %  Resp: 14    Intake/Output Summary (Last 24 hours) at 2/9/2022 1243  Last data filed at 2/9/2022 1200  Gross per 24 hour   Intake 3955.2 ml   Output 4870 ml   Net -914.8 ml     Physical Examination:  GENERAL:  Intubated, sedated, WDWN, 31 year old man supine in NAD on the ventilator.  HEAD:  NCAT.  EYES:  PERRL, anicteric sclerae.  ENT:  No otorrhea.  Oral ETT.  Right nares NJ and OG tubes present.  Mild nares pressure wounds healing.  NECK:  Passively supple.  Right internal jugular central catheter line site lacks inflammation.  LYMPH:  No cervical lymphadenopathy.  LUNGS:  Coarse rhonchi / UAW noise to auscultation bilaterally.  CARDIOVASCULAR:  RRR, normal S1, S2, without murmur. Sternotomy incision.  Old chest tube sites lack inflammation.  ABDOMEN:  Normal bowel sounds, soft, nondistended.  Rectal tube present.  :  Godfrey with sarah urine.  EXTREMITIES:  Distally warm, no edema.  SKIN:  No acute rash or lesion.  Right internal jugular, arterial,  and peripheral IV line sites lack inflammation.  NEUROLOGIC:  Sedated, non-interactive.         Laboratory Data:     Metabolic Studies       Recent Labs   Lab Test 02/09/22  1227 02/09/22  0431 02/09/22  0333 02/08/22  1509 02/08/22  1353 02/07/22  0339 02/07/22  0334 02/05/22  1055 02/05/22  0849 02/05/22  0547 02/05/22  0400 02/05/22  0359 02/04/22  0619 02/04/22  0545   NA  --   --  141  141  --  142   < > 147*   < >  --   --   --  144  144   < > 145*  145*   POTASSIUM  --   --  3.9  3.9  --  5.0   < > 4.3   < > 4.3  --   --  3.8  3.8   < > 3.6  3.6   CHLORIDE  --   --  106  106  --  108   < > 113*   < >  --   --   --  106  106   < > 108  108   CO2  --   --  27  27  --  30   < > 31   < >  --   --   --  32  32   < > 30  30   ANIONGAP  --   --  8  8  --  4   < > 3   < >  --   --   --  6  6   < > 7  7   BUN  --   --  52*  52*  --  51*   < > 56*   < >  --   --   --  72*  72*   < > 51*  51*   CR  --   --  0.75  0.75  --  0.85   < > 0.85   < >  --   --   --  1.34*  1.34*   < > 1.03  1.03   GFRESTIMATED  --   --  >90  >90  --  >90   < > >90   < >  --   --   --  73  73   < > >90  >90   *   < > 123*  123*   < > 152*   < > 129*   < >  --    < >  --  131*  131*  119*   < > 126*  126*   MAYE  --   --  9.0  9.0  --  9.0   < > 9.4   < >  --   --   --  8.9  8.9   < > 8.6  8.6   PHOS  --   --   --   --   --   --  3.3   < >  --   --   --  5.6*  --  4.0   MAG  --   --  2.3  --  2.6   < > 2.5   < >  --   --   --  2.8*   < > 2.9*   LACT  --   --   --   --   --   --   --   --  1.1  --  0.7  --   --   --    CKT  --   --   --   --   --   --   --   --   --   --   --  1,371*  --  1,711*    < > = values in this interval not displayed.     Hepatic Studies    Recent Labs   Lab Test 02/09/22  0333 02/08/22  0415 02/06/22  0354 02/05/22  0359   BILITOTAL 0.4 0.4   < > 0.6  0.4   DBIL  --   --   --  0.2   ALKPHOS 79 80   < > 69  68   PROTTOTAL 7.3 7.0   < > 6.7*  6.7*   ALBUMIN 2.7* 2.5*   < > 2.6*   2.5*   AST 32 46*   < > 44  41   * 141*   < > 62  62    < > = values in this interval not displayed.     Pancreatitis testing    Recent Labs   Lab Test 02/08/22  0415   TRIG 129     Hematology Studies   Recent Labs   Lab Test 02/09/22  0333 02/08/22  0415 02/07/22  0334 02/06/22  0354   WBC 18.6* 17.8* 18.8* 17.1*   HGB 9.5* 9.3* 9.4* 9.0*   HCT 31.5* 31.7* 30.8* 29.5*    246 320 276     Clotting Studies    Recent Labs   Lab Test 01/28/22  1447 01/28/22  1246 01/10/22  1216   INR 1.38* 1.53* 1.00   PTT 31 28 32     Arterial Blood Gas Testing    Recent Labs   Lab Test 02/09/22  0357 02/08/22  2211 02/08/22  1622 02/08/22  1045 02/08/22  0841   PH 7.47* 7.46* 7.46* 7.40 7.40   PCO2 41 42 42 50* 49*   PO2 80 71* 72* 73* 65*   HCO3 30* 30* 30* 31* 30*   O2PER 30  65 65 65 70 60     Urine Studies     Recent Labs   Lab Test 02/07/22  0804 02/04/22  0502 01/31/22  1038 01/30/22  0519 01/10/22  1154   URINEPH 5.5 5.5 5.0 6.0 6.0   NITRITE Negative Negative Negative Negative Negative   LEUKEST Negative Small* Negative Negative Negative   WBCU  --  5 1 0 1     Medication levels    Recent Labs   Lab Test 01/31/22  0939   VANCOMYCIN 4.2     Microbiology:    Last Culture results with specimen source  Culture   Date Value Ref Range Status   02/07/2022 No growth after 2 days  Preliminary   02/07/2022 No Growth  Final   02/07/2022 No growth after 2 days  Preliminary   02/05/2022 No growth after 3 days  Preliminary   02/05/2022 No growth after 3 days  Preliminary   02/04/2022 No Growth  Final   02/04/2022 No Growth  Final   02/04/2022 No Growth  Final   01/31/2022 50,000-100,000 CFU/mL Normal michele  Final   01/31/2022 No Growth  Final   01/30/2022 No Growth  Final   01/30/2022 No Growth  Final   01/30/2022 1+ Normal michele  Final         Last check of C difficile  C Difficile Toxin B by PCR   Date Value Ref Range Status   02/05/2022 Negative Negative Final     Comment:     A negative result does not exclude actual  disease due to C. difficile and may be due to improper collection, handling and storage of the specimen or the number of organisms in the specimen is below the detection limit of the assay.       Virology:    Coronavirus-19 testing    Recent Labs   Lab Test 02/05/22  1313 01/30/22  0311 01/25/22  0852 11/16/21  0856 09/29/21  1541   [id number] Negative Negative  --   --   --    COVIDPCREXT  --   --  Negative Negative Negative     Respiratory virus (non-coronavirus-19) testing    Recent Labs   Lab Test 02/05/22  1313 01/30/22  0951   IFLUA  --  Not Detected   INFZA Negative  --    FLUAH1  --  Not Detected   CW5494  --  Not Detected   FLUAH3  --  Not Detected   IFLUB  --  Not Detected   INFZB Negative  --    PIV1  --  Not Detected   PIV2  --  Not Detected   PIV3  --  Not Detected   PIV4  --  Not Detected   IRSV Negative  --    RSVA  --  Not Detected   RSVB  --  Not Detected   HMPV  --  Not Detected   ADENOV  --  Not Detected   CORONA  --  Not Detected     Imaging:  Recent Results (from the past 48 hour(s))   XR Chest Port 1 View    Narrative    Exam: XR CHEST PORT 1 VIEW, 2/8/2022 1:15 AM    Indication: ards    Comparison: Chest x-ray 2/7/2022, 1:24 AM    Findings:   Endotracheal tube tip projects over the mid to lower thoracic trachea.  Gastric tube tip projects over the stomach. Feeding tube courses past  the level of the diaphragm with tip excluded from the field-of-view.  Esophageal temperature probe tip projects over the mid thoracic  esophagus. Right IJ central venous catheter tip projects over the high  SVC/confluence.    Enlarged cardiomediastinal silhouette. No pneumothorax. New small left  pleural effusion. Unchanged low lung volumes with prominent  interstitial opacities. Increased left basilar opacification. Aortic  valve prosthesis noted.      Impression    Impression:   1. ET tube tip projects over the mid to lower thoracic trachea.  Otherwise additional support devices are unchanged as above.  2. Small  left pleural effusion and increased atelectasis.  3. Unchanged low lung volumes with prominent interstitial markings  likely representing atelectasis or edema.    I have personally reviewed the examination and initial interpretation  and I agree with the findings.    RAUDEL DYER MD         SYSTEM ID:  [id number]   XR Chest Port 1 View    Narrative    EXAM: XR CHEST PORT 1 VIEW  2/8/2022 8:24 AM     HISTORY:  Acute desat, assess for signs of mucus plug       COMPARISON:  Chest x-ray 2/8/2022 at 1:11 AM.    FINDINGS:   AP portable chest. Median sternotomy wires appear intact. Aortic valve  replacement. Endotracheal tube projects over the midthoracic trachea.  Enteric tube courses below the diaphragm, with tip inferiorly out of  view. NG/gastric catheter projects over the stomach. Esophageal  temperature probe over the upper/mid thoracic esophagus. Right IJ  catheter sheath. Stable low lung volumes. No significant appreciable  pleural effusions. No pneumothorax. Slight increase in the  perihilar/retrocardiac opacities. No new focal opacities. Unremarkable  limited upper abdomen radiographically.      Impression    IMPRESSION:  1. Stable postsurgical changes of aortic valve replacement with mild  increase in the postsurgical pulmonary edema/atelectasis. A follow-up  exclude infectious component.  2. No significant pleural effusions appreciated. No new focal  opacities. No pneumothorax.  3. Stable positioning of support devices.    I have personally reviewed the examination and initial interpretation  and I agree with the findings.    TAE BRISCOE MD         SYSTEM ID:  [id number]   XR Chest Port 1 View    Narrative    Exam: XR CHEST PORT 1 VIEW, 2/9/2022 8:46 AM    Indication: s/p Bentall    Comparison: Yesterday    Findings:   Endotracheal tube in the mid thoracic trachea. Right IJ sheath tip in  the low right internal jugular vein. Temperature probe in the upper  esophagus. Enteric tube tip and sidehole project over  the fundus of  the stomach. Feeding tube seen coursing through the mediastinum. Tip  projects off the film. Prosthetic aortic valve.    Continued perihilar and bibasilar opacities indicative of atelectasis.  Edema could have a similar appearance. Heart is enlarged but stable.      Impression    Impression:   1. Stable support devices  2. Unchanged perihilar and lower lobe opacities indicative of  atelectasis. Edema would be in the differential.    BENTLEY ANTONIO MD         SYSTEM ID:  [id number]     2/9 Chest / abdm CT:  Pending.  2/9 Sinus CT:  No suspicious osseous lesion or soft tissue infection.  Paranasal sinus and mastoid air cell effusions are nonspecific and could be seen in an intubated patient, however sinusitis cannot be excluded.  2/9 CXR:  Unchanged perihilar and lower lobe opacities indicative of atelectasis. Edema would be in the differential.  2/8 CXRs:  Stable postsurgical changes of aortic valve replacement with mild increase in the postsurgical pulmonary edema/atelectasis. A follow-up exclude infectious component.  No significant pleural effusions appreciated. No new focal opacities. No pneumothorax.  Stable positioning of support devices.  Unchanged low lung volumes with prominent interstitial markings likely representing atelectasis or edema.  2/7 CXR:  Support devices as above including right IJ catheter tip at the innominate vein confluence.  Decreased bibasilar atelectasis.  Unchanged perihilar edema/atelectasis.  2/6 CXR:  Increased right basilar opacity which may represent atelectasis,   edema and/or infection. Persistent retrocardiac opacification.  2/5 CXR:  Decreased perihilar and bibasilar predominant pulmonary opacities likely representing improving pulmonary edema and/or atelectasis.  2/4 CXR:  1. No appreciable pneumothorax or pneumomediastinum following mediastinal drain removal.  2. Decreased diffuse interstitial opacities, likely representing improving atelectasis versus pulmonary  edema.  3. Unchanged streaky bibasilar atelectasis and trace left pleural   Effusion.  2/4 CXR:  Endotracheal tube.  Continued low lung volumes with streaky bibasilar opacities, likely atelectasis.  Indistinct pulmonary vasculature with diffuse interstitial opacities, likely pulmonary edema.  2/3 CXR:  Stable small bilateral pleural effusions.  Stable perihilar/retrocardiac and left basilar opacities, slightly increased in the right lung opacities, representing an overall stable mild increase in postsurgical atelectasis/edema. Continued follow-up to exclude infectious component.     1/31 TTE:  Technically difficult study. Extremely poor acoustic windows.  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  Global and regional left ventricular function is hyperkinetic with an EF of 65-70%.  Right ventricular function, chamber size, wall motion, and thickness are normal.  The mean gradient across the aortic valve is11 mmHg.  Dilation of the inferior vena cava is present with abnormal respiratory variation in diameter.  No pericardial fluid.Organizing material noted along RV free wall.    12/13/21 Chest / abdm CT angiogram:  1. The proximal ascending aorta is severely dilated measuring 55 x 55mm in maximum dimension.  2. The aorta returns to normal size at the level of the arch.  3. No dissection, penetrating ulcer, or intramural hematoma in the visualized thoracic aorta.  4. No significant CAD in a right dominant coronary system.  5. Mild concentric LVH.  6. The aortic valve is likely bicuspid.  10/26/21 TTE:  LV normal in size with normal systolic function. LVEF 60-65%.  LV segmental wall motion normal.  Aortic valve likely tri leaflet with no stenosis or regurgitation.  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  Proximal ascending aorta is dilated, measuring 4.6cm with an index of  1.52cm/m2.      CV ICU PROGRESS NOTE  02/10/2022      CO-MORBIDITIES:   Aortic root aneurysm (H)    ASSESSMENT: Chuy Varner is a 31 year old male with a PMHx s/f bicuspid aortic valve, thoracic aortic aneurysm without rupture, hypertension, obesity, and testicular cancer s/p orchiectomy who underwent Bentall procedure with mechanical valve on 1/28 with Dr. Velasco and Dr. Hannah. Course complicated by postop hypoxemic respiratory failure.    TODAY'S PROGRESS:   - Wean FiO2 and PEEP as able  - 40 Lasix BID  - RAAS 0 -1  - Increase Coreg 12.5 BID  - Up to chair  - PS  - PO hydralazine 25 TID    PLAN:  Neuro/ pain/ sedation:  # Acute Postoperative pain  - Monitor neurological status. Notify the MD for any acute changes in exam.  - Pain: fentanyl gtt. Scheduled tylenol. PRN tylenol, oxycodone, robaxin  - Sedation: propofol gtt  - RAAS -0 to -1     Pulmonary care:   # Postoperative ventilation management  # Acute hypoxemic respiratory failure ARDS vs. PNA vs. Hypervolemia vs. TRALI  COVID-19.Mycoplasma, Parainfluenza and Influenza Negative. CTA negative for PE on 1/30.  WBC 23.0 2/4 (14.9 the day before). CXR, respiratory panel, and BCs ordered.  CXR not consistent with ARDS. Likely element of severe V/Q mismatch and atelectasis.  Sudden drop in sats overnight 2/8 requiring FiO2 80%  - Follow BCs and respiratory panel (2/4)  - Pulmonology saw 2/2, appreciate recs   > prone (14 to 18 hrs per day)   > PEEP 14   > may need to increase PEEP slightly when supine   > as he wakes up more, may benefit from volumes closer to 600 ml for comfort and synchrony (8 ml/kg of his IBW), for now we set it to 530   > pulmonary toilet ie QID duonebs and percussive therapy ie metanebs if possible (may be difficult with recent chest surgery)   > agree with borad antibiotics to treat pneumonia per the CV ICU team  - Titrate supplemental oxygen to maintain saturation above 90%  - Up to chair today    Cardiovascular:    # Ascending aortic  aneurysm s/p Bentall w/ [place] valve on 1/28 with Dr. Velasco and Dr. Hannah  # Bicuspid aortic valve  # History of thoracic aortic aneurysm without rupture  # History of hypertension  Recent echo on 10/26/21 with LVEF of 60-65%.  CT vascular 12/13/21 showing proximal ascending aorta is severely dilated (55 x 55mm in maximum dimension) and likely bicuspid aortic valve.  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  CT PE 1/30 - negative for acute PE, marked dependent atelectasis R>L.  - Monitor hemodynamic status  - Goal MAP>65, SBP <130  - Statin hold  - ASA 81  - Holding PTA meds: metoprolol succinate 25mg daily  - Increase Coreg 12.5 BID  - PO Hydralazine 25 TID  - Labetalol and hydralazine PRN to maintain SBP <130     GI care/ Nutrition:   # Obesity (BMI 46)  - NJ, advance to goal  - PPI  - Continue bowel regimen: miralax, senna    Renal/ Fluid Balance/ Electrolytes:   BL creat appears to be ~ 1.0  - Strict I/O, daily weights  - Lasix 40 q12h, goal net negative 1L  - Avoid/limit nephrotoxins as able  - Replete lytes PRN per protocol  - Scheduled potassium replacement     Endocrine:    # Stress induced hyperglycemia, improving  - HDSSI  - Lantus 30  - Goal BG <180 for optimal healing     ID/ Antibiotics:  # Stress induced leukocytosis, resolved  # Possible ventilator associated pneumonia  Febrile and pan-cultured on 1/30/22. Covid pcr, respiratory panel, and respiratory cultures negative 1/30. Re-cultured 1/31: respiratory and blood cultures NGTD. Re-cultured again 2/4 when WBC increased to 23.  - Follow respiratory panel and BC results 2/4  - Continue to monitor fever curve, WBC and inflammatory markers  - Meropenem x7 days - completed 2/7  - Consult ID 2/4: send C dif, blood cultures, repeat COVID  - CT C/A/P and sinuses   - Remove A line and central  line  - Augmentin PO if spiking fever for possible sinusitis     Heme/Onc:     # Stress induced leukocytosis  # Acute blood loss anemia  # Acute blood loss thrombocytopenia, resolved  # History of testicular cancer s/p orchiectomy  No s/sx active bleeding  - Continue to monitor  - CBC daily  - Heparin resistant  - 4T is 4 - HIT panel, transition to Bival, b/l LE duplex US     MSK/ Skin:  # Sternotomy  # Surgical Incision  - Sternal precautions  - Postoperative incision management per protocol  - PT/OT/CR     Prophylaxis:    - Mechanical prophylaxis for DVT  - Chemical DVT prophylaxis- bival  - PPI     Lines/ tubes/ drains:  - Left PIV (1/28)  - Godfrey (2/10)  - OG (1/28)  - Nasoduodenal (1/31)  - ETT (1/28)     Disposition:  - CVICU    Patient seen, findings and plan discussed with CV ICU staff, Dr. Rosado and CVTS staff Dr. Velasco.    Tom Adair MD  Anesthesiology PGY3    ====================================    SUBJECTIVE:   NAEO. Stable O2 requirements overnight. Godfrey replace    OBJECTIVE:   1. VITAL SIGNS:   Temp:  [99.4  F (37.4  C)-100.3  F (37.9  C)] 100.3  F (37.9  C)  Pulse:  [] 97  Resp:  [12-25] 12  BP: (112-165)/() 112/50  MAP:  [95 mmHg] 95 mmHg  Arterial Line BP: (134)/(73) 134/73  FiO2 (%):  [55 %-65 %] 65 %  SpO2:  [90 %-100 %] 97 %  Ventilation Mode: CMV/AC  (Continuous Mandatory Ventilation/ Assist Control)  FiO2 (%): 65 %  Rate Set (breaths/minute): 12 breaths/min  Tidal Volume Set (mL): 1000 mL  PEEP (cm H2O): 10 cmH2O  Pressure Support (cm H2O): 10 cmH2O  Oxygen Concentration (%): 65 %  Resp: 12      2. INTAKE/ OUTPUT:   I/O last 3 completed shifts:  In: 3976.34 [I.V.:2156.34; NG/GT:610]  Out: 4525 [Urine:4025; Emesis/NG output:400; Stool:100]    3. PHYSICAL EXAMINATION:   General: Sedated, intubated  Neuro: Sedated, arouses appropriately when weaned  Resp: Intubated, mechanically ventilated, CTA  CV: RRR  Abdomen: Soft, Non-distended  Incisions: C/D/I   Extremities: warm  and well perfused, SCDs in place    4. INVESTIGATIONS:   Arterial Blood Gases   Recent Labs   Lab 02/10/22  0436 02/09/22  2242 02/09/22  1239 02/09/22  0357   PH 7.43 7.41 7.37 7.47*   PCO2 44 48* 49* 41   PO2 103 55* 84 80   HCO3 29* 30* 28 30*     Complete Blood Count   Recent Labs   Lab 02/10/22  0548 02/09/22  0333 02/08/22  0415 02/07/22  0334   WBC 16.7* 18.6* 17.8* 18.8*   HGB 9.2* 9.5* 9.3* 9.4*   * 220 246 320     Basic Metabolic Panel  Recent Labs   Lab 02/10/22  0548 02/10/22  0323 02/10/22  0019 02/09/22  2100 02/09/22  1752 02/09/22  0431 02/09/22  0333 02/08/22  1509 02/08/22  1353     --   --   --  139  --  141  141  --  142   POTASSIUM 3.8  --   --   --  4.7  --  3.9  3.9  --  5.0   CHLORIDE 104  --   --   --  106  --  106  106  --  108   CO2 27  --   --   --  28  --  27  27  --  30   BUN 66*  --   --   --  50*  --  52*  52*  --  51*   CR 1.02  --   --   --  0.78  --  0.75  0.75  --  0.85   * 136* 114* 142* 151*   < > 123*  123*   < > 152*    < > = values in this interval not displayed.     Liver Function Tests  Recent Labs   Lab 02/10/22  0548 02/09/22  0333 02/08/22  0415 02/07/22  0334   AST 21 32 46* 58*   ALT 89* 122* 141* 128*   ALKPHOS 75 79 80 80   BILITOTAL 0.5 0.4 0.4 0.5   ALBUMIN 2.6* 2.7* 2.5* 2.6*     Pancreatic Enzymes  No lab results found in last 7 days.  Coagulation Profile  No lab results found in last 7 days.    Attestation signed by Samanta Rosado MD at 2/10/2022  9:06 PM (Updated):  Physician Attestation   Chuy was seen and evaluated by me on 2/7/2022.  He was in critical condition as the result of acute respiratory failure.    His condition is now Critical.      Supportive interventions provided and/or ordered by me include reviewing vital signs, laboratory, hemodynamic and imaging data, adjusting life saving medications and serially assessing the patient, managing airway and vent    I agree with the resident/fellow's assessment and plan of  care.    Hypoxia secondary to shunting with bibasilar atelectasis. Proning seemed to be helpful. No indication to prone now. Not c/w ARDS. Plan is aggressive mobilization into chair over the next several days to recruit basilar alveoli in the hope that the patient can be extubated rather than go to trach as bridge to liberation from the vent. Stable hemodynamically but still with fevers. Abx holiday yesterday. Still febrile. Pan CT showed improvement in basilar consolidations and sinusitis. Will treat with Augmentin.     I spoke with Nickolas Velasco about the hemopericardium. In the context of hemodynamic stability will simply monitor without intervention.  Total Critical Care time spent by me, excluding procedures, was  40 minutes    Samanta Rosado MD       02/10/22 1053   Quick Adds   Type of Visit Initial Occupational Therapy Evaluation  (Cardiac Rehab)   Living Environment   People in home significant other   Current Living Arrangements house   Home Accessibility stairs to enter home;stairs within home   Number of Stairs, Main Entrance 1   Number of Stairs, Within Home, Primary greater than 10 stairs   Stair Railings, Within Home, Primary railings safe and in good condition   Transportation Anticipated car, drives self;family or friend will provide   Living Environment Comments Pt intubated/sedated. PLOF and home environment gathered from pt's girlfriend. Pt lives with his girlfriend Debbie in a house w/ 1-2 steps to enter and a flight of stairs to the basement where laundry is located. Pt does not need to access basement, girlfriend does laundry.    Self-Care   Usual Activity Tolerance good   Current Activity Tolerance poor   Equipment Currently Used at Home none   Activity/Exercise/Self-Care Comment Pt was IND w/ ADLs at baseline.    Instrumental Activities of Daily Living (IADL)   Previous Responsibilities meal prep;housekeeping;shopping;yardwork;medication management;finances;driving;work   IADL Comments Pt  "is IND w/ IADLs performance. Pt and girlfriend share IADL responsibilities. Pt is primarily responsible for yardwork. Pt works as a  (desk job).   Disability/Function   Hearing Difficulty or Deaf no   Wear Glasses or Blind no   Concentrating, Remembering or Making Decisions Difficulty no   Difficulty Communicating no   Difficulty Eating/Swallowing no   Walking or Climbing Stairs Difficulty no   Dressing/Bathing Difficulty no   Toileting issues no   Doing Errands Independently Difficulty (such as shopping) no   Fall history within last six months no   Change in Functional Status Since Onset of Current Illness/Injury yes  (Impaired ADL/IADL performance)   General Information   Onset of Illness/Injury or Date of Surgery 01/28/22   Referring Physician Curly Stark, NP   Patient/Family Therapy Goal Statement (OT) Unable to state. Pt's girlfriend hoping pt will not need tracheostomy.   Additional Occupational Profile Info/Pertinent History of Current Problem Per chart: \"Chuy Varner is a 31 year old male with a PMHx s/f bicuspid aortic valve, thoracic aortic aneurysm without rupture, hypertension, obesity, and testicular cancer s/p orchiectomy who underwent Bentall procedure with mechanical valve on 1/28 with Dr. Velasco and Dr. Hannah. Course complicated by postop hypoxemic respiratory failure.\"   Left Upper Extremity (Weight-bearing Status) partial weight-bearing (PWB)  (10 lb limit per sternal precautions)   Right Upper Extremity (Weight-bearing Status) partial weight-bearing (PWB)  (10 lb limit per sternal precautions)   Left Lower Extremity (Weight-bearing Status) full weight-bearing (FWB)   Right Lower Extremity (Weight-bearing Status) full weight-bearing (FWB)   General Observations and Info Activity: Ambulate w/ assist   Cognitive Status Examination   Cognitive Status Comments Pt intubated/sedated, unable to follow commands   Visual Perception   Impact of Vision Impairment on Function (Vision) " Will further assess.   Sensory   Sensory Comments Will further assess.   Pain Assessment   Patient Currently in Pain No   Integumentary/Edema   Integumentary/Edema Comments Pt w/ 1-2+ pitting edema in feet. Will monitor.   Range of Motion Comprehensive   General Range of Motion no range of motion deficits identified   Comment, General Range of Motion No ROM deficits in BUE/BLE during PROM   Strength Comprehensive (MMT)   Comment, General Manual Muscle Testing (MMT) Assessment Will further assess   Bed Mobility   Comment (Bed Mobility) Total assist   Transfers   Transfer Comments Total assist   Bathing Assessment   [place] Level (Bathing) dependent (less than 25% patient effort)   Upper Body Dressing Assessment   [place] Level (Upper Body Dressing) dependent (less than 25% patient effort)   Lower Body Dressing Assessment   [place] Level (Lower Body Dressing) dependent (less than 25% patient effort)   Grooming Assessment   [place] Level (Grooming) dependent (less than 25% patient effort)   Eating/Self Feeding   [place] Level (Feeding) dependent (less than 25% patient effort)   Toileting   [place] Level (Toileting) dependent (less than 25% patient effort)   Clinical Impression   Criteria for Skilled Therapeutic Interventions Met (OT) yes;meets criteria;skilled treatment is necessary   OT Diagnosis Impaired ADL/IADL performance   OT Problem List-Impairments impacting ADL problems related to;activity tolerance impaired;strength;post-surgical precautions   Assessment of Occupational Performance 3-5 Performance Deficits   Identified Performance Deficits dressing, bathing, functional mobility, work   Planned Therapy Interventions (OT) ADL retraining;IADL retraining;balance training;cognition;fine motor coordination training;stretching;strengthening;transfer training;home program guidelines;progressive activity/exercise;risk factor education   Intervention Comments education in sternal  precautions during ADL performance   Clinical Decision Making Complexity (OT) moderate complexity   Therapy Frequency (OT) 3x/week  (Will increase frequency when pt more alert)   Predicted Duration of Therapy x2 weeks   Anticipated Equipment Needs Upon Discharge (OT)   (TBD)   Risk & Benefits of therapy have been explained evaluation/treatment results reviewed;care plan/treatment goals reviewed;risks/benefits reviewed;current/potential barriers reviewed;participants voiced agreement with care plan;participants included;spouse/significant other   Comment-Clinical Impression Pt presents today well below baseline for ADL performance, currently intubated/sedated, medical team focused on reducing need for trach. OT/CR will follow at low frequency to prevent ROM loss, will increase frequency when pt alert and following commands.   OT Discharge Planning    OT Discharge Recommendation (DC Rec) Transitional Care Facility   OT Rationale for DC Rec Pt intubated/sedated, not medically appropriate for discharge at this time.   OT Brief overview of current status  OT lift, Ax2-3   Total Evaluation Time (Minutes)   Total Evaluation Time (Minutes) 5       Care Management Follow Up    Length of Stay (days): 13    Expected Discharge Date:       Concerns to be Addressed: Insurance Information       Additional Information:  RNCC received message from patients insurance [employer] with the following information    Intake assisting with discharge needs: [phone]  Marilyn POWELL: [phone]  Benefit checks: [phone]      CARINA Romero RN, BSN  5B RN Care Coordinator  [phone] phone  [phone] pager    Weekend or Holiday Care Coordinator [phone] pager  Job Code [id number]      CV ICU PROGRESS NOTE  02/11/2022      CO-MORBIDITIES:   Aortic root aneurysm (H)    ASSESSMENT: Chuy Varner is a 31 year old male with a PMHx s/f bicuspid aortic valve, thoracic aortic aneurysm without rupture, hypertension,  obesity, and testicular cancer s/p orchiectomy who underwent Bentall procedure with mechanical valve on 1/28 with Dr. Velasco and Dr. Hannah. Course complicated by postop hypoxemic respiratory failure.    TODAY'S PROGRESS:   - Wean FiO2 and PEEP as able  - 40 Lasix BID  - RAAS 0 -1  - Increase Coreg 25 BID  - Up to chair  - PS  - PO hydralazine 50 q6h --> can increase to 75 this evening if not meeting goals    PLAN:  Neuro/ pain/ sedation:  # Acute Postoperative pain  - Monitor neurological status. Notify the MD for any acute changes in exam.  - Pain: fentanyl gtt. Scheduled tylenol. PRN tylenol, oxycodone, robaxin  - Sedation: propofol gtt  - RAAS -0 to -1     Pulmonary care:   # Postoperative ventilation management  # Acute hypoxemic respiratory failure ARDS vs. PNA vs. Hypervolemia vs. TRALI  COVID-19.Mycoplasma, Parainfluenza and Influenza Negative. CTA negative for PE on 1/30.  WBC 23.0 2/4 (14.9 the day before). CXR, respiratory panel, and BCs ordered.  CXR not consistent with ARDS. Likely element of severe V/Q mismatch and atelectasis.  Sudden drop in sats overnight 2/8 requiring FiO2 80%  - Follow BCs and respiratory panel (2/4)  - Pulmonology saw 2/2, appreciate recs   > prone (14 to 18 hrs per day)   > PEEP 14   > may need to increase PEEP slightly when supine   > as he wakes up more, may benefit from volumes closer to 600 ml for comfort and synchrony (8 ml/kg of his IBW), for now we set it to 530   > pulmonary toilet ie QID duonebs and percussive therapy ie metanebs if possible (may be difficult with recent chest surgery)   > agree with borad antibiotics to treat pneumonia per the CV ICU team  - Titrate supplemental oxygen to maintain saturation above 90%  - Up to chair today    Cardiovascular:    # Ascending aortic aneurysm s/p Bentall w/ [place] valve on 1/28 with Dr. Velasco and Dr. Hannah  # Bicuspid aortic valve  # History of thoracic aortic aneurysm without rupture  # History of hypertension  Recent  echo on 10/26/21 with LVEF of 60-65%.  CT vascular 12/13/21 showing proximal ascending aorta is severely dilated (55 x 55mm in maximum dimension) and likely bicuspid aortic valve.  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  CT PE 1/30 - negative for acute PE, marked dependent atelectasis R>L.  - Monitor hemodynamic status  - Goal MAP>65, SBP <130  - Statin hold  - ASA 81  - Holding PTA meds: metoprolol succinate 25mg daily  - Increase Coreg 25 BID  - PO Hydralazine 50 q6h  - Labetalol and hydralazine PRN to maintain SBP <130     GI care/ Nutrition:   # Obesity (BMI 46)  - [place], advance to goal  - PPI  - Continue bowel regimen: miralax, senna    Renal/ Fluid Balance/ Electrolytes:   BL creat appears to be ~ 1.0  - Strict I/O, daily weights  - Lasix 40 q12h, goal net negative 1L  - Avoid/limit nephrotoxins as able  - Replete lytes PRN per protocol  - Scheduled potassium replacement     Endocrine:    # Stress induced hyperglycemia, improving  - HDSSI  - Lantus 30  - Goal BG <180 for optimal healing     ID/ Antibiotics:  # Stress induced leukocytosis, resolved  # Possible ventilator associated pneumonia  Febrile and pan-cultured on 1/30/22. Covid pcr, respiratory panel, and respiratory cultures negative 1/30. Re-cultured 1/31: respiratory and blood cultures NGTD. Re-cultured again 2/4 when WBC increased to 23.  - Follow respiratory panel and BC results 2/4  - Continue to monitor fever curve, WBC and inflammatory markers  - Meropenem x7 days - completed 2/7  - Consult ID 2/4: send C dif, blood cultures, repeat COVID  - CT C/A/P and sinuses   - Remove A line and central line  - Augmentin PO if spiking fever for possible sinusitis     Heme/Onc:     # Stress induced leukocytosis  # Acute blood loss anemia  # Acute blood loss thrombocytopenia, resolved  # History of  testicular cancer s/p orchiectomy  # RLE DVT  No s/sx active bleeding  - Continue to monitor  - CBC daily  - Heparin resistant  - 4T is 4 - HIT panel, transition to Bival, b/l LE duplex US with RLE occlusive thrombus at gastrocnemius. Had brief episode of hypoxia today, can consider CT PE if acute change in oxygenation.     MSK/ Skin:  # Sternotomy  # Surgical Incision  - Sternal precautions  - Postoperative incision management per protocol  - PT/OT/CR     Prophylaxis:    - Mechanical prophylaxis for DVT  - Chemical DVT prophylaxis- bival  - PPI     Lines/ tubes/ drains:  - Left PIV (1/28)  - Godfrey (2/10)  - OG (1/28)  - Nasoduodenal (1/31)  - ETT (1/28)     Disposition:  - CVICU    Patient seen, findings and plan discussed with CV ICU staff, Dr. Rosado and CVTS staff Dr. Velasco.    Tom Adair MD  Anesthesiology PGY3    ====================================    SUBJECTIVE:   NAEO. Some brief self resolved hypoxia this a.m. when moving to bed this a.m.    OBJECTIVE:   1. VITAL SIGNS:   Temp:  [98.5  F (36.9  C)-101.6  F (38.7  C)] 99.9  F (37.7  C)  Pulse:  [] 122  Resp:  [15-28] 24  BP: (101-155)/(48-85) 128/67  FiO2 (%):  [40 %-100 %] 70 %  SpO2:  [87 %-98 %] 95 %  Ventilation Mode: CMV/AC  (Continuous Mandatory Ventilation/ Assist Control)  FiO2 (%): 70 %  Rate Set (breaths/minute): 12 breaths/min  Tidal Volume Set (mL): 680 mL  PEEP (cm H2O): 10 cmH2O  Pressure Support (cm H2O): 5 cmH2O  Oxygen Concentration (%): 70 %  Resp: 24      2. INTAKE/ OUTPUT:   I/O last 3 completed shifts:  In: 3623.73 [I.V.:1621.73; NG/GT:682]  Out: 5800 [Urine:5175; Emesis/NG output:225; Stool:400]    3. PHYSICAL EXAMINATION:   General: Sedated, intubated  Neuro: Sedated, following commands when weaned.  Resp: Intubated, CTA bilaterally  CV: RRR  Abdomen: Soft, Non-distended  Incisions: C/D/I   Extremities: warm and well perfused, SCDs in place    4. INVESTIGATIONS:   Arterial Blood Gases   Recent Labs   Lab  02/10/22  0828 02/10/22  0436 02/09/22  2242 02/09/22  1239   PH 7.38 7.43 7.41 7.37   PCO2 50* 44 48* 49*   PO2 86 103 55* 84   HCO3 30* 29* 30* 28     Complete Blood Count   Recent Labs   Lab 02/11/22  0512 02/10/22  0548 02/09/22 0333 02/08/22 0415   WBC 19.7* 16.7* 18.6* 17.8*   HGB 9.8* 9.2* 9.5* 9.3*    138* 220 246     Basic Metabolic Panel  Recent Labs   Lab 02/11/22  0759 02/11/22  0512 02/11/22  0432 02/10/22  2351 02/10/22  2049 02/10/22  0817 02/10/22  0548 02/09/22  2100 02/09/22  1752 02/09/22 0431 02/09/22  0333   NA  --  141  --   --   --   --  138  --  139  --  141  141   POTASSIUM  --  3.7  --   --  3.9  --  3.8  --  4.7  --  3.9  3.9   CHLORIDE  --  106  --   --   --   --  104  --  106  --  106  106   CO2  --  28  --   --   --   --  27  --  28  --  27  27   BUN  --  69*  --   --   --   --  66*  --  50*  --  52*  52*   CR  --  0.84  --   --   --   --  1.02  --  0.78  --  0.75  0.75   * 168* 160* 140*  --    < > 144*   < > 151*   < > 123*  123*    < > = values in this interval not displayed.     Liver Function Tests  Recent Labs   Lab 02/11/22  0512 02/10/22  0548 02/09/22  0333 02/08/22  0415   AST 20 21 32 46*   ALT 74* 89* 122* 141*   ALKPHOS 79 75 79 80   BILITOTAL 0.4 0.5 0.4 0.4   ALBUMIN 2.8* 2.6* 2.7* 2.5*     Pancreatic Enzymes  No lab results found in last 7 days.  Coagulation Profile  Recent Labs   Lab 02/11/22  0512 02/10/22  1354   PTT 74* 57*       Attestation signed by Samanta Rosado MD at 2/12/2022  3:39 PM:  Physician Attestation   Chuy was seen and evaluated by me on 2/11/22.  He was in critical condition as the result of acute hypoxic respiratory failure.    His condition is now Critical.      Supportive interventions provided and/or ordered by me include reviewing vital signs, laboratory, hemodynamic and imaging data, adjusting life saving medications and serially assessing the patient, managing airway and vent    I agree with the resident/fellow's  assessment and plan of care.     Total Critical Care time spent by me, excluding procedures, was  50 minutes    Samanta Rosado MD    WO Nurse Inpatient Pressure Injury Assessment   Reason for consultation: Evaluate and treat nasal pressure injury       ASSESSMENT  Pressure Injury: on columbella bilateral nares  , hospital acquired ,   This is a Medical Device Related Pressure Injury (MDRPI) due to Bridle string  Pressure Injury is Stage 2  And mucosal in bilateral nares   Contributing factor of the pressure injury: pressure, immobility and moisture  Status: improving       TREATMENT PLAN  Septum: BID cleanse with saline and apply a thin layer of vaseline over scab. Make sure there is no tension on bridle string.  If needing to prone again, place mepilex lite and optifoam over area   Punch out area in zflow pillow to accommodate medical devices      Orders Written     WO Nurse follow-up plan:twice weekly  Nursing to notify the Provider(s) and re-consult the WO Nurse if wound(s) deteriorates or new skin concern.    Patient History  According to provider note(s):  Acute Hypoxemic Respiratory Failure due to Shunt Physiology  # Pulmonary Shunting from Bilateral Lower Lobe Atelectasis vs. Pneumonia     Chuy Varner is a 31 year old male with PMH of likely bicuspid aortic valve, thoracic aortic aneurysm without rupture, hypertension, obesity, and testicular cancer s/p orchiectomy who underwent Bentall procedure with mechanical valve on 1/28 with Dr. Velasco ad Dr. Hannah. Patient was admitted to CVICU for post-operative management; post-op course complicated by acute hypoxic respiratory failure despite PEEP titration.     Objective Data  Containment of urine/stool: Indwelling catheter and Internal fecal management    Current Diet/ Nutrition:  Orders Placed This Encounter      NPO for Medical/Clinical Reasons Except for: Other; Specify: NPO until Extubated      Output:   I/O last 3 completed shifts:  In: 3623.73  [I.V.:1621.73; NG/GT:682]  Out: 5800 [Urine:5175; Emesis/NG output:225; Stool:400]    Risk Assessment:   Sensory Perception: 2-->very limited  Moisture: 3-->occasionally moist  Activity: 2-->chairfast  Mobility: 2-->very limited  Nutrition: 3-->adequate  Friction and Shear: 2-->potential problem  Ben Score: 14      Labs:   Recent Labs   Lab 02/11/22  0512 02/09/22  0333 02/08/22  0415   ALBUMIN 2.8*   < > 2.5*   HGB 9.8*   < > 9.3*   WBC 19.7*   < > 17.8*   CRP  --   --  86.0*    < > = values in this interval not displayed.       Physical Exam  Skin inspection: focused septum/columbella/bilateral nares           Date of last Photo 2/4 and 2/11  Wound History: proned  Measurements (length x width x depth, in cm) 0.5 cm x 1 cm  x  0.1 cm   Wound Base: 100% superficial scab   Palpation of the wound bed: normal   Periwound skin: intact  Color: normal and consistent with surrounding tissue  Temperature: normal   Drainage:, none  Description of drainage: none  Odor: none  Pain: unable to assess due to  sedation ,     Interventions  Current support surface: Standard  ICU mattress  Current off-loading measures: Heel off-loading boot(s) and Pillows  Repositioning aid: Z-jimi positioner(s) and Pillows  Visual inspection of wound(s) completed   Tube Securement: as per protocol  Wound Care: was not done per plan of care.  Supplies: floor stock  Educated provided: importance of repositioning, plan of care, Hygiene and Off-loading pressure  Education provided to: spouse and nurse  Discussed importance of:off-loading pressure to wound  Discussed plan of care with Family and Nurse    Yancy Carter RN CWOCN       CV ICU PROGRESS NOTE  02/12/2022      CO-MORBIDITIES:   Aortic root aneurysm (H)    ASSESSMENT: Chuy Varner is a 31 year old male with a PMHx s/f bicuspid aortic valve, thoracic aortic aneurysm without rupture, hypertension, obesity, and testicular cancer s/p orchiectomy who underwent Bentall procedure with  mechanical valve on 1/28 with Dr. Velasco and Dr. Hannah. Course complicated by postop hypoxemic respiratory failure.    TODAY'S PROGRESS:   - Encourage UOOB activity  - Increase Coreg  - Increase lantus dosing  - Aggressively treat hypokalemia  - Pan culture + c dif, new durant for increasing leukocytosis     PLAN:  Neuro/ pain/ sedation:  # Acute Postoperative pain  - Monitor neurological status. Notify the MD for any acute changes in exam.  - Pain: fentanyl gtt. Scheduled tylenol. PRN tylenol, oxycodone, robaxin   - Sedation: propofol gtt (currently off)  - RAAS -0 to -1  - denies pain, continue laura + prns      Pulmonary care:   # Postoperative ventilation management  # Acute hypoxemic respiratory failure ARDS vs. PNA vs. Hypervolemia vs. TRALI  COVID-19.Mycoplasma, Parainfluenza and Influenza Negative. CTA negative for PE on 1/30.  CXR not consistent with ARDS. Likely element of severe V/Q mismatch and atelectasis.  Sudden drop in sats overnight 2/8 requiring FiO2 80%  - Follow BCs and respiratory panel (2/4)  - Pulmonology saw 2/2, appreciate recs   > prone (14 to 18 hrs per day)   > PEEP 14   > may need to increase PEEP slightly when supine  - Titrate supplemental oxygen to maintain saturation above 90%  - Up to chair today. Aggressive pulm weaning, consideration for trach early next week if not liberated in the coming days     Cardiovascular:    # Ascending aortic aneurysm s/p Bentall w/ [place] valve on 1/28 with Dr. Velasco and Dr. Hannah  # Bicuspid aortic valve  # History of thoracic aortic aneurysm without rupture  # History of hypertension  Recent echo on 10/26/21 with LVEF of 60-65%.  CT vascular 12/13/21 showing proximal ascending aorta is severely dilated (55 x 55mm in maximum dimension) and likely bicuspid aortic valve.  TTE 10/26/21 showing dilation of the arotic root at the sinus of Valsalva (4.3cm with an index of 1.42 cm/m2) and dilated proximal ascending aorta is dilated, measuring 4.6cm with an  index of 1.52cm/m2.  CT PE 1/30 - negative for acute PE, marked dependent atelectasis R>L.  - Monitor hemodynamic status  - Goal MAP>65, SBP <130  - Statin hold  - ASA 81  - Holding PTA meds: metoprolol succinate 25mg daily  - Increase Coreg 25 BID increase to 50 BID   - PO Hydralazine 50 q6h  - Labetalol and hydralazine PRN to maintain SBP <130     GI care/ Nutrition:   # Obesity (BMI 46)  - [place], advance to goal  - PPI  - Continue bowel regimen: miralax, senna    Renal/ Fluid Balance/ Electrolytes:   BL creat appears to be ~ 1.0  - Strict I/O, daily weights  - no additional lasix today   - Avoid/limit nephrotoxins as able  - Replete lytes PRN per protocol  - Scheduled potassium replacement  - additional 60 po potassium      Endocrine:    # Stress induced hyperglycemia, improving  - HDSSI  - Lantus 30 increase to 40mg daily  - Goal BG <180 for optimal healing     ID/ Antibiotics:  # Stress induced leukocytosis, resolved  # Possible ventilator associated pneumonia  Febrile and pan-cultured on 1/30/22. Covid pcr, respiratory panel, and respiratory cultures negative 1/30. Re-cultured 1/31: respiratory and blood cultures NGTD. Re-cultured again 2/4 when WBC increased to 23.  - Continue to monitor fever curve, WBC and inflammatory markers  - Meropenem x7 days - completed 2/7  - Consult ID 2/4: send C dif, blood cultures, repeat COVID  - CT C/A/P and sinuses 2/11  - Augmentin PO for sinusititis  - Fevers + increasing leukocytosis. add lactate, pan culture, new durant,  c dif pending.      Heme/Onc:     # Stress induced leukocytosis  # Acute blood loss anemia  # Acute blood loss thrombocytopenia, resolved  # History of testicular cancer s/p orchiectomy  # RLE DVT  No s/sx active bleeding  - Continue to monitor  - CBC daily  - Heparin resistant  - Bivalirudin gtt  - 4T is 4 - HIT panel, transition to Bival, b/l LE duplex US with RLE occlusive thrombus at gastrocnemius. Had brief episode of hypoxia today, can consider CT PE if  acute change in oxygenation.     MSK/ Skin:  # Sternotomy  # Surgical Incision  - Sternal precautions  - Postoperative incision management per protocol  - PT/OT/CR  - pressure wound pending WOC evaluation      Prophylaxis:    - Mechanical prophylaxis for DVT  - Chemical DVT prophylaxis- bival  - PPI     Lines/ tubes/ drains:  - Left PIV (1/28)  - Godfrey   - OG (1/28)  - Nasoduodenal (1/31)  - ETT (1/28)     Disposition:  - [facility]    Patient seen, findings and plan discussed with CV ICU staff, Dr. Rosado and CVTS staff Dr. Velasco.    Nakita Weeks MD   General Surgery PGY3  [phone]      ====================================    SUBJECTIVE:   Up to chair this morning, more alert. Appears to be tracking. Girlfriend reports he was interacting with her earlier in morning.     OBJECTIVE:   1. VITAL SIGNS:   Temp:  [98.8  F (37.1  C)-102  F (38.9  C)] 102  F (38.9  C)  Pulse:  [] 106  Resp:  [17-25] 22  BP: ()/(44-75) 128/63  FiO2 (%):  [45 %-50 %] 45 %  SpO2:  [91 %-95 %] 93 %  Ventilation Mode: CMV/AC  (Continuous Mandatory Ventilation/ Assist Control)  FiO2 (%): 45 %  Rate Set (breaths/minute): 12 breaths/min  Tidal Volume Set (mL): 680 mL  PEEP (cm H2O): 10 cmH2O  Pressure Support (cm H2O): 8 cmH2O  Oxygen Concentration (%): 45 %  Resp: 22      2. INTAKE/ OUTPUT:   I/O last 3 completed shifts:  In: 3364.18 [I.V.:1332.18; NG/GT:767]  Out: 3898 [Urine:3373; Emesis/NG output:325; Stool:200]    3. PHYSICAL EXAMINATION:   General: Up to chair, comfortable  Neuro: following commands when weaned.  Resp: Intubated, CTA bilaterally  CV: RRR  Abdomen: Soft, Non-distended  Incisions: C/D/I   Extremities: warm and well perfused, SCDs in place    4. INVESTIGATIONS:   Arterial Blood Gases   Recent Labs   Lab 02/12/22  0434 02/10/22  0828 02/10/22  0436 02/09/22  2242   PH 7.44 7.38 7.43 7.41   PCO2 45 50* 44 48*   PO2 74* 86 103 55*   HCO3 30* 30* 29* 30*     Complete Blood Count   Recent Labs   Lab  02/12/22 0456 02/11/22  0512 02/10/22  0548 02/09/22  0333   WBC 30.1* 19.7* 16.7* 18.6*   HGB 9.3* 9.8* 9.2* 9.5*    166 138* 220     Basic Metabolic Panel  Recent Labs   Lab 02/12/22  0846 02/12/22  0456 02/12/22  0426 02/12/22  0026 02/11/22  0759 02/11/22  0512 02/10/22  2351 02/10/22  2049 02/10/22  0817 02/10/22  0548 02/09/22  2100 02/09/22  1752   NA  --  146*  --   --   --  141  --   --   --  138  --  139   POTASSIUM  --  3.1*  --   --   --  3.7  --  3.9  --  3.8  --  4.7   CHLORIDE  --  110*  --   --   --  106  --   --   --  104  --  106   CO2  --  27  --   --   --  28  --   --   --  27  --  28   BUN  --  92*  --   --   --  69*  --   --   --  66*  --  50*   CR  --  1.22  --   --   --  0.84  --   --   --  1.02  --  0.78   * 179* 171* 169*   < > 168*   < >  --    < > 144*   < > 151*    < > = values in this interval not displayed.     Liver Function Tests  Recent Labs   Lab 02/12/22  0456 02/11/22  0512 02/10/22  0548 02/09/22  0333   AST 31 20 21 32   ALT 71* 74* 89* 122*   ALKPHOS 77 79 75 79   BILITOTAL 0.6 0.4 0.5 0.4   ALBUMIN 2.8* 2.8* 2.6* 2.7*     Pancreatic Enzymes  No lab results found in last 7 days.  Coagulation Profile  Recent Labs   Lab 02/12/22 0456 02/11/22  0512 02/10/22  1354   PTT 41* 74* 57*       Attestation signed by Samanta Rosado MD at 2/12/2022  3:36 PM:  Physician Attestation   Chuy was seen and evaluated by me on 02/12/22.  He was in critical condition as the result of acute hypoxic respiratory failure.    His condition is now Critical.      Supportive interventions provided and/or ordered by me include reviewing vital signs, laboratory, hemodynamic and imaging data, adjusting life saving medications and serially assessing the patient, managing airway and vent    Today is the first day that Chuy is significantly more alert which his girlfriend also endorses. Eyes open, looking at me and others. Partially following simple commands. Also with significantly  reduced O2 requirements and doing well on pressure support. His significant bibasilar atelectasis with good aeration of the remaining portions of the lung aren't c/w ARDS rather shunting physiology. Sitting in the chair has much improved this as expected. He has been diuresing well which has also helped. His bicarb is not surprisingly creeping up from contraction alkalosis and we will give him diamox today so that he might more easily be liberated from the vent. If he cannot be extubated tomorrow we will go forward with tracheostomy which I suspect will allow him to much more easily and rapidly weaned from the vent.    The second problem is ongoing fevers. He does have a positive HIT screen with positive DVTs and the clot burden may explain his fevers and leukocytosis. He completed a course of  broad specturm antibiotics several days ago. He has had a CT scan of sinuses, chest, belly and pelvis with only possible sinusitis as a source. He is getting augmentin for this given his hemodynamic stability. Cdiff 2/5 was negative. We will send again today. Also, due for covid swab. We will continue to watch.       Total Critical Care time spent by me, excluding procedures, was  60 minutes    Samanta Rosado MD      CV ICU PROGRESS NOTE  02/13/2022      CO-MORBIDITIES:   Aortic root aneurysm (H)    ASSESSMENT: Chuy Varner is a 31 year old male with a PMHx s/f bicuspid aortic valve, thoracic aortic aneurysm without rupture, hypertension, obesity, and testicular cancer s/p orchiectomy who underwent Bentall procedure with mechanical valve on 1/28 with Dr. Velasco and Dr. Hannah. Course complicated by postop hypoxemic respiratory failure.    TODAY'S PROGRESS:   - Add trazadone, melatonin for sleep hygiene  - Decrease hydralazine to TID frequency   - Increase lantus to 50mg daily     PLAN:  Neuro/ pain/ sedation:  # Acute Postoperative pain  - Monitor neurological status. Notify the MD for any acute changes in exam.  -  Pain: fentanyl gtt. Scheduled tylenol. PRN tylenol, oxycodone, robaxin   - Sedation: propofol gtt (currently off)  - RAAS -0 to -1  - denies pain, continue laura + prns   - melatonin trazadone qhs     Pulmonary care:   # Postoperative ventilation management  # Acute hypoxemic respiratory failure ARDS vs. PNA vs. Hypervolemia vs. TRALI  COVID-19.Mycoplasma, Parainfluenza and Influenza Negative. CTA negative for PE on 1/30.  CXR not consistent with ARDS. Likely element of severe V/Q mismatch and atelectasis.  Sudden drop in sats overnight 2/8 requiring FiO2 80%  - Follow BCs and respiratory panel (2/4)  - Pulmonology saw 2/2, appreciate recs   > prone (14 to 18 hrs per day)   > PEEP 14   > may need to increase PEEP slightly when supine  - Titrate supplemental oxygen to maintain saturation above 90%  - Up to chair today. Aggressive pulm weaning, consideration for trach early next week    Cardiovascular:    # Ascending aortic aneurysm s/p Bentall w/ [place] valve on 1/28 with Dr. Velasco and Dr. Hannah  # Bicuspid aortic valve  # History of thoracic aortic aneurysm without rupture  # History of hypertension  Recent echo on 10/26/21 with LVEF of 60-65%.  CT vascular 12/13/21 showing proximal ascending aorta is severely dilated (55 x 55mm in maximum dimension) and likely bicuspid aortic valve.  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  CT PE 1/30 - negative for acute PE, marked dependent atelectasis R>L.  - Monitor hemodynamic status  - Goal MAP>65, SBP <130  - Statin hold  - ASA 81  - Holding PTA meds: metoprolol succinate 25mg daily  - Increase Coreg 25 BID increase to 50 BID   - PO Hydralazine 50 q6h  - Labetalol and hydralazine PRN to maintain SBP <130  - some hypotension with meds   - decrease hydralazine to TID      GI care/ Nutrition:   # Obesity (BMI 46)  -  NJ, advance to goal  - PPI  - Continue bowel regimen: miralax, senna    Renal/ Fluid Balance/ Electrolytes:   BL creat appears to be ~ 1.0  - Strict I/O, daily weights  - no additional lasix today   - Avoid/limit nephrotoxins as able  - Replete lytes PRN per protocol  - Scheduled potassium replacement     Endocrine:    # Stress induced hyperglycemia, improving  - HDSSI  - Lantus increase to 50mg daily   - Goal BG <180 for optimal healing     ID/ Antibiotics:  # Stress induced leukocytosis, resolved  # Possible ventilator associated pneumonia  Febrile and pan-cultured on 1/30/22. Covid pcr, respiratory panel, and respiratory cultures negative 1/30. Re-cultured 1/31: respiratory and blood cultures NGTD. Re-cultured again 2/4 when WBC increased to 23.  - Continue to monitor fever curve, WBC and inflammatory markers  - Meropenem x7 days - completed 2/7  - Consult ID 2/4: send C dif, blood cultures, repeat COVID  - CT C/A/P and sinuses 2/11  - Augmentin PO for sinusititis would cover UA+  - Fevers + increasing leukocytosis.  - Will discuss with ID any additional recommendations      Heme/Onc:     # Stress induced leukocytosis  # Acute blood loss anemia  # Acute blood loss thrombocytopenia, resolved  # History of testicular cancer s/p orchiectomy  # RLE DVT  # HIIT positive   No s/sx active bleeding  - Continue to monitor  - CBC daily  - Heparin resistant  - Bivalirudin gtt  - 4T is 4 - HIT panel, transition to Bival, b/l LE duplex US with RLE occlusive thrombus at gastrocnemius.     MSK/ Skin:  # Sternotomy  # Surgical Incision  # Buttock pressure injury   - Sternal precautions  - Postoperative incision management per protocol  - PT/OT/CR  - WOCN consulted for pressure injury      Prophylaxis:    - Mechanical prophylaxis for DVT  - Chemical DVT prophylaxis- bival  - PPI     Lines/ tubes/ drains:  - Left PIV (1/28)  - Godfrey   - OG (1/28)  - Nasoduodenal (1/31)  - ETT (1/28)  - Rectal tube      Disposition:  -  [facility]ICU    Patient seen, findings and plan discussed with CV ICU staff, Dr. Rosado and CVTS staff Dr. Velasco.    Nakita Weeks MD   General Surgery PGY3  [phone]      ====================================    SUBJECTIVE:   Some increased swelling/edema to RLE, does not appear to be causing any more pain. Continues to be febrile.     OBJECTIVE:   1. VITAL SIGNS:   Temp:  [101.2  F (38.4  C)-102  F (38.9  C)] 101.8  F (38.8  C)  Pulse:  [] 98  Resp:  [15-29] 20  BP: ()/(49-73) 98/54  FiO2 (%):  [45 %-60 %] 55 %  SpO2:  [89 %-95 %] 94 %  Ventilation Mode: (S) CPAP/PS  (Continuous positive airway pressure with Pressure Support)  FiO2 (%): 55 %  Rate Set (breaths/minute): 12 breaths/min  Tidal Volume Set (mL): 680 mL  PEEP (cm H2O): 8 cmH2O  Pressure Support (cm H2O): 8 cmH2O  Oxygen Concentration (%): 55 %  Resp: 20      2. INTAKE/ OUTPUT:   I/O last 3 completed shifts:  In: 3081.6 [I.V.:726.6; NG/GT:1035]  Out: 5290 [Urine:4460; Emesis/NG output:130; Stool:700]    3. PHYSICAL EXAMINATION:   General: Up to chair, comfortable. Tracking conversation with eyes  Neuro: following commands  Resp: Intubated, CTA bilaterally  CV: RRR  Abdomen: Soft, Non-distended  Incisions: C/D/I   Extremities: warm and well perfused, SCDs in place    4. INVESTIGATIONS:   Arterial Blood Gases   Recent Labs   Lab 02/12/22  0434 02/10/22  0828 02/10/22  0436 02/09/22  2242   PH 7.44 7.38 7.43 7.41   PCO2 45 50* 44 48*   PO2 74* 86 103 55*   HCO3 30* 30* 29* 30*     Complete Blood Count   Recent Labs   Lab 02/13/22  0532 02/12/22  0456 02/11/22  0512 02/10/22  0548   WBC 28.7* 30.1* 19.7* 16.7*   HGB 9.1* 9.3* 9.8* 9.2*    184 166 138*     Basic Metabolic Panel  Recent Labs   Lab 02/13/22  0758 02/13/22  0532 02/13/22  0350 02/13/22  0004 02/12/22  2002 02/12/22  1716 02/12/22  1312 02/12/22  1026 02/12/22  0846 02/12/22  0456 02/11/22  0759 02/11/22  0512 02/10/22  0817 02/10/22  0548   NA  --  152*  --   --   --   --    --   --   --  146*  --  141  --  138   POTASSIUM  --  3.5  --   --   --  3.9  --  3.5  --  3.1*  --  3.7   < > 3.8   CHLORIDE  --  116*  --   --   --   --   --   --   --  110*  --  106  --  104   CO2  --  28  --   --   --   --   --   --   --  27  --  28  --  27   BUN  --  114*  --   --   --   --   --   --   --  92*  --  69*  --  66*   CR  --  1.39*  --   --   --   --   --   --   --  1.22  --  0.84  --  1.02   * 189* 185* 168*   < >  --    < >  --    < > 179*   < > 168*   < > 144*    < > = values in this interval not displayed.     Liver Function Tests  Recent Labs   Lab 02/13/22  0532 02/12/22  0456 02/11/22  0512 02/10/22  0548   AST 39 31 20 21   ALT 79* 71* 74* 89*   ALKPHOS 76 77 79 75   BILITOTAL 0.7 0.6 0.4 0.5   ALBUMIN 2.8* 2.8* 2.8* 2.6*     Pancreatic Enzymes  No lab results found in last 7 days.  Coagulation Profile  Recent Labs   Lab 02/13/22  0532 02/12/22 2031 02/12/22 0456 02/11/22  0512   PTT 47* 74* 41* 74*       Attestation signed by Samanta Rosado MD at 2/14/2022  4:44 PM:  Physician Attestation   Chuy was seen and evaluated by me on 2/13/22.  He was in critical condition as the result of acute hypoxic respiratory failure.    His condition is now Critical.      Supportive interventions provided and/or ordered by me include reviewing vital signs, laboratory, hemodynamic and imaging data, adjusting life saving medications and serially assessing the patient, managing airway and vent    I agree with the resident/fellow's assessment and plan of care.    Please see yesterday's attestation for details of ongoing problems. In short, continuing out of bed to chair has significantly improved patient's oxygenation and allowing for  decreased sedation. Mechanics of breathing are still a bit concerning for extubation today and he seems generally weak. His mental status is also not conducive to extubation today. Trach tomorrow or Tuesday. He will likely do very well given his ability to do  pressure support trials for the past several days.     Total Critical Care time spent by me, excluding procedures, was  40  minutes    Samanta Rosado MD      CV ICU PROGRESS NOTE  February 14, 2022      CO-MORBIDITIES:   Aortic root aneurysm (H)    ASSESSMENT: Chuy Varner is a 31 year old male with a PMHx s/f bicuspid aortic valve, thoracic aortic aneurysm without rupture, hypertension, obesity, and testicular cancer s/p orchiectomy who underwent Bentall procedure with mechanical valve on 1/28 with Dr. Velasco and Dr. Hannah. Course complicated by postop hypoxemic respiratory failure, HIT positivity, remains intubated, now minimally sedated, off pressors.    TODAY'S PROGRESS:   - Extubation trial today   - pulling 600+ mL TV, adequate RSBI on pressure support, squeezing hand to commands  - Repleting free water deficit via NJ   - but holding feeds for extubation as above  - Patient's partner noted a rash overnight, resolved with benadryl, vanc + zosyn had been started prophylactically by night team, discontinue this AM    PLAN:  Neuro/ pain/ sedation:  # Acute Postoperative pain  - Monitor neurological status. Notify the MD for any acute changes in exam.  - Pain: well controlled on current regimen   - Scheduled tylenol   - PRN tylenol, dilaudid, robaxin, oxycodone  - Sedation: none  - RASS goal 0 to -1  - Sleep hygiene:   - melatonin trazadone qhs     Pulmonary care:   # Postoperative ventilation management  # Acute hypoxemic respiratory failure (ARDS vs. PNA vs. Hypervolemia vs. TRALI vs. Severe atelectasis)  COVID-19, Mycoplasma, Parainfluenza, and Influenza Negative. CTA negative for PE on 1/30.  CXR not consistent with ARDS. Likely element of severe V/Q mismatch and atelectasis.  Sudden drop in sats overnight on 2/8 requiring FiO2 80%.  - Possibly c/b PE given acute FiO2 increase, HIT positivity   - remains on bivalirudin  - Follow BCs and respiratory panels (2/12)   - Growing staph epi in blood and  sputum   - Only 1/2 bottles of blood, on one side -- likely contaminant  - Titrate supplemental oxygen to maintain saturation above 90%  - Extubation trial 02/14/22 as above     Cardiovascular:    # Ascending aortic aneurysm s/p Bentall w/ [place] valve on 1/28 with Dr. Velasco and Dr. Hannah  # Bicuspid aortic valve  # History of thoracic aortic aneurysm without rupture  # History of hypertension  Recent echo on 10/26/21 with LVEF of 60-65%.  Post-op echo with LVEF >60%  CT PE 1/30 - negative for acute PE, marked dependent atelectasis R>L, but since found to have DVT, and HIT +  - Goal MAP>65, SBP <130  - ASA 81mg  - Holding PTA meds: metoprolol succinate 25mg daily  - Management of HTN   - Increase Coreg maxed at 50 BID   - Hydralazine 75 mg q8   - Labetalol and hydralazine PRN to maintain SBP <130     GI / Nutrition:   # Obesity (BMI 46)  # Nutrition  - NJ feeds at goal   - holding for extubation  - PPI  - Continue bowel regimen: miralax, senna  # Elevated ALT  - Newly elevated ALT, but ALP and AST remain normal, CT liver on 02/09 was unremarkable, although stones were noted int he gallbladder (no cholecystitis)  - Monitor LFTs every day for now    Renal/ Fluid Balance/ Electrolytes:   BL creat appears to be ~ 1.0  - Strict I/O, daily weights  - Avoid/limit nephrotoxins as able  - Replete lytes PRN per protocol   - Scheduled potassium replacement (repleted potassium 40 mEq this AM post-BMP)  # Hypernatremia  Free water deficit 11.7 L (02/14)  - correct 200 free water q2 via NJ tube   - hold for now in anticipation of extubation  - is not on medications/drips that could be switched to hypotonic solution     Endocrine:    # Stress induced hyperglycemia, improving  - HDSSI  - Lantus 50mg daily   - keep same, since he won't be NPO for too long before extubation trial this afternoon  - Goal BG <180 for optimal healing     ID/ Antibiotics:  # Stress induced leukocytosis, resolved  # Possible ventilator associated  pneumonia  Febrile and pan-cultured on 1/30/22. Pan-scanned 02/09 with sinuses. Found small fluid collection in sinus.  Covid pcr, respiratory panel, and respiratory cultures all negative 1/30.   Re-cultured 1/31: respiratory and blood cultures NGTD.   Re-cultured again 2/4 when WBC increased to 23. NGTD.  Re-cultured 02/12: 1 tube of staph epidermidis and respiratory culture on 02/10 with staph epidermidis (likely contaminant)  - Continue to monitor fever curve, WBC and inflammatory markers  - Meropenem x7 days - completed 2/7  - Consult ID 2/4: send C dif, blood cultures, repeat COVID   - COVID negative   - C. Diff negative   - cultures NGTD other than noted above  - CT C/A/P and sinuses 2/11   - Augmentin PO for sinusititis would cover UA+  - Fevers + increasing leukocytosis.   - Possible that fever worsened by untreated DVT d/t HIT +   - RLE has occluded popliteal vein, nonocclusive thrombus in femoral   - LLE is clear of thrombus  - Could consider re-consult to ID if fevers, leukocytosis reoccur     Heme/Onc:     # Stress induced leukocytosis  # Acute blood loss anemia  # Acute blood loss thrombocytopenia, resolved  # History of testicular cancer s/p orchiectomy  # RLE DVT  # HIT positive   No s/sx active bleeding. Has RLE DVT as above.  - Continue to monitor  - CBC daily  - Heparin resistant   - Bivalirudin gtt     MSK/ Skin:  # Sternotomy  # Surgical Incision  # Buttock pressure injury   - Sternal precautions  - Postoperative incision management per protocol  - PT/OT/CR  - WOCN consulted for pressure injury   - CTAP did not show stranding c/f infection    - has small sacral ulcer     Prophylaxis:    - Mechanical prophylaxis for DVT  - Chemical DVT prophylaxis- bivalirudin  - PPI  - HOB to 30 degrees     Lines/ tubes/ drains:  - Left PIV (1/28)  - Godfrey   - OG (1/28)  - Nasoduodenal (1/31)  - ETT (1/28)  - Rectal tube      Disposition:  - CVICU     Patient seen, findings and plan discussed with CV ICU  staff    Xander Garcia, MS4    Nakita Weeks MD   General Surgery PGY3  [phone]      ====================================    SUBJECTIVE:   When visiting room, patient was with eyes open, directing gaze. Was asked if he was experiencing pain, able to shake head 'no.'    OBJECTIVE:   1. VITAL SIGNS:   Temp:  [98.6  F (37  C)-101.8  F (38.8  C)] 100.5  F (38.1  C)  Pulse:  [] 99  Resp:  [18-25] 19  BP: ()/(49-68) 115/60  FiO2 (%):  [40 %-55 %] 40 %  SpO2:  [92 %-98 %] 93 %  Ventilation Mode: CMV/AC  (Continuous Mandatory Ventilation/ Assist Control)  FiO2 (%): 40 %  Rate Set (breaths/minute): 12 breaths/min  Tidal Volume Set (mL): 680 mL  PEEP (cm H2O): 8 cmH2O  Pressure Support (cm H2O): 8 cmH2O  Oxygen Concentration (%): 55 %  Resp: 19      2. INTAKE/ OUTPUT:   I/O last 3 completed shifts:  In: 3432.6 [I.V.:1237.6; NG/GT:875]  Out: 5240 [Urine:3890; Stool:1350]    3. PHYSICAL EXAMINATION:   General: intubated male patient seated in chair  Neuro: RASS 0 to -1, directs gaze, squeezes hand weakly to command, nods head yes and no, denies pain  Resp: Intubated, mechanically ventilated, pulling adequate tidal volumes with low RSBI on pressure support  CV: RRR  Abdomen: Soft, Non-distended, Non-tender  Incisions: Covered, but surrounding tissue is nonerythematous, dry, well-healing  Extremities: warm and well perfused    4. INVESTIGATIONS:   Arterial Blood Gases   Recent Labs   Lab 02/12/22  0434 02/10/22  0828 02/10/22  0436 02/09/22  2242   PH 7.44 7.38 7.43 7.41   PCO2 45 50* 44 48*   PO2 74* 86 103 55*   HCO3 30* 30* 29* 30*     Complete Blood Count   Recent Labs   Lab 02/14/22  0418 02/13/22  0532 02/12/22  0456 02/11/22  0512   WBC 18.5* 28.7* 30.1* 19.7*   HGB 8.5* 9.1* 9.3* 9.8*    223 184 166     Basic Metabolic Panel  Recent Labs   Lab 02/14/22  0418 02/14/22  0415 02/14/22  0005 02/13/22  1954 02/13/22  1630 02/13/22  1404 02/13/22  0758 02/13/22  0532 02/12/22  2002  02/12/22 1716 02/12/22 0846 02/12/22 0456   *  --   --   --   --  153*  --  152*  --   --   --  146*   POTASSIUM 3.1*  --   --   --   --  3.5  --  3.5  --  3.9   < > 3.1*   CHLORIDE 126*  --   --   --   --  119*  --  116*  --   --   --  110*   CO2 26  --   --   --   --  30  --  28  --   --   --  27   *  --   --   --   --  121*  --  114*  --   --   --  92*   CR 1.31*  --   --   --   --  1.38*  --  1.39*  --   --   --  1.22   * 174* 166* 180*   < > 205*   < > 189*   < >  --    < > 179*    < > = values in this interval not displayed.     Liver Function Tests  Recent Labs   Lab 02/14/22 0418 02/13/22 0532 02/12/22 0456 02/11/22 0512   AST 58* 39 31 20   * 79* 71* 74*   ALKPHOS 73 76 77 79   BILITOTAL 0.7 0.7 0.6 0.4   ALBUMIN 2.4* 2.8* 2.8* 2.8*     Pancreatic Enzymes  No lab results found in last 7 days.  Coagulation Profile  Recent Labs   Lab 02/14/22 0418 02/13/22 0532 02/12/22 2031 02/12/22 0456   PTT 59* 47* 74* 41*         5. RADIOLOGY:   No results found for this or any previous visit (from the past 24 hour(s)).    =========================================          Attestation signed by Geoffrey Gordon MD at 2/20/2022  2:02 PM:  Chuy Varner is a 31 year old male with a pmhx sig for bicuspid av and asc ao aneurysm, htn, obesity, and testicular ca s/p rsection who was admitted 1/28/2022 for a Bentall with mechanical AV with Dr. MCADAMS and Dr. ZAVALETA. Course complicated by post op hypoxemic respiratory failure and HIT.      Events since last note: Fever 101.8 2/13 at 8a migratory rash resolved with benadryl    I personally examined the patient, reviewed vital signs, medications, laboratory values, imaging studies, and consults from the past 24 hours. The care plan was developed with the fellow/ANEL and I agree with the findings and plan out lined below with the following additions or exceptions.     Neuro: wean sedation off, melatonin and trazadone for sleep, pain  with scheduled tylenol and prns  Pulm: 12/680/8/55 wean to ps will tiral extubation to bipap if needed  CV: HD hydral 75 tid, coreg 50 bid, aspirin 81mg  bival  for hit and av mechanical av.goal sbp <130  GI: AST 58  AP 73 Tbili 0.7 Last BM 2/14 hold tf for possible extubation, NJ at goal otherwise, br in place as needed  Renal: Cr: 131 good uop net neg 2.2 L and 510 since midnight na 158 way behind on free water at least 12 L will begin to replace and likely have to diuresis in the mean time as well fwf at 200 q2 and will add by D5W if needed K 3.1 will replace  Id:wbc 18.5 coming down fever yesterday am started on vanc and zosyn discharge this am rash as well of uncertain etiology ctm  Endo: BG goal <200, BG have ranged from 170 to 200  hgb a1c sent insulin gtt as needed   Heme: stable; hgb:8.5 (9.1) plt: 244 PTT 59 will need to start warfarin soon HIT on bival RLE occlusion    Prophy: bowel reg as appropriate, PPI, peridex, bival gtt for DVT prophy  Dispo: will remain in the ICU for continued care    I have personally spent a total of 47 minutes providing critical care services excluding procedure, teaching and ECMO management time for Chuy Varner who has a high probability of life-threatening deterioration due to the conditions listed above which required my direct attention, intervention, and personal management.       Geoffrey Gordon MD  Critical Care Cardiology  [phone] (c)     February 14, 2022      CLINICAL NUTRITION SERVICES - REASSESSMENT NOTE     Nutrition Prescription    Malnutrition Status:    Moderate malnutrition in the context of acute illness    Interventions by Registered Dietitian (RD):  Increased TF regimen to better meet caloric needs:   TwoCal HN @ 65 mL/hr (1560 mL/day) + Prosource (2 pkt TID) to provide 3360 kcals (32 kcal/kg/day, 70% MREE), 197 g PRO (1.9 g/kg/day), 1092 mL H2O, 342 g CHO and 8 g Fiber daily.    Future Recommendations:  Repeat metabolic cart study as  appropriate  If loose stools (>500 ml/day) consider adding banatrol        EVALUATION OF THE PROGRESS TOWARD GOALS   Diet: NPO (intubated)   Nutrition Support: Vital HP @ 55 mL/hr + Prosource (2 pkts QID) via NDT to provide 1320 mL formula, 1640 kcal (15 kcal/kg), 203 g protein (1.9 g/kg), 147 g CHO, 30 g fat, 0 g fiber, and 1104 mL free water. Micro/Nx:    Intake: Met 40% calorie needs, 95% protein needs over past 5-days (from TF + propofol).      NEW FINDINGS   GI/Nutrition:  Pt currently receiving feeds at goal rate. No longer receiving significant kcal from propofol. Stool output variable (200-1750 ml/day). Overall, pt has lost 14 kg (8%) in the past 2 weeks. New driest weight of 152 kg today. Suspect weight loss r/t combination of nutrition inadequacy and fluctuation in fluid status.     Obtained metabolic cart study on 2/11 @ 1647. MREE = 4871 kcal/day (equiv to 46 kcal/kg) with RQ = 0.69. In the 24 hours preceding the study, pt received 2179 kcals (equiv to 21 kcal/kg or 45% MREE) from TF/modulars/propofol. RQ is within physiologic range and logical given provisions received prior to study. Based on study results, will aim to meet 60-80% MREE. See updated needs below.    UPDATED ASSESSED NUTRITION NEEDS   Based on dosing weight of 105 kg (adjusted for obesity, based on IBW of 89 kg and driest weight of 152 kg on 2/14):   Estimated Energy Needs: 2900 - 3900 kcal/day (28 - 37 kcal/kg/day)   Justification: 60-80% MREE  Estimated Protein Needs: 155 - 210 g protein/day (1.5 - 2 g/kg/day)   Justification: Increased needs, obesity       Renal: Lasix, held today. K+ 3.1 Na 158 with  ml q2h, Cr 1.3 and .    Skin: Stage 2 pressure injury on columbella/bilateral nares is improving per WOC RN. No additional nutritional interventions at this time.      MALNUTRITION  % Intake: </= 50% for >/= 5 days (severe)  % Weight Loss: 8% in <1 month (severe)  Subcutaneous Fat Loss: Orbital region: Mild  Muscle Loss:  Difficult to assess w/ body habitus   Fluid Accumulation/Edema: Mild  Malnutrition Diagnosis: Moderate malnutrition in the context of acute illness    Previous Goals   Total avg nutritional intake to meet a minimum of 20 kcal/kg and 1.5 g PRO/kg daily (per dosing wt 108 kg).  Evaluation: Met protein, but not caloric goals.     Previous Nutrition Diagnosis  Inadequate oral intake  Evaluation: No change    CURRENT NUTRITION DIAGNOSIS  Inadequate energy intake related to inadequate enteral infusion in setting of increased nutrition needs as evidenced by metabolic cart study, received ~40% energy needs on avg over past 5 days (from TF + propofol)       INTERVENTIONS  See interventions at top of progress note    Goals  Total avg nutritional intake to meet a minimum of 28 kcal/kg (60% MREE) and 1.5 g PRO/kg daily (per dosing wt 105 kg).    Monitoring/Evaluation  Progress toward goals will be monitored and evaluated per protocol.    Nannette Holguin, RD, LD  [id number]  Pgr: [phone]           Patient extubated to HFNC 45L/60% per MD order. Tolerated well. Raspy voice, but not acute signs of distress. Will continue to monitor.    [facility] Nurse Inpatient Pressure Injury Assessment   Reason for consultation: Evaluate and treat nasal pressure injury       ASSESSMENT  Pressure Injury: on columbella bilateral nares  , hospital acquired ,   This is a Medical Device Related Pressure Injury (MDRPI) due to Bridle string  Pressure Injury is Stage 2  And mucosal in bilateral nares   Contributing factor of the pressure injury: pressure, immobility and moisture  Proning  Status: improving on Right, resolved on Left    New:  Right buttock superficial abrasions from unknown source  Status:  healing   TREATMENT PLAN  Septum: BID cleanse with saline and apply a thin layer of vaseline over wound. Make sure there is no tension on bridle string.  Orders Reviewed and Updated     Right buttock wounds  Apply criticaid paste to wounds twice daily and as needed      WO Nurse follow-up plan:twice weekly  Nursing to notify the Provider(s) and re-consult the WOC Nurse if wound(s) deteriorates or new skin concern.    Patient History  According to provider note(s):  Acute Hypoxemic Respiratory Failure due to Shunt Physiology  # Pulmonary Shunting from Bilateral Lower Lobe Atelectasis vs. Pneumonia     Chuy Varner is a 31 year old male with PMH of likely bicuspid aortic valve, thoracic aortic aneurysm without rupture, hypertension, obesity, and testicular cancer s/p orchiectomy who underwent Bentall procedure with mechanical valve on 1/28 with Dr. Velasco ad Dr. Hannah. Patient was admitted to CVICU for post-operative management; post-op course complicated by acute hypoxic respiratory failure despite PEEP titration.     Objective Data  Containment of urine/stool: Indwelling catheter and Internal fecal management    Current Diet/ Nutrition:  Orders Placed This Encounter      NPO for Medical/Clinical Reasons Except for: Other; Specify: NPO until Extubated      Output:   I/O last 3 completed shifts:  In: 3702.6 [I.V.:1272.6; NG/GT:1385]  Out: 4675 [Urine:3325; Stool:1350]    Risk Assessment:   Sensory Perception: 1-->completely limited  Moisture: 3-->occasionally moist  Activity: 2-->chairfast  Mobility: 1-->completely immobile  Nutrition: 3-->adequate  Friction and Shear: 2-->potential problem  Ben Score: 12      Labs:   Recent Labs   Lab 02/14/22  0418 02/09/22  0333 02/08/22  0415   ALBUMIN 2.4*   < > 2.5*   HGB 8.5*   < > 9.3*   WBC 18.5*   < > 17.8*   CRP  --   --  86.0*    < > = values in this interval not displayed.       Physical Exam  Skin inspection: focused septum/columbella/bilateral nares           Date of last Photo 2/4 and 2/11and 2/14  Wound History: previously proned. Extubated today  Measurements (length x width x depth, in cm) 0.5 cm x 0.8 cm  x  0.1 cm   Wound Base: 100% superficial scab   Palpation of the wound bed: normal   Periwound skin:  intact  Color: normal and consistent with surrounding tissue  Temperature: normal   Drainage:, none  Description of drainage: none  Odor: none  Pain: mild,     Wound location:  Buttocks      Date of photo:  2/14/22  History:  Pt has a rectal tube with minimal bypassing of stool   Location:  Right fleshy buttock, approximately 2 cm superior to gluteal fold  Two identical 0.3 cm x 0.3 x 0.2 cm wounds with thin red fibrinous scabs  Drainage:  None  Pain:  none    Interventions  Current support surface: Standard  Low air loss mattress  Current off-loading measures: Heel off-loading boot(s) and Pillows  Repositioning aid: Pillows  Visual inspection of wound(s) completed   Tube Securement: as per protocol  Wound Care: was not done per plan of care.  Supplies: floor stock  Educated provided: importance of repositioning, plan of care, Hygiene and Off-loading pressure  Education provided to: spouse and nurse  Discussed importance of:off-loading pressure to wound  Discussed plan of care with Family and Nurse          CV ICU PROGRESS NOTE  February 15, 2022      CO-MORBIDITIES:   Aortic root aneurysm (H)     ASSESSMENT: Chuy Varner is a 31 year old male with a PMHx s/f bicuspid aortic valve, thoracic aortic aneurysm without rupture, hypertension, obesity, and testicular cancer s/p orchiectomy who underwent Bentall procedure with mechanical valve on 1/28 with Dr. Velasco and Dr. Hannah. Course complicated by postop hypoxemic respiratory failure, HIT positivity, now extubated onto high-flow, off pressors, with worsening hypernatremia.     TODAY'S PROGRESS:   - Started D5W IV overnight (150 mL/hr) for Na of 161  - Continues to have high UOP  - Giving hydrochlorothiazide and f/u UOP changes, Uosm, Serum osm, ordered FENa, Lucille , UCr    PLAN:  Neuro/ pain/ sedation:  # Acute Postoperative pain  - Monitor neurological status. Notify the MD for any acute changes in exam.  - Pain: well controlled on current regimen              -  Scheduled tylenol    - Discontinued for elevated LFTs              - PRN tylenol, dilaudid, robaxin, oxycodone  - Sedation: none  - RASS goal 0 to -1  - Sleep hygiene:              - Melatonin trazadone qhs     Pulmonary care:   # Postoperative ventilation management  # Acute hypoxemic respiratory failure (ARDS vs. PNA vs. Hypervolemia vs. TRALI vs. severe atelectasis)  COVID-19, Mycoplasma, Parainfluenza, and Influenza negative. CTA negative for PE on 1/30.  CXR not consistent with ARDS. Likely element of severe V/Q mismatch and atelectasis.  Sudden drop in sats overnight on 2/8 requiring FiO2 80%.  - Possibly c/b PE given acute FiO2 increase, HIT positivity              - Remains on bivalirudin  - Follow BCs and respiratory panels (2/12)              - Growing staph epi in blood and sputum              - Only 1/2 bottles of blood, on one side -- likely contaminant  - Keep on High Flow NC, but titrate down FiO2 as able to maintain SpO2 > 92%     Cardiovascular:    # Ascending aortic aneurysm s/p Bentall w/ Jerry valve on 1/28 with Dr. Velasco and Dr. Hannah  # Bicuspid aortic valve  # History of thoracic aortic aneurysm without rupture  # History of hypertension  Recent echo on 10/26/21 with LVEF of 60-65%.  Post-op echo with LVEF >60%  - Goal MAP>65, SBP <130  - ASA 81mg  - Holding PTA meds: metoprolol succinate 25mg daily  - Management of HTN              - Coreg maxed at 50 BID              - Hydralazine 75 mg q8              - Labetalol and hydralazine PRN to maintain SBP <130     GI / Nutrition:   # Obesity (BMI 46)  # Nutrition  - NJ feeds at goal  - PPI  - Continue bowel regimen: miralax, senna PRN (not scheduled with rectal tube)  # Elevated ALT  - Newly elevated ALT, but ALP and AST remain normal, CT liver on 02/09 was unremarkable, although stones were noted int he gallbladder (no cholecystitis)  - Monitor LFTs every day    Renal/ Fluid Balance/ Electrolytes:   BL creat appears to be ~ 1.0  - Strict I/O,  daily weights  - Avoid/limit nephrotoxins as able  - Replete lytes PRN per protocol              - Scheduled potassium replacement (repleted potassium 40 mEq this AM post-BMP)    # Hypernatremia  Free water deficit >13L (02/15)  - Hydrochlorothiazide (02/15) for possible diabetes insipidus (from renal injury)  - Correct 200 free water q2 via NJ tube  - Correct 150 mL D5W IV and f/u Na  - f/u Urine osms, Serum osms, Lucille, UCr, FENa     Endocrine:    # Stress induced hyperglycemia, improving  - HDSSI  - Lantus 50mg daily              - keep same, since he won't be NPO for too long before extubation trial this afternoon  - Goal BG <180 for optimal healing     ID/ Antibiotics:  # Stress induced leukocytosis, resolved  # Possible ventilator associated pneumonia  Febrile and pan-cultured on 1/30/22. Pan-scanned 02/09 with sinuses. Found small fluid collection in sinus.  Covid pcr, respiratory panel, and respiratory cultures all negative 1/30.   Re-cultured 1/31: respiratory and blood cultures NGTD.   Re-cultured again 2/4 when WBC increased to 23. NGTD.  Re-cultured 02/12: 1 tube of staph epidermidis and respiratory culture on 02/10 with staph epidermidis (likely contaminant)  - Continue to monitor fever curve, WBC and inflammatory markers  - Consult ID 2/4: send C dif, blood cultures, repeat COVID              - COVID negative              - C. diff negative              - Cultures NGTD other than noted above  - CT C/A/P and sinuses 2/11              - Augmentin PO for sinusititis; ends 02/17  - Could consider re-consult to ID if fevers, leukocytosis reoccur, but downtrending on Augmentin, bivalirudin (sinusitis and clot)     Heme/Onc:     # Stress induced leukocytosis  # Acute blood loss anemia  # Acute blood loss thrombocytopenia, resolved  # History of testicular cancer s/p orchiectomy  # RLE DVT  # HIT positive   No s/sx active bleeding. Has RLE DVT as above.  - Continue to monitor  - CBC daily  - Heparin resistant               - Bivalirudin gtt     MSK/ Skin:  # Sternotomy  # Surgical Incision  # Buttock pressure injury   - Sternal precautions  - Postoperative incision management per protocol  - PT/OT/CR  - WOCN consulted for pressure injury              - CTAP did not show stranding c/f infection               - Has small sacral ulcer     Prophylaxis:    - Mechanical prophylaxis for DVT  - Chemical DVT prophylaxis- bivalirudin  - PPI  - HOB to 30 degrees     Lines/ tubes/ drains:  - Left PIV (1/28)  - Godfrey (pull 02/15)  - Nasoduodenal (1/31)  - ETT (1/28)  - Rectal tube      Disposition:  - CVICU     Patient seen, findings and plan discussed with CV ICU staff     Xander Garcia, MS4     Resident/Fellow Attestation   I, Jean Angulo, was present with the medical student who participated in the service and in the documentation of the note.  I have verified the history and personally performed the physical exam and medical decision making.  I agree with the assessment and plan of care as documented in the note.      Jean Angulo MD  Anesthesiology, CA-2, PGY3      ====================================    SUBJECTIVE:   Patient declines pain this AM, nods yes and no to questions, directs gaze. States is not feeling tired from breathing on high flow, not having difficulty breathing    OBJECTIVE:   1. VITAL SIGNS:   Temp:  [36.4  C (97.6  F)-37.8  C (100.1  F)] 37.2  C (99  F)  Pulse:  [] 84  Resp:  [16-20] 18  BP: ()/(55-80) 108/65  FiO2 (%):  [60 %] 60 %  SpO2:  [92 %-96 %] 95 %  Ventilation Mode: CPAP/PS  (Continuous positive airway pressure with Pressure Support)  FiO2 (%): 60 %  Rate Set (breaths/minute): 12 breaths/min  Tidal Volume Set (mL): 680 mL  PEEP (cm H2O): 8 cmH2O  Pressure Support (cm H2O): 8 cmH2O  Oxygen Concentration (%): 40 %  Resp: 18      2. INTAKE/ OUTPUT:   I/O last 3 completed shifts:  In: 6795.1 [I.V.:2455.1; NG/GT:2845]  Out: 3735 [Urine:3535; Stool:200]    3. PHYSICAL EXAMINATION:    General: Sitting at an angle in bed, directs gaze to voice  Neuro: Directs gaze to voice, able to nod yes and no to questions, moves limbs spontaneously, does not speak in response to questions  Resp: Breathing non-labored on HFNC  CV: RRR on telemetry  Abdomen: Soft, Non-distended, Non-tender, rectal tube is draining stool  Incisions: c/d/i  Extremities: warm and well perfused    4. INVESTIGATIONS:   Arterial Blood Gases   Recent Labs   Lab 02/12/22  0434 02/10/22  0828 02/10/22  0436 02/09/22  2242   PH 7.44 7.38 7.43 7.41   PCO2 45 50* 44 48*   PO2 74* 86 103 55*   HCO3 30* 30* 29* 30*     Complete Blood Count   Recent Labs   Lab 02/15/22  0350 02/14/22  0418 02/13/22  0532 02/12/22  0456   WBC 15.5* 18.5* 28.7* 30.1*   HGB 8.4* 8.5* 9.1* 9.3*    244 223 184     Basic Metabolic Panel  Recent Labs   Lab 02/15/22  1837 02/15/22  1546 02/15/22  1341 02/15/22  1131 02/15/22  1029 02/15/22  0808 02/15/22  0619   *  --  158*  --  160*  --  160*   POTASSIUM 3.8  --  3.6  --  3.8  --  4.0   CHLORIDE 126*  --  128*  --  128*  --  129*   CO2 26  --  25  --  27  --  26   BUN 77*  --  85*  --  94*  --  96*   CR 0.96  --  1.01  --  1.17  --  1.11   * 183* 217* 168* 208*   < > 236*    < > = values in this interval not displayed.     Liver Function Tests  Recent Labs   Lab 02/15/22  0350 02/14/22 0418 02/13/22  0532 02/12/22  0456   * 58* 39 31   * 102* 79* 71*   ALKPHOS 83 73 76 77   BILITOTAL 0.4 0.7 0.7 0.6   ALBUMIN 2.4* 2.4* 2.8* 2.8*     Pancreatic Enzymes  No lab results found in last 7 days.  Coagulation Profile  Recent Labs   Lab 02/15/22  0350 02/14/22  0418 02/13/22  0532 02/12/22 2031   PTT 59* 59* 47* 74*         5. RADIOLOGY:   Recent Results (from the past 24 hour(s))   XR Chest Port 1 View    Narrative    EXAM: XR CHEST PORT 1 VW  2/15/2022 5:35 AM      HISTORY: Evaluate for interval change.    COMPARISON: Radiograph dated 2/11/2022    FINDINGS: AP portable semiupright  radiograph of the chest. New right  PICC with distal tip in the low SVC. Postsurgical changes of thoracic  surgery. Median sternotomy wires are intact. Cardiac valve  replacement. Partially visualized feeding tube is not well-visualized  although appears to course into the left upper quadrant and outside  the field of view.     Trachea is clear. Cardiac silhouette is enlarged and stable. Pulmonary  vasculature is prominent and relatively indistinct. No pneumothorax.  Trace bilateral pleural effusions. Perihilar and bibasilar predominant  opacities. Low lung volumes.      Impression    IMPRESSION:     1. Increased perihilar and bibasilar predominant opacities likely  represent pulmonary edema.  2. New right PICC with distal tip in the low SVC.    I have personally reviewed the examination and initial interpretation  and I agree with the findings.    KIMMIE CANTRELL MD         SYSTEM ID:  [id number]       =========================================          Attestation signed by Geoffrey Gordon MD at 2/20/2022  3:00 PM:  Chuy Varner is a 31 year old male with a pmhx sig for bicuspid av and asc ao aneurysm, htn, obesity, and testicular ca s/p rsection who was admitted 1/28/2022 for a Bentall with mechanical AV with Dr. MCADAMS and Dr. ZAVALETA. Course complicated by post op hypoxemic respiratory failure and HIT.      Events since last note: extubated, not speaking but doing well. Sodium continued to climb, started D5W at 150 per hour in addition to FWF brisk UOP continues, likely post atn diuresis with nephrogenic DI     I personally examined the patient, reviewed vital signs, medications, laboratory values, imaging studies, and consults from the past 24 hours. The care plan was developed with the fellow/ANEL and I agree with the findings and plan out lined below with the following additions or exceptions.     Neuro: sedation off, melatonin and trazadone for sleep, hypoactive delirium hypernatremia vs sedation  will send NH2  Pulm: wean HFNC extubated, IS flutter, oob to chair bipap if needed vbg no retaining  CV: HD hydral 75 tid, coreg 50 bid, aspirin 81mg  bival  for hit and av mechanical av.goal sbp <130 lacate 1.1  GI:   AP * Tbili 0.4 stop standing tylenol may be augmentin Last BM 2/15 NJ with tf br in place as needed  Renal: brisk UOP continues, likely post atn diuresis with nephrogenic DI Cr: 1.21 good uop net neg 25cc and +891 since midnight na 161-->158 fwf at 200 q2 +150 D5W q1 + hydrochlorothiazide if needed K 3.6  urine and serum osms pending  follow na qq4 hrs  Id:wbc 15.5 coming down afebrile remains on Augmentin for sinusitis ends 2/17   Endo: BG goal <200, BG have ranged from 170 to 200  hgb a1c sent insulin gtt as needed   Heme: stable; hgb:8.4 plt: 332 PTT 59 will need to start warfarin soon HIT on bival RLE occlusion    Prophy: bowel reg as appropriate, PPI, peridex, bival gtt for DVT prophy  Dispo: will remain in the ICU for continued care    I have personally spent a total of 40 minutes providing critical care services excluding procedure, teaching and ECMO management time for Chuy Varner who has a high probability of life-threatening deterioration due to the conditions listed above which required my direct attention, intervention, and personal management.       Geoffrey Gordon MD  Critical Care Cardiology  [phone] (c)     February 15, 2022    CLINICAL NUTRITION SERVICES - BRIEF NOTE   (see RD note on 2/14 for full assessment)    Loose stools (400-1750 ml/day), presumably r/t increased FWF, at least in part.  Will add supplemental fiber to benefit gut microbiome and in effort to thicken stools    Nutrition changes today:  Banatrol (1 pkt BID, 4 g fiber)     Nannette Holguin, KISHORE, LD  [id number]  Pgr: [phone]                   CV ICU PROGRESS NOTE  February 16, 2022      CO-MORBIDITIES:   Aortic root aneurysm (H)    ASSESSMENT: Chuy Varner is a 31 year old male with a PMHx s/f bicuspid  aortic valve, thoracic aortic aneurysm without rupture, hypertension, obesity, and testicular cancer s/p orchiectomy who underwent Bentall procedure with mechanical valve on 1/28 with Dr. Velasco and Dr. Hannah. 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    TODAY'S PROGRESS:   Overnight  - Measured a sodium at 144 overnight   - Changed free water NJ from 200 q2 --> 30 q2   - Held IV D5W   - Recheck was 154  Goals:  - Ammonia level today  - Neuro consult for aphasia  - Lymphedema wraps  - Possible RUQ U/S   - Will decide depending on what feeds we need to hold (tammy. free water)  - Correct HyperNa+ (resume NJ free water)   - Resume free water via NJ   - Continue to hold IV D5 until recheck this PM    PLAN:  Neuro/ pain/ sedation:  # Acute Postoperative pain  - Monitor neurological status. Notify the MD for any acute changes in exam.   - still does not speak, possible delirium per RN note   - check ammonia level -> 35 (normal)   - Neuro consult for possible aphasia, appreciate recs  - Pain: well controlled on current regimen              - PRN tylenol, dilaudid, robaxin, oxycodone    - No PRNs used  - Sedation: none  - RASS goal 0 to -1  - Sleep hygiene:               - Melatonin trazadone qhs    Pulmonary care:   # Postoperative ventilation management  # Acute hypoxemic respiratory failure (ARDS vs. PNA vs. Hypervolemia vs. TRALI vs. severe atelectasis)  COVID-19, Mycoplasma, Parainfluenza, and Influenza negative. CTA negative for PE on 1/30.  CXR not consistent with ARDS. Likely element of severe V/Q mismatch and atelectasis.  Sudden drop in sats overnight on 2/8 requiring FiO2 80%.  - Possibly c/b PE given acute FiO2 increase, HIT positivity              - Remains on bivalirudin  - Follow BCs and respiratory panels (2/12)              - Growing staph epi in blood and sputum              -  Only 1/2 bottles of blood, on one side - likely contaminant  - Keep on High Flow NC, but titrate down FiO2 as able to maintain SpO2 > 92%   - 45 LPM HFNC keep the LPM   - Titrated FiO2 down to 30%   - Continue to titrate down to 25%   - ensure PT/OT to see him    Cardiovascular:    # Ascending aortic aneurysm s/p Bentall w/ Jerry valve on 1/28 with Dr. Velasco and Dr. Hannah  # Bicuspid aortic valve  # History of thoracic aortic aneurysm without rupture  # History of hypertension  Recent echo on 10/26/21 with LVEF of 60-65%.  Post-op echo with LVEF >60%  - Goal MAP>65, SBP <130  - ASA 81mg  - Holding PTA meds: metoprolol succinate 25mg daily  - Management of HTN              - Coreg maxed at 50 BID              - Hydralazine 75 mg q8              - Labetalol and hydralazine PRN to maintain SBP <130    GI / Nutrition:   # Obesity (BMI 46)  # Nutrition  - NJ feeds at goal   - remain NPO while on HFNC (and potentially needing BiPAP intermittently)  - PPI  - Continue bowel regimen: miralax, senna PRN (not scheduled with rectal tube)  # Elevated ALT, AST  - Newly elevated ALT (02/15), now AST elevated, but ALP, CT liver on 02/09 was unremarkable, although stones were noted in the gallbladder (no cholecystitis)  - Monitor LFTs qd   - Discontinued acetaminophen   - Consider RUQ ultrasound    - depending on what needs to be held from NJ (tammy. Free water)    - no RUQ if need to hold free water    - LFT elevation could be from augmentin (ends on 02/17)   - Order ammonia level to inform AMS    Renal/ Fluid Balance/ Electrolytes:   BL creat appears to be ~ 1.0  - Strict I/O   - As close to net 0 or negative as possible (not having increasing O2 demands)  - Avoid/limit nephrotoxins as able  - Replete lytes PRN per protocol              - Scheduled potassium replacement (repleted potassium 40 mEq this AM post-BMP)  # Hypernatremia  - FENa < 1%, high UOP  - Uosm = 902 pre-hydrochlorothiazide   - Consider re-measure Uosm  post-hydrochlorothiazide   - re-dose hydrochlorothiazide 25 mg BID  - Continue NJ free water 200 q2   - reinitiate IV D5W at  150/hr pending f/u on Na this PM    Endocrine:    # Stress induced hyperglycemia, improving  - HDSSI  - Lantus 50mg daily              - keep same, since he won't be NPO for too long before extubation trial this afternoon  - Goal BG <180 for optimal healing    ID/ Antibiotics:  # Stress induced leukocytosis, resolved  # Possible ventilator associated pneumonia  Febrile and pan-cultured on 1/30/22. Pan-scanned 02/09 with sinuses. Found small fluid collection in sinus.  Covid pcr, respiratory panel, and respiratory cultures all negative 1/30.  Re-cultured 1/31: respiratory and blood cultures NGTD.  Re-cultured again 2/4 when WBC increased to 23. NGTD.  Re-cultured 02/12: 1 tube of staph epidermidis and respiratory culture on 02/10 with staph epidermidis (likely contaminant)   - Continue to monitor fever curve, WBC and inflammatory markers  - Consult ID 2/4: send C dif, blood cultures, repeat COVID              - COVID negative              - C. diff negative              - Cultures NGTD other than noted above  - CT C/A/P and sinuses 2/11              - Augmentin PO for sinusititis; ends 02/17  - Could consider re-consult to ID if fevers, leukocytosis reoccur, but downtrending on Augmentin, bivalirudin (sinusitis and clot)    Heme/Onc:     # Stress induced leukocytosis  # Acute blood loss anemia  # Acute blood loss thrombocytopenia, resolved  # History of testicular cancer s/p orchiectomy  # RLE DVT  # HIT positive   No s/sx active bleeding. Has RLE DVT as above.   - Lymphedema wraps  - CBC daily  - Heparin resistant              - Continue Bivalirudin gtt    MSK/ Skin:  # Sternotomy  # Surgical Incision  # Buttock pressure injury   - Sternal precautions  - Postoperative incision management per protocol  - PT/OT/CR   - Ensure that PT/OT sees him (02/16) for dispo  - WOCN consulted for pressure  injury   - Has known, small sacral ulcer              - CTAP did not show stranding c/f infection      Prophylaxis:    - Mechanical prophylaxis for DVT  - Chemical DVT prophylaxis- bivalirudin  - PPI - until 03/16 (30d from extubation) per institutional protocol  - HOB to 30 degrees     Lines/ tubes/ drains:  - PIV x 3  - Triple lumen PICC  - Condom urine cath  - Nasoduodenal (1/31)  - Rectal tube      Disposition:  - CVICU     Patient seen, findings and plan discussed with CV ICU staff     Xander Garcia, MS4    Resident/Fellow Attestation   I, Jean Angulo, was present with the medical student who participated in the service and in the documentation of the note.  I have verified the history and personally performed the physical exam and medical decision making.  I agree with the assessment and plan of care as documented in the note.      Jean Angulo MD  Anesthesiology, CA-2, PGY3    ====================================    SUBJECTIVE:   Patient is not having pain this AM. Slept better last night (got trazodone).    OBJECTIVE:   1. VITAL SIGNS:   Temp:  [36.8  C (98.2  F)-37.4  C (99.3  F)] 37.4  C (99.3  F)  Pulse:  [75-92] 89  Resp:  [16-22] 20  BP: ()/(58-77) 113/68  FiO2 (%):  [30 %-50 %] 30 %  SpO2:  [92 %-98 %] 96 %  FiO2 (%): 30 %  Resp: 20    2. INTAKE/ OUTPUT:   I/O last 3 completed shifts:  In: 5822.6 [I.V.:2317.6; NG/GT:2010]  Out: 4075 [Urine:3475; Stool:600]    3. PHYSICAL EXAMINATION:   General: Adult male, seated upright in chair, directs gaze, does not appear uncomfortable  Neuro: Eyes open spontaneously, directs gaze to speech, Nods yes and no to questions, follows commands (hold up 1 finger, hold up 2 fingers), unable to speak when asked (does not attempt to phonate), moves all extremities spontaneously and without focal deficits  Resp: Breathing on HFNC, nonlabored  CV: RRR on telemetry  Abdomen: Soft, Non-distended, Non-tender x 4   Incisions: CDI  Extremities: warm and well  perfused    4. INVESTIGATIONS:   Arterial Blood Gases   Recent Labs   Lab 02/12/22  0434 02/10/22  0828 02/10/22  0436 02/09/22  2242   PH 7.44 7.38 7.43 7.41   PCO2 45 50* 44 48*   PO2 74* 86 103 55*   HCO3 30* 30* 29* 30*     Complete Blood Count   Recent Labs   Lab 02/16/22  0315 02/15/22  0350 02/14/22 0418 02/13/22  0532   WBC 13.6* 15.5* 18.5* 28.7*   HGB 8.1* 8.4* 8.5* 9.1*    332 244 223     Basic Metabolic Panel  Recent Labs   Lab 02/16/22  1849 02/16/22  1734 02/16/22  1555 02/16/22  1408 02/16/22  1204 02/16/22  1027   * 145*  --  155*  --  154*   POTASSIUM 3.9 3.7  --  3.9  --  4.1   CHLORIDE 124* 117*  --  126*  --  126*   CO2 24 24  --  24  --  25   BUN 49* 45*  --  51*  --  54*   CR 0.76 0.70  --  0.74  --  0.83   * 374* 138* 151*   < > 173*    < > = values in this interval not displayed.     Liver Function Tests  Recent Labs   Lab 02/16/22  0315 02/15/22  0350 02/14/22  0418 02/13/22  0532   * 116* 58* 39   * 189* 102* 79*   ALKPHOS 87 83 73 76   BILITOTAL 0.4 0.4 0.7 0.7   ALBUMIN 2.3* 2.4* 2.4* 2.8*     Pancreatic Enzymes  No lab results found in last 7 days.  Coagulation Profile  Recent Labs   Lab 02/16/22  0523 02/15/22  0350 02/14/22  0418 02/13/22  0532   PTT 55* 59* 59* 47*         5. RADIOLOGY:   No results found for this or any previous visit (from the past 24 hour(s)).    =========================================          Attestation signed by Geoffrey Gordon MD at 2/20/2022  3:15 PM:  Chuy Varner is a 31 year old male with a pmhx sig for bicuspid av and asc ao aneurysm, htn, obesity, and testicular ca s/p rsection who was admitted 1/28/2022 for a Bentall with mechanical AV with Dr. MCADAMS and Dr. ZAVALETA. Course complicated by post op hypoxemic respiratory failure and HIT. Extubated 2/14      Events since last note: stopped IV free water and reduced fwf to 30 q4 due to na 144 but this was likely in error based on repeat na    I personally  examined the patient, reviewed vital signs, medications, laboratory values, imaging studies, and consults from the past 24 hours. The care plan was developed with the fellow/ANEL and I agree with the findings and plan out lined below with the following additions or exceptions.     Neuro: sedation off, melatonin and trazadone for sleep, hypoactive delirium hypernatremia vs sedation ammonia level pending seems nonfocal but will ct head and ask neurocrit to see pt consider ent if nonfocal and seems more phonation  Pulm: wean HFNC 30% and 45L continue to wean, IS flutter, oob to chair bipap if needed vbg no retaining  CV: HD hydral 75 tid, coreg 50 bid, aspirin 81mg  bival  for hit and av mechanical av.goal sbp <130   GI:   AP 87 Tbili 0.4 ? Augmentin Last BM 2/15 NJ with tf br in place as needed  Renal: brisk UOP continues, likely post atn diuresis with nephrogenic DI Cr:0.82  good uop net +3L and +1L since midnight but this is not accurate due to large unmeasured voids na 154 resume FW replacement + hydrochlorothiazide  and post osm K 4.1 follow na qq4 hrs  Id:wbc 13.6  coming down afebrile remains on Augmentin for sinusitis ends 2/17/today  Endo: BG goal <200, BG at goal hgb a1c sent insulin HDSSI +50 Lantus    Heme: stable; hgb:8.1 plt: 396 bival to warfarin INR 1.38 HIT --> RLE occlusion    Prophy: bowel reg as appropriate, PPI, peridex, bival gtt for DVT prophy  Dispo: will remain in the ICU for continued care    I have personally spent a total of 41 minutes providing critical care services excluding procedure, teaching and ECMO management time for Chuy JUAN Telly who has a high probability of life-threatening deterioration due to the conditions listed above which required my direct attention, intervention, and personal management.       Geoffrey Gordon MD  Critical Care Cardiology  [phone] (c)     February 16, 2022    [facility] Nurse Inpatient Pressure Injury Assessment   Reason for consultation:  Evaluate and treat nasal pressure injury       ASSESSMENT  Pressure Injury: on columbella bilateral nares  , hospital acquired ,   This is a Medical Device Related Pressure Injury (MDRPI) due to Bridle string  Pressure Injury is Stage 2  And mucosal in bilateral nares   Contributing factor of the pressure injury: pressure, immobility and moisture  Proning  Status: improving on Right, resolved on Left    New:  Right buttock superficial abrasions from unknown source  Status:  healing   TREATMENT PLAN  Septum: BID cleanse with saline and apply a thin layer of vaseline over wound. Make sure there is no tension on bridle string.  Orders Reviewed and Updated     Right buttock wounds  Apply criticaid paste to wounds twice daily and as needed     WOC Nurse follow-up plan:weekly  Nursing to notify the Provider(s) and re-consult the WOC Nurse if wound(s) deteriorates or new skin concern.    Patient History  According to provider note(s):  Acute Hypoxemic Respiratory Failure due to Shunt Physiology  # Pulmonary Shunting from Bilateral Lower Lobe Atelectasis vs. Pneumonia     Chuy Varner is a 31 year old male with PMH of likely bicuspid aortic valve, thoracic aortic aneurysm without rupture, hypertension, obesity, and testicular cancer s/p orchiectomy who underwent Bentall procedure with mechanical valve on 1/28 with Dr. Velasco ad Dr. Hannah. Patient was admitted to CVICU for post-operative management; post-op course complicated by acute hypoxic respiratory failure despite PEEP titration.     Objective Data  Containment of urine/stool: Indwelling catheter and Internal fecal management    Current Diet/ Nutrition:  Orders Placed This Encounter      NPO for Medical/Clinical Reasons Except for: Meds, Ice Chips      Output:   I/O last 3 completed shifts:  In: 7087.6 [I.V.:3697.6; NG/GT:1830]  Out: 3775 [Urine:3175; Stool:600]    Risk Assessment:   Sensory Perception: 2-->very limited  Moisture: 3-->occasionally moist  Activity:  2-->chairfast  Mobility: 2-->very limited  Nutrition: 3-->adequate  Friction and Shear: 2-->potential problem  Ben Score: 14      Labs:   Recent Labs   Lab 02/16/22  0315   ALBUMIN 2.3*   HGB 8.1*   WBC 13.6*       Physical Exam  Skin inspection: focused septum/columbella/bilateral nares           Date of last Photo 2/4 and 2/11and 2/14  Wound History: previously proned. Extubated now on high flow O2   Measurements (length x width x depth, in cm) 0.5 cm x 0.8 cm  x  0.1 cm   Wound Base: 100% superficial scab   Palpation of the wound bed: normal   Periwound skin: intact  Color: normal and consistent with surrounding tissue  Temperature: normal   Drainage:, none  Description of drainage: none  Odor: none  Pain: mild,     Wound location:  Buttocks      Date of photo:  2/14/22  History:  Pt has a rectal tube with minimal bypassing of stool   Location:  Right fleshy buttock, approximately 2 cm superior to gluteal fold  Two identical 0.3 cm x 0.3 x 0.2 cm wounds with thin red fibrinous scabs  Drainage:  None  Pain:  none    Interventions  Current support surface: Standard  Low air loss mattress  Current off-loading measures: Heel off-loading boot(s) and Pillows  Repositioning aid: Pillows  Visual inspection of wound(s) completed   Tube Securement: as per protocol  Wound Care: was not done per plan of care.  Supplies: floor stock  Educated provided: importance of repositioning, plan of care, Hygiene and Off-loading pressure  Education provided to: spouse and nurse  Discussed importance of:off-loading pressure to wound  Discussed plan of care with Family and Nurse        Major Shift Events:  02 weaned to 30% 35L, ot. Had head CT was stable on oxy mask in CT. Has been able to use urinal to pee and say a few words with max effort.  Plan: consult speech, lymphedema and have abdominal US  For vital signs and complete assessments, please see documentation flowsheets.         CV ICU PROGRESS NOTE  February 17,  2022      CO-MORBIDITIES:   Aortic root aneurysm (H)    ASSESSMENT: Chuy Varner is a 32 yo M with a PMHx of bicuspid aortic valve, thoracic aortic aneurysm without rupture, hypertension, obesity, and testicular cancer s/p orchiectomy who underwent Bentall procedure with mechanical valve on 1/28 with Dr. Velasco and Dr. Hannah. 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    TODAY'S PROGRESS:   Overnight:  - Na re-check was 152 (raised D5 to 125ml/hr)  - Subsequently lowered down to 50ml/hr D5  Goals:  - Neuro following   - CT w/left thalamic infarct (old) otherwise unremarkable   - Recommended speech therapy eval, & MRI  - Ensure lymphedema wraps are applied (consult in)  - Continue to correct HyperNa+  - Added respiratory therapy orders (flutter, metaneb)    PLAN:  Neuro/ pain/ sedation:  # Acute Postoperative pain  - Monitor neurological status. Notify the MD for any acute changes in exam.              - Neurology following, appreciate recs    - dysarthric on exam -> speech therapy eval    - CT head -> no acute path (L thalamic infarct, old)    - Neurology would like an MRI before he leaves the hospital/ICU (not now)    - ENT see him for scope (for possibility of laryngeal injury [intubated ~2.5 wk])  - Pain: remains well controlled on current regimen              - PRN tylenol, dilaudid, robaxin, oxycodone                          - still no PRNs used  - RASS goal 0 to -1  - Sleep hygiene:              - Melatonin, trazadone qhs    Pulmonary care:   # Acute hypoxemic respiratory failure (ARDS vs. PNA vs. Hypervolemia vs. TRALI vs. severe atelectasis) s/p extubation  - Extubated, breathing on Room Air   - conditional VBGs -- draw one if increased somnolence or distressed breathing   - metanebs (percussive therapy)   - flutter valve QID to encourage coughing, clearance of airways   - PT/OT are seeing  him, he's getting up to chair, sitting up    Cardiovascular:    # Ascending aortic aneurysm s/p Bentall w/ Jerry valve on 1/28 with Dr. Velasco and Dr. Hannah  # Hx of HTN  - Goal MAP>65, SBP <130   - ASA 81mg  - Holding PTA meds: metoprolol succinate 25mg daily  - Management of HTN              - Coreg 50 BID, Hydralazine 75 TID              - Labetalol and hydralazine PRN to maintain SBP <130    - Not needing PRNs, no HTN or HypoTN    GI care:   # Obesity (BMI 46)  # Nutrition  - NJ feeds at goal              - consider swallow study once AMS, breathing stabilize  # Diarrhea/Incontinence  Suspect caused by large amounts of free water via NJ vs augmentin vs recent colonization of c. diff   - rectal tube is a risk for injury in conscious patient   - try to use rectal bag for now   - can re-send C. Diff 02/19 if no improvement (no imodium for now)  # Elevated ALT, AST  # Cholelithiasis  Delong sign negative. Canceled RUQ U/S  - LFT elevation likely from augmentin (ends 02/18)  - Monitor LFTs qd    Renal/ Fluid Balance/ Electrolytes:   # I/O Management  BL creat appears to be ~ 1.0  - Strict I/O              - As close to net 0 or negative as possible   - UOP numbers have been inaccurate (condom cath leak)  - Avoid/limit nephrotoxins as able  - Replete lytes PRN per protocol9  # Hypernatremia  - Uosm = 902 pre-hydrochlorothiazide              - Consider re-measure Uosm post-hydrochlorothiazide              - Continue hydrochlorothiazide 25 mg BID  - Continue NJ free water 200 q2              - F/u on Na+ and adjust IV D5 as needed (at 100/hr)    Endocrine:    # Stress induced hyperglycemia, improving  - HDSSI  - Lantus 50mg daily  - Goal BG <180 for optimal healing    ID/ Antibiotics:  # Stress induced leukocytosis, resolved  # Possible ventilator associated pneumonia  Pan cultures and re-cultures all remained unrevealing. Fever likely from previously untreated DVT vs sinusitis.  - Continue to monitor fever curve, WBC  "and inflammatory markers  - Augmentin PO for sinusititis; ends 02/17  - Consider re-consult to ID if fevers, leukocytosis reoccur    Heme:     # History of testicular cancer s/p orchiectomy  # RLE DVT  # HIT positive   RLE DVT              - Lymphedema wraps, consult placed  - CBC daily  - Heparin resistant              - Continue Bivalirudin gtt    MSK/ Skin:  # Sternotomy  # Surgical Incision  # Buttock pressure injury   - Sternal precautions  - Postoperative incision management per protocol  - PT/OT  - WOCN consulted for pressure injury              - Has known, small sacral ulcer              - CTAP did not show stranding c/f infection      Prophylaxis:    - Mechanical prophylaxis for DVT  - Chemical DVT prophylaxis- bivalirudin  - PPI - until 03/16 (30d from extubation) per institutional protocol  - HOB to 30 degrees     Lines/ tubes/ drains:  - PIV x 3  - Triple lumen PICC  - Condom urine cath  - Nasoduodenal (1/31)  - Rectal tube      Disposition:  - CVICU    Patient seen, findings and plan discussed with CV ICU staff     Xander Garcia, MS4    Resident/Fellow Attestation   I, Jean Angulo, was present with the medical student who participated in the service and in the documentation of the note.  I have verified the history and personally performed the physical exam and medical decision making.  I agree with the assessment and plan of care as documented in the note.      Jean Angulo MD  Anesthesiology, CA-2, PGY3      ====================================    SUBJECTIVE:   Chuy is without pain overnight, sleeping soundly this AM. Per RN, able to nod yes and no to questions, was able to state that his name is \"Chuy.\"    OBJECTIVE:   1. VITAL SIGNS:   Temp:  [36.9  C (98.5  F)-37.7  C (99.8  F)] 37.7  C (99.8  F)  Pulse:  [81-96] 96  Resp:  [20-28] 28  BP: (105-131)/(52-82) 126/78  SpO2:  [91 %-100 %] 100 %  FiO2 (%): 30 %  Resp: 28      2. INTAKE/ OUTPUT:   I/O last 3 completed shifts:  In: 7205.09 " [I.V.:3045.09; NG/GT:2730]  Out: 2685 [Urine:2685]    3. PHYSICAL EXAMINATION:   General: adult male, sleeping/resting in bed  Neuro: opens eyes and directs eyes to voice, nods yes and no to questions, follows commands (raise 1 finger, raise 2 fingers), per RN was able to state his name, although labored/dysarthric  Resp: Breathing non-labored on Room Air! Lungs CTAB  CV: RRR, normal S1, S2, sinus on telemetry  Abdomen: Soft, Non-distended, Non-tender, Delong's sign is negative in the RUQ, no abdominal pain x4  Incisions: c/d/i  Extremities: warm and well perfused, the R calf is swollen as compared to the left, no discoloration or duskiness, similar to prior    4. INVESTIGATIONS:   Arterial Blood Gases   Recent Labs   Lab 02/12/22  0434   PH 7.44   PCO2 45   PO2 74*   HCO3 30*     Complete Blood Count   Recent Labs   Lab 02/17/22  0330 02/16/22  0315 02/15/22  0350 02/14/22  0418   WBC 13.8* 13.6* 15.5* 18.5*   HGB 9.1* 8.1* 8.4* 8.5*    396 332 244     Basic Metabolic Panel  Recent Labs   Lab 02/17/22  1736 02/17/22  1731 02/17/22  1410 02/17/22  1303 02/17/22  1001 02/17/22  0912 02/17/22  0630     --  144  --  148*  --  148*   POTASSIUM 4.1  --  4.5  --  4.1  --  4.1   CHLORIDE 110*  --  114*  --  117*  --  118*   CO2 23  --  23  --  23  --  24   BUN 36*  --  37*  --  38*  --  42*   CR 0.62*  --  0.68  --  0.70  --  0.65*   * 126* 168* 160* 166*   < > 168*    < > = values in this interval not displayed.     Liver Function Tests  Recent Labs   Lab 02/17/22  0330 02/16/22  0315 02/15/22  0350 02/14/22  0418   * 141* 116* 58*   * 311* 189* 102*   ALKPHOS 89 87 83 73   BILITOTAL 0.4 0.4 0.4 0.7   ALBUMIN 2.2* 2.3* 2.4* 2.4*     Pancreatic Enzymes  No lab results found in last 7 days.  Coagulation Profile  Recent Labs   Lab 02/17/22  0330 02/16/22  0523 02/15/22  0350 02/14/22  0418   PTT 60* 55* 59* 59*         5. RADIOLOGY:   Recent Results (from the past 24 hour(s))   XR Chest Port  1 View    Narrative    Exam: XR CHEST PORT 1 VIEW, 2/17/2022 9:40 AM    Comparison: 2/15/2022    History: Reassess atelectasis    Findings:  AP view the chest. Postsurgical chest with intact median sternotomy  wires. Right upper central ectatic with tip in the low SVC. Cardiac  valve replacement. Enteric tube traverses below the field-of-view.    Trachea is midline. Stable enlarged cardiac silhouette. Perihilar and  bibasilar predominant pulmonary opacities. Low lung volumes. There is  no pneumothorax. Trace bilateral pleural effusions. The upper abdomen  is unremarkable.      Impression    Impression:   Stable to slightly improved appearance of perihilar and bibasilar  predominant opacities that likely represent pulmonary edema.    I have personally reviewed the examination and initial interpretation  and I agree with the findings.    TAE BRISCOE MD         SYSTEM ID:  [id number]       =========================================          Attestation signed by Geoffrey Gordon MD at 2/20/2022  3:33 PM (Updated):  Chuy Varner is a 31 year old male with a pmhx sig for bicuspid av and asc ao aneurysm, htn, obesity, and testicular ca s/p rsection who was admitted 1/28/2022 for a Bentall with mechanical AV with Dr. MCADAMS and Dr. ZAVALETA. Course complicated by post op hypoxemic respiratory failure and HIT. Extubated 2/14      Events since last note: CT head with old stroke in the L thalamic no acute findings, not likely related to speech     I personally examined the patient, reviewed vital signs, medications, laboratory values, imaging studies, and consults from the past 24 hours. The care plan was developed with the fellow/ANEL and I agree with the findings and plan out lined below with the following additions or exceptions.      Neuro: melatonin and trazadone for sleep, hypoactive delirium hypernatremia vs sedation ammonia level normal ent to eval vocal cords for phonation issue, Given old stroke neuro would  like MRI/MRA prior to discharge, hold on that for now on asp and starting statin in setting of stroke  Pulm: wean HFNC 30% and 45L continue to wean, IS flutter, oob to chair bipap if needed vbg no retaining  CV: HD hydral 75 tid, coreg 50 bid, aspirin 81mg  bival  for hit and av mechanical av.goal sbp <130 starting atorva 40 qhs   GI:   AP * Tbili 0.4 ? Augmentin now stopped Last BM 2/15 NJ with tf br in place as needed  Renal: brisk UOP continues, likely post atn diuresis with nephrogenic DI Cr:0.77  good uop net +2.6L and +1.2L since midnight but this is not accurate due to large unmeasured voids na 149 resume FW replacement + hydrochlorothiazide  K 4.2 follow na q6hrs  Id:wbc 13.8  coming down afebrile s/p course of Augmentin for sinusitis   Endo: BG goal <200, BG at goal on HDSSI +50 Lantus    Heme: stable; hgb:9.1 plt: 448 bival to warfarin INR pending PTT 69 HIT --> RLE occlusion    Prophy: bowel reg as appropriate, PPI, peridex, bival gtt for DVT prophy  Dispo: will remain in the ICU for continued care     I have personally spent a total of 50 minutes providing critical care services excluding procedure, teaching and ECMO management time for Chuy Varner who has a high probability of life-threatening deterioration due to the conditions listed above which required my direct attention, intervention, and personal management.       Geoffrey Gordon MD  Critical Care Cardiology  [phone] (c)      February 17, 2022    Care Management Follow Up    Length of Stay (days): 20    Expected Discharge Date:   TBD     Concerns to be Addressed: all concerns addressed in this encounter     Patient plan of care discussed at interdisciplinary rounds: Yes    Anticipated Discharge Disposition: TBD     Anticipated Discharge Services: None  Anticipated Discharge DME:  TBD    Additional Information:  This writer received a call from [facility] care coordinator. She can assist in discharge planning if  needed.   If patient will be transitioning to a rehab facility- will need authorization.      Intake assisting with discharge needs/authorization for facility placement: [phone]  Marilyn POWELL: [phone]  Benefit checks: [phone]      Kacey Mckeon, RN  Float RN Care Coordinator  Pager [phone] (unit RNCC pager)     For Weekend & Holiday on call RN Care Coordinator:  (Home discharge with needs including home care, Assisted living facility returns, Durable medical equip, IV antibiotics)   [place]    Pager [phone] or dial [phone] and enter job code [phone] at prompt  [facility] (Sunday Only) Pager [phone]    For weekend social work needs, contact information below:  (Transitional care unit, Long-term care unit, Hospice, Counseling, Domestic violence,Vulnerable adult, Health Care Directive)  4A, 4C, 4E, 5A, 5B  Pager [phone]  6A, 6B, 6C, 6D   Pager [phone]  7A, 7B, 7C, 7D, 5C  Pager [phone]  [facility] (Saturday Only) Pager [phone]    After hours for all units- (only  is available -[phone]/everyday)  Pager [phone]             02/17/22 1253   General Information   Onset of Illness/Injury or Date of Surgery 01/28/22   Referring Physician Silvano Bagley MD   Patient/Family Therapy Goal Statement (SLP) Wants water    Pertinent History of Current Problem Chuy Varner is a 31 year old male with a PMHx s/f bicuspid aortic valve, thoracic aortic aneurysm without rupture, hypertension, obesity, and testicular cancer s/p orchiectomy who underwent Bentall procedure with mechanical valve on 1/28 with Dr. Velasco and Dr. Hannah. 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   General Observations Pt is alert but fatigued. Able to maintain alertness with cues and support of his s/o.    Past History of Dysphagia None PTA per pt and s/o report    Type of Evaluation   Type of Evaluation Swallow Evaluation   Oral Motor   Oral Musculature generally intact   Structural Abnormalities none present   Mucosal Quality dry;sticky   Dentition (Oral Motor)   Dentition (Oral Motor) adequate dentition   Facial Symmetry (Oral Motor)   Facial Symmetry (Oral Motor) WNL   Lip Function (Oral Motor)   Lip Range of Motion (Oral Motor) WNL   Tongue Function (Oral Motor)   Tongue ROM (Oral Motor) WNL   Vocal Quality/Secretion Management (Oral Motor)   Vocal Quality (Oral Motor) hoarse  (gargled speech)   Secretion Management (Oral Motor) oral suctioning required  (sticky and thick secretions)   General Swallowing Observations   Current Diet/Method of Nutritional Intake (General Swallowing Observations, NIS) NPO;nasogastric tube (NG)   Respiratory Support (General Swallowing Observations) supplemental oxygen;oxygen mask   Swallowing Evaluation Clinical swallow evaluation   Clinical Swallow Evaluation   Feeding Assistance dependent   Clinical Swallow Evaluation Textures Trialed thin liquids   Clinical Swallow Eval: Thin Liquid Texture Trial   Mode of Presentation, Thin Liquids spoon;straw;fed by clinician   Volume of Liquid or Food Presented 2 oz    Oral Phase of Swallow premature pharyngeal entry   Pharyngeal Phase of Swallow impaired;throat clearing   Diagnostic Statement Intermittent throat clearing with teaspoons and single straw sips of thin liquids. Suspect this is partially related to secretion management as thick secretions were visualized, some where able to be cleared however suspect more   Esophageal Phase of Swallow   Patient reports or presents with symptoms of esophageal dysphagia No   Swallowing Recommendations   Diet Consistency Recommendations ice chips only   Supervision Level for Intake 1:1 supervision needed   Mode of Delivery Recommendations bolus size, small;slow rate of intake   Swallowing Maneuver Recommendations effortful (hard) swallow;extra swallow    Monitoring/Assistance Required (Eating/Swallowing) stop eating activities when fatigue is present;monitor for cough or change in vocal quality with intake   Recommended Feeding/Eating Techniques (Swallow Eval) maintain upright posture during/after eating for 30 minutes;minimize distractions during oral intake;provide assist with feeding   Medication Administration Recommendations, Swallowing (SLP) Non oral means at this time    Instrumental Assessment Recommendations instrumental evaluation not recommended at this time  (pending clinical course )   General Therapy Interventions   Planned Therapy Interventions Dysphagia Treatment   Dysphagia treatment Oropharyngeal exercise training;Modified diet education;Instruction of safe swallow strategies   Clinical Impression   Criteria for Skilled Therapeutic Interventions Met (SLP Eval) Yes, treatment indicated   SLP Diagnosis Oropharyngeal dysphagia    Risks & Benefits of therapy have been explained evaluation/treatment results reviewed;care plan/treatment goals reviewed;participants voiced agreement with care plan;participants included;spouse/significant other;patient   Clinical Impression Comments Pt seen for clinical swallow evaluation. He is fatigued but able to participate. He has thick and sticky secretions, some of which were able to be cleared. Ice chips, teaspoons of thin, and single straw sips consumed. No overt s/sx of aspiration with ice chips. Intermittent throat clearing and coughing with thin liquids. However, it is difficult to determine if related to aspiration or secondary to secretion managment. This in combination with his mentation increases his risk for aspiration and no further trials were provided. Oropharyngeal dysphagia secondary to prolonged intubation and altered mentation with garbled speech. Continue NPO status - complete frequent oral cares and allow ice chips. Initiate SLP services for dysphagia.    SLP Discharge Planning   SLP Discharge  Recommendation Transitional Care Facility;Acute Rehab Center-Motivated patient will benefit from intensive, interdisciplinary therapy.  Anticipate will be able to tolerate 3 hours of therapy per day   SLP Rationale for DC Rec Dysphagia    SLP Brief overview of current status  Oropharyngeal dysphagia secondary to prolonged intubation and altered mentation with garbled speech. Continue NPO status - complete frequent oral cares and allow ice chips. Initiate SLP services for dysphagia.     Total Evaluation Time   Total Evaluation Time (Minutes) 13   SLP Goals   Therapy Frequency (SLP Eval) 5 times/wk   SLP Predicated Duration/Target Date for Goal Attainment 03/31/22   SLP Goals Swallow   SLP: Safely tolerate diet without signs/symptoms of aspiration Regular diet;Thin liquids              [facility]    Stroke Progress Note    Interval EventsPatient speech improved able to utter single words with great difficulty    HPI Summary    Chuy Varner is a 31 year old male w pmhx of bicuspid aortic valve, unraptured thoracic aortic aneurysm, HTN, obesity, testicular CA s/p orchiectomy, s/p Bentall procedure with a mechanical valve 1/28. Course complicated by intubation for postop hypoxemic resp failure, HIT, s/p extubation, primary team consulting for speech difficulty.     Since extubation patient has not been able to speak, per primary team follows commands and appears to understand, but is completely mute. Has a significant generalized weakness but no obvious laterality to exam, consulted for concern for infarct.       MRI/Head CT CTH chronic L thalamic lacunar infarct   Intracranial Vasculature -   Cervical Vasculature -      Echocardiogram S/p Bentall, EF 65-70%,    EKG/Telemetry SR   Other Testing Not Applicable      LDL  No lab value available in past 30 days   A1C  No lab value available in past 30 days   Troponin No lab value available in past 48 hrs      Impression  "    Difficulty speaking in setting of prolonged intubation, now improving; likely incidental chronic L thalamic infarct 2/2 small vessel disease in setting of hypertension.    L thalamic infarcts can cause aphasia, but patients overall presentation more consistent with hypophonia from intubation and overall deconditioning post cardiac surgery, infarct appears chronic. Should get basic workup and follow up op for the chronic infarct.     Plan  -MRI brain w wo, MRA H+N - if unable to get MRA promptly please order CTA H+N  -Aspirin 81mg every day   -Atorvastatin 40mg at bedtime  -Continue anticoag for mech valve  -Lipid panel, HbA1C  -Follow-up op stroke neuro    Patient Follow-up    - final recommendation pending work-up    We will continue to follow.     The Stroke Staff is Dr. Nicholas Roman MD  Vascular Neurology Fellow  To page me or covering stroke neurology team member, click here: [email or web address]   Choose \"On Call\" tab at top, then search dropdown box for \"Neurology Adult\", select location, press Enter, then look for stroke/neuro ICU/telestroke.    ______________________________________________________    Clinically Significant Risk Factors Present on Admission                  Medications   Scheduled Meds    amoxicillin-clavulanate  1 tablet Oral BID     aspirin  81 mg Oral or NG Tube Daily     banatrol plus  2 packet Per Feeding Tube BID     carvedilol  50 mg Oral or Feeding Tube BID     hydrALAZINE  75 mg Oral or Feeding Tube Q8H DIAMOND     hydrochlorothiazide  25 mg Oral BID     insulin aspart  1-10 Units Subcutaneous Q4H     insulin glargine  50 Units Subcutaneous Daily     melatonin  10 mg Oral or Feeding Tube QPM     multivitamins w/minerals  15 mL Per Feeding Tube Daily     pantoprazole  40 mg Oral or NG Tube Daily     protein modular  2 packet Per Feeding Tube TID     sodium chloride (PF)  10-40 mL Intracatheter Q8H       Infusion Meds    bivalirudin ANTICOAGULANT (ANGIOMAX) 250 mg/250 mL in 0.9% Sodium " Chloride 0.18 mg/kg/hr (02/17/22 1200)     dextrose       D5W 100 mL/hr at 02/17/22 1500     - MEDICATION INSTRUCTIONS -       BETA BLOCKER NOT PRESCRIBED         PRN Meds  acetaminophen, bisacodyl, artificial tears ophthalmic solution, dextrose, glucose **OR** dextrose **OR** glucagon, HOLD MEDICATION, hydrALAZINE, HYDROmorphone **OR** HYDROmorphone, labetalol, lidocaine 4%, lidocaine 4%, lidocaine (buffered or not buffered), lidocaine (buffered or not buffered), loperamide, magnesium hydroxide, methocarbamol, naloxone **OR** naloxone **OR** naloxone **OR** naloxone, - MEDICATION INSTRUCTIONS -, ondansetron **OR** ondansetron, oxyCODONE **OR** oxyCODONE, polyethylene glycol-propylene glycol PF, prochlorperazine **OR** prochlorperazine, BETA BLOCKER NOT PRESCRIBED, sodium chloride (PF), sodium chloride (PF), sodium chloride (PF), traZODone       PHYSICAL EXAMINATION  Temp:  [98.5  F (36.9  C)-99.3  F (37.4  C)] 99.2  F (37.3  C)  Pulse:  [81-93] 85  Resp:  [20-26] 24  BP: (105-131)/(52-82) 114/69  FiO2 (%):  [30 %] 30 %  SpO2:  [91 %-99 %] 96 %      Neurologic  Mental Status:  Awake and alert, follows commands, names and repeats with difficulty, choked quiet voice, improved from yesterday  Cranial Nerves:  visual fields intact to threat, PERRL, EOMI with normal smooth pursuit, facial sensation intact and symmetric, facial movements symmetric, hearing not formally tested but intact to conversation, tongue protrusion midline  Motor: deltoid 0/5 bilat, bicep and tricep 2/5 bilat LE 0/5 hip flex 2/5 distally  Sensory:  light touch sensation intact and symmetric throughout upper and lower extremities, no extinction on double simultaneous stimulation   Coordination: Unable to perfrom  Station/Gait:  deferred    Stroke Scales    NIHSS  Interval     Interval Comments     1a. Level of Consciousness 1-->Not alert, but arousable by minor stimulation to obey, answer, or respond   1b. LOC Questions 2-->Answers neither question  correctly   1c. LOC Commands 0-->Performs both tasks correctly   2.   Best Gaze 0-->Normal   3.   Visual 0-->No visual loss   4.   Facial Palsy 0-->Normal symmetrical movements   5a. Motor Arm, Left 3-->No effort against gravity, limb falls   5b. Motor Arm, Right 3-->No effort against gravity, limb falls   6a. Motor Leg, Left 3-->No effort against gravity, leg falls to bed immediately   6b. Motor Leg, right 3-->No effort against gravity, leg falls to bed immediately   7.   Limb Ataxia 0-->Absent   8.   Sensory 0-->Normal, no sensory loss   9.   Best Language 3-->Mute, global aphasia, no usable speech or auditory comprehension   10. Dysarthria 2-->Severe dysarthria, patients speech is so slurred as to be unintelligible in the absence of or out of proportion to any dysphasia, or is mute/anarthric   11. Extinction and Inattention  0-->No abnormality   Total 20 (02/16/22 1454)       Modified [place] Score (Pre-morbid)  0-No deficits    Imaging  I personally reviewed all imaging; relevant findings per HPI.     Lab Results Data   CBC  Recent Labs   Lab 02/17/22  0330 02/16/22  0315 02/15/22  0350   WBC 13.8* 13.6* 15.5*   RBC 3.13* 2.79* 2.82*   HGB 9.1* 8.1* 8.4*   HCT 32.1* 29.3* 29.7*    396 332     Basic Metabolic Panel    Recent Labs   Lab 02/17/22  1303 02/17/22  1001 02/17/22  0912 02/17/22  0630 02/17/22  0335 02/17/22  0330   NA  --  148*  --  148*  --  149*   POTASSIUM  --  4.1  --  4.1  --  4.1   CHLORIDE  --  117*  --  118*  --  120*   CO2  --  23  --  24  --  26   BUN  --  38*  --  42*  --  42*   CR  --  0.70  --  0.65*  --  0.77   * 166* 156* 168*   < > 165*   MAYE  --  8.8  --  8.7  --  8.8    < > = values in this interval not displayed.     Liver Panel  Recent Labs   Lab 02/17/22  0330 02/16/22  0315 02/15/22  0350   PROTTOTAL 6.7* 6.8 7.3   ALBUMIN 2.2* 2.3* 2.4*   BILITOTAL 0.4 0.4 0.4   ALKPHOS 89 87 83   * 141* 116*   * 311* 189*     INR    Recent Labs   Lab Test  01/28/22  1447 01/28/22  1246 01/10/22  1216   INR 1.38* 1.53* 1.00      Lipid Profile    Recent Labs   Lab Test 02/08/22  0415 02/05/22  0359 02/04/22  0545   TRIG 129 208* 219*     A1C  No lab results found.  Troponin I  No results for input(s): TROPONIN, GHTROP in the last 168 hours.       Attestation signed by Jesse Peterson MD at 2/17/2022  5:37 PM (Updated):  Physician Attestation   I, Jesse Peterson MD, saw this patient with the resident and agree with the resident/fellow's findings and plan of care as documented in the note.      I personally reviewed vital signs, medications, labs, and imaging.    Key findings: CTH obtained showed old left thalamic stroke. Etiology is likely small vessel disease.   -cont asa and anticoagulation per cardiology. These will also protect him from stroke  -MRI brain wwo and MRA head and neck when able. Please call stroke team after completion of studies.  -Will need follow up with Neurology on discharge in 2-3 months.    Jesse Peterson MD  Date of Service (when I saw the patient): 02/17/22      CV ICU PROGRESS NOTE  February 18, 2022      CO-MORBIDITIES:   Aortic root aneurysm (H)    ASSESSMENT: Chuy Varner is a 30 yo M with a PMHx of bicuspid aortic valve, thoracic aortic aneurysm without rupture, hypertension, obesity, and testicular cancer s/p orchiectomy who underwent Bentall procedure with mechanical valve on 1/28 with Dr. Velasco and Dr. Hannah. 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    TODAY'S PROGRESS:   Overnight:  - Had a fever at 101 overnight  - Papular rash/ small pustules appeared on chest last night  Goals:  - Transfer today  - Advance diet per speech therapy   - f/u w/speech therapy about reason for dysarthria/hoarseness    PLAN:    Neuro/ pain/ sedation:  # Acute Postoperative pain  - Monitor neurological status. Notify the MD for  any acute changes in exam.              - Neurology following, appreciate recs                          - dysarthric on exam -> speech therapy evaluation 02/17     - need to f/u with speech about their suspected reason for dysarthria                          - Neurology would like an MRI before he leaves the hospital (not now)     - Please f/u on this as inpatient after transfer                          - ENT scoped, no reason for long term dysarthria/problems with phonation  - Pain: remains well controlled on current regimen               - PRN tylenol, dilaudid, robaxin, oxycodone  - RASS goal 0 to -1  - Sleep hygiene:              - Melatonin, trazadone at bedtime  # Aged (pre-existing) Thalamic Infarct  - Not contributing to current dysarthria vs delirium vs deconditioning per Neuro  - Please start statin after transfer, before discharge (as long as LFTs remain WNL)   - Lipid panel, HgbA1c ordered per neuro for stroke Hx   - Start atorvastatin 40 at bedtime before discharge    Pulmonary care:   # Acute hypoxemic respiratory failure (ARDS vs. PNA vs. Hypervolemia vs. TRALI vs. severe atelectasis) s/p extubation  - Extubated, breathing on Room Air              - conditional VBGs -- draw one if increased somnolence or distressed breathing   - continue respiratory therapy    - metanebs (percussive therapy)               - flutter valve QID to encourage coughing, clearance of airways    Cardiovascular:    # Ascending aortic aneurysm s/p Bentall w/ [place] valve on 1/28 with Dr. Velasco and Dr. Hannah  # Hx of HTN  - Goal MAP>65, SBP <130   - ASA 81mg  - Holding PTA meds: metoprolol succinate 25mg daily  - Management of HTN              - Coreg 50 BID, Hydralazine 75 TID              - Labetalol and hydralazine PRN to maintain SBP <130    GI care:   # Obesity (BMI 46)  # Nutrition  - NJ feeds at goal              - can take food PO now per Speech Language   - Diet -> full liquid, mild thickness  #  Diarrhea/Incontinence  Suspect caused by large amounts of free water via NJ vs augmentin vs recent colonization of C. diff              - hold off on rectal tube as able (since he is now conscious)              - Receiving team can re-send C. Diff 02/19 if no improvement (no imodium for now)  # Elevated ALT, AST  # Cholelithiasis  Delong sign negative. Downtrending LFTs. Suspect elevations d/t Augmentin   - please f/u after transfer and restart statins as able    Renal/ Fluid Balance/ Electrolytes:   # I/O Management  BL creat appears to be ~ 1.0  - Strict I/O              - As close to net 0 or negative as possible               - UOP numbers have been inaccurate (condom cath leak)  - Avoid/limit nephrotoxins as able  - Replete lytes PRN per protocol  # Hypernatremia  - Uosm = 902 pre-hydrochlorothiazide; F/u Uosm < 600 (no effect)              - continue hydrochlorothiazide 25 mg BID to maintain correction of hyperNa+ (and patient remains net volume up)  - Continue NJ free water 30 q4              - F/u on Na+ and adjust IV D5 as needed (at 0.00/hr)     Endocrine:    # Stress induced hyperglycemia, improving  - HDSSI  - Lantus 50mg daily  - Goal BG <180 for optimal healing    ID/ Antibiotics:  # Stress induced leukocytosis, resolved  # Possible ventilator associated pneumonia  Pan cultures and re-cultures all remained unrevealing. Fever likely from previously untreated DVT vs sinusitis.  - Continue to monitor fever curve, WBC and inflammatory markers  - Augmentin PO for sinusititis; ends 02/17  - Consider re-consult to ID if fevers, leukocytosis reoccur  - Consider re-send C. Difficile (02/19)    Heme:     # History of testicular cancer s/p orchiectomy  # RLE DVT  # HIT positive   RLE DVT              - Lymphedema wraps, consult placed  - CBC daily  - Heparin resistant              - Continue Bivalirudin gtt    MSK/ Skin:  # Sternotomy  # Surgical Incision  # Buttock pressure injury   - Sternal precautions  -  Postoperative incision management per protocol  - PT/OT  - WOCN following for pressure injuries  # Maculopapular Rash  Patient has maculopapular rash per Exam below.  - Likely represents folliculitis, monitor for now     Prophylaxis:    - Mechanical prophylaxis for DVT  - Chemical DVT prophylaxis- bivalirudin  - PPI - until 03/16 (30d from extubation) per institutional protocol  - HOB to 30 degrees     Lines/ tubes/ drains:  - PIV x 3  - Triple lumen PICC  - Condom urine cath  - Nasoduodenal (1/31)  - rectal tube order in (not currently placed)     Disposition:  - Transfer     Xander Garcia, MS4    Resident/Fellow Attestation   I, Jean Angulo, was present with the medical student who participated in the service and in the documentation of the note.  I have verified the history and personally performed the physical exam and medical decision making.  I agree with the assessment and plan of care as documented in the note.      Jean Angulo MD  Anesthesiology, CA-2, PGY3    ====================================    SUBJECTIVE:   No acute overnight events.    OBJECTIVE:   1. VITAL SIGNS:   Temp:  [99.2  F (37.3  C)-101  F (38.3  C)] 99.7  F (37.6  C)  Pulse:  [81-99] 98  Resp:  [23-30] 26  BP: (110-131)/(62-83) 119/73  SpO2:  [90 %-100 %] 95 %  Resp: 26    2. INTAKE/ OUTPUT:   I/O last 3 completed shifts:  In: 5079.26 [I.V.:1874.26; NG/GT:1710]  Out: 3200 [Urine:3200]    3. PHYSICAL EXAMINATION:   General: adult male, sleeping/resting in bed  Neuro: opens eyes and directs eyes to voice, nods yes and no to questions, follows commands (raise 1 finger, raise 2 fingers), per RN was able to state his name, although labored/dysarthric  Resp: Breathing non-labored on Room Air! Lungs CTAB  CV: RRR, normal S1, S2, sinus on telemetry  Abdomen: Soft, Non-distended, Non-tender x 4  Skin: there is a papular rash/ small pustules largely concentrated around hair follicles which spans from the R shoulder, across the chest, to  the left shoulder; legs are clear, abdomen has scattered similar lesions around the belt area  Incisions: c/d/i  Extremities: warm and well perfused, the R calf is swollen as compared to the left, no discoloration or duskiness, similar to prior    4. INVESTIGATIONS:   Arterial Blood Gases   Recent Labs   Lab 02/12/22  0434   PH 7.44   PCO2 45   PO2 74*   HCO3 30*     Complete Blood Count   Recent Labs   Lab 02/18/22 0316 02/17/22  0330 02/16/22  0315 02/15/22  0350   WBC 17.0* 13.8* 13.6* 15.5*   HGB 10.3* 9.1* 8.1* 8.4*   * 448 396 332     Basic Metabolic Panel  Recent Labs   Lab 02/18/22  0605 02/18/22  0321 02/18/22 0316 02/18/22  0228 02/18/22  0022 02/17/22  2208     --  141 143  --  142   POTASSIUM 3.9  --  3.9 3.9  --  4.2   CHLORIDE 112*  --  110* 112*  --  111*   CO2 24  --  26 25  --  24   BUN 33*  --  32* 32*  --  39*   CR 0.66  --  0.65* 0.61*  --  0.72   * 144* 149* 160*   < > 159*    < > = values in this interval not displayed.     Liver Function Tests  Recent Labs   Lab 02/18/22 0316 02/17/22 0330 02/16/22  0315 02/15/22  0350   * 101* 141* 116*   * 263* 311* 189*   ALKPHOS 97 89 87 83   BILITOTAL 0.6 0.4 0.4 0.4   ALBUMIN 2.4* 2.2* 2.3* 2.4*     Pancreatic Enzymes  No lab results found in last 7 days.  Coagulation Profile  Recent Labs   Lab 02/18/22 0316 02/17/22  0330 02/16/22  0523 02/15/22  0350   PTT 55* 60* 55* 59*         5. RADIOLOGY:   Recent Results (from the past 24 hour(s))   XR Chest Port 1 View    Narrative    Exam: XR CHEST PORT 1 VIEW, 2/17/2022 9:40 AM    Comparison: 2/15/2022    History: Reassess atelectasis    Findings:  AP view the chest. Postsurgical chest with intact median sternotomy  wires. Right upper central ectatic with tip in the low SVC. Cardiac  valve replacement. Enteric tube traverses below the field-of-view.    Trachea is midline. Stable enlarged cardiac silhouette. Perihilar and  bibasilar predominant pulmonary opacities. Low  lung volumes. There is  no pneumothorax. Trace bilateral pleural effusions. The upper abdomen  is unremarkable.      Impression    Impression:   Stable to slightly improved appearance of perihilar and bibasilar  predominant opacities that likely represent pulmonary edema.    I have personally reviewed the examination and initial interpretation  and I agree with the findings.    TAE BRISCOE MD         SYSTEM ID:  [id number]       =========================================          Attestation signed by Geoffrey Gordon MD at 2/20/2022  3:32 PM (Updated):  Chuy Varner is a 31 year old male with a pmhx sig for bicuspid av and asc ao aneurysm, htn, obesity, and testicular ca s/p rsection who was admitted 1/28/2022 for a Bentall with mechanical AV with Dr. MCADAMS and Dr. ZAVALETA. Course complicated by post op hypoxemic respiratory failure and HIT. Extubated 2/14      Events since last note: NAEO fever 101 rash due to removing wound vac    I personally examined the patient, reviewed vital signs, medications, laboratory values, imaging studies, and consults from the past 24 hours. The care plan was developed with the fellow/ANEL and I agree with the findings and plan out lined below with the following additions or exceptions.     Neuro: melatonin and trazadone for sleep, hypoactive delirium hypernatremia vs sedation ammonia level normal ent to eval vocal cords for phonation issue, Given old stroke neuro would like MRI/MRA prior to discharge, hold on that for now on asp and starting statin in setting of stroke  Pulm: ra IS flutter, oob to chair bipap if needed vbg no retaining metanebs ordered  CV: HD hydral 75 tid, coreg 50 bid, aspirin 81mg  bival  for hit and av mechanical av.goal sbp <130 well controlled, atorva 40 qhs   GI: AST  135   AP * Tbili 0.6 may need RUQ due to fluctuation Last BM 2/15 NJ with tf br in place as needed  Renal:  Cr:0.65  good uop net +4.3L and +450cc since midnight but this is  not accurate due to large unmeasured voids na 141 resume FW replacement down to 30q4 by nj + hydrochlorothiazide 25bid  K 3.9   Id:wbc 17 RISING + fever to 101 afebrile s/p course of Augmentin diarrhea improving, low threshold to resend cdiff remove all lines able already has external cath, picc and pivs only  Endo: BG goal <200, BG at goal on HDSSI +50 Lantus    Heme: stable; hgb:10.3 plt: 469 bival to warfarin INR pending PTT 55 HIT --> RLE occlusion    Prophy: bowel reg as appropriate, PPI, peridex, bival gtt for DVT prophy  Dispo: will  place floor orders    I have personally spent a total of 54 minutes providing care excluding procedure, teaching and ECMO management time for Chuy Varner who required my direct attention, intervention, and personal management.       Geoffrey Gordon MD  Critical Care Cardiology  [phone] (c)     February 17, 2022      Cardiovascular Surgery Progress Note  02/19/2022         Assessment and Plan:     Chuy Varner is a 30 yo M with a PMHx of bicuspid aortic valve, thoracic aortic aneurysm without rupture, hypertension, obesity, and testicular cancer s/p orchiectomy who underwent Bentall procedure with mechanical valve on 1/28 with Dr. Velasco and Dr. Hannah. 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     Cardiovascular:   # Ascending aortic aneurysm s/p Bentall w/ Jerry valve on 1/28 with Dr. Velasco and Dr. Hannah  # Hx of HTN  - Goal MAP>65, SBP <130   - ASA 81mg  - Management of HTN              - Coreg 50 BID, Hydralazine 75 TID              - Labetalol and hydralazine PRN to maintain SBP <130  - Anticoagulatin for valve, will need coumadin with INR goal 2-3 for three months, then 1.5-2.0 for life, with ASA. Start Coumadin today, Continue Bival as bridge.     Chest tubes and TPW removed  2/4    Pulmonary:  # Acute hypoxemic respiratory failure (ARDS vs. PNA vs. Hypervolemia vs. TRALI vs. severe atelectasis) s/p extubation  - Extubated 2/14 to HFNC, now breathing on Room Air  - conditional VBGs -- draw one if increased somnolence or distressed breathing  - continue respiratory therapy                          - metanebs (percussive therapy)                          - flutter valve QID to encourage coughing, clearance of airways    Neurology / MSK:  Neuro/ pain/ sedation:  # Acute Postoperative pain  - Monitor neurological status. Notify the MD for any acute changes in exam.  - Neurology following, appreciate recs  - speech therapy evaluation 02/17 for dysarthric   # Dysarthria   - ENT scoped, no reason for long term dysarthria/problems with phonation  - Pain well controlled on PRN tylenol, dilaudid, robaxin, oxycodone  - Sleep hygiene: Melatonin, trazadone at bedtime  # Thalamic infarct on head CT 2/16, old, age unknown  - Not contributing to current dysarthria vs delirium vs deconditioning per Neuro  - Neurology would like an MRI before he leaves the hospital   - Per Neuro, start statin, Lipitor 40 mg daily pending LFT's               - Lipid panel, HgbA1c ordered per neuro for stroke Hx      / Renal:  -Hypernatremia   BL creat appears to be ~ 1.0  - I/O's inaccurate   - Avoid/limit nephrotoxins as able  - Replete lytes PRN per protocol  - Uosm = 902 pre-hydrochlorothiazide; F/u Uosm < 600 (no effect)  -  hydrochlorothiazide 25 mg BID last given 2/17 for correction of hyperNa+ and hypervolemia.   - Continue NJ free water 30 q4  - Na+ wnl, 143  - Pre-op weight 365 lbs 11 oz, today's wt 335 lbs 11 oz, up 7 lbs from yesterday     - Moderate amount of right MARIAM, generalized edema as well.   - Consult PT/OT for right lymphedema wrap.   Diuresis: Lasix 40 mg IV X1 and reevaluate this afternoon.     GI / FEN:   # Obesity (BMI 46)  # Nutrition  - NJ feeds at goal  - Speech following okay to take full  liquid, mild thickness 1/18  # Diarrhea/Incontinence  Suspect caused by large amounts of free water via NJ vs augmentin vs recent colonization of C. diff  - Consider repeat C. Diff if no improvement (no imodium for now)  # Elevated ALT, AST  - Monitor for now   # Cholelithiasis  Delong sign negative. Downtrending LFTs. Suspect elevations d/t Augmentin             Endocrine:  # Stress induced hyperglycemia, improving  - HDSSI  - Hgb A1c 5.8 2/18  - Lantus 50mg daily, plan to decrease as using less sliding scale insulin and good BG control   - Goal BG <180 for optimal healing  - Consider consulting Endocrinology Monday if persistent need for insulin with no history of DM     Infectious Disease:  # Stress induced leukocytosis, resolved  # Possible ventilator associated pneumonia  Pan cultures and re-cultures all remained unrevealing. Fever likely from previously untreated DVT vs sinusitis.  - Continue to monitor fever curve, WBC and inflammatory markers  - Augmentin PO for sinusititis; ended 02/17  - Consider re-consult to ID if fevers, leukocytosis reoccur  - WBC 16.9 (17.0), Tmax 99.9 last evening   - Consider re-send C. Difficile (02/19)  - CXR today     Hematology:   # History of testicular cancer s/p orchiectomy  # RLE DVT  # HIT positive   - Right LE Lymphedema wrap, reconsult PT/OT   - CBC daily, Hgb 10.5  - Continue Bivalirudin gtt as bridge to coumadin   - start coumadin with INR goal 2-3     Anticoagulation:   - ASA 81 mg daily   - Bival drip bridge to therapeutic coumadin, INR goal 2-3 for three months, then 1.5-2.0 lifelong     MSK/ Skin:  # Sternotomy  # Surgical Incision  # Buttock pressure injury   - Sternal precautions  - Postoperative incision management per protocol  - PT/OT  - WOCN following for pressure injuries  # Maculopapular Rash  Patient has maculopapular rash per Exam below.  - Likely represents folliculitis, monitor for now, improving        Prophylaxis:    Mechanical prophylaxis for DVT  -  "Chemical DVT prophylaxis- bivalirudin  - PPI - until 03/16 (30d from extubation) per institutional protocol  - HOB to 30 degrees    Disposition:   - Transferred to  on 2/18  - Therapies recommending discharge to TCU     Discussed with Dr Velasco through both written and verbal communication.      Qi Umaña, DNP, CNP  [facility] Cardiothoracic Surgery  O: [phone]  Pgr [phone]        Interval History:     No overnight events.  Pain is well managed on current regimen.   Tolerating diet and tube feeds, is passing flatus, + BM, has had one loose stool today. No nausea or vomiting.  Breathing well without complaints.   Working with therapies.   Denies chest pain, palpitations, dizziness, syncopal symptoms, fevers, chills, myalgias, or sternal popping/clicking.         Physical Exam:   Blood pressure 114/64, pulse 103, temperature 99.1  F (37.3  C), temperature source Axillary, resp. rate 26, height 1.905 m (6' 3\"), weight (!) 152.3 kg (335 lb 11.2 oz), SpO2 94 %.  Vitals:    02/16/22 2000 02/18/22 0600 02/19/22 0447   Weight: (!) 152 kg (335 lb 1.6 oz) 149.1 kg (328 lb 11.2 oz) (!) 152.3 kg (335 lb 11.2 oz)      MAPs: 80's    Gen: Alert, NAD. Significant other is at the bedside.   Neuro: opens eyes and directs eyes to voice, nods yes and no to questions, follows commands, although labored/dysarthric  CV: RRR, normal S1 S2, no murmurs, rubs or gallops. Crisp valve click.   Pulm: CTA, no wheezing or rhonchi, normal breathing on RA  Abd: nondistended, normal BS, soft, nontender  Ext: Large amount of RLE peripheral edema, 2+ pitting. Generalized edema.   Incision: clean, dry, intact, no erythema, sternum stable  Tubes/drain sites: clean and dry         Data:    Imaging:  reviewed recent imaging, no acute concerns    Labs:  BMP  Recent Labs   Lab 02/19/22  0807 02/19/22  0544 02/19/22  0450 02/19/22  0218 02/19/22  0013 02/18/22  1920 02/18/22  1208 02/18/22  1006   NA  --  143  --  142  --  143  --  143   POTASSIUM  --  " 3.8  --  3.8  --  3.8  --  3.5   CHLORIDE  --  113*  --  112*  --  112*  --  114*   MAYE  --  8.4*  --  8.5  --  8.7  --  8.1*   CO2  --  23  --  27  --  24  --  22   BUN  --  33*  --  35*  --  33*  --  32*   CR  --  0.63*  --  0.63*  --  0.64*  --  0.63*   * 135* 152* 134*   < > 138*   < > 158*    < > = values in this interval not displayed.     CBC  Recent Labs   Lab 02/19/22  0544 02/18/22  0316 02/17/22  0330 02/16/22  0315   WBC 16.9* 17.0* 13.8* 13.6*   RBC 3.49* 3.50* 3.13* 2.79*   HGB 10.5* 10.3* 9.1* 8.1*   HCT 35.1* 34.7* 32.1* 29.3*   * 99 103* 105*   MCH 30.1 29.4 29.1 29.0   MCHC 29.9* 29.7* 28.3* 27.6*   RDW 15.6* 14.8 15.2* 15.4*    469* 448 396     INR  No lab results found in last 7 days.   Hepatic Panel  Recent Labs   Lab 02/19/22  0544 02/18/22  0316 02/17/22  0330 02/16/22  0315   * 135* 101* 141*   * 228* 263* 311*   ALKPHOS 91 97 89 87   BILITOTAL 0.5 0.6 0.4 0.4   ALBUMIN 2.5* 2.4* 2.2* 2.3*     GLUCOSE:   Recent Labs   Lab 02/19/22  0807 02/19/22  0544 02/19/22  0450 02/19/22  0218 02/19/22  0013 02/18/22  1920   * 135* 152* 134* 142* 138*       19:00-23:00  A/O x4 with slow garbled speech. SR on tele and stating > 90% on RA. Adequate UOP with external catheter, and no BM during shift. TF running at goal of 65mL/hr in NG. Lift assist/A2 with repositioning. R. TL PICC SL in red and white lumen and infusing bivalirudin at .18 mg/kg/hr in grey lumen. No new skin issues noted. Continue POC.      Cardiovascular Surgery Progress Note  02/20/2022         Assessment and Plan:     Chuy Varner is a 30 yo M with a PMHx of bicuspid aortic valve, thoracic aortic aneurysm without rupture, hypertension, obesity, and testicular cancer s/p orchiectomy who underwent Bentall procedure with mechanical valve on 1/28 with Dr. Velasco and Dr. Hannah. 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     Cardiovascular:   # Ascending aortic aneurysm s/p Bentall w/ Jerry valve on 1/28 with Dr. Velasco and Dr. Hannah  # Hx of HTN  - Goal MAP>65, SBP <130   - ASA 81mg  - Management of HTN              - Coreg 50 BID, Hydralazine 75 TID   - start Lisinopril 5 mg daily and decrease Hydralazine to 50 mg TID, plan to titrate up Lisinopril and wean off hydralazine as able.               - Labetalol and hydralazine PRN to maintain SBP <130, has not needed in several days.   - Anticoagulatin for valve, will need coumadin with INR goal 2-3 for three months, then 1.5-2.0 for life, with ASA. Started Coumadin 2/19, Continue Bival as bridge.     Chest tubes and TPW removed 2/4    Pulmonary:  # Acute hypoxemic respiratory failure (ARDS vs. PNA vs. Hypervolemia vs. TRALI vs. severe atelectasis) s/p extubation  - Extubated 2/14 to HFNC, now breathing on Room Air  - conditional VBGs -- draw one if increased somnolence or distressed breathing  - continue respiratory therapy                          - metanebs (percussive therapy)                          - flutter valve QID to encourage coughing, clearance of airways    Neurology / MSK:  Neuro/ pain/ sedation:  # Acute Postoperative pain  - Monitor neurological status. Notify the MD for any acute changes in exam.  - Neurology following, appreciate recs  - speech therapy evaluation 02/17 for dysarthric   # Dysarthria - improving   - ENT scoped, no reason for long term dysarthria/problems with phonation  - Pain well controlled on PRN tylenol, dilaudid, robaxin, oxycodone,   - has not needed IV dilaudid for several days, will discontinue   - Sleep hygiene: Melatonin, trazadone prn at bedtime  # Thalamic infarct on head CT 2/16, old, age unknown  - Not contributing to current dysarthria vs delirium vs deconditioning per Neuro  - Neurology would like an MRI before he leaves the  hospital, would recheck with neurology as he had a head CT with evidence of old infarct as above.    - Per Neuro, start statin, Lipitor 40 mg daily pending LFT's               - Lipid panel, HgbA1c ordered per neuro for stroke Hx      / Renal:  -Hypernatremia   BL creat appears to be ~ 1.0  - I/O's inaccurate   - Avoid/limit nephrotoxins as able  - Replete lytes PRN per protocol  - Uosm = 902 pre-hydrochlorothiazide; F/u Uosm < 600 (no effect)  -  hydrochlorothiazide 25 mg BID last given 2/17 for correction of hyperNa+ and hypervolemia.   - Continue NJ free water 30 q4  - Na+ wnl, 142  - Pre-op weight 365 lbs 11 oz, today's wt 337 lbs 4 oz, trending up   - Moderate amount of right MARIAM, generalized edema as well.   - Re Consulted PT/OT for right lymphedema wrap 2/19.   Diuresis: repeat IV Lasix 40 mg IV BID today, reevaluate in am     GI / FEN:   # Obesity (BMI 46)  # Nutrition  - NJ feeds at goal  - Speech following okay to advance to soft and bite sized diet with mild thickness 1/20  - start calorie counts  # Diarrhea/Incontinence  Suspect caused by large amounts of free water via NJ vs augmentin vs recent colonization of C. diff  - Consider repeat C. Diff if no improvement (no imodium for now)  - Less frequent loose stools, but with rising WBC will check for Cdiff  # Elevated ALT, AST  - Monitor for now   # Cholelithiasis  Delong sign negative. Downtrending LFTs. Suspect elevations d/t Augmentin             Endocrine:  # Stress induced hyperglycemia, improving  - HDSSI  - Hgb A1c 5.8 2/18  - Lantus 50mg daily, plan to decrease as using less sliding scale insulin and good BG control   - Goal BG <180 for optimal healing  - -120's with decreasing need of sliding scale insulin   - Continue to monitor BG and plan to wean down Lantus as able  - Consider Calorie Counts if patient tolerating po intake    Infectious Disease:  # Stress induced leukocytosis  # Possible ventilator associated pneumonia  # Sinusitis,  treated with Augmentin   Pan cultures and re-cultures all remained unrevealing. Fever likely from previously untreated DVT vs sinusitis.  - Continue to monitor fever curve, WBC and inflammatory markers  - Pending lab results, consider re-consulting ID with fever and leukocytosis   - WBC 17.5 (16.9), Tmax 99.8 this am. Lactate 1.3  - Check C. Difficile, UA, and Procal today   - CXR today     Hematology:   # History of testicular cancer s/p orchiectomy  # RLE DVT  # HIT positive   - Right LE Lymphedema wrap, reconsult PT/OT   - CBC daily, Hgb 11.1  - Continue Bivalirudin gtt as bridge to coumadin   - started coumadin 2/19 with INR goal 2-3, pharmacy to dose   -      Anticoagulation:   - ASA 81 mg daily   - Bival drip bridge to therapeutic coumadin, INR goal 2-3 for three months, then 1.5-2.0 lifelong   - Follow Chromogenic Factor X, goal 20-40 while on Bival. Chromogenic Factor X - 97    MSK/ Skin:  # Sternotomy  # Surgical Incision  # Buttock pressure injury   - Sternal precautions  - Postoperative incision management per protocol  - PT/OT  - WOCN following for pressure injuries  # Maculopapular Rash  Patient has maculopapular rash per Exam below.  - Likely represents folliculitis, monitor for now, improving        Prophylaxis:    Mechanical prophylaxis for DVT  - Chemical DVT prophylaxis- bivalirudin  - PPI - until 03/16 (30d from extubation) per institutional protocol  - HOB to 30 degrees    Disposition:   - Transferred to  on 2/18  - Therapies recommending discharge to TCU     Discussed with Dr. Hannah and Dr Velasco through both written and verbal communication.      Qi Umaña, PRATIK, CNP  [facility] Cardiothoracic Surgery  O: [phone]  Pgr [phone]        Interval History:     No overnight events. Didn't sleep well.   Pain is well managed on current regimen.   Tolerating diet and tube feeds, is passing flatus, + BM, had three loose stool yesterday. No nausea or vomiting.  Breathing well without complaints.  "  Working with therapies.   Denies chest pain, palpitations, dizziness, syncopal symptoms, fevers, chills, myalgias, or sternal popping/clicking.         Physical Exam:   Blood pressure 119/64, pulse 95, temperature 99.8  F (37.7  C), temperature source Axillary, resp. rate 22, height 1.905 m (6' 3\"), weight (!) 153 kg (337 lb 4.8 oz), SpO2 96 %.  Vitals:    02/18/22 0600 02/19/22 0447 02/20/22 0600   Weight: 149.1 kg (328 lb 11.2 oz) (!) 152.3 kg (335 lb 11.2 oz) (!) 153 kg (337 lb 4.8 oz)      MAPs: 80's    Gen: Alert, NAD. Talking, more awake today. Significant other is at the bedside.   Neuro: Follows commands, very deconditioned   CV: RRR, normal S1 S2, no murmurs, rubs or gallops. Crisp valve click.   Pulm: CTA, no wheezing or rhonchi, normal breathing on RA  Abd: nondistended, normal BS, soft, nontender  Ext: Large amount of RLE peripheral edema, 2+ pitting and generalized edema. Has RLE wraps on.   Incision: clean, dry, intact, no erythema, sternum stable  Tubes/drain sites: clean and dry         Data:    Imaging:  reviewed recent imaging, no acute concerns    CXR 2/19/2022: Findings:   Portable upright AP view of the chest. Right upper extremity PICC and  enteric tube in similar position.  Low lung volumes. Stable enlargement of cardiac silhouette. No  significant change in perihilar and bibasilar predominant pulmonary  opacities. No appreciable pneumothorax. No significant pleural  Effusion.                                                               Impression: No significant change in perihilar and bibasilar prominent  pulmonary opacities likely representative of atelectasis and/or  pulmonary edema    Labs:  BMP  Recent Labs   Lab 02/20/22  0814 02/20/22  0746 02/20/22  0603 02/20/22  0451 02/20/22  0149 02/20/22  0045 02/19/22  2234   NA  --  142 140  --  141  --  140   POTASSIUM  --  3.9 3.9  --  3.7  --  3.9   CHLORIDE  --  112* 112*  --  112*  --  110*   MAYE  --  8.6 8.6  --  8.4*  --  8.6   CO2  " --  22 22  --  26  --  24   BUN  --  39* 39*  --  38*  --  43*   CR  --  0.56* 0.63*  --  0.62*  --  0.70   * 138* 137* 126* 122*   < > 134*    < > = values in this interval not displayed.     CBC  Recent Labs   Lab 02/20/22  0603 02/19/22  0544 02/18/22  0316 02/17/22  0330   WBC 17.5* 16.9* 17.0* 13.8*   RBC 3.67* 3.49* 3.50* 3.13*   HGB 11.1* 10.5* 10.3* 9.1*   HCT 36.8* 35.1* 34.7* 32.1*    101* 99 103*   MCH 30.2 30.1 29.4 29.1   MCHC 30.2* 29.9* 29.7* 28.3*   RDW 15.9* 15.6* 14.8 15.2*    414 469* 448     INR  Recent Labs   Lab 02/20/22  0603   INR 1.53*      Hepatic Panel  Recent Labs   Lab 02/20/22  0603 02/19/22  0544 02/18/22  0316 02/17/22  0330   * 142* 135* 101*   * 183* 228* 263*   ALKPHOS 91 91 97 89   BILITOTAL 0.5 0.5 0.6 0.4   ALBUMIN 2.5* 2.5* 2.4* 2.2*     GLUCOSE:   Recent Labs   Lab 02/20/22  0814 02/20/22  0746 02/20/22  0603 02/20/22  0451 02/20/22  0149 02/20/22  0045   * 138* 137* 126* 122* 121*       CLINICAL NUTRITION SERVICES - REASSESSMENT NOTE     Nutrition Prescription    RECOMMENDATIONS FOR MDs/PROVIDERS TO ORDER:  Monitor isaiah cts, ability to consume least 1700 Kcals and 125 gm protein (~60% est needs) before considering end of nutrition support.     Malnutrition Status:    Severe malnutrition in the context of acute illness.    Recommendations already ordered by Registered Dietitian (RD):  1. Calorie counts x 3 days 2/21-2/23      2. Begin ONS, Ensure Enlive, BID between meals     3. Modify to a cyclic TF regimen to stimulate appetite as below:   Two Isaiah HN (cycle) x  14 hours (18h00 to 08h00) @ 60 mL/hr  [840 mL]+ 2 pkt BID Prosource daily to provide: 1840 Kcals (18 Kcal/kg), 115 gm protein ( 1.1 gm/kg), 184 gm CHO, 5 gm fiber, and 588 mL free water daily.   + FWF 30 mL q4hrs while TF running    4. Banatrol 1 pkt BID. Adds 80 kcal, 20 g CHO, 4 g fiber    Future/Additional Recommendations:  Monitor PO intake, calorie counts, wt trends,  ability to further decrease tube feedings.     EVALUATION OF THE PROGRESS TOWARD GOALS   Diet: Regular, thin liquids  (advanced today from minced & moisted, mildly thick)    Nutrition Support: TwoCal HN @ 65 mL/hr (1560 mL/day) + Prosource (2 pkt TID) + Banatrol (1 pkt BID) to provide 3360 kcals (32 kcal/kg/day, 70% MREE), 197 g PRO (1.9 g/kg/day), 1092 mL H2O, 342 g CHO and 12 g Fiber daily.     Intake:     Per RN notes, poor appetite and needs encouragement to eat. Patient reports eating 1/3 - 1/2 of trays today. Says he is feeling full from tube feeding. Open to trying ONS between meals.    TF average 1462 mL/day over past week. With 2 pkt TID Prosource + Banatrol 1 pkt BID, total provisions = 3244 kcal/day (31 kcal/kg), 189 g protein (1.8 g/kg), 340 g CHO, 133 g fat, 11 g fiber daily.                                                                                                                                                                          NEW FINDINGS   -General:   Dysarthria improving per MD note 2/20  CT 2/16 found old thalamic stroke, per neuro not contributing to current dysarthria vs delirium vs deconditioning  Calorie counts ordered today    -Wt: Wt loss 2.8% in past week, 9.6% in past 2 weeks  02/21/22 0417 147.7 kg (325 lb 9.6 oz)   02/20/22 0600 153 kg (337 lb 4.8 oz) Abnormal    02/19/22 0447 152.3 kg (335 lb 11.2 oz) Abnormal    02/18/22 0600 149.1 kg (328 lb 11.2 oz)   02/16/22 2000 152 kg (335 lb 1.6 oz) Abnormal    02/15/22 2300 152 kg (335 lb) Abnormal    02/15/22 0000 153.1 kg (337 lb 8 oz) Abnormal    02/14/22 0000 151.9 kg (334 lb 12.8 oz) Abnormal    02/12/22 2000 154 kg (339 lb 9.6 oz) Abnormal    02/12/22 0400 156 kg (343 lb 14.4 oz) Abnormal    02/11/22 0400 159.9 kg (352 lb 9.6 oz) Abnormal    02/09/22 0300 165.1 kg (364 lb) Abnormal    02/08/22 0600 165.3 kg (364 lb 6.7 oz) Abnormal    02/07/22 0000 163.3 kg (360 lb) Abnormal    02/06/22 0400 164.1 kg (361 lb 11.2  oz) Abnormal    02/05/22 0400 164.7 kg (363 lb) Abnormal    02/03/22 0400 168 kg (370 lb 6 oz) Abnormal    02/02/22 0400 168.7 kg (371 lb 14.7 oz) Abnormal    02/01/22 0000 165.5 kg (364 lb 13.8 oz) Abnormal    01/31/22 1600 164.8 kg (363 lb 5.1 oz) Abnormal    01/29/22 0000 165.2 kg (364 lb 3.2 oz) Abnormal    01/28/22 0551 165.9 kg (365 lb 11.9 oz) Abnormal        -Labs:  BUN 35 (H), Cr 0.6 (L)  Na+, K+, Mg++, Phos all WNL   (H), AST 99(H)  Alk phos and Total bili WNL  Glucose 125, HgbA1c 5.8 on 2/18     -GI:   Less frequent loose stools  C.Diff negative 2/21    -Skin: Per WOC RN note 2/16: pressure injury on bilateral nares improved on right side, resolved on left. Right buttock superficial abrasions healing.    -Meds:   Prosource 2 pkts TID  Banatrol 1 pkt BID  Lasix  Lantus  Novolog q4  MVI  Protonix  Coumadin started 2/19   Replacement protocols K+, Mg++, Phos    -Diet/SLP: Advanced to regular today per SLP. Thin liquids.    -EN Access: NDT    MALNUTRITION  % Intake: </= 50% for >/= 5 days (severe)  % Weight Loss: > 2% in 1 week (severe)  Subcutaneous Fat Loss: None observed  Muscle Loss: None observed  Fluid Accumulation/Edema: None noted  Malnutrition Diagnosis: Severe malnutrition in the context of acute illness.    Previous Goals   Total avg nutritional intake to meet a minimum of 28 kcal/kg (60% MREE) and 1.5 g PRO/kg daily (per dosing wt 105 kg).  Evaluation: Met    Previous Nutrition Diagnosis  Inadequate energy intake related to inadequate enteral infusion in setting of increased nutrition needs as evidenced by metabolic cart study, received ~40% energy needs on avg over past 5 days (from TF + propofol)    Evaluation: Improving    CURRENT NUTRITION DIAGNOSIS  Inadequate oral intake related to difficulty swallowing post-extubation last week as evidenced by patient reliant on TF to meet needs, NPO status until 2/18, did not begin eating orally until  2/20.    INTERVENTIONS  Implementation  Collaboration with other nutrition professionals - NSA to document melissa cts  Collaboration with other providers - NATIVIDAD, bedside RN   Enteral Nutrition - Modify schedule to nocturnal/cyclic   Medical Food/supplement therapy - ONS BID     Goals  Total oral intake of at least 1700 Kcals and 125 gm protein (~60% est needs) before considering end of nutrition support.     Monitoring/Evaluation  Progress toward goals will be monitored and evaluated per protocol.    Shonna Mcfarlane  Dietetic Intern        Attestation signed by Bartolo Rosario RD at 2/21/2022  4:29 PM:  I have read & agree with the below nutrition assessment, intervention and evaluation.     Bartolo Rosario RDN, LD, CNSC  6B RD pager: [phone] 6L RD Phone: [phone]           Cardiovascular Surgery Progress Note  02/21/2022         Assessment and Plan:     Chuy Varner is a 32 yo M with a PMHx of bicuspid aortic valve, thoracic aortic aneurysm without rupture, hypertension, obesity, and testicular cancer s/p orchiectomy who underwent Bentall procedure with mechanical valve on 1/28 with Dr. Velasco and Dr. Hannah. 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     Cardiovascular:   # Ascending aortic aneurysm s/p Bentall w/ Barker valve on 1/28 with Dr. Velasco and Dr. Hannah  # Hx of HTN  - Goal MAP>65, SBP <130   - ASA 81mg  - Management of HTN              - Coreg 50 BID   -  2/20 started Lisinopril 5 mg daily and decreased Hydralazine to 50 mg TID, plan to titrate up Lisinopril and wean off hydralazine as able.    - Will stop hydralazine 2/21 and increase Lisinopril to 7.5 mg daily.               - Labetalol and hydralazine PRN to maintain SBP <130, has not needed in several days.   - Anticoagulatin for valve, will need coumadin with INR  goal 2-3 for three months, then 1.5-2.0 for life, with ASA. Started Coumadin 2/19, Continue Bival as bridge.     Chest tubes and TPW removed 2/4    Pulmonary:  # Acute hypoxemic respiratory failure (ARDS vs. PNA vs. Hypervolemia vs. TRALI vs. severe atelectasis) s/p extubation  - Extubated 2/14 to HFNC, now breathing on Room Air  - conditional VBGs -- draw one if increased somnolence or distressed breathing  - continue respiratory therapy                          - metanebs (percussive therapy)                          - flutter valve QID to encourage coughing, clearance of airways    Neurology / MSK:  Neuro/ pain/ sedation:  # Acute Postoperative pain  - Monitor neurological status. Notify the MD for any acute changes in exam.  - Neurology following, appreciate recs  - speech therapy evaluation 02/17 for dysarthric   # Dysarthria - improving   - ENT scoped, no reason for long term dysarthria/problems with phonation  - Pain well controlled on PRN tylenol, dilaudid, robaxin, oxycodone,   - has not needed IV dilaudid for several days, will discontinue   - Sleep hygiene: Melatonin, trazadone prn at bedtime  # Thalamic infarct on head CT 2/16, old, age unknown  - Not contributing to current dysarthria vs delirium vs deconditioning per Neuro  - Neurology would like an MRI before he leaves the hospital, would recheck with neurology as he had a head CT with evidence of old infarct as above.    - Per Neuro, start statin, Lipitor 40 mg daily pending LFT's               - Lipid panel, HgbA1c ordered per neuro for stroke Hx      / Renal:  -Hypernatremia   BL creat appears to be ~ 1.0  - I/O's inaccurate   - Avoid/limit nephrotoxins as able  - Replete lytes PRN per protocol  - Uosm = 902 pre-hydrochlorothiazide; F/u Uosm < 600 (no effect)  -  hydrochlorothiazide 25 mg BID last given 2/17 for correction of hyperNa+ and hypervolemia.   - Continue NJ free water 30 q4  - Na+ wnl  - Pre-op weight 365 lbs 11 oz, weight down today  147.7 kg  - Moderate amount of right MARIAM, generalized edema as well.   - Re Consulted PT/OT for right lymphedema wrap 2/19.   Diuresis: repeat IV Lasix 40 mg IV BID today to keep negative, got same 2/20, reevaluate in am.    GI / FEN:   # Obesity (BMI 46)  # Nutrition  - NJ feeds at goal  - Speech following okay to advance to soft and bite sized diet with mild thickness 1/20  - start calorie counts  # Diarrhea/Incontinence  Suspect caused by large amounts of free water via NJ vs augmentin vs recent colonization of C. diff  - Consider repeat C. Diff if no improvement (no imodium for now)  - Less frequent loose stools, Cdiff-negative 2/20  # Elevated ALT, AST  - Monitor for now, trending down, no abdominal pain.    # Cholelithiasis  Delong sign negative. Downtrending LFTs. Suspect elevations d/t Augmentin             Endocrine:  # Stress induced hyperglycemia, improving  - HDSSI  - Hgb A1c 5.8 2/18  - Lantus 50mg daily, plan to decrease as using less sliding scale insulin and good BG control   - Goal BG <180 for optimal healing  - -120's with decreasing need of sliding scale insulin   - Continue to monitor BG and plan to wean down Lantus as able  - Consider Calorie Counts if patient tolerating po intake    Infectious Disease:  # Stress induced leukocytosis  # Possible ventilator associated pneumonia  # Sinusitis, treated with Augmentin   Pan cultures and re-cultures all remained unrevealing. Fever likely from previously untreated DVT vs sinusitis.  - Continue to monitor fever curve, WBC and inflammatory markers  - Pending lab results, consider re-consulting ID with fever and leukocytosis   - WBC 15.9 and trending down, AF. Lactate 1.3  - Check C. Difficile, UA, and Procal today-unremarkable.    - CXR no change      Hematology:   # History of testicular cancer s/p orchiectomy  # RLE DVT  # HIT positive   - Right LE Lymphedema wrap, reconsult PT/OT   - CBC daily, Hgb 10 from 11.1, will recheck.   -  "Continue Bivalirudin gtt as bridge to coumadin   - started coumadin 2/19 with INR goal 2-3, pharmacy to dose   - Right upper extremity swelling, will get US bilaterally 2/21     Anticoagulation:   - ASA 81 mg daily   - Bival drip bridge to therapeutic coumadin, INR goal 2-3 for three months, then 1.5-2.0 lifelong   - Follow Chromogenic Factor X, goal 20-40 while on Bival. Chromogenic Factor X - 97    MSK/ Skin:  # Sternotomy  # Surgical Incision  # Buttock pressure injury   - Sternal precautions  - Postoperative incision management per protocol  - PT/OT  - WOCN following for pressure injuries  # Maculopapular Rash  Patient has maculopapular rash per Exam below.  - Likely represents folliculitis, monitor for now, improving        Prophylaxis:    Mechanical prophylaxis for DVT  - Chemical DVT prophylaxis- bivalirudin  - PPI - until 03/16 (30d from extubation) per institutional protocol  - HOB to 30 degrees    Disposition:   - Transferred to  on 2/18  - Therapies recommending discharge to TCU, likely by end of week.     Discussed with  Dr Velasco through both written and verbal communication.      Zuleika Kraft PA-C  Cardiothoracic Surgery  Pager [phone]  February 21, 2022        Interval History:     No overnight events.   Pain is well managed on current regimen.   Tolerating diet and tube feeds, is passing flatus, + BM, No nausea or vomiting.  Breathing well without complaints.   Working with therapies.   Denies chest pain, palpitations, dizziness, syncopal symptoms, fevers, chills, myalgias, or sternal popping/clicking.         Physical Exam:   Blood pressure 106/64, pulse 92, temperature 97.8  F (36.6  C), temperature source Oral, resp. rate 18, height 1.905 m (6' 3\"), weight 147.7 kg (325 lb 9.6 oz), SpO2 98 %.  Vitals:    02/19/22 0447 02/20/22 0600 02/21/22 0417   Weight: (!) 152.3 kg (335 lb 11.2 oz) (!) 153 kg (337 lb 4.8 oz) 147.7 kg (325 lb 9.6 oz)        Gen: Alert, NAD. Talking, more awake " today. Significant other is at the bedside.   Neuro: Follows commands, very deconditioned   CV: RRR, normal S1 S2, no murmurs, rubs or gallops. Crisp valve click.   Pulm: CTA, no wheezing or rhonchi, normal breathing on RA  Abd: nondistended, normal BS, soft, nontender  Ext: Large amount of RLE peripheral edema, 2+ pitting and generalized edema. Has RLE wraps on.   Incision: clean, dry, intact, no erythema, sternum stable  Tubes/drain sites: clean and dry         Data:    Imaging:  reviewed recent imaging, no acute concerns  2/19/22  9:41 AM [id number] [facility] Imaging        PACS Images     Show images for XR Chest Port 1 View    Study Result    Narrative & Impression   Exam: XR CHEST PORT 1 VIEW, 2/19/2022 9:41 AM     Indication: elevated WBC and follow up opacities     Comparison: 2/17/2022     Findings:   Portable upright AP view of the chest. Right upper extremity PICC and  enteric tube in similar position.     Low lung volumes. Stable enlargement of cardiac silhouette. No  significant change in perihilar and bibasilar predominant pulmonary  opacities. No appreciable pneumothorax. No significant pleural  effusion.                                                                      Impression: No significant change in perihilar and bibasilar prominent  pulmonary opacities likely representative of atelectasis and/or  pulmonary edema     I have personally reviewed the examination and initial interpretation  and I agree with the findings.     MICAH VIERA MD                 Labs:  BMP  Recent Labs   Lab 02/21/22  1228 02/21/22  0953 02/21/22  0756 02/21/22  0557 02/21/22  0425 02/21/22  0149 02/20/22  2330 02/20/22  2156   NA  --  139  --  139  --  139  --  139   POTASSIUM  --  3.7  --  4.5  --  3.5  --  3.5   CHLORIDE  --  109  --  110*  --  108  --  106   MAYE  --  8.2*  --  8.3*  --  8.2*  --  8.2*   CO2  --  23  --  24  --  27  --  24   BUN  --  35*  --  36*  --  36*  --  38*   CR  --  0.60*   --  0.59*  --  0.61*  --  0.67   * 125* 121* 102*   < > 123*   < > 123*    < > = values in this interval not displayed.     CBC  Recent Labs   Lab 02/21/22  0426 02/20/22  0603 02/19/22  0544 02/18/22  0316   WBC 15.9* 17.5* 16.9* 17.0*   RBC 3.40* 3.67* 3.49* 3.50*   HGB 10.0* 11.1* 10.5* 10.3*   HCT 33.3* 36.8* 35.1* 34.7*   MCV 98 100 101* 99   MCH 29.4 30.2 30.1 29.4   MCHC 30.0* 30.2* 29.9* 29.7*   RDW 15.9* 15.9* 15.6* 14.8    369 414 469*     INR  Recent Labs   Lab 02/21/22  0425 02/20/22  0603   INR 1.50* 1.53*      Hepatic Panel  Recent Labs   Lab 02/21/22  0149 02/20/22  0603 02/19/22  0544 02/18/22  0316   AST 99* 127* 142* 135*   * 167* 183* 228*   ALKPHOS 83 91 91 97   BILITOTAL 0.5 0.5 0.5 0.6   ALBUMIN 2.5* 2.5* 2.5* 2.4*     GLUCOSE:   Recent Labs   Lab 02/21/22  1228 02/21/22  0953 02/21/22  0756 02/21/22  0557 02/21/22  0425 02/21/22  0149   * 125* 121* 102* 114* 123*       Care Management Follow Up    Length of Stay (days): 24    Expected Discharge Date: 02/23/2022     Concerns to be Addressed: all concerns addressed in this encounter     Patient plan of care discussed at interdisciplinary rounds: No    Anticipated Discharge Disposition: LTACH, Acute Rehab     Anticipated Discharge Services: None  Anticipated Discharge DME:      Patient/family educated on Medicare website which has current facility and service quality ratings: no  Education Provided on the Discharge Plan:    Patient/Family in Agreement with the Plan:  (pt unable to talk d/t vent, GF answered questions)    Referrals Placed by CM/SW:    Private pay costs discussed: Not applicable    Additional Information:   following up to discuss ARU options. Met w/ pt and SO, Debbie, at bedside. Pt confused and ultimately had to discuss options with pt's SO outside of room as pt because anxious. Pt's only two options for ARU are [facility] and [facility]. SO asked questions about TCU as there are TCUs in  [place]. We discussed the difference between ARU and TCU level of care and that pt currently would benefit from ARU level of care. SO agrees but wants to talk more with pt and his mother. May need to involve pt's mother in discharge planning as she is next legal decision maker for pt.       Randee Guevara, [facility]            Cardiovascular Surgery Progress Note  02/22/2022         Assessment and Plan:     Chuy Varner is a 30 yo M with a PMHx of bicuspid aortic valve, thoracic aortic aneurysm without rupture, hypertension, obesity, and testicular cancer s/p orchiectomy who underwent Bentall procedure with mechanical valve on 1/28 with Dr. Velasco and Dr. Hannah. 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     Cardiovascular:   # Ascending aortic aneurysm s/p Bentall w/ Jerry valve on 1/28 with Dr. Velasco and Dr. Hannah  # Hx of HTN  - Goal MAP>65, SBP <130   - ASA 81mg  - Management of HTN              - Coreg 50 BID   -  2/20 started Lisinopril 5 mg daily and decreased Hydralazine to 50 mg TID, plan to titrate up Lisinopril and wean off hydralazine as able.    -Stopped hydralazine 2/21 and increased Lisinopril to 7.5 mg daily, b/p's 140's.    - Increase Lisinopril to 10 mg daily and titrate further as needed.               - Labetalol and hydralazine PRN to maintain SBP <130, has not needed in several days., will discontinue    - Anticoagulatin for valve, will need coumadin with INR goal 2-3 for three months, then 1.5-2.0 for life, with ASA. Started Coumadin 2/19, Continue Bival as bridge.     Chest tubes and TPW removed 2/4    Pulmonary:  # Acute hypoxemic respiratory failure (ARDS vs. PNA vs. Hypervolemia vs. TRALI vs. severe atelectasis) s/p extubation  - Extubated 2/14 to HFNC, now breathing on Room Air  - conditional VBGs --  draw one if increased somnolence or distressed breathing  - continue respiratory therapy                          - metanebs (percussive therapy)                          - flutter valve QID to encourage coughing, clearance of airways    Neurology / MSK:  Neuro/ pain/ sedation:  # Acute Postoperative pain  - Monitor neurological status. Notify the MD for any acute changes in exam.  - Neurology following, appreciate recs  - speech therapy evaluation 02/17 for dysarthric   # Dysarthria - improving   - ENT scoped, no reason for long term dysarthria/problems with phonation  - Pain well controlled on PRN tylenol, dilaudid, robaxin, oxycodone,   - has not needed IV dilaudid for several days, will discontinue   - Sleep hygiene: Melatonin, trazadone prn at bedtime  # Thalamic infarct on head CT 2/16, old, age unknown  - Not contributing to current dysarthria vs delirium vs deconditioning per Neuro  - Neurology would like an MRI before he leaves the hospital, would recheck with neurology as he had a head CT with evidence of old infarct as above.    - Per Neuro, start statin, Lipitor 40 mg daily pending LFT's               - Lipid panel, HgbA1c ordered per neuro for stroke Hx  # Symptoms of depression with insomnia   - reports feeling down and not being able to sleep despite melatonin and prn Trazodone   - Mental health consult, may need antidepressant. Patient and SO agreeable to MH visit.       / Renal:  -Hypernatremia   BL creat appears to be ~ 1.0  - I/O's inaccurate   - Avoid/limit nephrotoxins as able  - Replete lytes PRN per protocol  - Uosm = 902 pre-hydrochlorothiazide; F/u Uosm < 600 (no effect)  -  hydrochlorothiazide 25 mg BID last given 2/17 for correction of hyperNa+ and hypervolemia.   - Continue NJ free water 30 q4  - Na+ wnl  - Pre-op weight 365 lbs 11 oz, weight down today 325 lbs 6 oz  - Moderate amount of MARIAM, has wraps on with decreased edema. Has windy UE edema with windy non-occlusive DVTs- elevate both  arms and limit B/p's    - Re Consulted PT/OT for right lymphedema wrap 2/19.   Diuresis: Decrease IV Lasix to 40 mg po BID today to keep negative to net even.     GI / FEN:   # Obesity (BMI 46)  # Nutrition  - NJ feeds at goal, starting cycling TF's this am, 8P-8A  - Speech following okay to advanced to soft and bite sized diet with mild thickness 1/20  - started calorie counts 2/21  # Diarrhea/Incontinence  Suspect caused by large amounts of free water via NJ vs augmentin vs recent colonization of C. diff  - Consider repeat C. Diff if no improvement (no imodium for now)  - Less frequent loose stools, Cdiff-negative 2/20  # Elevated ALT, AST  - Monitor for now, trending down, no abdominal pain.    # Cholelithiasis  Downtrending LFTs. Suspect elevations d/t Augmentin             Endocrine:  # Stress induced hyperglycemia, improving  - HDSSI  - Hgb A1c 5.8 2/18  - Decrease Lantus to 40 U daily, plan to decrease as using less sliding scale insulin and good BG control   - Goal BG <180 for optimal healing  - -120's without any sliding scale insulin   - Continue to monitor BG    Infectious Disease:  # Stress induced leukocytosis  # Possible ventilator associated pneumonia  # Sinusitis, treated with Augmentin   Pan cultures and re-cultures all remained unrevealing. Fever likely from previously untreated DVT vs sinusitis.  - Continue to monitor fever curve, WBC and inflammatory markers  - WBC 14.2 (15.9) trending down, AF. Lactate 1.3  - Checked C. Difficile 2/21 negative, UA, and Procal today-unremarkable.    - CXR no change      Hematology:   # History of testicular cancer s/p orchiectomy  # RLE, Cesar UE DVT's  # HIT positive   - Right LE Lymphedema wrap, reconsult PT/OT   - CBC daily, Hgb 9.6, stable   - Continue Bivalirudin gtt as bridge to coumadin, following Chromogenic Factor X  - started coumadin 2/19 with INR goal 2-3, pharmacy to dose   - Right upper extremity US 2/21 with occlusive thrombus of one of the  "paired right brachial veins at the upper arm, with PICC in place, which was removed 2/21.   - LUE US 2/21 wotj occlusive superficial thrombus in left cephalic vein at the antecubital fossa. No evidence of left upper extremity deep venous thrombosis     Anticoagulation:   - ASA 81 mg daily   - Bival drip bridge to therapeutic coumadin, INR goal 2-3 for three months, then 1.5-2.0 lifelong   - Follow Chromogenic Factor X, goal 20-40 while on Bival. Chromogenic Factor X - 98    MSK/ Skin:  # Sternotomy  # Surgical Incision  # Buttock pressure injury   - Sternal precautions  - Postoperative incision management per protocol  - PT/OT  - WOCN following for pressure injuries  # Maculopapular Rash  Patient has maculopapular rash per Exam below.  - Likely represents folliculitis, monitor for now, improving        Prophylaxis:    Mechanical prophylaxis for DVT  - Chemical DVT prophylaxis- bivalirudin  - PPI - until 03/16 (30d from extubation) per institutional protocol  - HOB to 30 degrees    Disposition:   - Transferred to  on 2/18  - Therapies recommending discharge to TCU, possibly Thursday or Friday pending INR     Discussed with  Dr Velasco through both written and verbal communication.      Qi Umaña, DNP, CNP  [facility] Cardiothoracic Surgery  O: [phone]  Pgr [phone]          Interval History:     No overnight events.   Pain is well managed on current regimen.   Tolerating diet and tube feeds, is passing flatus, + BM, No nausea or vomiting.  Breathing well without complaints.   Working with therapies.   Denies chest pain, palpitations, dizziness, syncopal symptoms, fevers, chills, myalgias, or sternal popping/clicking.         Physical Exam:   Blood pressure (!) 146/70, pulse 100, temperature 98.4  F (36.9  C), temperature source Oral, resp. rate 18, height 1.905 m (6' 3\"), weight 147.6 kg (325 lb 6.4 oz), SpO2 95 %.  Vitals:    02/21/22 0417 02/22/22 0400 02/22/22 0453   Weight: 147.7 kg (325 lb 9.6 oz) (!) 155.6 " kg (343 lb) 147.6 kg (325 lb 6.4 oz)        Gen: Alert, NAD. Talking, more awake today. Significant other is at the bedside.   Neuro: Follows commands, very deconditioned   CV: RRR, normal S1 S2, no murmurs, rubs or gallops. Crisp valve click.   Pulm: CTA, no wheezing or rhonchi, normal breathing on RA  Abd: nondistended, normal BS, soft, nontender  Ext: Large amount of RLE peripheral edema, 2+ pitting and generalized edema. Has RLE wraps on.   Incision: clean, dry, intact, no erythema, sternum stable  Tubes/drain sites: clean and dry         Data:    Imaging:  reviewed recent imaging, no acute concerns  2/19/22  9:41 AM [id number] [facility] Imaging        PACS Images     Show images for XR Chest Port 1 View    Study Result    Narrative & Impression   Exam: XR CHEST PORT 1 VIEW, 2/19/2022 9:41 AM     Indication: elevated WBC and follow up opacities     Comparison: 2/17/2022     Findings:   Portable upright AP view of the chest. Right upper extremity PICC and  enteric tube in similar position.     Low lung volumes. Stable enlargement of cardiac silhouette. No  significant change in perihilar and bibasilar predominant pulmonary  opacities. No appreciable pneumothorax. No significant pleural  effusion.                                                                      Impression: No significant change in perihilar and bibasilar prominent  pulmonary opacities likely representative of atelectasis and/or  pulmonary edema     I have personally reviewed the examination and initial interpretation  and I agree with the findings.     MICAH VIERA MD                 Labs:  BMP  Recent Labs   Lab 02/22/22  0803 02/22/22  0636 02/22/22  0410 02/21/22  2358 02/21/22  1534 02/21/22  1350 02/21/22  1228 02/21/22  0953 02/21/22  0756 02/21/22  0557   NA  --  139  --   --   --  137  --  139  --  139   POTASSIUM  --  4.0  --   --   --  3.8  --  3.7  --  4.5   CHLORIDE  --  108  --   --   --  107  --  109  --  110*  "  MAYE  --  8.3*  --   --   --  8.5  --  8.2*  --  8.3*   CO2  --  25  --   --   --  25  --  23  --  24   BUN  --  32*  --   --   --  34*  --  35*  --  36*   CR  --  0.58*  --   --   --  0.62*  --  0.60*  --  0.59*   * 112* 117* 116*   < > 119*   < > 125*   < > 102*    < > = values in this interval not displayed.     CBC  Recent Labs   Lab 02/22/22  0636 02/21/22  1701 02/21/22  0426 02/20/22  0603 02/19/22  0544   WBC 14.2*  --  15.9* 17.5* 16.9*   RBC 3.23*  --  3.40* 3.67* 3.49*   HGB 9.6* 10.2* 10.0* 11.1* 10.5*   HCT 31.7*  --  33.3* 36.8* 35.1*   MCV 98  --  98 100 101*   MCH 29.7  --  29.4 30.2 30.1   MCHC 30.3*  --  30.0* 30.2* 29.9*   RDW 15.9*  --  15.9* 15.9* 15.6*     --  382 369 414     INR  Recent Labs   Lab 02/22/22  0636 02/21/22  0425 02/20/22  0603   INR 1.57* 1.50* 1.53*      Hepatic Panel  Recent Labs   Lab 02/22/22  0636 02/21/22  0149 02/20/22  0603 02/19/22  0544   AST 76* 99* 127* 142*   * 148* 167* 183*   ALKPHOS 82 83 91 91   BILITOTAL 0.3 0.5 0.5 0.5   ALBUMIN 2.6* 2.5* 2.5* 2.5*     GLUCOSE:   Recent Labs   Lab 02/22/22  0803 02/22/22  0636 02/22/22  0410 02/21/22  2358 02/21/22  1923 02/21/22  1534   * 112* 117* 116* 91 96       SPIRITUAL HEALTH SERVICES Progress Note  [facility] ([place]) 6B    Visited patient, Chuy, per length of stay. Chuy's significant other, Debbie, present.  Chuy declined spiritual health services but thanked me. Debbie says, she will \"stay right by his side.\"    Will continue to follow this patient as he remains on the unit.    Tyrone Anderson MDiv  Chaplain Resident  Pager [phone]    * [facility] remains available 24/7 for emergent requests/referrals, either by having the switchboard page the on-call  or by entering an ASAP/STAT consult in Epic (this will also page the on-call ). Routine Epic consults receive an initial response within 24 hours.*    Calorie Count  Intake recorded for: 2/21  Total Kcals: 106 Total Protein: " "12g  Kcals from Hospital Food: 106   Protein: 12g  Kcals from Outside Food (average):0 Protein: 0g  # Meals Ordered from Kitchen: 2  # Meals Recorded: 1 meal- 50% Herb baked chicken, 25% puree rice, green beans    # Supplements Recorded: no intake recorded          02/22/22 1352   Quick Adds   Type of Visit Initial PT Evaluation   Living Environment   People in Home significant other   Current Living Arrangements house   Home Accessibility stairs to enter home;stairs within home   Number of Stairs, Main Entrance 1   Number of Stairs, Within Home, Primary other (see comments)  (greater than 10)   Stair Railings, Within Home, Primary railings safe and in good condition   Transportation Anticipated car, drives self;family or friend will provide   Living Environment Comments Lives with girlfrienmony Lewis in house. Laundry in basement which girlfriend does. Has four dogs.   Self-Care   Usual Activity Tolerance good   Current Activity Tolerance fair   Equipment Currently Used at Home none   Fall history within last six months no   Activity/Exercise/Self-Care Comment Patient IND with ADLs, share cooking/cleaning with girlfriend, patient primarily completes yardwork. No reglar exercise but has stationary bike at home. 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    General Information   Onset of Illness/Injury or Date of Surgery 01/28/22   Referring Physician Ivory Wen RN   Patient/Family Therapy Goals Statement (PT) return home and to PLOF   Pertinent History of Current Problem (include personal factors and/or comorbidities that impact the POC) Per chart, \"Chuy Varner is a 32 yo M with a PMHx of bicuspid aortic valve, thoracic aortic aneurysm without rupture, hypertension, obesity, and testicular cancer s/p orchiectomy who underwent Bentall procedure with mechanical valve on 1/28 with Dr. Velasco and Dr. Hannah. 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 \"   Existing Precautions/Restrictions sternal   Cognition   Affect/Mental Status (Cognition) confused   Cognitive Status Comments pleasant but confused with some repeated questions.   Pain Assessment   Patient Currently in Pain Yes, see Vital Sign flowsheet   Posture    Posture Protracted shoulders;Forward head position   Range of Motion (ROM)   ROM Comment AROM limitations due to weakness   Strength (Manual Muscle Testing)   Strength Comments Generalized weakness, R leg weaker than L leg. Able to complete B SLR with Min A. Anticipate strength grossly <3/5   Bed Mobility   Comment, (Bed Mobility) supine<>sit dependent with OH lift   Transfers   Comment, (Transfers) OH lift for transfers   Gait/Stairs (Locomotion)   Comment, (Gait/Stairs) unable to ambulate   Balance   Balance Comments sat EOB for two minutes with CGA   Muscle Tone   Muscle Tone no deficits were identified   Clinical Impression   Criteria for Skilled Therapeutic Intervention Yes, treatment indicated   PT Diagnosis (PT) impaired functional mobility   Influenced by the following impairments weakness, deconditioning, balance impairments   Functional limitations due to impairments bed mobility, transfers, gait   Clinical Presentation (PT Evaluation Complexity) Evolving/Changing   Clinical Presentation Rationale clinical judgment   Clinical Decision Making (Complexity) moderate complexity   Planned Therapy Interventions (PT) balance training;bed mobility training;gait training;home exercise program;neuromuscular re-education;motor coordination training;patient/family education;postural re-education;ROM (range of motion);strengthening;stretching;transfer training   Anticipated Equipment Needs at Discharge (PT)   (TBD)   Risk & Benefits of therapy have been explained evaluation/treatment results reviewed;care plan/treatment goals " reviewed;risks/benefits reviewed;current/potential barriers reviewed;patient;spouse/significant other   PT Discharge Planning   PT Discharge Recommendation (DC Rec) Acute Rehab Center-Motivated patient will benefit from intensive, interdisciplinary therapy.  Anticipate will be able to tolerate 3 hours of therapy per day   PT Rationale for DC Rec Patient is well below PLOF, currently dependent with assist x2 with all functional mobility, lift dependent. Would benefit from intensive therapies to progress IND with functional mobility.    PT Brief overview of current status OH lift for transfers   Total Evaluation Time   Total Evaluation Time (Minutes) 10   Physical Therapy Goals   PT Frequency 6x/week   PT Predicated Duration/Target Date for Goal Attainment 03/08/22   PT Goals Bed Mobility;Transfers;Gait   PT: Bed Mobility Modified independent;Supine to/from sit;Rolling;Within precautions   PT: Transfers Modified independent;Sit to/from stand;Bed to/from chair;Assistive device;Within precautions   PT: Gait 50 feet;Supervision/stand-by assist;Within precautions;Assistive device         Cardiovascular Surgery Progress Note  02/23/2022         Assessment and Plan:     Chuy Varner is a 32 yo M with a PMHx of bicuspid aortic valve, thoracic aortic aneurysm without rupture, hypertension, obesity, and testicular cancer s/p orchiectomy who underwent Bentall procedure with mechanical valve on 1/28 with Dr. Velasco and Dr. Hannah. 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      Cardiovascular:   # Ascending aortic aneurysm s/p Bentall w/ [place] valve on 1/28 with Dr. Velasco and Dr. Hannah  # Hx of HTN  - Goal MAP>65, SBP <130   - Coreg 50 BID  Lisinopril 10mg daily (increase to 20mg 2/24)  - ASA 81mg  Atorvastatin 40mg   - Anticoagulatin for  valve, will need coumadin with INR goal 2-3 for three months, then 1.5-2.0 for life, with ASA. Started Coumadin 2/19, Continue Bival as bridge.   - Baseline TTE per Dr. Velasco 2/23     Chest tubes and TPW removed 2/4     Pulmonary:  # Acute hypoxemic respiratory failure (ARDS vs. PNA vs. Hypervolemia vs. TRALI vs. severe atelectasis) s/p extubation  - Extubated 2/14 to HFNC, now breathing on Room Air  - conditional VBGs - draw one if increased somnolence or distressed breathing  - continue respiratory therapy  - metanebs (percussive therapy)  - flutter valve QID to encourage coughing, clearance of airways     Neurology / MSK:  Neuro/ pain/ sedation:  # Acute Postoperative pain  - Monitor neurological status. Notify the MD for any acute changes in exam.  - Neurology following, appreciate recs  - Pain well controlled on PRN tylenol, dilaudid, robaxin, oxycodone,     # Dysarthria - improving   - speech therapy evaluation 02/17 for dysarthric  Reconsulted 2/23 or swallow evaluation  - ENT scoped, no reason for long term dysarthria/problems with phonation  - Sleep hygiene: Melatonin, trazadone prn at bedtime    # Thalamic infarct on head CT 2/16, old, age unknown  - Not contributing to current dysarthria vs delirium vs deconditioning per Neuro  - Neurology would like an MRI before he leaves the hospital, would recheck with neurology as he had a head CT with evidence of old infarct as above.    - Lipid panel, HgbA1c ordered per neuro for stroke Hx    # Symptoms of depression with insomnia   - reports feeling down and not being able to sleep despite melatonin and prn Trazodone   - Mental health consult, may need antidepressant. Patient and SO agreeable to MH visit.       / Renal:  -Hypernatremia, improving  BL creat appears to be ~ 1.0  - Daily weights and I/O's  - Avoid/limit nephrotoxins as able  - Replete lytes PRN per protocol  -  hydrochlorothiazide 25 mg BID last given 2/17 for correction of hyperNa+ and  hypervolemia.   - Continue NJ free water 30 q4  - Na+ wnl  - Pre-op weight 365 lbs 11 oz, weight down today 325 lbs 6 oz  - Moderate amount of MARIAM, has wraps on with decreased edema. Has windy UE edema with windy non-occlusive DVTs- elevate both arms and limit B/p's    - Re Consulted PT/OT for right lymphedema wrap 2/19.   Diuresis: Decrease IV Lasix to 40 mg po BID today to keep negative to net even.      GI / FEN:   # Obesity (BMI 46)  # Nutrition  - NJ feeds at goal, starting cycling TF's this am, 8P-8A  - Speech following okay to advanced to soft and bite sized diet with mild thickness 1/20  - started calorie counts 2/21    Addendum: Remove NJ Tube. Patient is tolerating regular diet without complication.    # Diarrhea/Incontinence  Suspect caused by large amounts of free water via NJ vs augmentin vs recent colonization of C. diff  - Consider repeat C. Diff if no improvement (no imodium for now)  - Less frequent loose stools, Cdiff-negative 2/20    # Elevated ALT, AST, imrpoving  - Monitor for now, trending down, no abdominal pain.      # Cholelithiasis  Downtrending LFTs. Suspect elevations d/t Augmentin    Endocrine:  # Stress induced hyperglycemia, improving  - HDSSI  - Hgb A1c 5.8 2/18  - Decrease Lantus to 40 U daily, plan to decrease as using less sliding scale insulin and good BG control   - Goal BG <180 for optimal healing  - -120's without any sliding scale insulin   - Continue to monitor BG     Infectious Disease:  # Stress induced leukocytosis  # Possible ventilator associated pneumonia  # Sinusitis, treated with Augmentin   Pan cultures and re-cultures all remained unrevealing. Fever likely from previously untreated DVT vs sinusitis.  - Continue to monitor fever curve, WBC and inflammatory markers  - WBC 11.3 (14.2) trending down, AF. Lactate 1.3  - Checked C. Difficile 2/21 negative, UA, and Procal today-unremarkable.    - CXR no change       Hematology:   # History of testicular cancer s/p  orchiectomy  # RLE, Cesar UE DVT's  # HIT positive   - Right LE Lymphedema wrap, reconsult PT/OT   - CBC daily, Hgb 9.6, stable   - Continue Bivalirudin gtt as bridge to coumadin, following Chromogenic Factor X  - started coumadin 2/19 with INR goal 2-3, pharmacy to dose   - Right upper extremity US 2/21 with occlusive thrombus of one of the paired right brachial veins at the upper arm, with PICC in place, which was removed 2/21.   - LUE US 2/21 wotj occlusive superficial thrombus in left cephalic vein at the antecubital fossa. No evidence of left upper extremity deep venous thrombosis     Anticoagulation:   - ASA 81 mg daily   - Bival drip bridge to therapeutic coumadin, INR goal 2-3 for three months, then 1.5-2.0 lifelong   - Follow Chromogenic Factor X, goal 20-40 while on Bival. Chromogenic Factor X - 98     MSK/ Skin:  # Sternotomy  # Surgical Incision  # Buttock pressure injury   - Sternal precautions  - Postoperative incision management per protocol  - PT/OT  - WOCN following for pressure injuries    # Maculopapular Rash, improving  Patient has maculopapular rash per Exam below.  - Likely represents folliculitis, monitor for now, improving         Prophylaxis:    Mechanical prophylaxis for DVT  - Chemical DVT prophylaxis- bivalirudin and Warfarin  - PPI - until 03/16 (30d from extubation) per institutional protocol  - HOB to 30 degrees     Disposition:   - Transferred to  on 2/18  - Therapies recommending discharge to TCU, possibly Thursday or Friday pending INR        Discussed with Surgeon, Dr. Velasco via written and verbal commnication.     Keila Churchill PA-c  Pager: [phone]  8:05 AM February 23, 2022           Interval History:     No overnight events.  States pain is well managed on current regimen. Slept well overnight.  Tolerating diet and tube feeds, is passing flatus, BM x 1. No nausea or vomiting.  Breathing well without complaints.   Working with therapies and ambulating in halls without  "assistance.   Denies chest pain, palpitations, dizziness, syncopal symptoms, fevers, chills, myalgias, or sternal popping/clicking.         Physical Exam:   Blood pressure 125/76, pulse 100, temperature 98.8  F (37.1  C), temperature source Oral, resp. rate 20, height 1.905 m (6' 3\"), weight 147.9 kg (326 lb 1.6 oz), SpO2 95 %.  Vitals:    02/22/22 0400 02/22/22 0453 02/23/22 0316   Weight: (!) 155.6 kg (343 lb) 147.6 kg (325 lb 6.4 oz) 147.9 kg (326 lb 1.6 oz)      Current Weight: 147.9 Kg Trend: -0.3 Kg  Admit weight: 165.9 Kg    Daily Fluid status; net loss: +657 (1950 O)  mL    Net loss SA: +6326 mL  MAPs: 82-91  LVEF: 65-70%    Gen: A&Ox4, NAD  Neuro: no focal deficits   CV: RRR, normal S1 S2, no murmurs, rubs or gallops. No appreciable JVD   Vascular: Peripheral pulses present Radial 2+, Dorsalis Pedis 2+, Posterior Tibialis 2+.   Pulm: CTA, no wheezing or rhonchi, normal breathing on RA  Abd: nondistended, normal BS, soft, nontender  Ext: negative peripheral edema, 0+ pitting. Warm.   Incision: clean, dry, intact, no erythema, sternum stable  Tubes/drain sites: dressing clean and dry         Data:    Imaging:  reviewed recent imaging    Chest x-ray  Interpretation:    Echocardiogram  Interpretation:    CT scan:    Labs:  CBC  Recent Labs   Lab 02/23/22  0616 02/22/22  0636 02/21/22  1701 02/21/22  0426 02/20/22  0603   WBC 11.3* 14.2*  --  15.9* 17.5*   RBC 3.31* 3.23*  --  3.40* 3.67*   HGB 9.6* 9.6* 10.2* 10.0* 11.1*   HCT 33.5* 31.7*  --  33.3* 36.8*   * 98  --  98 100   MCH 29.0 29.7  --  29.4 30.2   MCHC 28.7* 30.3*  --  30.0* 30.2*   RDW 15.5* 15.9*  --  15.9* 15.9*    344  --  382 369     CMP:  Last Comprehensive Metabolic Panel:  Sodium   Date Value Ref Range Status   02/23/2022 136 133 - 144 mmol/L Final     Potassium   Date Value Ref Range Status   02/23/2022 4.1 3.4 - 5.3 mmol/L Final   02/05/2022 4.3 3.4 - 5.3 mmol/L Final     Chloride   Date Value Ref Range Status   02/23/2022 108 " 94 - 109 mmol/L Final     Carbon Dioxide (CO2)   Date Value Ref Range Status   02/23/2022 21 20 - 32 mmol/L Final     Anion Gap   Date Value Ref Range Status   02/23/2022 7 3 - 14 mmol/L Final     Glucose   Date Value Ref Range Status   02/23/2022 110 (H) 70 - 99 mg/dL Final     GLUCOSE BY METER POCT   Date Value Ref Range Status   02/23/2022 104 (H) 70 - 99 mg/dL Final     Urea Nitrogen   Date Value Ref Range Status   02/23/2022 25 7 - 30 mg/dL Final     Creatinine   Date Value Ref Range Status   02/23/2022 0.58 (L) 0.66 - 1.25 mg/dL Final     GFR Estimate   Date Value Ref Range Status   02/23/2022 >90 >60 mL/min/1.73m2 Final     Comment:     Effective December 21, 2021 eGFRcr in adults is calculated using the 2021 CKD-EPI creatinine equation which includes age and gender (Renata et al., [place], DOI: 10.1056/TIWTwz8279229)     Calcium   Date Value Ref Range Status   02/23/2022 8.7 8.5 - 10.1 mg/dL Final     Bilirubin Total   Date Value Ref Range Status   02/23/2022 0.3 0.2 - 1.3 mg/dL Final     Alkaline Phosphatase   Date Value Ref Range Status   02/23/2022 85 40 - 150 U/L Final     ALT   Date Value Ref Range Status   02/23/2022 108 (H) 0 - 70 U/L Final     AST   Date Value Ref Range Status   02/23/2022 52 (H) 0 - 45 U/L Final     BMP  Recent Labs   Lab 02/23/22  0727 02/23/22  0616 02/23/22  0416 02/23/22  0048 02/22/22  0803 02/22/22  0636 02/21/22  1534 02/21/22  1350 02/21/22  1228 02/21/22  0953   NA  --  136  --   --   --  139  --  137  --  139   POTASSIUM  --  4.1  --   --   --  4.0  --  3.8  --  3.7   CHLORIDE  --  108  --   --   --  108  --  107  --  109   MAYE  --  8.7  --   --   --  8.3*  --  8.5  --  8.2*   CO2  --  21  --   --   --  25  --  25  --  23   BUN  --  25  --   --   --  32*  --  34*  --  35*   CR  --  0.58*  --   --   --  0.58*  --  0.62*  --  0.60*   * 110* 111* 88   < > 112*   < > 119*   < > 125*    < > = values in this interval not displayed.     INR  Recent Labs   Lab 02/23/22  0616  02/22/22  0636 02/21/22  0425 02/20/22  0603   INR 1.66* 1.57* 1.50* 1.53*      Hepatic Panel  Recent Labs   Lab 02/23/22  0616 02/22/22  0636 02/21/22  0149 02/20/22  0603   AST 52* 76* 99* 127*   * 130* 148* 167*   ALKPHOS 85 82 83 91   BILITOTAL 0.3 0.3 0.5 0.5   ALBUMIN 2.4* 2.6* 2.5* 2.5*     GLUCOSE:   Recent Labs   Lab 02/23/22  0727 02/23/22  0616 02/23/22  0416 02/23/22  0048 02/22/22 2017 02/22/22  1542   * 110* 111* 88 118* 111*         WO Nurse Inpatient Pressure Injury Assessment   Reason for consultation: Evaluate and treat nasal pressure injury       ASSESSMENT  Pressure Injury: on columbella bilateral nares  , hospital acquired ,   This is a Medical Device Related Pressure Injury (MDRPI) due to Bridle string  Pressure Injury is Stage 2  And mucosal in bilateral nares   Contributing factor of the pressure injury: pressure, immobility and moisture  Proning  Status: healed    New:  Right buttock superficial abrasions from unknown source  Status:  healed   TREATMENT PLAN  NA    WO Nurse follow-up plan:signing off  Nursing to notify the Provider(s) and re-consult the WO Nurse if wound(s) deteriorates or new skin concern.    Patient History  According to provider note(s):  Acute Hypoxemic Respiratory Failure due to Shunt Physiology  # Pulmonary Shunting from Bilateral Lower Lobe Atelectasis vs. Pneumonia     Chuy Varner is a 31 year old male with PMH of likely bicuspid aortic valve, thoracic aortic aneurysm without rupture, hypertension, obesity, and testicular cancer s/p orchiectomy who underwent Bentall procedure with mechanical valve on 1/28 with Dr. Velasco ad Dr. Hannah. Patient was admitted to CVICU for post-operative management; post-op course complicated by acute hypoxic respiratory failure despite PEEP titration.     Objective Data  Containment of urine/stool: Indwelling catheter and Internal fecal management    Current Diet/ Nutrition:  Orders Placed This Encounter       Regular Diet Adult Thin Liquids (level 0)      Output:   I/O last 3 completed shifts:  In: 2378.17 [P.O.:600; I.V.:698.17; NG/GT:240]  Out: 3600 [Urine:3600]    Risk Assessment:   Sensory Perception: 3-->slightly limited  Moisture: 3-->occasionally moist  Activity: 2-->chairfast  Mobility: 2-->very limited  Nutrition: 3-->adequate  Friction and Shear: 1-->problem  Ben Score: 14    Labs:   Recent Labs   Lab 02/23/22  0616 02/19/22  0544 02/18/22  0316   ALBUMIN 2.4*   < > 2.4*   HGB 9.6*   < > 10.3*   INR 1.66*   < >  --    WBC 11.3*   < > 17.0*   A1C  --   --  5.8*    < > = values in this interval not displayed.     Physical Exam  Skin inspection: focused septum/columbella/bilateral nares           Date of last Photo 2/4 and 2/11and 2/14 2/23  Wound History: previously proned. Extubated now on high flow O2       Wound location:  Buttocks      Date of photo:  2/14/22  History:  Pt has a rectal tube with minimal bypassing of stool   Location:  Right fleshy buttock, approximately 2 cm superior to gluteal fold      Interventions  Current support surface: Standard  Low air loss mattress  Current off-loading measures: Heel off-loading boot(s) and Pillows  Repositioning aid: Pillows  Visual inspection of wound(s) completed   Tube Securement: as per protocol  Wound Care: was not done per plan of care.  Supplies: floor stock  Educated provided: importance of repositioning, plan of care, Hygiene and Off-loading pressure  Education provided to: spouse and nurse  Discussed importance of:off-loading pressure to wound  Discussed plan of care with Family and Nurse    Samantha Choudhury RN CWOCN     Calorie Count  Intake recorded for: 2/22  Total Kcals: 523 Total Protein: 27g  Kcals from Hospital Food: 523   Protein: 27g  Kcals from Outside Food (average):0 Protein: 0g  # Meals Ordered from Kitchen: 2  # Meals Recorded: 1 meal-  100% lemon fruit ice, 50% grilled pollock, 25% half fruit platter  # Supplements Recorded: 100% 1  "ensure enlive       Per phone conversation with CVTS okay to discharge NJ tube.  Cecilia Goyal RN on 2/23/2022 at 1:16 PM      CLINICAL NUTRITION SERVICES - BRIEF NOTE      Nutrition Prescription     RECOMMENDATIONS FOR MDs/PROVIDERS TO ORDER:  Encourage PO intake, monitor calorie counts for adequacy.     Recommendations already ordered by Registered Dietitian (RD):  Continue Ensure Enlive BID between meals  Extend melissa counts 2/24-2/26  Discontinuing EN order per provider pulling FT     Future/Additional Recommendations:  Monitor PO intake, melissa counts, wt trends, hydration status.  Offer additional supplements/snacks as needed.    *Please see full assessment note from 02/21/22    New Findings:  Calorie counts: Average 315 kcal, 20 g protein (11% energy and 13% protein needs)  2/21: 106 kcal, 12g protein  2/22: 523 kcal, 27 g protein      Patient reports improved appetite, drinking supplements, trying to eat food but \"it's too salty\". Partner brought in food from outside yesterday, not noted in calorie count so actual intake may be higher than documented.    NJ removed this afternoon per CVTS team as pt tolerating diet, reports good appetite, and feels tube is inhibiting eating/swallowing.  Spoke with RN, discussed continuing to monitor and encourage PO intake for pt's high calorie needs.    Interventions  Collaboration with other providers - CVTS PA  Medical food supplement therapy - Continue Ensure Enlive BID between meals    RD to follow per protocol.    Shonna Mcfarlane  Dietetic Intern    And     Bartolo Rosario, KISHOREN, LD, CNSC  [address] RD pager: [phone] Phone: [phone]             Care Management Follow Up    Length of Stay (days): 26    Expected Discharge Date: 02/26/2022     Concerns to be Addressed: Discharge follow up for placement      Additional Information:  RNCC followed up with patients SO on if they had had time to discuss ARU options.     SO said that patient is determined to be closest to home and would " like to explore the TCU options that are closest to [place] even if the rehab may take longer. RNCC explained the differences in ARU to SO and why they are recommending ARU over TCU. SO understood but again relayed that patient is telling her he is going to be close to home. Currently 2.5 hours from their home.    SO shared that currently patients mom is at his home caring for their dogs. And has been notified that she must return to work this Monday. SO said that Sunday will be a rough day as she will need to leave the hospital and go home to [place] and is expecting patient to have a significantly hard time with this.       Sanya Adan, RN    Sanya Adan RN, BSN  5B RN Care Coordinator  [phone] phone  [phone] pager    Weekend or Holiday Care Coordinator [phone] pager  Job Code [phone]      Cardiovascular Surgery Progress Note  02/24/2022         Assessment and Plan:     Chuy Varner is a 30 yo M with a PMHx of bicuspid aortic valve, thoracic aortic aneurysm without rupture, hypertension, obesity, and testicular cancer s/p orchiectomy who underwent Bentall procedure with mechanical valve on 1/28 with Dr. Velasco and Dr. Hannah. 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      Cardiovascular:   # Ascending aortic aneurysm s/p Bentall w/ [place] valve on 1/28 with Dr. Velasco and Dr. Hannah  # Hx of HTN  - Goal MAP>65, SBP <130   - Coreg 50 BID  Lisinopril 10mg daily (increase to 20mg 2/24)  - ASA 81mg  Atorvastatin 40mg   - Anticoagulatin for valve, will need coumadin with INR goal 2-3 for three months, then 1.5-2.0 for life, with ASA. Started Coumadin 2/19, Continue Bival as bridge.   - Baseline TTE per Dr. Velasco 2/23     Chest tubes and TPW removed 2/4     Pulmonary:  # Acute hypoxemic respiratory failure (ARDS vs. PNA  vs. Hypervolemia vs. TRALI vs. severe atelectasis) s/p extubation  - Extubated 2/14 to HFNC, now breathing on Room Air  - conditional VBGs - draw one if increased somnolence or distressed breathing  - continue respiratory therapy  - metanebs (percussive therapy)  - flutter valve QID to encourage coughing, clearance of airways     Neurology / MSK:  Neuro/ pain/ sedation:  # Acute Postoperative pain  - Monitor neurological status. Notify the MD for any acute changes in exam.  - Neurology following, appreciate recs  - Pain well controlled on PRN tylenol, dilaudid, robaxin, oxycodone,      # Dysarthria - improving   - speech therapy evaluation 02/17 for dysarthric  Reconsulted 2/23 or swallow evaluation  - ENT scoped, no reason for long term dysarthria/problems with phonation  - Sleep hygiene: Melatonin, trazadone prn at bedtime     # Thalamic infarct on head CT 2/16, old, age unknown  - Not contributing to current dysarthria vs delirium vs deconditioning per Neuro  - Neurology would like an MRI: to be done outpatient per Dr. Velasco  - Lipid panel, HgbA1c ordered per neuro for stroke Hx     # Symptoms of depression with insomnia   - reports feeling down and not being able to sleep despite melatonin and prn Trazodone   - Mental health consult, may need antidepressant. Patient and SO agreeable to MH visit.       / Renal:  -Hypernatremia, improving  BL creat appears to be ~ 1.0  Most recent Creatinine: 0.58  - Daily weights and I/O's  - Avoid/limit nephrotoxins as able  Replete lytes PRN per protocol  - Pre-op weight 365 lbs  - Moderate amount of MARIAM, has wraps on with decreased edema. Has windy UE edema with windy non-occlusive DVTs- elevate both arms and limit B/p's    - Re Consulted PT/OT for right lymphedema wrap 2/19.   Diuresis: Furosemide 40mg bid with potassium supplementation      GI / FEN:   # Obesity (BMI 46)  # Nutrition  - Remove NJ Tube 2/23. Patient is tolerating regular diet without complication.  - Speech  following okay to advanced to soft and bite sized diet with mild thickness 1/20  - started calorie counts 2/24     # Diarrhea/Incontinence  Suspect caused by large amounts of free water via NJ vs augmentin vs recent colonization of C. diff  - Less frequent loose stools, Cdiff-negative 2/20     # Elevated ALT, AST, imrpoving  - Monitor for now, trending down, no abdominal pain.       # Cholelithiasis  Downtrending LFTs. Suspect elevations d/t Augmentin     Endocrine:  # Stress induced hyperglycemia, improving  - HDSSI  - Hgb A1c 5.8 2/18  - Decrease Lantus to 20 U daily, plan to decrease as using less sliding scale insulin and good BG control   - Goal BG <180 for optimal healing  - -120's without any sliding scale insulin   - Continue to monitor BG     Infectious Disease:  # Stress induced leukocytosis  # Possible ventilator associated pneumonia  # Sinusitis, treated with Augmentin   Pan cultures and re-cultures all remained unrevealing. Fever likely from previously untreated DVT vs sinusitis.  - Continue to monitor fever curve, WBC and inflammatory markers  - WBC 8.9 (11.2) trending down, AF. Lactate 1.3  - Checked C. Difficile 2/21 negative, UA, and Procal today-unremarkable.    - CXR no change       Hematology:   # History of testicular cancer s/p orchiectomy  # RLE, Cesar UE DVT's  # HIT positive   - Right LE Lymphedema wrap, reconsult PT/OT   - CBC daily, Hgb 10.3, stable   - Continue Bivalirudin gtt as bridge to coumadin, following Chromogenic Factor X  - started coumadin 2/19 with INR goal 2-3, pharmacy to dose   - Right upper extremity US 2/21 with occlusive thrombus of one of the paired right brachial veins at the upper arm, with PICC in place, which was removed 2/21.   - LUE US 2/21 wotj occlusive superficial thrombus in left cephalic vein at the antecubital fossa. No evidence of left upper extremity deep venous thrombosis     Anticoagulation:   - ASA 81 mg daily   - Bival drip bridge to therapeutic  "coumadin, INR goal 2-3 for three months, then 1.5-2.0 lifelong   - Follow Chromogenic Factor X, goal 20-40 while on Bival. Chromogenic Factor X - 85  - Most recent INR: 1.79     MSK/ Skin:  # Sternotomy  # Surgical Incision  # Buttock pressure injury   - Sternal precautions  - Postoperative incision management per protocol  - PT/OT  - WOCN following for pressure injuries     # Maculopapular Rash, improving  Patient has maculopapular rash per Exam below.  - Likely represents folliculitis, monitor for now, improving         Prophylaxis:    Mechanical prophylaxis for DVT  - Chemical DVT prophylaxis- bivalirudin and Warfarin  - PPI - until 03/16 (30d from extubation) per institutional protocol  - HOB to 30 degrees     Disposition:   - Transferred to  on 2/18  - Therapies recommending discharge to ARU, possibly Friday pending INR     Discussed with Surgeon, Dr. Velasco via written and verbal commnication.     Keila Churchill PA-c  Pager: [phone]  7:54 AM February 24, 2022           Interval History:     No overnight events.  States pain is well managed on current regimen. Slept well overnight.  Tolerating diet, is passing flatus, BM x 1. No nausea or vomiting.  Breathing well without complaints.   Working with therapies and ambulating in halls without assistance.   Denies chest pain, palpitations, dizziness, syncopal symptoms, fevers, chills, myalgias, or sternal popping/clicking.         Physical Exam:   Blood pressure 121/71, pulse 94, temperature (P) 99.3  F (37.4  C), temperature source (P) Axillary, resp. rate (P) 18, height 1.905 m (6' 3\"), weight 148.8 kg (328 lb), SpO2 99 %.  Vitals:    02/22/22 0453 02/23/22 0316 02/24/22 0500   Weight: 147.6 kg (325 lb 6.4 oz) 147.9 kg (326 lb 1.6 oz) 148.8 kg (328 lb)      Current Weight: 148.8 Kg Trend: +0.9 Kg  Admit weight: 165.9 Kg    Daily Fluid status; net loss: -3247.4  mL    Net loss SA: +3542 mL  MAPs: 80-90  LVEF: 60-65%    Gen: A&Ox4, NAD  Neuro: no focal deficits "   CV: RRR, normal S1 S2, no murmurs, rubs or gallops. No appreciable JVD  Vascular: Peripheral pulses present Radial 2+, Dorsalis Pedis 2+, Posterior Tibialis 2+.   Pulm: CTA, no wheezing or rhonchi, normal breathing on RA  Abd: nondistended, normal BS, soft, nontender  Ext: positive peripheral edema, 1+ pitting. Warm with lymphedema wraps on   Incision: clean, dry, intact, no erythema, sternum stable  Tubes/drain sites: dressing clean and dry         Data:    Imaging:  reviewed recent imaging    Chest x-ray  Interpretation:    Echocardiogram 2/23/22  Interpretation Summary  S/P Aortic root replacement with 27-29 mm On-X ascending aortic prosthesis  with Vascutek Gelweave Valsalva graft (Bentall procedure with coronary  implantation).on 1/31/2022.  Global and regional left ventricular function is normal with an EF of 60-65%.  Right ventricular function, chamber size, wall motion, and thickness are  normal.  A mechanical aortic valve is present.  Doppler interrogation of the aortic valve is normal.  The mean gradient across the aortic valve is7 mmHg.  Trivial pericardial effusion is present.    CT scan:    Labs:  CBC  Recent Labs   Lab 02/24/22  0453 02/23/22  0616 02/22/22  0636 02/21/22  1701 02/21/22  0426   WBC 8.9 11.3* 14.2*  --  15.9*   RBC 3.44* 3.31* 3.23*  --  3.40*   HGB 10.3* 9.6* 9.6* 10.2* 10.0*   HCT 34.1* 33.5* 31.7*  --  33.3*   MCV 99 101* 98  --  98   MCH 29.9 29.0 29.7  --  29.4   MCHC 30.2* 28.7* 30.3*  --  30.0*   RDW 15.6* 15.5* 15.9*  --  15.9*    293 344  --  382     CMP:  Last Comprehensive Metabolic Panel:  Sodium   Date Value Ref Range Status   02/24/2022 139 133 - 144 mmol/L Final     Potassium   Date Value Ref Range Status   02/24/2022 3.8 3.4 - 5.3 mmol/L Final   02/05/2022 4.3 3.4 - 5.3 mmol/L Final     Chloride   Date Value Ref Range Status   02/24/2022 108 94 - 109 mmol/L Final     Carbon Dioxide (CO2)   Date Value Ref Range Status   02/24/2022 27 20 - 32 mmol/L Final      Anion Gap   Date Value Ref Range Status   02/24/2022 4 3 - 14 mmol/L Final     Glucose   Date Value Ref Range Status   02/24/2022 93 70 - 99 mg/dL Final     GLUCOSE BY METER POCT   Date Value Ref Range Status   02/24/2022 93 70 - 99 mg/dL Final     Urea Nitrogen   Date Value Ref Range Status   02/24/2022 23 7 - 30 mg/dL Final     Creatinine   Date Value Ref Range Status   02/24/2022 0.58 (L) 0.66 - 1.25 mg/dL Final     GFR Estimate   Date Value Ref Range Status   02/24/2022 >90 >60 mL/min/1.73m2 Final     Comment:     Effective December 21, 2021 eGFRcr in adults is calculated using the 2021 CKD-EPI creatinine equation which includes age and gender (Renata et al., [place], DOI: 10.1056/TFKBny7007198)     Calcium   Date Value Ref Range Status   02/24/2022 8.7 8.5 - 10.1 mg/dL Final     Bilirubin Total   Date Value Ref Range Status   02/24/2022 0.4 0.2 - 1.3 mg/dL Final     Alkaline Phosphatase   Date Value Ref Range Status   02/24/2022 78 40 - 150 U/L Final     ALT   Date Value Ref Range Status   02/24/2022 96 (H) 0 - 70 U/L Final     AST   Date Value Ref Range Status   02/24/2022 31 0 - 45 U/L Final     BMP  Recent Labs   Lab 02/24/22  0730 02/24/22  0506 02/24/22  0453 02/24/22  0039 02/23/22  0727 02/23/22  0616 02/22/22  0803 02/22/22  0636 02/21/22  1534 02/21/22  1350   NA  --   --  139  --   --  136  --  139  --  137   POTASSIUM  --   --  3.8  --   --  4.1  --  4.0  --  3.8   CHLORIDE  --   --  108  --   --  108  --  108  --  107   MAYE  --   --  8.7  --   --  8.7  --  8.3*  --  8.5   CO2  --   --  27  --   --  21  --  25  --  25   BUN  --   --  23  --   --  25  --  32*  --  34*   CR  --   --  0.58*  --   --  0.58*  --  0.58*  --  0.62*   GLC 93 89 93 81   < > 110*   < > 112*   < > 119*    < > = values in this interval not displayed.     INR  Recent Labs   Lab 02/24/22  0453 02/23/22  0616 02/22/22  0636 02/21/22  0425   INR 1.79* 1.66* 1.57* 1.50*      Hepatic Panel  Recent Labs   Lab 02/24/22  0453  "02/23/22  0616 02/22/22  0636 02/21/22  0149   AST 31 52* 76* 99*   ALT 96* 108* 130* 148*   ALKPHOS 78 85 82 83   BILITOTAL 0.4 0.3 0.3 0.5   ALBUMIN 2.6* 2.4* 2.6* 2.5*     GLUCOSE:   Recent Labs   Lab 02/24/22  0730 02/24/22  0506 02/24/22  0453 02/24/22  0039 02/23/22  2334 02/23/22  1942   GLC 93 89 93 81 86 99         Care Management Follow Up    Length of Stay (days): 27    Expected Discharge Date: 02/27/2022     Concerns to be Addressed: Discharge planning  Patient plan of care discussed at interdisciplinary rounds: Yes    Anticipated Discharge Disposition:  ARU vs TCU     Anticipated Discharge Services: Inpatient rehabilitation vs post acute rehab    Anticipated Discharge DME:  TBD    Patient/family educated on Medicare website which has current facility and service quality ratings: Yes  Education Provided on the Discharge Plan:  Yes  Patient/Family in Agreement with the Plan:  Yes    Private pay costs discussed: Not at this time    Additional Information:  MERCEDES met with pt and S/O at bedside, with pt's mother on speaker phone, to discuss discharge options. SW explained the difference between ARU and TCU requirements, reiterated that IDT is still strongly recommending ARU. SW notified pt and family that SW asked [facility] to begin following pt this morning for potential placement. Pt agreed that \"it doesn't hurt to keep options open.\" Pt continues to prefer TCU and be as close to home as possible ([facility]). SW described barriers to TCU placement for pt, such as his young age and medical complexities. Pt stated that he wants to try TCU placement. SW provided both pt and S/O copies of 'Care Compare' lists of ARUs in the state and TCU within a 50 mi radius of [place], per pt's request. SW requested minimum of 5 TCU options, and any additional ARU preferences, for SW to send referrals to tomorrow; pt and S/O agreed.      ORVILLE Davalos, LSW  [facility]   Pager: [phone]        No " significant events occurred overnight.      Cardiovascular Surgery Progress Note  02/25/2022         Assessment and Plan:     Chuy Varner is a 32 yo M with a PMHx of bicuspid aortic valve, thoracic aortic aneurysm without rupture, hypertension, obesity, and testicular cancer s/p orchiectomy who underwent Bentall procedure with mechanical valve on 1/28 with Dr. Velasco and Dr. Hannah. 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      Cardiovascular:   # Ascending aortic aneurysm s/p Bentall w/ Jerry valve on 1/28 with Dr. Velasco and Dr. Hannah  # Hx of HTN  - Goal MAP>65, SBP <130  LVEF: 60-65%  - Coreg 50 BID  Lisinopril 10mg daily (increase to 20mg 2/24)  - ASA 81mg  Atorvastatin 40mg   - Anticoagulatin for valve, will need coumadin with INR goal 2-3 for three months, then 1.5-2.0 for life, with ASA. Started Coumadin 2/19, Continue Bival as bridge.   - Baseline TTE per Dr. Velasco 2/23     Chest tubes and TPW removed 2/4     Pulmonary:  # Acute hypoxemic respiratory failure (ARDS vs. PNA vs. Hypervolemia vs. TRALI vs. severe atelectasis) s/p extubation  - Extubated 2/14 to HFNC, now breathing on Room Air  - conditional VBGs - draw one if increased somnolence or distressed breathing  - continue respiratory therapy  - metanebs (percussive therapy)  - flutter valve QID to encourage coughing, clearance of airways     Neurology / MSK:  Neuro/ pain/ sedation:  # Acute Postoperative pain  - Monitor neurological status. Notify the MD for any acute changes in exam.  - Neurology following, appreciate recs  - Pain well controlled on PRN tylenol, dilaudid, robaxin, oxycodone,      # Dysarthria - improving   - speech therapy evaluation 02/17 for dysarthric  Reconsulted 2/23 or swallow evaluation  - ENT scoped, no reason for long term  dysarthria/problems with phonation  - Sleep hygiene: Melatonin, trazadone prn at bedtime     # Thalamic infarct on head CT 2/16, old, age unknown  - Not contributing to current dysarthria vs delirium vs deconditioning per Neuro  - Neurology would like an MRI: to be done outpatient per Dr. Velasco  - Lipid panel, HgbA1c ordered per neuro for stroke Hx     # Symptoms of depression with insomnia   - reports feeling down and not being able to sleep despite melatonin and prn Trazodone   - Mental health consult, may need antidepressant. Patient and SO agreeable to MH visit.       / Renal:  -Hypernatremia, improving  BL creat appears to be ~ 1.0  Most recent Creatinine: 0.58  - Daily weights and I/O's  - Avoid/limit nephrotoxins as able  Replete lytes PRN per protocol  - Pre-op weight 365 lbs  - Moderate amount of MARIAM, has wraps on with decreased edema. Has windy UE edema with windy non-occlusive DVTs- elevate both arms and limit B/p's    - Re Consulted PT/OT for right lymphedema wrap 2/19.   Diuresis: Furosemide 40mg bid with potassium supplementation      GI / FEN:   - Remove NJ Tube 2/23. Patient is tolerating regular diet without complication.  - Speech following okay to advanced to soft and bite sized diet with mild thickness 1/20  - started calorie counts 2/24     # Diarrhea/Incontinence  Suspect caused by large amounts of free water via NJ vs augmentin vs recent colonization of C. diff  - Less frequent loose stools, Cdiff-negative 2/20     # Elevated ALT, AST, imrpoving  - Monitor for now, trending down, no abdominal pain.       # Cholelithiasis  - Downtrending LFTs. Suspect elevations d/t Augmentin     Endocrine:  # Stress induced hyperglycemia, improving  # Obesity (BMI 46)  - HDSSI  - Hgb A1c 5.8 2/18  - Decrease Lantus to 20 U daily, plan to decrease as using less sliding scale insulin and good BG control   - Goal BG <180 for optimal healing  - -120's without any sliding scale insulin   - Continue to monitor  BG    # Mild-Moderate Protein-calorie Malnutrition  - Weight loss > 5% over 1 month  - Oral intake <75% for more than 7 days  -  Nutrition consulted for recommendations    Infectious Disease:  # Stress induced leukocytosis  # Possible ventilator associated pneumonia  # Sinusitis, treated with Augmentin   Pan cultures and re-cultures all remained unrevealing. Fever likely from previously untreated DVT vs sinusitis.  - Continue to monitor fever curve, WBC and inflammatory markers  - WBC WNL trending down, AF.  - Checked C. Difficile 2/21 negative, UA    - CXR no change       Hematology:   # History of testicular cancer s/p orchiectomy  # RLE, Cesar UE DVT's  # HIT positive   - Right LE Lymphedema wrap, reconsult PT/OT   - CBC daily, Hgb 9.9, stable   - Continue Bivalirudin gtt as bridge to coumadin, following Chromogenic Factor X  - started coumadin 2/19 with INR goal 2-3, pharmacy to dose   - Right upper extremity US 2/21 with occlusive thrombus of one of the paired right brachial veins at the upper arm, with PICC in place, which was removed 2/21.   - LUE US 2/21 wotj occlusive superficial thrombus in left cephalic vein at the antecubital fossa. No evidence of left upper extremity deep venous thrombosis     Anticoagulation:   - ASA 81 mg daily   - Bival drip bridge to therapeutic coumadin, INR goal 2-3 for three months, then 1.5-2.0 lifelong   - Follow Chromogenic Factor X, goal 20-40 while on Bival. Chromogenic Factor X - 79  - Most recent INR: 2.05. (Bivalirudin held for 4 hours and INR rechecked)     MSK/ Skin:  # Sternotomy  # Surgical Incision  # Buttock pressure injury   - Sternal precautions  - Postoperative incision management per protocol  - PT/OT  - WOCN following for pressure injuries     # Maculopapular Rash, improving  Patient has maculopapular rash per Exam below.  - Likely represents folliculitis, monitor for now, improving     # Sternal Clicking and Popping   - Occurs with deep inspiration, but not  "appreciated with coughing. Noted evening of 2/24  - Dr. Velasco aware: Non Contrast CT ordered     Prophylaxis:    Mechanical prophylaxis for DVT  - Chemical DVT prophylaxis- bivalirudin and Warfarin  - PPI - until 03/16 (30d from extubation) per institutional protocol  - HOB to 30 degrees     Disposition:   - Transferred to  on 2/18  - Therapies recommending discharge to ARU, possibly Friday pending INR     Discussed with Surgeon, Dr. Velasco via written and verbal commnication.     Keila Churchill PA-c  Pager: [phone]  8:11 AM February 25, 2022           Interval History:     Patient noticed some painless clicking and popping in his chest  States pain is well managed on current regimen. Slept well overnight.  Tolerating diet, is passing flatus, BM x 1. No nausea or vomiting.  Breathing well without complaints.   Working with therapies and ambulating in halls with assistance.   Denies chest pain, palpitations, dizziness, syncopal symptoms, fevers, chills, myalgias, or sternal popping/clicking.         Physical Exam:   Blood pressure 121/75, pulse 95, temperature 97.5  F (36.4  C), temperature source Oral, resp. rate 18, height 1.905 m (6' 3\"), weight 148.8 kg (328 lb), SpO2 96 %.  Vitals:    02/22/22 0453 02/23/22 0316 02/24/22 0500   Weight: 147.6 kg (325 lb 6.4 oz) 147.9 kg (326 lb 1.6 oz) 148.8 kg (328 lb)      Current Weight:*** Kg Trend: *** Kg  Admit weight: *** Kg    Daily Fluid status; net loss: -3502  mL    Net loss SA: +1377 mL  MAPs: 73-92  LVEF: 60-65%    Gen: A&Ox4, NAD  Neuro: no focal deficits   CV: RRR, normal S1 S2, no murmurs, rubs or gallops. No appreciable JVD   Vascular: Peripheral pulses present Radial 2+, Dorsalis Pedis 2+, Posterior Tibialis 2+.   Pulm: CTA, no wheezing or rhonchi, normal breathing on RA  Abd: nondistended, normal BS, soft, nontender  Ext: Positive peripheral edema, 1+ pitting. Warm.   Incision: clean, dry, intact, no erythema, sternum stable  Tubes/drain sites: dressing clean " and dry          Data:    Imaging:  reviewed recent imaging    Chest x-ray  Interpretation:    Echocardiogram  Interpretation:    CT scan:    Labs:  CBC  Recent Labs   Lab 02/25/22  0559 02/24/22  0453 02/23/22  0616 02/22/22  0636   WBC 8.3 8.9 11.3* 14.2*   RBC 3.39* 3.44* 3.31* 3.23*   HGB 9.9* 10.3* 9.6* 9.6*   HCT 32.9* 34.1* 33.5* 31.7*   MCV 97 99 101* 98   MCH 29.2 29.9 29.0 29.7   MCHC 30.1* 30.2* 28.7* 30.3*   RDW 15.4* 15.6* 15.5* 15.9*    317 293 344     CMP:  Last Comprehensive Metabolic Panel:  Sodium   Date Value Ref Range Status   02/25/2022 140 133 - 144 mmol/L Final     Potassium   Date Value Ref Range Status   02/25/2022 4.0 3.4 - 5.3 mmol/L Final   02/05/2022 4.3 3.4 - 5.3 mmol/L Final     Chloride   Date Value Ref Range Status   02/25/2022 107 94 - 109 mmol/L Final     Carbon Dioxide (CO2)   Date Value Ref Range Status   02/25/2022 26 20 - 32 mmol/L Final     Anion Gap   Date Value Ref Range Status   02/25/2022 7 3 - 14 mmol/L Final     Glucose   Date Value Ref Range Status   02/25/2022 87 70 - 99 mg/dL Final     GLUCOSE BY METER POCT   Date Value Ref Range Status   02/25/2022 82 70 - 99 mg/dL Final     Urea Nitrogen   Date Value Ref Range Status   02/25/2022 21 7 - 30 mg/dL Final     Creatinine   Date Value Ref Range Status   02/25/2022 0.60 (L) 0.66 - 1.25 mg/dL Final     GFR Estimate   Date Value Ref Range Status   02/25/2022 >90 >60 mL/min/1.73m2 Final     Comment:     Effective December 21, 2021 eGFRcr in adults is calculated using the 2021 CKD-EPI creatinine equation which includes age and gender (Renata rios al., NEJM, DOI: 10.1056/EUEVum2295905)     Calcium   Date Value Ref Range Status   02/25/2022 8.8 8.5 - 10.1 mg/dL Final     Bilirubin Total   Date Value Ref Range Status   02/25/2022 0.4 0.2 - 1.3 mg/dL Final     Alkaline Phosphatase   Date Value Ref Range Status   02/25/2022 88 40 - 150 U/L Final     ALT   Date Value Ref Range Status   02/25/2022 84 (H) 0 - 70 U/L Final     AST    Date Value Ref Range Status   02/25/2022 28 0 - 45 U/L Final     BMP  Recent Labs   Lab 02/25/22  0559 02/25/22  0430 02/24/22  2336 02/24/22 2000 02/24/22  0506 02/24/22  0453 02/23/22  0727 02/23/22  0616 02/22/22  0803 02/22/22  0636     --   --   --   --  139  --  136  --  139   POTASSIUM 4.0  --   --   --   --  3.8  --  4.1  --  4.0   CHLORIDE 107  --   --   --   --  108  --  108  --  108   MAYE 8.8  --   --   --   --  8.7  --  8.7  --  8.3*   CO2 26  --   --   --   --  27  --  21  --  25   BUN 21  --   --   --   --  23  --  25  --  32*   CR 0.60*  --   --   --   --  0.58*  --  0.58*  --  0.58*   GLC 87 82 81 82   < > 93   < > 110*   < > 112*    < > = values in this interval not displayed.     INR  Recent Labs   Lab 02/25/22  0559 02/24/22  0453 02/23/22  0616 02/22/22  0636   INR 2.05* 1.79* 1.66* 1.57*      Hepatic Panel  Recent Labs   Lab 02/25/22  0559 02/24/22  0453 02/23/22  0616 02/22/22  0636   AST 28 31 52* 76*   ALT 84* 96* 108* 130*   ALKPHOS 88 78 85 82   BILITOTAL 0.4 0.4 0.3 0.3   ALBUMIN 2.6* 2.6* 2.4* 2.6*     GLUCOSE:   Recent Labs   Lab 02/25/22  0559 02/25/22  0430 02/24/22 2336 02/24/22 2000 02/24/22  1923 02/24/22  1518   GLC 87 82 81 82 97 113*         Calorie Count  Intake recorded for: 2/24  Total Kcals: 408 Total Protein: 12g  Kcals from Hospital Food: 408   Protein: 12g  Kcals from Outside Food (average):0 Protein: 0g  # Meals Ordered from Kitchen: 3 meals  # Meals Recorded: 1 meal (100% ice cream, 90% broccoli, 75% fruit platter, 50% chicken tenders)  # Supplements Recorded: 0      Care Management Follow Up    Length of Stay (days): 28    Expected Discharge Date: 02/27/2022      Concerns to be Addressed: Discharge planning  Patient plan of care discussed at interdisciplinary rounds: Yes     Anticipated Discharge Disposition:  ARU vs TCU     Anticipated Discharge Services: Inpatient rehabilitation vs post acute rehab     Anticipated Discharge DME:  TBD     Patient/family  educated on Medicare website which has current facility and service quality ratings: Yes  Education Provided on the Discharge Plan:  Yes  Patient/Family in Agreement with the Plan:  Yes     Private pay costs discussed: Not at this time      Additional Information:  Pt and S/O provided 2 TCU options and one additional ARU option for SW to send referrals to. MERCEDES updated [facility] liaison, whose following pt for ARU. Pt's IV bivalirudin will be discontinued prior to discharge; bridging to warafin. S/O works full-time outside of the home, but pt's father will be staying with pt for an extended time, so pt will have 24 hr supervision/assistance once he returns home.    Referrals Placed by CM/SW:      ARU  [facility]  [address] [place] [address]  PH: [phone]  2/25: Referral faxed.  1230: SW received VM from Kacey, stating that their IRF is at full-capacity and currently 12 pts on wait list w/in their own hospital; could add pt to wait list, but there are no openings next week.     ARU  [facility] [place], [place] [address] [phone]  2/25: Kia Richards stated she paged out the PMR consult, hoping they will see him today; continuing to follow pt.      TCU  [place]  [address] [place] [address]  PH: [phone]  Fax:  2/25: SW left VM for Lorin, requesting a call back for referral fax #.    [facility]  [address] [place] [address]  PH: [phone]  Fax: [phone]  Admissions: [phone] (Shannan)  2/25: SW left VM for Desirae, requesting a call back for referral fax #.  1220: SW received VM from Shannan in Admissions, who provided fax # and her direct dial.    ORVILLE Davalos, [facility]   Pager: [phone]

## 2022-01-28 NOTE — ANESTHESIA CARE TRANSFER NOTE
Patient: Chuy Varner    Procedure: Procedure(s):  Median Sternotomy.  Cardiopulmonary bypass pump.  Bentall procedure using On-X Ascending Aortic Prosthesis with Vascutek Gelweave Valsa Graft  size 27-29MM .  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    Anesthesia Type:   General     Note:    Oropharynx: endotracheal tube in place  Level of Consciousness: iatrogenic sedation          Vital Signs Stable: post-procedure vital signs reviewed and stable  Report to RN Given: handoff report given  Patient transferred to: ICU  Comments: Patient transferred to ICU with ETT in situ and with appropriate monitors. VSS off pressers. No apparent complications from anesthesia. Sign out given to ICU team and all questions were answered prior to leaving.  ICU Handoff: Call for PAUSE to initiate/utilize ICU HANDOFF, Identified Patient, Identified Responsible Provider, Reviewed the Pertinent Medical History, Discussed Surgical Course, Reviewed Intra-OP Anesthesia Management and Issues during Anesthesia, Set Expectations for Post Procedure Period and Allowed Opportunity for Questions and Acknowledgement of Understanding      Vitals:  Vitals Value Taken Time   BP     Temp     Pulse     Resp     SpO2         Electronically Signed By: Suad Julien MD  January 28, 2022  2:46 PM

## 2022-01-28 NOTE — ANESTHESIA PROCEDURE NOTES
Central Line/PA Catheter Placement    Pre-Procedure   Staff -        Anesthesiologist:  Adam Ford MD       Resident/Fellow: Suad Julien MD       Performed By: resident       Location: OR       Pre-Anesthestic Checklist: patient identified, IV checked, site marked, risks and benefits discussed, informed consent, monitors and equipment checked, pre-op evaluation and at physician/surgeon's request    Procedure   Procedure: central line       Laterality: right       Insertion Site: internal jugular.       Patient Position: Trendelenburg  Sterile Prep        All elements of maximal sterile barrier technique followed       Patient Prep/Sterile Barriers: draped, hand hygiene, gloves , hat , mask , draped, gown, sterile gel and probe cover       Skin prep: Chloraprep  Insertion/Injection        Technique: ultrasound guided        1. Ultrasound was used to evaluate the access site.       2. Vein evaluated via ultrasound for patency/adequacy.       3. Using real-time ultrasound the needle/catheter was observed entering the artery/vein.       Introducer Type: 9 Fr, 2-lumen MAC        PA Catheter Type: CCO         Appropriate RV, RA and PA waveforms noted:  Yes            Withdrawn and Locked at cm: 50  Narrative         Secured by: suture       Tegaderm and Biopatch dressing used.       Complications: None apparent,        blood aspirated from all lumens,        All lumens flushed: Yes       Verification method: Ultrasound, LIVAN and Placement to be verified post-op

## 2022-01-28 NOTE — LETTER
Health Information Management Services               Recipient:  Barbi Campbell  [facility]      Sender:  ECHO Davalos  [phone]        Date: February 25, 2022  Patient Name:  Chuy Varner  Routing Message:    Please review pt for TCU placement. Anticipating pt will be medically ready to discharge on Monday.  IV bivalirudin will be discontinued prior to discharge; bridging to warafin. Pt will have 24 hr supervision/assistance once he returns home.  Thank you!          The documents accompanying this notice contain confidential information belonging to the sender.  This information is intended only for the use of the individual or entity named above.  The authorized recipient of this information is prohibited from disclosing this information to any other party and is required to destroy the information after its stated need has been fulfilled, unless otherwise required by state law.      If you are not the intended recipient, you are hereby notified that any disclosure, copy, distribution or action taken in reliance on the contents of these documents is strictly prohibited.  If you have received this document in error, please return it by fax to [phone] with a note on the cover sheet explaining why you are returning it (e.g. not your patient, not your provider, etc.).  If you need further assistance, please call [facility] Centralized Transcription at [phone].  Documents may also be returned by mail to [employer], , [facility] [address], [place], [place] [address].

## 2022-01-28 NOTE — LETTER
Health Information Management Services               Recipient:  ELEAZAR, ADMISSIONS        Sender:  ECHO Davalos  [phone]        Date: February 25, 2022  Patient Name:  Chuy Varner  Routing Message:    Please review for TCU placement. Pt will be medically ready to discharge early next week.  IV bivalirudin will be discontinued prior to discharge; bridging to warafin. Pt will have 24 hr supervision/assistance once he returns home. Thank you!          The documents accompanying this notice contain confidential information belonging to the sender.  This information is intended only for the use of the individual or entity named above.  The authorized recipient of this information is prohibited from disclosing this information to any other party and is required to destroy the information after its stated need has been fulfilled, unless otherwise required by state law.      If you are not the intended recipient, you are hereby notified that any disclosure, copy, distribution or action taken in reliance on the contents of these documents is strictly prohibited.  If you have received this document in error, please return it by fax to [phone] with a note on the cover sheet explaining why you are returning it (e.g. not your patient, not your provider, etc.).  If you need further assistance, please call [facility] Centralized Transcription at [phone].  Documents may also be returned by mail to [email or web address], , [address], [place], [place] [address].

## 2022-01-28 NOTE — ANESTHESIA PROCEDURE NOTES
Airway       Patient location during procedure: OR       Procedure Start/Stop Times: 1/28/2022 7:51 AM  Staff -        Anesthesiologist:  Adam Ford MD       Resident/Fellow: Suad Julien MD       Performed By: resident  Consent for Airway        Urgency: elective  Indications and Patient Condition       Indications for airway management: rai-procedural       Induction type:intravenous       Mask difficulty assessment: 2 - vent by mask + OA or adjuvant +/- NMBA    Final Airway Details       Final airway type: endotracheal airway       Successful airway: ETT - single  Endotracheal Airway Details        ETT size (mm): 8.0       Cuffed: yes       Successful intubation technique: video laryngoscopy       VL Blade Size: MAC 4       Grade View of Cords: 1       Adjucts: stylet       Position: Right       Measured from: gums/teeth       Secured at (cm): 24       Bite block used: Soft    Post intubation assessment        Placement verified by: capnometry, equal breath sounds and chest rise        Number of attempts at approach: 1       Secured with: pink tape       Ease of procedure: easy       Dentition: Intact

## 2022-01-28 NOTE — LETTER
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]  Admit Date/Time: 2022  5:33 AM  Discharge Date: 3/4/22 10am   Payor Source: Payor: BCBS / Plan: BCBS OUT OF STATE / Product Type: Indemnity /     Plan of Care Goals/Milestone Events:   Patient Goals:   Short-term go to acute rehab   Long-term get strong enough to go home             Reason for Communication Hand-off Referral: Multiple providers/specialties    Discharge Plan: [facility]       Concern for non-adherence with plan of care:   No   Discharge Needs Assessment:  Needs      Most Recent Value   Equipment Currently Used at Home none          Already enrolled in Tele-monitoring program and name of program:  no  Follow-up specialty is recommended: yes     Follow-up plan:    Future Appointments   Date Time Provider Department Center   3/4/2022 10:45 AM Maria M Paiz, OT [facility] O   3/4/2022 11:30 AM Melissa Boston, PT [facility] O   3/11/2022  2:30 PM [facility]       Any outstanding tests or procedures:        Radiology & Cardiology Orders     Future Labs/Procedures Complete By Expires    MR Brain w/o & w Contrast  2022 (Approximate) 2023    Process Instructions:    Administration of IV contrast (contrast agent, dose, and amount) will be tailored to this examination per the appropriate written protocol listed in the Protocol E-Book, or by the interpreting provider.         Referrals     Future Labs/Procedures    CARDIAC REHAB REFERRAL     Process Instructions:    Advance to Wellness and Exercise for Life (WEL) Program or to another maintenance exercise program upon completion of phase 2 cardiac rehab.    Weight mgt  program is only offered at [facility].    *This therapy referral will be filtered to a centralized scheduling office at [facility] and the patient will receive a call to schedule an appointment at a [facility] location most convenient for them. *        Comments:    Please be aware that coverage of these services is subject to the terms and limitations of your health insurance plan.  Call member services at your health plan with any benefit or coverage questions.     Order is sent electronically to central rehab scheduling. Call [phone] if you haven't been contacted regarding these appointments within 2 business days of discharge.      Adult Neurology  Referral     Comments:    Please be aware that coverage of these services is subject to the terms and limitations of your health insurance plan.  Call member services at your health plan with any benefit or coverage questions.  [facility] will call you to coordinate your care as prescribed by your provider. If you don't hear from a representative within 2 business days, please call [phone].    Occupational Therapy Adult Consult     Comments:    Evaluate and treat as clinically indicated.    Reason:  s/p Aortic Aneurysm repair    Occupational Therapy Adult Consult     Comments:    Evaluate and treat as clinically indicated.    Reason:  s/p Aortic aneurysm repair    Physical Therapy Adult Consult     Comments:    Evaluate and treat as clinically indicated.    Reason:  s/p Aortic Aneurysm repair    Physical Therapy Adult Consult     Comments:    Evaluate and treat as clinically indicated.    Reason:  s/p Aortic aneurysm repair          Supplies     Future Labs/Procedures    Anti-Embolism Stockings     Comments:    Bilateral below knee length.On in the morning, off at night    Compression Sleeve/Stocking Order     Process Instructions:    By signing this order, the Authorizing Provider is attesting that they have completed a  face-to-face evaluation on the patient to determine their need for this equipment in the last 60 days. A new face-to-face evaluation is required each time  A new prescription for one of the specified items is ordered.   If an additional provider completed the evaluation, please indicate their name below.     **As of 2018, an order requisition and face sheet will print for all DME orders. Please give printed order and face sheet to patient if not obtaining product from [facility] DME closet.     Comments:    Compression Sleeve/Stocking Documentation:   The patient needs compression stockings for lower extremity edema    I, the undersigned, certify that the above prescribed supplies are medically necessary for this patient and is both reasonable and necessary in reference to accepted standards of medical and necessary in reference to accepted standards of medical practice in the treatment of this patient's condition and is not prescribed as a convenience.          Key Recommendations:      Blake Romero    AVS/Discharge Summary is the source of truth; this is a helpful guide for improved communication of patient story

## 2022-01-29 ENCOUNTER — APPOINTMENT (OUTPATIENT)
Dept: GENERAL RADIOLOGY | Facility: CLINIC | Age: 32
End: 2022-01-29
Attending: SURGERY
Payer: COMMERCIAL

## 2022-01-29 LAB
ALBUMIN SERPL-MCNC: 3 G/DL (ref 3.4–5)
ALP SERPL-CCNC: 42 U/L (ref 40–150)
ALT SERPL W P-5'-P-CCNC: 26 U/L (ref 0–70)
ANION GAP SERPL CALCULATED.3IONS-SCNC: 2 MMOL/L (ref 3–14)
AST SERPL W P-5'-P-CCNC: 45 U/L (ref 0–45)
BASE EXCESS BLDA CALC-SCNC: -0.7 MMOL/L (ref -9–1.8)
BASE EXCESS BLDA CALC-SCNC: 1.2 MMOL/L (ref -9–1.8)
BASE EXCESS BLDA CALC-SCNC: 1.8 MMOL/L (ref -9–1.8)
BASE EXCESS BLDA CALC-SCNC: 2.3 MMOL/L (ref -9–1.8)
BASE EXCESS BLDA CALC-SCNC: 3.1 MMOL/L (ref -9–1.8)
BASE EXCESS BLDV CALC-SCNC: 3.7 MMOL/L (ref -7.7–1.9)
BASE EXCESS BLDV CALC-SCNC: 4.1 MMOL/L (ref -7.7–1.9)
BASE EXCESS BLDV CALC-SCNC: 4.9 MMOL/L (ref -7.7–1.9)
BASE EXCESS BLDV CALC-SCNC: 5.3 MMOL/L (ref -7.7–1.9)
BASE EXCESS BLDV CALC-SCNC: 6.2 MMOL/L (ref -7.7–1.9)
BILIRUB SERPL-MCNC: 0.5 MG/DL (ref 0.2–1.3)
BUN SERPL-MCNC: 21 MG/DL (ref 7–30)
CA-I BLD-MCNC: 4.8 MG/DL (ref 4.4–5.2)
CALCIUM SERPL-MCNC: 7.7 MG/DL (ref 8.5–10.1)
CHLORIDE BLD-SCNC: 114 MMOL/L (ref 94–109)
CO2 SERPL-SCNC: 27 MMOL/L (ref 20–32)
CREAT SERPL-MCNC: 0.83 MG/DL (ref 0.66–1.25)
ERYTHROCYTE [DISTWIDTH] IN BLOOD BY AUTOMATED COUNT: 12.4 % (ref 10–15)
GFR SERPL CREATININE-BSD FRML MDRD: >90 ML/MIN/1.73M2
GLUCOSE BLD-MCNC: 144 MG/DL (ref 70–99)
GLUCOSE BLDC GLUCOMTR-MCNC: 103 MG/DL (ref 70–99)
GLUCOSE BLDC GLUCOMTR-MCNC: 104 MG/DL (ref 70–99)
GLUCOSE BLDC GLUCOMTR-MCNC: 111 MG/DL (ref 70–99)
GLUCOSE BLDC GLUCOMTR-MCNC: 112 MG/DL (ref 70–99)
GLUCOSE BLDC GLUCOMTR-MCNC: 113 MG/DL (ref 70–99)
GLUCOSE BLDC GLUCOMTR-MCNC: 119 MG/DL (ref 70–99)
GLUCOSE BLDC GLUCOMTR-MCNC: 121 MG/DL (ref 70–99)
GLUCOSE BLDC GLUCOMTR-MCNC: 123 MG/DL (ref 70–99)
GLUCOSE BLDC GLUCOMTR-MCNC: 137 MG/DL (ref 70–99)
GLUCOSE BLDC GLUCOMTR-MCNC: 144 MG/DL (ref 70–99)
GLUCOSE BLDC GLUCOMTR-MCNC: 146 MG/DL (ref 70–99)
GLUCOSE BLDC GLUCOMTR-MCNC: 150 MG/DL (ref 70–99)
HCO3 BLD-SCNC: 23 MMOL/L (ref 21–28)
HCO3 BLD-SCNC: 25 MMOL/L (ref 21–28)
HCO3 BLD-SCNC: 27 MMOL/L (ref 21–28)
HCO3 BLD-SCNC: 27 MMOL/L (ref 21–28)
HCO3 BLD-SCNC: 28 MMOL/L (ref 21–28)
HCO3 BLDV-SCNC: 30 MMOL/L (ref 21–28)
HCO3 BLDV-SCNC: 30 MMOL/L (ref 21–28)
HCO3 BLDV-SCNC: 31 MMOL/L (ref 21–28)
HCT VFR BLD AUTO: 40.7 % (ref 40–53)
HGB BLD-MCNC: 13.6 G/DL (ref 13.3–17.7)
LACTATE SERPL-SCNC: 1.3 MMOL/L (ref 0.7–2)
MAGNESIUM SERPL-MCNC: 2.4 MG/DL (ref 1.6–2.3)
MCH RBC QN AUTO: 30.2 PG (ref 26.5–33)
MCHC RBC AUTO-ENTMCNC: 33.4 G/DL (ref 31.5–36.5)
MCV RBC AUTO: 90 FL (ref 78–100)
O2/TOTAL GAS SETTING VFR VENT: 60 %
O2/TOTAL GAS SETTING VFR VENT: 65 %
O2/TOTAL GAS SETTING VFR VENT: 70 %
O2/TOTAL GAS SETTING VFR VENT: 70 %
O2/TOTAL GAS SETTING VFR VENT: 80 %
O2/TOTAL GAS SETTING VFR VENT: 90 %
OXYHGB MFR BLD: 90 % (ref 92–100)
OXYHGB MFR BLD: 92 % (ref 92–100)
OXYHGB MFR BLD: 93 % (ref 92–100)
OXYHGB MFR BLD: 94 % (ref 92–100)
OXYHGB MFR BLD: 96 % (ref 92–100)
OXYHGB MFR BLDV: 58 % (ref 70–75)
OXYHGB MFR BLDV: 59 % (ref 70–75)
OXYHGB MFR BLDV: 60 % (ref 70–75)
OXYHGB MFR BLDV: 63 % (ref 70–75)
OXYHGB MFR BLDV: 67 % (ref 70–75)
PCO2 BLD: 33 MM HG (ref 35–45)
PCO2 BLD: 38 MM HG (ref 35–45)
PCO2 BLD: 38 MM HG (ref 35–45)
PCO2 BLD: 43 MM HG (ref 35–45)
PCO2 BLD: 46 MM HG (ref 35–45)
PCO2 BLDV: 47 MM HG (ref 40–50)
PCO2 BLDV: 50 MM HG (ref 40–50)
PCO2 BLDV: 52 MM HG (ref 40–50)
PH BLD: 7.39 [PH] (ref 7.35–7.45)
PH BLD: 7.41 [PH] (ref 7.35–7.45)
PH BLD: 7.44 [PH] (ref 7.35–7.45)
PH BLD: 7.45 [PH] (ref 7.35–7.45)
PH BLD: 7.46 [PH] (ref 7.35–7.45)
PH BLDV: 7.37 [PH] (ref 7.32–7.43)
PH BLDV: 7.39 [PH] (ref 7.32–7.43)
PH BLDV: 7.4 [PH] (ref 7.32–7.43)
PH BLDV: 7.41 [PH] (ref 7.32–7.43)
PH BLDV: 7.44 [PH] (ref 7.32–7.43)
PHOSPHATE SERPL-MCNC: 3.3 MG/DL (ref 2.5–4.5)
PLATELET # BLD AUTO: 189 10E3/UL (ref 150–450)
PO2 BLD: 55 MM HG (ref 80–105)
PO2 BLD: 60 MM HG (ref 80–105)
PO2 BLD: 64 MM HG (ref 80–105)
PO2 BLD: 73 MM HG (ref 80–105)
PO2 BLD: 84 MM HG (ref 80–105)
PO2 BLDV: 29 MM HG (ref 25–47)
PO2 BLDV: 30 MM HG (ref 25–47)
PO2 BLDV: 31 MM HG (ref 25–47)
PO2 BLDV: 33 MM HG (ref 25–47)
PO2 BLDV: 35 MM HG (ref 25–47)
POTASSIUM BLD-SCNC: 3.6 MMOL/L (ref 3.4–5.3)
POTASSIUM BLD-SCNC: 3.9 MMOL/L (ref 3.4–5.3)
PROT SERPL-MCNC: 5.6 G/DL (ref 6.8–8.8)
RBC # BLD AUTO: 4.5 10E6/UL (ref 4.4–5.9)
SODIUM SERPL-SCNC: 143 MMOL/L (ref 133–144)
WBC # BLD AUTO: 11 10E3/UL (ref 4–11)

## 2022-01-29 PROCEDURE — 250N000011 HC RX IP 250 OP 636: Performed by: SURGERY

## 2022-01-29 PROCEDURE — 82330 ASSAY OF CALCIUM: CPT | Performed by: SURGERY

## 2022-01-29 PROCEDURE — 71045 X-RAY EXAM CHEST 1 VIEW: CPT | Mod: 26 | Performed by: RADIOLOGY

## 2022-01-29 PROCEDURE — 250N000009 HC RX 250: Performed by: SURGERY

## 2022-01-29 PROCEDURE — 999N000157 HC STATISTIC RCP TIME EA 10 MIN

## 2022-01-29 PROCEDURE — 83735 ASSAY OF MAGNESIUM: CPT | Performed by: SURGERY

## 2022-01-29 PROCEDURE — 71045 X-RAY EXAM CHEST 1 VIEW: CPT

## 2022-01-29 PROCEDURE — 200N000002 HC R&B ICU UMMC

## 2022-01-29 PROCEDURE — 82805 BLOOD GASES W/O2 SATURATION: CPT | Performed by: STUDENT IN AN ORGANIZED HEALTH CARE EDUCATION/TRAINING PROGRAM

## 2022-01-29 PROCEDURE — 80053 COMPREHEN METABOLIC PANEL: CPT | Performed by: SURGERY

## 2022-01-29 PROCEDURE — 999N000045 HC STATISTIC DAILY SWAN MONITORING

## 2022-01-29 PROCEDURE — 250N000013 HC RX MED GY IP 250 OP 250 PS 637: Performed by: SURGERY

## 2022-01-29 PROCEDURE — 99291 CRITICAL CARE FIRST HOUR: CPT | Mod: 24 | Performed by: INTERNAL MEDICINE

## 2022-01-29 PROCEDURE — 85027 COMPLETE CBC AUTOMATED: CPT | Performed by: SURGERY

## 2022-01-29 PROCEDURE — 84100 ASSAY OF PHOSPHORUS: CPT | Performed by: SURGERY

## 2022-01-29 PROCEDURE — 94003 VENT MGMT INPAT SUBQ DAY: CPT

## 2022-01-29 PROCEDURE — 999N000155 HC STATISTIC RAPCV CVP MONITORING

## 2022-01-29 PROCEDURE — 250N000011 HC RX IP 250 OP 636: Performed by: STUDENT IN AN ORGANIZED HEALTH CARE EDUCATION/TRAINING PROGRAM

## 2022-01-29 PROCEDURE — 83605 ASSAY OF LACTIC ACID: CPT | Performed by: STUDENT IN AN ORGANIZED HEALTH CARE EDUCATION/TRAINING PROGRAM

## 2022-01-29 PROCEDURE — 84132 ASSAY OF SERUM POTASSIUM: CPT | Performed by: SURGERY

## 2022-01-29 PROCEDURE — 999N000015 HC STATISTIC ARTERIAL MONITORING DAILY

## 2022-01-29 PROCEDURE — 82805 BLOOD GASES W/O2 SATURATION: CPT | Performed by: SURGERY

## 2022-01-29 RX ORDER — FUROSEMIDE 10 MG/ML
60 INJECTION INTRAMUSCULAR; INTRAVENOUS ONCE
Status: COMPLETED | OUTPATIENT
Start: 2022-01-29 | End: 2022-01-29

## 2022-01-29 RX ORDER — FUROSEMIDE 10 MG/ML
20 INJECTION INTRAMUSCULAR; INTRAVENOUS ONCE
Status: DISCONTINUED | OUTPATIENT
Start: 2022-01-29 | End: 2022-01-29

## 2022-01-29 RX ORDER — IPRATROPIUM BROMIDE AND ALBUTEROL SULFATE 2.5; .5 MG/3ML; MG/3ML
3 SOLUTION RESPIRATORY (INHALATION) 2 TIMES DAILY
Status: DISCONTINUED | OUTPATIENT
Start: 2022-01-29 | End: 2022-01-29

## 2022-01-29 RX ORDER — FUROSEMIDE 10 MG/ML
40 INJECTION INTRAMUSCULAR; INTRAVENOUS ONCE
Status: COMPLETED | OUTPATIENT
Start: 2022-01-29 | End: 2022-01-29

## 2022-01-29 RX ORDER — FUROSEMIDE 10 MG/ML
20 INJECTION INTRAMUSCULAR; INTRAVENOUS ONCE
Status: COMPLETED | OUTPATIENT
Start: 2022-01-29 | End: 2022-01-29

## 2022-01-29 RX ORDER — POTASSIUM CHLORIDE 29.8 MG/ML
20 INJECTION INTRAVENOUS ONCE
Status: COMPLETED | OUTPATIENT
Start: 2022-01-29 | End: 2022-01-29

## 2022-01-29 RX ADMIN — FUROSEMIDE 20 MG: 10 INJECTION, SOLUTION INTRAVENOUS at 08:04

## 2022-01-29 RX ADMIN — DEXMEDETOMIDINE HYDROCHLORIDE 0.7 MCG/KG/HR: 4 INJECTION, SOLUTION INTRAVENOUS at 02:08

## 2022-01-29 RX ADMIN — PANTOPRAZOLE SODIUM 40 MG: 40 TABLET, DELAYED RELEASE ORAL at 07:36

## 2022-01-29 RX ADMIN — DEXMEDETOMIDINE HYDROCHLORIDE 0.7 MCG/KG/HR: 4 INJECTION, SOLUTION INTRAVENOUS at 19:17

## 2022-01-29 RX ADMIN — HUMAN INSULIN 0.5 UNITS/HR: 100 INJECTION, SOLUTION SUBCUTANEOUS at 09:03

## 2022-01-29 RX ADMIN — POLYETHYLENE GLYCOL 3350 17 G: 17 POWDER, FOR SOLUTION ORAL at 07:37

## 2022-01-29 RX ADMIN — DEXMEDETOMIDINE HYDROCHLORIDE 0.7 MCG/KG/HR: 4 INJECTION, SOLUTION INTRAVENOUS at 09:07

## 2022-01-29 RX ADMIN — OXYCODONE HYDROCHLORIDE 10 MG: 10 TABLET ORAL at 10:00

## 2022-01-29 RX ADMIN — SENNOSIDES AND DOCUSATE SODIUM 1 TABLET: 50; 8.6 TABLET ORAL at 07:36

## 2022-01-29 RX ADMIN — DEXMEDETOMIDINE HYDROCHLORIDE 0.7 MCG/KG/HR: 4 INJECTION, SOLUTION INTRAVENOUS at 22:17

## 2022-01-29 RX ADMIN — MUPIROCIN 0.5 G: 20 OINTMENT TOPICAL at 07:37

## 2022-01-29 RX ADMIN — PROPOFOL 10 MCG/KG/MIN: 10 INJECTION, EMULSION INTRAVENOUS at 05:39

## 2022-01-29 RX ADMIN — HEPARIN SODIUM 5000 UNITS: 5000 INJECTION, SOLUTION INTRAVENOUS; SUBCUTANEOUS at 19:27

## 2022-01-29 RX ADMIN — DEXMEDETOMIDINE HYDROCHLORIDE 0.7 MCG/KG/HR: 4 INJECTION, SOLUTION INTRAVENOUS at 05:39

## 2022-01-29 RX ADMIN — POTASSIUM CHLORIDE 20 MEQ: 29.8 INJECTION, SOLUTION INTRAVENOUS at 17:45

## 2022-01-29 RX ADMIN — DEXMEDETOMIDINE HYDROCHLORIDE 0.7 MCG/KG/HR: 4 INJECTION, SOLUTION INTRAVENOUS at 12:26

## 2022-01-29 RX ADMIN — ACETAMINOPHEN 975 MG: 325 TABLET, FILM COATED ORAL at 15:25

## 2022-01-29 RX ADMIN — SENNOSIDES AND DOCUSATE SODIUM 1 TABLET: 50; 8.6 TABLET ORAL at 19:27

## 2022-01-29 RX ADMIN — OXYCODONE HYDROCHLORIDE 10 MG: 10 TABLET ORAL at 15:25

## 2022-01-29 RX ADMIN — CEFAZOLIN SODIUM 2 G: 2 INJECTION, SOLUTION INTRAVENOUS at 04:07

## 2022-01-29 RX ADMIN — HEPARIN SODIUM 5000 UNITS: 5000 INJECTION, SOLUTION INTRAVENOUS; SUBCUTANEOUS at 12:08

## 2022-01-29 RX ADMIN — FUROSEMIDE 40 MG: 10 INJECTION, SOLUTION INTRAVENOUS at 13:30

## 2022-01-29 RX ADMIN — ASPIRIN 81 MG CHEWABLE TABLET 81 MG: 81 TABLET CHEWABLE at 07:36

## 2022-01-29 RX ADMIN — ACETAMINOPHEN 975 MG: 325 TABLET, FILM COATED ORAL at 07:36

## 2022-01-29 RX ADMIN — OXYCODONE HYDROCHLORIDE 10 MG: 10 TABLET ORAL at 00:06

## 2022-01-29 RX ADMIN — MUPIROCIN 0.5 G: 20 OINTMENT TOPICAL at 19:27

## 2022-01-29 RX ADMIN — FUROSEMIDE 60 MG: 10 INJECTION, SOLUTION INTRAVENOUS at 19:27

## 2022-01-29 RX ADMIN — CEFAZOLIN SODIUM 2 G: 2 INJECTION, SOLUTION INTRAVENOUS at 12:06

## 2022-01-29 RX ADMIN — DEXMEDETOMIDINE HYDROCHLORIDE 0.7 MCG/KG/HR: 4 INJECTION, SOLUTION INTRAVENOUS at 16:06

## 2022-01-29 RX ADMIN — ACETAMINOPHEN 975 MG: 325 TABLET, FILM COATED ORAL at 00:06

## 2022-01-29 RX ADMIN — PROPOFOL 10 MCG/KG/MIN: 10 INJECTION, EMULSION INTRAVENOUS at 22:17

## 2022-01-29 RX ADMIN — OXYCODONE HYDROCHLORIDE 5 MG: 5 TABLET ORAL at 04:07

## 2022-01-29 ASSESSMENT — ACTIVITIES OF DAILY LIVING (ADL)
ADLS_ACUITY_SCORE: 11

## 2022-01-29 ASSESSMENT — MIFFLIN-ST. JEOR: SCORE: 2692.63

## 2022-01-29 NOTE — PLAN OF CARE
Major Shift Events:  Pt diuresed with 60mg IV lasix this shift. FiO2 weaned down: 90% to 60%.  CCO monitoring initiated.     Labs/Protocols: K, Mag and Phos Protocols. K replaced x1 this shift. Lactic acid remains down trending.   Neuro: Sedated, RASS: -1/-2. Follows commands, moves all extremities. PERRLA. Bilateral ES catheters in place at 7 and 7, bolused by anesthesia at 0800. PRN oxycodone and robaxin given for pain management. T-Max: 100.4 (PA temp), scheduled tylenol given.   Cardiac: SR, HR 60s-80s. Temp pacer, VVI 60-standby only. SBP goal <110, MAP goal >65. FREDA Q 4 hours; CVP: 15-18, PA: 30s/10s, SVO2: 50s-60s, CI:2s, SVR: 700s-800s. Palpable pulses throughout.   Respiratory: CMV 60%, 600, 16, 10. Pressure supported 1630-present. SpO2 goal >90, PaO2 goal >60 per CVTS. Lung sounds clear/diminished. Chest tubes x3-mediastinal, all to -20 suction with minimal serosanguinous output.   GI/: OG for meds. Bowel sounds hypoactive. Godfrey in place with adequate urine output. 60mg IV lasix given this shift.   Skin: Midline chest incision with wound vac, no output. Rash to chest, back and forearms, present PTA per S.O. Chest tube sites, dressing CDI.   LDAs: R internal jugular MAC with [place]. L radial A-line. L PIV.   GTTs: Dex, Propofol, Insulin  Diet: NPO  Activity: Ax2 with lift. Repositioned Q 2 hours for skin integrity.   Teaching: Patient and S.O. educated on plan of care and cares throughout shift.     Plan: Continue diuresis. Continue to wean vent settings as tolerated. Possible extubation to BiPap tomorrow.   For vital signs and complete assessments, please see documentation flowsheets.

## 2022-01-29 NOTE — PLAN OF CARE
Major Shift Events: Overnight patient remains intubated d/t hypoxia. Neuro wise, following all commands when sedation lightened, wiggles toes, squeezing hands bilaterally. PO oxycodone given for pain control.Patient requiring increased in FIO2 to 90% (from 60%) and peep to 8 (from 5) overnight. MD CVTS - Cata, aware. Otherwise off all pressors. Afebrile. Adequate UO. Minimal CT output. BS stable on 1 units/hr.      Plan: Continue to monitor reporting changes to treatment team.    For vital signs and complete assessments, please see documentation flowsheets.

## 2022-01-29 NOTE — PROGRESS NOTES
CLINICAL NUTRITION SERVICES - BRIEF NOTE    Received provider consult for nutrition education with comments post op cardiovascular surgery (automatic consult on post-op order set). S/p Bentall procedure with mechanical valve on 1/28. Nutrition education not indicated.    RD will follow per LOS protocol or if re-consulted.     Margret Whaley RD, , LD  Weekend/Holiday RD Pager: [phone]

## 2022-01-29 NOTE — PROGRESS NOTES
"Pain Service Progress Note  [facility]  Date: January 29, 2022      Patient Name: Chuy Varner  MRN: [id number]  Age: 31 year old  Sex: male      Assessment: 31 year old male with PMH of possible bicuspid aortic valve, thoracic aortic aneurysm without rupture, hypertension, obesity, and testicular cancer s/p orchiectomy    Procedure: Bentall procedure with mechanical valve     Date of Surgery: 1/28/2022    Date of T5-6 Erector Spinae Catheter Placement: 1/28/2022    Plan/Recommendations:  1. Regional Anesthesia/Analgesia  -Continuous Catheter Type/Site: bilateral erector spinae (ES) T5-6  Continue 0.2% ropivacaine  Continuous Infusion at 14 mL/hr    Plan to maintain catheters, max of 14 days    2. Anticoagulation  Heparin 5,000 units subcutaneous Q8H  -Please contact Inpatient Pain Service (pager [phone]) before ordering or making any medication changes       3. Multimodal Analgesia  - Acetaminophen  - Consider Robaxin   For breakthrough  - Oxycodone 5-10mg q4  - Hydromorphone 0.2-0.4 q2    Pain Service will continue to follow.    Discussed with attending anesthesiologist      Overnight Events: NAEO    Tubes/Drains: Yes      Subjective: intubated, sedated, nods when asked if he is in pain  Nausea: No  Vomiting: No  Pruritus: No  Symptoms of LAST: No    Pain Location:  incision    Pain Intensity:    Pain at Rest: NA   Pain with Activity: NA  Comfort Goal: NA   Baseline Pain: NA   Satisfied with your level of pain control: NA    Diet: NPO for Medical/Clinical Reasons Except for: Other; Specify: NPO until Extubated  Advance Diet as Tolerated: Clear Liquid Diet    Relevant Labs:  Recent Labs   Lab Test 01/29/22  0323 01/28/22  1447   INR  --  1.38*    205   PTT  --  31   BUN 21 18       Physical Exam:  Vitals: /88   Pulse 63   Temp 36.7  C (98  F) (Oral)   Resp 17   Ht 1.905 m (6' 3\")   Wt (!) 165.2 kg (364 lb 3.2 oz)   SpO2 94%   BMI 45.52 kg/m      Physical Exam: " "  Orientation:  Alert, oriented, and in no acute distress: No  Sedation: Yes    Motor Examination:  5/5 Strength in lower extremities: Yes    Sensory Level:   Decrease in sensation: Yes    Catheter Site:   Catheter entry site is clean/dry/intact: Yes    Tender: No      Relevant Medications:  Current Pain Medications:  Medications related to Pain Management (From now, onward)    Start     Dose/Rate Route Frequency Ordered Stop    01/31/22 0000  acetaminophen (TYLENOL) tablet 650 mg         650 mg Oral EVERY 4 HOURS PRN 01/28/22 1435      01/29/22 0800  polyethylene glycol (MIRALAX) Packet 17 g         17 g Oral DAILY 01/28/22 1435 01/29/22 0800  aspirin (ASA) chewable tablet 81 mg         81 mg Oral or NG Tube DAILY 01/28/22 1435 01/28/22 2000  senna-docusate (SENOKOT-S/PERICOLACE) 8.6-50 MG per tablet 1 tablet         1 tablet Oral 2 TIMES DAILY 01/28/22 1435 01/28/22 2000  propofol (DIPRIVAN) infusion         5-75 mcg/kg/min × 165.9 kg (Dosing Weight)  5-74.7 mL/hr  Intravenous CONTINUOUS 01/28/22 1958 01/28/22 1600  ropivacaine 0.2% (NAROPIN) 750 mL in ON-Q C-Bloc select flow ([id number] holds 600-750 mL) dual cath disposable pump         14 mL/hr  Irrigation CONTINUOUS 01/28/22 1533      01/28/22 1500  dexmedetomidine (PRECEDEX) 400 mcg in 0.9% sodium chloride 100 mL         0.2-0.7 mcg/kg/hr × 165.9 kg  8.3-29 mL/hr  Intravenous CONTINUOUS 01/28/22 1435 01/28/22 1500  acetaminophen (TYLENOL) tablet 975 mg         975 mg Oral EVERY 8 HOURS 01/28/22 1435 01/31/22 1559    01/28/22 1500  fentaNYL (SUBLIMAZE) infusion          mcg/hr  0.5-2 mL/hr  Intravenous CONTINUOUS 01/28/22 1438 01/28/22 1435  HYDROmorphone (PF) (DILAUDID) injection 0.2 mg        \"Or\" Linked Group Details    0.2 mg Intravenous EVERY 2 HOURS PRN 01/28/22 1435      01/28/22 1435  HYDROmorphone (PF) (DILAUDID) injection 0.4 mg        \"Or\" Linked Group Details    0.4 mg Intravenous EVERY 2 HOURS PRN 01/28/22 1575      " "01/28/22 1435  oxyCODONE (ROXICODONE) tablet 5 mg        \"Or\" Linked Group Details    5 mg Oral EVERY 4 HOURS PRN 01/28/22 1435      01/28/22 1435  oxyCODONE IR (ROXICODONE) tablet 10 mg        \"Or\" Linked Group Details    10 mg Oral EVERY 4 HOURS PRN 01/28/22 1435      01/28/22 1435  magnesium hydroxide (MILK OF MAGNESIA) suspension 30 mL         30 mL Oral DAILY PRN 01/28/22 1435      01/28/22 1435  bisacodyl (DULCOLAX) Suppository 10 mg         10 mg Rectal DAILY PRN 01/28/22 1435      01/28/22 1435  methocarbamol (ROBAXIN) tablet 750 mg         750 mg Oral EVERY 6 HOURS PRN 01/28/22 1435      01/28/22 1434  lidocaine 1 % 0.1-1 mL         0.1-1 mL Other EVERY 1 HOUR PRN 01/28/22 1435      01/28/22 1434  lidocaine (LMX4) kit          Topical EVERY 1 HOUR PRN 01/28/22 1435            Primary Service Contacted with Recommendations? No    Eliana Mcbride MD  1/29/2022    Time/Communication:  I personally spent 20 minutes with greater than 50% in consultation, education, counseliing and coordination of care.  Also discussed with RN.    Contact Info (24 hour job code pager is the last 4 digits) For in-house use only:   Job code ID: [id number]   [facility] 0599  Peds 0602  [place] phone: dial * * * [phone], enter jobcode ID, then enter call-back number.    Text: Use [employer]COM on the Intranet <Paging/Directory> tab and enter Jobcode ID.   If no call back at any time, contact the hospital  and ask for Regional Anesthesia attending or backup   "

## 2022-01-29 NOTE — ADDENDUM NOTE
Addendum  created 01/28/22 2001 by Stanislaw Robles MD    Clinical Note Signed, Intraprocedure Blocks edited, Pend clinical note

## 2022-01-29 NOTE — PROGRESS NOTES
CV ICU PROGRESS NOTE  01/29/2022      CO-MORBIDITIES:   Aortic root aneurysm (H)    ASSESSMENT: Chuy Varner is a 31 year old male with a PMHx s/f bicuspid aortic valve, thoracic aortic aneurysm without rupture, hypertension, obesity, and testicular cancer s/p orchiectomy who underwent Bentall procedure with mechanical valve on 1/28 with Dr. Velasco ad Dr. Hannah.    TODAY'S PROGRESS:   - Lasix 20 BID  - PS and extubate to BiPAP if FiO2 needs improve with diuresis  - SubQ heparin  - Aspirin    PLAN:  Neuro/ pain/ sedation:  # Acute Postoperative pain  - Monitor neurological status. Notify the MD for any acute changes in exam.  - Pain: fentanyl gtt. Scheduled tylenol and gabapentin. PRN tylenol, oxycodone, robaxin  - Sedation: propofol gtt, precedex gtt     Pulmonary care:   # Postoperative ventilation management  # Hypoxemic respiratory failure  Initially hypercarbic postoperatively, increasing FiO2 requirements. CXR w/ pulmonary edema.  - Intubated, ventilated  - Titrate supplemental oxygen to maintain saturation above 92%.  - Pulmonary hygiene: Incentive spirometer every 15- 30 minutes when awake, flutter valve, C&DB  - Lasix 20mg BID today  - PS and possible extubation pending improving FiO2 requirements     Cardiovascular:    #  Thoracic aortic aneurysm s/p Bentall w/ [place] valve on 1/28 with Dr. Velasco and Dr. Hannah  # History of hypertension  # Bicuspid aortic valve  Recent echo on 10/26/21 with LVEF of 60-65%.  CT vascular 12/13/21 showing proximal ascending aorta is severely dilated (55 x 55mm in maximum dimension) and likely bicuspid aortic valve.  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  - Monitor hemodynamic status  - Goal MAP>65, SBP<110  - Statin hold  - BB hold  - ASA  - Off all pressors  - Holding PTA meds: metoprolol succinate 25mg  daily  - Labetalol PRN to maintain SBP <110     GI care/ Nutrition:   # Obesity (BMI 46)  - NPO  - PPI  - Continue bowel regimen: miralax, senna    Renal/ Fluid Balance/ Electrolytes:   BL creat appears to be ~ 1.0  - Strict I/O, daily weights  - Avoid/limit nephrotoxins as able  - Replete lytes PRN per protocol     Endocrine:    # Stress induced hyperglycemia  - Insulin gtt  - Goal BG <180 for optimal healing     ID/ Antibiotics:  # Stress induced leukocytosis  - To complete perioperative regimen  - Continue to monitor fever curve, WBC and inflammatory markers as appropriate     Heme/Onc:     # Stress induced leukocytosis  # Acute blood loss anemia  # Acute blood loss thrombocytopenia  # History of testicular cancer s/p orchiectomy  No s/sx active bleeding  - Continue to monitor  - CBC      MSK/ Skin:  # Sternotomy  # Surgical Incision  - Sternal precautions  - Postoperative incision management per protocol  - PT/OT/CR     Prophylaxis:    - Mechanical prophylaxis for DVT  - Chemical DVT prophylaxis - start subcutaneous heparin tomorrow  - PPI     Lines/ tubes/ drains:  - L radial arterial Line, ETT, CTs, PA catheter, RIJ Mac, durant, bilateral ES catheters     Disposition:  - CVICU   Patient seen, findings and plan discussed with CV ICU staff, Dr. Rosado and CVTS staff Dr. Hannah.    -----------------------------------  Tom Adair MD  Anesthesia Resident, PGY3    ====================================    SUBJECTIVE:   Worsening FiO2 requirements overnight, now on FiO2 of 80%. No other acute events.    OBJECTIVE:   1. VITAL SIGNS:   Temp:  [98  F (36.7  C)-99.2  F (37.3  C)] 99  F (37.2  C)  Pulse:  [61-90] 63  Resp:  [13-26] 17  MAP:  [65 mmHg-94 mmHg] 84 mmHg  Arterial Line BP: ()/(57-80) 114/69  FiO2 (%):  [50 %-100 %] 90 %  SpO2:  [90 %-100 %] 95 %  Ventilation Mode: CMV/AC  (Continuous Mandatory Ventilation/ Assist Control)  FiO2 (%): 90 %  Rate Set (breaths/minute): 16 breaths/min  Tidal Volume  Set (mL): 600 mL  PEEP (cm H2O): 8 cmH2O  Pressure Support (cm H2O): 7 cmH2O  Oxygen Concentration (%): 80 %  Resp: 17      2. INTAKE/ OUTPUT:   I/O last 3 completed shifts:  In: 4208.92 [I.V.:2919.92; Other:546; NG/GT:135; IV Piggyback:150]  Out: 3960 [Urine:2580; Blood:1000; Chest Tube:380]    3. PHYSICAL EXAMINATION:   General: Sedated, arouses to voice  Neuro: Sedated, follows commands and moves all extremities  Resp: Lungs CTA bilaterally. Mechanically ventilated  CV: RRR  Abdomen: Soft, Non-distended, Non-tender  Incisions: C/D/I  Extremities: warm and well perfused    4. INVESTIGATIONS:   Arterial Blood Gases   Recent Labs   Lab 01/29/22  0655 01/29/22  0323 01/28/22  2326 01/28/22  2211   PH 7.41 7.39 7.38 7.38   PCO2 43 46* 42 42   PO2 84 73* 68* 65*   HCO3 27 28 25 25     Complete Blood Count   Recent Labs   Lab 01/29/22  0323 01/28/22  1447 01/28/22  1346 01/28/22  1246   WBC 11.0 13.7*  --  14.4*   HGB 13.6 13.7 13.4 12.6*    205  --  154     Basic Metabolic Panel  Recent Labs   Lab 01/29/22  0743 01/29/22  0659 01/29/22  0608 01/29/22  0331 01/29/22  0323 01/28/22  2114 01/28/22  2113 01/28/22  1451 01/28/22  1447 01/28/22  1346 01/28/22  1239   0000   NA  --   --   --   --  143  --   --   --  143 142 142  --    POTASSIUM  --   --   --   --  3.9  --  3.9  --  4.5  4.5 4.3 4.2  --    CHLORIDE  --   --   --   --  114*  --   --   --  112*  --   --   --    CO2  --   --   --   --  27  --   --   --  24  --   --   --    BUN  --   --   --   --  21  --   --   --  18  --   --   --    CR  --   --   --   --  0.83  --   --   --  1.20  --   --   --    * 137* 150* 144* 144*   < >  --    < > 133* 133* 166*   < >    < > = values in this interval not displayed.     Liver Function Tests  Recent Labs   Lab 01/29/22  0323 01/28/22  1447 01/28/22  1246   AST 45 50*  --    ALT 26 28  --    ALKPHOS 42 44  --    BILITOTAL 0.5 0.6  --    ALBUMIN 3.0* 3.1*  --    INR  --  1.38* 1.53*     Pancreatic Enzymes  No lab  results found in last 7 days.  Coagulation Profile  Recent Labs   Lab 01/28/22  1447 01/28/22  1246   INR 1.38* 1.53*   PTT 31 28         5. RADIOLOGY:   Recent Results (from the past 24 hour(s))   XR Chest Port 1 View    Narrative    Exam: XR CHEST PORT 1 VIEW, 1/28/2022 3:53 PM    Indication: Post Op CVTS Surgery    Comparison: 1/10/2022    Findings:   Median sternotomy. Endotracheal tube in the upper thoracic trachea.  [place]-Nelda catheter tip in the central right pulmonary artery.  Prosthetic aortic valve is in place. Mediastinal chest tube is in  place. Left basilar chest tube in place.     Small amount of pneumopericardium. No significant pneumothorax. Low  lung volume with bibasilar atelectasis. Heart within normal limits in  size. No significant pulmonary edema.      Impression    Impression: Postoperative chest.  1. Support devices as above.  2. Likely postoperative pneumopericardium.  3. No pneumothorax.  4. Bibasilar atelectasis.    BENTLEY ANTONIO MD         SYSTEM ID:  [id number]   XR Abdomen Port 1 View    Narrative    Exam: XR ABDOMEN PORT 1 VIEWS, 1/29/2022 12:18 AM    Indication: OG placement    Comparison: Serial same day radiographs.    Findings:   Portable semiupright AP view of the abdomen. Lung bases with bibasilar  atelectasis, prominent cardiac silhouette. Partially visualized median  sternotomy with intact wires. Enteric tube tip and sidehole project  over the gastric bubble. Mediastinal drains.  No evidence of large volume intra-abdominal free air. Nonobstructive  bowel gas pattern. No definite pneumatosis or portal venous gas. No  suspicious calcifications.      Impression    Impression:   1. Enteric tube tip and sidehole project over the gastric bubble.  2. Stable post surgical changes of the chest and additional support  devices.  3. Partially visualized bibasilar atelectasis.    I have personally reviewed the examination and initial interpretation  and I agree with the findings.    BENTLEY  MD PACO         SYSTEM ID:  [id number]   XR Chest Port 1 View    Narrative    Exam: XR CHEST PORT 1 VIEW, 1/29/2022 12:20 AM    Indication: Post Op CVTS Surgery    Comparison: 1/28/2022    Findings:   Portable semiupright AP view of the chest. Stable postsurgical changes  with intact median sternotomy. Endotracheal tube extends over the  midthoracic trachea, unchanged. Right IJ [place]-Nelda catheter tip  projects over the main pulmonary artery. Prosthetic aortic valve.  Mediastinal drain. Enteric tube courses below left hemidiaphragm with  tip collimated out of field of view.    Midline trachea. Stable mildly enlarged cardiac silhouette with small  amount of pneumopericardium. Low lung volumes with mildly decreased  bibasilar atelectasis. No appreciable pneumothorax. Mixed perihilar  opacities are stable. No acute findings in the upper abdomen.      Impression    Impression:   1. No significant change in postoperative pneumopericardium. No  pneumothorax.  2. Perihilar pulmonary edema and/or atelectasis. Infection cannot be  excluded.  3. Bibasilar atelectasis.  4. Stable satisfactory position of support devices.    I have personally reviewed the examination and initial interpretation  and I agree with the findings.    BENTLEY ANTONIO MD         SYSTEM ID:  [id number]   XR Chest Port 1 View    Impression    RESIDENT PRELIMINARY INTERPRETATION  IMPRESSION:  1. Stable endotracheal tube and right IJ [place]-Nelda catheter.  2. Low lung volumes with unchanged perihilar interstitial prominent  opacities, likely pulmonary edema and/or atelectasis.  3. Increased small left and possible trace right pleural effusions.       =========================================

## 2022-01-29 NOTE — PHARMACY-ADMISSION MEDICATION HISTORY
Admission Medication History Completed by Pharmacy    See [facility] Admission Navigator for allergy information, preferred outpatient pharmacy, prior to admission medications and immunization status.     Medication History Sources:     Interview with patient's wife, abdi    Changes made to PTA medication list (reason):    Added: None    Deleted: None    Changed: None    Additional Information:    None    Prior to Admission medications    Medication Sig Last Dose Taking? Auth Provider   metoprolol succinate ER (TOPROL-XL) 25 MG 24 hr tablet Take 25 mg by mouth every morning  1/28/2022 at 0400 Yes Reported, Patient   Multiple Vitamin (QUINTABS) TABS Take 1 tablet by mouth every morning  Past Week at Unknown time Yes Reported, Patient       Date completed: 01/29/22    Medication history completed by: Honey Mckinney RPH

## 2022-01-30 ENCOUNTER — APPOINTMENT (OUTPATIENT)
Dept: CT IMAGING | Facility: CLINIC | Age: 32
End: 2022-01-30
Attending: SURGERY
Payer: COMMERCIAL

## 2022-01-30 ENCOUNTER — APPOINTMENT (OUTPATIENT)
Dept: GENERAL RADIOLOGY | Facility: CLINIC | Age: 32
End: 2022-01-30
Attending: SURGERY
Payer: COMMERCIAL

## 2022-01-30 LAB
ALBUMIN SERPL-MCNC: 2.8 G/DL (ref 3.4–5)
ALBUMIN UR-MCNC: 30 MG/DL
ALP SERPL-CCNC: 40 U/L (ref 40–150)
ALT SERPL W P-5'-P-CCNC: 24 U/L (ref 0–70)
ANION GAP SERPL CALCULATED.3IONS-SCNC: 5 MMOL/L (ref 3–14)
APPEARANCE UR: CLEAR
AST SERPL W P-5'-P-CCNC: 31 U/L (ref 0–45)
BACTERIA #/AREA URNS HPF: ABNORMAL /HPF
BASE EXCESS BLDA CALC-SCNC: -0.6 MMOL/L (ref -9–1.8)
BASE EXCESS BLDA CALC-SCNC: -1.3 MMOL/L (ref -9–1.8)
BASE EXCESS BLDA CALC-SCNC: 0.4 MMOL/L (ref -9–1.8)
BASE EXCESS BLDA CALC-SCNC: 0.5 MMOL/L (ref -9–1.8)
BASE EXCESS BLDA CALC-SCNC: 0.6 MMOL/L (ref -9–1.8)
BASE EXCESS BLDA CALC-SCNC: 1.1 MMOL/L (ref -9–1.8)
BASE EXCESS BLDA CALC-SCNC: 1.9 MMOL/L (ref -9–1.8)
BASE EXCESS BLDA CALC-SCNC: 2.6 MMOL/L (ref -9–1.8)
BASE EXCESS BLDA CALC-SCNC: 3 MMOL/L (ref -9–1.8)
BASE EXCESS BLDA CALC-SCNC: 3.5 MMOL/L (ref -9–1.8)
BASE EXCESS BLDA CALC-SCNC: 3.6 MMOL/L (ref -9–1.8)
BASE EXCESS BLDA CALC-SCNC: 5.6 MMOL/L (ref -9–1.8)
BASE EXCESS BLDV CALC-SCNC: 4.5 MMOL/L (ref -7.7–1.9)
BASE EXCESS BLDV CALC-SCNC: 4.6 MMOL/L (ref -7.7–1.9)
BASE EXCESS BLDV CALC-SCNC: 5.9 MMOL/L (ref -7.7–1.9)
BILIRUB SERPL-MCNC: 0.7 MG/DL (ref 0.2–1.3)
BILIRUB UR QL STRIP: NEGATIVE
BUN SERPL-MCNC: 26 MG/DL (ref 7–30)
C PNEUM DNA SPEC QL NAA+PROBE: NOT DETECTED
CA-I BLD-MCNC: 4.6 MG/DL (ref 4.4–5.2)
CALCIUM SERPL-MCNC: 7.8 MG/DL (ref 8.5–10.1)
CHLORIDE BLD-SCNC: 113 MMOL/L (ref 94–109)
CO2 SERPL-SCNC: 26 MMOL/L (ref 20–32)
COLOR UR AUTO: YELLOW
CREAT SERPL-MCNC: 1.15 MG/DL (ref 0.66–1.25)
ERYTHROCYTE [DISTWIDTH] IN BLOOD BY AUTOMATED COUNT: 12.4 % (ref 10–15)
FLUAV H1 2009 PAND RNA SPEC QL NAA+PROBE: NOT DETECTED
FLUAV H1 RNA SPEC QL NAA+PROBE: NOT DETECTED
FLUAV H3 RNA SPEC QL NAA+PROBE: NOT DETECTED
FLUAV RNA SPEC QL NAA+PROBE: NOT DETECTED
FLUBV RNA SPEC QL NAA+PROBE: NOT DETECTED
GFR SERPL CREATININE-BSD FRML MDRD: 87 ML/MIN/1.73M2
GLUCOSE BLD-MCNC: 130 MG/DL (ref 70–99)
GLUCOSE BLDC GLUCOMTR-MCNC: 101 MG/DL (ref 70–99)
GLUCOSE BLDC GLUCOMTR-MCNC: 102 MG/DL (ref 70–99)
GLUCOSE BLDC GLUCOMTR-MCNC: 103 MG/DL (ref 70–99)
GLUCOSE BLDC GLUCOMTR-MCNC: 106 MG/DL (ref 70–99)
GLUCOSE BLDC GLUCOMTR-MCNC: 109 MG/DL (ref 70–99)
GLUCOSE BLDC GLUCOMTR-MCNC: 114 MG/DL (ref 70–99)
GLUCOSE BLDC GLUCOMTR-MCNC: 125 MG/DL (ref 70–99)
GLUCOSE BLDC GLUCOMTR-MCNC: 86 MG/DL (ref 70–99)
GLUCOSE BLDC GLUCOMTR-MCNC: 87 MG/DL (ref 70–99)
GLUCOSE BLDC GLUCOMTR-MCNC: 94 MG/DL (ref 70–99)
GLUCOSE BLDC GLUCOMTR-MCNC: 96 MG/DL (ref 70–99)
GLUCOSE UR STRIP-MCNC: NEGATIVE MG/DL
HADV DNA SPEC QL NAA+PROBE: NOT DETECTED
HCO3 BLD-SCNC: 23 MMOL/L (ref 21–28)
HCO3 BLD-SCNC: 24 MMOL/L (ref 21–28)
HCO3 BLD-SCNC: 25 MMOL/L (ref 21–28)
HCO3 BLD-SCNC: 26 MMOL/L (ref 21–28)
HCO3 BLD-SCNC: 26 MMOL/L (ref 21–28)
HCO3 BLD-SCNC: 27 MMOL/L (ref 21–28)
HCO3 BLD-SCNC: 28 MMOL/L (ref 21–28)
HCO3 BLD-SCNC: 30 MMOL/L (ref 21–28)
HCO3 BLDV-SCNC: 30 MMOL/L (ref 21–28)
HCO3 BLDV-SCNC: 30 MMOL/L (ref 21–28)
HCO3 BLDV-SCNC: 31 MMOL/L (ref 21–28)
HCOV PNL SPEC NAA+PROBE: NOT DETECTED
HCT VFR BLD AUTO: 39.4 % (ref 40–53)
HGB BLD-MCNC: 13 G/DL (ref 13.3–17.7)
HGB UR QL STRIP: NEGATIVE
HMPV RNA SPEC QL NAA+PROBE: NOT DETECTED
HPIV1 RNA SPEC QL NAA+PROBE: NOT DETECTED
HPIV2 RNA SPEC QL NAA+PROBE: NOT DETECTED
HPIV3 RNA SPEC QL NAA+PROBE: NOT DETECTED
HPIV4 RNA SPEC QL NAA+PROBE: NOT DETECTED
KETONES UR STRIP-MCNC: NEGATIVE MG/DL
LACTATE SERPL-SCNC: 0.8 MMOL/L (ref 0.7–2)
LACTATE SERPL-SCNC: 1 MMOL/L (ref 0.7–2)
LACTATE SERPL-SCNC: 1.1 MMOL/L (ref 0.7–2)
LEUKOCYTE ESTERASE UR QL STRIP: NEGATIVE
M PNEUMO DNA SPEC QL NAA+PROBE: NOT DETECTED
MAGNESIUM SERPL-MCNC: 2 MG/DL (ref 1.6–2.3)
MCH RBC QN AUTO: 30 PG (ref 26.5–33)
MCHC RBC AUTO-ENTMCNC: 33 G/DL (ref 31.5–36.5)
MCV RBC AUTO: 91 FL (ref 78–100)
MRSA DNA SPEC QL NAA+PROBE: NEGATIVE
MUCOUS THREADS #/AREA URNS LPF: PRESENT /LPF
NITRATE UR QL: NEGATIVE
O2/TOTAL GAS SETTING VFR VENT: 100 %
O2/TOTAL GAS SETTING VFR VENT: 70 %
O2/TOTAL GAS SETTING VFR VENT: 70 %
O2/TOTAL GAS SETTING VFR VENT: 75 %
O2/TOTAL GAS SETTING VFR VENT: 75 %
O2/TOTAL GAS SETTING VFR VENT: 80 %
O2/TOTAL GAS SETTING VFR VENT: 80 %
O2/TOTAL GAS SETTING VFR VENT: 85 %
O2/TOTAL GAS SETTING VFR VENT: 85 %
O2/TOTAL GAS SETTING VFR VENT: 90 %
OXYHGB MFR BLD: 87 % (ref 92–100)
OXYHGB MFR BLD: 88 % (ref 92–100)
OXYHGB MFR BLD: 90 % (ref 92–100)
OXYHGB MFR BLD: 91 % (ref 92–100)
OXYHGB MFR BLD: 92 % (ref 92–100)
OXYHGB MFR BLD: 92 % (ref 92–100)
OXYHGB MFR BLD: 93 % (ref 92–100)
OXYHGB MFR BLD: 94 % (ref 92–100)
OXYHGB MFR BLD: 95 % (ref 92–100)
OXYHGB MFR BLD: 96 % (ref 92–100)
OXYHGB MFR BLD: 96 % (ref 92–100)
OXYHGB MFR BLD: 97 % (ref 92–100)
OXYHGB MFR BLDV: 64 % (ref 70–75)
OXYHGB MFR BLDV: 66 % (ref 70–75)
OXYHGB MFR BLDV: 68 % (ref 70–75)
PCO2 BLD: 36 MM HG (ref 35–45)
PCO2 BLD: 36 MM HG (ref 35–45)
PCO2 BLD: 37 MM HG (ref 35–45)
PCO2 BLD: 38 MM HG (ref 35–45)
PCO2 BLD: 39 MM HG (ref 35–45)
PCO2 BLD: 39 MM HG (ref 35–45)
PCO2 BLD: 40 MM HG (ref 35–45)
PCO2 BLD: 40 MM HG (ref 35–45)
PCO2 BLD: 43 MM HG (ref 35–45)
PCO2 BLD: 44 MM HG (ref 35–45)
PCO2 BLDV: 44 MM HG (ref 40–50)
PCO2 BLDV: 48 MM HG (ref 40–50)
PCO2 BLDV: 50 MM HG (ref 40–50)
PH BLD: 7.4 [PH] (ref 7.35–7.45)
PH BLD: 7.41 [PH] (ref 7.35–7.45)
PH BLD: 7.41 [PH] (ref 7.35–7.45)
PH BLD: 7.42 [PH] (ref 7.35–7.45)
PH BLD: 7.43 [PH] (ref 7.35–7.45)
PH BLD: 7.44 [PH] (ref 7.35–7.45)
PH BLD: 7.45 [PH] (ref 7.35–7.45)
PH BLD: 7.46 [PH] (ref 7.35–7.45)
PH BLD: 7.47 [PH] (ref 7.35–7.45)
PH BLD: 7.48 [PH] (ref 7.35–7.45)
PH BLDV: 7.4 [PH] (ref 7.32–7.43)
PH BLDV: 7.41 [PH] (ref 7.32–7.43)
PH BLDV: 7.45 [PH] (ref 7.32–7.43)
PH UR STRIP: 6 [PH] (ref 5–7)
PHOSPHATE SERPL-MCNC: 2.7 MG/DL (ref 2.5–4.5)
PLATELET # BLD AUTO: 165 10E3/UL (ref 150–450)
PO2 BLD: 51 MM HG (ref 80–105)
PO2 BLD: 52 MM HG (ref 80–105)
PO2 BLD: 58 MM HG (ref 80–105)
PO2 BLD: 60 MM HG (ref 80–105)
PO2 BLD: 63 MM HG (ref 80–105)
PO2 BLD: 65 MM HG (ref 80–105)
PO2 BLD: 69 MM HG (ref 80–105)
PO2 BLD: 69 MM HG (ref 80–105)
PO2 BLD: 80 MM HG (ref 80–105)
PO2 BLD: 89 MM HG (ref 80–105)
PO2 BLD: 91 MM HG (ref 80–105)
PO2 BLD: 91 MM HG (ref 80–105)
PO2 BLDV: 32 MM HG (ref 25–47)
PO2 BLDV: 35 MM HG (ref 25–47)
PO2 BLDV: 37 MM HG (ref 25–47)
POTASSIUM BLD-SCNC: 3.3 MMOL/L (ref 3.4–5.3)
POTASSIUM BLD-SCNC: 3.3 MMOL/L (ref 3.4–5.3)
POTASSIUM BLD-SCNC: 3.4 MMOL/L (ref 3.4–5.3)
POTASSIUM BLD-SCNC: 3.4 MMOL/L (ref 3.4–5.3)
POTASSIUM BLD-SCNC: 3.8 MMOL/L (ref 3.4–5.3)
PROT SERPL-MCNC: 6 G/DL (ref 6.8–8.8)
RBC # BLD AUTO: 4.33 10E6/UL (ref 4.4–5.9)
RBC URINE: 11 /HPF
RSV RNA SPEC QL NAA+PROBE: NOT DETECTED
RSV RNA SPEC QL NAA+PROBE: NOT DETECTED
RV+EV RNA SPEC QL NAA+PROBE: NOT DETECTED
SA TARGET DNA: POSITIVE
SARS-COV-2 RNA RESP QL NAA+PROBE: NEGATIVE
SODIUM SERPL-SCNC: 144 MMOL/L (ref 133–144)
SP GR UR STRIP: 1.01 (ref 1–1.03)
UROBILINOGEN UR STRIP-MCNC: NORMAL MG/DL
WBC # BLD AUTO: 11.5 10E3/UL (ref 4–11)
WBC URINE: 0 /HPF

## 2022-01-30 PROCEDURE — 200N000002 HC R&B ICU UMMC

## 2022-01-30 PROCEDURE — 272N000272 HC CONTINUOUS NEBULIZER MICRO PUMP

## 2022-01-30 PROCEDURE — U0005 INFEC AGEN DETEC AMPLI PROBE: HCPCS | Performed by: SURGERY

## 2022-01-30 PROCEDURE — 83605 ASSAY OF LACTIC ACID: CPT | Performed by: STUDENT IN AN ORGANIZED HEALTH CARE EDUCATION/TRAINING PROGRAM

## 2022-01-30 PROCEDURE — 83605 ASSAY OF LACTIC ACID: CPT | Performed by: SURGERY

## 2022-01-30 PROCEDURE — 250N000009 HC RX 250: Performed by: SURGERY

## 2022-01-30 PROCEDURE — 82805 BLOOD GASES W/O2 SATURATION: CPT | Performed by: SURGERY

## 2022-01-30 PROCEDURE — 999N000155 HC STATISTIC RAPCV CVP MONITORING

## 2022-01-30 PROCEDURE — 250N000011 HC RX IP 250 OP 636: Performed by: STUDENT IN AN ORGANIZED HEALTH CARE EDUCATION/TRAINING PROGRAM

## 2022-01-30 PROCEDURE — 99291 CRITICAL CARE FIRST HOUR: CPT | Mod: 24 | Performed by: INTERNAL MEDICINE

## 2022-01-30 PROCEDURE — 87633 RESP VIRUS 12-25 TARGETS: CPT | Performed by: STUDENT IN AN ORGANIZED HEALTH CARE EDUCATION/TRAINING PROGRAM

## 2022-01-30 PROCEDURE — 250N000013 HC RX MED GY IP 250 OP 250 PS 637: Performed by: SURGERY

## 2022-01-30 PROCEDURE — 71275 CT ANGIOGRAPHY CHEST: CPT | Mod: 26 | Performed by: STUDENT IN AN ORGANIZED HEALTH CARE EDUCATION/TRAINING PROGRAM

## 2022-01-30 PROCEDURE — 94667 MNPJ CHEST WALL 1ST: CPT

## 2022-01-30 PROCEDURE — 999N000015 HC STATISTIC ARTERIAL MONITORING DAILY

## 2022-01-30 PROCEDURE — 87040 BLOOD CULTURE FOR BACTERIA: CPT | Performed by: SURGERY

## 2022-01-30 PROCEDURE — 71045 X-RAY EXAM CHEST 1 VIEW: CPT

## 2022-01-30 PROCEDURE — 87205 SMEAR GRAM STAIN: CPT | Performed by: SURGERY

## 2022-01-30 PROCEDURE — 271N000002 HC RX 271: Performed by: STUDENT IN AN ORGANIZED HEALTH CARE EDUCATION/TRAINING PROGRAM

## 2022-01-30 PROCEDURE — 84100 ASSAY OF PHOSPHORUS: CPT | Performed by: SURGERY

## 2022-01-30 PROCEDURE — 85027 COMPLETE CBC AUTOMATED: CPT | Performed by: STUDENT IN AN ORGANIZED HEALTH CARE EDUCATION/TRAINING PROGRAM

## 2022-01-30 PROCEDURE — 94644 CONT INHLJ TX 1ST HOUR: CPT

## 2022-01-30 PROCEDURE — 82330 ASSAY OF CALCIUM: CPT | Performed by: SURGERY

## 2022-01-30 PROCEDURE — 94645 CONT INHLJ TX EACH ADDL HOUR: CPT

## 2022-01-30 PROCEDURE — 71045 X-RAY EXAM CHEST 1 VIEW: CPT | Mod: 26 | Performed by: STUDENT IN AN ORGANIZED HEALTH CARE EDUCATION/TRAINING PROGRAM

## 2022-01-30 PROCEDURE — 999N000045 HC STATISTIC DAILY SWAN MONITORING

## 2022-01-30 PROCEDURE — 84132 ASSAY OF SERUM POTASSIUM: CPT | Performed by: SURGERY

## 2022-01-30 PROCEDURE — 258N000003 HC RX IP 258 OP 636: Performed by: SURGERY

## 2022-01-30 PROCEDURE — 94003 VENT MGMT INPAT SUBQ DAY: CPT

## 2022-01-30 PROCEDURE — 82805 BLOOD GASES W/O2 SATURATION: CPT | Performed by: STUDENT IN AN ORGANIZED HEALTH CARE EDUCATION/TRAINING PROGRAM

## 2022-01-30 PROCEDURE — 83735 ASSAY OF MAGNESIUM: CPT | Performed by: SURGERY

## 2022-01-30 PROCEDURE — 250N000011 HC RX IP 250 OP 636: Performed by: SURGERY

## 2022-01-30 PROCEDURE — 258N000003 HC RX IP 258 OP 636: Performed by: STUDENT IN AN ORGANIZED HEALTH CARE EDUCATION/TRAINING PROGRAM

## 2022-01-30 PROCEDURE — 250N000009 HC RX 250: Performed by: STUDENT IN AN ORGANIZED HEALTH CARE EDUCATION/TRAINING PROGRAM

## 2022-01-30 PROCEDURE — 80053 COMPREHEN METABOLIC PANEL: CPT | Performed by: STUDENT IN AN ORGANIZED HEALTH CARE EDUCATION/TRAINING PROGRAM

## 2022-01-30 PROCEDURE — 87641 MR-STAPH DNA AMP PROBE: CPT | Performed by: SURGERY

## 2022-01-30 PROCEDURE — 71275 CT ANGIOGRAPHY CHEST: CPT

## 2022-01-30 PROCEDURE — 81001 URINALYSIS AUTO W/SCOPE: CPT | Performed by: SURGERY

## 2022-01-30 PROCEDURE — 999N000157 HC STATISTIC RCP TIME EA 10 MIN

## 2022-01-30 RX ORDER — POTASSIUM CHLORIDE 29.8 MG/ML
20 INJECTION INTRAVENOUS
Status: COMPLETED | OUTPATIENT
Start: 2022-01-30 | End: 2022-01-30

## 2022-01-30 RX ORDER — NOREPINEPHRINE BITARTRATE 0.06 MG/ML
.01-.6 INJECTION, SOLUTION INTRAVENOUS CONTINUOUS
Status: DISCONTINUED | OUTPATIENT
Start: 2022-01-30 | End: 2022-02-02

## 2022-01-30 RX ORDER — MAGNESIUM SULFATE HEPTAHYDRATE 40 MG/ML
2 INJECTION, SOLUTION INTRAVENOUS ONCE
Status: COMPLETED | OUTPATIENT
Start: 2022-01-30 | End: 2022-01-30

## 2022-01-30 RX ORDER — POTASSIUM CHLORIDE 1.5 G/1.58G
40 POWDER, FOR SOLUTION ORAL ONCE
Status: COMPLETED | OUTPATIENT
Start: 2022-01-30 | End: 2022-01-30

## 2022-01-30 RX ORDER — IOPAMIDOL 755 MG/ML
77 INJECTION, SOLUTION INTRAVASCULAR ONCE
Status: COMPLETED | OUTPATIENT
Start: 2022-01-30 | End: 2022-01-30

## 2022-01-30 RX ORDER — MAGNESIUM OXIDE 400 MG/1
400 TABLET ORAL 2 TIMES DAILY
Status: DISCONTINUED | OUTPATIENT
Start: 2022-01-30 | End: 2022-01-30

## 2022-01-30 RX ORDER — FENTANYL CITRATE 50 UG/ML
INJECTION, SOLUTION INTRAMUSCULAR; INTRAVENOUS
Status: DISCONTINUED
Start: 2022-01-30 | End: 2022-01-30 | Stop reason: CLARIF

## 2022-01-30 RX ORDER — FUROSEMIDE 10 MG/ML
60 INJECTION INTRAMUSCULAR; INTRAVENOUS ONCE
Status: COMPLETED | OUTPATIENT
Start: 2022-01-30 | End: 2022-01-30

## 2022-01-30 RX ORDER — HEPARIN SODIUM 10000 [USP'U]/100ML
500 INJECTION, SOLUTION INTRAVENOUS CONTINUOUS
Status: DISCONTINUED | OUTPATIENT
Start: 2022-01-30 | End: 2022-01-31

## 2022-01-30 RX ORDER — PIPERACILLIN SODIUM, TAZOBACTAM SODIUM 4; .5 G/20ML; G/20ML
4.5 INJECTION, POWDER, LYOPHILIZED, FOR SOLUTION INTRAVENOUS EVERY 6 HOURS
Status: DISCONTINUED | OUTPATIENT
Start: 2022-01-30 | End: 2022-01-31

## 2022-01-30 RX ORDER — FUROSEMIDE 10 MG/ML
40 INJECTION INTRAMUSCULAR; INTRAVENOUS ONCE
Status: COMPLETED | OUTPATIENT
Start: 2022-01-30 | End: 2022-01-30

## 2022-01-30 RX ORDER — POTASSIUM CHLORIDE 1.5 G/1.58G
20 POWDER, FOR SOLUTION ORAL ONCE
Status: COMPLETED | OUTPATIENT
Start: 2022-01-30 | End: 2022-01-30

## 2022-01-30 RX ORDER — LIDOCAINE 40 MG/G
CREAM TOPICAL
Status: DISCONTINUED | OUTPATIENT
Start: 2022-01-30 | End: 2022-01-30

## 2022-01-30 RX ADMIN — SENNOSIDES AND DOCUSATE SODIUM 1 TABLET: 50; 8.6 TABLET ORAL at 07:47

## 2022-01-30 RX ADMIN — PROPOFOL 45 MCG/KG/MIN: 10 INJECTION, EMULSION INTRAVENOUS at 15:03

## 2022-01-30 RX ADMIN — POTASSIUM CHLORIDE 40 MEQ: 1.5 POWDER, FOR SOLUTION ORAL at 10:44

## 2022-01-30 RX ADMIN — FENTANYL CITRATE 100 MCG/HR: 50 INJECTION INTRAVENOUS at 05:37

## 2022-01-30 RX ADMIN — ACETAMINOPHEN 975 MG: 325 TABLET, FILM COATED ORAL at 00:30

## 2022-01-30 RX ADMIN — HEPARIN SODIUM 5000 UNITS: 5000 INJECTION, SOLUTION INTRAVENOUS; SUBCUTANEOUS at 04:51

## 2022-01-30 RX ADMIN — PROPOFOL 45 MCG/KG/MIN: 10 INJECTION, EMULSION INTRAVENOUS at 10:52

## 2022-01-30 RX ADMIN — POTASSIUM CHLORIDE 20 MEQ: 1.5 POWDER, FOR SOLUTION ORAL at 18:09

## 2022-01-30 RX ADMIN — MUPIROCIN 0.5 G: 20 OINTMENT TOPICAL at 19:43

## 2022-01-30 RX ADMIN — IOPAMIDOL 77 ML: 755 INJECTION, SOLUTION INTRAVENOUS at 04:33

## 2022-01-30 RX ADMIN — Medication 0.01 MCG/KG/MIN: at 06:15

## 2022-01-30 RX ADMIN — ASPIRIN 81 MG CHEWABLE TABLET 81 MG: 81 TABLET CHEWABLE at 07:47

## 2022-01-30 RX ADMIN — PROPOFOL 45 MCG/KG/MIN: 10 INJECTION, EMULSION INTRAVENOUS at 17:05

## 2022-01-30 RX ADMIN — FUROSEMIDE 40 MG: 10 INJECTION, SOLUTION INTRAVENOUS at 18:09

## 2022-01-30 RX ADMIN — PIPERACILLIN AND TAZOBACTAM 4.5 G: 4; .5 INJECTION, POWDER, FOR SOLUTION INTRAVENOUS at 11:47

## 2022-01-30 RX ADMIN — POTASSIUM & SODIUM PHOSPHATES POWDER PACK 280-160-250 MG 1 PACKET: 280-160-250 PACK at 10:44

## 2022-01-30 RX ADMIN — POTASSIUM CHLORIDE 20 MEQ: 29.8 INJECTION, SOLUTION INTRAVENOUS at 05:00

## 2022-01-30 RX ADMIN — Medication 20 NG/KG/MIN: at 12:34

## 2022-01-30 RX ADMIN — Medication 20 NG/KG/MIN: at 03:20

## 2022-01-30 RX ADMIN — VANCOMYCIN HYDROCHLORIDE 1500 MG: 10 INJECTION, POWDER, LYOPHILIZED, FOR SOLUTION INTRAVENOUS at 19:31

## 2022-01-30 RX ADMIN — MAGNESIUM SULFATE IN WATER 2 G: 40 INJECTION, SOLUTION INTRAVENOUS at 10:44

## 2022-01-30 RX ADMIN — PROPOFOL 60 MCG/KG/MIN: 10 INJECTION, EMULSION INTRAVENOUS at 05:26

## 2022-01-30 RX ADMIN — Medication 20 NG/KG/MIN: at 21:31

## 2022-01-30 RX ADMIN — PIPERACILLIN AND TAZOBACTAM 4.5 G: 4; .5 INJECTION, POWDER, FOR SOLUTION INTRAVENOUS at 17:39

## 2022-01-30 RX ADMIN — PROPOFOL 45 MCG/KG/MIN: 10 INJECTION, EMULSION INTRAVENOUS at 12:36

## 2022-01-30 RX ADMIN — PROPOFOL 50 MCG/KG/MIN: 10 INJECTION, EMULSION INTRAVENOUS at 06:59

## 2022-01-30 RX ADMIN — HYDRALAZINE HYDROCHLORIDE 10 MG: 20 INJECTION INTRAMUSCULAR; INTRAVENOUS at 01:27

## 2022-01-30 RX ADMIN — POTASSIUM CHLORIDE 20 MEQ: 29.8 INJECTION, SOLUTION INTRAVENOUS at 05:58

## 2022-01-30 RX ADMIN — PIPERACILLIN AND TAZOBACTAM 4.5 G: 4; .5 INJECTION, POWDER, FOR SOLUTION INTRAVENOUS at 05:52

## 2022-01-30 RX ADMIN — POTASSIUM CHLORIDE 40 MEQ: 1.5 POWDER, FOR SOLUTION ORAL at 15:37

## 2022-01-30 RX ADMIN — PROPOFOL 45 MCG/KG/MIN: 10 INJECTION, EMULSION INTRAVENOUS at 21:24

## 2022-01-30 RX ADMIN — POTASSIUM & SODIUM PHOSPHATES POWDER PACK 280-160-250 MG 1 PACKET: 280-160-250 PACK at 19:37

## 2022-01-30 RX ADMIN — PANTOPRAZOLE SODIUM 40 MG: 40 TABLET, DELAYED RELEASE ORAL at 07:47

## 2022-01-30 RX ADMIN — SENNOSIDES AND DOCUSATE SODIUM 1 TABLET: 50; 8.6 TABLET ORAL at 19:37

## 2022-01-30 RX ADMIN — FENTANYL CITRATE 100 MCG/HR: 50 INJECTION, SOLUTION INTRAMUSCULAR; INTRAVENOUS at 05:11

## 2022-01-30 RX ADMIN — ACETAMINOPHEN 975 MG: 325 TABLET, FILM COATED ORAL at 15:37

## 2022-01-30 RX ADMIN — HEPARIN SODIUM 5000 UNITS: 5000 INJECTION, SOLUTION INTRAVENOUS; SUBCUTANEOUS at 11:47

## 2022-01-30 RX ADMIN — FENTANYL CITRATE 100 MCG/HR: 50 INJECTION INTRAVENOUS at 21:15

## 2022-01-30 RX ADMIN — FUROSEMIDE 60 MG: 10 INJECTION, SOLUTION INTRAVENOUS at 01:04

## 2022-01-30 RX ADMIN — POLYETHYLENE GLYCOL 3350 17 G: 17 POWDER, FOR SOLUTION ORAL at 07:47

## 2022-01-30 RX ADMIN — PROPOFOL 45 MCG/KG/MIN: 10 INJECTION, EMULSION INTRAVENOUS at 23:53

## 2022-01-30 RX ADMIN — HEPARIN SODIUM 500 UNITS/HR: 1000 INJECTION INTRAVENOUS; SUBCUTANEOUS at 13:29

## 2022-01-30 RX ADMIN — DEXMEDETOMIDINE HYDROCHLORIDE 0.7 MCG/KG/HR: 4 INJECTION, SOLUTION INTRAVENOUS at 02:27

## 2022-01-30 RX ADMIN — Medication: at 15:38

## 2022-01-30 RX ADMIN — PROPOFOL 45 MCG/KG/MIN: 10 INJECTION, EMULSION INTRAVENOUS at 18:38

## 2022-01-30 RX ADMIN — PROPOFOL 45 MCG/KG/MIN: 10 INJECTION, EMULSION INTRAVENOUS at 08:56

## 2022-01-30 RX ADMIN — VANCOMYCIN HYDROCHLORIDE 2500 MG: 10 INJECTION, POWDER, LYOPHILIZED, FOR SOLUTION INTRAVENOUS at 07:23

## 2022-01-30 RX ADMIN — PROPOFOL 50 MCG/KG/MIN: 10 INJECTION, EMULSION INTRAVENOUS at 03:21

## 2022-01-30 RX ADMIN — ACETAMINOPHEN 975 MG: 325 TABLET, FILM COATED ORAL at 07:47

## 2022-01-30 RX ADMIN — SODIUM CHLORIDE, POTASSIUM CHLORIDE, SODIUM LACTATE AND CALCIUM CHLORIDE 250 ML: 600; 310; 30; 20 INJECTION, SOLUTION INTRAVENOUS at 05:53

## 2022-01-30 RX ADMIN — MUPIROCIN 0.5 G: 20 OINTMENT TOPICAL at 07:48

## 2022-01-30 ASSESSMENT — ACTIVITIES OF DAILY LIVING (ADL)
ADLS_ACUITY_SCORE: 11

## 2022-01-30 NOTE — PLAN OF CARE
Major Shift Events: Pt had worsening respiratory status throughout the night. Sating 89-90% on 100% FiO2. PO2 as low as 51. Temp increased from 100.2 to 102.2. Bag suctioned. Inhaled Veletri started @ 20 ng/kg/min. 60mg lasix given w/ 1L UO. Pt sent for chest CT - no PE but atelectasis apparent bilaterally. Covid test negative. Pt appropriately sedated on prop @ 60 & fent gtt @ 100. Paravertebrals continue @ 14mL/hr. Pan cultures sent & antibiotics zosyn & vanco started. 250mL LR bolus given for low MAPs. Levo started @ 0.01. K+ 3.3 replaced. SR 70-90s. See flowsheet for Sima numbers. %/550/16/10. Pt turned on R. side w/ improved oxygenation. Cata - CVTS crosscover updated significant other Debbie.

## 2022-01-30 NOTE — PROGRESS NOTES
Pain Service Progress Note  [facility]  Date: January 30, 2022    Patient Name: Chuy Varner  MRN: [id number]  Age: 31 year old  Sex: male    Assessment: 31 year old male with PMH of possible bicuspid aortic valve, thoracic aortic aneurysm without rupture, hypertension, obesity, and testicular cancer s/p orchiectomy     Procedure: Bentall procedure with mechanical valve      Date of Surgery: 1/28/2022     Date of T5-6 Erector Spinae Catheter Placement: 1/28/2022     Plan/Recommendations:  1. Regional Anesthesia/Analgesia  - Continuous Catheter Type/Site: bilateral erector spinae (ES) T5-6  - Continue 0.2% ropivacaine  - Continuous Infusion at 14 mL/hr  - Plan to maintain catheters, max of 14 days  - No AM bolus administered     2. Anticoagulation  - Heparin 5,000 units subcutaneous Q8H  - Please contact Inpatient Pain Service (pager [phone]) before ordering or making any medication changes        3. Multimodal Analgesia  - Acetaminophen  - Consider Robaxin   - For breakthrough:      - Oxycodone 5-10mg q4      - Hydromorphone 0.2-0.4 q2     Pain Service will continue to follow.  Discussed with attending anesthesiologist, Dr. Adi Barrera    Overnight Events: Development of ARDS and fever (102.2F). CT PE negative.     Tubes/Drains: Yes  CT    Subjective: Intubated, sedated  Nausea: No  Vomiting: No  Pruritus: No  Symptoms of LAST: No    Pain Location:  N/A    Pain Intensity:    Pain at Rest: NA   Pain with Activity: NA  Comfort Goal: NA   Baseline Pain: NA   Satisfied with your level of pain control: NA    Diet: NPO for Medical/Clinical Reasons Except for: Other; Specify: NPO until Extubated  Advance Diet as Tolerated: Clear Liquid Diet    Relevant Labs:  Recent Labs   Lab Test 01/30/22  0312 01/29/22  0323 01/28/22  1447   INR  --   --  1.38*      < > 205   PTT  --   --  31   BUN 26   < > 18    < > = values in this interval not displayed.       Physical Exam:  Vitals: /88    "Pulse 93   Temp (!) 39  C (102.2  F)   Resp 16   Ht 1.905 m (6' 3\")   Wt (!) 165.2 kg (364 lb 3.2 oz)   SpO2 99%   BMI 45.52 kg/m      Physical Exam:   Orientation:  Alert, oriented, and in no acute distress: No  Sedation: Yes    Motor Examination:  5/5 Strength in lower extremities: No    Sensory Level:   Decrease in sensation: No    Catheter Site:   Catheter entry site is clean/dry/intact: Yes    Tender: No      Relevant Medications:  Current Pain Medications:  Medications related to Pain Management (From now, onward)    Start     Dose/Rate Route Frequency Ordered Stop    01/31/22 0000  acetaminophen (TYLENOL) tablet 650 mg         650 mg Oral EVERY 4 HOURS PRN 01/28/22 1435      01/30/22 0530  fentaNYL (SUBLIMAZE) infusion          mcg/hr  0.5-4 mL/hr  Intravenous CONTINUOUS 01/30/22 0513      01/29/22 0800  polyethylene glycol (MIRALAX) Packet 17 g         17 g Oral DAILY 01/28/22 1435      01/29/22 0800  aspirin (ASA) chewable tablet 81 mg         81 mg Oral or NG Tube DAILY 01/28/22 1435      01/28/22 2000  senna-docusate (SENOKOT-S/PERICOLACE) 8.6-50 MG per tablet 1 tablet         1 tablet Oral 2 TIMES DAILY 01/28/22 1435      01/28/22 2000  propofol (DIPRIVAN) infusion         5-75 mcg/kg/min × 165.9 kg (Dosing Weight)  5-74.7 mL/hr  Intravenous CONTINUOUS 01/28/22 1958      01/28/22 1600  ropivacaine 0.2% (NAROPIN) 750 mL in ON-Q C-Bloc select flow ([id number] holds 600-750 mL) dual cath disposable pump         14 mL/hr  Irrigation CONTINUOUS 01/28/22 1533      01/28/22 1500  dexmedetomidine (PRECEDEX) 400 mcg in 0.9% sodium chloride 100 mL         0.2-0.7 mcg/kg/hr × 165.9 kg  8.3-29 mL/hr  Intravenous CONTINUOUS 01/28/22 1435      01/28/22 1500  acetaminophen (TYLENOL) tablet 975 mg         975 mg Oral EVERY 8 HOURS 01/28/22 1435 01/31/22 1559    01/28/22 1435  HYDROmorphone (PF) (DILAUDID) injection 0.2 mg        \"Or\" Linked Group Details    0.2 mg Intravenous EVERY 2 HOURS PRN 01/28/22 1435      " "01/28/22 1435  HYDROmorphone (PF) (DILAUDID) injection 0.4 mg        \"Or\" Linked Group Details    0.4 mg Intravenous EVERY 2 HOURS PRN 01/28/22 1435      01/28/22 1435  oxyCODONE (ROXICODONE) tablet 5 mg        \"Or\" Linked Group Details    5 mg Oral EVERY 4 HOURS PRN 01/28/22 1435      01/28/22 1435  oxyCODONE IR (ROXICODONE) tablet 10 mg        \"Or\" Linked Group Details    10 mg Oral EVERY 4 HOURS PRN 01/28/22 1435      01/28/22 1435  magnesium hydroxide (MILK OF MAGNESIA) suspension 30 mL         30 mL Oral DAILY PRN 01/28/22 1435      01/28/22 1435  bisacodyl (DULCOLAX) Suppository 10 mg         10 mg Rectal DAILY PRN 01/28/22 1435      01/28/22 1435  methocarbamol (ROBAXIN) tablet 750 mg         750 mg Oral EVERY 6 HOURS PRN 01/28/22 1435      01/28/22 1434  lidocaine 1 % 0.1-1 mL         0.1-1 mL Other EVERY 1 HOUR PRN 01/28/22 1435      01/28/22 1434  lidocaine (LMX4) kit          Topical EVERY 1 HOUR PRN 01/28/22 1435            Primary Service Contacted with Recommendations? No    Jean Angulo MD  1/30/2022    Time/Communication:  I personally spent 10 minutes with greater than 50% in consultation, education, counseliing and coordination of care.  Also discussed with RN.    Contact Info (24 hour job code pager is the last 4 digits) For in-house use only:   Job code ID: [email or web address] [id number]   [facility] 0599  Peds [id number]  [employer] phone: dial * * * [phone], enter jobcode ID, then enter call-back number.    Text: Use AMCOM on the Intranet <Paging/Directory> tab and enter Jobcode ID.   If no call back at any time, contact the hospital  and ask for Regional Anesthesia attending or backup   "

## 2022-01-30 NOTE — PLAN OF CARE
Major Shift Events:  Pt remains intubated/sedated with veletri at 20. Respiratory virus panel sent, awaiting results. Straight rate heparin gtt intiated. One time dose 40mg IV lasix given.     Labs/Protocols: High K, Mag and Phos protocol, K replaced x3 (total of 100mEq), Mag and Phos each replaced x1 per protocol. Lactic acid continues to down trend. Hemoglobin remains stable.   Neuro: Sedated, RASS -3. PERRLA. T-Max this shift 102.4, scheduled tylenol given, ice packs and fan in place, temp improving. Bilateral ES catheters in place at 7 and 7.   Cardiac: SR, HR 80s-90s. Temp pacer V wires in place, capped. Pacer off at bedside. MAP goal >65, SBP goal <110: met on/off levo gtt-see MAR. FREDA Q 4 hours, CVP: 12, PA: 30s/10s, SVO2:60s, CI: 2s, SVR: 700s  Respiratory: CMV 80%, 550, 16, 10. Veletri at 20. Lung sounds clear over diminished. Minimal thick, creamy tan/yellow secretions via ETT.   GI/: OG to LIS.  Godfrey in place, adequate urine output. Cloudy pink tinged yellow urine with sediment.   Skin: Midline chest incision with wound vac. Rash to chest, back and forearms, present PTA per pt's S.O. Chest tube sites, dressing CDI.   LDAs: R internal jugular MAC with [place]. L radial A-line. L PIV.   GTTs: Propofol, Fentanyl, Levo, Heparin  Diet: NPO.   Activity: Bedrest. Repositioned Q 2 hours, weight shifting while R-side lying only due to desaturation when supine or L-side lying majority of shift.   Teaching: Patient and S.O. (Debbie) educated on plan of care and cares throughout shift.     Plan: Continue to wean FiO2 as tolerated.   For vital signs and complete assessments, please see documentation flowsheets.

## 2022-01-30 NOTE — PHARMACY-VANCOMYCIN DOSING SERVICE
"Pharmacy Vancomycin Initial Note  Date of Service 2022  Patient's  1990  31 year old, male    Indication: [facility]ated Pneumonia    Current estimated CrCl = Estimated Creatinine Clearance: 153.8 mL/min (based on SCr of 1.15 mg/dL).    Creatinine for last 3 days  2022:  2:47 PM Creatinine 1.20 mg/dL  2022:  3:23 AM Creatinine 0.83 mg/dL  2022:  3:12 AM Creatinine 1.15 mg/dL    Recent Vancomycin Level(s) for last 3 days  No results found for requested labs within last 72 hours.      Vancomycin IV Administrations (past 72 hours)      No vancomycin orders with administrations in past 72 hours.                Nephrotoxins and other renal medications (From now, onward)    Start     Dose/Rate Route Frequency Ordered Stop    22 1900  vancomycin 1500 mg in 0.9% NaCl 250 ml intermittent infusion 1,500 mg         1,500 mg  over 90 Minutes Intravenous EVERY 12 HOURS 22 0559      22 0700  vancomycin (VANCOCIN) 2,500 mg in sodium chloride 0.9 % 500 mL intermittent infusion         25 mg/kg × 165.2 kg  over 120 Minutes Intravenous ONCE 22 0552      22 0600  piperacillin-tazobactam (ZOSYN) 4.5 g vial to attach to  mL bag        Note to Pharmacy: For [facility], [facility] and [facility]: For Zosyn-naive patients, use the \"Zosyn initial dose + extended infusion\" order panel.    4.5 g  over 30 Minutes Intravenous EVERY 6 HOURS 22 0539 22 0559          Contrast Orders - past 72 hours (72h ago, onward)            Start     Dose/Rate Route Frequency Ordered Stop    22 0430  iopamidol (ISOVUE-370) solution 77 mL         77 mL Intravenous ONCE 22 0408 22 0433        [employer]ight[employer] Prediction of Planned Initial Vancomycin Regimen    Loading dose: 2500 mg   Regimen: 1500 mg IV every 12 hours  Exposure target: AUC24 (range)400-600 mg/L.hr   AUC24,ss: 538 mg/L.hr  Probability of AUC24 > 400: 71 %  Ctrough,ss: 13.6 mg/L  Probability of Ctrough,ss > 20: 33 " %  Probability of nephrotoxicity (Lodise MAGI 2009): 9 %        Plan:  1. Start vancomycin 2500 mg IV x 1 dose then 1500 mg IV q12h.   2. Vancomycin monitoring method: AUC  3. Vancomycin therapeutic monitoring goal: 400-600 mg*h/L  4. Pharmacy will check vancomycin levels as appropriate in 1-3 Days.    5. Serum creatinine levels will be ordered daily for the first week of therapy and at least twice weekly for subsequent weeks.      Brigid Mathew, PharmD, BCPS

## 2022-01-30 NOTE — PROGRESS NOTES
CV ICU PROGRESS NOTE  01/30/2022      CO-MORBIDITIES:   Aortic root aneurysm (H)    ASSESSMENT: Chuy Varner is a 31 year old male with a PMHx s/f bicuspid aortic valve, thoracic aortic aneurysm without rupture, hypertension, obesity, and testicular cancer s/p orchiectomy who underwent Bentall procedure with mechanical valve on 1/28 with Dr. Velasco and Dr. Hannah.     TODAY'S PROGRESS:   - Wean flolan and FiO2 as able  - Follow cultures  - COVID negative, follow up respiratory panel  - Vancomycin/Zosyn  - Straight rate heparin  - Goal I=O    PLAN:  Neuro/ pain/ sedation:  # Acute Postoperative pain  - Monitor neurological status. Notify the MD for any acute changes in exam.  - Pain: fentanyl gtt. Scheduled tylenol and gabapentin. PRN tylenol, oxycodone, robaxin  - Sedation: propofol gtt, precedex gtt     Pulmonary care:   # Postoperative ventilation management  # Acute hypoxemic respiratory failure  Worsening FiO2 requirements overnight, started on Flolan  - Intubated, ventilated  - Titrate supplemental oxygen to maintain saturation above 90%.  - Pulmonary hygiene: Incentive spirometer every 15- 30 minutes when awake, flutter valve, C&DB  - Wean flolan and FiO2 as able  - Lung protective ventilation     Cardiovascular:    # Thoracic aortic aneurysm s/p Bentall w/ Jerry valve on 1/28 with Dr. Velasco and Dr. Hannah  # History of hypertension  # Bicuspid aortic valve  Recent echo on 10/26/21 with LVEF of 60-65%.  CT vascular 12/13/21 showing proximal ascending aorta is severely dilated (55 x 55mm in maximum dimension) and likely bicuspid aortic valve.  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  - Monitor hemodynamic status  - Goal MAP>65, SBP<110  - Statin hold  - BB hold  - ASA  - Wean NE gtt as able  - Holding PTA meds: metoprolol succinate 25mg  daily  - CT PE 1/30 - negative for acute PE, marked dependent atelectasis R>L  - Labetalol PRN to maintain SBP <110     GI care/ Nutrition:   # Obesity (BMI 46)  - Trickle feeds  - PPI  - Continue bowel regimen: miralax, senna    Renal/ Fluid Balance/ Electrolytes:   BL creat appears to be ~ 1.0  - Strict I/O, daily weights  - Avoid/limit nephrotoxins as able  - Replete lytes PRN per protocol     Endocrine:    # Stress induced hyperglycemia  - Insulin gtt  - Goal BG <180 for optimal healing     ID/ Antibiotics:  # Stress induced leukocytosis  # Possible ventilator associated pneumonia  Febrile to 102 overnight and some hypotension but in the setting of diuresis.  - To complete perioperative regimen  - Continue to monitor fever curve, WBC and inflammatory markers as appropriate  - Started on Vancomycin and Zosyn 1/30  - COVID pcr negative 1/30  - Viral panel pending  - Respiratory, blood, and urine cultures 1/30 pending     Heme/Onc:     # Stress induced leukocytosis  # Acute blood loss anemia  # Acute blood loss thrombocytopenia  # History of testicular cancer s/p orchiectomy  No s/sx active bleeding  - Continue to monitor  - CBC daily     MSK/ Skin:  # Sternotomy  # Surgical Incision  - Sternal precautions  - Postoperative incision management per protocol  - PT/OT/CR     Prophylaxis:    - Mechanical prophylaxis for DVT  - Chemical DVT prophylaxis - start subcutaneous heparin tomorrow  - PPI     Lines/ tubes/ drains:  - L radial arterial Line, ETT, CTs, PA catheter, RIJ Mac, durant, bilateral ES catheters     Disposition:  - CVICU     Patient seen, findings and plan discussed with CV ICU staff, Dr. Rosado and CVTS staff Dr. Velasco.    -----------------------------------  Tom Adair MD  Anesthesia Resident, PGY3    ====================================    SUBJECTIVE:   Worsening FiO2 requirements overnight, now on FiO2 of 100%, flolan. Started broad spectrum abx and norepi gtt.    OBJECTIVE:   1. VITAL SIGNS:    Temp:  [99  F (37.2  C)-102.4  F (39.1  C)] 102.4  F (39.1  C)  Pulse:  [62-96] 91  Resp:  [16-30] 19  MAP:  [57 mmHg-87 mmHg] 65 mmHg  Arterial Line BP: ()/(42-72) 103/48  FiO2 (%):  [60 %-100 %] 100 %  SpO2:  [85 %-100 %] 100 %  Ventilation Mode: CMV/AC  (Continuous Mandatory Ventilation/ Assist Control)  FiO2 (%): 100 %  Rate Set (breaths/minute): 16 breaths/min  Tidal Volume Set (mL): (S) 550 mL  PEEP (cm H2O): 10 cmH2O  Pressure Support (cm H2O): 7 cmH2O  Oxygen Concentration (%): 100 %  Resp: 19      2. INTAKE/ OUTPUT:   I/O last 3 completed shifts:  In: 2229.68 [I.V.:1429.68; NG/GT:550; IV Piggyback:250]  Out: 3934 [Urine:3064; Emesis/NG output:500; Chest Tube:370]    3. PHYSICAL EXAMINATION:   General: Sedated  Neuro: Sedated, follows commands and moves all extremities when weaned  Resp: Mechanical breath sounds  CV: RRR  Abdomen: Soft, Non-distended, Non-tender  Incisions: C/D/I  Extremities: warm and well perfused    4. INVESTIGATIONS:   Arterial Blood Gases   Recent Labs   Lab 01/30/22  0636 01/30/22  0547 01/30/22  0515 01/30/22  0313   PH 7.44 7.45 7.46* 7.48*   PCO2 36 40 36 40   PO2 91 63* 51* 52*   HCO3 25 28 26 30*     Complete Blood Count   Recent Labs   Lab 01/30/22  0312 01/29/22  0323 01/28/22  1447 01/28/22  1346 01/28/22  1246   WBC 11.5* 11.0 13.7*  --  14.4*   HGB 13.0* 13.6 13.7 13.4 12.6*    189 205  --  154     Basic Metabolic Panel  Recent Labs   Lab 01/30/22  0607 01/30/22  0514 01/30/22  0315 01/30/22  0312 01/29/22  1658 01/29/22  1656 01/29/22  0331 01/29/22  0323 01/28/22 2114 01/28/22  2113 01/28/22  1451 01/28/22  1447 01/28/22  1346   0000   NA  --   --   --  144  --   --   --  143  --   --   --  143 142  --    POTASSIUM  --   --   --  3.3*  --  3.6  --  3.9  --  3.9  --  4.5  4.5 4.3  --    CHLORIDE  --   --   --  113*  --   --   --  114*  --   --   --  112*  --   --    CO2  --   --   --  26  --   --   --  27  --   --   --  24  --   --    BUN  --   --   --  26   --   --   --  21  --   --   --  18  --   --    CR  --   --   --  1.15  --   --   --  0.83  --   --   --  1.20  --   --    GLC 96 103* 125* 130*   < >  --    < > 144*   < >  --    < > 133* 133*   < >    < > = values in this interval not displayed.     Liver Function Tests  Recent Labs   Lab 01/30/22  0312 01/29/22  0323 01/28/22  1447 01/28/22  1246   AST 31 45 50*  --    ALT 24 26 28  --    ALKPHOS 40 42 44  --    BILITOTAL 0.7 0.5 0.6  --    ALBUMIN 2.8* 3.0* 3.1*  --    INR  --   --  1.38* 1.53*     Pancreatic Enzymes  No lab results found in last 7 days.  Coagulation Profile  Recent Labs   Lab 01/28/22  1447 01/28/22  1246   INR 1.38* 1.53*   PTT 31 28         5. RADIOLOGY:   Recent Results (from the past 24 hour(s))   XR Chest Port 1 View    Narrative    EXAM: XR CHEST PORT 1 VIEW  1/29/2022 8:45 AM     HISTORY:  increasing FiO2 requirements       COMPARISON:  Chest x-ray 1/28/2022    FINDINGS: AP radiograph of the chest. Endotracheal tube projecting  over the high thoracic trachea. Right IJ Delphia-Nelda catheter with tip  overlying the main pulmonary artery. Partially visualized enteric tube  with tip overlying the stomach. Postoperative chest with intact median  sternotomy wires.    Stable borderline enlargement of the cardiomediastinal silhouette. Low  lung volumes. Small left and possible trace right pleural effusions.  Pulmonary vasculature is indistinct. Perihilar interstitial prominent  opacities. No pneumothorax. Visualized upper abdomen is unremarkable.      Impression    IMPRESSION:  1. Stable endotracheal tube and right IJ Delphia-Nelda catheter.  2. Low lung volumes with unchanged perihilar interstitial prominent  opacities, likely pulmonary edema and/or atelectasis.  3. Increased small left and possible trace right pleural effusions.    I have personally reviewed the examination and initial interpretation  and I agree with the findings.    BENTLEY ANTONIO MD         SYSTEM ID:  [id number]   XR Chest Port 1  View    Impression    RESIDENT PRELIMINARY INTERPRETATION  Impression:   1. Worsening of low lung volumes, small left greater than right  pleural effusions, and perihilar opacities compatible with pulmonary  edema and/or atelectasis. Infection cannot be excluded.  2. Endotracheal tube has been advanced with tip projecting over mid  thoracic trachea. Good position.  3. Stable additional support devices and postsurgical changes.   CT Chest Pulmonary Embolism w Contrast    Impression    RESIDENT PRELIMINARY INTERPRETATION  IMPRESSION:   1. Exam is negative for acute pulmonary embolism.  No evidence of  right heart strain or increased right heart pressures.       2. Marked dependent atelectasis in the right than left lungs. Occult  infection cannot be excluded.    3. Postsurgical changes of median sternotomy and aortic valve repair.  Satisfactory position of support devices as detailed.       In the event of a positive result for acute pulmonary embolism  resulting in right heart strain, consider calling the   [facility]  for PERT (Pulmonary Embolism Response Team)  Activation?    PERT -- Pulmonary Embolism Response Team (Multidisciplinary team  including cardiology, interventional radiology, critical care,  hematology)       =========================================

## 2022-01-31 ENCOUNTER — APPOINTMENT (OUTPATIENT)
Dept: GENERAL RADIOLOGY | Facility: CLINIC | Age: 32
End: 2022-01-31
Attending: NURSE PRACTITIONER
Payer: COMMERCIAL

## 2022-01-31 ENCOUNTER — APPOINTMENT (OUTPATIENT)
Dept: GENERAL RADIOLOGY | Facility: CLINIC | Age: 32
End: 2022-01-31
Attending: SURGERY
Payer: COMMERCIAL

## 2022-01-31 ENCOUNTER — APPOINTMENT (OUTPATIENT)
Dept: CARDIOLOGY | Facility: CLINIC | Age: 32
End: 2022-01-31
Attending: ANESTHESIOLOGY
Payer: COMMERCIAL

## 2022-01-31 LAB
ALBUMIN SERPL-MCNC: 2.4 G/DL (ref 3.4–5)
ALBUMIN UR-MCNC: NEGATIVE MG/DL
ALP SERPL-CCNC: 46 U/L (ref 40–150)
ALT SERPL W P-5'-P-CCNC: 20 U/L (ref 0–70)
ANION GAP SERPL CALCULATED.3IONS-SCNC: 5 MMOL/L (ref 3–14)
ANION GAP SERPL CALCULATED.3IONS-SCNC: 5 MMOL/L (ref 3–14)
APPEARANCE UR: CLEAR
AST SERPL W P-5'-P-CCNC: 27 U/L (ref 0–45)
ATRIAL RATE - MUSE: 86 BPM
ATRIAL RATE - MUSE: 87 BPM
BASE EXCESS BLDA CALC-SCNC: -0.2 MMOL/L (ref -9–1.8)
BASE EXCESS BLDA CALC-SCNC: -0.2 MMOL/L (ref -9–1.8)
BASE EXCESS BLDA CALC-SCNC: 0 MMOL/L (ref -9–1.8)
BASE EXCESS BLDA CALC-SCNC: 0.1 MMOL/L (ref -9–1.8)
BASE EXCESS BLDA CALC-SCNC: 0.3 MMOL/L (ref -9–1.8)
BASE EXCESS BLDA CALC-SCNC: 1.3 MMOL/L (ref -9–1.8)
BASE EXCESS BLDA CALC-SCNC: 1.4 MMOL/L (ref -9–1.8)
BASE EXCESS BLDV CALC-SCNC: 1.3 MMOL/L (ref -7.7–1.9)
BASE EXCESS BLDV CALC-SCNC: 2.4 MMOL/L (ref -7.7–1.9)
BASE EXCESS BLDV CALC-SCNC: 3.6 MMOL/L (ref -7.7–1.9)
BILIRUB SERPL-MCNC: 0.7 MG/DL (ref 0.2–1.3)
BILIRUB UR QL STRIP: NEGATIVE
BUN SERPL-MCNC: 26 MG/DL (ref 7–30)
BUN SERPL-MCNC: 28 MG/DL (ref 7–30)
CA-I BLD-MCNC: 4.5 MG/DL (ref 4.4–5.2)
CALCIUM SERPL-MCNC: 8.1 MG/DL (ref 8.5–10.1)
CALCIUM SERPL-MCNC: 8.1 MG/DL (ref 8.5–10.1)
CHLORIDE BLD-SCNC: 111 MMOL/L (ref 94–109)
CHLORIDE BLD-SCNC: 111 MMOL/L (ref 94–109)
CO COMPONENTS: NORMAL
CO2 SERPL-SCNC: 26 MMOL/L (ref 20–32)
CO2 SERPL-SCNC: 27 MMOL/L (ref 20–32)
COLOR UR AUTO: ABNORMAL
COMMENTS: NORMAL
CREAT SERPL-MCNC: 1.2 MG/DL (ref 0.66–1.25)
CREAT SERPL-MCNC: 1.26 MG/DL (ref 0.66–1.25)
DIASTOLIC BLOOD PRESSURE - MUSE: NORMAL MMHG
DIASTOLIC BLOOD PRESSURE - MUSE: NORMAL MMHG
ERYTHROCYTE [DISTWIDTH] IN BLOOD BY AUTOMATED COUNT: 12.5 % (ref 10–15)
ERYTHROCYTE [DISTWIDTH] IN BLOOD BY AUTOMATED COUNT: 12.6 % (ref 10–15)
GFR SERPL CREATININE-BSD FRML MDRD: 78 ML/MIN/1.73M2
GFR SERPL CREATININE-BSD FRML MDRD: 83 ML/MIN/1.73M2
GLUCOSE BLD-MCNC: 108 MG/DL (ref 70–99)
GLUCOSE BLD-MCNC: 196 MG/DL (ref 70–99)
GLUCOSE BLDC GLUCOMTR-MCNC: 105 MG/DL (ref 70–99)
GLUCOSE BLDC GLUCOMTR-MCNC: 106 MG/DL (ref 70–99)
GLUCOSE BLDC GLUCOMTR-MCNC: 148 MG/DL (ref 70–99)
GLUCOSE BLDC GLUCOMTR-MCNC: 171 MG/DL (ref 70–99)
GLUCOSE BLDC GLUCOMTR-MCNC: 173 MG/DL (ref 70–99)
GLUCOSE BLDC GLUCOMTR-MCNC: 188 MG/DL (ref 70–99)
GLUCOSE BLDC GLUCOMTR-MCNC: 99 MG/DL (ref 70–99)
GLUCOSE UR STRIP-MCNC: NEGATIVE MG/DL
GRAM/CULTURE INDICATED?: NO
GRANULAR CAST: 3 /LPF
HCO3 BLD-SCNC: 25 MMOL/L (ref 21–28)
HCO3 BLD-SCNC: 26 MMOL/L (ref 21–28)
HCO3 BLD-SCNC: 28 MMOL/L (ref 21–28)
HCO3 BLDV-SCNC: 28 MMOL/L (ref 21–28)
HCO3 BLDV-SCNC: 28 MMOL/L (ref 21–28)
HCO3 BLDV-SCNC: 30 MMOL/L (ref 21–28)
HCT VFR BLD AUTO: 35.9 % (ref 40–53)
HCT VFR BLD AUTO: 36.1 % (ref 40–53)
HGB BLD-MCNC: 11.5 G/DL (ref 13.3–17.7)
HGB BLD-MCNC: 11.5 G/DL (ref 13.3–17.7)
HGB UR QL STRIP: NEGATIVE
INTERPRETATION ECG - MUSE: NORMAL
INTERPRETATION ECG - MUSE: NORMAL
KETONES UR STRIP-MCNC: NEGATIVE MG/DL
LACTATE SERPL-SCNC: 0.7 MMOL/L (ref 0.7–2)
LEUKOCYTE ESTERASE UR QL STRIP: NEGATIVE
LVEF ECHO: NORMAL
MAGNESIUM SERPL-MCNC: 2.4 MG/DL (ref 1.6–2.3)
MAGNESIUM SERPL-MCNC: 2.6 MG/DL (ref 1.6–2.3)
MCH RBC QN AUTO: 30.4 PG (ref 26.5–33)
MCH RBC QN AUTO: 30.5 PG (ref 26.5–33)
MCHC RBC AUTO-ENTMCNC: 31.9 G/DL (ref 31.5–36.5)
MCHC RBC AUTO-ENTMCNC: 32 G/DL (ref 31.5–36.5)
MCV RBC AUTO: 95 FL (ref 78–100)
MCV RBC AUTO: 96 FL (ref 78–100)
MUCOUS THREADS #/AREA URNS LPF: PRESENT /LPF
NITRATE UR QL: NEGATIVE
O2/TOTAL GAS SETTING VFR VENT: 100 %
O2/TOTAL GAS SETTING VFR VENT: 100 %
O2/TOTAL GAS SETTING VFR VENT: 70 %
O2/TOTAL GAS SETTING VFR VENT: 75 %
O2/TOTAL GAS SETTING VFR VENT: 85 %
O2/TOTAL GAS SETTING VFR VENT: 90 %
OTHER UNITS TRANSFUSED: NORMAL
OXYHGB MFR BLD: 91 % (ref 92–100)
OXYHGB MFR BLD: 92 % (ref 92–100)
OXYHGB MFR BLD: 92 % (ref 92–100)
OXYHGB MFR BLD: 93 % (ref 92–100)
OXYHGB MFR BLD: 93 % (ref 92–100)
OXYHGB MFR BLD: 94 % (ref 92–100)
OXYHGB MFR BLD: 95 % (ref 92–100)
OXYHGB MFR BLDV: 56 % (ref 70–75)
OXYHGB MFR BLDV: 60 % (ref 70–75)
OXYHGB MFR BLDV: 61 % (ref 70–75)
P AXIS - MUSE: 34 DEGREES
P AXIS - MUSE: 41 DEGREES
PCO2 BLD: 38 MM HG (ref 35–45)
PCO2 BLD: 39 MM HG (ref 35–45)
PCO2 BLD: 40 MM HG (ref 35–45)
PCO2 BLD: 45 MM HG (ref 35–45)
PCO2 BLD: 46 MM HG (ref 35–45)
PCO2 BLD: 47 MM HG (ref 35–45)
PCO2 BLD: 50 MM HG (ref 35–45)
PCO2 BLDV: 47 MM HG (ref 40–50)
PCO2 BLDV: 49 MM HG (ref 40–50)
PCO2 BLDV: 55 MM HG (ref 40–50)
PH BLD: 7.35 [PH] (ref 7.35–7.45)
PH BLD: 7.35 [PH] (ref 7.35–7.45)
PH BLD: 7.36 [PH] (ref 7.35–7.45)
PH BLD: 7.37 [PH] (ref 7.35–7.45)
PH BLD: 7.4 [PH] (ref 7.35–7.45)
PH BLD: 7.41 [PH] (ref 7.35–7.45)
PH BLD: 7.43 [PH] (ref 7.35–7.45)
PH BLDV: 7.32 [PH] (ref 7.32–7.43)
PH BLDV: 7.38 [PH] (ref 7.32–7.43)
PH BLDV: 7.39 [PH] (ref 7.32–7.43)
PH UR STRIP: 5 [PH] (ref 5–7)
PHOSPHATE SERPL-MCNC: 3.2 MG/DL (ref 2.5–4.5)
PHOSPHATE SERPL-MCNC: 3.2 MG/DL (ref 2.5–4.5)
PLATELET # BLD AUTO: 133 10E3/UL (ref 150–450)
PLATELET # BLD AUTO: 139 10E3/UL (ref 150–450)
PO2 BLD: 60 MM HG (ref 80–105)
PO2 BLD: 64 MM HG (ref 80–105)
PO2 BLD: 66 MM HG (ref 80–105)
PO2 BLD: 72 MM HG (ref 80–105)
PO2 BLD: 72 MM HG (ref 80–105)
PO2 BLD: 73 MM HG (ref 80–105)
PO2 BLD: 83 MM HG (ref 80–105)
PO2 BLDV: 30 MM HG (ref 25–47)
PO2 BLDV: 32 MM HG (ref 25–47)
PO2 BLDV: 33 MM HG (ref 25–47)
POST RXN CLERICAL CHECK: NORMAL
POST SPECIMEN APPEARANCE: NORMAL
POST-RXN ABO/RH: NORMAL
POST-RXN POLY DAT: NORMAL
POTASSIUM BLD-SCNC: 3.8 MMOL/L (ref 3.4–5.3)
POTASSIUM BLD-SCNC: 3.9 MMOL/L (ref 3.4–5.3)
POTASSIUM BLD-SCNC: 3.9 MMOL/L (ref 3.4–5.3)
POTASSIUM BLD-SCNC: 4 MMOL/L (ref 3.4–5.3)
PR INTERVAL - MUSE: 208 MS
PR INTERVAL - MUSE: 248 MS
PRODUCT CODE: NORMAL
PROT SERPL-MCNC: 6.1 G/DL (ref 6.8–8.8)
QRS DURATION - MUSE: 106 MS
QRS DURATION - MUSE: 110 MS
QT - MUSE: 356 MS
QT - MUSE: 370 MS
QTC - MUSE: 428 MS
QTC - MUSE: 442 MS
R AXIS - MUSE: 49 DEGREES
R AXIS - MUSE: 66 DEGREES
RBC # BLD AUTO: 3.77 10E6/UL (ref 4.4–5.9)
RBC # BLD AUTO: 3.78 10E6/UL (ref 4.4–5.9)
RBC URINE: 4 /HPF
SODIUM SERPL-SCNC: 142 MMOL/L (ref 133–144)
SODIUM SERPL-SCNC: 143 MMOL/L (ref 133–144)
SP GR UR STRIP: 1.02 (ref 1–1.03)
SQUAMOUS EPITHELIAL: <1 /HPF
SYSTOLIC BLOOD PRESSURE - MUSE: NORMAL MMHG
SYSTOLIC BLOOD PRESSURE - MUSE: NORMAL MMHG
T AXIS - MUSE: 0 DEGREES
T AXIS - MUSE: 8 DEGREES
TRIGL SERPL-MCNC: 220 MG/DL
UFH PPP CHRO-ACNC: <0.1 IU/ML
UNIT NUMBER: NORMAL
UROBILINOGEN UR STRIP-MCNC: NORMAL MG/DL
VANCOMYCIN SERPL-MCNC: 4.2 MG/L
VENTRICULAR RATE- MUSE: 86 BPM
VENTRICULAR RATE- MUSE: 87 BPM
WBC # BLD AUTO: 10.1 10E3/UL (ref 4–11)
WBC # BLD AUTO: 10.9 10E3/UL (ref 4–11)
WBC URINE: 1 /HPF

## 2022-01-31 PROCEDURE — 85027 COMPLETE CBC AUTOMATED: CPT | Performed by: STUDENT IN AN ORGANIZED HEALTH CARE EDUCATION/TRAINING PROGRAM

## 2022-01-31 PROCEDURE — 999N000208 ECHOCARDIOGRAM COMPLETE

## 2022-01-31 PROCEDURE — 258N000003 HC RX IP 258 OP 636: Performed by: SURGERY

## 2022-01-31 PROCEDURE — 250N000009 HC RX 250: Performed by: NURSE PRACTITIONER

## 2022-01-31 PROCEDURE — 81001 URINALYSIS AUTO W/SCOPE: CPT | Performed by: NURSE PRACTITIONER

## 2022-01-31 PROCEDURE — 250N000013 HC RX MED GY IP 250 OP 250 PS 637: Performed by: SURGERY

## 2022-01-31 PROCEDURE — 87040 BLOOD CULTURE FOR BACTERIA: CPT | Performed by: SURGERY

## 2022-01-31 PROCEDURE — 99024 POSTOP FOLLOW-UP VISIT: CPT | Performed by: NURSE PRACTITIONER

## 2022-01-31 PROCEDURE — 200N000002 HC R&B ICU UMMC

## 2022-01-31 PROCEDURE — 250N000011 HC RX IP 250 OP 636: Performed by: SURGERY

## 2022-01-31 PROCEDURE — 999N000045 HC STATISTIC DAILY SWAN MONITORING

## 2022-01-31 PROCEDURE — 84132 ASSAY OF SERUM POTASSIUM: CPT | Performed by: STUDENT IN AN ORGANIZED HEALTH CARE EDUCATION/TRAINING PROGRAM

## 2022-01-31 PROCEDURE — 258N000003 HC RX IP 258 OP 636: Performed by: NURSE PRACTITIONER

## 2022-01-31 PROCEDURE — 99291 CRITICAL CARE FIRST HOUR: CPT | Mod: 24 | Performed by: ANESTHESIOLOGY

## 2022-01-31 PROCEDURE — 84132 ASSAY OF SERUM POTASSIUM: CPT | Performed by: SURGERY

## 2022-01-31 PROCEDURE — 83735 ASSAY OF MAGNESIUM: CPT | Performed by: SURGERY

## 2022-01-31 PROCEDURE — 93306 TTE W/DOPPLER COMPLETE: CPT | Mod: 26 | Performed by: INTERNAL MEDICINE

## 2022-01-31 PROCEDURE — 83605 ASSAY OF LACTIC ACID: CPT | Performed by: STUDENT IN AN ORGANIZED HEALTH CARE EDUCATION/TRAINING PROGRAM

## 2022-01-31 PROCEDURE — 74018 RADEX ABDOMEN 1 VIEW: CPT | Mod: 26 | Performed by: STUDENT IN AN ORGANIZED HEALTH CARE EDUCATION/TRAINING PROGRAM

## 2022-01-31 PROCEDURE — 80053 COMPREHEN METABOLIC PANEL: CPT | Performed by: STUDENT IN AN ORGANIZED HEALTH CARE EDUCATION/TRAINING PROGRAM

## 2022-01-31 PROCEDURE — 999N000065 XR ABDOMEN PORT 1 VIEWS

## 2022-01-31 PROCEDURE — 71045 X-RAY EXAM CHEST 1 VIEW: CPT | Mod: 26 | Performed by: RADIOLOGY

## 2022-01-31 PROCEDURE — 44500 INTRO GASTROINTESTINAL TUBE: CPT

## 2022-01-31 PROCEDURE — 82805 BLOOD GASES W/O2 SATURATION: CPT | Performed by: STUDENT IN AN ORGANIZED HEALTH CARE EDUCATION/TRAINING PROGRAM

## 2022-01-31 PROCEDURE — 36415 COLL VENOUS BLD VENIPUNCTURE: CPT | Performed by: SURGERY

## 2022-01-31 PROCEDURE — 71045 X-RAY EXAM CHEST 1 VIEW: CPT

## 2022-01-31 PROCEDURE — 84100 ASSAY OF PHOSPHORUS: CPT | Performed by: SURGERY

## 2022-01-31 PROCEDURE — 85041 AUTOMATED RBC COUNT: CPT | Performed by: NURSE PRACTITIONER

## 2022-01-31 PROCEDURE — 250N000013 HC RX MED GY IP 250 OP 250 PS 637: Performed by: STUDENT IN AN ORGANIZED HEALTH CARE EDUCATION/TRAINING PROGRAM

## 2022-01-31 PROCEDURE — 94799 UNLISTED PULMONARY SVC/PX: CPT

## 2022-01-31 PROCEDURE — 85520 HEPARIN ASSAY: CPT | Performed by: SURGERY

## 2022-01-31 PROCEDURE — 82805 BLOOD GASES W/O2 SATURATION: CPT | Performed by: NURSE PRACTITIONER

## 2022-01-31 PROCEDURE — 94003 VENT MGMT INPAT SUBQ DAY: CPT

## 2022-01-31 PROCEDURE — 94640 AIRWAY INHALATION TREATMENT: CPT | Mod: 76

## 2022-01-31 PROCEDURE — 80202 ASSAY OF VANCOMYCIN: CPT | Performed by: SURGERY

## 2022-01-31 PROCEDURE — 250N000011 HC RX IP 250 OP 636: Performed by: STUDENT IN AN ORGANIZED HEALTH CARE EDUCATION/TRAINING PROGRAM

## 2022-01-31 PROCEDURE — 5A1955Z RESPIRATORY VENTILATION, GREATER THAN 96 CONSECUTIVE HOURS: ICD-10-PCS | Performed by: ANESTHESIOLOGY

## 2022-01-31 PROCEDURE — 82805 BLOOD GASES W/O2 SATURATION: CPT | Performed by: SURGERY

## 2022-01-31 PROCEDURE — 82330 ASSAY OF CALCIUM: CPT | Performed by: SURGERY

## 2022-01-31 PROCEDURE — 250N000011 HC RX IP 250 OP 636: Performed by: NURSE PRACTITIONER

## 2022-01-31 PROCEDURE — 86078 PHYS BLOOD BANK SERV REACTJ: CPT | Performed by: PATHOLOGY

## 2022-01-31 PROCEDURE — 84478 ASSAY OF TRIGLYCERIDES: CPT | Performed by: SURGERY

## 2022-01-31 PROCEDURE — 255N000002 HC RX 255 OP 636: Performed by: SURGERY

## 2022-01-31 PROCEDURE — 250N000009 HC RX 250: Performed by: SURGERY

## 2022-01-31 PROCEDURE — 87071 CULTURE AEROBIC QUANT OTHER: CPT | Performed by: STUDENT IN AN ORGANIZED HEALTH CARE EDUCATION/TRAINING PROGRAM

## 2022-01-31 PROCEDURE — 999N000155 HC STATISTIC RAPCV CVP MONITORING

## 2022-01-31 PROCEDURE — 999N000157 HC STATISTIC RCP TIME EA 10 MIN

## 2022-01-31 PROCEDURE — 250N000011 HC RX IP 250 OP 636: Performed by: ANESTHESIOLOGY

## 2022-01-31 PROCEDURE — 85520 HEPARIN ASSAY: CPT | Performed by: NURSE PRACTITIONER

## 2022-01-31 PROCEDURE — 999N000015 HC STATISTIC ARTERIAL MONITORING DAILY

## 2022-01-31 PROCEDURE — 94645 CONT INHLJ TX EACH ADDL HOUR: CPT

## 2022-01-31 PROCEDURE — 85520 HEPARIN ASSAY: CPT | Performed by: STUDENT IN AN ORGANIZED HEALTH CARE EDUCATION/TRAINING PROGRAM

## 2022-01-31 RX ORDER — LIDOCAINE HYDROCHLORIDE 20 MG/ML
5 SOLUTION OROPHARYNGEAL ONCE
Status: COMPLETED | OUTPATIENT
Start: 2022-01-31 | End: 2022-01-31

## 2022-01-31 RX ORDER — DEXAMETHASONE SODIUM PHOSPHATE 10 MG/ML
20 INJECTION, SOLUTION INTRAMUSCULAR; INTRAVENOUS DAILY
Status: COMPLETED | OUTPATIENT
Start: 2022-01-31 | End: 2022-02-05

## 2022-01-31 RX ORDER — GUAIFENESIN 600 MG/1
15 TABLET, EXTENDED RELEASE ORAL DAILY
Status: DISCONTINUED | OUTPATIENT
Start: 2022-01-31 | End: 2022-02-24

## 2022-01-31 RX ORDER — POTASSIUM CHLORIDE 1.5 G/1.58G
40 POWDER, FOR SOLUTION ORAL ONCE
Status: COMPLETED | OUTPATIENT
Start: 2022-01-31 | End: 2022-01-31

## 2022-01-31 RX ORDER — ACETYLCYSTEINE 200 MG/ML
2 SOLUTION ORAL; RESPIRATORY (INHALATION) EVERY 4 HOURS
Status: DISCONTINUED | OUTPATIENT
Start: 2022-01-31 | End: 2022-01-31

## 2022-01-31 RX ORDER — AMINO AC/PROTEIN HYDR/WHEY PRO 10G-100/30
2 LIQUID (ML) ORAL 4 TIMES DAILY
Status: DISCONTINUED | OUTPATIENT
Start: 2022-01-31 | End: 2022-02-14

## 2022-01-31 RX ORDER — FUROSEMIDE 10 MG/ML
40 INJECTION INTRAMUSCULAR; INTRAVENOUS EVERY 12 HOURS
Status: DISCONTINUED | OUTPATIENT
Start: 2022-01-31 | End: 2022-01-31

## 2022-01-31 RX ORDER — FUROSEMIDE 10 MG/ML
60 INJECTION INTRAMUSCULAR; INTRAVENOUS EVERY 8 HOURS
Status: COMPLETED | OUTPATIENT
Start: 2022-01-31 | End: 2022-02-01

## 2022-01-31 RX ORDER — MEROPENEM 1 G/1
1 INJECTION, POWDER, FOR SOLUTION INTRAVENOUS EVERY 8 HOURS
Status: COMPLETED | OUTPATIENT
Start: 2022-01-31 | End: 2022-02-07

## 2022-01-31 RX ORDER — POTASSIUM CHLORIDE 1.5 G/1.58G
20 POWDER, FOR SOLUTION ORAL ONCE
Status: COMPLETED | OUTPATIENT
Start: 2022-01-31 | End: 2022-01-31

## 2022-01-31 RX ORDER — HEPARIN SODIUM 10000 [USP'U]/100ML
0-5000 INJECTION, SOLUTION INTRAVENOUS CONTINUOUS
Status: DISCONTINUED | OUTPATIENT
Start: 2022-01-31 | End: 2022-02-10

## 2022-01-31 RX ORDER — FUROSEMIDE 10 MG/ML
40 INJECTION INTRAMUSCULAR; INTRAVENOUS EVERY 8 HOURS
Status: DISCONTINUED | OUTPATIENT
Start: 2022-01-31 | End: 2022-01-31

## 2022-01-31 RX ORDER — IPRATROPIUM BROMIDE AND ALBUTEROL SULFATE 2.5; .5 MG/3ML; MG/3ML
3 SOLUTION RESPIRATORY (INHALATION)
Status: DISCONTINUED | OUTPATIENT
Start: 2022-01-31 | End: 2022-02-07 | Stop reason: CLARIF

## 2022-01-31 RX ORDER — DEXTROSE MONOHYDRATE 100 MG/ML
INJECTION, SOLUTION INTRAVENOUS CONTINUOUS PRN
Status: DISCONTINUED | OUTPATIENT
Start: 2022-01-31 | End: 2022-02-26

## 2022-01-31 RX ORDER — ACETYLCYSTEINE 200 MG/ML
2 SOLUTION ORAL; RESPIRATORY (INHALATION) 4 TIMES DAILY
Status: DISCONTINUED | OUTPATIENT
Start: 2022-02-01 | End: 2022-02-07 | Stop reason: CLARIF

## 2022-01-31 RX ORDER — DEXAMETHASONE SODIUM PHOSPHATE 10 MG/ML
6 INJECTION, SOLUTION INTRAMUSCULAR; INTRAVENOUS DAILY
Status: DISCONTINUED | OUTPATIENT
Start: 2022-01-31 | End: 2022-01-31

## 2022-01-31 RX ADMIN — ACETYLCYSTEINE 2 ML: 200 SOLUTION ORAL; RESPIRATORY (INHALATION) at 19:51

## 2022-01-31 RX ADMIN — Medication 2 PACKET: at 16:46

## 2022-01-31 RX ADMIN — IPRATROPIUM BROMIDE AND ALBUTEROL SULFATE 3 ML: 2.5; .5 SOLUTION RESPIRATORY (INHALATION) at 11:50

## 2022-01-31 RX ADMIN — ACETAMINOPHEN 975 MG: 325 TABLET, FILM COATED ORAL at 00:05

## 2022-01-31 RX ADMIN — FENTANYL CITRATE 125 MCG/HR: 50 INJECTION INTRAVENOUS at 18:20

## 2022-01-31 RX ADMIN — MEROPENEM 1 G: 1 INJECTION, POWDER, FOR SOLUTION INTRAVENOUS at 17:47

## 2022-01-31 RX ADMIN — ACETAMINOPHEN 650 MG: 325 TABLET, FILM COATED ORAL at 16:46

## 2022-01-31 RX ADMIN — FUROSEMIDE 40 MG: 10 INJECTION, SOLUTION INTRAVENOUS at 08:55

## 2022-01-31 RX ADMIN — PROPOFOL 50 MCG/KG/MIN: 10 INJECTION, EMULSION INTRAVENOUS at 18:12

## 2022-01-31 RX ADMIN — FUROSEMIDE 60 MG: 10 INJECTION, SOLUTION INTRAVENOUS at 21:53

## 2022-01-31 RX ADMIN — HUMAN INSULIN 1.5 UNITS/HR: 100 INJECTION, SOLUTION SUBCUTANEOUS at 21:16

## 2022-01-31 RX ADMIN — SENNOSIDES AND DOCUSATE SODIUM 1 TABLET: 50; 8.6 TABLET ORAL at 20:08

## 2022-01-31 RX ADMIN — ACETYLCYSTEINE 2 ML: 200 SOLUTION ORAL; RESPIRATORY (INHALATION) at 11:50

## 2022-01-31 RX ADMIN — IPRATROPIUM BROMIDE AND ALBUTEROL SULFATE 3 ML: 2.5; .5 SOLUTION RESPIRATORY (INHALATION) at 19:51

## 2022-01-31 RX ADMIN — Medication 40 MG: at 08:49

## 2022-01-31 RX ADMIN — PROPOFOL 40 MCG/KG/MIN: 10 INJECTION, EMULSION INTRAVENOUS at 12:21

## 2022-01-31 RX ADMIN — ASPIRIN 81 MG CHEWABLE TABLET 81 MG: 81 TABLET CHEWABLE at 08:49

## 2022-01-31 RX ADMIN — MUPIROCIN 0.5 G: 20 OINTMENT TOPICAL at 08:55

## 2022-01-31 RX ADMIN — PROPOFOL 50 MCG/KG/MIN: 10 INJECTION, EMULSION INTRAVENOUS at 03:42

## 2022-01-31 RX ADMIN — PIPERACILLIN AND TAZOBACTAM 4.5 G: 4; .5 INJECTION, POWDER, FOR SOLUTION INTRAVENOUS at 00:05

## 2022-01-31 RX ADMIN — POTASSIUM CHLORIDE 20 MEQ: 1.5 POWDER, FOR SOLUTION ORAL at 16:16

## 2022-01-31 RX ADMIN — PROPOFOL 50 MCG/KG/MIN: 10 INJECTION, EMULSION INTRAVENOUS at 05:41

## 2022-01-31 RX ADMIN — ALTEPLASE 2 MG: 2.2 INJECTION, POWDER, LYOPHILIZED, FOR SOLUTION INTRAVENOUS at 17:46

## 2022-01-31 RX ADMIN — MUPIROCIN 0.5 G: 20 OINTMENT TOPICAL at 20:05

## 2022-01-31 RX ADMIN — DEXAMETHASONE SODIUM PHOSPHATE 20 MG: 10 INJECTION, SOLUTION INTRAMUSCULAR; INTRAVENOUS at 09:44

## 2022-01-31 RX ADMIN — VANCOMYCIN HYDROCHLORIDE 1500 MG: 10 INJECTION, POWDER, LYOPHILIZED, FOR SOLUTION INTRAVENOUS at 08:55

## 2022-01-31 RX ADMIN — Medication 2 PACKET: at 20:05

## 2022-01-31 RX ADMIN — LIDOCAINE HYDROCHLORIDE 5 ML: 20 SOLUTION ORAL; TOPICAL at 13:41

## 2022-01-31 RX ADMIN — PIPERACILLIN AND TAZOBACTAM 4.5 G: 4; .5 INJECTION, POWDER, FOR SOLUTION INTRAVENOUS at 06:12

## 2022-01-31 RX ADMIN — PROPOFOL 50 MCG/KG/MIN: 10 INJECTION, EMULSION INTRAVENOUS at 14:18

## 2022-01-31 RX ADMIN — Medication 20 NG/KG/MIN: at 07:36

## 2022-01-31 RX ADMIN — HEPARIN SODIUM 1200 UNITS/HR: 1000 INJECTION INTRAVENOUS; SUBCUTANEOUS at 08:50

## 2022-01-31 RX ADMIN — VANCOMYCIN HYDROCHLORIDE 2000 MG: 1 INJECTION, POWDER, LYOPHILIZED, FOR SOLUTION INTRAVENOUS at 16:17

## 2022-01-31 RX ADMIN — HEPARIN SODIUM 1500 UNITS/HR: 1000 INJECTION INTRAVENOUS; SUBCUTANEOUS at 17:51

## 2022-01-31 RX ADMIN — PROPOFOL 50 MCG/KG/MIN: 10 INJECTION, EMULSION INTRAVENOUS at 07:45

## 2022-01-31 RX ADMIN — PROPOFOL 50 MCG/KG/MIN: 10 INJECTION, EMULSION INTRAVENOUS at 01:38

## 2022-01-31 RX ADMIN — Medication 15 ML: at 16:17

## 2022-01-31 RX ADMIN — PROPOFOL 40 MCG/KG/MIN: 10 INJECTION, EMULSION INTRAVENOUS at 20:08

## 2022-01-31 RX ADMIN — PROPOFOL 35 MCG/KG/MIN: 10 INJECTION, EMULSION INTRAVENOUS at 09:47

## 2022-01-31 RX ADMIN — PROPOFOL 50 MCG/KG/MIN: 10 INJECTION, EMULSION INTRAVENOUS at 16:10

## 2022-01-31 RX ADMIN — SENNOSIDES AND DOCUSATE SODIUM 1 TABLET: 50; 8.6 TABLET ORAL at 08:49

## 2022-01-31 RX ADMIN — MEROPENEM 1 G: 1 INJECTION, POWDER, FOR SOLUTION INTRAVENOUS at 09:52

## 2022-01-31 RX ADMIN — PROPOFOL 40 MCG/KG/MIN: 10 INJECTION, EMULSION INTRAVENOUS at 21:54

## 2022-01-31 RX ADMIN — ACETYLCYSTEINE 2 ML: 200 SOLUTION ORAL; RESPIRATORY (INHALATION) at 15:40

## 2022-01-31 RX ADMIN — FUROSEMIDE 40 MG: 10 INJECTION, SOLUTION INTRAVENOUS at 13:54

## 2022-01-31 RX ADMIN — HUMAN ALBUMIN MICROSPHERES AND PERFLUTREN 9 ML: 10; .22 INJECTION, SOLUTION INTRAVENOUS at 12:53

## 2022-01-31 RX ADMIN — POLYETHYLENE GLYCOL 3350 17 G: 17 POWDER, FOR SOLUTION ORAL at 08:49

## 2022-01-31 RX ADMIN — IPRATROPIUM BROMIDE AND ALBUTEROL SULFATE 3 ML: 2.5; .5 SOLUTION RESPIRATORY (INHALATION) at 15:40

## 2022-01-31 RX ADMIN — POTASSIUM CHLORIDE 40 MEQ: 1.5 POWDER, FOR SOLUTION ORAL at 03:21

## 2022-01-31 RX ADMIN — Medication 20 NG/KG/MIN: at 06:52

## 2022-01-31 RX ADMIN — ACETAMINOPHEN 975 MG: 325 TABLET, FILM COATED ORAL at 08:49

## 2022-01-31 RX ADMIN — Medication 20 NG/KG/MIN: at 16:11

## 2022-01-31 ASSESSMENT — ACTIVITIES OF DAILY LIVING (ADL)
ADLS_ACUITY_SCORE: 11
ADLS_ACUITY_SCORE: 13
ADLS_ACUITY_SCORE: 11
ADLS_ACUITY_SCORE: 13
ADLS_ACUITY_SCORE: 11

## 2022-01-31 ASSESSMENT — MIFFLIN-ST. JEOR: SCORE: 2688.63

## 2022-01-31 NOTE — PHARMACY-VANCOMYCIN DOSING SERVICE
Pharmacy Vancomycin Note  Date of Service 2022  Patient's  1990   31 year old, male    Indication: Healthcare-Associated Pneumonia  Day of Therapy: 2  Current vancomycin regimen:  1500 mg IV q12h  Current vancomycin monitoring method: AUC  Current vancomycin therapeutic monitoring goal: 400-600 mg*h/L    InsightRX Prediction of Current Vancomycin Regimen  Loading dose: N/A  Regimen: 1500 mg IV every 12 hours.  Start time: 20:55 on 2022  Exposure target: AUC24 (range)400-600 mg/L.hr   AUC24,ss: 403 mg/L.hr  Probability of AUC24 > 400: 51 %  Ctrough,ss: 5.1 mg/L  Probability of Ctrough,ss > 20: 0 %  Probability of nephrotoxicity (Lodise MAGI ): 3 %      Current estimated CrCl = Estimated Creatinine Clearance: 147.4 mL/min (based on SCr of 1.2 mg/dL).    Creatinine for last 3 days  2022:  2:47 PM Creatinine 1.20 mg/dL  2022:  3:23 AM Creatinine 0.83 mg/dL  2022:  3:12 AM Creatinine 1.15 mg/dL  2022:  3:32 AM Creatinine 1.20 mg/dL    Recent Vancomycin Levels (past 3 days)  2022:  9:39 AM Vancomycin 4.2 mg/L    Vancomycin IV Administrations (past 72 hours)                   vancomycin 1500 mg in 0.9% NaCl 250 ml intermittent infusion 1,500 mg (mg) 1,500 mg New Bag 22 0855     1,500 mg New Bag 22 1931    vancomycin (VANCOCIN) 2,500 mg in sodium chloride 0.9 % 500 mL intermittent infusion (mg) 2,500 mg New Bag 22 0723                Nephrotoxins and other renal medications (From now, onward)    Start     Dose/Rate Route Frequency Ordered Stop    22 1600  vancomycin (VANCOCIN) 2,000 mg in sodium chloride 0.9 % 250 mL intermittent infusion         2,000 mg (central catheter)  over 90 Minutes Intravenous EVERY 12 HOURS 22 1119      22 0900  furosemide (LASIX) injection 40 mg         40 mg  over 1-3 Minutes Intravenous EVERY 12 HOURS 22 0833      22 0630  norepinephrine (LEVOPHED) 16 mg in  mL infusion MAX CONC CENTRAL  LINE         0.01-0.6 mcg/kg/min × 165.9 kg (Dosing Weight)  1.6-93.3 mL/hr  Intravenous CONTINUOUS 01/30/22 0609               Contrast Orders - past 72 hours (72h ago, onward)            Start     Dose/Rate Route Frequency Ordered Stop    01/30/22 0430  iopamidol (ISOVUE-370) solution 77 mL         77 mL Intravenous ONCE 01/30/22 0408 01/30/22 0433          Interpretation of levels and current regimen:  Vancomycin level is reflective of a technically therapeutic AUC, however at the very low end of that range with a low probability of achieving AUC    Has serum creatinine changed greater than 50% in last 72 hours: No    Urine output:  good urine output with diuretics    Renal Function: Stable    InsightRX Prediction of Planned New Vancomycin Regimen  Loading dose: N/A  Regimen: 2000 mg IV every 12 hours.  Start time: 20:55 on 01/31/2022  Exposure target: AUC24 (range)400-600 mg/L.hr   AUC24,ss: 537 mg/L.hr  Probability of AUC24 > 400: 88 %  Ctrough,ss: 6.8 mg/L  Probability of Ctrough,ss > 20: 0 %  Probability of nephrotoxicity (Lodise MAGI 2009): 4 %      Plan:  1. Increase Dose to 2000 mg IV q12 hours. Increasing dose to increase the probability of a therapeutic AUC given persistent fevers  2. Vancomycin monitoring method: AUC  3. Vancomycin therapeutic monitoring goal: 400-600 mg*h/L  4. Pharmacy will check vancomycin levels as appropriate in 1-3 Days.  5. Serum creatinine levels will be ordered daily for the first week of therapy and at least twice weekly for subsequent weeks.    Ace Jorge formerly [facility]

## 2022-01-31 NOTE — PROCEDURES
Small Bowel Feeding Tube Placement Assessment  Reason for Feeding Tube Placement: Team request for post pyloric FT   Cortrak Start Time: 1340   Cortrak End Time: 1400  Medicine Delivered During Procedure: Lidocaine gel   Placement Successful: Presume post-pyloric (pending AXR confirmation).     Procedure Complications: None   Final Placement Neftali at exit of nare: 98 cm  Face to Face time with patient: 20 min       Bridle Placement:   Reason for bridle placement: Securement of FT    Medicine delivered during procedure: lidocaine gel   Procedure: Successful  Location of top of clip on FT: @ 99 cm marker   Condition of nose/skin at time of bridle placement: Unremarkable   Face to Face time with patient: 5 minutes.    Nannette Silva RD, LD  [id number]  Pgr: [phone]

## 2022-01-31 NOTE — PROGRESS NOTES
CLINICAL NUTRITION SERVICES - ASSESSMENT NOTE     Nutrition Prescription  Recommendations:  Total fluids/water flushes per MD    Malnutrition Status:  Patient does not meet two of the established criteria necessary for diagnosing malnutrition    Interventions by Registered Dietitian (RD):  Vital high protein via nasoduodenal tube @ 55 mL/hr for 24 hrs + 2  pkts Prosource QID providing 1320 mL formula, 1640 kcal (15 kcal/kg), 203 g protein (1.9 g/kg), 147 g CHO, 30 g fat, 0 g fiber, and 1104 mL free water. (note, pt receiving addtl kcal from propofol)     - 30 mL water flushes q 4 hrs for patency  - Initiate @ 15 mL/hr for 8 hours. Advance by 15 mL every 8 hours to goal rate if K>3, phos >2, and Mg >1.5  - Certavite multivitamin    Future Recommendations:  If pt's full needs being met via tube feeding (ie when propofol discontinued), recommend Osmolite 1.5 @ 65 mL/hr for 24 hours + 2 pkts Prosource five times daily providing 1560 mL, 2740 kcal (25 kcal/kg), 208 g protein (1.9 g/kg), 318 g CHO, 76 g fat, 0 g fiber, and 1189 mL free water.     REASON FOR ASSESSMENT  Chuy Varner is a/an 31 year old male assessed by the dietitian for Provider Order - Registered Dietitian to Assess and Order TF per Medical Nutrition Therapy Protocol and place a post-pyloric feeding tube    CLINICAL HISTORY  PMH of possible bicuspid aortic valve, thoracic aortic aneurysm without rupture, hypertension, obesity, and testicular cancer s/p orchiectomy who underwent Bentall procedure with mechanical valve on 1/28    NUTRITION HISTORY   Pt not known to clinical nutrition services prior to this admission.    OGT placed 1/28/22. Nasoduodenal tube placed 1/31.    Pt's significant other, Debbie, reported no recent weight loss or reduction in appetite.    CURRENT NUTRITION ORDERS  Diet: NPO  Intake/Tolerance: NA    LABS  Labs reviewed  K 3.9 (WNL)  Phos 3.2 (WNL)  Mg 2.6 (H)  Lactic acid 0.7 (trending down from 1.3 on 1/29)  Triglycerides 220  "(H)  Creatinine 1.2 (WNL)    MEDICATIONS  Medications reviewed  Propofol (if at continuous rate of 35 mL, providing ~900 kcal/day)  Ancef  Lasix  Miralax and senokot  Neutra-phos  Klor-con  Vancomycin  Norepinephrine    ANTHROPOMETRICS  Height: 190.5 cm (6' 3\")  Most Recent Weight: (!) 165.2 kg (364 lb 3.2 oz)  IBW: 89 kg  BMI: Obesity Grade III BMI >40  Weight History:  Wt Readings from Last 10 Encounters:   01/29/22 (!) 165.2 kg (364 lb 3.2 oz)   01/10/22 (!) 166.5 kg (367 lb)   10/27/21 164.7 kg (363 lb)    Dosing Weight: 108 kg (adjusted based on current weight 165.2 kg and IBW)    ASSESSED NUTRITION NEEDS  Estimated Energy Needs: 2150 - 2700 kcals/day (20 - 25+ kcals/kg)  Justification: Maintenance and Post-op  Estimated Protein Needs: 160 - 215 grams protein/day (1.5 - 2 grams of pro/kg)  Justification: Increased needs  Estimated Fluid Needs: 1 mL/kcal  Justification: Maintenance and Per provider pending fluid status    PHYSICAL FINDINGS  See malnutrition section below.    MALNUTRITION  % Intake: No decreased intake noted  % Weight Loss: None noted  Subcutaneous Fat Loss: None observed  Muscle Loss: None observed  Fluid Accumulation/Edema: pulmonary edema   Malnutrition Diagnosis: Patient does not meet two of the established criteria necessary for diagnosing malnutrition    NUTRITION DIAGNOSIS  Inadequate oral intake vs increased needs related to intubation s/p Bentall procedure as evidenced by pt reliant on TF to meet 100% assessed needs    INTERVENTIONS  Implementation  Collaboration with other providers regarding feeding tube placement  Enteral Nutrition - Initiate  Feeding tube flush    Goals  Total avg nutritional intake to meet a minimum of 20 kcal/kg and 1.5 g PRO/kg daily (per dosing wt 108 kg).    Monitoring/Evaluation  Progress toward goals will be monitored and evaluated per protocol.    Briana Palmer  Dietetic Intern    I have read and agree with the above assessment and recommendations.   Nannette " KISHORE Silva, LD  [id number]  Pgr: [phone]

## 2022-01-31 NOTE — PLAN OF CARE
RASS -3, pt sedated on prop @ 50 & fent @ 100. Follows commands when sedation weaned. PERRL. Tmax 102.7, cooling blanket initiated. SR/ST. Pacer wires capped. Levo @ 0.01. K+ 3.4 replaced. See flowsheet for Sima numbers. %/550/16/10. Veletri @ 20. Last PO2 64. Voiding adequately through durant. 3 med CTs to sxn w/ 10mL/hr serosang output. Sternal woundvac in place. Straight rate heparin @ 500mL/hr. Paravertebrals continue @ 14mL/hr. Awaiting culture results. CVTS primary.

## 2022-01-31 NOTE — PROGRESS NOTES
CV ICU PROGRESS NOTE  01/31/2022      CO-MORBIDITIES:   Aortic root aneurysm (H)    ASSESSMENT: Chuy Varner is a 31 year old male with a PMHx s/f bicuspid aortic valve, thoracic aortic aneurysm without rupture, hypertension, obesity, and testicular cancer s/p orchiectomy who underwent Bentall procedure with mechanical valve on 1/28 with Dr. Velasco and Dr. Hannah.    OVERNIGHT EVENTS:  TMax of 102.4 with FiO2 requirements of 90%. CXR showing worsening bilateral pleural effusions and perihilar opacities. Respiratory panel negative. Awaiting blood, respiratory, and urine cultures. Urine is cloudy and pink-tinged with sediment. Vent: CMV/AC 16, 550, 10, 90%. Propofol @ 50, fentanyl @ 100, levo @ 0.01.    TODAY'S PROGRESS:   - Add Nitric  - Wean levo as able  - Follow blood, respiratory, and urine cultures from 1/30  - Repeat cultures today  - Continue Vancomycin, transition from zosyn to meropenem  - Start Dexamethasone 20 mg daily x5 days (followed by 10mg x5 days snd taper)  - IV lasix 40 BID with goal net 500 to 1L negative I/O  - Low intensity heparin gtt today  - NJ placement  - Daily lactate  - Trial APRV ventilation  - Q6h ABG    PLAN:  Neuro/ pain/ sedation:  # Acute Postoperative pain  - Monitor neurological status. Notify the MD for any acute changes in exam.  - Pain: fentanyl gtt. Scheduled tylenol. PRN tylenol, oxycodone, robaxin  - Sedation: propofol gtt     Pulmonary care:   # Postoperative ventilation management  # Acute hypoxemic respiratory failure  # ARDS  Worsening FiO2 requirements , started on Flolan 1/30  - Intubated, ventilated  - Titrate supplemental oxygen to maintain saturation above 90%  - Pulmonary hygiene: Incentive spirometer every 15- 30 minutes when awake, flutter valve, C&DB  - Wean flolan and FiO2 as able  - Lung protective ventilation   - Trialing APRV w/ FiO2 70%, [place] 0.2, pLow 0.2, pHigh 2.8, T Low 0.7  - Dexamethasone 20 mg daily x5 days, 10 mg daily x5, slow taper  following     Cardiovascular:    # Ascending aortic aneurysm s/p Bentall w/ Jerry valve on 1/28 with Dr. Velasco and Dr. Hannah  # Bicuspid aortic valve  # History of thoracic aortic aneurysm without rupture  # History of hypertension  Recent echo on 10/26/21 with LVEF of 60-65%.  CT vascular 12/13/21 showing proximal ascending aorta is severely dilated (55 x 55mm in maximum dimension) and likely bicuspid aortic valve.  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  CT PE 1/30 - negative for acute PE, marked dependent atelectasis R>L.  - Monitor hemodynamic status  - Goal MAP>65, SBP<110  - Start statin  - BB hold  - ASA  - Wean levo gtt as able  - Holding PTA meds: metoprolol succinate 25mg daily  - Labetalol PRN to maintain SBP <110     GI care/ Nutrition:   # Obesity (BMI 46)  - NJ placement, titrate tube feeds to goal following  - PPI  - Continue bowel regimen: miralax, senna    Renal/ Fluid Balance/ Electrolytes:   BL creat appears to be ~ 1.0  - Strict I/O, daily weights  - Goal net -1L today- diurese  - Avoid/limit nephrotoxins as able  - Replete lytes PRN per protocol  - Lasix 40 BID, goal -500 to -1L net     Endocrine:    # Stress induced hyperglycemia, improving  - Insulin gtt  - Goal BG <180 for optimal healing     ID/ Antibiotics:  # Stress induced leukocytosis, resolved  # Possible ventilator associated pneumonia  Febrile to 102 overnight. Covid pcr, respiratory panel, and respiratory cultures negative 1/30.  - Continue to monitor fever curve, WBC and inflammatory markers  - Started on Vancomycin and Zosyn 1/30, transition off zosyn to meropenem w/ concern for further kidney injury and drug fever  - Blood and urine cultures 1/30 pending     Heme/Onc:     # Stress induced leukocytosis, resolved  # Acute blood loss anemia  # Acute blood loss  thrombocytopenia  # History of testicular cancer s/p orchiectomy  No s/sx active bleeding  - Continue to monitor  - CBC daily  - Increase to low intensity heparin gtt today     MSK/ Skin:  # Sternotomy  # Surgical Incision  - Sternal precautions  - Postoperative incision management per protocol  - PT/OT/CR     Prophylaxis:    - Mechanical prophylaxis for DVT  - Chemical DVT prophylaxis- heparin gtt  - PPI     Lines/ tubes/ drains:  - PA catheter (1/28)  - RIJ MAC (1/28)  - Left radial arterial line (1/28)  - Left PIV (1/28)  - Godfrey (1/28)  - 3 CTs (2 anterior mediastinal, 1 left mediastinal) (1/28)  - OG (2 days)  - ETT (1/28)  - Bilateral ES catheters (1/28)     Disposition:  - CVICU     Patient seen, findings and plan discussed with CV ICU staff, Dr. Valles, CVTS fellow Dr. Pichardo and CVTS staff Dr. Velasco.    Michelle Poon, MS4    I was present with the medical student who participated in the service and in the documentation of the note. I have verified the history and personally performed the physical exam and medical decision making. I agree with the assessment and plan of care as documented in the note.    Tom Adair MD  Anesthesiology, PGY3    ====================================    SUBJECTIVE:   Worsening FiO2 requirements and findings on CXR, now on FiO2 of 90%, flolan. Continuing broad spectrum abx. Awaiting blood and urine cultures.    OBJECTIVE:   1. VITAL SIGNS:   Temp:  [99.3  F (37.4  C)-102.7  F (39.3  C)] 102.6  F (39.2  C)  Pulse:  [] 100  Resp:  [16-18] 16  MAP:  [62 mmHg-80 mmHg] 68 mmHg  Arterial Line BP: ()/(47-66) 99/54  FiO2 (%):  [75 %-100 %] 100 %  SpO2:  [89 %-100 %] 95 %  Ventilation Mode: CMV/AC  (Continuous Mandatory Ventilation/ Assist Control)  FiO2 (%): 100 %  Rate Set (breaths/minute): 16 breaths/min  Tidal Volume Set (mL): 550 mL  PEEP (cm H2O): 10 cmH2O  Oxygen Concentration (%): 90 %  Resp: 16      2. INTAKE/ OUTPUT:   I/O last 3 completed shifts:  In: 4208.83  [I.V.:2903.83; NG/GT:1305]  Out: 2220 [Urine:1960; Emesis/NG output:50; Chest Tube:210]    3. PHYSICAL EXAMINATION:   General: Sedated  Neuro: Sedated, opening eyes to voice, tracking, pupils equal and reactive  Resp: Coarse BS bilaterally, decreased BS over RLL  CV: RRR, no r/m/g apprecaited, temporary pacemaker on standby  Abdomen: Soft, Non-distended  Incisions: C/D/I  Extremities: warm and well perfused    4. INVESTIGATIONS:   Arterial Blood Gases   Recent Labs   Lab 01/31/22  0439 01/31/22  0000 01/30/22 2201 01/30/22  1756   PH 7.41 7.40 7.41 7.40   PCO2 38 40 44 37   PO2 64* 72* 60* 80   HCO3 25 25 28 23     Complete Blood Count   Recent Labs   Lab 01/31/22  0332 01/30/22 0312 01/29/22 0323 01/28/22  1447   WBC 10.1 11.5* 11.0 13.7*   HGB 11.5* 13.0* 13.6 13.7   * 165 189 205     Basic Metabolic Panel  Recent Labs   Lab 01/31/22  0338 01/31/22  0332 01/30/22  2359 01/30/22 2202 01/30/22  2005 01/30/22  1759 01/30/22  1730 01/30/22  1455 01/30/22  0315 01/30/22 0312 01/29/22  0331 01/29/22  0323 01/28/22  1451 01/28/22  1447   NA  --  143  --   --   --   --   --   --   --  144  --  143  --  143   POTASSIUM  --  3.9  --  3.4  --   --  3.8 3.4   < > 3.3*   < > 3.9   < > 4.5  4.5   CHLORIDE  --  111*  --   --   --   --   --   --   --  113*  --  114*  --  112*   CO2  --  27  --   --   --   --   --   --   --  26  --  27  --  24   BUN  --  26  --   --   --   --   --   --   --  26  --  21  --  18   CR  --  1.20  --   --   --   --   --   --   --  1.15  --  0.83  --  1.20   * 108* 99  --  106*   < >  --   --    < > 130*   < > 144*   < > 133*    < > = values in this interval not displayed.     Liver Function Tests  Recent Labs   Lab 01/31/22  0332 01/30/22  0312 01/29/22  0323 01/28/22  1447 01/28/22  1246   AST 27 31 45 50*  --    ALT 20 24 26 28  --    ALKPHOS 46 40 42 44  --    BILITOTAL 0.7 0.7 0.5 0.6  --    ALBUMIN 2.4* 2.8* 3.0* 3.1*  --    INR  --   --   --  1.38* 1.53*     Pancreatic  Enzymes  No lab results found in last 7 days.  Coagulation Profile  Recent Labs   Lab 01/28/22  1447 01/28/22  1246   INR 1.38* 1.53*   PTT 31 28         5. RADIOLOGY:   Recent Results (from the past 24 hour(s))   XR Chest Port 1 View    Narrative    EXAM: XR CHEST PORT 1 VIEW  1/29/2022 8:45 AM     HISTORY:  increasing FiO2 requirements       COMPARISON:  Chest x-ray 1/28/2022    FINDINGS: AP radiograph of the chest. Endotracheal tube projecting  over the high thoracic trachea. Right IJ Oak-Nelda catheter with tip  overlying the main pulmonary artery. Partially visualized enteric tube  with tip overlying the stomach. Postoperative chest with intact median  sternotomy wires.    Stable borderline enlargement of the cardiomediastinal silhouette. Low  lung volumes. Small left and possible trace right pleural effusions.  Pulmonary vasculature is indistinct. Perihilar interstitial prominent  opacities. No pneumothorax. Visualized upper abdomen is unremarkable.      Impression    IMPRESSION:  1. Stable endotracheal tube and right IJ Oak-Nelda catheter.  2. Low lung volumes with unchanged perihilar interstitial prominent  opacities, likely pulmonary edema and/or atelectasis.  3. Increased small left and possible trace right pleural effusions.    I have personally reviewed the examination and initial interpretation  and I agree with the findings.    BENTLEY ANTONIO MD         SYSTEM ID:  [id number]   XR Chest Port 1 View    Impression    RESIDENT PRELIMINARY INTERPRETATION  Impression:   1. Worsening of low lung volumes, small left greater than right  pleural effusions, and perihilar opacities compatible with pulmonary  edema and/or atelectasis. Infection cannot be excluded.  2. Endotracheal tube has been advanced with tip projecting over mid  thoracic trachea. Good position.  3. Stable additional support devices and postsurgical changes.   CT Chest Pulmonary Embolism w Contrast    Impression    RESIDENT PRELIMINARY  INTERPRETATION  IMPRESSION:   1. Exam is negative for acute pulmonary embolism.  No evidence of  right heart strain or increased right heart pressures.       2. Marked dependent atelectasis in the right than left lungs. Occult  infection cannot be excluded.    3. Postsurgical changes of median sternotomy and aortic valve repair.  Satisfactory position of support devices as detailed.       In the event of a positive result for acute pulmonary embolism  resulting in right heart strain, consider calling the   [facility] hospital  for PERT (Pulmonary Embolism Response Team)  Activation?    PERT -- Pulmonary Embolism Response Team (Multidisciplinary team  including cardiology, interventional radiology, critical care,  hematology)       =========================================

## 2022-01-31 NOTE — PLAN OF CARE
Major Shift Events: T max 102.7F, cooling blanket on, ice packs and tylenol to help keep fever down. Sedated, on fentanyl and propofol. Sedation increased d/t pt fighting ventilator. SR/ST, 90s-100s. FREDA done in am refer to flowsheets for numbers. Unable to complete afternoon FREDA - no blood return. TPA given and currently indwelling. On levo 0.01-0.03 throughout the day. Vent settings changed. FiO2 decreased to 85% sating >95%. Started on nitric oxide this morning to improve oxygenation. Whenever pt turned to L side O2 sats drop to 84%. Started on steroids. NJ placed. Started TF at 15ml/hr. OG to low intermittent suction. Godfrey in place, good urine output. Lasix givenx2. Heparin running at 1500 units/hr, will need hep10a drawn at 0000.     Plan: Increase tube feeds at 0200 to 25 ml/hr. Continue to monitor respiratory status and intervene as needed. Notify team of changes/concerns.     For vital signs and complete assessments, please see documentation flowsheets.

## 2022-01-31 NOTE — PROGRESS NOTES
"Pain Service Progress Note  [facility]  Date: January 31, 2022      Patient Name: Chuy Varner  MRN: [id number]  Age: 31 year old  Sex: male      Assessment:  31 year old male with PMH of possible bicuspid aortic valve, thoracic aortic aneurysm without rupture, hypertension, obesity, testicular cancer s/p orchiectomy    Procedure: Bentall procedure with mechanical valve    Date of Surgery: 1/28/2022    Date of bilateral erector spinae (ES) T5-6 Placement: 1/28/2022    Plan/Recommendations:  1. Regional Anesthesia/Analgesia  -Continuous Catheter Type/Site: bilateral erector spinae (ES) T5-6    Continue 0.2% ropivacaine     Continuous Infusion at 7 mL/hr via each catheter, total infusion rate of 14 mL/hr    Plan to maintain catheters while chest tubes in place, max of 7 days    2. Anticoagulation  Receiving heparin IV infusion at 1,200 units/hr and \"no plan for warfarin in the near future\" per primary service  -Please contact Inpatient Pain Service (pager [phone]) before ordering or making any medication changes     3. Multimodal Analgesia  - per primary service    Pain Service will continue to follow.    Discussed with attending anesthesiologist      Overnight Events: CMV, Tmax (24hrs) 102.7      Tubes/Drains: Yes  Chest tubes x 3, Godfrey catheter    Subjective: On propofol and fentanyl. Opens eyes to voice. Follows commands. No pain behaviors with repositioning    Symptoms of LAST: No objective symptoms      Diet: NPO for Medical/Clinical Reasons Except for: Other; Specify: NPO until Extubated  Advance Diet as Tolerated: Clear Liquid Diet    Relevant Labs:  Recent Labs   Lab Test 01/31/22  0939 01/31/22  0332 01/29/22  0323 01/28/22  1447   INR  --   --   --  1.38*   * 133*   < > 205   PTT  --   --   --  31   BUN  --  26   < > 18    < > = values in this interval not displayed.     CBC RESULTS: Recent Labs   Lab Test 01/31/22  0939   WBC 10.9   RBC 3.77*   HGB 11.5*   HCT 36.1*   MCV " "96   MCH 30.5   MCHC 31.9   RDW 12.5   *       Physical Exam:  Vitals: /88   Pulse 94   Temp (!) 102.7  F (39.3  C)   Resp 16   Ht 1.905 m (6' 3\")   Wt (!) 165.2 kg (364 lb 3.2 oz)   SpO2 90%   BMI 45.52 kg/m      Physical Exam:   Orientation:  Opens eyes, cooperative with repositioning, NAD    Motor Examination:  5/5 Strength in lower extremities: Yes moves all extremities    Catheter Site:   Catheter entry site is clean/dry/intact: Yes    Tender: No      Relevant Medications:  Current Pain Medications:  Medications related to Pain Management (From now, onward)    Start     Dose/Rate Route Frequency Ordered Stop    01/31/22 0000  acetaminophen (TYLENOL) tablet 650 mg         650 mg Oral EVERY 4 HOURS PRN 01/28/22 1435      01/30/22 1129  fentaNYL (SUBLIMAZE) 50 mcg/mL bolus from infusion pump 25-50 mcg         25-50 mcg Intravenous EVERY 1 HOUR PRN 01/30/22 1129      01/30/22 0530  fentaNYL (SUBLIMAZE) infusion          mcg/hr  0.5-4 mL/hr  Intravenous CONTINUOUS 01/30/22 0513      01/29/22 0800  polyethylene glycol (MIRALAX) Packet 17 g         17 g Oral DAILY 01/28/22 1435      01/29/22 0800  aspirin (ASA) chewable tablet 81 mg         81 mg Oral or NG Tube DAILY 01/28/22 1435      01/28/22 2000  senna-docusate (SENOKOT-S/PERICOLACE) 8.6-50 MG per tablet 1 tablet         1 tablet Oral 2 TIMES DAILY 01/28/22 1435 01/28/22 2000  propofol (DIPRIVAN) infusion         5-75 mcg/kg/min × 165.9 kg (Dosing Weight)  5-74.7 mL/hr  Intravenous CONTINUOUS 01/28/22 1958 01/28/22 1600  ropivacaine 0.2% (NAROPIN) 750 mL in ON-Q C-Bloc select flow ([id number] holds 600-750 mL) dual cath disposable pump         14 mL/hr  Irrigation CONTINUOUS 01/28/22 1533      01/28/22 1435  HYDROmorphone (PF) (DILAUDID) injection 0.2 mg        \"Or\" Linked Group Details    0.2 mg Intravenous EVERY 2 HOURS PRN 01/28/22 1435      01/28/22 1435  HYDROmorphone (PF) (DILAUDID) injection 0.4 mg        \"Or\" Linked Group " "Details    0.4 mg Intravenous EVERY 2 HOURS PRN 01/28/22 1435      01/28/22 1435  oxyCODONE (ROXICODONE) tablet 5 mg        \"Or\" Linked Group Details    5 mg Oral EVERY 4 HOURS PRN 01/28/22 1435      01/28/22 1435  oxyCODONE IR (ROXICODONE) tablet 10 mg        \"Or\" Linked Group Details    10 mg Oral EVERY 4 HOURS PRN 01/28/22 1435      01/28/22 1435  magnesium hydroxide (MILK OF MAGNESIA) suspension 30 mL         30 mL Oral DAILY PRN 01/28/22 1435      01/28/22 1435  bisacodyl (DULCOLAX) Suppository 10 mg         10 mg Rectal DAILY PRN 01/28/22 1435      01/28/22 1435  methocarbamol (ROBAXIN) tablet 750 mg         750 mg Oral EVERY 6 HOURS PRN 01/28/22 1435      01/28/22 1434  lidocaine 1 % 0.1-1 mL         0.1-1 mL Other EVERY 1 HOUR PRN 01/28/22 1435      01/28/22 1434  lidocaine (LMX4) kit          Topical EVERY 1 HOUR PRN 01/28/22 1435            Primary Service Contacted with Recommendations? Yes    Bree Patricio, APRN CNP  1/31/2022    Time/Communication:  I personally spent 15 minutes with greater than 50% in consultation, education, counseliing and coordination of care.  Also discussed with RN, CVICU Service    Contact Info (24 hour job code pager is the last 4 digits) For in-house use only:   Job code ID: [id number]   [facility] [place] 0599  [facility] [place] [address]  [place] phone: dial * * * [phone], enter jobcode ID, then enter call-back number.    Text: Use [employer] on the Intranet <Paging/Directory> tab and enter Jobcode ID.   If no call back at any time, contact the hospital  and ask for Regional Anesthesia attending or backup     "

## 2022-02-01 ENCOUNTER — APPOINTMENT (OUTPATIENT)
Dept: GENERAL RADIOLOGY | Facility: CLINIC | Age: 32
End: 2022-02-01
Attending: SURGERY
Payer: COMMERCIAL

## 2022-02-01 LAB
ALBUMIN SERPL-MCNC: 2.3 G/DL (ref 3.4–5)
ALP SERPL-CCNC: 54 U/L (ref 40–150)
ALT SERPL W P-5'-P-CCNC: 20 U/L (ref 0–70)
ANION GAP SERPL CALCULATED.3IONS-SCNC: 5 MMOL/L (ref 3–14)
ANION GAP SERPL CALCULATED.3IONS-SCNC: 6 MMOL/L (ref 3–14)
ANION GAP SERPL CALCULATED.3IONS-SCNC: 6 MMOL/L (ref 3–14)
AST SERPL W P-5'-P-CCNC: 18 U/L (ref 0–45)
BACTERIA SPT CULT: NORMAL
BASE EXCESS BLDA CALC-SCNC: 3.4 MMOL/L (ref -9–1.8)
BASE EXCESS BLDA CALC-SCNC: 3.6 MMOL/L (ref -9–1.8)
BASE EXCESS BLDA CALC-SCNC: 3.6 MMOL/L (ref -9–1.8)
BASE EXCESS BLDA CALC-SCNC: 4.8 MMOL/L (ref -9–1.8)
BASE EXCESS BLDA CALC-SCNC: 4.8 MMOL/L (ref -9–1.8)
BASE EXCESS BLDA CALC-SCNC: 4.9 MMOL/L (ref -9–1.8)
BASE EXCESS BLDV CALC-SCNC: 4.9 MMOL/L (ref -7.7–1.9)
BASE EXCESS BLDV CALC-SCNC: 5.5 MMOL/L (ref -7.7–1.9)
BILIRUB SERPL-MCNC: 0.3 MG/DL (ref 0.2–1.3)
BUN SERPL-MCNC: 34 MG/DL (ref 7–30)
BUN SERPL-MCNC: 43 MG/DL (ref 7–30)
BUN SERPL-MCNC: 44 MG/DL (ref 7–30)
CALCIUM SERPL-MCNC: 8.4 MG/DL (ref 8.5–10.1)
CALCIUM SERPL-MCNC: 8.4 MG/DL (ref 8.5–10.1)
CALCIUM SERPL-MCNC: 8.8 MG/DL (ref 8.5–10.1)
CHLORIDE BLD-SCNC: 108 MMOL/L (ref 94–109)
CHLORIDE BLD-SCNC: 109 MMOL/L (ref 94–109)
CHLORIDE BLD-SCNC: 110 MMOL/L (ref 94–109)
CO2 SERPL-SCNC: 27 MMOL/L (ref 20–32)
CO2 SERPL-SCNC: 28 MMOL/L (ref 20–32)
CO2 SERPL-SCNC: 28 MMOL/L (ref 20–32)
CREAT SERPL-MCNC: 1.14 MG/DL (ref 0.66–1.25)
CREAT SERPL-MCNC: 1.35 MG/DL (ref 0.66–1.25)
CREAT SERPL-MCNC: 1.37 MG/DL (ref 0.66–1.25)
ERYTHROCYTE [DISTWIDTH] IN BLOOD BY AUTOMATED COUNT: 12.3 % (ref 10–15)
GFR SERPL CREATININE-BSD FRML MDRD: 71 ML/MIN/1.73M2
GFR SERPL CREATININE-BSD FRML MDRD: 72 ML/MIN/1.73M2
GFR SERPL CREATININE-BSD FRML MDRD: 88 ML/MIN/1.73M2
GLUCOSE BLD-MCNC: 156 MG/DL (ref 70–99)
GLUCOSE BLD-MCNC: 159 MG/DL (ref 70–99)
GLUCOSE BLD-MCNC: 166 MG/DL (ref 70–99)
GLUCOSE BLDC GLUCOMTR-MCNC: 124 MG/DL (ref 70–99)
GLUCOSE BLDC GLUCOMTR-MCNC: 126 MG/DL (ref 70–99)
GLUCOSE BLDC GLUCOMTR-MCNC: 132 MG/DL (ref 70–99)
GLUCOSE BLDC GLUCOMTR-MCNC: 142 MG/DL (ref 70–99)
GLUCOSE BLDC GLUCOMTR-MCNC: 143 MG/DL (ref 70–99)
GLUCOSE BLDC GLUCOMTR-MCNC: 144 MG/DL (ref 70–99)
GLUCOSE BLDC GLUCOMTR-MCNC: 144 MG/DL (ref 70–99)
GLUCOSE BLDC GLUCOMTR-MCNC: 146 MG/DL (ref 70–99)
GLUCOSE BLDC GLUCOMTR-MCNC: 148 MG/DL (ref 70–99)
GLUCOSE BLDC GLUCOMTR-MCNC: 150 MG/DL (ref 70–99)
GLUCOSE BLDC GLUCOMTR-MCNC: 151 MG/DL (ref 70–99)
GLUCOSE BLDC GLUCOMTR-MCNC: 152 MG/DL (ref 70–99)
GLUCOSE BLDC GLUCOMTR-MCNC: 152 MG/DL (ref 70–99)
GLUCOSE BLDC GLUCOMTR-MCNC: 154 MG/DL (ref 70–99)
GLUCOSE BLDC GLUCOMTR-MCNC: 155 MG/DL (ref 70–99)
GLUCOSE BLDC GLUCOMTR-MCNC: 156 MG/DL (ref 70–99)
GLUCOSE BLDC GLUCOMTR-MCNC: 156 MG/DL (ref 70–99)
GLUCOSE BLDC GLUCOMTR-MCNC: 157 MG/DL (ref 70–99)
GLUCOSE BLDC GLUCOMTR-MCNC: 162 MG/DL (ref 70–99)
GRAM STAIN RESULT: NORMAL
GRAM STAIN RESULT: NORMAL
HCO3 BLD-SCNC: 29 MMOL/L (ref 21–28)
HCO3 BLD-SCNC: 29 MMOL/L (ref 21–28)
HCO3 BLD-SCNC: 30 MMOL/L (ref 21–28)
HCO3 BLD-SCNC: 31 MMOL/L (ref 21–28)
HCO3 BLDV-SCNC: 32 MMOL/L (ref 21–28)
HCO3 BLDV-SCNC: 32 MMOL/L (ref 21–28)
HCT VFR BLD AUTO: 31.7 % (ref 40–53)
HGB BLD-MCNC: 10.1 G/DL (ref 13.3–17.7)
LACTATE SERPL-SCNC: 1.4 MMOL/L (ref 0.7–2)
MAGNESIUM SERPL-MCNC: 2.8 MG/DL (ref 1.6–2.3)
MAGNESIUM SERPL-MCNC: 3.1 MG/DL (ref 1.6–2.3)
MCH RBC QN AUTO: 30.1 PG (ref 26.5–33)
MCHC RBC AUTO-ENTMCNC: 31.9 G/DL (ref 31.5–36.5)
MCV RBC AUTO: 95 FL (ref 78–100)
O2/TOTAL GAS SETTING VFR VENT: 65 %
O2/TOTAL GAS SETTING VFR VENT: 65 %
O2/TOTAL GAS SETTING VFR VENT: 75 %
O2/TOTAL GAS SETTING VFR VENT: 75 %
O2/TOTAL GAS SETTING VFR VENT: 80 %
O2/TOTAL GAS SETTING VFR VENT: 80 %
O2/TOTAL GAS SETTING VFR VENT: 85 %
O2/TOTAL GAS SETTING VFR VENT: 90 %
OXYHGB MFR BLD: 90 % (ref 92–100)
OXYHGB MFR BLD: 92 % (ref 92–100)
OXYHGB MFR BLD: 92 % (ref 92–100)
OXYHGB MFR BLD: 94 % (ref 92–100)
OXYHGB MFR BLD: 95 % (ref 92–100)
OXYHGB MFR BLD: 97 % (ref 92–100)
OXYHGB MFR BLDV: 58 % (ref 70–75)
OXYHGB MFR BLDV: 66 % (ref 70–75)
PCO2 BLD: 46 MM HG (ref 35–45)
PCO2 BLD: 46 MM HG (ref 35–45)
PCO2 BLD: 49 MM HG (ref 35–45)
PCO2 BLD: 50 MM HG (ref 35–45)
PCO2 BLDV: 56 MM HG (ref 40–50)
PCO2 BLDV: 57 MM HG (ref 40–50)
PH BLD: 7.38 [PH] (ref 7.35–7.45)
PH BLD: 7.39 [PH] (ref 7.35–7.45)
PH BLD: 7.4 [PH] (ref 7.35–7.45)
PH BLD: 7.43 [PH] (ref 7.35–7.45)
PH BLDV: 7.36 [PH] (ref 7.32–7.43)
PH BLDV: 7.36 [PH] (ref 7.32–7.43)
PHOSPHATE SERPL-MCNC: 2.4 MG/DL (ref 2.5–4.5)
PHOSPHATE SERPL-MCNC: 2.7 MG/DL (ref 2.5–4.5)
PLATELET # BLD AUTO: 165 10E3/UL (ref 150–450)
PO2 BLD: 110 MM HG (ref 80–105)
PO2 BLD: 59 MM HG (ref 80–105)
PO2 BLD: 65 MM HG (ref 80–105)
PO2 BLD: 65 MM HG (ref 80–105)
PO2 BLD: 75 MM HG (ref 80–105)
PO2 BLD: 77 MM HG (ref 80–105)
PO2 BLDV: 32 MM HG (ref 25–47)
PO2 BLDV: 36 MM HG (ref 25–47)
POTASSIUM BLD-SCNC: 3.8 MMOL/L (ref 3.4–5.3)
POTASSIUM BLD-SCNC: 3.9 MMOL/L (ref 3.4–5.3)
POTASSIUM BLD-SCNC: 4 MMOL/L (ref 3.4–5.3)
POTASSIUM BLD-SCNC: 4 MMOL/L (ref 3.4–5.3)
PROT SERPL-MCNC: 6.4 G/DL (ref 6.8–8.8)
RBC # BLD AUTO: 3.35 10E6/UL (ref 4.4–5.9)
SODIUM SERPL-SCNC: 142 MMOL/L (ref 133–144)
SODIUM SERPL-SCNC: 142 MMOL/L (ref 133–144)
SODIUM SERPL-SCNC: 143 MMOL/L (ref 133–144)
UFH PPP CHRO-ACNC: 0.12 IU/ML
UFH PPP CHRO-ACNC: 0.13 IU/ML
UFH PPP CHRO-ACNC: <0.1 IU/ML
UFH PPP CHRO-ACNC: <0.1 IU/ML
WBC # BLD AUTO: 12.5 10E3/UL (ref 4–11)

## 2022-02-01 PROCEDURE — 94645 CONT INHLJ TX EACH ADDL HOUR: CPT

## 2022-02-01 PROCEDURE — 250N000013 HC RX MED GY IP 250 OP 250 PS 637: Performed by: ANESTHESIOLOGY

## 2022-02-01 PROCEDURE — 85041 AUTOMATED RBC COUNT: CPT | Performed by: STUDENT IN AN ORGANIZED HEALTH CARE EDUCATION/TRAINING PROGRAM

## 2022-02-01 PROCEDURE — 82310 ASSAY OF CALCIUM: CPT | Performed by: STUDENT IN AN ORGANIZED HEALTH CARE EDUCATION/TRAINING PROGRAM

## 2022-02-01 PROCEDURE — 71045 X-RAY EXAM CHEST 1 VIEW: CPT | Mod: 77

## 2022-02-01 PROCEDURE — 94003 VENT MGMT INPAT SUBQ DAY: CPT

## 2022-02-01 PROCEDURE — 85520 HEPARIN ASSAY: CPT | Performed by: SURGERY

## 2022-02-01 PROCEDURE — 71045 X-RAY EXAM CHEST 1 VIEW: CPT | Mod: 26 | Performed by: RADIOLOGY

## 2022-02-01 PROCEDURE — 250N000011 HC RX IP 250 OP 636: Performed by: SURGERY

## 2022-02-01 PROCEDURE — 82805 BLOOD GASES W/O2 SATURATION: CPT | Performed by: SURGERY

## 2022-02-01 PROCEDURE — 83735 ASSAY OF MAGNESIUM: CPT | Performed by: ANESTHESIOLOGY

## 2022-02-01 PROCEDURE — 250N000011 HC RX IP 250 OP 636: Performed by: STUDENT IN AN ORGANIZED HEALTH CARE EDUCATION/TRAINING PROGRAM

## 2022-02-01 PROCEDURE — 82805 BLOOD GASES W/O2 SATURATION: CPT | Performed by: NURSE PRACTITIONER

## 2022-02-01 PROCEDURE — 82805 BLOOD GASES W/O2 SATURATION: CPT | Performed by: STUDENT IN AN ORGANIZED HEALTH CARE EDUCATION/TRAINING PROGRAM

## 2022-02-01 PROCEDURE — 999N000015 HC STATISTIC ARTERIAL MONITORING DAILY

## 2022-02-01 PROCEDURE — 250N000011 HC RX IP 250 OP 636: Performed by: NURSE PRACTITIONER

## 2022-02-01 PROCEDURE — 250N000013 HC RX MED GY IP 250 OP 250 PS 637: Performed by: SURGERY

## 2022-02-01 PROCEDURE — 258N000003 HC RX IP 258 OP 636: Performed by: NURSE PRACTITIONER

## 2022-02-01 PROCEDURE — 999N000155 HC STATISTIC RAPCV CVP MONITORING

## 2022-02-01 PROCEDURE — 71045 X-RAY EXAM CHEST 1 VIEW: CPT

## 2022-02-01 PROCEDURE — 999N000045 HC STATISTIC DAILY SWAN MONITORING

## 2022-02-01 PROCEDURE — 200N000002 HC R&B ICU UMMC

## 2022-02-01 PROCEDURE — 84132 ASSAY OF SERUM POTASSIUM: CPT | Performed by: SURGERY

## 2022-02-01 PROCEDURE — 250N000013 HC RX MED GY IP 250 OP 250 PS 637: Performed by: STUDENT IN AN ORGANIZED HEALTH CARE EDUCATION/TRAINING PROGRAM

## 2022-02-01 PROCEDURE — 99024 POSTOP FOLLOW-UP VISIT: CPT | Performed by: NURSE PRACTITIONER

## 2022-02-01 PROCEDURE — 250N000009 HC RX 250: Performed by: SURGERY

## 2022-02-01 PROCEDURE — 250N000009 HC RX 250: Performed by: STUDENT IN AN ORGANIZED HEALTH CARE EDUCATION/TRAINING PROGRAM

## 2022-02-01 PROCEDURE — 80053 COMPREHEN METABOLIC PANEL: CPT | Performed by: STUDENT IN AN ORGANIZED HEALTH CARE EDUCATION/TRAINING PROGRAM

## 2022-02-01 PROCEDURE — 84100 ASSAY OF PHOSPHORUS: CPT | Performed by: STUDENT IN AN ORGANIZED HEALTH CARE EDUCATION/TRAINING PROGRAM

## 2022-02-01 PROCEDURE — 83735 ASSAY OF MAGNESIUM: CPT | Performed by: STUDENT IN AN ORGANIZED HEALTH CARE EDUCATION/TRAINING PROGRAM

## 2022-02-01 PROCEDURE — 258N000003 HC RX IP 258 OP 636: Performed by: SURGERY

## 2022-02-01 PROCEDURE — 271N000002 HC RX 271: Performed by: STUDENT IN AN ORGANIZED HEALTH CARE EDUCATION/TRAINING PROGRAM

## 2022-02-01 PROCEDURE — 250N000009 HC RX 250: Performed by: NURSE PRACTITIONER

## 2022-02-01 PROCEDURE — 999N000157 HC STATISTIC RCP TIME EA 10 MIN: Mod: 76

## 2022-02-01 PROCEDURE — 83605 ASSAY OF LACTIC ACID: CPT | Performed by: NURSE PRACTITIONER

## 2022-02-01 PROCEDURE — 250N000009 HC RX 250

## 2022-02-01 PROCEDURE — 94640 AIRWAY INHALATION TREATMENT: CPT | Mod: 76

## 2022-02-01 PROCEDURE — 84132 ASSAY OF SERUM POTASSIUM: CPT | Performed by: STUDENT IN AN ORGANIZED HEALTH CARE EDUCATION/TRAINING PROGRAM

## 2022-02-01 PROCEDURE — 94799 UNLISTED PULMONARY SVC/PX: CPT

## 2022-02-01 PROCEDURE — 99291 CRITICAL CARE FIRST HOUR: CPT | Mod: 24 | Performed by: ANESTHESIOLOGY

## 2022-02-01 RX ORDER — FUROSEMIDE 10 MG/ML
60 INJECTION INTRAMUSCULAR; INTRAVENOUS EVERY 8 HOURS
Status: COMPLETED | OUTPATIENT
Start: 2022-02-01 | End: 2022-02-02

## 2022-02-01 RX ORDER — FUROSEMIDE 40 MG
80 TABLET ORAL EVERY 8 HOURS
Status: DISCONTINUED | OUTPATIENT
Start: 2022-02-01 | End: 2022-02-01

## 2022-02-01 RX ADMIN — POTASSIUM & SODIUM PHOSPHATES POWDER PACK 280-160-250 MG 1 PACKET: 280-160-250 PACK at 19:32

## 2022-02-01 RX ADMIN — POTASSIUM & SODIUM PHOSPHATES POWDER PACK 280-160-250 MG 1 PACKET: 280-160-250 PACK at 14:47

## 2022-02-01 RX ADMIN — IPRATROPIUM BROMIDE AND ALBUTEROL SULFATE 3 ML: 2.5; .5 SOLUTION RESPIRATORY (INHALATION) at 19:49

## 2022-02-01 RX ADMIN — Medication 2 PACKET: at 19:32

## 2022-02-01 RX ADMIN — PROPOFOL 55 MCG/KG/MIN: 10 INJECTION, EMULSION INTRAVENOUS at 12:54

## 2022-02-01 RX ADMIN — MEROPENEM 1 G: 1 INJECTION, POWDER, FOR SOLUTION INTRAVENOUS at 15:40

## 2022-02-01 RX ADMIN — HEPARIN SODIUM 1950 UNITS/HR: 1000 INJECTION INTRAVENOUS; SUBCUTANEOUS at 15:40

## 2022-02-01 RX ADMIN — PROPOFOL 40 MCG/KG/MIN: 10 INJECTION, EMULSION INTRAVENOUS at 06:09

## 2022-02-01 RX ADMIN — PROPOFOL 45 MCG/KG/MIN: 10 INJECTION, EMULSION INTRAVENOUS at 23:47

## 2022-02-01 RX ADMIN — PROPOFOL 40 MCG/KG/MIN: 10 INJECTION, EMULSION INTRAVENOUS at 08:24

## 2022-02-01 RX ADMIN — ACETAMINOPHEN 650 MG: 325 TABLET, FILM COATED ORAL at 23:18

## 2022-02-01 RX ADMIN — Medication 20 NG/KG/MIN: at 18:52

## 2022-02-01 RX ADMIN — HEPARIN SODIUM 2400 UNITS/HR: 1000 INJECTION INTRAVENOUS; SUBCUTANEOUS at 23:17

## 2022-02-01 RX ADMIN — PROPOFOL 55 MCG/KG/MIN: 10 INJECTION, EMULSION INTRAVENOUS at 18:35

## 2022-02-01 RX ADMIN — IPRATROPIUM BROMIDE AND ALBUTEROL SULFATE 3 ML: 2.5; .5 SOLUTION RESPIRATORY (INHALATION) at 16:04

## 2022-02-01 RX ADMIN — Medication 2 PACKET: at 07:38

## 2022-02-01 RX ADMIN — MEROPENEM 1 G: 1 INJECTION, POWDER, FOR SOLUTION INTRAVENOUS at 23:17

## 2022-02-01 RX ADMIN — FUROSEMIDE 80 MG: 40 TABLET ORAL at 08:37

## 2022-02-01 RX ADMIN — PROPOFOL 40 MCG/KG/MIN: 10 INJECTION, EMULSION INTRAVENOUS at 03:05

## 2022-02-01 RX ADMIN — Medication 20 NG/KG/MIN: at 01:15

## 2022-02-01 RX ADMIN — Medication 2 PACKET: at 12:48

## 2022-02-01 RX ADMIN — HEPARIN SODIUM 1800 UNITS/HR: 1000 INJECTION INTRAVENOUS; SUBCUTANEOUS at 04:19

## 2022-02-01 RX ADMIN — ACETAMINOPHEN 650 MG: 325 TABLET, FILM COATED ORAL at 09:18

## 2022-02-01 RX ADMIN — PROPOFOL 55 MCG/KG/MIN: 10 INJECTION, EMULSION INTRAVENOUS at 16:41

## 2022-02-01 RX ADMIN — ASPIRIN 81 MG CHEWABLE TABLET 81 MG: 81 TABLET CHEWABLE at 07:29

## 2022-02-01 RX ADMIN — PROPOFOL 55 MCG/KG/MIN: 10 INJECTION, EMULSION INTRAVENOUS at 14:46

## 2022-02-01 RX ADMIN — ACETYLCYSTEINE 2 ML: 200 SOLUTION ORAL; RESPIRATORY (INHALATION) at 12:36

## 2022-02-01 RX ADMIN — ACETAMINOPHEN 650 MG: 325 TABLET, FILM COATED ORAL at 14:47

## 2022-02-01 RX ADMIN — HUMAN INSULIN 6 UNITS/HR: 100 INJECTION, SOLUTION SUBCUTANEOUS at 20:15

## 2022-02-01 RX ADMIN — FENTANYL CITRATE 125 MCG/HR: 50 INJECTION INTRAVENOUS at 15:40

## 2022-02-01 RX ADMIN — ACETYLCYSTEINE 2 ML: 200 SOLUTION ORAL; RESPIRATORY (INHALATION) at 16:04

## 2022-02-01 RX ADMIN — SENNOSIDES AND DOCUSATE SODIUM 1 TABLET: 50; 8.6 TABLET ORAL at 07:29

## 2022-02-01 RX ADMIN — FUROSEMIDE 60 MG: 10 INJECTION, SOLUTION INTRAVENOUS at 06:11

## 2022-02-01 RX ADMIN — Medication 40 MG: at 07:29

## 2022-02-01 RX ADMIN — POLYETHYLENE GLYCOL 3350 17 G: 17 POWDER, FOR SOLUTION ORAL at 07:29

## 2022-02-01 RX ADMIN — Medication 20 NG/KG/MIN: at 10:29

## 2022-02-01 RX ADMIN — HUMAN INSULIN 3 UNITS/HR: 100 INJECTION, SOLUTION SUBCUTANEOUS at 09:21

## 2022-02-01 RX ADMIN — ACETYLCYSTEINE 2 ML: 200 SOLUTION ORAL; RESPIRATORY (INHALATION) at 19:49

## 2022-02-01 RX ADMIN — ACETAMINOPHEN 650 MG: 325 TABLET, FILM COATED ORAL at 18:09

## 2022-02-01 RX ADMIN — Medication 15 ML: at 07:29

## 2022-02-01 RX ADMIN — IPRATROPIUM BROMIDE AND ALBUTEROL SULFATE 3 ML: 2.5; .5 SOLUTION RESPIRATORY (INHALATION) at 08:05

## 2022-02-01 RX ADMIN — Medication: at 15:51

## 2022-02-01 RX ADMIN — PROPOFOL 55 MCG/KG/MIN: 10 INJECTION, EMULSION INTRAVENOUS at 22:08

## 2022-02-01 RX ADMIN — ACETYLCYSTEINE 2 ML: 200 SOLUTION ORAL; RESPIRATORY (INHALATION) at 08:05

## 2022-02-01 RX ADMIN — Medication 2 PACKET: at 15:40

## 2022-02-01 RX ADMIN — MEROPENEM 1 G: 1 INJECTION, POWDER, FOR SOLUTION INTRAVENOUS at 01:12

## 2022-02-01 RX ADMIN — VANCOMYCIN HYDROCHLORIDE 2000 MG: 1 INJECTION, POWDER, LYOPHILIZED, FOR SOLUTION INTRAVENOUS at 04:10

## 2022-02-01 RX ADMIN — DEXAMETHASONE SODIUM PHOSPHATE 20 MG: 10 INJECTION, SOLUTION INTRAMUSCULAR; INTRAVENOUS at 09:54

## 2022-02-01 RX ADMIN — MEROPENEM 1 G: 1 INJECTION, POWDER, FOR SOLUTION INTRAVENOUS at 07:35

## 2022-02-01 RX ADMIN — SENNOSIDES AND DOCUSATE SODIUM 1 TABLET: 50; 8.6 TABLET ORAL at 19:32

## 2022-02-01 RX ADMIN — FUROSEMIDE 60 MG: 10 INJECTION, SOLUTION INTRAVENOUS at 17:56

## 2022-02-01 RX ADMIN — PROPOFOL 55 MCG/KG/MIN: 10 INJECTION, EMULSION INTRAVENOUS at 20:15

## 2022-02-01 RX ADMIN — IPRATROPIUM BROMIDE AND ALBUTEROL SULFATE 3 ML: 2.5; .5 SOLUTION RESPIRATORY (INHALATION) at 12:36

## 2022-02-01 ASSESSMENT — ACTIVITIES OF DAILY LIVING (ADL)
ADLS_ACUITY_SCORE: 13

## 2022-02-01 ASSESSMENT — MIFFLIN-ST. JEOR: SCORE: 2695.63

## 2022-02-01 NOTE — PLAN OF CARE
Cared for pt from [id number]    Major Shift Events: T max 100.8F - tylenol given, ice packs and cooling blanket used. Pt remains sedated, on fentanyl and propofol. Sedation increased to help w/ oxygenation. At 1000, pt was turned to L side by previous RN and FiO2 was increased to 75% to keep O2 sats >90%. Pt was sating 90-92% until around 1150 when he started de sating to 88-89%. Pt was laid flat to turn to right side, however, he desated further to 83%. FiO2 increased to 100% w/o improvement. Team called - Nitric oxide increased back to 20, PEEP increased to 16, and chest xray done. Pt has been off levo since 1000. Unable to obtain FREDA numbers as pt does not tolerate laying flat. TF increased to 40ml, next increase tonight at 2000 to 55ml/hr. On insulin gtt on algorithm 4, bg 140s-160s. Godfrey in place good urine output. Heparin gtt increased to 2250 units/hr, per protocol. Needs hep10 drawn.    Plan: Recheck hep10a at 2145. Increase TF at 2000 to 55ml/hr. Continue to monitor respiratory status and intervene as needed. Notify team of any changes/concerns.     For vital signs and complete assessments, please see documentation flowsheets.

## 2022-02-01 NOTE — PROGRESS NOTES
Antimicrobial Stewardship Team Note    Antimicrobial Stewardship Program - A joint venture between [facility] and  Physicians to optimize antibiotic management.  NOT a formal consult - Restricted Antimicrobial Review     Patient: Chuy Varner  MRN: [id number]  Allergies: Patient has no known allergies.    Brief Summary: Chuy Varner is a 31 year old male with history of possible bicuspid aortic valve, thoracic aortic aneurysm without rupture, HTN, obesity, and testicular cancer s/p orchiectomy. 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 Patient was admitted to CVICU for post-operative management.     Post-op course complicated by acute hypoxic respiratory failure with CXR showing pulmonary edema. Patient developed fever (tmax 102.4F) with worsening respiratory failure on 1/30. Cultures were sent and empiric Zosyn and vancomycin were initiated for possible ventilator associated pneumonia. CT chest negative for PE, with lower lung volumes and moderate bibasilar atelectasis/consolidation. Occult infection cannot be excluded. Blood cultures NGTD x2 days, ET sputum culture with normal michele, nasal MRSA PCR, COVID-19 PCR, and respiratory panel negative. UA negative. Zosyn was escalated to meropenem on 1/31 due to ongoing fever and concern for kidney injury with vancomycin. Repeat blood and sputum cultures pending. UA negative.     ARDS being managed with lung protective ventilation, epoprostenol, dexamethasone slow taper, and diuresis. 2/1 CXR shows mildly improved perihilar and basilar prominent interstitial and airspace opacities with stable small bilateral pleural effusions and left basilar opacity, likely a combination of atelectasis and pleural fluid. Vancomycin stopped today.         Active Anti-infective Medications   (From admission, onward)                 Start     Stop    01/31/22 0900  meropenem  1 g,   Intravenous,   EVERY 8 HOURS         Ventilator-Associated Pneumonia        --                  Assessment: Possible ventilator associated pneumonia  Patient remains in critical condition on mechanical ventilation as the result of acute hypoxic respiratory failure: source likely ARDS vs. VAP vs. hypervolemia vs. TRALI. Empiric broad spectrum antibiotics were initiated for possible VAP given fever and worsening respiratory failure. Blood and endotracheal sputum culture are negative for bacteria with repeat cultures pending. Vancomycin was stopped today given negative nares MRSA PCR test and normal michele in sputum culture. Reasonable to continue empiric GNR coverage for possible VAP given improving fever curve and pressor support. However, patient does not have an indication for carbapenem therapy (i.e. current or prior history of MDR Pseudomonas or ESBL-producing GNRs). We recommend deescalating meropenem back to Zosyn to complete a duration of 7 days for possible VAP. This would not be considered Zosyn failure given < 48-hour exposure. Patient is now off vancomycin reducing risk for DIA with combination therapy with Zosyn.     Recommendations:  Deescalate meropenem to Zosyn to complete 7 days (~end date 2/6) for possible VAP     Pharmacy took the following actions: Called/paged provider,Electronic note created.    Discussed with ID Staff: DO yL Dumont, PharmD, [facility]  Pager: [phone]    Vital Signs/Clinical Features:  Vitals  Report        01/30 0700  01/31 0659 01/31 0700  02/01 0659 02/01 0700  02/01 1200   Most Recent      Temp ( F) 99.3 -  102.7    97 -  102.7    99.1 -  100.4     100.2 (37.9) 02/01 1145    Pulse 78 -  103    79 -  111    79 -  96     81 02/01 1145    Resp 16 -  19    11 -  20    19 -  22     19 02/01 1200    SpO2 (%) 89 -  100    88 -  100    90 -  97     90 02/01 1145            Labs  Estimated Creatinine Clearance: 155.2 mL/min (based on SCr of 1.14 mg/dL).  Recent Labs   Lab Test 01/28/22  1447  01/29/22  0323 01/30/22  0312 01/31/22  0332 01/31/22  1933 02/01/22  0326   CR 1.20 0.83 1.15 1.20 1.26* 1.14       Recent Labs   Lab Test 01/28/22  1447 01/29/22  0323 01/30/22  0312 01/31/22  0332 01/31/22  0939 02/01/22  0326   WBC 13.7* 11.0 11.5* 10.1 10.9 12.5*   HGB 13.7 13.6 13.0* 11.5* 11.5* 10.1*   HCT 40.0 40.7 39.4* 35.9* 36.1* 31.7*   MCV 90 90 91 95 96 95    189 165 133* 139* 165       Recent Labs   Lab Test 01/10/22  1216 01/28/22  1447 01/29/22  0323 01/30/22  0312 01/31/22  0332 02/01/22  0326   BILITOTAL 0.5 0.6 0.5 0.7 0.7 0.3   ALKPHOS 64 44 42 40 46 54   ALBUMIN 4.5 3.1* 3.0* 2.8* 2.4* 2.3*   AST 33 50* 45 31 27 18   ALT 42 28 26 24 20 20       Recent Labs   Lab Test 01/29/22  0323 01/30/22  0313 01/30/22  0547 01/30/22  1545 01/31/22  0332 02/01/22  0326   LACT 1.3 1.0 1.1 0.8 0.7 1.4       Recent Labs   Lab Test 01/31/22  0939   VANCOMYCIN 4.2       Culture Results:  7-Day Micro Results       Procedure Component Value Units Date/Time    Respiratory Aerobic Bacterial Culture with Gram Stain [[id number]] Collected: 01/31/22 1147    Order Status: Completed Lab Status: Preliminary result Updated: 02/01/22 1009    Specimen: Aspirate from Endotracheal      Culture 50,000-100,000 CFU/mL Normal michele    Blood Culture Hand, Right [[id number]]  (Normal) Collected: 01/31/22 1102    Order Status: Completed Lab Status: Preliminary result Updated: 02/01/22 0105    Specimen: Blood from Hand, Right      Culture No growth after 12 hours    Blood Culture Line, venous     Order Status: Sent Lab Status: No result     Specimen: Blood from Line, venous     Blood Culture Line, venous     Order Status: Canceled Lab Status: No result     Specimen: Blood from Line, venous     Blood Culture Peripheral Blood     Order Status: Canceled Lab Status: No result     Specimen: Peripheral Blood     Blood Culture Peripheral Blood     Order Status: Canceled Lab Status: No result     Specimen: Peripheral Blood     Blood  Culture Peripheral Blood     Order Status: Canceled Lab Status: No result     Specimen: Peripheral Blood     Respiratory Panel PCR - NP Swab [[id number]]  (Normal) Collected: 01/30/22 0951    Order Status: Completed Lab Status: Final result Updated: 01/30/22 1449    Specimen: Swab from Nasopharyngeal      Adenovirus Not Detected     Coronavirus Not Detected     Comment: This test detects Coronavirus 229E, HKU1, NL63 and OC43 but does not distinguish between them. It does not detect MERS ( Respiratory Syndrome), SARS (Severe Acute Respiratory Syndrome) or 2019-nCoV (Novel 2019) Coronavirus.        Human Metapneumovirus Not Detected     Human Rhin/Enterovirus Not Detected     Influenza A Not Detected     Influenza A, H1 Not Detected     Influenza A 2009 H1N1 Not Detected     Influenza A, H3 Not Detected     Influenza B Not Detected     Parainfluenza Virus 1 Not Detected     Parainfluenza Virus 2 Not Detected     Parainfluenza Virus 3 Not Detected     Parainfluenza Virus 4 Not Detected     Respiratory Syncytial Virus A Not Detected     Respiratory Syncytial Virus B Not Detected     Chlamydia Pneumoniae Not Detected     Mycoplasma Pneumoniae Not Detected    Narrative:      The [employer] Respiratory Viral Panel is a qualitative nucleic acid, multiplex, in vitro diagnostic test for the simultaneous detection and identification of multiple respiratory viral and bacterial nucleic acids in nasopharyngeal swabs collected in viral transport media from individual exhibiting signs and symptoms of respiratory infection. The assay has received FDA approval for the testing of nasopharyngeal (NP) swabs only. This test has been verified and is performed by the [facility] at [facility]. This test is used for clinical purposes and should not be regarded as investigational or for research. This laboratory is certified under the Clinical Laboratory Improvement Amendments of 1988 (CLIA-88)  as qualified to perform high complexity clinical laboratory testing.    MRSA/MSSA PCR [[id number]] Collected: 01/30/22 0543    Order Status: Completed Lab Status: Final result Updated: 01/30/22 0725    Specimen: Swab from Nares, Bilateral      MRSA Target DNA Negative     SA Target DNA Positive    Narrative:      The [facility]  Xpert SA Nasal Complete assay performed in the [employer]  Dx System is a qualitative in vitro diagnostic test designed for rapid detection of Staphylococcus aureus (SA) and methicillin-resistant Staphylococcus aureus (MRSA) from nasal swabs in patients at risk for nasal colonization. The test utilizes automated real-time polymerase chain reaction (PCR) to detect MRSA/SA DNA. The Xpert SA Nasal Complete assay is intended to aid in the prevention and control of MRSA/SA infections in healthcare settings. The assay is not intended to diagnose, guide or monitor treatment for MRSA/SA infections, or provide results of susceptibility to methicillin. A negative result does not preclude MRSA/SA nasal colonization.     Blood Culture Peripheral Blood [[id number]]  (Normal) Collected: 01/30/22 0534    Order Status: Completed Lab Status: Preliminary result Updated: 02/01/22 0702    Specimen: Peripheral Blood      Culture No growth after 2 days    Blood Culture Peripheral Blood [[id number]]  (Normal) Collected: 01/30/22 0534    Order Status: Completed Lab Status: Preliminary result Updated: 02/01/22 0702    Specimen: Peripheral Blood      Culture No growth after 2 days    Respiratory Aerobic Bacterial Culture with Gram Stain [[id number]] Collected: 01/30/22 0528    Order Status: Completed Lab Status: Final result Updated: 02/01/22 0929    Specimen: Sputum from Endotracheal      Culture 1+ Normal michele     Gram Stain Result >25 PMNs/low power field      3+ Mixed michele            Recent Labs   Lab Test 01/10/22  1154 01/30/22  0519 01/31/22  1038   URINEPH 6.0 6.0 5.0   NITRITE Negative Negative  Negative   LEUKEST Negative Negative Negative   WBCU 1 0 1             Recent Labs   Lab Test 22  0951   IFLUA Not Detected   FLUAH1 Not Detected   FLUAH3 Not Detected   GS9937 Not Detected   IFLUB Not Detected   RSVA Not Detected   RSVB Not Detected   PIV1 Not Detected   PIV2 Not Detected   PIV3 Not Detected   HMPV Not Detected             Imaging: Echo Complete    Result Date: 2022  [id number]^ABIRL^ROLA^TOBIN  [facility],[place] [facility] [address] [place] [id number]  Name: TU EDAN MRN: [id number] : 1990 Study Date: 2022 12:24 PM Age: 31 yrs Gender: Male Patient Location: [facility] Reason For Study: Shock Ordering Physician: ROLA SAMUELS Performed By: Berto Gómez  BSA: 2.8 m2 Height: 75 in Weight: 364 lb HR: 107 BP: 110/63 mmHg ______________________________________________________________________________ Procedure Complete Portable Echo Adult. Technically difficult study.Extremely poor acoustic windows. Optison (NDC #5352-6795-07) given intravenously. Patient was given 9 ml mixture of 3 ml Optison and 6 ml saline. 0 ml wasted. ______________________________________________________________________________ Interpretation Summary Technically difficult study.Extremely poor acoustic windows. S/p Bentall procedure using On-X Ascending Aortic Prosthesis with Vascutek Gelweave Valsa Graft size 27-29MM on 2022. Global and regional left ventricular function is hyperkinetic with an EF of 65-70%. Right ventricular function, chamber size, wall motion, and thickness are normal. The mean gradient across the aortic valve is11 mmHg. Dilation of the inferior vena cava is present with abnormal respiratory variation in diameter. No pericardial fluid.Organizing material noted along RV free wall. ______________________________________________________________________________ Left Ventricle S/p Bentall procedure  using On-X Ascending Aortic Prosthesis with Vascutek Gelweave Valsa Graft size 27-29MM on . Global and regional left ventricular function is hyperkinetic with an EF of 65-70%. Left ventricular size is normal. Mild concentric wall thickening consistent with left ventricular hypertrophy is present. Diastolic function not assessed due to tachycardia. No regional wall motion abnormalities are seen.  Right Ventricle Right ventricular function, chamber size, wall motion, and thickness are normal.  Atria The atria cannot be assessed.  Mitral Valve The mitral valve is normal.  Aortic Valve The valve leaflets are not well visualized. The mean gradient across the aortic valve is11 mmHg.  Tricuspid Valve The tricuspid valve is normal.  Vessels Dilation of the inferior vena cava is present with abnormal respiratory variation in diameter.  Pericardium No pericardial fluid.Organizing material noted along RV free wall.  ______________________________________________________________________________ MMode/2D Measurements & Calculations IVSd: 1.2 cm LVIDd: 4.2 cm LVIDs: 2.5 cm LVPWd: 1.5 cm FS: 40.0 % LV mass(C)d: 206.2 grams LV mass(C)dI: 72.9 grams/m2 LVOT diam: 2.5 cm LVOT area: 4.7 cm2 RWT: 0.73  Doppler Measurements & Calculations Ao V2 max: 220.0 cm/sec Ao max P.0 mmHg Ao V2 mean: 151.0 cm/sec Ao mean P.0 mmHg Ao V2 VTI: 28.8 cm HOLLIE(I,D): 2.6 cm2 HOLLIE(V,D): 2.5 cm2 LV V1 max P.6 mmHg LV V1 max: 118.0 cm/sec LV V1 VTI: 15.9 cm SV(LVOT): 75.0 ml SI(LVOT): 26.5 ml/m2 AV Aguila Ratio (DI): 0.54 HOLILE Index (cm2/m2): 0.92  ______________________________________________________________________________ Report approved by: Beverly Roach 2022 02:06 PM       XR Chest Port 1 View    Result Date: 2022  EXAM: XR CHEST PORT 1 VIEW  2022 1:05 AM HISTORY:  S/P AVR, CT in place   COMPARISON:  Chest radiograph 2022 FINDINGS: Single view of the chest. Postsurgical changes of the chest with intact median  sternotomy wires. Aortic valve replacement. Endotracheal tube tip projects over the midthoracic trachea. Gastric tube tip projects over the stomach. Second enteric tube courses below the level of the diaphragm and projects below the field-of-view. Right IJ [place]-Nelda catheter tip projects over the proximal right pulmonary artery. Mediastinal drains and left basilar chest tube in place. Stable partially obscured cardiac silhouette due to pleural pulmonary pathology. Small bilateral pleural effusions. No pneumothorax. Mildly improved perihilar and bibasilar predominant mixed interstitial and airspace opacities. Left basilar opacity. Osseous structures are unchanged.     IMPRESSION: 1. Mildly improved perihilar and basilar prominent interstitial and airspace opacities. 2. Stable small bilateral pleural effusions. 3. Stable support devices. 4. Stable left basilar opacity, likely a combination of atelectasis and pleural fluid. I have personally reviewed the examination and initial interpretation and I agree with the findings. KIMMIE CANTRELL MD   SYSTEM ID:  [id number]    XR Chest Port 1 View    Result Date: 1/31/2022  Chest one view portable HISTORY: Evaluate worsening hypoxemia COMPARISON STUDY: Same day at 01 40 FINDINGS: Cardiac silhouette is borderline enlarged. Aortic valve replacement. 3 mediastinal drains . Right IJ [place]-Nelda catheter tip in the right pulmonary artery. Endotracheal tube tip in the mid trachea. Median sternotomy wires. Interstitial opacities bilaterally. Bibasilar atelectasis and consolidation. Enteric tube tip in stomach.     IMPRESSION: Increased pulmonary edema and bibasilar atelectasis and consolidation. TAE BRISCOE MD   SYSTEM ID:  [id number]    XR Chest Port 1 View    Result Date: 1/31/2022  EXAM: XR CHEST PORT 1 VIEW  1/31/2022 1:50 AM HISTORY:  S/P AVR, CT in place   COMPARISON:  Chest radiograph 1/30/2022 FINDINGS: Single view of the chest. Postsurgical change of the chest with intact  median sternotomy wires. Prosthetic aortic valve. Endotracheal tube tip projects over the midthoracic trachea. Enteric tube sidehole and tip project over the stomach. Right IJ [place]-Nelda catheter tip projects over the mid pulmonary artery. Stable mediastinal drains and left basilar chest tube. Trachea is midline. Stable partially obscured cardiomediastinal silhouette. Small bilateral pleural effusions, similar to prior. No pneumothorax. Low lung volumes. Bilateral perihilar and bibasilar predominant mixed interstitial and airspace opacities. The visualized upper abdomen is unremarkable.     IMPRESSION: 1. Stable perihilar and bibasilar predominant mixed interstitial and airspace opacities likely representing edema and/or atelectasis. 2. Stable small bilateral pleural effusions. 3. Stable support devices. I have personally reviewed the examination and initial interpretation and I agree with the findings. АННА SMITH MD   SYSTEM ID:  [id number]    XR Chest Port 1 View    Result Date: 1/30/2022  Exam: XR CHEST PORT 1 VIEW, 1/30/2022 3:19 AM Indication: S/P AVR, CT in place Comparison: 1/29/2022 Findings: Portable semiupright AP view of the chest. Endotracheal tube projecting over the midthoracic trachea, slightly advanced. Right IJ [place]-Nelda catheter tip projects over the main pulmonary artery. Partially visualized enteric tube, tip and sidehole projecting over left upper quadrant. Stable post surgical changes with intact median sternotomy. Mediastinal drains. Cardiac silhouette is prominent midline trachea. Increased small left greater than right pleural effusions with adjacent streaky opacities. Increased perihilar and left retrocardiac mixed interstitial and patchy airspace opacities. No appreciable pneumothorax. No acute findings in the upper abdomen. Low lung volumes.     Impression: 1. Lower lung volumes, small left greater than right pleural effusions, and perihilar opacities compatible with pulmonary edema  and/or atelectasis. 2. Endotracheal tube has been advanced with tip projecting over mid thoracic trachea, about 3.7 cm above the deysi. 3. Stable additional support devices and postsurgical changes. I have personally reviewed the examination and initial interpretation and I agree with the findings. MICAH VIERA MD   SYSTEM ID:  [id number]    XR Chest Port 1 View    Result Date: 1/29/2022  EXAM: XR CHEST PORT 1 VIEW  1/29/2022 8:45 AM HISTORY:  increasing FiO2 requirements   COMPARISON:  Chest x-ray 1/28/2022 FINDINGS: AP radiograph of the chest. Endotracheal tube projecting over the high thoracic trachea. Right IJ [place]-Nelda catheter with tip overlying the main pulmonary artery. Partially visualized enteric tube with tip overlying the stomach. Postoperative chest with intact median sternotomy wires. Stable borderline enlargement of the cardiomediastinal silhouette. Low lung volumes. Small left and possible trace right pleural effusions. Pulmonary vasculature is indistinct. Perihilar interstitial prominent opacities. No pneumothorax. Visualized upper abdomen is unremarkable.     IMPRESSION: 1. Stable endotracheal tube and right IJ [place]-Nelda catheter. 2. Low lung volumes with unchanged perihilar interstitial prominent opacities, likely pulmonary edema and/or atelectasis. 3. Increased small left and possible trace right pleural effusions. I have personally reviewed the examination and initial interpretation and I agree with the findings. BENTLEY ANTONIO MD   SYSTEM ID:  [id number]    XR Chest Port 1 View    Result Date: 1/29/2022  Exam: XR CHEST PORT 1 VIEW, 1/29/2022 12:20 AM Indication: Post Op CVTS Surgery Comparison: 1/28/2022 Findings: Portable semiupright AP view of the chest. Stable postsurgical changes with intact median sternotomy. Endotracheal tube extends over the midthoracic trachea, unchanged. Right IJ [place]-Nelda catheter tip projects over the main pulmonary artery. Prosthetic aortic valve. Mediastinal  drain. Enteric tube courses below left hemidiaphragm with tip collimated out of field of view. Midline trachea. Stable mildly enlarged cardiac silhouette with small amount of pneumopericardium. Low lung volumes with mildly decreased bibasilar atelectasis. No appreciable pneumothorax. Mixed perihilar opacities are stable. No acute findings in the upper abdomen.     Impression: 1. No significant change in postoperative pneumopericardium. No pneumothorax. 2. Perihilar pulmonary edema and/or atelectasis. Infection cannot be excluded. 3. Bibasilar atelectasis. 4. Stable satisfactory position of support devices. I have personally reviewed the examination and initial interpretation and I agree with the findings. BENTLEY ANTONIO MD   SYSTEM ID:  [id number]    XR Chest Port 1 View    Result Date: 1/28/2022  Exam: XR CHEST PORT 1 VIEW, 1/28/2022 3:53 PM Indication: Post Op CVTS Surgery Comparison: 1/10/2022 Findings: Median sternotomy. Endotracheal tube in the upper thoracic trachea. [place]-Nelda catheter tip in the central right pulmonary artery. Prosthetic aortic valve is in place. Mediastinal chest tube is in place. Left basilar chest tube in place. Small amount of pneumopericardium. No significant pneumothorax. Low lung volume with bibasilar atelectasis. Heart within normal limits in size. No significant pulmonary edema.     Impression: Postoperative chest. 1. Support devices as above. 2. Likely postoperative pneumopericardium. 3. No pneumothorax. 4. Bibasilar atelectasis. BENTLEY ANTONIO MD   SYSTEM ID:  [id number]    XR Abdomen Port 1 View    Result Date: 1/31/2022  Exam: XR ABDOMEN PORT 1 VIEWS, 1/31/2022 2:41 PM Indication: Verify small bowel feeding tube bedside placement Comparison: Abdominal x-ray 1/28/2022, chest x-ray 1/31/2022, CT 1/30/2022 Findings: Portable supine AP radiograph of the abdomen. The tip of the feeding tube projects over the distal duodenum. The tip and sidehole of the NG/OG tube project over the stomach.  Nonobstructive bowel gas pattern. No visualized pneumatosis or portal venous gas. Partially visualized chest tube/mediastinal drains, median sternotomy wires, and [place]-Nelda catheter. Bibasilar opacities are better evaluated on same-day chest x-ray.     Impression: 1. The tip of the feeding tube projects over the distal duodenum. 2. Nonobstructive bowel gas pattern. I have personally reviewed the examination and initial interpretation and I agree with the findings. KAREN FUNEZ MD   SYSTEM ID:  [id number]    XR Abdomen Port 1 View    Result Date: 1/29/2022  Exam: XR ABDOMEN PORT 1 VIEWS, 1/29/2022 12:18 AM Indication: OG placement Comparison: Serial same day radiographs. Findings: Portable semiupright AP view of the abdomen. Lung bases with bibasilar atelectasis, prominent cardiac silhouette. Partially visualized median sternotomy with intact wires. Enteric tube tip and sidehole project over the gastric bubble. Mediastinal drains. No evidence of large volume intra-abdominal free air. Nonobstructive bowel gas pattern. No definite pneumatosis or portal venous gas. No suspicious calcifications.     Impression: 1. Enteric tube tip and sidehole project over the gastric bubble. 2. Stable post surgical changes of the chest and additional support devices. 3. Partially visualized bibasilar atelectasis. I have personally reviewed the examination and initial interpretation and I agree with the findings. BENTLEY ANTONIO MD   SYSTEM ID:  [id number]    CT Chest Pulmonary Embolism w Contrast    Result Date: 1/30/2022  EXAM: CTA pulmonary angiogram, 1/30/2022 4:35 AM HISTORY: PE suspected, high prob; [id number] procedure with mechanical valve. PE vs COVID pneumonitis? COMPARISON: 1/30/2020. However 1/29/2022 radiograph. TECHNIQUE: Volumetric CT images obtained through the chest with contrast. Coronal and axial MIP reformatted images obtained. Three-dimensional (3D) post-processed angiographic images were reconstructed, archived to PACS  and used in interpretation of this study. CONTRAST: iopamidol (ISOVUE-370) solution 77 mL    ml isovue 370 IV. FINDINGS: Lines and tubes: Right IJ [place]-Nelda catheter tip terminates in the left main pulmonary artery. 2 anterior mediastinal drains. Left/pericardial drain. Enteric tube tip and sidehole terminate in the stomach. Endotracheal tube tip terminates in the mid thoracic trachea. Vascular: There is good contrast opacification of the pulmonary arterial vasculature. No pulmonary embolus.  The heart is prominent q. Prosthetic aortic valve. No thoracic aortic aneurysm. Remaining Chest: Inspissated secretions about the distal endotracheal tube. No significant peripheral mucous plugging is appreciated. Significant right greater than left dependent atelectasis with air bronchograms. Mild superimposed patchy groundglass and micronodular opacities. No pleural effusion or pneumothorax. No significant thoracic lymphadenopathy. Expected postoperative gas in the anterior mediastinum. Median sternotomy with intact wires. Upper Abdomen: Limited evaluation demonstrates no acute findings. Bones/Soft Tissues: No acute abnormalities or suspicious lesions.     IMPRESSION: 1. Exam is negative for acute pulmonary embolism.  No evidence of right heart strain or increased right heart pressures. 2. Lower lung volumes with moderate bibasilar atelectasis/consolidation. Occult infection cannot be excluded. 3. Postsurgical changes of median sternotomy and cardiothoracic surgery with pneumomediastinum. Support devices as detailed.  In the event of a positive result for acute pulmonary embolism resulting in right heart strain, consider calling the [facility] hospital  for PERT (Pulmonary Embolism Response Team) Activation? PERT -- Pulmonary Embolism Response Team (Multidisciplinary team including cardiology, interventional radiology, critical care, hematology) I have personally reviewed the examination and initial interpretation and I  agree with the findings. MICAH VIERA MD   SYSTEM ID:  [id number]    POC US Guidance Needle Placement    Result Date: 1/28/2022  Bilateral erector spinae catheters

## 2022-02-01 NOTE — PROGRESS NOTES
CV ICU PROGRESS NOTE  02/01/2022      CO-MORBIDITIES:   Aortic root aneurysm (H)    ASSESSMENT: Chuy Varner is a 31 year old male with a PMHx s/f bicuspid aortic valve, thoracic aortic aneurysm without rupture, hypertension, obesity, and testicular cancer s/p orchiectomy who underwent Bentall procedure with mechanical valve on 1/28 with Dr. Velasco and Dr. Hannah.    OVERNIGHT EVENTS:  TMax of 102.7. Repeat blood and respiratory cultures pending. CXR showing improved pulmonary edema. Was net +1.6L yesterday (received a total of 140 IV lasix) with a urine output of 1.98L. Lung re-recruitment by RT x3 with improved saturation during these times. Vent CMV 18, 500, 14, 65%. NE @ 0.01, propofol @ 40, fentanyl @ 100, heparin @ 1800, insulin @ 3 U/hr.    TODAY'S PROGRESS:   - Wean levo as able  - Wean O2 as able  - Follow blood and respiratory cultures from 1/31  - IV lasix 80 TID with goal net negative 500 to 1L  - Advance TFs  - Wean nitric and FiO2 as able  - Continue Dexamethasone 20 mg daily x5 days (followed by 10mg x5 days snd taper)  - Continue meropenem x7 days, discontinue vancomycin  - Daily lactate    PLAN:  Neuro/ pain/ sedation:  # Acute Postoperative pain  - Monitor neurological status. Notify the MD for any acute changes in exam.  - Pain: fentanyl gtt. Scheduled tylenol. PRN tylenol, oxycodone, robaxin  - Sedation: propofol gtt  - RAAS -1 to -2 for better vent compliance     Pulmonary care:   # Postoperative ventilation management  # Acute hypoxemic respiratory failure likely ARDS vs. PNA vs. Hypervolemia vs. TRALI  Started on Flolan 1/30. COVID-19.Mycoplasma, Parainfluenza and Influenza Negative. CTA negative for PE on 1/30.  - Lung protective ventilation   - CMV: 18, 500, 14, 65%  - Titrate supplemental oxygen to maintain saturation above 90%  - Wean flolan and FiO2 as able  - Continue Dexamethasone 20 mg daily x5 days, 10 mg daily x5, slow taper following (started 1/31)  - Pulmonary hygiene:  Incentive spirometer every 15- 30 minutes when awake, flutter valve, C&DB  - Wean nitric     Cardiovascular:    # Ascending aortic aneurysm s/p Bentall w/ Jerry valve on 1/28 with Dr. Velasco and Dr. Hannah  # Bicuspid aortic valve  # History of thoracic aortic aneurysm without rupture  # History of hypertension  Recent echo on 10/26/21 with LVEF of 60-65%.  CT vascular 12/13/21 showing proximal ascending aorta is severely dilated (55 x 55mm in maximum dimension) and likely bicuspid aortic valve.  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  CT PE 1/30 - negative for acute PE, marked dependent atelectasis R>L.  - Monitor hemodynamic status  - Goal MAP>65, SBP<110  - Start statin  - BB hold  - ASA  - Wean levo gtt as able  - Holding PTA meds: metoprolol succinate 25mg daily  - Labetalol PRN to maintain SBP <110 once off pressors     GI care/ Nutrition:   # Obesity (BMI 46)  - NJ placement, advance to goal  - PPI  - Continue bowel regimen: miralax, senna    Renal/ Fluid Balance/ Electrolytes:   BL creat appears to be ~ 1.0  - Strict I/O, daily weights  - Lasix 80 TID, goal -500 to -1L net  - Avoid/limit nephrotoxins as able  - Replete lytes PRN per protocol     Endocrine:    # Stress induced hyperglycemia, improving  - Insulin gtt  - Goal BG <180 for optimal healing     ID/ Antibiotics:  # Stress induced leukocytosis, resolved  # Possible ventilator associated pneumonia  Febrile and pan-cultured on 1/30/22. Covid pcr, respiratory panel, and respiratory cultures negative 1/30.  - Continue to monitor fever curve, WBC and inflammatory markers  - Continue meropenem x7 days  - Discontinue vancomycin  - Blood and respiratory cultures 1/31 pending     Heme/Onc:     # Stress induced leukocytosis, resolved  # Acute blood loss anemia  # Acute blood loss thrombocytopenia,  improving  # History of testicular cancer s/p orchiectomy  No s/sx active bleeding  - Continue to monitor  - CBC daily  - Continue low intensity heparin gtt     MSK/ Skin:  # Sternotomy  # Surgical Incision  - Sternal precautions  - Postoperative incision management per protocol  - PT/OT/CR     Prophylaxis:    - Mechanical prophylaxis for DVT  - Chemical DVT prophylaxis- heparin gtt  - PPI     Lines/ tubes/ drains:  - PA catheter (1/28)  - RIJ MAC (1/28)  - Left radial arterial line (1/28)  - Left PIV (1/28)  - Godfrey (1/28)  - 3 CTs (2 anterior mediastinal, 1 left mediastinal) (1/28)  - OG (2 days)  - ETT (1/28)  - Bilateral ES catheters (1/28)  - Nasoduodenal (1/31)     Disposition:  - CVICU     Patient seen, findings and plan discussed with CV ICU staff, Dr. Valles and CVTS staff Dr. Velasco.    Michelle Poon, MS4    I was present with the medical student who participated in the service and in the documentation of the note. I have verified the history and personally performed the physical exam and medical decision making. I agree with the assessment and plan of care as documented in the note.    Tom Adair MD  Anesthesiology, PGY3    ====================================    SUBJECTIVE:   Improving FiO2 requirements and findings on CXR, now on FiO2 of 65%. Continuing broad spectrum abx. Awaiting repeat blood and respiratory cultures.      OBJECTIVE:   1. VITAL SIGNS:   Temp:  [97  F (36.1  C)-102.7  F (39.3  C)] 99.7  F (37.6  C)  Pulse:  [] 89  Resp:  [11-20] 19  MAP:  [59 mmHg-108 mmHg] 77 mmHg  Arterial Line BP: ()/(47-85) 117/61  FiO2 (%):  [65 %-100 %] 65 %  SpO2:  [88 %-100 %] 95 %  Ventilation Mode: CMV/AC  (Continuous Mandatory Ventilation/ Assist Control)  FiO2 (%): 65 %  Rate Set (breaths/minute): 18 breaths/min  Tidal Volume Set (mL): 500 mL  PEEP (cm H2O): 14 cmH2O  Oxygen Concentration (%): 65 %  Resp: 19      2. INTAKE/ OUTPUT:   I/O last 3 completed shifts:  In: 3753.14  [I.V.:2833.14; NG/GT:675]  Out: 2812 [Urine:2332; Emesis/NG output:350; Chest Tube:130]    3. PHYSICAL EXAMINATION:   General: Sedated, intubated, laying in bed on back  Neuro: Sedated, opening eyes to voice, tracking  Resp: Intubated, at 65% FIO2  CV: RRR, no r/m/g apprecaited, temporary pacemaker on standby  Abdomen: Soft, Non-distended  Incisions: C/D/I  Extremities: warm and well perfused    4. INVESTIGATIONS:   Arterial Blood Gases   Recent Labs   Lab 02/01/22  0330 01/31/22  2210 01/31/22  2046 01/31/22  1642   PH 7.38 7.35 7.36 7.35   PCO2 50* 50* 46* 47*   PO2 110* 72* 66* 83   HCO3 30* 28 26 26     Complete Blood Count   Recent Labs   Lab 02/01/22  0326 01/31/22  0939 01/31/22  0332 01/30/22  0312   WBC 12.5* 10.9 10.1 11.5*   HGB 10.1* 11.5* 11.5* 13.0*    139* 133* 165     Basic Metabolic Panel  Recent Labs   Lab 02/01/22  0805 02/01/22  0702 02/01/22  0608 02/01/22  0501 02/01/22  0344 02/01/22  0326 01/31/22  2053 01/31/22  1933 01/31/22  1642 01/31/22  1347 01/31/22  1033 01/31/22  0939 01/31/22  0338 01/31/22  0332 01/30/22  0315 01/30/22  0312   NA  --   --   --   --   --  142  --  142  --   --   --   --   --  143  --  144   POTASSIUM  --   --   --   --   --  3.9  --  4.0  --  3.8  --  3.9  --  3.9   < > 3.3*   CHLORIDE  --   --   --   --   --  109  --  111*  --   --   --   --   --  111*  --  113*   CO2  --   --   --   --   --  28  --  26  --   --   --   --   --  27  --  26   BUN  --   --   --   --   --  34*  --  28  --   --   --   --   --  26  --  26   CR  --   --   --   --   --  1.14  --  1.26*  --   --   --   --   --  1.20  --  1.15   * 126* 143* 132*   < > 156*   < > 196*   < >  --    < >  --    < > 108*   < > 130*    < > = values in this interval not displayed.     Liver Function Tests  Recent Labs   Lab 02/01/22  0326 01/31/22  0332 01/30/22  0312 01/29/22  0323 01/28/22  1447 01/28/22  1447 01/28/22  1246   AST 18 27 31 45   < > 50*  --    ALT 20 20 24 26   < > 28  --    ALKPHOS  54 46 40 42   < > 44  --    BILITOTAL 0.3 0.7 0.7 0.5   < > 0.6  --    ALBUMIN 2.3* 2.4* 2.8* 3.0*   < > 3.1*  --    INR  --   --   --   --   --  1.38* 1.53*    < > = values in this interval not displayed.     Pancreatic Enzymes  No lab results found in last 7 days.  Coagulation Profile  Recent Labs   Lab 01/28/22  1447 01/28/22  1246   INR 1.38* 1.53*   PTT 31 28         5. RADIOLOGY:   Recent Results (from the past 24 hour(s))   XR Chest Port 1 View    Narrative    EXAM: XR CHEST PORT 1 VIEW  1/29/2022 8:45 AM     HISTORY:  increasing FiO2 requirements       COMPARISON:  Chest x-ray 1/28/2022    FINDINGS: AP radiograph of the chest. Endotracheal tube projecting  over the high thoracic trachea. Right IJ Butte-Nelda catheter with tip  overlying the main pulmonary artery. Partially visualized enteric tube  with tip overlying the stomach. Postoperative chest with intact median  sternotomy wires.    Stable borderline enlargement of the cardiomediastinal silhouette. Low  lung volumes. Small left and possible trace right pleural effusions.  Pulmonary vasculature is indistinct. Perihilar interstitial prominent  opacities. No pneumothorax. Visualized upper abdomen is unremarkable.      Impression    IMPRESSION:  1. Stable endotracheal tube and right IJ Butte-Nelda catheter.  2. Low lung volumes with unchanged perihilar interstitial prominent  opacities, likely pulmonary edema and/or atelectasis.  3. Increased small left and possible trace right pleural effusions.    I have personally reviewed the examination and initial interpretation  and I agree with the findings.    BENTLEY ANTONIO MD         SYSTEM ID:  [id number]   XR Chest Port 1 View    Impression    RESIDENT PRELIMINARY INTERPRETATION  Impression:   1. Worsening of low lung volumes, small left greater than right  pleural effusions, and perihilar opacities compatible with pulmonary  edema and/or atelectasis. Infection cannot be excluded.  2. Endotracheal tube has been  advanced with tip projecting over mid  thoracic trachea. Good position.  3. Stable additional support devices and postsurgical changes.   CT Chest Pulmonary Embolism w Contrast    Impression    RESIDENT PRELIMINARY INTERPRETATION  IMPRESSION:   1. Exam is negative for acute pulmonary embolism.  No evidence of  right heart strain or increased right heart pressures.       2. Marked dependent atelectasis in the right than left lungs. Occult  infection cannot be excluded.    3. Postsurgical changes of median sternotomy and aortic valve repair.  Satisfactory position of support devices as detailed.       In the event of a positive result for acute pulmonary embolism  resulting in right heart strain, consider calling the   [facility] hospital  for PERT (Pulmonary Embolism Response Team)  Activation?    PERT -- Pulmonary Embolism Response Team (Multidisciplinary team  including cardiology, interventional radiology, critical care,  hematology)       =========================================

## 2022-02-01 NOTE — OP NOTE
Procedure Date: 01/28/2022    PREOPERATIVE DIAGNOSES:    1.  Ascending aortic aneurysm.  2.  Aortic root aneurysm.  3.  Possible bicuspid aortic valve.    POSTOPERATIVE DIAGNOSES:   1.  Ascending aortic aneurysm.  2.  Aortic root aneurysm.  3.  Bicuspid aortic valve.    OPERATIVE PROCEDURE:  Aortic root replacement with 27-29 mm On-X ascending aortic prosthesis with Vascutek Gelweave Valsalva graft (Bentall procedure with coronary implantation).    SURGEON:  Nickolas Velasco MD.    COSURGEON:  Newton Hannah MD.  Note, a second attending surgeon was requested in the operating room because of the complexity of the operation.    OPERATIVE FINDINGS:  The patient's sternum was of adequate quality.  Pericardial space was free of any adhesions.  There was normal biventricular function.  The aortic valve was bicuspid with complete fusion of the right coronary and noncoronary leaflets.  There was some thickening and calcification of the leaflets.  There was a severely aneurysmal ascending aorta with aneurysm of the root.    DESCRIPTION OF PROCEDURE:  The patient was brought to the operating room in stable condition after the administration of general anesthesia.  The patient's chest, abdomen and lower extremities were prepped and draped in usual manner.  Median sternotomy was performed.  Preparation for cardiopulmonary bypass included ACT-guided heparinization and admission of Amicar.  The distal ascending aorta was cannulated with a 20-Upper sorbian arterial cannula via 3 atretic pursestring pledgeted sutures.  Dual stage venous cannula was inserted in the right atrium.  Antegrade and retrograde cardioplegia cannula were placed.  Full cardiopulmonary bypass was initiated.  A left ventricular vent was also placed.  Aortic pressure was temporarily reduced and the aorta was crossclamped. 1.5 liters of cold blood cardioplegia administered with antegrade and retrograde routes.  Subsequent dose of cardioplegia given at no greater than  20-minute intervals, via the retrograde route.  Septal temperature was reduced less than 10 degrees centigrade.  The mid portion of the ascending aorta was excised.  We also then examined the valve.  We attempted to do a valve sparing root replacement; however, when we saw the bicuspid aortic valve with evidence of calcification on the leaflets, we felt that a root replacement is a preferable option. The aortic valve was sharply excised and annulus thoroughly debrided of all calcium.  Coronary buttons were then prepared. The entire ascending aorta including the sinuses were all excised leaving behind a rim of aorta.  2-0 tevdek mattress sutures were placed around the annulus with pledgets on the aortic side.  These sutures then passed through a sewing ring of a 27-29 mm On-X ascending aortic prosthesis with a Vascutek Gelweave graft. The sutures were tied with Cor-Knot.  Next, right and left coronary buttons were sewn to respective aortotomy with continuous 5-0 Prolene.  The coronary buttons were reinforced with a bovine pericardial strip.  Next, a bovine pericardial strip was placed inside and outside the distal aortic margin, which was just proximal to the takeoff of the innominate artery.  The Gelweave graft was sewn end-to-end with a continuous 4-point suture.  Deairing maneuvers were performed.  A warm dose of retrograde hotshot cardioplegia was given and with high suction on both vents, crossclamp was released.  Organized rhythm resumed without the need for defibrillations.  Rewarming and reperfusion allowed.  De-airing was confirmed by echocardiography.  The patient was weaned off bypass with low dose epinephrine. After weaning off cardiopulmonary bypass.  biventricular function within normal limits.  There was normal function of prosthetic valve.  Protamine was given.  All cannulas removed.  Careful hemostasis was obtained.  Two anterior mediastinal and 1 posterior Aneudy drain were placed.  Sternum was  closed with surgical steel wires.  Fascia, subcutaneous tissue, skin of chest were closed in layers.  The patient transferred to ICU in stable, but critical condition.    Nickolas Velasco MD        D: 2022   T: 2022   MT: [id number]    Name:     TU DEAN  MRN:      [id number]        Account:        [id number]   :      1990           Procedure Date: 2022     Document: [id number]    cc:  Rohit Crooks DO

## 2022-02-01 NOTE — PROGRESS NOTES
"SPIRITUAL HEALTH SERVICES  SPIRITUAL ASSESSMENT Progress Note  [facility] ([place]) 4E     REFERRAL SOURCE: LOS    Pt unable to communicate d/t intubation.  Pt's girlfriend Debbie at bedside, and was receptive to  visit.  Debbie reported pt is \"getting better,\" and talked about her hopes for his recovery.   introduced spiritual health services and let her know that we would support her and pt as much as they needed throughout stay.  Debbie said she would reach out if she had further needs.    PLAN: [facility] will remain available     Nandini Olguinin  Pager: [phone]    "

## 2022-02-01 NOTE — PROGRESS NOTES
Pain Service Progress Note  [facility]  Date: February 1, 2022      Patient Name: Chuy Varner  MRN: [id number]  Age: 31 year old  Sex: male      Assessment:  31 year old male with PMH of possible bicuspid aortic valve, thoracic aortic aneurysm without rupture, hypertension, obesity, testicular cancer s/p orchiectomy    Procedure: Bentall procedure with mechanical valve    Date of Surgery: 1/28/2022    Date of bilateral erector spinae (ES) T5-6 Placement: 1/28/2022    Plan/Recommendations:  1. Regional Anesthesia/Analgesia  -Continuous Catheter Type/Site: bilateral erector spinae (ES) T5-6    Continue 0.2% ropivacaine    Continuous Infusion at 7 mL/hr via each catheter, total infusion rate of 14 mL/hr    Plan to maintain catheter while chest tubes in place, max of 7 days    2. Anticoagulation    Heparin IV infusion at 1950 units/hr per primary servicre    Please contact Inpatient Pain Service (pager [phone]) before ordering or making any medication changes     3. Multimodal Analgesia  - per CVICU team    Pain Service will continue to follow.    Discussed with attending anesthesiologist      Overnight Events: remains intubated and sedated    Tubes/Drains: Yes  Chest tubes x 3    Subjective:  Bedside RN reports when sedation lightened Chuy opened eyes, followed commands, and is moving upper and lower extremities. No pain behaviors at rest and with movement  Symptoms of LAST: No objective symptoms of LAST      Diet: NPO for Medical/Clinical Reasons Except for: Other; Specify: NPO until Extubated  Advance Diet as Tolerated: Clear Liquid Diet  Adult Formula Drip Feeding: Continuous Vital High Protein; Nasoduodenal tube; Goal Rate: 65; mL/hr; Medication - Feeding Tube Flush Frequency: At least 15-30 mL water before and after medication administration and with tube clogging; Amount to Sen...    Relevant Labs:  Recent Labs   Lab Test 02/01/22  0326 01/29/22  0323 01/28/22  1447   INR  --   --   "1.38*      < > 205   PTT  --   --  31   BUN 34*   < > 18    < > = values in this interval not displayed.       Physical Exam:  Vitals: /88   Pulse 93   Temp 100.2  F (37.9  C)   Resp 21   Ht 1.905 m (6' 3\")   Wt (!) 165.5 kg (364 lb 13.8 oz)   SpO2 94%   BMI 45.60 kg/m      Physical Exam:   Orientation:  Intubated, on CMV, sedated on propofol drip, IV fentanyl drip for pain    Motor Examination:  Strength in lower extremities: Yes, moving all extremities     Catheter Site:   Catheter entry site is clean/dry/intact: Yes    Tender: No      Relevant Medications:  Current Pain Medications:  Medications related to Pain Management (From now, onward)    Start     Dose/Rate Route Frequency Ordered Stop    01/31/22 0000  acetaminophen (TYLENOL) tablet 650 mg         650 mg Oral EVERY 4 HOURS PRN 01/28/22 1435      01/30/22 1129  fentaNYL (SUBLIMAZE) 50 mcg/mL bolus from infusion pump 25-50 mcg         25-50 mcg Intravenous EVERY 1 HOUR PRN 01/30/22 1129      01/30/22 0530  fentaNYL (SUBLIMAZE) infusion          mcg/hr  0.5-4 mL/hr  Intravenous CONTINUOUS 01/30/22 0513      01/29/22 0800  polyethylene glycol (MIRALAX) Packet 17 g         17 g Oral DAILY 01/28/22 1435      01/29/22 0800  aspirin (ASA) chewable tablet 81 mg         81 mg Oral or NG Tube DAILY 01/28/22 1435      01/28/22 2000  senna-docusate (SENOKOT-S/PERICOLACE) 8.6-50 MG per tablet 1 tablet         1 tablet Oral 2 TIMES DAILY 01/28/22 1435      01/28/22 2000  propofol (DIPRIVAN) infusion         5-75 mcg/kg/min × 165.9 kg (Dosing Weight)  5-74.7 mL/hr  Intravenous CONTINUOUS 01/28/22 1958 01/28/22 1600  ropivacaine 0.2% (NAROPIN) 750 mL in ON-Q C-Bloc select flow ([id number] holds 600-750 mL) dual cath disposable pump         14 mL/hr  Irrigation CONTINUOUS 01/28/22 1533      01/28/22 1435  HYDROmorphone (PF) (DILAUDID) injection 0.2 mg        \"Or\" Linked Group Details    0.2 mg Intravenous EVERY 2 HOURS PRN 01/28/22 3490      " "01/28/22 1435  HYDROmorphone (PF) (DILAUDID) injection 0.4 mg        \"Or\" Linked Group Details    0.4 mg Intravenous EVERY 2 HOURS PRN 01/28/22 1435      01/28/22 1435  oxyCODONE (ROXICODONE) tablet 5 mg        \"Or\" Linked Group Details    5 mg Oral EVERY 4 HOURS PRN 01/28/22 1435      01/28/22 1435  oxyCODONE IR (ROXICODONE) tablet 10 mg        \"Or\" Linked Group Details    10 mg Oral EVERY 4 HOURS PRN 01/28/22 1435      01/28/22 1435  magnesium hydroxide (MILK OF MAGNESIA) suspension 30 mL         30 mL Oral DAILY PRN 01/28/22 1435      01/28/22 1435  bisacodyl (DULCOLAX) Suppository 10 mg         10 mg Rectal DAILY PRN 01/28/22 1435      01/28/22 1435  methocarbamol (ROBAXIN) tablet 750 mg         750 mg Oral EVERY 6 HOURS PRN 01/28/22 1435      01/28/22 1434  lidocaine 1 % 0.1-1 mL         0.1-1 mL Other EVERY 1 HOUR PRN 01/28/22 1435      01/28/22 1434  lidocaine (LMX4) kit          Topical EVERY 1 HOUR PRN 01/28/22 1435            Primary Service Contacted with Recommendations? No    Bree Patricio, APRN CNP  2/1/2022    Time/Communication:  I personally spent 15 minutes with greater than 50% in consultation, evaluation, coordination of care.  Discussed with significant other at bedside and RN      Contact Info (24 hour job code pager is the last 4 digits) For in-house use only:   Job code ID: [id number]   [facility] 0599  Peds [phone]  [employer] phone: dial * * * [phone], enter jobcode ID, then enter call-back number.    Text: Use [employer] on the Intranet <Paging/Directory> tab and enter Jobcode ID.   If no call back at any time, contact the hospital  and ask for Regional Anesthesia attending or backup     "

## 2022-02-01 NOTE — CONSULTS
"Care Management Initial Consult    General Information  Assessment completed with: Spouse or significant other, Debbie  Type of CM/SW Visit: Initial Assessment    Primary Care Provider verified and updated as needed: Yes   Readmission within the last 30 days: no previous admission in last 30 days   Return Category: New Diagnosis    Reason for Consult: discharge planning  Advance Care Planning: no concerns identified          Communication Assessment  Patient's communication style: spoken language (English or Bilingual)    Hearing Difficulty or Deaf: no   Wear Glasses or Blind: no    Cognitive  Cognitive/Neuro/Behavioral:   Arousal Level: arouses to repeated stimulation  Speech: endotracheal tube    Living Environment:   People in home: significant other  Debbie  Current living Arrangements: house      Able to return to prior arrangements: yes       Family/Social Support:  Care provided by: self  Provides care for: no one  Marital Status: Singler          Description of Support System: Supportive,Involved    Support Assessment: Adequate family and caregiver support    Current Resources:   Patient receiving home care services:  no     Community Resources:    Equipment currently used at home: none       Employment/Financial:  Employment Status: employed full-time        Financial Concerns:   UTS     Functional Status:  Prior to admission patient needed assistance:  Independent       Additional Information:  Writer met with girlfriend, Debbie, at bedside and introduced the RNCC role. Patient is currently unable to answer questions while on a ventilator. Debbie answered questions on his behalf. Writer confirmed PCP and insurance.     Patient recently moved to [place] from [place]. Mother lives in [facility]. No other family involved. Debbie stated, \"it's just me and him.\" His mother is now in town from [facility] but is staying at his home near [place], [place].     Debbie expressed she may want a care conference so " out of state family members and can hear updates and answer questions. Writer let her know this is something that can be arranged at her request.    No other concerns were identified at this time, DC needs are TBD at this time.  RNCC/SW will continue to follow and support as needed.     Rosangela Moreno RN   RNCC [place]

## 2022-02-01 NOTE — PLAN OF CARE
Major Shift Events:  Sedation holiday completed during the shift. Patient moves everything and follows commands. Desats with movements and turns. Sats best on his right side. Lungs are equal and clear/diminished. Abdominal muscle use for breathing. Lung re-recruitment done by RT x 3 during the shift; increases sats significantly. Creamy thick secretions out of ETT. Strong pulses and good perfusion. On 0.01-0.02 mcg/kg/min of Levo during the shift. T-max 37.9; cooling blanket removed. Sinus rhythm with 1st degree AV block. CI 2.1 and 3 during the shift. CVP 16 and 14.  and 570. Most recent PA 32/12. Good urine output with Lasix. Tolerating feeds well; BS present. No new skin issues. Girlfriend called for an update at 0530; will be here this am.    For vital signs and complete assessments, please see documentation flowsheets.

## 2022-02-02 ENCOUNTER — APPOINTMENT (OUTPATIENT)
Dept: GENERAL RADIOLOGY | Facility: CLINIC | Age: 32
End: 2022-02-02
Attending: SURGERY
Payer: COMMERCIAL

## 2022-02-02 LAB
ALBUMIN SERPL-MCNC: 2.2 G/DL (ref 3.4–5)
ALP SERPL-CCNC: 51 U/L (ref 40–150)
ALT SERPL W P-5'-P-CCNC: 20 U/L (ref 0–70)
ANION GAP SERPL CALCULATED.3IONS-SCNC: 4 MMOL/L (ref 3–14)
ANION GAP SERPL CALCULATED.3IONS-SCNC: 5 MMOL/L (ref 3–14)
AST SERPL W P-5'-P-CCNC: 15 U/L (ref 0–45)
BACTERIA ASPIRATE CULT: NORMAL
BASE EXCESS BLDA CALC-SCNC: 5.9 MMOL/L (ref -9–1.8)
BASE EXCESS BLDA CALC-SCNC: 6 MMOL/L (ref -9–1.8)
BASE EXCESS BLDA CALC-SCNC: 6.7 MMOL/L (ref -9–1.8)
BASE EXCESS BLDA CALC-SCNC: 7.2 MMOL/L (ref -9–1.8)
BILIRUB SERPL-MCNC: 0.2 MG/DL (ref 0.2–1.3)
BUN SERPL-MCNC: 47 MG/DL (ref 7–30)
BUN SERPL-MCNC: 50 MG/DL (ref 7–30)
CALCIUM SERPL-MCNC: 8.4 MG/DL (ref 8.5–10.1)
CALCIUM SERPL-MCNC: 8.7 MG/DL (ref 8.5–10.1)
CHLORIDE BLD-SCNC: 107 MMOL/L (ref 94–109)
CHLORIDE BLD-SCNC: 109 MMOL/L (ref 94–109)
CO2 SERPL-SCNC: 29 MMOL/L (ref 20–32)
CO2 SERPL-SCNC: 31 MMOL/L (ref 20–32)
CREAT SERPL-MCNC: 1.24 MG/DL (ref 0.66–1.25)
CREAT SERPL-MCNC: 1.24 MG/DL (ref 0.66–1.25)
ERYTHROCYTE [DISTWIDTH] IN BLOOD BY AUTOMATED COUNT: 12.5 % (ref 10–15)
GFR SERPL CREATININE-BSD FRML MDRD: 80 ML/MIN/1.73M2
GFR SERPL CREATININE-BSD FRML MDRD: 80 ML/MIN/1.73M2
GLUCOSE BLD-MCNC: 138 MG/DL (ref 70–99)
GLUCOSE BLD-MCNC: 157 MG/DL (ref 70–99)
GLUCOSE BLDC GLUCOMTR-MCNC: 112 MG/DL (ref 70–99)
GLUCOSE BLDC GLUCOMTR-MCNC: 126 MG/DL (ref 70–99)
GLUCOSE BLDC GLUCOMTR-MCNC: 127 MG/DL (ref 70–99)
GLUCOSE BLDC GLUCOMTR-MCNC: 128 MG/DL (ref 70–99)
GLUCOSE BLDC GLUCOMTR-MCNC: 129 MG/DL (ref 70–99)
GLUCOSE BLDC GLUCOMTR-MCNC: 129 MG/DL (ref 70–99)
GLUCOSE BLDC GLUCOMTR-MCNC: 132 MG/DL (ref 70–99)
GLUCOSE BLDC GLUCOMTR-MCNC: 133 MG/DL (ref 70–99)
GLUCOSE BLDC GLUCOMTR-MCNC: 136 MG/DL (ref 70–99)
GLUCOSE BLDC GLUCOMTR-MCNC: 142 MG/DL (ref 70–99)
GLUCOSE BLDC GLUCOMTR-MCNC: 143 MG/DL (ref 70–99)
GLUCOSE BLDC GLUCOMTR-MCNC: 145 MG/DL (ref 70–99)
GLUCOSE BLDC GLUCOMTR-MCNC: 148 MG/DL (ref 70–99)
GLUCOSE BLDC GLUCOMTR-MCNC: 148 MG/DL (ref 70–99)
GLUCOSE BLDC GLUCOMTR-MCNC: 150 MG/DL (ref 70–99)
GLUCOSE BLDC GLUCOMTR-MCNC: 152 MG/DL (ref 70–99)
GLUCOSE BLDC GLUCOMTR-MCNC: 155 MG/DL (ref 70–99)
GLUCOSE BLDC GLUCOMTR-MCNC: 156 MG/DL (ref 70–99)
GLUCOSE BLDC GLUCOMTR-MCNC: 159 MG/DL (ref 70–99)
GLUCOSE BLDC GLUCOMTR-MCNC: 163 MG/DL (ref 70–99)
GLUCOSE BLDC GLUCOMTR-MCNC: 170 MG/DL (ref 70–99)
GLUCOSE BLDC GLUCOMTR-MCNC: 184 MG/DL (ref 70–99)
HCO3 BLD-SCNC: 32 MMOL/L (ref 21–28)
HCO3 BLD-SCNC: 33 MMOL/L (ref 21–28)
HCT VFR BLD AUTO: 29.8 % (ref 40–53)
HGB BLD-MCNC: 9.4 G/DL (ref 13.3–17.7)
LACTATE SERPL-SCNC: 0.9 MMOL/L (ref 0.7–2)
MAGNESIUM SERPL-MCNC: 3.1 MG/DL (ref 1.6–2.3)
MAGNESIUM SERPL-MCNC: 3.1 MG/DL (ref 1.6–2.3)
MCH RBC QN AUTO: 29.7 PG (ref 26.5–33)
MCHC RBC AUTO-ENTMCNC: 31.5 G/DL (ref 31.5–36.5)
MCV RBC AUTO: 94 FL (ref 78–100)
O2/TOTAL GAS SETTING VFR VENT: 30 %
O2/TOTAL GAS SETTING VFR VENT: 60 %
O2/TOTAL GAS SETTING VFR VENT: 75 %
O2/TOTAL GAS SETTING VFR VENT: 75 %
OXYHGB MFR BLD: 92 % (ref 92–100)
OXYHGB MFR BLD: 93 % (ref 92–100)
OXYHGB MFR BLD: 98 % (ref 92–100)
OXYHGB MFR BLD: 98 % (ref 92–100)
PCO2 BLD: 50 MM HG (ref 35–45)
PCO2 BLD: 50 MM HG (ref 35–45)
PCO2 BLD: 51 MM HG (ref 35–45)
PCO2 BLD: 53 MM HG (ref 35–45)
PH BLD: 7.39 [PH] (ref 7.35–7.45)
PH BLD: 7.41 [PH] (ref 7.35–7.45)
PH BLD: 7.41 [PH] (ref 7.35–7.45)
PH BLD: 7.43 [PH] (ref 7.35–7.45)
PHOSPHATE SERPL-MCNC: 3.2 MG/DL (ref 2.5–4.5)
PHOSPHATE SERPL-MCNC: 3.3 MG/DL (ref 2.5–4.5)
PLATELET # BLD AUTO: 210 10E3/UL (ref 150–450)
PO2 BLD: 196 MM HG (ref 80–105)
PO2 BLD: 223 MM HG (ref 80–105)
PO2 BLD: 64 MM HG (ref 80–105)
PO2 BLD: 71 MM HG (ref 80–105)
POTASSIUM BLD-SCNC: 3.7 MMOL/L (ref 3.4–5.3)
POTASSIUM BLD-SCNC: 3.9 MMOL/L (ref 3.4–5.3)
POTASSIUM BLD-SCNC: 3.9 MMOL/L (ref 3.4–5.3)
PROT SERPL-MCNC: 6.4 G/DL (ref 6.8–8.8)
RBC # BLD AUTO: 3.17 10E6/UL (ref 4.4–5.9)
SODIUM SERPL-SCNC: 142 MMOL/L (ref 133–144)
SODIUM SERPL-SCNC: 143 MMOL/L (ref 133–144)
UFH PPP CHRO-ACNC: 0.2 IU/ML
UFH PPP CHRO-ACNC: 0.2 IU/ML
UFH PPP CHRO-ACNC: 0.24 IU/ML
WBC # BLD AUTO: 12.8 10E3/UL (ref 4–11)

## 2022-02-02 PROCEDURE — 85027 COMPLETE CBC AUTOMATED: CPT | Performed by: STUDENT IN AN ORGANIZED HEALTH CARE EDUCATION/TRAINING PROGRAM

## 2022-02-02 PROCEDURE — 250N000011 HC RX IP 250 OP 636: Performed by: NURSE PRACTITIONER

## 2022-02-02 PROCEDURE — 80053 COMPREHEN METABOLIC PANEL: CPT | Performed by: STUDENT IN AN ORGANIZED HEALTH CARE EDUCATION/TRAINING PROGRAM

## 2022-02-02 PROCEDURE — 250N000013 HC RX MED GY IP 250 OP 250 PS 637: Performed by: SURGERY

## 2022-02-02 PROCEDURE — 250N000011 HC RX IP 250 OP 636: Performed by: STUDENT IN AN ORGANIZED HEALTH CARE EDUCATION/TRAINING PROGRAM

## 2022-02-02 PROCEDURE — A7035 POS AIRWAY PRESS HEADGEAR: HCPCS

## 2022-02-02 PROCEDURE — 250N000011 HC RX IP 250 OP 636: Performed by: ANESTHESIOLOGY

## 2022-02-02 PROCEDURE — 82805 BLOOD GASES W/O2 SATURATION: CPT | Performed by: NURSE PRACTITIONER

## 2022-02-02 PROCEDURE — 94003 VENT MGMT INPAT SUBQ DAY: CPT

## 2022-02-02 PROCEDURE — 85520 HEPARIN ASSAY: CPT | Performed by: SURGERY

## 2022-02-02 PROCEDURE — 83735 ASSAY OF MAGNESIUM: CPT | Performed by: SURGERY

## 2022-02-02 PROCEDURE — 84100 ASSAY OF PHOSPHORUS: CPT | Performed by: STUDENT IN AN ORGANIZED HEALTH CARE EDUCATION/TRAINING PROGRAM

## 2022-02-02 PROCEDURE — 94640 AIRWAY INHALATION TREATMENT: CPT | Mod: 76

## 2022-02-02 PROCEDURE — 94799 UNLISTED PULMONARY SVC/PX: CPT

## 2022-02-02 PROCEDURE — 94640 AIRWAY INHALATION TREATMENT: CPT

## 2022-02-02 PROCEDURE — 84132 ASSAY OF SERUM POTASSIUM: CPT | Performed by: SURGERY

## 2022-02-02 PROCEDURE — 99024 POSTOP FOLLOW-UP VISIT: CPT | Performed by: NURSE PRACTITIONER

## 2022-02-02 PROCEDURE — 250N000013 HC RX MED GY IP 250 OP 250 PS 637: Performed by: STUDENT IN AN ORGANIZED HEALTH CARE EDUCATION/TRAINING PROGRAM

## 2022-02-02 PROCEDURE — 999N000015 HC STATISTIC ARTERIAL MONITORING DAILY

## 2022-02-02 PROCEDURE — 99291 CRITICAL CARE FIRST HOUR: CPT | Mod: 24 | Performed by: ANESTHESIOLOGY

## 2022-02-02 PROCEDURE — 83605 ASSAY OF LACTIC ACID: CPT | Performed by: NURSE PRACTITIONER

## 2022-02-02 PROCEDURE — 250N000009 HC RX 250

## 2022-02-02 PROCEDURE — 250N000009 HC RX 250: Performed by: NURSE PRACTITIONER

## 2022-02-02 PROCEDURE — 71045 X-RAY EXAM CHEST 1 VIEW: CPT

## 2022-02-02 PROCEDURE — 71045 X-RAY EXAM CHEST 1 VIEW: CPT | Mod: 26 | Performed by: STUDENT IN AN ORGANIZED HEALTH CARE EDUCATION/TRAINING PROGRAM

## 2022-02-02 PROCEDURE — 999N000157 HC STATISTIC RCP TIME EA 10 MIN: Mod: 76

## 2022-02-02 PROCEDURE — 258N000003 HC RX IP 258 OP 636: Performed by: NURSE PRACTITIONER

## 2022-02-02 PROCEDURE — 82310 ASSAY OF CALCIUM: CPT | Performed by: SURGERY

## 2022-02-02 PROCEDURE — 94645 CONT INHLJ TX EACH ADDL HOUR: CPT

## 2022-02-02 PROCEDURE — 84100 ASSAY OF PHOSPHORUS: CPT | Performed by: SURGERY

## 2022-02-02 PROCEDURE — 250N000011 HC RX IP 250 OP 636: Performed by: SURGERY

## 2022-02-02 PROCEDURE — 200N000002 HC R&B ICU UMMC

## 2022-02-02 PROCEDURE — 250N000009 HC RX 250: Performed by: SURGERY

## 2022-02-02 RX ORDER — LABETALOL HYDROCHLORIDE 5 MG/ML
20 INJECTION, SOLUTION INTRAVENOUS
Status: COMPLETED | OUTPATIENT
Start: 2022-02-02 | End: 2022-02-02

## 2022-02-02 RX ORDER — FUROSEMIDE 10 MG/ML
80 INJECTION INTRAMUSCULAR; INTRAVENOUS EVERY 8 HOURS
Status: COMPLETED | OUTPATIENT
Start: 2022-02-02 | End: 2022-02-02

## 2022-02-02 RX ORDER — HYDRALAZINE HYDROCHLORIDE 20 MG/ML
10 INJECTION INTRAMUSCULAR; INTRAVENOUS EVERY 30 MIN PRN
Status: DISCONTINUED | OUTPATIENT
Start: 2022-02-02 | End: 2022-02-09

## 2022-02-02 RX ORDER — POTASSIUM CHLORIDE 1.5 G/1.58G
20 POWDER, FOR SOLUTION ORAL ONCE
Status: COMPLETED | OUTPATIENT
Start: 2022-02-02 | End: 2022-02-02

## 2022-02-02 RX ADMIN — MEROPENEM 1 G: 1 INJECTION, POWDER, FOR SOLUTION INTRAVENOUS at 23:07

## 2022-02-02 RX ADMIN — ACETYLCYSTEINE 2 ML: 200 SOLUTION ORAL; RESPIRATORY (INHALATION) at 08:00

## 2022-02-02 RX ADMIN — PROPOFOL 75 MCG/KG/MIN: 10 INJECTION, EMULSION INTRAVENOUS at 11:11

## 2022-02-02 RX ADMIN — HEPARIN SODIUM 2700 UNITS/HR: 1000 INJECTION INTRAVENOUS; SUBCUTANEOUS at 18:01

## 2022-02-02 RX ADMIN — HEPARIN SODIUM 2850 UNITS/HR: 1000 INJECTION INTRAVENOUS; SUBCUTANEOUS at 23:28

## 2022-02-02 RX ADMIN — PROPOFOL 75 MCG/KG/MIN: 10 INJECTION, EMULSION INTRAVENOUS at 23:04

## 2022-02-02 RX ADMIN — PROPOFOL 75 MCG/KG/MIN: 10 INJECTION, EMULSION INTRAVENOUS at 17:59

## 2022-02-02 RX ADMIN — FUROSEMIDE 80 MG: 10 INJECTION, SOLUTION INTRAVENOUS at 14:57

## 2022-02-02 RX ADMIN — POLYETHYLENE GLYCOL 3350 17 G: 17 POWDER, FOR SOLUTION ORAL at 07:44

## 2022-02-02 RX ADMIN — POTASSIUM CHLORIDE 20 MEQ: 1.5 POWDER, FOR SOLUTION ORAL at 05:09

## 2022-02-02 RX ADMIN — PROPOFOL 75 MCG/KG/MIN: 10 INJECTION, EMULSION INTRAVENOUS at 12:34

## 2022-02-02 RX ADMIN — Medication 15 ML: at 07:43

## 2022-02-02 RX ADMIN — IPRATROPIUM BROMIDE AND ALBUTEROL SULFATE 3 ML: 2.5; .5 SOLUTION RESPIRATORY (INHALATION) at 08:00

## 2022-02-02 RX ADMIN — IPRATROPIUM BROMIDE AND ALBUTEROL SULFATE 3 ML: 2.5; .5 SOLUTION RESPIRATORY (INHALATION) at 16:26

## 2022-02-02 RX ADMIN — Medication 20 NG/KG/MIN: at 23:29

## 2022-02-02 RX ADMIN — PROPOFOL 75 MCG/KG/MIN: 10 INJECTION, EMULSION INTRAVENOUS at 21:49

## 2022-02-02 RX ADMIN — FUROSEMIDE 80 MG: 10 INJECTION, SOLUTION INTRAVENOUS at 23:13

## 2022-02-02 RX ADMIN — HEPARIN SODIUM 2550 UNITS/HR: 1000 INJECTION INTRAVENOUS; SUBCUTANEOUS at 07:43

## 2022-02-02 RX ADMIN — ACETAMINOPHEN 650 MG: 325 TABLET, FILM COATED ORAL at 14:11

## 2022-02-02 RX ADMIN — Medication 50 MCG: at 12:42

## 2022-02-02 RX ADMIN — PROPOFOL 60 MCG/KG/MIN: 10 INJECTION, EMULSION INTRAVENOUS at 08:24

## 2022-02-02 RX ADMIN — ACETAMINOPHEN 650 MG: 325 TABLET, FILM COATED ORAL at 18:42

## 2022-02-02 RX ADMIN — Medication 2 PACKET: at 19:30

## 2022-02-02 RX ADMIN — PROPOFOL 75 MCG/KG/MIN: 10 INJECTION, EMULSION INTRAVENOUS at 20:44

## 2022-02-02 RX ADMIN — Medication 2 PACKET: at 11:23

## 2022-02-02 RX ADMIN — Medication 50 MCG: at 08:23

## 2022-02-02 RX ADMIN — SENNOSIDES AND DOCUSATE SODIUM 1 TABLET: 50; 8.6 TABLET ORAL at 19:30

## 2022-02-02 RX ADMIN — Medication 40 MG: at 07:43

## 2022-02-02 RX ADMIN — ACETYLCYSTEINE 2 ML: 200 SOLUTION ORAL; RESPIRATORY (INHALATION) at 16:26

## 2022-02-02 RX ADMIN — PROPOFOL 50 MCG/KG/MIN: 10 INJECTION, EMULSION INTRAVENOUS at 06:30

## 2022-02-02 RX ADMIN — Medication 50 MCG: at 16:51

## 2022-02-02 RX ADMIN — DEXAMETHASONE SODIUM PHOSPHATE 20 MG: 10 INJECTION, SOLUTION INTRAMUSCULAR; INTRAVENOUS at 07:44

## 2022-02-02 RX ADMIN — PROPOFOL 55 MCG/KG/MIN: 10 INJECTION, EMULSION INTRAVENOUS at 01:09

## 2022-02-02 RX ADMIN — PROPOFOL 50 MCG/KG/MIN: 10 INJECTION, EMULSION INTRAVENOUS at 02:30

## 2022-02-02 RX ADMIN — FUROSEMIDE 80 MG: 10 INJECTION, SOLUTION INTRAVENOUS at 07:44

## 2022-02-02 RX ADMIN — FUROSEMIDE 60 MG: 10 INJECTION, SOLUTION INTRAVENOUS at 02:09

## 2022-02-02 RX ADMIN — LABETALOL HYDROCHLORIDE 20 MG: 5 INJECTION, SOLUTION INTRAVENOUS at 18:49

## 2022-02-02 RX ADMIN — HUMAN INSULIN 6 UNITS/HR: 100 INJECTION, SOLUTION SUBCUTANEOUS at 14:16

## 2022-02-02 RX ADMIN — Medication 2 PACKET: at 15:48

## 2022-02-02 RX ADMIN — PROPOFOL 75 MCG/KG/MIN: 10 INJECTION, EMULSION INTRAVENOUS at 09:58

## 2022-02-02 RX ADMIN — PROPOFOL 75 MCG/KG/MIN: 10 INJECTION, EMULSION INTRAVENOUS at 13:56

## 2022-02-02 RX ADMIN — PROPOFOL 75 MCG/KG/MIN: 10 INJECTION, EMULSION INTRAVENOUS at 15:18

## 2022-02-02 RX ADMIN — MEROPENEM 1 G: 1 INJECTION, POWDER, FOR SOLUTION INTRAVENOUS at 15:48

## 2022-02-02 RX ADMIN — HUMAN INSULIN 7 UNITS/HR: 100 INJECTION, SOLUTION SUBCUTANEOUS at 20:48

## 2022-02-02 RX ADMIN — PROPOFOL 75 MCG/KG/MIN: 10 INJECTION, EMULSION INTRAVENOUS at 19:31

## 2022-02-02 RX ADMIN — FENTANYL CITRATE 200 MCG/HR: 50 INJECTION INTRAVENOUS at 20:48

## 2022-02-02 RX ADMIN — Medication 2 PACKET: at 07:43

## 2022-02-02 RX ADMIN — Medication 20 NG/KG/MIN: at 14:10

## 2022-02-02 RX ADMIN — FENTANYL CITRATE 200 MCG/HR: 50 INJECTION INTRAVENOUS at 09:14

## 2022-02-02 RX ADMIN — POTASSIUM & SODIUM PHOSPHATES POWDER PACK 280-160-250 MG 1 PACKET: 280-160-250 PACK at 07:43

## 2022-02-02 RX ADMIN — SENNOSIDES AND DOCUSATE SODIUM 1 TABLET: 50; 8.6 TABLET ORAL at 07:43

## 2022-02-02 RX ADMIN — HUMAN INSULIN 4 UNITS/HR: 100 INJECTION, SOLUTION SUBCUTANEOUS at 04:07

## 2022-02-02 RX ADMIN — Medication 20 NG/KG/MIN: at 04:01

## 2022-02-02 RX ADMIN — ACETYLCYSTEINE 2 ML: 200 SOLUTION ORAL; RESPIRATORY (INHALATION) at 11:35

## 2022-02-02 RX ADMIN — ACETYLCYSTEINE 2 ML: 200 SOLUTION ORAL; RESPIRATORY (INHALATION) at 20:26

## 2022-02-02 RX ADMIN — IPRATROPIUM BROMIDE AND ALBUTEROL SULFATE 3 ML: 2.5; .5 SOLUTION RESPIRATORY (INHALATION) at 11:35

## 2022-02-02 RX ADMIN — IPRATROPIUM BROMIDE AND ALBUTEROL SULFATE 3 ML: 2.5; .5 SOLUTION RESPIRATORY (INHALATION) at 20:26

## 2022-02-02 RX ADMIN — MEROPENEM 1 G: 1 INJECTION, POWDER, FOR SOLUTION INTRAVENOUS at 07:44

## 2022-02-02 RX ADMIN — ASPIRIN 81 MG CHEWABLE TABLET 81 MG: 81 TABLET CHEWABLE at 07:44

## 2022-02-02 ASSESSMENT — ACTIVITIES OF DAILY LIVING (ADL)
ADLS_ACUITY_SCORE: 15
ADLS_ACUITY_SCORE: 13
ADLS_ACUITY_SCORE: 15
ADLS_ACUITY_SCORE: 13
ADLS_ACUITY_SCORE: 15
ADLS_ACUITY_SCORE: 13
ADLS_ACUITY_SCORE: 15
ADLS_ACUITY_SCORE: 13
ADLS_ACUITY_SCORE: 15
ADLS_ACUITY_SCORE: 13
ADLS_ACUITY_SCORE: 15

## 2022-02-02 ASSESSMENT — MIFFLIN-ST. JEOR: SCORE: 2727.63

## 2022-02-02 NOTE — PROGRESS NOTES
Pain Service Progress Note  [facility]  Date: February 2, 2022      Patient Name: Chuy Varner  MRN: [id number]  Age: 31 year old  Sex: male      Assessment:  31 year old male with PMH of possible bicuspid aortic valve, thoracic aortic aneurysm without rupture, hypertension, obesity, testicular cancer s/p orchiectomy    Procedure: Bentall procedure with mechanical valve    Date of Surgery: 1/28/2022    Date of bilateral erector spinae (ES) T5-6 Placement: 1/28/2022  Catheter Day #5.     Plan/Recommendations:  1. Regional Anesthesia/Analgesia  -Continuous Catheter Type/Site: bilateral erector spinae (ES) T5-6    Plan for catheter as soon as able Okay to continue Ropivacaine 0.2% at 7 mL/hr each catheter.      Recommendation to primary team: Pause heparin drip for 5 hours as soon as appropriate.  Plan to remove nerve catheters 4 hrs after infusion paused, then one hour later the bedside RN can resume the infusion. Discussed with CVICU and they will update our service when planning to pause heparin drip.         3. Multimodal Analgesia  - per primary service    Pain Service will continue to follow.    Discussed with attending anesthesiologist      Overnight Events: Remains intubated, sedated    Tubes/Drains: Yes  Chest tubes x 3, Godfrey    Subjective: No pain behaviors with repositioned to prone position this AM. No objective symptoms of LAST    Diet: NPO for Medical/Clinical Reasons Except for: Other; Specify: NPO until Extubated  Advance Diet as Tolerated: Clear Liquid Diet  Adult Formula Drip Feeding: Continuous Vital High Protein; Nasoduodenal tube; Goal Rate: 55; mL/hr; Medication - Feeding Tube Flush Frequency: At least 15-30 mL water before and after medication administration and with tube clogging; Amount to Sen...    Relevant Labs:  Recent Labs   Lab Test 02/02/22  0357 01/29/22  0323 01/28/22  1447   INR  --   --  1.38*      < > 205   PTT  --   --  31   BUN 47*   < > 18     "< > = values in this interval not displayed.       Physical Exam:  Vitals: /88   Pulse 75   Temp 99.7  F (37.6  C)   Resp 18   Ht 1.905 m (6' 3\")   Wt (!) 168.7 kg (371 lb 14.7 oz)   SpO2 97%   BMI 46.49 kg/m      Physical Exam:   Orientation:  Sedation: Yes, on Propofol 75 mcg/kg/min and fentanyl 125 mcg/hr    Motor Examination:  Unable to assess due to sedation    Catheter Site:   Catheter entry site is clean/dry/intact: Yes        Relevant Medications:  Current Pain Medications:  Medications related to Pain Management (From now, onward)    Start     Dose/Rate Route Frequency Ordered Stop    01/31/22 0000  acetaminophen (TYLENOL) tablet 650 mg         650 mg Oral EVERY 4 HOURS PRN 01/28/22 1435      01/30/22 1129  fentaNYL (SUBLIMAZE) 50 mcg/mL bolus from infusion pump 25-50 mcg         25-50 mcg Intravenous EVERY 1 HOUR PRN 01/30/22 1129      01/30/22 0530  fentaNYL (SUBLIMAZE) infusion          mcg/hr  0.5-4 mL/hr  Intravenous CONTINUOUS 01/30/22 0513      01/29/22 0800  polyethylene glycol (MIRALAX) Packet 17 g         17 g Oral DAILY 01/28/22 1435      01/29/22 0800  aspirin (ASA) chewable tablet 81 mg         81 mg Oral or NG Tube DAILY 01/28/22 1435      01/28/22 2000  senna-docusate (SENOKOT-S/PERICOLACE) 8.6-50 MG per tablet 1 tablet         1 tablet Oral 2 TIMES DAILY 01/28/22 1435 01/28/22 2000  propofol (DIPRIVAN) infusion         5-75 mcg/kg/min × 165.9 kg (Dosing Weight)  5-74.7 mL/hr  Intravenous CONTINUOUS 01/28/22 1958 01/28/22 1600  ropivacaine 0.2% (NAROPIN) 750 mL in ON-Q C-Bloc select flow ([id number] holds 600-750 mL) dual cath disposable pump         14 mL/hr  Irrigation CONTINUOUS 01/28/22 1533      01/28/22 1435  HYDROmorphone (PF) (DILAUDID) injection 0.2 mg        \"Or\" Linked Group Details    0.2 mg Intravenous EVERY 2 HOURS PRN 01/28/22 1435      01/28/22 1435  HYDROmorphone (PF) (DILAUDID) injection 0.4 mg        \"Or\" Linked Group Details    0.4 mg Intravenous " "EVERY 2 HOURS PRN 01/28/22 1435      01/28/22 1435  oxyCODONE (ROXICODONE) tablet 5 mg        \"Or\" Linked Group Details    5 mg Oral EVERY 4 HOURS PRN 01/28/22 1435      01/28/22 1435  oxyCODONE IR (ROXICODONE) tablet 10 mg        \"Or\" Linked Group Details    10 mg Oral EVERY 4 HOURS PRN 01/28/22 1435      01/28/22 1435  magnesium hydroxide (MILK OF MAGNESIA) suspension 30 mL         30 mL Oral DAILY PRN 01/28/22 1435      01/28/22 1435  bisacodyl (DULCOLAX) Suppository 10 mg         10 mg Rectal DAILY PRN 01/28/22 1435      01/28/22 1435  methocarbamol (ROBAXIN) tablet 750 mg         750 mg Oral EVERY 6 HOURS PRN 01/28/22 1435      01/28/22 1434  lidocaine 1 % 0.1-1 mL         0.1-1 mL Other EVERY 1 HOUR PRN 01/28/22 1435      01/28/22 1434  lidocaine (LMX4) kit          Topical EVERY 1 HOUR PRN 01/28/22 1435            Primary Service Contacted with Recommendations? Yes    Bree Patricio, APRN CNP  2/2/2022    Time/Communication:  I personally spent 15 minutes with greater than 50% in consultation and coordination of care.  Also discussed with RN, CVICU team      Contact Info (24 hour job code pager is the last 4 digits) For in-house use only:   Job code ID: [id number]   [facility] 0599  Peds [id number]  [employer] phone: dial * * * [phone], enter jobcode ID, then enter call-back number.    Text: Use [employer] on the Intranet <Paging/Directory> tab and enter Jobcode ID.   If no call back at any time, contact the hospital  and ask for Regional Anesthesia attending or backup     "

## 2022-02-02 NOTE — CONSULTS
Pulmonary Consult Note    We were consulted for hypoxemia refractory to ventilator adjustments and antibiotics.     Assessment & Plan      # Acute Hypoxemic Respiratory Failure due to Shunt Physiology  # Pulmonary Shunting from Bilateral Lower Lobe Atelectasis vs. Pneumonia    Chuy Varner is a 31 year old male with PMH of likely bicuspid aortic valve, thoracic aortic aneurysm without rupture, hypertension, obesity, and testicular cancer s/p orchiectomy who underwent Bentall procedure with mechanical valve on 1/28 with Dr. Velasco ad Dr. Hannah. Patient was admitted to CVICU for post-operative management; post-op course complicated by acute hypoxic respiratory failure despite PEEP titration.     CTA (1/30) with no PE but showed bilateral lower lung volumes with moderate bibasilar atelectasis / consolidation likely due to Pna in combination with body habitus. His profound hypoxemia is due to shunt physiology. PEEP titration has been unable to open up the lower lobes. The remained of his lung tissue appears normal, so increased PEEP or volume while the lower lobes are collapsed is likely to overdistend the normal lung tissue.    Recommendations:   - place prone (for 14 to 18 hrs per day according to nursing schedule)  - we reduced PEEP from 18 to 14  - may need to increase PEEP slightly when supine  - as he wakes up more, may benefit from volumes closer to 600 ml for comfort and synchrony (8 ml/kg of his IBW), for now we set it to 530  - pulmonary toilet ie QID duonebs and percussive therapy ie metanebs if possible (may be difficult with recent chest surgery)  - agree with broad abx to treat pneumonia per the CV ICU team    If ventilator settings continue to titrate down, we will follow peripherally.     Discussed with Dr. Mike Neri MD  Pulmonary & Critical Care Fellow          History of Present Illness:     Chuy Varner is a 31 year old male with PMH of likely bicuspid aortic valve, thoracic aortic  "aneurysm without rupture, hypertension, obesity, and testicular cancer s/p orchiectomy who underwent Bentall procedure with mechanical valve on 1/28 with Dr. Velasco ad Dr. Hannah. Patient was admitted to CVICU for post-operative management.     Per surgery note \"Post-op course complicated by acute hypoxic respiratory failure with CXR showing pulmonary edema. Patient developed fever (tmax 102.4F) with worsening respiratory failure on 1/30. Cultures were sent and empiric Zosyn and vancomycin were initiated for possible ventilator associated pneumonia. CT chest negative for PE, with lower lung volumes and moderate bibasilar atelectasis/consolidation. Occult infection cannot be excluded. Blood cultures NGTD x2 days, ET sputum culture with normal michele, nasal MRSA PCR, COVID-19 PCR, and respiratory panel negative. UA negative. Zosyn was escalated to meropenem on 1/31 due to ongoing fever and concern for kidney injury with vancomycin. Repeat blood and sputum cultures pending. UA negative. ARDS being managed with lung protective ventilation, epoprostenol, dexamethasone slow taper, and diuresis. 2/1 CXR shows mildly improved perihilar and basilar prominent interstitial and airspace opacities with stable small bilateral pleural effusions and left basilar opacity, likely a combination of atelectasis and pleural fluid. Vancomycin stopped today. \"    We were consulted for concern for ARDS and would like further evaluation and management.     Patient is not a smoker and no history of asthma. No known recreational drug use.            Review of Symptoms:   10-point ROS reviewed, & found negative w/ exceptions noted in the HPI.          Past Medical History:     No past medical history on file.    Past Surgical History:   Procedure Laterality Date     ORCHIECTOMY SCROTAL       REPLACE VALVE AORTIC N/A 1/28/2022    Procedure: Median Sternotomy.  Cardiopulmonary bypass pump.  Bentall procedure using On-X Ascending Aortic " "Prosthesis with Vascutek Gelweave Valsa Graft  size 27-29MM .  Intraoperative transesophageal echocardiogram per anesthesia;  Surgeon: Nickolas Velasco MD;  Location: [facility] OR            Allergies:     No Known Allergies          Outpatient Medications:     No current facility-administered medications on file prior to encounter.  metoprolol succinate ER (TOPROL-XL) 25 MG 24 hr tablet, Take 25 mg by mouth every morning   Multiple Vitamin (QUINTABS) TABS, Take 1 tablet by mouth every morning               Family History:     Family History   Problem Relation Age of Onset     Anesthesia Reaction No family hx of      Deep Vein Thrombosis (DVT) No family hx of                Social History:     Social History     Tobacco Use     Smoking status: Never Smoker     Smokeless tobacco: Never Used   Substance Use Topics     Alcohol use: Yes     Alcohol/week: 3.0 standard drinks     Types: 3 Standard drinks or equivalent per week     Drug use: Never             Physical Exam:   /88   Pulse 75   Temp 99.7  F (37.6  C)   Resp 18   Ht 1.905 m (6' 3\")   Wt (!) 168.7 kg (371 lb 14.7 oz)   SpO2 97%   BMI 46.49 kg/m      General: Sedated, intubated, laying in bed on stomach  Lungs: Intubated - coarse at bases  Heart: Regular rate and rhythm  Abdomen: Unable to assess  Extremities: Warm and well perfused  Skin: no concerning rashes or lesions  Neurologic: Sedated, not responsive to voice           Data:     Labs (all laboratory studies reviewed by me): notable labs in HPI above.    Imaging and other diagnostic testing (all imaging studies reviewed by me)    Chest xray:  XR CHEST PORT 1 VIEW  2/2/2022 1:05 AM   IMPRESSION:   1. Improved lung volumes with mild perihilar and left basilar  opacities, likely representing pulmonary edema/atelectasis versus  infection.  2. Support devices as described above.    XR PORT 1 VIEW 1/31/22  IMPRESSION: Increased pulmonary edema and bibasilar atelectasis and  consolidation.      CT chest:  CTA " pulmonary angiogram, 1/30/2022 4:35 AM  IMPRESSION:   1. Exam is negative for acute pulmonary embolism.  No evidence of  right heart strain or increased right heart pressures.   2. Lower lung volumes with moderate bibasilar  atelectasis/consolidation. Occult infection cannot be excluded.  3. Postsurgical changes of median sternotomy and cardiothoracic  surgery with pneumomediastinum. Support devices as detailed.       CT vascular 12/13/21:  1. The proximal ascending aorta is severely dilated measuring 55 x 55mm in maximum dimension.  2. The aorta returns to normal size at the level of the arch.  3. No dissection, penetrating ulcer, or intramural hematoma in the visualized thoracic aorta.  4. No significant CAD in a right dominant coronary system.  5. Mild concentric LVH.  6. The aortic valve is likely bicuspid      PFTs:  None in chart to date.       Transthoracic echocardiogram:  01/31/2022   Interpretation Summary  Technically difficult study. Extremely poor acoustic windows.  S/p Bentall procedure using On-X Ascending Aortic Prosthesis with Vascutek  Gelweave Valsa Graft size 27-29MM on 1/28/2022.  Global and regional left ventricular function is hyperkinetic with an EF of  65-70%.  Right ventricular function, chamber size, wall motion, and thickness are  normal.  The mean gradient across the aortic valve is 11 mmHg.  Dilation of the inferior vena cava is present with abnormal respiratory  variation in diameter.  No pericardial fluid. Organizing material noted along RV free wall.    10/26/21:  LV normal in size with normal systolic function. LVEF 60-65%.  LV segmental wall motion normal  Aortic valve likely tri leaflet with no stenosis or regurgitation.  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  Proximal ascending aorta is dilated, measuring 4.6cm with an index of 1.52cm/m2.       Resident/Fellow Attestation   I, Xander Neri, was present  with the medical/ANEL student who participated in the service and in the documentation of the note.  I have verified the history and personally performed the physical exam and medical decision making.  I agree with the assessment and plan of care as documented in the note.

## 2022-02-02 NOTE — PROGRESS NOTES
CV ICU PROGRESS NOTE  02/02/2022      CO-MORBIDITIES:   Aortic root aneurysm (H)    ASSESSMENT: Chuy Varner is a 31 year old male with a PMHx s/f bicuspid aortic valve, thoracic aortic aneurysm without rupture, hypertension, obesity, and testicular cancer s/p orchiectomy who underwent Bentall procedure with mechanical valve on 1/28 with Dr. Velasco and Dr. Hannah.    OVERNIGHT EVENTS:  NAEOs. CMV: 18, 500, 18, 75%. Total of 260 IV lasix with 3L urine output (net even). CXR improved lung volumes with mild perihilar and retrocardiac opacities. Fentanyl @ 125, propofol @ 50, heparin @ 2550.    TODAY'S PROGRESS:   - Consider Proning (RAAS goal -4 to -5) vs prone  - Wean flolan and FiO2 as able  - Follow blood and respiratory cultures from 1/31  - Continue IV lasix 80 TID with goal net negative 500 to 1L  - Continue Dexamethasone 20 mg daily x5 days (followed by 10mg x5 days snd taper)  - Continue meropenem x 7 days  - Daily lactate  - Afternoon BMP, Mag, Phos    PLAN:  Neuro/ pain/ sedation:  # Acute Postoperative pain  - Monitor neurological status. Notify the MD for any acute changes in exam.  - Pain: fentanyl gtt. Scheduled tylenol. PRN tylenol, oxycodone, robaxin  - Sedation: propofol gtt  - RAAS -4 to -5 if proning      Pulmonary care:   # Postoperative ventilation management  # Acute hypoxemic respiratory failure likely ARDS vs. PNA vs. Hypervolemia vs. TRALI  Started on Flolan 1/30. COVID-19.Mycoplasma, Parainfluenza and Influenza Negative. CTA negative for PE on 1/30.  - Prone today  - Possible bronch today  - Lung protective ventilation   - CMV: 18, 500, 18, 75%  - Titrate supplemental oxygen to maintain saturation above 90%  - Wean flolan and FiO2 as able  - Continue Dexamethasone 20 mg daily x5 days, 10 mg daily x5, slow taper following (started 1/31)  - Pulmonary hygiene: Incentive spirometer every 15- 30 minutes when awake, flutter valve, C&DB     Cardiovascular:    # Ascending aortic aneurysm s/p  Bentall w/ Jerry valve on 1/28 with Dr. Velasco and Dr. Hannah  # Bicuspid aortic valve  # History of thoracic aortic aneurysm without rupture  # History of hypertension  Recent echo on 10/26/21 with LVEF of 60-65%.  CT vascular 12/13/21 showing proximal ascending aorta is severely dilated (55 x 55mm in maximum dimension) and likely bicuspid aortic valve.  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  CT PE 1/30 - negative for acute PE, marked dependent atelectasis R>L.  - Monitor hemodynamic status  - Goal MAP>65, SBP<110  - Start statin  - BB hold  - ASA  - Holding PTA meds: metoprolol succinate 25mg daily  - Labetalol PRN to maintain SBP <110 once off pressors     GI care/ Nutrition:   # Obesity (BMI 46)  - NJ placement, advance to goal  - PPI  - Continue bowel regimen: miralax, senna    Renal/ Fluid Balance/ Electrolytes:   BL creat appears to be ~ 1.0  - Strict I/O, daily weights  - Lasix 80 TID, goal -500 to -1L net  - Avoid/limit nephrotoxins as able  - Replete lytes PRN per protocol  - Scheduled afternoon lytes     Endocrine:    # Stress induced hyperglycemia, improving  - Insulin gtt  - Goal BG <180 for optimal healing     ID/ Antibiotics:  # Stress induced leukocytosis, resolved  # Possible ventilator associated pneumonia  Febrile and pan-cultured on 1/30/22. Covid pcr, respiratory panel, and respiratory cultures negative 1/30. Re-cultured 1/31: respiratory and blood cultures NGTD.  - Continue to monitor fever curve, WBC and inflammatory markers  - Continue meropenem x7 days (started 1/31)  - Blood and respiratory cultures 1/31 pending     Heme/Onc:     # Stress induced leukocytosis, resolved  # Acute blood loss anemia  # Acute blood loss thrombocytopenia, resolved  # History of testicular cancer s/p orchiectomy  No s/sx active bleeding  - Continue to  monitor  - CBC daily  - Continue low intensity heparin gtt     MSK/ Skin:  # Sternotomy  # Surgical Incision  - Sternal precautions  - Postoperative incision management per protocol  - PT/OT/CR     Prophylaxis:    - Mechanical prophylaxis for DVT  - Chemical DVT prophylaxis- heparin gtt  - PPI     Lines/ tubes/ drains:  - PA catheter (1/28)  - RIJ MAC (1/28)  - Left radial arterial line (1/28)  - Left PIV (1/28)  - Godfrey (1/28)  - 3 CTs (2 anterior mediastinal, 1 left mediastinal) (1/28)  - OG (1/28)  - Nasoduodenal (1/31)  - ETT (1/28)  - Bilateral ES catheters (1/28)     Disposition:  - CVICU     Patient seen, findings and plan discussed with CV ICU staff, Dr. Valles and CVTS staff Dr. Velasco.    Michelle Poon, MS4    I was present with the medical student who participated in the service and in the documentation of the note. I have verified the history and personally performed the physical exam and medical decision making. I agree with the assessment and plan of care as documented in the note.    Tom Adair MD  Anesthesiology, PGY3    ====================================    SUBJECTIVE:   Improving CXR, now on CMV: 18, 500, 18, 75%. Continuing broad spectrum abx. Awaiting repeat blood and respiratory cultures.      OBJECTIVE:   1. VITAL SIGNS:   Temp:  [99.5  F (37.5  C)-100.9  F (38.3  C)] 99.5  F (37.5  C)  Pulse:  [] 68  Resp:  [18-24] 18  MAP:  [61 mmHg-100 mmHg] 68 mmHg  Arterial Line BP: ()/(46-77) 110/51  FiO2 (%):  [65 %-100 %] 75 %  SpO2:  [83 %-100 %] 96 %  Ventilation Mode: CMV/AC  (Continuous Mandatory Ventilation/ Assist Control)  FiO2 (%): 75 %  Rate Set (breaths/minute): 18 breaths/min  Tidal Volume Set (mL): 500 mL  PEEP (cm H2O): 18 cmH2O  Oxygen Concentration (%): 75 %  Resp: 18      2. INTAKE/ OUTPUT:   I/O last 3 completed shifts:  In: 4215.2 [I.V.:2495.2; NG/GT:720]  Out: 4509 [Urine:3739; Emesis/NG output:650; Chest Tube:120]    3. PHYSICAL EXAMINATION:   General: Sedated,  intubated, laying in bed on back  Neuro: Sedated, opening eyes to voice  Resp: Intubated, at 75% FIO2  CV: RRR, temporary pacemaker on standby  Abdomen: Soft, Non-distended  Incisions: C/D/I  Extremities: warm and well perfused, SCDs in place    4. INVESTIGATIONS:   Arterial Blood Gases   Recent Labs   Lab 02/02/22  0358 02/01/22  2201 02/01/22  1846 02/01/22  1624   PH 7.41 7.40 7.40 7.39   PCO2 50* 49* 46* 49*   PO2 71* 75* 77* 59*   HCO3 32* 30* 29* 29*     Complete Blood Count   Recent Labs   Lab 02/02/22  0357 02/01/22  0326 01/31/22  0939 01/31/22  0332   WBC 12.8* 12.5* 10.9 10.1   HGB 9.4* 10.1* 11.5* 11.5*    165 139* 133*     Basic Metabolic Panel  Recent Labs   Lab 02/02/22  0804 02/02/22  0704 02/02/22  0610 02/02/22  0452 02/02/22  0357 02/01/22  1629 02/01/22  1624 02/01/22  1453 02/01/22  1354 02/01/22  0805 02/01/22  0748 02/01/22  0344 02/01/22  0326   NA  --   --   --   --  143  --  143  --  142  --   --   --  142   POTASSIUM  --   --   --   --  3.7  --  4.0  --  4.0  --  3.8  --  3.9   CHLORIDE  --   --   --   --  109  --  110*  --  108  --   --   --  109   CO2  --   --   --   --  29  --  27  --  28  --   --   --  28   BUN  --   --   --   --  47*  --  44*  --  43*  --   --   --  34*   CR  --   --   --   --  1.24  --  1.35*  --  1.37*  --   --   --  1.14   * 129* 132* 126* 138*   < > 166*   < > 159*   < >  --    < > 156*    < > = values in this interval not displayed.     Liver Function Tests  Recent Labs   Lab 02/02/22  0357 02/01/22  0326 01/31/22  0332 01/30/22  0312 01/29/22  0323 01/28/22  1447 01/28/22  1246   AST 15 18 27 31   < > 50*  --    ALT 20 20 20 24   < > 28  --    ALKPHOS 51 54 46 40   < > 44  --    BILITOTAL 0.2 0.3 0.7 0.7   < > 0.6  --    ALBUMIN 2.2* 2.3* 2.4* 2.8*   < > 3.1*  --    INR  --   --   --   --   --  1.38* 1.53*    < > = values in this interval not displayed.     Pancreatic Enzymes  No lab results found in last 7 days.  Coagulation Profile  Recent Labs    Lab 01/28/22  1447 01/28/22  1246   INR 1.38* 1.53*   PTT 31 28         5. RADIOLOGY:   Recent Results (from the past 24 hour(s))   XR Chest Port 1 View    Narrative    EXAM: XR CHEST PORT 1 VIEW  1/29/2022 8:45 AM     HISTORY:  increasing FiO2 requirements       COMPARISON:  Chest x-ray 1/28/2022    FINDINGS: AP radiograph of the chest. Endotracheal tube projecting  over the high thoracic trachea. Right IJ [place]-Nelda catheter with tip  overlying the main pulmonary artery. Partially visualized enteric tube  with tip overlying the stomach. Postoperative chest with intact median  sternotomy wires.    Stable borderline enlargement of the cardiomediastinal silhouette. Low  lung volumes. Small left and possible trace right pleural effusions.  Pulmonary vasculature is indistinct. Perihilar interstitial prominent  opacities. No pneumothorax. Visualized upper abdomen is unremarkable.      Impression    IMPRESSION:  1. Stable endotracheal tube and right IJ [place]-Nelda catheter.  2. Low lung volumes with unchanged perihilar interstitial prominent  opacities, likely pulmonary edema and/or atelectasis.  3. Increased small left and possible trace right pleural effusions.    I have personally reviewed the examination and initial interpretation  and I agree with the findings.    BENTLEY ANTONIO MD         SYSTEM ID:  [id number]   XR Chest Port 1 View    Impression    RESIDENT PRELIMINARY INTERPRETATION  Impression:   1. Worsening of low lung volumes, small left greater than right  pleural effusions, and perihilar opacities compatible with pulmonary  edema and/or atelectasis. Infection cannot be excluded.  2. Endotracheal tube has been advanced with tip projecting over mid  thoracic trachea. Good position.  3. Stable additional support devices and postsurgical changes.   CT Chest Pulmonary Embolism w Contrast    Impression    RESIDENT PRELIMINARY INTERPRETATION  IMPRESSION:   1. Exam is negative for acute pulmonary embolism.  No  evidence of  right heart strain or increased right heart pressures.       2. Marked dependent atelectasis in the right than left lungs. Occult  infection cannot be excluded.    3. Postsurgical changes of median sternotomy and aortic valve repair.  Satisfactory position of support devices as detailed.       In the event of a positive result for acute pulmonary embolism  resulting in right heart strain, consider calling the   [facility] hospital  for PERT (Pulmonary Embolism Response Team)  Activation?    PERT -- Pulmonary Embolism Response Team (Multidisciplinary team  including cardiology, interventional radiology, critical care,  hematology)       =========================================

## 2022-02-02 NOTE — PLAN OF CARE
Major Shift Events:  Pt VSS and slightly febrile. Pt has cooling blanket on to achieve normothermia. Pt is intubated and sedated. Pt is on prop and fent for sedation. Pt on CMV, peep 14, rate 18, , and FiO2 30%. Pt has heparin and insulin gtt running. Pt was proned at 0930. Pt tolerating proning well. Pt has tf running and tolerating them well. Pt having adequate output from CT. Pt having adequate UO. Monitoring ABG's and titrate FiO2 accordingly.     Plan: Supine pt at 4am and monitor status on the vent.     For vital signs and complete assessments, please see documentation flowsheets.

## 2022-02-02 NOTE — PLAN OF CARE
Major Shift Events:  Remains sedated RASS -2/-3 on Prop and Fent. Pupils 3mm, equal and reactive. Withdraws to pain. Tmax 100.8, Sinus rhythm, no noted ectopy. MAP goal >65, no issues overnight. Levo off since 2/1 @ 1000. CMV 75%/500/18/18, PIPs high 20s/low 30s. Veletri & Nitric @ 20. RT performed recruitment maneuver q4h with good results. O2 sat mid-high 90s. Able to tolerate brief turn to clean BM. Unable to tolerated laying flat, per MD okay to forego FREDA numbers overnight. Small, thick, creamy oral/ETT secretions. One small, loose BM, good UOP with Godfrey. Lasix given x 1. Sternal incision with wound vac in place, CDI. Mediastinal CT x 3, approx 0-20 mL, serosanguinous fluid q2h. OG to LIS, approximately 350 out, NJT with TF @ goal 65mL/hr (adjusted at 0400), standard FWF. Cooled with cooling blanket, fan, ice packs, cool towel. GFDebbie updated @ 0000, will be in today. Heparin increased x 2, per protocol. K+ 3.7, replaced per protocol. Hgb 9.1, no signs of active bleeding. Insulin gtt titrated 2-6u, algorithm 4.     Plan: Continue to monitor respiratory status, wean vent settings if able. Monitor UOP. Recheck Hep Xa @ 1115, K+ @ 1000.     For vital signs and complete assessments, please see documentation flowsheets.        Problem: Bowel Elimination Impaired (Cardiovascular Surgery)  Goal: Effective Bowel Elimination  Outcome: Improving     Problem: Bleeding (Cardiovascular Surgery)  Goal: Absence of Bleeding  Outcome: Improving     Problem: Cardiac Function Impaired (Cardiovascular Surgery)  Goal: Effective Cardiac Function  Outcome: No Change     Problem: Cerebral Tissue Perfusion Risk (Cardiovascular Surgery)  Goal: Effective Cerebral Perfusion  Outcome: No Change

## 2022-02-03 ENCOUNTER — APPOINTMENT (OUTPATIENT)
Dept: GENERAL RADIOLOGY | Facility: CLINIC | Age: 32
End: 2022-02-03
Attending: SURGERY
Payer: COMMERCIAL

## 2022-02-03 LAB
ALBUMIN SERPL-MCNC: 2.3 G/DL (ref 3.4–5)
ALP SERPL-CCNC: 67 U/L (ref 40–150)
ALT SERPL W P-5'-P-CCNC: 34 U/L (ref 0–70)
ANION GAP SERPL CALCULATED.3IONS-SCNC: 4 MMOL/L (ref 3–14)
ANION GAP SERPL CALCULATED.3IONS-SCNC: 4 MMOL/L (ref 3–14)
AST SERPL W P-5'-P-CCNC: 26 U/L (ref 0–45)
BASE EXCESS BLDA CALC-SCNC: 8.3 MMOL/L (ref -9–1.8)
BASE EXCESS BLDA CALC-SCNC: 8.8 MMOL/L (ref -9–1.8)
BASE EXCESS BLDA CALC-SCNC: 9.1 MMOL/L (ref -9–1.8)
BASE EXCESS BLDA CALC-SCNC: 9.2 MMOL/L (ref -9–1.8)
BASE EXCESS BLDA CALC-SCNC: 9.4 MMOL/L (ref -9–1.8)
BASE EXCESS BLDA CALC-SCNC: 9.7 MMOL/L (ref -9–1.8)
BASE EXCESS BLDV CALC-SCNC: 9.9 MMOL/L (ref -7.7–1.9)
BILIRUB SERPL-MCNC: 0.3 MG/DL (ref 0.2–1.3)
BUN SERPL-MCNC: 43 MG/DL (ref 7–30)
BUN SERPL-MCNC: 47 MG/DL (ref 7–30)
CALCIUM SERPL-MCNC: 8.8 MG/DL (ref 8.5–10.1)
CALCIUM SERPL-MCNC: 8.8 MG/DL (ref 8.5–10.1)
CHLORIDE BLD-SCNC: 106 MMOL/L (ref 94–109)
CHLORIDE BLD-SCNC: 107 MMOL/L (ref 94–109)
CO2 SERPL-SCNC: 32 MMOL/L (ref 20–32)
CO2 SERPL-SCNC: 33 MMOL/L (ref 20–32)
CREAT SERPL-MCNC: 0.96 MG/DL (ref 0.66–1.25)
CREAT SERPL-MCNC: 1.07 MG/DL (ref 0.66–1.25)
ERYTHROCYTE [DISTWIDTH] IN BLOOD BY AUTOMATED COUNT: 12.9 % (ref 10–15)
GFR SERPL CREATININE-BSD FRML MDRD: >90 ML/MIN/1.73M2
GFR SERPL CREATININE-BSD FRML MDRD: >90 ML/MIN/1.73M2
GLUCOSE BLD-MCNC: 123 MG/DL (ref 70–99)
GLUCOSE BLD-MCNC: 157 MG/DL (ref 70–99)
GLUCOSE BLDC GLUCOMTR-MCNC: 110 MG/DL (ref 70–99)
GLUCOSE BLDC GLUCOMTR-MCNC: 113 MG/DL (ref 70–99)
GLUCOSE BLDC GLUCOMTR-MCNC: 113 MG/DL (ref 70–99)
GLUCOSE BLDC GLUCOMTR-MCNC: 117 MG/DL (ref 70–99)
GLUCOSE BLDC GLUCOMTR-MCNC: 119 MG/DL (ref 70–99)
GLUCOSE BLDC GLUCOMTR-MCNC: 128 MG/DL (ref 70–99)
GLUCOSE BLDC GLUCOMTR-MCNC: 129 MG/DL (ref 70–99)
GLUCOSE BLDC GLUCOMTR-MCNC: 130 MG/DL (ref 70–99)
GLUCOSE BLDC GLUCOMTR-MCNC: 133 MG/DL (ref 70–99)
GLUCOSE BLDC GLUCOMTR-MCNC: 135 MG/DL (ref 70–99)
GLUCOSE BLDC GLUCOMTR-MCNC: 137 MG/DL (ref 70–99)
GLUCOSE BLDC GLUCOMTR-MCNC: 140 MG/DL (ref 70–99)
GLUCOSE BLDC GLUCOMTR-MCNC: 153 MG/DL (ref 70–99)
GLUCOSE BLDC GLUCOMTR-MCNC: 156 MG/DL (ref 70–99)
GLUCOSE BLDC GLUCOMTR-MCNC: 157 MG/DL (ref 70–99)
GLUCOSE BLDC GLUCOMTR-MCNC: 167 MG/DL (ref 70–99)
GLUCOSE BLDC GLUCOMTR-MCNC: 98 MG/DL (ref 70–99)
HCO3 BLD-SCNC: 34 MMOL/L (ref 21–28)
HCO3 BLD-SCNC: 35 MMOL/L (ref 21–28)
HCO3 BLDV-SCNC: 36 MMOL/L (ref 21–28)
HCT VFR BLD AUTO: 31.4 % (ref 40–53)
HGB BLD-MCNC: 9.8 G/DL (ref 13.3–17.7)
LACTATE SERPL-SCNC: 1.1 MMOL/L (ref 0.7–2)
MAGNESIUM SERPL-MCNC: 2.8 MG/DL (ref 1.6–2.3)
MCH RBC QN AUTO: 29.8 PG (ref 26.5–33)
MCHC RBC AUTO-ENTMCNC: 31.2 G/DL (ref 31.5–36.5)
MCV RBC AUTO: 95 FL (ref 78–100)
O2/TOTAL GAS SETTING VFR VENT: 30 %
O2/TOTAL GAS SETTING VFR VENT: 30 %
O2/TOTAL GAS SETTING VFR VENT: 40 %
O2/TOTAL GAS SETTING VFR VENT: 40 %
O2/TOTAL GAS SETTING VFR VENT: 50 %
O2/TOTAL GAS SETTING VFR VENT: 50 %
O2/TOTAL GAS SETTING VFR VENT: 60 %
OXYHGB MFR BLD: 91 % (ref 92–100)
OXYHGB MFR BLD: 92 % (ref 92–100)
OXYHGB MFR BLD: 94 % (ref 92–100)
OXYHGB MFR BLD: 94 % (ref 92–100)
OXYHGB MFR BLD: 95 % (ref 92–100)
OXYHGB MFR BLD: 97 % (ref 92–100)
OXYHGB MFR BLDV: 56 % (ref 70–75)
PCO2 BLD: 49 MM HG (ref 35–45)
PCO2 BLD: 50 MM HG (ref 35–45)
PCO2 BLD: 50 MM HG (ref 35–45)
PCO2 BLD: 52 MM HG (ref 35–45)
PCO2 BLD: 53 MM HG (ref 35–45)
PCO2 BLD: 54 MM HG (ref 35–45)
PCO2 BLDV: 55 MM HG (ref 40–50)
PH BLD: 7.42 [PH] (ref 7.35–7.45)
PH BLD: 7.42 [PH] (ref 7.35–7.45)
PH BLD: 7.43 [PH] (ref 7.35–7.45)
PH BLD: 7.45 [PH] (ref 7.35–7.45)
PH BLD: 7.45 [PH] (ref 7.35–7.45)
PH BLD: 7.46 [PH] (ref 7.35–7.45)
PH BLDV: 7.43 [PH] (ref 7.32–7.43)
PLATELET # BLD AUTO: 250 10E3/UL (ref 150–450)
PO2 BLD: 102 MM HG (ref 80–105)
PO2 BLD: 62 MM HG (ref 80–105)
PO2 BLD: 66 MM HG (ref 80–105)
PO2 BLD: 76 MM HG (ref 80–105)
PO2 BLD: 79 MM HG (ref 80–105)
PO2 BLD: 80 MM HG (ref 80–105)
PO2 BLDV: 31 MM HG (ref 25–47)
POTASSIUM BLD-SCNC: 3.4 MMOL/L (ref 3.4–5.3)
POTASSIUM BLD-SCNC: 3.5 MMOL/L (ref 3.4–5.3)
POTASSIUM BLD-SCNC: 3.7 MMOL/L (ref 3.4–5.3)
POTASSIUM BLD-SCNC: 3.9 MMOL/L (ref 3.4–5.3)
PROT SERPL-MCNC: 6.7 G/DL (ref 6.8–8.8)
RBC # BLD AUTO: 3.29 10E6/UL (ref 4.4–5.9)
SODIUM SERPL-SCNC: 143 MMOL/L (ref 133–144)
SODIUM SERPL-SCNC: 143 MMOL/L (ref 133–144)
TRIGL SERPL-MCNC: 293 MG/DL
UFH PPP CHRO-ACNC: 0.16 IU/ML
UFH PPP CHRO-ACNC: 0.3 IU/ML
UFH PPP CHRO-ACNC: 0.36 IU/ML
WBC # BLD AUTO: 14.9 10E3/UL (ref 4–11)

## 2022-02-03 PROCEDURE — 99232 SBSQ HOSP IP/OBS MODERATE 35: CPT | Mod: 24 | Performed by: INTERNAL MEDICINE

## 2022-02-03 PROCEDURE — 82805 BLOOD GASES W/O2 SATURATION: CPT | Performed by: NURSE PRACTITIONER

## 2022-02-03 PROCEDURE — 82040 ASSAY OF SERUM ALBUMIN: CPT | Performed by: STUDENT IN AN ORGANIZED HEALTH CARE EDUCATION/TRAINING PROGRAM

## 2022-02-03 PROCEDURE — 258N000003 HC RX IP 258 OP 636: Performed by: NURSE PRACTITIONER

## 2022-02-03 PROCEDURE — 99291 CRITICAL CARE FIRST HOUR: CPT | Mod: 24 | Performed by: ANESTHESIOLOGY

## 2022-02-03 PROCEDURE — 94645 CONT INHLJ TX EACH ADDL HOUR: CPT

## 2022-02-03 PROCEDURE — 83605 ASSAY OF LACTIC ACID: CPT | Performed by: NURSE PRACTITIONER

## 2022-02-03 PROCEDURE — 85027 COMPLETE CBC AUTOMATED: CPT | Performed by: STUDENT IN AN ORGANIZED HEALTH CARE EDUCATION/TRAINING PROGRAM

## 2022-02-03 PROCEDURE — 250N000011 HC RX IP 250 OP 636: Performed by: SURGERY

## 2022-02-03 PROCEDURE — 999N000157 HC STATISTIC RCP TIME EA 10 MIN

## 2022-02-03 PROCEDURE — 999N000015 HC STATISTIC ARTERIAL MONITORING DAILY

## 2022-02-03 PROCEDURE — 250N000013 HC RX MED GY IP 250 OP 250 PS 637: Performed by: STUDENT IN AN ORGANIZED HEALTH CARE EDUCATION/TRAINING PROGRAM

## 2022-02-03 PROCEDURE — 84132 ASSAY OF SERUM POTASSIUM: CPT | Performed by: SURGERY

## 2022-02-03 PROCEDURE — 71045 X-RAY EXAM CHEST 1 VIEW: CPT

## 2022-02-03 PROCEDURE — 250N000011 HC RX IP 250 OP 636: Performed by: STUDENT IN AN ORGANIZED HEALTH CARE EDUCATION/TRAINING PROGRAM

## 2022-02-03 PROCEDURE — 250N000013 HC RX MED GY IP 250 OP 250 PS 637: Performed by: SURGERY

## 2022-02-03 PROCEDURE — 80053 COMPREHEN METABOLIC PANEL: CPT | Performed by: STUDENT IN AN ORGANIZED HEALTH CARE EDUCATION/TRAINING PROGRAM

## 2022-02-03 PROCEDURE — 94003 VENT MGMT INPAT SUBQ DAY: CPT

## 2022-02-03 PROCEDURE — 85520 HEPARIN ASSAY: CPT | Performed by: SURGERY

## 2022-02-03 PROCEDURE — 250N000009 HC RX 250: Performed by: NURSE PRACTITIONER

## 2022-02-03 PROCEDURE — 84478 ASSAY OF TRIGLYCERIDES: CPT | Performed by: SURGERY

## 2022-02-03 PROCEDURE — 250N000009 HC RX 250: Performed by: SURGERY

## 2022-02-03 PROCEDURE — 94799 UNLISTED PULMONARY SVC/PX: CPT

## 2022-02-03 PROCEDURE — 200N000002 HC R&B ICU UMMC

## 2022-02-03 PROCEDURE — 71045 X-RAY EXAM CHEST 1 VIEW: CPT | Mod: 26 | Performed by: RADIOLOGY

## 2022-02-03 PROCEDURE — 250N000011 HC RX IP 250 OP 636: Performed by: NURSE PRACTITIONER

## 2022-02-03 PROCEDURE — 85520 HEPARIN ASSAY: CPT | Performed by: STUDENT IN AN ORGANIZED HEALTH CARE EDUCATION/TRAINING PROGRAM

## 2022-02-03 PROCEDURE — 94640 AIRWAY INHALATION TREATMENT: CPT | Mod: 76

## 2022-02-03 PROCEDURE — 82805 BLOOD GASES W/O2 SATURATION: CPT | Performed by: STUDENT IN AN ORGANIZED HEALTH CARE EDUCATION/TRAINING PROGRAM

## 2022-02-03 PROCEDURE — 83735 ASSAY OF MAGNESIUM: CPT | Performed by: STUDENT IN AN ORGANIZED HEALTH CARE EDUCATION/TRAINING PROGRAM

## 2022-02-03 PROCEDURE — 250N000009 HC RX 250

## 2022-02-03 PROCEDURE — 999N000045 HC STATISTIC DAILY SWAN MONITORING

## 2022-02-03 RX ORDER — LABETALOL HYDROCHLORIDE 5 MG/ML
20 INJECTION, SOLUTION INTRAVENOUS ONCE
Status: COMPLETED | OUTPATIENT
Start: 2022-02-04 | End: 2022-02-03

## 2022-02-03 RX ORDER — POTASSIUM CHLORIDE 29.8 MG/ML
20 INJECTION INTRAVENOUS ONCE
Status: COMPLETED | OUTPATIENT
Start: 2022-02-03 | End: 2022-02-03

## 2022-02-03 RX ORDER — LABETALOL HYDROCHLORIDE 5 MG/ML
10-20 INJECTION, SOLUTION INTRAVENOUS EVERY 4 HOURS PRN
Status: DISCONTINUED | OUTPATIENT
Start: 2022-02-03 | End: 2022-02-10

## 2022-02-03 RX ORDER — POTASSIUM CHLORIDE 29.8 MG/ML
20 INJECTION INTRAVENOUS
Status: COMPLETED | OUTPATIENT
Start: 2022-02-03 | End: 2022-02-03

## 2022-02-03 RX ORDER — POTASSIUM CHLORIDE 20MEQ/15ML
40 LIQUID (ML) ORAL 2 TIMES DAILY
Status: DISCONTINUED | OUTPATIENT
Start: 2022-02-03 | End: 2022-02-09

## 2022-02-03 RX ORDER — FUROSEMIDE 10 MG/ML
80 INJECTION INTRAMUSCULAR; INTRAVENOUS EVERY 6 HOURS
Status: DISCONTINUED | OUTPATIENT
Start: 2022-02-03 | End: 2022-02-05

## 2022-02-03 RX ORDER — HYDROMORPHONE HCL IN WATER/PF 6 MG/30 ML
0.2 PATIENT CONTROLLED ANALGESIA SYRINGE INTRAVENOUS
Status: DISCONTINUED | OUTPATIENT
Start: 2022-02-03 | End: 2022-02-26

## 2022-02-03 RX ORDER — LABETALOL HYDROCHLORIDE 5 MG/ML
10 INJECTION, SOLUTION INTRAVENOUS EVERY 4 HOURS PRN
Status: DISCONTINUED | OUTPATIENT
Start: 2022-02-03 | End: 2022-02-03

## 2022-02-03 RX ORDER — POTASSIUM CHLORIDE 1.5 G/1.58G
20 POWDER, FOR SOLUTION ORAL ONCE
Status: COMPLETED | OUTPATIENT
Start: 2022-02-03 | End: 2022-02-03

## 2022-02-03 RX ORDER — HYDROMORPHONE HCL IN WATER/PF 6 MG/30 ML
0.4 PATIENT CONTROLLED ANALGESIA SYRINGE INTRAVENOUS
Status: DISCONTINUED | OUTPATIENT
Start: 2022-02-03 | End: 2022-02-26

## 2022-02-03 RX ORDER — FUROSEMIDE 10 MG/ML
40 INJECTION INTRAMUSCULAR; INTRAVENOUS EVERY 6 HOURS
Status: DISCONTINUED | OUTPATIENT
Start: 2022-02-03 | End: 2022-02-03

## 2022-02-03 RX ADMIN — POLYETHYLENE GLYCOL 3350 17 G: 17 POWDER, FOR SOLUTION ORAL at 07:32

## 2022-02-03 RX ADMIN — POTASSIUM CHLORIDE 20 MEQ: 29.8 INJECTION, SOLUTION INTRAVENOUS at 00:29

## 2022-02-03 RX ADMIN — PROPOFOL 75 MCG/KG/MIN: 10 INJECTION, EMULSION INTRAVENOUS at 21:05

## 2022-02-03 RX ADMIN — ACETYLCYSTEINE 2 ML: 200 SOLUTION ORAL; RESPIRATORY (INHALATION) at 07:25

## 2022-02-03 RX ADMIN — HUMAN INSULIN 4 UNITS/HR: 100 INJECTION, SOLUTION SUBCUTANEOUS at 20:06

## 2022-02-03 RX ADMIN — POTASSIUM CHLORIDE 40 MEQ: 40 SOLUTION ORAL at 20:14

## 2022-02-03 RX ADMIN — IPRATROPIUM BROMIDE AND ALBUTEROL SULFATE 3 ML: 2.5; .5 SOLUTION RESPIRATORY (INHALATION) at 11:51

## 2022-02-03 RX ADMIN — PROPOFOL 75 MCG/KG/MIN: 10 INJECTION, EMULSION INTRAVENOUS at 22:18

## 2022-02-03 RX ADMIN — IPRATROPIUM BROMIDE AND ALBUTEROL SULFATE 3 ML: 2.5; .5 SOLUTION RESPIRATORY (INHALATION) at 15:18

## 2022-02-03 RX ADMIN — PROPOFOL 75 MCG/KG/MIN: 10 INJECTION, EMULSION INTRAVENOUS at 16:10

## 2022-02-03 RX ADMIN — POTASSIUM CHLORIDE 40 MEQ: 40 SOLUTION ORAL at 11:47

## 2022-02-03 RX ADMIN — ASPIRIN 81 MG CHEWABLE TABLET 81 MG: 81 TABLET CHEWABLE at 07:32

## 2022-02-03 RX ADMIN — HEPARIN SODIUM 3000 UNITS/HR: 1000 INJECTION INTRAVENOUS; SUBCUTANEOUS at 23:32

## 2022-02-03 RX ADMIN — Medication 20 NG/KG/MIN: at 09:02

## 2022-02-03 RX ADMIN — PROPOFOL 75 MCG/KG/MIN: 10 INJECTION, EMULSION INTRAVENOUS at 00:28

## 2022-02-03 RX ADMIN — FUROSEMIDE 80 MG: 10 INJECTION, SOLUTION INTRAVENOUS at 17:03

## 2022-02-03 RX ADMIN — HUMAN INSULIN 7 UNITS/HR: 100 INJECTION, SOLUTION SUBCUTANEOUS at 12:19

## 2022-02-03 RX ADMIN — ACETYLCYSTEINE 2 ML: 200 SOLUTION ORAL; RESPIRATORY (INHALATION) at 19:43

## 2022-02-03 RX ADMIN — LABETALOL HYDROCHLORIDE 20 MG: 5 INJECTION, SOLUTION INTRAVENOUS at 23:51

## 2022-02-03 RX ADMIN — PROPOFOL 75 MCG/KG/MIN: 10 INJECTION, EMULSION INTRAVENOUS at 18:33

## 2022-02-03 RX ADMIN — Medication 50 MCG: at 10:15

## 2022-02-03 RX ADMIN — PROPOFOL 75 MCG/KG/MIN: 10 INJECTION, EMULSION INTRAVENOUS at 09:23

## 2022-02-03 RX ADMIN — Medication 2 PACKET: at 20:14

## 2022-02-03 RX ADMIN — POTASSIUM CHLORIDE 20 MEQ: 29.8 INJECTION, SOLUTION INTRAVENOUS at 05:12

## 2022-02-03 RX ADMIN — PROPOFOL 75 MCG/KG/MIN: 10 INJECTION, EMULSION INTRAVENOUS at 10:45

## 2022-02-03 RX ADMIN — PROPOFOL 75 MCG/KG/MIN: 10 INJECTION, EMULSION INTRAVENOUS at 19:50

## 2022-02-03 RX ADMIN — HUMAN INSULIN 7 UNITS/HR: 100 INJECTION, SOLUTION SUBCUTANEOUS at 02:29

## 2022-02-03 RX ADMIN — Medication 40 MG: at 07:33

## 2022-02-03 RX ADMIN — Medication 50 MCG: at 08:50

## 2022-02-03 RX ADMIN — LABETALOL HYDROCHLORIDE 10 MG: 5 INJECTION, SOLUTION INTRAVENOUS at 23:15

## 2022-02-03 RX ADMIN — DEXAMETHASONE SODIUM PHOSPHATE 20 MG: 10 INJECTION, SOLUTION INTRAMUSCULAR; INTRAVENOUS at 07:33

## 2022-02-03 RX ADMIN — MEROPENEM 1 G: 1 INJECTION, POWDER, FOR SOLUTION INTRAVENOUS at 07:32

## 2022-02-03 RX ADMIN — IPRATROPIUM BROMIDE AND ALBUTEROL SULFATE 3 ML: 2.5; .5 SOLUTION RESPIRATORY (INHALATION) at 19:43

## 2022-02-03 RX ADMIN — PROPOFOL 75 MCG/KG/MIN: 10 INJECTION, EMULSION INTRAVENOUS at 13:25

## 2022-02-03 RX ADMIN — HEPARIN SODIUM 2850 UNITS/HR: 1000 INJECTION INTRAVENOUS; SUBCUTANEOUS at 10:14

## 2022-02-03 RX ADMIN — FUROSEMIDE 80 MG: 10 INJECTION, SOLUTION INTRAVENOUS at 11:47

## 2022-02-03 RX ADMIN — POTASSIUM CHLORIDE 20 MEQ: 29.8 INJECTION, SOLUTION INTRAVENOUS at 01:25

## 2022-02-03 RX ADMIN — Medication 50 MCG: at 11:15

## 2022-02-03 RX ADMIN — IPRATROPIUM BROMIDE AND ALBUTEROL SULFATE 3 ML: 2.5; .5 SOLUTION RESPIRATORY (INHALATION) at 07:25

## 2022-02-03 RX ADMIN — Medication 2 PACKET: at 07:34

## 2022-02-03 RX ADMIN — POTASSIUM CHLORIDE 20 MEQ: 1.5 POWDER, FOR SOLUTION ORAL at 23:01

## 2022-02-03 RX ADMIN — PROPOFOL 75 MCG/KG/MIN: 10 INJECTION, EMULSION INTRAVENOUS at 14:48

## 2022-02-03 RX ADMIN — FUROSEMIDE 80 MG: 10 INJECTION, SOLUTION INTRAVENOUS at 23:45

## 2022-02-03 RX ADMIN — ACETYLCYSTEINE 2 ML: 200 SOLUTION ORAL; RESPIRATORY (INHALATION) at 15:18

## 2022-02-03 RX ADMIN — Medication 50 MCG: at 07:46

## 2022-02-03 RX ADMIN — Medication 2 PACKET: at 15:49

## 2022-02-03 RX ADMIN — FENTANYL CITRATE 200 MCG/HR: 50 INJECTION INTRAVENOUS at 09:05

## 2022-02-03 RX ADMIN — PROPOFOL 75 MCG/KG/MIN: 10 INJECTION, EMULSION INTRAVENOUS at 05:06

## 2022-02-03 RX ADMIN — PROPOFOL 75 MCG/KG/MIN: 10 INJECTION, EMULSION INTRAVENOUS at 17:24

## 2022-02-03 RX ADMIN — ACETAMINOPHEN 650 MG: 325 TABLET, FILM COATED ORAL at 01:59

## 2022-02-03 RX ADMIN — PROPOFOL 75 MCG/KG/MIN: 10 INJECTION, EMULSION INTRAVENOUS at 05:19

## 2022-02-03 RX ADMIN — SENNOSIDES AND DOCUSATE SODIUM 1 TABLET: 50; 8.6 TABLET ORAL at 07:32

## 2022-02-03 RX ADMIN — Medication 15 ML: at 07:32

## 2022-02-03 RX ADMIN — PROPOFOL 75 MCG/KG/MIN: 10 INJECTION, EMULSION INTRAVENOUS at 02:52

## 2022-02-03 RX ADMIN — MEROPENEM 1 G: 1 INJECTION, POWDER, FOR SOLUTION INTRAVENOUS at 15:49

## 2022-02-03 RX ADMIN — PROPOFOL 75 MCG/KG/MIN: 10 INJECTION, EMULSION INTRAVENOUS at 08:07

## 2022-02-03 RX ADMIN — FENTANYL CITRATE 200 MCG/HR: 50 INJECTION INTRAVENOUS at 21:14

## 2022-02-03 RX ADMIN — Medication 20 NG/KG/MIN: at 18:34

## 2022-02-03 RX ADMIN — PROPOFOL 75 MCG/KG/MIN: 10 INJECTION, EMULSION INTRAVENOUS at 23:31

## 2022-02-03 RX ADMIN — PROPOFOL 75 MCG/KG/MIN: 10 INJECTION, EMULSION INTRAVENOUS at 01:25

## 2022-02-03 RX ADMIN — Medication 2 PACKET: at 11:47

## 2022-02-03 RX ADMIN — ACETAMINOPHEN 650 MG: 325 TABLET, FILM COATED ORAL at 21:07

## 2022-02-03 RX ADMIN — ACETYLCYSTEINE 2 ML: 200 SOLUTION ORAL; RESPIRATORY (INHALATION) at 11:51

## 2022-02-03 RX ADMIN — HYDRALAZINE HYDROCHLORIDE 10 MG: 20 INJECTION INTRAMUSCULAR; INTRAVENOUS at 23:38

## 2022-02-03 ASSESSMENT — ACTIVITIES OF DAILY LIVING (ADL)
ADLS_ACUITY_SCORE: 15
ADLS_ACUITY_SCORE: 15
ADLS_ACUITY_SCORE: 17
ADLS_ACUITY_SCORE: 15
ADLS_ACUITY_SCORE: 17
ADLS_ACUITY_SCORE: 17
ADLS_ACUITY_SCORE: 15
ADLS_ACUITY_SCORE: 17
ADLS_ACUITY_SCORE: 15

## 2022-02-03 ASSESSMENT — MIFFLIN-ST. JEOR: SCORE: 2720.63

## 2022-02-03 NOTE — PROGRESS NOTES
CV ICU PROGRESS NOTE  02/03/2022      CO-MORBIDITIES:   Aortic root aneurysm (H)    ASSESSMENT: Chuy Varner is a 31 year old male with a PMHx s/f bicuspid aortic valve, thoracic aortic aneurysm without rupture, hypertension, obesity, and testicular cancer s/p orchiectomy who underwent Bentall procedure with mechanical valve on 1/28 with Dr. Velasco and Dr. Hannah.    OVERNIGHT EVENTS:  NAEOs. CMV: 18, 500, 14, 30% when proned. Supine around 0400 with increase in PEEP to 18. Total of 300 IV lasix with 4.6L urine output (net 0.4 L). Fentanyl @ 200, propofol @ 75, heparin @ 2850.    TODAY'S PROGRESS:   - Prone 14-18 hours a day (PEEP of 14 when prone)  - Consider changing propofol to versed in setting of elevated triglycerides  - Wean flolan and FiO2 as able  - Wean nitric oxide to 10, then down one q2h w/ abg  - Continue Advair, mucomyst, and dubonebs  - Continue IV lasix 80 q6h with goal net negative 500 to 1L  - Continue Dexamethasone 20 mg daily x5 days (followed by 10mg x5 days snd taper)  - Continue meropenem x 7 days  - Daily lactate    PLAN:  Neuro/ pain/ sedation:  # Acute Postoperative pain  - Monitor neurological status. Notify the MD for any acute changes in exam.  - Pain: fentanyl gtt. Scheduled tylenol. PRN tylenol, oxycodone, robaxin  - Sedation: propofol gtt, consider changing to versed in setting of elevated triglycerides  - RAAS -4 to -5 if proning      Pulmonary care:   # Postoperative ventilation management  # Acute hypoxemic respiratory failure likely ARDS vs. PNA vs. Hypervolemia vs. TRALI  Started on Flolan 1/30. COVID-19.Mycoplasma, Parainfluenza and Influenza Negative. CTA negative for PE on 1/30.  - Pulmonology saw 2/2, appreciate recs   > prone (14 to 18 hrs per day)   > PEEP 14   > may need to increase PEEP slightly when supine   > as he wakes up more, may benefit from volumes closer to 600 ml for comfort and synchrony (8 ml/kg of his IBW), for now we set it to 530   > pulmonary toilet  ie QID duonebs and percussive therapy ie metanebs if possible (may be difficult with recent chest surgery)   > agree with borad antibiotics to treat pneumonia per the CV ICU team  - Wean nitric oxide  - Titrate supplemental oxygen to maintain saturation above 90%  - Wean nitric and FiO2 as able  - Continue flolan  - Continue Dexamethasone 20 mg daily x5 days, 10 mg daily x5, slow taper following (started 1/31)  - Pulmonary hygiene: Incentive spirometer every 15- 30 minutes when awake, flutter valve, C&DB     Cardiovascular:    # Ascending aortic aneurysm s/p Bentall w/ Jerry valve on 1/28 with Dr. Velasco and Dr. Hannah  # Bicuspid aortic valve  # History of thoracic aortic aneurysm without rupture  # History of hypertension  Recent echo on 10/26/21 with LVEF of 60-65%.  CT vascular 12/13/21 showing proximal ascending aorta is severely dilated (55 x 55mm in maximum dimension) and likely bicuspid aortic valve.  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  CT PE 1/30 - negative for acute PE, marked dependent atelectasis R>L.  - Monitor hemodynamic status  - Goal MAP>65, SBP<110  - Statin hold  - BB hold  - ASA  - Holding PTA meds: metoprolol succinate 25mg daily  - Labetalol PRN to maintain SBP <110 once off pressors     GI care/ Nutrition:   # Obesity (BMI 46)  - NJ, advance to goal  - PPI  - Continue bowel regimen: miralax, senna    Renal/ Fluid Balance/ Electrolytes:   BL creat appears to be ~ 1.0  - Strict I/O, daily weights  - Lasix 80 q6h, goal -500 to -1L net  - Avoid/limit nephrotoxins as able  - Replete lytes PRN per protocol  - Scheduled afternoon lytes  - Scheduled potassium replacement     Endocrine:    # Stress induced hyperglycemia, improving  - Insulin gtt  - Goal BG <180 for optimal healing     ID/ Antibiotics:  # Stress induced leukocytosis,  resolved  # Possible ventilator associated pneumonia  Febrile and pan-cultured on 1/30/22. Covid pcr, respiratory panel, and respiratory cultures negative 1/30. Re-cultured 1/31: respiratory and blood cultures NGTD.  - Continue to monitor fever curve, WBC and inflammatory markers  - Continue meropenem x7 days (started 1/31)     Heme/Onc:     # Stress induced leukocytosis, resolved  # Acute blood loss anemia  # Acute blood loss thrombocytopenia, resolved  # History of testicular cancer s/p orchiectomy  No s/sx active bleeding  - Continue to monitor  - CBC daily  - Continue low intensity heparin gtt     MSK/ Skin:  # Sternotomy  # Surgical Incision  - Sternal precautions  - Postoperative incision management per protocol  - PT/OT/CR     Prophylaxis:    - Mechanical prophylaxis for DVT  - Chemical DVT prophylaxis- heparin gtt  - PPI     Lines/ tubes/ drains:  - PA catheter (1/28)  - RIJ MAC (1/28)  - Left radial arterial line (1/28)  - Left PIV (1/28)  - Godfrey (1/28)  - 3 CTs (2 anterior mediastinal, 1 left mediastinal) (1/28)  - OG (1/28)  - Nasoduodenal (1/31)  - ETT (1/28)  - Bilateral ES catheters (1/28)     Disposition:  - CVICU    Patient seen, findings and plan discussed with CV ICU staff, Dr. Valles and CVTS staff Dr. Velasco.    Michelle Poon, MS4    I was present with the medical student who participated in the service and in the documentation of the note. I have verified the history and personally performed the physical exam and medical decision making. I agree with the assessment and plan of care as documented in the note.    Tom Adair MD  Anesthesiology, PGY3    ====================================    SUBJECTIVE:   NAEOs. Proned yesterday, plan to prone for 14-18 hours per day. Continuing Advair, mucomyst, and duonebs. Continue to diurese.      OBJECTIVE:   1. VITAL SIGNS:   Temp:  [99.5  F (37.5  C)-100.9  F (38.3  C)] 100.4  F (38  C)  Pulse:  [69-96] 94  Resp:  [18-22] 20  MAP:  [65 mmHg-90 mmHg]  80 mmHg  Arterial Line BP: (103-137)/(46-70) 137/57  FiO2 (%):  [30 %-75 %] 30 %  SpO2:  [91 %-100 %] 96 %  Ventilation Mode: CMV/AC  (Continuous Mandatory Ventilation/ Assist Control)  FiO2 (%): 30 %  Rate Set (breaths/minute): 18 breaths/min  Tidal Volume Set (mL): 500 mL  PEEP (cm H2O): 14 cmH2O  Oxygen Concentration (%): 50 %  Resp: 20      2. INTAKE/ OUTPUT:   I/O last 3 completed shifts:  In: 5272.72 [I.V.:3162.72; NG/GT:695]  Out: 5905 [Urine:4735; Emesis/NG output:1050; Chest Tube:120]    3. PHYSICAL EXAMINATION:   General: Sedated, intubated, laying supine  Neuro: Sedated, opening eyes to voice  Resp: Intubated, at 50% FIO2  CV: RRR, temporary pacemaker on standby  Abdomen: Soft, Non-distended  Incisions: C/D/I  Extremities: warm and well perfused, SCDs in place    4. INVESTIGATIONS:   Arterial Blood Gases   Recent Labs   Lab 02/03/22  0549 02/03/22  0313 02/02/22  2140 02/02/22  1527   PH 7.43 7.45 7.43 7.41   PCO2 52* 50* 50* 51*   PO2 79* 62* 64* 223*   HCO3 34* 34* 33* 32*     Complete Blood Count   Recent Labs   Lab 02/03/22  0312 02/02/22  0357 02/01/22  0326 01/31/22  0939   WBC 14.9* 12.8* 12.5* 10.9   HGB 9.8* 9.4* 10.1* 11.5*    210 165 139*     Basic Metabolic Panel  Recent Labs   Lab 02/03/22  0758 02/03/22  0550 02/03/22  0321 02/03/22  0312 02/02/22  2354 02/02/22  2345 02/02/22  1159 02/02/22  1102 02/02/22  0452 02/02/22  0357 02/01/22  1629 02/01/22  1624   NA  --   --   --  143  --   --   --  142  --  143  --  143   POTASSIUM  --   --   --  3.5  --  3.4  --  3.9  3.9  --  3.7  --  4.0   CHLORIDE  --   --   --  107  --   --   --  107  --  109  --  110*   CO2  --   --   --  32  --   --   --  31  --  29  --  27   BUN  --   --   --  47*  --   --   --  50*  --  47*  --  44*   CR  --   --   --  1.07  --   --   --  1.24  --  1.24  --  1.35*   GLC 98 113* 117* 123*   < >  --    < > 157*   < > 138*   < > 166*    < > = values in this interval not displayed.     Liver Function Tests  Recent  Labs   Lab 02/03/22  0312 02/02/22  0357 02/01/22  0326 01/31/22  0332 01/29/22  0323 01/28/22  1447 01/28/22  1246   AST 26 15 18 27   < > 50*  --    ALT 34 20 20 20   < > 28  --    ALKPHOS 67 51 54 46   < > 44  --    BILITOTAL 0.3 0.2 0.3 0.7   < > 0.6  --    ALBUMIN 2.3* 2.2* 2.3* 2.4*   < > 3.1*  --    INR  --   --   --   --   --  1.38* 1.53*    < > = values in this interval not displayed.     Pancreatic Enzymes  No lab results found in last 7 days.  Coagulation Profile  Recent Labs   Lab 01/28/22  1447 01/28/22  1246   INR 1.38* 1.53*   PTT 31 28         5. RADIOLOGY:   Recent Results (from the past 24 hour(s))   XR Chest Port 1 View    Narrative    EXAM: XR CHEST PORT 1 VIEW  1/29/2022 8:45 AM     HISTORY:  increasing FiO2 requirements       COMPARISON:  Chest x-ray 1/28/2022    FINDINGS: AP radiograph of the chest. Endotracheal tube projecting  over the high thoracic trachea. Right IJ [place]-Nelda catheter with tip  overlying the main pulmonary artery. Partially visualized enteric tube  with tip overlying the stomach. Postoperative chest with intact median  sternotomy wires.    Stable borderline enlargement of the cardiomediastinal silhouette. Low  lung volumes. Small left and possible trace right pleural effusions.  Pulmonary vasculature is indistinct. Perihilar interstitial prominent  opacities. No pneumothorax. Visualized upper abdomen is unremarkable.      Impression    IMPRESSION:  1. Stable endotracheal tube and right IJ [place]-Nelda catheter.  2. Low lung volumes with unchanged perihilar interstitial prominent  opacities, likely pulmonary edema and/or atelectasis.  3. Increased small left and possible trace right pleural effusions.    I have personally reviewed the examination and initial interpretation  and I agree with the findings.    BENTLEY ANTONIO MD         SYSTEM ID:  [id number]   XR Chest Port 1 View    Impression    RESIDENT PRELIMINARY INTERPRETATION  Impression:   1. Worsening of low lung volumes,  small left greater than right  pleural effusions, and perihilar opacities compatible with pulmonary  edema and/or atelectasis. Infection cannot be excluded.  2. Endotracheal tube has been advanced with tip projecting over mid  thoracic trachea. Good position.  3. Stable additional support devices and postsurgical changes.   CT Chest Pulmonary Embolism w Contrast    Impression    RESIDENT PRELIMINARY INTERPRETATION  IMPRESSION:   1. Exam is negative for acute pulmonary embolism.  No evidence of  right heart strain or increased right heart pressures.       2. Marked dependent atelectasis in the right than left lungs. Occult  infection cannot be excluded.    3. Postsurgical changes of median sternotomy and aortic valve repair.  Satisfactory position of support devices as detailed.       In the event of a positive result for acute pulmonary embolism  resulting in right heart strain, consider calling the   [facility] hospital  for PERT (Pulmonary Embolism Response Team)  Activation?    PERT -- Pulmonary Embolism Response Team (Multidisciplinary team  including cardiology, interventional radiology, critical care,  hematology)       =========================================

## 2022-02-03 NOTE — PROGRESS NOTES
"Pulmonary Progress Note    Assessment  # Acute Hypoxemic Respiratory Failure due to Shunt Physiology  # Pulmonary Shunting from Bilateral Lower Lobe Atelectasis vs. Pneumonia    31 year old male with hx of of likely bicuspid aortic valve, thoracic aortic aneurysm without rupture, underwent Bentall procedure with mechanical valve on 1/28. Post-op course c/b hypoxic respiratory failure despite PEEP titration. CTA (1/30) with no PE but showed bilateral lower lobe consolidations vs atelectasis.    Profound hypoxemia is due to shunt physiology (was unresponsive to volume and PEEP changes). He does not meet criteria for ARDS, the remainder of his lung tissue is normal. Improving now with prone positioning.     Recommendations  - continue to prone per protocol  - vent management per intensive care team  - pulmonary to sign off    Discussed with Dr. Mike Neri MD  Pulmonary & Critical Care Fellow    -----------------------------------------------    Subjective / Interval Events : Still having fevers. CI up to 2.8-3. Micro with normal michele.    Objective  /88   Pulse 92   Temp 100.2  F (37.9  C)   Resp 20   Ht 1.905 m (6' 3\")   Wt (!) 168 kg (370 lb 6 oz)   SpO2 94%   BMI 46.29 kg/m      General: Sedated, intubated, laying in bed on stomach  Lungs: Intubated - coarse at bases  Heart: Regular rate and rhythm  Abdomen: Unable to assess  Extremities: Warm and well perfused  Skin: no concerning rashes or lesions  Neurologic: Sedated, not responsive to voice     Labs and imaging reviewed    "

## 2022-02-03 NOTE — PLAN OF CARE
Major Shift Events:  Pt supinated at 0400 (During turn pt's FIO2 increased to 100% and weaned down after), While proned FIO2 30%, peep 14, Pt on 50% Fio2, peep 14 while supine. K replaced x 3.  Plan: Continue to monitor and notify MD of questions or concerns.  For vital signs and complete assessments, please see documentation flowsheets.

## 2022-02-03 NOTE — PROGRESS NOTES
"Pain Service Progress Note  [facility]  Date: February 3, 2022      Patient Name: Chuy Varner  MRN: [id number]  Age: 31 year old  Sex: male      Assessment:  31 year old male with PMH of possible bicuspid aortic valve, thoracic aortic aneurysm without rupture, hypertension, obesity, testicular cancer s/p orchiectomy    Procedure: Bentall procedure with mechanical valve    Date of Surgery: 1/28/2022    Date of bilateral erector spinae (ES) T5-6 Placement: 1/28/2022  Catheter Day #6    Plan/Recommendations:  1. Regional Anesthesia/Analgesia  - Heparin IV infusion held at 1145  - Bilateral erector spinae (ES) T5-6 catheters removed without complication,dark tips intact.  Insertion sites c/d/i. Small bandages applied.        2. Anticoagulation  Okay to resume heparin drip in one hour - bedside RN aware    Pain Service will sign off.    Discussed with attending anesthesiologist      Overnight Events: remains intubated, sedated.      Interval history: On CMV, sedated.  No pain behaviors, no objective symptoms of LAST.      Diet: NPO for Medical/Clinical Reasons Except for: Other; Specify: NPO until Extubated  Adult Formula Drip Feeding: Continuous Vital High Protein; Nasoduodenal tube; Goal Rate: 55; mL/hr; Medication - Feeding Tube Flush Frequency: At least 15-30 mL water before and after medication administration and with tube clogging; Amount to Sen...    Relevant Labs:  Recent Labs   Lab Test 02/03/22  1351 02/03/22  0312 01/29/22  0323 01/28/22  1447   INR  --   --   --  1.38*   PLT  --  250   < > 205   PTT  --   --   --  31   BUN 43* 47*   < > 18    < > = values in this interval not displayed.       Physical Exam:  Vitals: /88   Pulse 79   Temp 100  F (37.8  C)   Resp 19   Ht 1.905 m (6' 3\")   Wt (!) 168 kg (370 lb 6 oz)   SpO2 94%   BMI 46.29 kg/m      Physical Exam:   Orientation:  Relevant Medications:  Current Pain Medications:  Medications related to Pain Management " "(From now, onward)    Start     Dose/Rate Route Frequency Ordered Stop    02/03/22 1450  HYDROmorphone (DILAUDID) injection 0.2 mg        \"Or\" Linked Group Details    0.2 mg Intravenous EVERY 2 HOURS PRN 02/03/22 1450      02/03/22 1450  HYDROmorphone (DILAUDID) injection 0.4 mg        \"Or\" Linked Group Details    0.4 mg Intravenous EVERY 2 HOURS PRN 02/03/22 1450      01/31/22 0000  acetaminophen (TYLENOL) tablet 650 mg         650 mg Oral EVERY 4 HOURS PRN 01/28/22 1435      01/30/22 1129  fentaNYL (SUBLIMAZE) 50 mcg/mL bolus from infusion pump 25-50 mcg         25-50 mcg Intravenous EVERY 1 HOUR PRN 01/30/22 1129      01/30/22 0530  fentaNYL (SUBLIMAZE) infusion          mcg/hr  0.5-4 mL/hr  Intravenous CONTINUOUS 01/30/22 0513      01/29/22 0800  polyethylene glycol (MIRALAX) Packet 17 g         17 g Oral DAILY 01/28/22 1435      01/29/22 0800  aspirin (ASA) chewable tablet 81 mg         81 mg Oral or NG Tube DAILY 01/28/22 1435      01/28/22 2000  senna-docusate (SENOKOT-S/PERICOLACE) 8.6-50 MG per tablet 1 tablet         1 tablet Oral 2 TIMES DAILY 01/28/22 1435      01/28/22 2000  propofol (DIPRIVAN) infusion         5-75 mcg/kg/min × 165.9 kg (Dosing Weight)  5-74.7 mL/hr  Intravenous CONTINUOUS 01/28/22 1958      01/28/22 1600  [Held by provider]  ropivacaine 0.2% (NAROPIN) 750 mL in ON-Q C-Bloc select flow ([id number] holds 600-750 mL) dual cath disposable pump        (Held by provider since Thu 2/3/2022 at 1253 by Bree Patricio APRN CNP.Hold Reason: Other.Hold Comments: Bilateral nerve block catheter infusions stopped at 1030 AM today.)    14 mL/hr  Irrigation CONTINUOUS 01/28/22 1533      01/28/22 1435  oxyCODONE (ROXICODONE) tablet 5 mg        \"Or\" Linked Group Details    5 mg Oral EVERY 4 HOURS PRN 01/28/22 1435      01/28/22 1435  oxyCODONE IR (ROXICODONE) tablet 10 mg        \"Or\" Linked Group Details    10 mg Oral EVERY 4 HOURS PRN 01/28/22 1435      01/28/22 1435  magnesium hydroxide (MILK " OF MAGNESIA) suspension 30 mL         30 mL Oral DAILY PRN 01/28/22 1435      01/28/22 1435  bisacodyl (DULCOLAX) Suppository 10 mg         10 mg Rectal DAILY PRN 01/28/22 1435      01/28/22 1435  methocarbamol (ROBAXIN) tablet 750 mg         750 mg Oral EVERY 6 HOURS PRN 01/28/22 1435      01/28/22 1434  lidocaine 1 % 0.1-1 mL         0.1-1 mL Other EVERY 1 HOUR PRN 01/28/22 1435      01/28/22 1434  lidocaine (LMX4) kit          Topical EVERY 1 HOUR PRN 01/28/22 1435            Primary Service Contacted with Recommendations? Yes    Bree Patricio, APRN CNP  2/3/2022      Contact Info (24 hour job code pager is the last 4 digits) For in-house use only:   Job code ID: [email or web address] [id number]   [place] [id number]  Peds [phone]  [place] phone: dial * * * [phone], enter jobcode ID, then enter call-back number.    Text: Use [employer] on the Intranet <Paging/Directory> tab and enter Jobcode ID.   If no call back at any time, contact the hospital  and ask for Regional Anesthesia attending or backup

## 2022-02-03 NOTE — PLAN OF CARE
Major Shift Events:  Pt VSS and slightly febrile. Pt has cooling blanket on to achieve normothermia. Pt is intubated and sedated. Pt is on prop and fent for sedation. Pt on CMV, peep 14, rate 18, , and FiO2 40%. Pt has heparin and insulin gtt running. Pt was proned at 1000. Pt tolerating proning well. Pt has tf running and tolerating them well. Pt having adequate output from CT. Pt having adequate UO. Pt had rectal tube placed for frequent stools. Monitoring ABG's and titrating nitric and FiO2 accordingly.     Plan: Supine pt at 4am and monitor status on the vent.     For vital signs and complete assessments, please see documentation flowsheets.

## 2022-02-04 ENCOUNTER — APPOINTMENT (OUTPATIENT)
Dept: GENERAL RADIOLOGY | Facility: CLINIC | Age: 32
End: 2022-02-04
Attending: NURSE PRACTITIONER
Payer: COMMERCIAL

## 2022-02-04 ENCOUNTER — APPOINTMENT (OUTPATIENT)
Dept: GENERAL RADIOLOGY | Facility: CLINIC | Age: 32
End: 2022-02-04
Attending: SURGERY
Payer: COMMERCIAL

## 2022-02-04 LAB
ABO/RH(D): NORMAL
ALBUMIN SERPL-MCNC: 2.2 G/DL (ref 3.4–5)
ALBUMIN SERPL-MCNC: 2.5 G/DL (ref 3.4–5)
ALBUMIN UR-MCNC: 20 MG/DL
ALP SERPL-CCNC: 71 U/L (ref 40–150)
ALP SERPL-CCNC: 81 U/L (ref 40–150)
ALT SERPL W P-5'-P-CCNC: 54 U/L (ref 0–70)
ALT SERPL W P-5'-P-CCNC: 55 U/L (ref 0–70)
ANION GAP SERPL CALCULATED.3IONS-SCNC: 6 MMOL/L (ref 3–14)
ANION GAP SERPL CALCULATED.3IONS-SCNC: 7 MMOL/L (ref 3–14)
ANION GAP SERPL CALCULATED.3IONS-SCNC: 7 MMOL/L (ref 3–14)
ANION GAP SERPL CALCULATED.3IONS-SCNC: 8 MMOL/L (ref 3–14)
ANTIBODY SCREEN: NEGATIVE
APPEARANCE UR: CLEAR
AST SERPL W P-5'-P-CCNC: 52 U/L (ref 0–45)
AST SERPL W P-5'-P-CCNC: 55 U/L (ref 0–45)
ATRIAL RATE - MUSE: 98 BPM
BACTERIA BLD CULT: NO GROWTH
BACTERIA BLD CULT: NO GROWTH
BASE EXCESS BLDA CALC-SCNC: 8 MMOL/L (ref -9–1.8)
BASE EXCESS BLDA CALC-SCNC: 8.2 MMOL/L (ref -9–1.8)
BASE EXCESS BLDA CALC-SCNC: 8.7 MMOL/L (ref -9–1.8)
BASE EXCESS BLDA CALC-SCNC: 9.9 MMOL/L (ref -9–1.8)
BASE EXCESS BLDV CALC-SCNC: 10.4 MMOL/L (ref -7.7–1.9)
BILIRUB SERPL-MCNC: 0.5 MG/DL (ref 0.2–1.3)
BILIRUB SERPL-MCNC: 0.5 MG/DL (ref 0.2–1.3)
BILIRUB UR QL STRIP: NEGATIVE
BUN SERPL-MCNC: 51 MG/DL (ref 7–30)
BUN SERPL-MCNC: 51 MG/DL (ref 7–30)
BUN SERPL-MCNC: 52 MG/DL (ref 7–30)
BUN SERPL-MCNC: 54 MG/DL (ref 7–30)
CALCIUM SERPL-MCNC: 8.5 MG/DL (ref 8.5–10.1)
CALCIUM SERPL-MCNC: 8.6 MG/DL (ref 8.5–10.1)
CALCIUM SERPL-MCNC: 8.6 MG/DL (ref 8.5–10.1)
CALCIUM SERPL-MCNC: 8.8 MG/DL (ref 8.5–10.1)
CHLORIDE BLD-SCNC: 106 MMOL/L (ref 94–109)
CHLORIDE BLD-SCNC: 107 MMOL/L (ref 94–109)
CHLORIDE BLD-SCNC: 108 MMOL/L (ref 94–109)
CHLORIDE BLD-SCNC: 108 MMOL/L (ref 94–109)
CK SERPL-CCNC: 1711 U/L (ref 30–300)
CO2 SERPL-SCNC: 29 MMOL/L (ref 20–32)
CO2 SERPL-SCNC: 30 MMOL/L (ref 20–32)
CO2 SERPL-SCNC: 30 MMOL/L (ref 20–32)
CO2 SERPL-SCNC: 31 MMOL/L (ref 20–32)
COLOR UR AUTO: YELLOW
CREAT SERPL-MCNC: 1 MG/DL (ref 0.66–1.25)
CREAT SERPL-MCNC: 1.03 MG/DL (ref 0.66–1.25)
CREAT SERPL-MCNC: 1.03 MG/DL (ref 0.66–1.25)
CREAT SERPL-MCNC: 1.07 MG/DL (ref 0.66–1.25)
DIASTOLIC BLOOD PRESSURE - MUSE: NORMAL MMHG
ERYTHROCYTE [DISTWIDTH] IN BLOOD BY AUTOMATED COUNT: 13.2 % (ref 10–15)
ERYTHROCYTE [DISTWIDTH] IN BLOOD BY AUTOMATED COUNT: 13.2 % (ref 10–15)
ERYTHROCYTE [DISTWIDTH] IN BLOOD BY AUTOMATED COUNT: 13.4 % (ref 10–15)
GFR SERPL CREATININE-BSD FRML MDRD: >90 ML/MIN/1.73M2
GLUCOSE BLD-MCNC: 126 MG/DL (ref 70–99)
GLUCOSE BLD-MCNC: 126 MG/DL (ref 70–99)
GLUCOSE BLD-MCNC: 137 MG/DL (ref 70–99)
GLUCOSE BLD-MCNC: 173 MG/DL (ref 70–99)
GLUCOSE BLDC GLUCOMTR-MCNC: 100 MG/DL (ref 70–99)
GLUCOSE BLDC GLUCOMTR-MCNC: 113 MG/DL (ref 70–99)
GLUCOSE BLDC GLUCOMTR-MCNC: 114 MG/DL (ref 70–99)
GLUCOSE BLDC GLUCOMTR-MCNC: 116 MG/DL (ref 70–99)
GLUCOSE BLDC GLUCOMTR-MCNC: 117 MG/DL (ref 70–99)
GLUCOSE BLDC GLUCOMTR-MCNC: 121 MG/DL (ref 70–99)
GLUCOSE BLDC GLUCOMTR-MCNC: 121 MG/DL (ref 70–99)
GLUCOSE BLDC GLUCOMTR-MCNC: 130 MG/DL (ref 70–99)
GLUCOSE BLDC GLUCOMTR-MCNC: 132 MG/DL (ref 70–99)
GLUCOSE BLDC GLUCOMTR-MCNC: 140 MG/DL (ref 70–99)
GLUCOSE BLDC GLUCOMTR-MCNC: 140 MG/DL (ref 70–99)
GLUCOSE BLDC GLUCOMTR-MCNC: 142 MG/DL (ref 70–99)
GLUCOSE BLDC GLUCOMTR-MCNC: 146 MG/DL (ref 70–99)
GLUCOSE BLDC GLUCOMTR-MCNC: 150 MG/DL (ref 70–99)
GLUCOSE BLDC GLUCOMTR-MCNC: 151 MG/DL (ref 70–99)
GLUCOSE BLDC GLUCOMTR-MCNC: 152 MG/DL (ref 70–99)
GLUCOSE BLDC GLUCOMTR-MCNC: 154 MG/DL (ref 70–99)
GLUCOSE BLDC GLUCOMTR-MCNC: 154 MG/DL (ref 70–99)
GLUCOSE UR STRIP-MCNC: NEGATIVE MG/DL
HCO3 BLD-SCNC: 33 MMOL/L (ref 21–28)
HCO3 BLD-SCNC: 33 MMOL/L (ref 21–28)
HCO3 BLD-SCNC: 34 MMOL/L (ref 21–28)
HCO3 BLD-SCNC: 35 MMOL/L (ref 21–28)
HCO3 BLDV-SCNC: 36 MMOL/L (ref 21–28)
HCT VFR BLD AUTO: 29.5 % (ref 40–53)
HCT VFR BLD AUTO: 32.4 % (ref 40–53)
HCT VFR BLD AUTO: 35.1 % (ref 40–53)
HGB BLD-MCNC: 10 G/DL (ref 13.3–17.7)
HGB BLD-MCNC: 11 G/DL (ref 13.3–17.7)
HGB BLD-MCNC: 9.3 G/DL (ref 13.3–17.7)
HGB UR QL STRIP: NEGATIVE
INTERPRETATION ECG - MUSE: NORMAL
KETONES UR STRIP-MCNC: NEGATIVE MG/DL
LACTATE SERPL-SCNC: 1 MMOL/L (ref 0.7–2)
LEUKOCYTE ESTERASE UR QL STRIP: ABNORMAL
MAGNESIUM SERPL-MCNC: 2.9 MG/DL (ref 1.6–2.3)
MCH RBC QN AUTO: 29.9 PG (ref 26.5–33)
MCH RBC QN AUTO: 30 PG (ref 26.5–33)
MCH RBC QN AUTO: 30.6 PG (ref 26.5–33)
MCHC RBC AUTO-ENTMCNC: 30.9 G/DL (ref 31.5–36.5)
MCHC RBC AUTO-ENTMCNC: 31.3 G/DL (ref 31.5–36.5)
MCHC RBC AUTO-ENTMCNC: 31.5 G/DL (ref 31.5–36.5)
MCV RBC AUTO: 95 FL (ref 78–100)
MCV RBC AUTO: 97 FL (ref 78–100)
MCV RBC AUTO: 97 FL (ref 78–100)
NITRATE UR QL: NEGATIVE
O2/TOTAL GAS SETTING VFR VENT: 50 %
O2/TOTAL GAS SETTING VFR VENT: 50 %
O2/TOTAL GAS SETTING VFR VENT: 60 %
OXYHGB MFR BLD: 92 % (ref 92–100)
OXYHGB MFR BLD: 96 % (ref 92–100)
OXYHGB MFR BLD: 96 % (ref 92–100)
OXYHGB MFR BLD: 97 % (ref 92–100)
OXYHGB MFR BLDV: 64 % (ref 70–75)
P AXIS - MUSE: 43 DEGREES
PCO2 BLD: 44 MM HG (ref 35–45)
PCO2 BLD: 46 MM HG (ref 35–45)
PCO2 BLD: 51 MM HG (ref 35–45)
PCO2 BLD: 52 MM HG (ref 35–45)
PCO2 BLDV: 54 MM HG (ref 40–50)
PH BLD: 7.43 [PH] (ref 7.35–7.45)
PH BLD: 7.44 [PH] (ref 7.35–7.45)
PH BLD: 7.47 [PH] (ref 7.35–7.45)
PH BLD: 7.49 [PH] (ref 7.35–7.45)
PH BLDV: 7.44 [PH] (ref 7.32–7.43)
PH UR STRIP: 5.5 [PH] (ref 5–7)
PHOSPHATE SERPL-MCNC: 4 MG/DL (ref 2.5–4.5)
PLATELET # BLD AUTO: 249 10E3/UL (ref 150–450)
PLATELET # BLD AUTO: 271 10E3/UL (ref 150–450)
PLATELET # BLD AUTO: 289 10E3/UL (ref 150–450)
PO2 BLD: 67 MM HG (ref 80–105)
PO2 BLD: 85 MM HG (ref 80–105)
PO2 BLD: 89 MM HG (ref 80–105)
PO2 BLD: 90 MM HG (ref 80–105)
PO2 BLDV: 35 MM HG (ref 25–47)
POTASSIUM BLD-SCNC: 3.4 MMOL/L (ref 3.4–5.3)
POTASSIUM BLD-SCNC: 3.6 MMOL/L (ref 3.4–5.3)
POTASSIUM BLD-SCNC: 3.6 MMOL/L (ref 3.4–5.3)
POTASSIUM BLD-SCNC: 3.8 MMOL/L (ref 3.4–5.3)
POTASSIUM BLD-SCNC: 3.9 MMOL/L (ref 3.4–5.3)
PR INTERVAL - MUSE: 242 MS
PROT SERPL-MCNC: 6.4 G/DL (ref 6.8–8.8)
PROT SERPL-MCNC: 7 G/DL (ref 6.8–8.8)
QRS DURATION - MUSE: 102 MS
QT - MUSE: 356 MS
QTC - MUSE: 454 MS
R AXIS - MUSE: 48 DEGREES
RADIOLOGIST FLAGS: ABNORMAL
RBC # BLD AUTO: 3.04 10E6/UL (ref 4.4–5.9)
RBC # BLD AUTO: 3.33 10E6/UL (ref 4.4–5.9)
RBC # BLD AUTO: 3.68 10E6/UL (ref 4.4–5.9)
RBC URINE: 6 /HPF
SODIUM SERPL-SCNC: 143 MMOL/L (ref 133–144)
SODIUM SERPL-SCNC: 144 MMOL/L (ref 133–144)
SODIUM SERPL-SCNC: 145 MMOL/L (ref 133–144)
SODIUM SERPL-SCNC: 145 MMOL/L (ref 133–144)
SP GR UR STRIP: 1.03 (ref 1–1.03)
SPECIMEN EXPIRATION DATE: NORMAL
SYSTOLIC BLOOD PRESSURE - MUSE: NORMAL MMHG
T AXIS - MUSE: 10 DEGREES
TRANSITIONAL EPI: <1 /HPF
TRIGL SERPL-MCNC: 219 MG/DL
UFH PPP CHRO-ACNC: 0.15 IU/ML
UFH PPP CHRO-ACNC: 0.25 IU/ML
UROBILINOGEN UR STRIP-MCNC: NORMAL MG/DL
VENTRICULAR RATE- MUSE: 98 BPM
WBC # BLD AUTO: 17.8 10E3/UL (ref 4–11)
WBC # BLD AUTO: 21.1 10E3/UL (ref 4–11)
WBC # BLD AUTO: 23 10E3/UL (ref 4–11)
WBC URINE: 5 /HPF

## 2022-02-04 PROCEDURE — 250N000009 HC RX 250: Performed by: STUDENT IN AN ORGANIZED HEALTH CARE EDUCATION/TRAINING PROGRAM

## 2022-02-04 PROCEDURE — 85014 HEMATOCRIT: CPT | Performed by: STUDENT IN AN ORGANIZED HEALTH CARE EDUCATION/TRAINING PROGRAM

## 2022-02-04 PROCEDURE — 84132 ASSAY OF SERUM POTASSIUM: CPT | Performed by: SURGERY

## 2022-02-04 PROCEDURE — 84155 ASSAY OF PROTEIN SERUM: CPT | Performed by: STUDENT IN AN ORGANIZED HEALTH CARE EDUCATION/TRAINING PROGRAM

## 2022-02-04 PROCEDURE — 94645 CONT INHLJ TX EACH ADDL HOUR: CPT

## 2022-02-04 PROCEDURE — 82805 BLOOD GASES W/O2 SATURATION: CPT | Performed by: STUDENT IN AN ORGANIZED HEALTH CARE EDUCATION/TRAINING PROGRAM

## 2022-02-04 PROCEDURE — 71045 X-RAY EXAM CHEST 1 VIEW: CPT

## 2022-02-04 PROCEDURE — 999N000157 HC STATISTIC RCP TIME EA 10 MIN

## 2022-02-04 PROCEDURE — 250N000009 HC RX 250

## 2022-02-04 PROCEDURE — 999N000045 HC STATISTIC DAILY SWAN MONITORING

## 2022-02-04 PROCEDURE — 999N000065 XR CHEST PORT 1 VIEW

## 2022-02-04 PROCEDURE — 250N000011 HC RX IP 250 OP 636: Performed by: SURGERY

## 2022-02-04 PROCEDURE — 85027 COMPLETE CBC AUTOMATED: CPT | Performed by: STUDENT IN AN ORGANIZED HEALTH CARE EDUCATION/TRAINING PROGRAM

## 2022-02-04 PROCEDURE — 82805 BLOOD GASES W/O2 SATURATION: CPT | Performed by: NURSE PRACTITIONER

## 2022-02-04 PROCEDURE — 250N000011 HC RX IP 250 OP 636: Performed by: NURSE PRACTITIONER

## 2022-02-04 PROCEDURE — 93005 ELECTROCARDIOGRAM TRACING: CPT

## 2022-02-04 PROCEDURE — 94003 VENT MGMT INPAT SUBQ DAY: CPT

## 2022-02-04 PROCEDURE — 84100 ASSAY OF PHOSPHORUS: CPT | Performed by: SURGERY

## 2022-02-04 PROCEDURE — 94640 AIRWAY INHALATION TREATMENT: CPT | Mod: 76

## 2022-02-04 PROCEDURE — 81001 URINALYSIS AUTO W/SCOPE: CPT | Performed by: STUDENT IN AN ORGANIZED HEALTH CARE EDUCATION/TRAINING PROGRAM

## 2022-02-04 PROCEDURE — 250N000011 HC RX IP 250 OP 636: Performed by: STUDENT IN AN ORGANIZED HEALTH CARE EDUCATION/TRAINING PROGRAM

## 2022-02-04 PROCEDURE — 86850 RBC ANTIBODY SCREEN: CPT | Performed by: SURGERY

## 2022-02-04 PROCEDURE — 999N000015 HC STATISTIC ARTERIAL MONITORING DAILY

## 2022-02-04 PROCEDURE — 258N000003 HC RX IP 258 OP 636: Performed by: NURSE PRACTITIONER

## 2022-02-04 PROCEDURE — 250N000013 HC RX MED GY IP 250 OP 250 PS 637: Performed by: STUDENT IN AN ORGANIZED HEALTH CARE EDUCATION/TRAINING PROGRAM

## 2022-02-04 PROCEDURE — 999N000127 HC STATISTIC PERIPHERAL IV START W US GUIDANCE

## 2022-02-04 PROCEDURE — 250N000009 HC RX 250: Performed by: SURGERY

## 2022-02-04 PROCEDURE — 250N000009 HC RX 250: Performed by: NURSE PRACTITIONER

## 2022-02-04 PROCEDURE — 71045 X-RAY EXAM CHEST 1 VIEW: CPT | Mod: 26 | Performed by: RADIOLOGY

## 2022-02-04 PROCEDURE — 85520 HEPARIN ASSAY: CPT | Performed by: SURGERY

## 2022-02-04 PROCEDURE — 99291 CRITICAL CARE FIRST HOUR: CPT | Mod: 24 | Performed by: ANESTHESIOLOGY

## 2022-02-04 PROCEDURE — G0463 HOSPITAL OUTPT CLINIC VISIT: HCPCS

## 2022-02-04 PROCEDURE — 36415 COLL VENOUS BLD VENIPUNCTURE: CPT | Performed by: STUDENT IN AN ORGANIZED HEALTH CARE EDUCATION/TRAINING PROGRAM

## 2022-02-04 PROCEDURE — 86901 BLOOD TYPING SEROLOGIC RH(D): CPT | Performed by: SURGERY

## 2022-02-04 PROCEDURE — 85520 HEPARIN ASSAY: CPT | Performed by: ANESTHESIOLOGY

## 2022-02-04 PROCEDURE — 250N000013 HC RX MED GY IP 250 OP 250 PS 637: Performed by: SURGERY

## 2022-02-04 PROCEDURE — 71045 X-RAY EXAM CHEST 1 VIEW: CPT | Mod: 77

## 2022-02-04 PROCEDURE — 80048 BASIC METABOLIC PNL TOTAL CA: CPT | Performed by: STUDENT IN AN ORGANIZED HEALTH CARE EDUCATION/TRAINING PROGRAM

## 2022-02-04 PROCEDURE — 84478 ASSAY OF TRIGLYCERIDES: CPT | Performed by: STUDENT IN AN ORGANIZED HEALTH CARE EDUCATION/TRAINING PROGRAM

## 2022-02-04 PROCEDURE — 200N000002 HC R&B ICU UMMC

## 2022-02-04 PROCEDURE — 87205 SMEAR GRAM STAIN: CPT | Performed by: STUDENT IN AN ORGANIZED HEALTH CARE EDUCATION/TRAINING PROGRAM

## 2022-02-04 PROCEDURE — 94799 UNLISTED PULMONARY SVC/PX: CPT

## 2022-02-04 PROCEDURE — P9041 ALBUMIN (HUMAN),5%, 50ML: HCPCS | Performed by: STUDENT IN AN ORGANIZED HEALTH CARE EDUCATION/TRAINING PROGRAM

## 2022-02-04 PROCEDURE — 83605 ASSAY OF LACTIC ACID: CPT | Performed by: STUDENT IN AN ORGANIZED HEALTH CARE EDUCATION/TRAINING PROGRAM

## 2022-02-04 PROCEDURE — 87040 BLOOD CULTURE FOR BACTERIA: CPT | Performed by: STUDENT IN AN ORGANIZED HEALTH CARE EDUCATION/TRAINING PROGRAM

## 2022-02-04 PROCEDURE — 80053 COMPREHEN METABOLIC PANEL: CPT | Performed by: STUDENT IN AN ORGANIZED HEALTH CARE EDUCATION/TRAINING PROGRAM

## 2022-02-04 PROCEDURE — 83735 ASSAY OF MAGNESIUM: CPT | Performed by: STUDENT IN AN ORGANIZED HEALTH CARE EDUCATION/TRAINING PROGRAM

## 2022-02-04 PROCEDURE — 99223 1ST HOSP IP/OBS HIGH 75: CPT | Performed by: INTERNAL MEDICINE

## 2022-02-04 PROCEDURE — 999N000155 HC STATISTIC RAPCV CVP MONITORING

## 2022-02-04 PROCEDURE — 82550 ASSAY OF CK (CPK): CPT | Performed by: STUDENT IN AN ORGANIZED HEALTH CARE EDUCATION/TRAINING PROGRAM

## 2022-02-04 PROCEDURE — 93010 ELECTROCARDIOGRAM REPORT: CPT | Performed by: INTERNAL MEDICINE

## 2022-02-04 RX ORDER — ESMOLOL HYDROCHLORIDE 20 MG/ML
50-300 INJECTION, SOLUTION INTRAVENOUS CONTINUOUS
Status: DISCONTINUED | OUTPATIENT
Start: 2022-02-04 | End: 2022-02-07

## 2022-02-04 RX ORDER — POTASSIUM CHLORIDE 29.8 MG/ML
20 INJECTION INTRAVENOUS
Status: DISPENSED | OUTPATIENT
Start: 2022-02-04 | End: 2022-02-04

## 2022-02-04 RX ORDER — ALBUMIN, HUMAN INJ 5% 5 %
12.5 SOLUTION INTRAVENOUS ONCE
Status: COMPLETED | OUTPATIENT
Start: 2022-02-04 | End: 2022-02-04

## 2022-02-04 RX ORDER — ALBUMIN, HUMAN INJ 5% 5 %
SOLUTION INTRAVENOUS
Status: DISCONTINUED
Start: 2022-02-04 | End: 2022-02-05 | Stop reason: HOSPADM

## 2022-02-04 RX ORDER — POTASSIUM CHLORIDE 29.8 MG/ML
20 INJECTION INTRAVENOUS ONCE
Status: COMPLETED | OUTPATIENT
Start: 2022-02-04 | End: 2022-02-04

## 2022-02-04 RX ADMIN — Medication 15 ML: at 08:01

## 2022-02-04 RX ADMIN — MEROPENEM 1 G: 1 INJECTION, POWDER, FOR SOLUTION INTRAVENOUS at 09:12

## 2022-02-04 RX ADMIN — PROPOFOL 75 MCG/KG/MIN: 10 INJECTION, EMULSION INTRAVENOUS at 18:52

## 2022-02-04 RX ADMIN — POTASSIUM CHLORIDE 20 MEQ: 29.8 INJECTION, SOLUTION INTRAVENOUS at 03:15

## 2022-02-04 RX ADMIN — HYDRALAZINE HYDROCHLORIDE 10 MG: 20 INJECTION INTRAMUSCULAR; INTRAVENOUS at 06:14

## 2022-02-04 RX ADMIN — POTASSIUM CHLORIDE 40 MEQ: 40 SOLUTION ORAL at 08:13

## 2022-02-04 RX ADMIN — PROPOFOL 75 MCG/KG/MIN: 10 INJECTION, EMULSION INTRAVENOUS at 14:23

## 2022-02-04 RX ADMIN — ACETYLCYSTEINE 2 ML: 200 SOLUTION ORAL; RESPIRATORY (INHALATION) at 12:14

## 2022-02-04 RX ADMIN — PROPOFOL 75 MCG/KG/MIN: 10 INJECTION, EMULSION INTRAVENOUS at 05:40

## 2022-02-04 RX ADMIN — HYDRALAZINE HYDROCHLORIDE 10 MG: 20 INJECTION INTRAMUSCULAR; INTRAVENOUS at 00:19

## 2022-02-04 RX ADMIN — POTASSIUM CHLORIDE 40 MEQ: 40 SOLUTION ORAL at 20:29

## 2022-02-04 RX ADMIN — ACETYLCYSTEINE 2 ML: 200 SOLUTION ORAL; RESPIRATORY (INHALATION) at 19:41

## 2022-02-04 RX ADMIN — IPRATROPIUM BROMIDE AND ALBUTEROL SULFATE 3 ML: 2.5; .5 SOLUTION RESPIRATORY (INHALATION) at 08:38

## 2022-02-04 RX ADMIN — HEPARIN SODIUM 3150 UNITS/HR: 1000 INJECTION INTRAVENOUS; SUBCUTANEOUS at 20:49

## 2022-02-04 RX ADMIN — PROPOFOL 75 MCG/KG/MIN: 10 INJECTION, EMULSION INTRAVENOUS at 11:59

## 2022-02-04 RX ADMIN — Medication 2 PACKET: at 15:47

## 2022-02-04 RX ADMIN — POTASSIUM CHLORIDE 20 MEQ: 29.8 INJECTION, SOLUTION INTRAVENOUS at 08:02

## 2022-02-04 RX ADMIN — ACETYLCYSTEINE 2 ML: 200 SOLUTION ORAL; RESPIRATORY (INHALATION) at 08:39

## 2022-02-04 RX ADMIN — PROPOFOL 75 MCG/KG/MIN: 10 INJECTION, EMULSION INTRAVENOUS at 04:30

## 2022-02-04 RX ADMIN — PROPOFOL 75 MCG/KG/MIN: 10 INJECTION, EMULSION INTRAVENOUS at 15:46

## 2022-02-04 RX ADMIN — DEXAMETHASONE SODIUM PHOSPHATE 20 MG: 10 INJECTION, SOLUTION INTRAMUSCULAR; INTRAVENOUS at 08:13

## 2022-02-04 RX ADMIN — ESMOLOL HYDROCHLORIDE 50 MCG/KG/MIN: 20 INJECTION INTRAVENOUS at 14:22

## 2022-02-04 RX ADMIN — Medication 2 PACKET: at 08:13

## 2022-02-04 RX ADMIN — IPRATROPIUM BROMIDE AND ALBUTEROL SULFATE 3 ML: 2.5; .5 SOLUTION RESPIRATORY (INHALATION) at 12:14

## 2022-02-04 RX ADMIN — ESMOLOL HYDROCHLORIDE 50 MCG/KG/MIN: 20 INJECTION INTRAVENOUS at 21:54

## 2022-02-04 RX ADMIN — FUROSEMIDE 80 MG: 10 INJECTION, SOLUTION INTRAVENOUS at 10:39

## 2022-02-04 RX ADMIN — PROPOFOL 75 MCG/KG/MIN: 10 INJECTION, EMULSION INTRAVENOUS at 10:39

## 2022-02-04 RX ADMIN — Medication 20 NG/KG/MIN: at 03:37

## 2022-02-04 RX ADMIN — PROPOFOL 75 MCG/KG/MIN: 10 INJECTION, EMULSION INTRAVENOUS at 19:37

## 2022-02-04 RX ADMIN — FUROSEMIDE 80 MG: 10 INJECTION, SOLUTION INTRAVENOUS at 23:43

## 2022-02-04 RX ADMIN — MEROPENEM 1 G: 1 INJECTION, POWDER, FOR SOLUTION INTRAVENOUS at 23:16

## 2022-02-04 RX ADMIN — PROPOFOL 75 MCG/KG/MIN: 10 INJECTION, EMULSION INTRAVENOUS at 09:11

## 2022-02-04 RX ADMIN — Medication 2 PACKET: at 20:29

## 2022-02-04 RX ADMIN — IPRATROPIUM BROMIDE AND ALBUTEROL SULFATE 3 ML: 2.5; .5 SOLUTION RESPIRATORY (INHALATION) at 16:06

## 2022-02-04 RX ADMIN — Medication 20 NG/KG/MIN: at 23:08

## 2022-02-04 RX ADMIN — Medication 40 MG: at 08:01

## 2022-02-04 RX ADMIN — IPRATROPIUM BROMIDE AND ALBUTEROL SULFATE 3 ML: 2.5; .5 SOLUTION RESPIRATORY (INHALATION) at 19:41

## 2022-02-04 RX ADMIN — HYDRALAZINE HYDROCHLORIDE 10 MG: 20 INJECTION INTRAMUSCULAR; INTRAVENOUS at 00:54

## 2022-02-04 RX ADMIN — SENNOSIDES AND DOCUSATE SODIUM 1 TABLET: 50; 8.6 TABLET ORAL at 08:00

## 2022-02-04 RX ADMIN — FUROSEMIDE 80 MG: 10 INJECTION, SOLUTION INTRAVENOUS at 06:14

## 2022-02-04 RX ADMIN — PROPOFOL 75 MCG/KG/MIN: 10 INJECTION, EMULSION INTRAVENOUS at 17:18

## 2022-02-04 RX ADMIN — ALBUMIN HUMAN 12.5 G: 0.05 INJECTION, SOLUTION INTRAVENOUS at 22:53

## 2022-02-04 RX ADMIN — HEPARIN SODIUM 3150 UNITS/HR: 1000 INJECTION INTRAVENOUS; SUBCUTANEOUS at 08:33

## 2022-02-04 RX ADMIN — NICARDIPINE HYDROCHLORIDE 2.5 MG/HR: 0.2 INJECTION, SOLUTION INTRAVENOUS at 06:33

## 2022-02-04 RX ADMIN — MEROPENEM 1 G: 1 INJECTION, POWDER, FOR SOLUTION INTRAVENOUS at 00:26

## 2022-02-04 RX ADMIN — FENTANYL CITRATE 200 MCG/HR: 50 INJECTION INTRAVENOUS at 08:43

## 2022-02-04 RX ADMIN — Medication 2 PACKET: at 12:01

## 2022-02-04 RX ADMIN — LABETALOL HYDROCHLORIDE 20 MG: 5 INJECTION, SOLUTION INTRAVENOUS at 05:50

## 2022-02-04 RX ADMIN — ASPIRIN 81 MG CHEWABLE TABLET 81 MG: 81 TABLET CHEWABLE at 08:01

## 2022-02-04 RX ADMIN — PROPOFOL 75 MCG/KG/MIN: 10 INJECTION, EMULSION INTRAVENOUS at 21:51

## 2022-02-04 RX ADMIN — HUMAN INSULIN 3 UNITS/HR: 100 INJECTION, SOLUTION SUBCUTANEOUS at 07:00

## 2022-02-04 RX ADMIN — ACETAMINOPHEN 650 MG: 325 TABLET, FILM COATED ORAL at 01:02

## 2022-02-04 RX ADMIN — PROPOFOL 75 MCG/KG/MIN: 10 INJECTION, EMULSION INTRAVENOUS at 20:39

## 2022-02-04 RX ADMIN — HUMAN INSULIN 7 UNITS/HR: 100 INJECTION, SOLUTION SUBCUTANEOUS at 15:48

## 2022-02-04 RX ADMIN — ACETYLCYSTEINE 2 ML: 200 SOLUTION ORAL; RESPIRATORY (INHALATION) at 16:07

## 2022-02-04 RX ADMIN — ACETAMINOPHEN 650 MG: 325 TABLET, FILM COATED ORAL at 06:47

## 2022-02-04 RX ADMIN — PROPOFOL 75 MCG/KG/MIN: 10 INJECTION, EMULSION INTRAVENOUS at 23:05

## 2022-02-04 RX ADMIN — MEROPENEM 1 G: 1 INJECTION, POWDER, FOR SOLUTION INTRAVENOUS at 15:46

## 2022-02-04 RX ADMIN — PROPOFOL 75 MCG/KG/MIN: 10 INJECTION, EMULSION INTRAVENOUS at 03:12

## 2022-02-04 RX ADMIN — PROPOFOL 75 MCG/KG/MIN: 10 INJECTION, EMULSION INTRAVENOUS at 00:47

## 2022-02-04 RX ADMIN — FUROSEMIDE 80 MG: 10 INJECTION, SOLUTION INTRAVENOUS at 17:17

## 2022-02-04 RX ADMIN — ESMOLOL HYDROCHLORIDE 50 MCG/KG/MIN: 20 INJECTION INTRAVENOUS at 08:05

## 2022-02-04 RX ADMIN — Medication 20 NG/KG/MIN: at 13:26

## 2022-02-04 RX ADMIN — POLYETHYLENE GLYCOL 3350 17 G: 17 POWDER, FOR SOLUTION ORAL at 08:01

## 2022-02-04 RX ADMIN — PROPOFOL 75 MCG/KG/MIN: 10 INJECTION, EMULSION INTRAVENOUS at 08:33

## 2022-02-04 RX ADMIN — PROPOFOL 75 MCG/KG/MIN: 10 INJECTION, EMULSION INTRAVENOUS at 06:50

## 2022-02-04 RX ADMIN — FENTANYL CITRATE 200 MCG/HR: 50 INJECTION INTRAVENOUS at 22:01

## 2022-02-04 RX ADMIN — PROPOFOL 75 MCG/KG/MIN: 10 INJECTION, EMULSION INTRAVENOUS at 01:58

## 2022-02-04 ASSESSMENT — ACTIVITIES OF DAILY LIVING (ADL)
ADLS_ACUITY_SCORE: 15

## 2022-02-04 NOTE — PROGRESS NOTES
Care Management Follow Up    Length of Stay (days): 7    Expected Discharge Date:  TBD     Patient plan of care discussed at interdisciplinary rounds: Yes    Anticipated Discharge Disposition: TBD     Anticipated Discharge Services: TBD  Anticipated Discharge DME:  TBD    Additional Information:  Pt remain in ICU vented and sedated.  RNCC visited pt so, Debbie for support.  Debbie stated the team have been updating her well about the plan of care.  Pt and Debbie lives in [facility].  Debbie is staying locally at the [facility].  RNCC will cont to follow plan of care.      Claude Owen RN, PHN, BSN  4A and 4E/ ICU  Care Coordinator  Phone: [phone]  Pager: [phone]    To get in touch with weekend & Holiday on call RN Care Coordinator  Page [phone] or Care Coordinator Job code/pager- [phone]

## 2022-02-04 NOTE — CONSULTS
[facility] Nurse Inpatient Pressure Injury Assessment   Reason for consultation: Evaluate and treat nasal pressure injury       ASSESSMENT  Pressure Injury: on columbella bilateral nares  , hospital acquired ,   This is a Medical Device Related Pressure Injury (MDRPI) due to Bridle string  Pressure Injury is Stage Deep Tissue Pressure Injury (DTPI)  And mucosal in bilateral nares   Contributing factor of the pressure injury: pressure, immobility and moisture  Status: initial assessment  Recommend provider order: None, at this time     TREATMENT PLAN  Septum every shift and prn dislodgement   After cleansing with wound cleanser   Pat dry  Apply skin prep  Apply mepilex lite in upside down T wound  When proned - make sure there is no tension on bridle string  Punch out area in zflow pillow to accommodate medical devices    Orders Written     WO Nurse follow-up plan:twice weekly  Nursing to notify the Provider(s) and re-consult the WO Nurse if wound(s) deteriorates or new skin concern.    Patient History  According to provider note(s):  Acute Hypoxemic Respiratory Failure due to Shunt Physiology  # Pulmonary Shunting from Bilateral Lower Lobe Atelectasis vs. Pneumonia     Chuy Varner is a 31 year old male with PMH of likely bicuspid aortic valve, thoracic aortic aneurysm without rupture, hypertension, obesity, and testicular cancer s/p orchiectomy who underwent Bentall procedure with mechanical valve on 1/28 with Dr. Velasco ad Dr. Hannah. Patient was admitted to CVICU for post-operative management; post-op course complicated by acute hypoxic respiratory failure despite PEEP titration.     Objective Data  Containment of urine/stool: Indwelling catheter and Internal fecal management    Current Diet/ Nutrition:  Orders Placed This Encounter      NPO for Medical/Clinical Reasons Except for: Other; Specify: NPO until Extubated      Output:   I/O last 3 completed shifts:  In: 4463.43 [I.V.:2453.43; NG/GT:690]  Out: 5500  [Urine:4100; Emesis/NG output:1300; Stool:100]    Risk Assessment:   Sensory Perception: 1-->completely limited  Moisture: 4-->rarely moist  Activity: 1-->bedfast  Mobility: 1-->completely immobile  Nutrition: 3-->adequate  Friction and Shear: 1-->problem  Ben Score: 11      Labs: Recent Labs   Lab 02/04/22  0545   ALBUMIN 2.2*   HGB 10.0*   WBC 21.1*       Physical Exam  Skin inspection: focused septum/columbella/bilateral nares   Patient is high risk for pressure injury development secondary to vascular event sedated                   Date of last Photo 2/4  Wound History: proned  Measurements (length x width x depth, in cm) 1 cm x 3 cm  x  0.1 cm   Wound Base:  60, 40 % dtpi and erythemic with superficial opening/maceration  Palpation of the wound bed: normal   Periwound skin: macerated  Color: normal and consistent with surrounding tissue  Temperature: normal   Drainage:, none  Description of drainage: none  Odor: none  Pain: unable to assess due to  sedation ,     Interventions  Current support surface: Standard  ICU mattress  Current off-loading measures: Heel off-loading boot(s) and Pillows  Repositioning aid: Z-jimi positioner(s) and Pillows  Visual inspection of wound(s) completed   Tube Securement: as per protocol  Wound Care: was not done per plan of care.  Supplies: floor stock  Educated provided: importance of repositioning, plan of care, Hygiene and Off-loading pressure  Education provided to: spouse and nurse  Discussed importance of:off-loading pressure to wound  Discussed plan of care with Family and Nurse    Araceli Page RN BS CWOCN

## 2022-02-04 NOTE — CONSULTS
[facility] General Infectious Disease Consult Note - New Patient     Patient:  Chuy Varner, Date of birth 1990, Medical record number [id number]  Date of Visit:  02/04/2022  Consult requested by Dr. Nickolas Velasco for evaluation of fever and leukocytosis after mechanical aortic valve replacement surgery.         Assessment and Recommendations:   Recommendations:  - Send a procalcitonin and serial serum CRPs every other day x 3.  - Send a C difficile PCR.  - Obtain two more surveillance blood cultures tomorrow (including one each from the central line and from the arterial line, if possible).  - Send one more Covid-19 screening PCR (although quite unlikely to be positive).  - If fever and rising leukocytosis persist, would re-evaluate the post-operative thorax with a chest CT and a repeat echocardiogram.  - Continue the empiric meropenem through (at most) 2/6/22 to complete a five to seven day empiric course, but then discontinue it (assuming there is no new evidence of benefit).  - He is not pre-operatively immunosuppressed, so antimicrobial coverage in the absence of severe sepsis or a focal indication is unneeded.  - Would not give additional empiric antimicrobial drugs without a well-defined specific target (in the absence of galloping septic hemodynamic decompensation).    Thanks for the consult on this complex patient. General ID will follow with you but will not see over the weekend unless paged.    Axel Castillo MD  Pager [phone]    Assessment:  A 31 year old gentleman with a history of possible bicuspid aortic valve, thoracic aortic aneurysm without rupture, hypertension, obesity (with a BMI of 46), and testicular cancer s/p orchiectomy who was admitted to the [facility] Cardiovascular Thoracic Surgery service on 1/28/22 following scheduled Bentall procedure with mechanical valve replacement on 1/28/22 for an incidentally discovered 5.3 cm aortic aneurysm.  He has  remained post-operatively intubated and sedated on a ventilator at moderately high settings in the CVICU since that surgery, with acute respiratory failure and chest radiology showing persistent pulmonary edema / ARDS like changes.  He has also been persistently febrile (in the 100 - 103+ degree F range) since 1/29/22 midday with a recently rising leukocytosis up to 21.1 today (versus a markel WBC of 10.9 on 1/31/22) despite ongoing empiric meropenem / broad-spectrum antibiotic therapy since 1/30/22 -- both of uncertain cause.  Transplant ID is consulted regarding the persistent fever and leukocytosis.    ID issues:    - Cryptogenic persistent fever and rising leukocytosis:  There is no present evidence for an active bacterial infection beyond the persisting fever and the rising leukocytosis.  He lacks evidence for an active bacteremia (with negative blood cultures x 5 -- all drawn peripherally), pneumonia (with negative ETT sputum Gram stains / cultures x 3 and chest x-rays without new focal infiltrates), or UTI (with negative urinalyses x 3), although he is a potential set-up for any of these nosocomial conditions given a central line, intubation / sedation, and the presence of a Godfrey catheter.  He is broadly covered for any bacterial infection anywhere with meropenem (which leaves little by way of holes in its bacterial spectrum of coverage).  Given the time course and rising leukocytosis despite broad antibiotic coverage, a loculated infection within the surgical field is possible.  With a rising WBC, recent extensive antibiotic therapy, and a rectal tube, C difficile infection needs to be ruled out.  There is no evidence of a candidemia by blood cultures.  With the rising leukocytosis and in the absence of any underlying immunocompromise, other infectious causes of fever beyond bacterial causes or candidemia are unlikely, including other fungal, atypical bacterial, mycobacterial, viral, tick borne, zoonotic, or  STD infections are quite unlikely.  (There is no likely recent pertinent exposure history.)  The chest radiology is not suggestive of any invasive fungal or other atypical infection and a 1/30/22 nares respiratory virus PCR was also negative.  Non-infectious causes of fever might include a drug fever (perhaps meropenem or propofol) and daily dexamethasone 20 mg doses could still in part drive the leukocytosis.  The meropenem which was empirically started on 1/30/22 (preceded by 24 hours of Zosyn / vancomycin which has roughly the same broad antimicrobial spectrum) does not seem to be providing any benefit in regard to the fever and leukocytosis -- holding that to a five to seven day course would be optimal.    - Diffuse bilateral pulmonary interstitial infiltrates, acute respiratory failure:  The chest radiology seems most consistent with pulmonary edema -- diuresis is being pursued.    - QTc interval:  356 msec on 2/4/22 EKG.  - Immunization status:  Tdap received 4/30/17.  No [facility] recorded Covid-19 vaccination but negative screening PCRs on 1/22/22 and 1/30/22.  Otherwise not up to date per [facility] registry.  - Isolation status: Routine.         History of Infectious Disease Illness:   Mr. Varner is a 31 year old gentleman with a history of possible bicuspid aortic valve, thoracic aortic aneurysm without rupture, hypertension, obesity (with a BMI of 46), and testicular cancer s/p orchiectomy. With a family history of aortic valve disease, although he was asymptomatic, his primary physician obtained cardiac imaging which revealed a normally functioning aortic valve but with an aortic aneurysm measuring 53 mm  for which he was referred to Cardiology who recommended surgery for the aneurysm. He was underwent a scheduled surgical Bentall procedure with mechanical valve replacement on 1/28/22 and has been post-operatively hospitalized in the CVICU since then on the Cardiovascular Thoracic Surgery service.  His initial  early post-operative course was unremarkable, but his initial chest radiology showed pulmonary edema and he developed worsened hypoxia later the night after the surgery which persisted and required nitric oxide and Flolan.  He required proning while on the ventilator on 2/2/22 AM and that has continued intermittently every day.  He also received an initial five days of dexamethasone 20 mg daily followed by taper as well as multiple furosemide doses and has required ongoing pain control management by continuous catheter through the Anesthesiology Pain Service.  Pulmonary Consult was consulted on 2/2/22 for management of ventilation in the setting of ARDS.  In the past ~ 48 hours, he has had difficulties with hypertension when proned.  He began spiking low-grade post-operative fevers in the 100 degree range on 1/29/22 afternoon which catherine to 102+ degrees on 1/30 - 31/22.  The fevers remitted into the 100-degree range again 2/1 - 3/22 (while perhaps on a cooling blanket) but he spiked another fever of 103.0 degrees F again at 3 AM this morning.  Meanwhile, his peripheral WBC has climbed from a markel of 10.1 on 1/31/22 AM (after initial post-operative steroid rise) up to 23.0 this early morning, despite ongoing broad-spectrum empiric antibiotic coverage with meropenem (since 1/31/22 AM, preceded by 24 hours of Zosyn / IV vancomycin) for a concern for possible sepsis.  The infectious work-up to date has included five (1/30/22 x 2, 1/31/22, 2/4/22 x 2) blood cultures without growth, a negative 1/30/22 nares MRSA PCR screen, negative 1/30/22 and 1/31/22 ETT sputum cultures with a 2/4/22 ETT sputum culture pending (Gram stain > 25 PMNs / lpf, no organisms seen), negative 1/30/22 nares respiratory virus PCR panel and SARS Cov-2 PCR, negative 1/30/22, 1/31/22, and 2/4/22 urinalyses, and recurrently negative (essentially daily) lactic acid assays.  Serial chest x-rays show diffuse interstitial opacities most suggestive for  pulmonary edema (versus extensive atelectasis versus ARDS).  He has some bilateral nares mucosal injury due to a bridle string for which he was evaluated by the [facility] RN today.  This afternoon, he has newly had some hypothermia.  A serum creatinine kinase level this morning was elevated at 1,711 and AST has been slightly elevated (52 this AM), but other LFTs have been normal.  Infectious Disease is consulted regarding the fever and leukocytosis.    Review of Systems:  Patient is present intubated, sedated, proned, so current ROS is unobtainable.    CONSTITUTIONAL:  Post-operative fevers 100 - 102+ degrees starting 1/29/22, with leukocytosis..  No reported pre-admission fatigue or anorexia.  EYES: No eye pain, visual changes, or scleral icterus.  ENT:  No recorded preadmission rhinorrhea, sinus pain, otalgia, hearing loss, tinnitus, sore throat, or oral pain.  LYMPH:  Anasarca.  RESPIRATORY:  As per HPI -- acute respiratory failure, ventilated / proned.  CARDIOVASCULAR:  As per HPI.  Hypertensive when proned.  GASTROINTESTINAL:  No known abdominal pain, nausea, vomiting, diarrhea or constipation.  GENITOURINARY:  Godfrey.  HEME:  Acute leukocytosis.  Mild post-operative anemia following 1/28/22 surgery.  No easy bruising.  ENDOCRINE:  On insulin drip.  MUSCULOSKELETAL:  No recorded pre-admission myalgias / arthralgias.  SKIN:  No rash or pruritus.  NEURO:  Sedated.  PSYCH:  Sedated.    Past Medical History:    Bicuspid aortic valve     History of testicular cancer     Hypertension     Thoracic aortic aneurysm without rupture (H)     Past Surgical History:   Procedure Laterality Date     ORCHIECTOMY SCROTAL       REPLACE VALVE AORTIC N/A 1/28/2022    Procedure: Median Sternotomy.  Cardiopulmonary bypass pump.  Bentall procedure using On-X Ascending Aortic Prosthesis with Vascutek Gelweave Valsa Graft  size 27-29MM .  Intraoperative transesophageal echocardiogram per anesthesia;  Surgeon: Nickolas Velasco MD;  Location:  OR      Family History   Problem Relation Age of Onset     Anesthesia Reaction No family hx of      Deep Vein Thrombosis (DVT) No family hx of      Social History     Tobacco Use     Smoking status: Never Smoker     Smokeless tobacco: Never Used   Substance Use Topics     Alcohol use: Yes     Alcohol/week: 3.0 standard drinks     Types: 3 Standard drinks or equivalent per week     Drug use: Never     Immunization History   Administered Date(s) Administered     Tdap (Adacel,Boostrix) 04/30/2017     Patient Active Problem List   Diagnosis     Bicuspid aortic valve     History of testicular cancer     Hypertension     Thoracic aortic aneurysm without rupture (H)     Aortic root aneurysm (H)          Current Medications & Allergies:       acetylcysteine  2 mL Nebulization 4x Daily     aspirin  81 mg Oral or NG Tube Daily     dexamethasone  20 mg Intravenous Daily     furosemide  80 mg Intravenous Q6H     ipratropium - albuterol 0.5 mg/2.5 mg/3 mL  3 mL Nebulization 4x daily     meropenem  1 g Intravenous Q8H     multivitamins w/minerals  15 mL Per Feeding Tube Daily     pantoprazole  40 mg Oral or NG Tube Daily    Or     pantoprazole  40 mg Oral Daily     polyethylene glycol  17 g Oral Daily     potassium chloride  40 mEq Oral BID     protein modular  2 packet Per Feeding Tube 4x Daily     senna-docusate  1 tablet Oral BID     sodium chloride (PF)  3 mL Intracatheter Q8H     Infusions/Drips:      dextrose       epoprostenol (VELETRI) 20 mcg/mL in sterile water inhalation solution 20 ng/kg/min (02/04/22 1219)     esmolol Stopped (02/04/22 0936)     fentaNYL 200 mcg/hr (02/04/22 0843)     heparin Stopped (02/04/22 0930)     insulin regular 7 Units/hr (02/04/22 1222)     propofol (DIPRIVAN) infusion 75 mcg/kg/min (02/04/22 1159)     BETA BLOCKER NOT PRESCRIBED       No Known Allergies         Physical Exam:     Patient Vitals for the past 24 hrs:   BP Temp Temp src Pulse Resp SpO2   02/04/22 1215 -- 98.4  F (36.9  C) -- 77 --  99 %   02/04/22 1214 -- -- -- -- -- 99 %   02/04/22 1200 -- 97.7  F (36.5  C) -- 79 -- 100 %   02/04/22 1145 -- 97.9  F (36.6  C) -- 80 -- 100 %   02/04/22 1130 -- 98.1  F (36.7  C) -- 81 -- 100 %   02/04/22 1115 -- 98.1  F (36.7  C) -- 82 -- 100 %   02/04/22 1100 -- 98.1  F (36.7  C) -- 85 -- 100 %   02/04/22 1030 -- 98.4  F (36.9  C) -- 87 -- 100 %   02/04/22 1015 -- 98.4  F (36.9  C) -- 90 -- 100 %   02/04/22 1000 -- 98.1  F (36.7  C) -- 95 -- 96 %   02/04/22 0945 -- 96.8  F (36  C) -- 91 -- 94 %   02/04/22 0930 -- 98.2  F (36.8  C) -- 83 -- 90 %   02/04/22 0915 -- 98.4  F (36.9  C) -- 83 -- 91 %   02/04/22 0900 -- 98.6  F (37  C) -- 85 -- 93 %   02/04/22 0845 -- 98.6  F (37  C) -- 85 -- 100 %   02/04/22 0830 -- 98.6  F (37  C) -- 85 -- 97 %   02/04/22 0815 -- 98.8  F (37.1  C) -- 87 -- 99 %   02/04/22 0800 -- 99  F (37.2  C) -- 93 -- 98 %   02/04/22 0745 -- 99.1  F (37.3  C) -- 95 -- 98 %   02/04/22 0730 -- 99.3  F (37.4  C) -- 96 -- 99 %   02/04/22 0715 -- 99.3  F (37.4  C) -- 97 -- 99 %   02/04/22 0700 -- 99.3  F (37.4  C) Bladder 97 21 100 %   02/04/22 0655 -- 98.8  F (37.1  C) -- 97 -- 100 %   02/04/22 0650 -- 99.5  F (37.5  C) -- 97 -- 100 %   02/04/22 0645 -- 99.3  F (37.4  C) -- 96 -- 100 %   02/04/22 0640 -- 99.3  F (37.4  C) -- 96 -- 100 %   02/04/22 0635 -- 99.3  F (37.4  C) -- 95 -- 100 %   02/04/22 0630 -- 99.3  F (37.4  C) -- 94 -- 100 %   02/04/22 0625 -- 99.1  F (37.3  C) -- 92 -- 99 %   02/04/22 0620 -- 99.1  F (37.3  C) -- 87 -- 100 %   02/04/22 0615 -- 99.1  F (37.3  C) -- 85 -- 98 %   02/04/22 0610 -- 99.3  F (37.4  C) -- 81 -- 100 %   02/04/22 0605 -- 99.3  F (37.4  C) -- 82 -- 99 %   02/04/22 0600 -- 99.3  F (37.4  C) -- 82 20 100 %   02/04/22 0500 -- -- -- 97 21 100 %   02/04/22 0400 -- (!) 101.8  F (38.8  C) -- 110 22 98 %   02/04/22 0300 -- (!) 102  F (38.9  C) -- 114 23 97 %   02/04/22 0200 -- (!) 102  F (38.9  C) -- 119 22 95 %   02/04/22 0100 -- (!) 102  F (38.9  C) -- 115 20 96 %    02/04/22 0045 -- (!) 101.8  F (38.8  C) -- 111 -- 94 %   02/04/22 0030 -- (!) 101.7  F (38.7  C) -- 106 -- 95 %   02/04/22 0018 -- -- -- 101 -- --   02/04/22 0017 -- -- -- 113 -- --   02/04/22 0015 -- (!) 101.5  F (38.6  C) -- 100 -- 97 %   02/04/22 0000 -- (!) 101.3  F (38.5  C) -- 92 21 100 %   02/03/22 2350 -- (!) 101.3  F (38.5  C) -- 93 -- 100 %   02/03/22 2345 -- (!) 101.3  F (38.5  C) -- 90 -- 100 %   02/03/22 2340 -- (!) 101.3  F (38.5  C) -- 82 -- 100 %   02/03/22 2337 -- -- -- 81 -- --   02/03/22 2330 -- (!) 101.3  F (38.5  C) -- 81 -- 100 %   02/03/22 2320 -- (!) 101.3  F (38.5  C) -- 80 -- 100 %   02/03/22 2315 -- (!) 101.1  F (38.4  C) -- 80 -- 100 %   02/03/22 2314 -- -- -- 80 -- --   02/03/22 2310 -- (!) 101.1  F (38.4  C) -- 80 -- 100 %   02/03/22 2300 -- (!) 101.1  F (38.4  C) -- 80 22 100 %   02/03/22 2245 -- (!) 100.9  F (38.3  C) -- 80 -- 100 %   02/03/22 2230 -- (!) 100.9  F (38.3  C) -- 80 -- 95 %   02/03/22 2215 -- (!) 100.8  F (38.2  C) -- 83 -- 98 %   02/03/22 2200 -- (!) 100.8  F (38.2  C) -- 81 21 94 %   02/03/22 2145 -- (!) 100.8  F (38.2  C) -- 81 -- 93 %   02/03/22 2130 -- (!) 100.8  F (38.2  C) -- 81 -- 93 %   02/03/22 2115 -- (!) 100.6  F (38.1  C) -- 84 -- 93 %   02/03/22 2100 -- (!) 100.6  F (38.1  C) -- 85 21 92 %   02/03/22 2045 -- 100.4  F (38  C) -- 87 -- 93 %   02/03/22 2030 -- 100.4  F (38  C) -- 89 -- 92 %   02/03/22 2015 -- 100.4  F (38  C) -- 87 -- 92 %   02/03/22 2000 104/59 100.4  F (38  C) Bladder 91 21 93 %   02/03/22 1945 -- 100.4  F (38  C) -- 79 -- 95 %   02/03/22 1930 -- 100.4  F (38  C) -- 80 -- 94 %   02/03/22 1915 -- 100.4  F (38  C) -- 81 -- 95 %   02/03/22 1900 -- 100.4  F (38  C) -- 78 21 94 %   02/03/22 1845 -- 100.4  F (38  C) -- 79 -- 94 %   02/03/22 1830 -- 100.4  F (38  C) -- 81 -- 94 %   02/03/22 1815 -- 100.4  F (38  C) -- 83 -- 94 %   02/03/22 1800 -- 100.2  F (37.9  C) -- 84 21 100 %   02/03/22 1745 -- 100.2  F (37.9  C) -- 87 -- 94 %   02/03/22  1730 -- 100.2  F (37.9  C) -- 90 -- 93 %   02/03/22 1715 -- 100.2  F (37.9  C) -- 88 -- 94 %   02/03/22 1700 -- 100.2  F (37.9  C) -- 88 21 92 %   02/03/22 1645 -- 100.2  F (37.9  C) -- 91 -- 90 %   02/03/22 1630 -- 100.2  F (37.9  C) -- 100 -- 90 %   02/03/22 1615 -- 100  F (37.8  C) -- 91 -- 96 %   02/03/22 1600 -- 100  F (37.8  C) Bladder 87 22 95 %   02/03/22 1545 -- 100.2  F (37.9  C) -- 89 -- 95 %   02/03/22 1530 -- 100.2  F (37.9  C) -- 78 -- 94 %   02/03/22 1515 -- 100  F (37.8  C) -- 76 -- 94 %   02/03/22 1500 -- 100  F (37.8  C) -- 79 19 94 %   02/03/22 1445 -- 99.9  F (37.7  C) -- 78 -- 93 %   02/03/22 1430 -- 99.9  F (37.7  C) -- 82 -- 94 %   02/03/22 1415 -- 100  F (37.8  C) -- 85 -- 93 %   02/03/22 1400 -- 100  F (37.8  C) -- 83 20 96 %   02/03/22 1345 -- 100  F (37.8  C) -- 85 -- 96 %   02/03/22 1330 -- 100.2  F (37.9  C) -- 88 -- 96 %   02/03/22 1315 -- 100.2  F (37.9  C) -- 90 -- 95 %     Ranges for vital signs over the past 24 hours:   Temp:  [96.8  F (36  C)-102  F (38.9  C)] 98.4  F (36.9  C)  Pulse:  [] 77  Resp:  [19-23] 21  BP: (104)/(59) 104/59  MAP:  [52 mmHg-100 mmHg] 73 mmHg  Arterial Line BP: ()/(39-82) 122/56  FiO2 (%):  [40 %-60 %] 60 %  SpO2:  [90 %-100 %] 99 %  Vitals:    02/01/22 0000 02/02/22 0400 02/03/22 0400   Weight: (!) 165.5 kg (364 lb 13.8 oz) (!) 168.7 kg (371 lb 14.7 oz) (!) 168 kg (370 lb 6 oz)   Ventilation Mode: CMV/AC  (Continuous Mandatory Ventilation/ Assist Control)  FiO2 (%): (S) 50 %  Rate Set (breaths/minute): 18 breaths/min  Tidal Volume Set (mL): 530 mL  PEEP (cm H2O): 16 cmH2O  Oxygen Concentration (%): 60 %  Resp: 21    Intake/Output Summary (Last 24 hours) at 2/4/2022 1303  Last data filed at 2/4/2022 1200  Gross per 24 hour   Intake 4400.43 ml   Output 6550 ml   Net -2149.57 ml     Physical Examination:  GENERAL:  Intubated, sedated, well-developed, well-nourished, 31 year old man proned in bed in no acute distress on the ventilator.  HEAD:   Normocephalic, atraumatic.  EYES:  EOMI, PERRL, anicteric sclerae without conjunctival injection.  ENT:  External auditory canals patent without discharge.  Oral ETT.  Right nares NJ and OG tubes present.  Mild nares pressure wounds without cellulitic changes.  Oropharynx is moist without exudates or ulcers.  NECK:  Passively supple.  Right internal jugular central catheter line site lacks inflammation.  LYMPH:  No cervical lymphadenopathy  LUNGS:  Coarse rhonchi / UAW noise to auscultation bilaterally.  CARDIOVASCULAR:  Regular rate and rhythm, normal S1, S2, without murmur, gallop, or rub.  Dressed sternotomy incision.  Dressed removed old chest tube sites.  ABDOMEN:  Normal bowel sounds, soft, nondistended.  Rectal tube present.  :  Godfrey with sarah urine.  EXTREMITIES:  Distally warm, no edema.  SKIN:  No acute rash or lesion.  Right internal jugular, arterial, and peripheral IV line sites lack inflammation.  No distal embolic stigmata.  NEUROLOGIC:  Sedated, unresponsive.         Laboratory Data:     Metabolic Studies       Recent Labs   Lab Test 02/04/22  1200 02/04/22  0619 02/04/22  0545 02/04/22  0156 02/04/22  0132 02/03/22  0321 02/03/22  0313   NA  --   --  145*  145*  --  144   < >  --    POTASSIUM  --   --  3.6  3.6  --  3.4   < >  --    CHLORIDE  --   --  108  108  --  107   < >  --    CO2  --   --  30  30  --  29   < >  --    ANIONGAP  --   --  7  7  --  8   < >  --    BUN  --   --  51*  51*  --  52*   < >  --    CR  --   --  1.03  1.03  --  1.07   < >  --    GFRESTIMATED  --   --  >90  >90  --  >90   < >  --    *   < > 126*  126*   < > 137*   < >  --    MAYE  --   --  8.6  8.6  --  8.8   < >  --    PHOS  --   --  4.0  --   --   --   --    MAG  --   --  2.9*  --   --    < >  --    LACT  --   --   --   --  1.0  --  1.1   CKT  --   --  1,711*  --   --   --   --     < > = values in this interval not displayed.     Hepatic Studies    Recent Labs   Lab Test 02/04/22  0545 02/04/22  0130    BILITOTAL 0.5 0.5   ALKPHOS 71 81   PROTTOTAL 6.4* 7.0   ALBUMIN 2.2* 2.5*   AST 52* 55*   ALT 55 54     Pancreatitis testing    Recent Labs   Lab Test 02/04/22  0545   TRIG 219*     Hematology Studies   Recent Labs   Lab Test 02/04/22  0545 02/04/22  0132 02/03/22  0312 02/02/22  0357   WBC 21.1* 23.0* 14.9* 12.8*   HGB 10.0* 11.0* 9.8* 9.4*   HCT 32.4* 35.1* 31.4* 29.8*    289 250 210     Clotting Studies    Recent Labs   Lab Test 01/28/22  1447 01/28/22  1246 01/10/22  1216   INR 1.38* 1.53* 1.00   PTT 31 28 32     Arterial Blood Gas Testing    Recent Labs   Lab Test 02/04/22  0502 02/03/22  2357 02/03/22  2203 02/03/22  1556 02/03/22  1351   PH 7.47* 7.49* 7.45 7.42 7.46*   PCO2 46* 44 50* 54* 49*   PO2 89 85 66* 80 102   HCO3 33* 33* 35* 35* 35*   O2PER 60  60 50 40 40 50     Urine Studies     Recent Labs   Lab Test 02/04/22  0502 01/31/22  1038 01/30/22  0519 01/10/22  1154   URINEPH 5.5 5.0 6.0 6.0   NITRITE Negative Negative Negative Negative   LEUKEST Small* Negative Negative Negative   WBCU 5 1 0 1     Medication levels    Recent Labs   Lab Test 01/31/22  0939   VANCOMYCIN 4.2     Microbiology:    Last Culture results with specimen source  Culture   Date Value Ref Range Status   01/31/2022 50,000-100,000 CFU/mL Normal michele  Final   01/31/2022 No growth after 4 days  Preliminary   01/30/2022 No Growth  Final   01/30/2022 No Growth  Final   01/30/2022 1+ Normal michele  Final         Last check of C difficile  No results found for: CDBPCT    Virology:    Coronavirus-19 testing    Recent Labs   Lab Test 01/30/22  0311 01/25/22  0852 11/16/21  0856 09/29/21  1541   [id number] Negative  --   --   --    COVIDPCREXT  --  Negative Negative Negative     Respiratory virus (non-coronavirus-19) testing    Recent Labs   Lab Test 01/30/22  0951   IFLUA Not Detected   FLUAH1 Not Detected   QU8249 Not Detected   FLUAH3 Not Detected   IFLUB Not Detected   PIV1 Not Detected   PIV2 Not Detected   PIV3 Not Detected    PIV4 Not Detected   RSVA Not Detected   RSVB Not Detected   HMPV Not Detected   ADENOV Not Detected   ROMERO Not Detected     Imaging:  Recent Results (from the past 48 hour(s))   XR Chest Port 1 View    Narrative    EXAM: XR CHEST PORT 1 VIEW  2/3/2022 6:02 AM     HISTORY:  ards       COMPARISON:  Chest x-ray 2/2/2022 and 2/1/2022.    FINDINGS:   AP portable chest. Postsurgical changes of the chest with intact  median sternotomy wires. Endotracheal tube projects in the mid  thoracic trachea. Right IJ [place]-Nelda catheter tip projects over the  main pulmonary artery. Stable positioning of mediastinal drains.  Enteric tube courses below the level of the diaphragm, tip inferiorly  out of view. Gastric/NG tube tip projects over the stomach.    Stable cardiomediastinal silhouette. Midline trachea. Stable small  bilateral pleural effusions. Stable perihilar/retrocardiac and left  basilar opacities, with slight increase in the right lower lobe  opacities. No appreciable pneumothorax.      Impression    IMPRESSION:  1. Stable small bilateral pleural effusions.  2. Stable perihilar/retrocardiac and left basilar opacities, slightly  increased in the right lung opacities, representing an overall stable  mild increase in postsurgical atelectasis/edema. Continued follow-up  to exclude infectious component.    I have personally reviewed the examination and initial interpretation  and I agree with the findings.    TAE BRISCOE MD         SYSTEM ID:  [id number]   XR Chest Port 1 View   Result Value    Radiologist flags (Urgent)     Endotracheal tube overlying the high trachea, consider    Narrative    EXAM: XR CHEST PORT 1 VIEW  2/4/2022 6:15 AM     HISTORY:  S/P AVR, CT in place       COMPARISON:  Chest x-ray 2/3/2022    FINDINGS: AP radiograph of the chest. Postoperative changes of aortic  valve replacement with intact median sternotomy wires. Right IJ  central venous catheter with tip overlying the high  SVC/brachiocephalic  confluence. The [place]-Nelda catheter has been  removed. Esophageal temperature probe overlying the high thoracic  esophagus. Endotracheal tube projecting over the high thoracic trachea  with the tip approximately 7.2 cm cephalad to the deysi. Enteric tube  with tip and side-port overlying the stomach. Feeding tube coursing  inferior to the field of view. There is a bandage overlying the left  chest and left upper quadrant.    Stable enlargement of the cardiomediastinal silhouette. Low lung  volumes. Possible trace left pleural effusion. Pulmonary vasculature  is indistinct. Unchanged perihilar interstitial opacities. Streaky  right basilar and left basilar opacities. Visualized upper abdomen is  unremarkable.      Impression    IMPRESSION:  1. Endotracheal tube projecting over the thoracic trachea  approximately 7.2 cm cephalad to the deysi. Consider slight  advancement, approximately 2-3 cm.  2. Low lung volumes with streaky bibasilar opacities, likely  atelectasis.  3. Indistinct pulmonary vasculature with unchanged perihilar  interstitial and hazy airspace opacities, likely representing  pulmonary edema and/or atelectasis.  4. Possible trace left pleural effusion.    [Access Center: Endotracheal tube overlying the high trachea, consider  advancing 2-3 cm]    This report will be copied to the [facility] to ensure a  provider acknowledges the finding. [facility] Center is available Monday  through Friday 8am-3:30 pm.     I have personally reviewed the examination and initial interpretation  and I agree with the findings.    TAE BRISCOE MD         SYSTEM ID:  [id number]   XR Chest Port 1 View    Narrative    EXAM: XR CHEST PORT 1 VIEW  2/4/2022 10:30 AM     HISTORY:  Confirm ETT placement       COMPARISON:  Same day chest x-ray    FINDINGS: AP radiograph of the chest. Postoperative chest with aortic  valve replacement. Visualized median sternotomy wires are intact.  Endotracheal tube now projecting  approximately 3 cm cephalad to the  deysi. Right IJ central venous catheter with tip overlying the  SVC/brachiocephalic confluence. Esophageal temperature probe overlying  the mid thoracic esophagus. Partially visualized feeding tube coursing  inferior to the field-of-view. Enteric tube with tip overlying the  stomach. Cutaneous pacer pad overlying the left chest.    The left costophrenic angle is collimated out of view. Stable  enlargement of the cardiomediastinal silhouette. Low lung volumes.  Streaky right basilar opacities. The pulmonary vasculature is  indistinct with diffuse interstitial opacities throughout the lungs.  No significant right pleural effusion. No pneumothorax. Upper abdomen  is unremarkable.      Impression    IMPRESSION:  1. Endotracheal tube now projecting approximately 3 cm cephalad to the  deysi.  2. Continued low lung volumes with streaky bibasilar opacities, likely  atelectasis.  3. Indistinct pulmonary vasculature with diffuse interstitial  opacities, likely pulmonary edema.    I have personally reviewed the examination and initial interpretation  and I agree with the findings.    TAE BRISCOE MD         SYSTEM ID:  [id number]     2/4 CXR:  1. No appreciable pneumothorax or pneumomediastinum following mediastinal drain removal.  2. Decreased diffuse interstitial opacities, likely representing improving atelectasis versus pulmonary edema.  3. Unchanged streaky bibasilar atelectasis and trace left pleural   Effusion.  2/4 CXR:  Endotracheal tube.  Continued low lung volumes with streaky bibasilar opacities, likely atelectasis.  Indistinct pulmonary vasculature with diffuse interstitial opacities, likely pulmonary edema.  2/3 CXR:  Stable small bilateral pleural effusions.  Stable perihilar/retrocardiac and left basilar opacities, slightly increased in the right lung opacities, representing an overall stable mild increase in postsurgical atelectasis/edema. Continued follow-up to exclude  infectious component.     1/31 TTE:  Technically difficult study. Extremely poor acoustic windows.  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  Global and regional left ventricular function is hyperkinetic with an EF of 65-70%.  Right ventricular function, chamber size, wall motion, and thickness are normal.  The mean gradient across the aortic valve is11 mmHg.  Dilation of the inferior vena cava is present with abnormal respiratory variation in diameter.  No pericardial fluid.Organizing material noted along RV free wall.    12/13/21 Chest / abdm CT angiogram:  1. The proximal ascending aorta is severely dilated measuring 55 x 55mm in maximum dimension.  2. The aorta returns to normal size at the level of the arch.  3. No dissection, penetrating ulcer, or intramural hematoma in the visualized thoracic aorta.  4. No significant CAD in a right dominant coronary system.  5. Mild concentric LVH.  6. The aortic valve is likely bicuspid.  10/26/21 TTE:  LV normal in size with normal systolic function. LVEF 60-65%.  LV segmental wall motion normal.  Aortic valve likely tri leaflet with no stenosis or regurgitation.  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  Proximal ascending aorta is dilated, measuring 4.6cm with an index of 1.52cm/m2.

## 2022-02-04 NOTE — PLAN OF CARE
Major Shift Events: Pt febrile pt pan-cultured. Ciku=479 Arctic son started. Pt had trouble meeting SBP goal of <130, then <140 through night. Nicardipine started.   Neuro: Rass: -5. Pupals 3 E/R. No w/d in extremities. Pt opens eyes with some cares. Prop @ 75 mcg/min/kr and fentanyl @ 200 mcg/min   Resp: ET @ 23 at lips. CMV 60% fio2, 530tv 18 R 14->6 peep. Veletri @ 20 ng/kg/min. Nitric @ 3 PPM. q4 hr Peep recruiting maneuver.   CV: NS HR . SBP<140. 50mg Labatolol given, 40 mg hydralazine given and nicardipine @ 15 mg/hr.  : Godfrey exchanged d/t need for UA/UC. UO from 75 to 175 mL/hr. Net output was -0L.  GI:OG @ 65 set to LIS. Output was 850.   Skin/Endo: Insulin Algorithm 4+2 units  Plan: Switch to esmolol.   For vital signs and complete assessments, please see documentation flowsheets.       Time 2000 0000 0500   CVP 14 13 12   PA 32/20 34/10 34/16   CI 3.3 3.3 2.3   SVo2 67 67 64     Time 2203 2357 0502   PH 7.45 7.49 7.47   PaO2 66 85 89   fio2 40 50 60   P/F 165 170 148   Peep 14 14 16

## 2022-02-04 NOTE — PROGRESS NOTES
CV ICU PROGRESS NOTE  02/04/2022      CO-MORBIDITIES:   Aortic root aneurysm (H)    ASSESSMENT: Chuy Varner is a 31 year old male with a PMHx s/f bicuspid aortic valve, thoracic aortic aneurysm without rupture, hypertension, obesity, and testicular cancer s/p orchiectomy who underwent Bentall procedure with mechanical valve on 1/28 with Dr. Velasco and Dr. Hannah.    OVERNIGHT EVENTS:  Hypertensive when proned. Given PRN labetalol and hydralazine without improvement. Started on nicardipine gtt and is now maxed out. Was proned at at 1000 2/3 until 0400 2/4. PEEP of 14 when proned, 18 when supine. WBC increased to 23 from 14.9 yesterday and he had a Tmax of 102. CXR, respiratory panel, and BCs ordered. Fentanyl @ 200, propofol @ 75, heparin @ 3000.    TODAY'S PROGRESS:   - Consult ID  - Start esmolol gtt, stop nicardipine  - Continue PRN hydralazine and labetalol to keep SBP<130  - Prone 14-18 hours a day (PEEP of 14 when prone)  - Pull CTs  - Wean Nitric, consider flolan wean after  - Continue Advair, mucomyst, and dubonebs  - Continue IV lasix 80 q6h with goal net negative 500 to 1L  - Continue Dexamethasone 20 mg daily x5 days (followed by 10mg x5 days snd taper)  - Continue meropenem x 7 days  - Daily lactate    PLAN:  Neuro/ pain/ sedation:  # Acute Postoperative pain  - Monitor neurological status. Notify the MD for any acute changes in exam.  - Pain: fentanyl gtt. Scheduled tylenol. PRN tylenol, oxycodone, robaxin  - Sedation: propofol gtt, consider changing to versed in setting of elevated triglycerides  - RAAS -4 to -5 if proning      Pulmonary care:   # Postoperative ventilation management  # Acute hypoxemic respiratory failure likely ARDS vs. PNA vs. Hypervolemia vs. TRALI  Started on Flolan 1/30. COVID-19.Mycoplasma, Parainfluenza and Influenza Negative. CTA negative for PE on 1/30.  WBC 23.0 2/4 (14.9 the day before). CXR, respiratory panel, and BCs ordered.  - Follow BCs and respiratory panel  (2/4)  - Pulmonology saw 2/2, appreciate recs   > prone (14 to 18 hrs per day)   > PEEP 14   > may need to increase PEEP slightly when supine   > as he wakes up more, may benefit from volumes closer to 600 ml for comfort and synchrony (8 ml/kg of his IBW), for now we set it to 530   > pulmonary toilet ie QID duonebs and percussive therapy ie metanebs if possible (may be difficult with recent chest surgery)   > agree with borad antibiotics to treat pneumonia per the CV ICU team  - Stop nitric oxide  - Titrate supplemental oxygen to maintain saturation above 90%  - Wean flolan and FiO2 as able  - Continue Dexamethasone 20 mg daily x5 days, 10 mg daily x5, slow taper following (started 1/31)  - Pulmonary hygiene: Incentive spirometer every 15- 30 minutes when awake, flutter valve, C&DB     Cardiovascular:    # Ascending aortic aneurysm s/p Bentall w/ Riggins valve on 1/28 with Dr. Velasco and Dr. Hannah  # Bicuspid aortic valve  # History of thoracic aortic aneurysm without rupture  # History of hypertension  Recent echo on 10/26/21 with LVEF of 60-65%.  CT vascular 12/13/21 showing proximal ascending aorta is severely dilated (55 x 55mm in maximum dimension) and likely bicuspid aortic valve.  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  CT PE 1/30 - negative for acute PE, marked dependent atelectasis R>L.  - Start epmolol gtt, stop nicardipine gtt  - Monitor hemodynamic status  - Goal MAP>65, SBP <`30  - Statin hold  - BB hold  - ASA  - Holding PTA meds: metoprolol succinate 25mg daily  - Labetalol and hydralazine PRN to maintain SBP <130     GI care/ Nutrition:   # Obesity (BMI 46)  - NJ, advance to goal  - PPI  - Continue bowel regimen: miralax, senna    Renal/ Fluid Balance/ Electrolytes:   BL creat appears to be ~ 1.0  - Strict I/O, daily weights  - Lasix 80  q6h, goal -500 to -1L net  - Avoid/limit nephrotoxins as able  - Replete lytes PRN per protocol  - Scheduled afternoon lytes  - Scheduled potassium replacement     Endocrine:    # Stress induced hyperglycemia, improving  - Insulin gtt  - Goal BG <180 for optimal healing     ID/ Antibiotics:  # Stress induced leukocytosis, resolved  # Possible ventilator associated pneumonia  Febrile and pan-cultured on 1/30/22. Covid pcr, respiratory panel, and respiratory cultures negative 1/30. Re-cultured 1/31: respiratory and blood cultures NGTD. Re-cultured again 2/4 when WBC increased to 23.  - Follow respiratory panel and BC results 2/4  - Continue to monitor fever curve, WBC and inflammatory markers  - Continue meropenem x7 days (started 1/31)  - Consult ID     Heme/Onc:     # Stress induced leukocytosis  # Acute blood loss anemia  # Acute blood loss thrombocytopenia, resolved  # History of testicular cancer s/p orchiectomy  No s/sx active bleeding  - Continue to monitor  - CBC daily  - Continue heparin gtt (hold for CT removal)     MSK/ Skin:  # Sternotomy  # Surgical Incision  - Sternal precautions  - Postoperative incision management per protocol  - PT/OT/CR     Prophylaxis:    - Mechanical prophylaxis for DVT  - Chemical DVT prophylaxis- heparin gtt (hold for CT removal)  - PPI     Lines/ tubes/ drains:  - PA catheter (1/28)  - RIJ MAC (1/28)  - Left radial arterial line (1/28)  - Left PIV (1/28)  - Godfrey (1/28)  - 3 CTs (2 anterior mediastinal, 1 left mediastinal) (1/28) to pull today  - OG (1/28)  - Nasoduodenal (1/31)  - ETT (1/28)  - Bilateral ES catheters (1/28)     Disposition:  - CVICU    Patient seen, findings and plan discussed with CV ICU staff, Dr. Han and CVTS staff Dr. Velasco.    Michelle Poon, MS4    I was present with the medical student who participated in the service and in the documentation of the note. I have verified the history and personally performed the physical exam and medical decision  making. I agree with the assessment and plan of care as documented in the note.    Tom Adair MD  Anesthesiology, PGY3    ====================================    SUBJECTIVE:   Hypertensive when proned. Treating with esmolol gtt and PRN labetalol and hydralazine. Was proned for 18 hours yesterday. Elevated WBCs, CXR, respiratory panel, and BCs ordered. Continue to diurese.      OBJECTIVE:   1. VITAL SIGNS:   Temp:  [98.8  F (37.1  C)-102  F (38.9  C)] 99.3  F (37.4  C)  Pulse:  [] 96  Resp:  [19-23] 21  BP: (104)/(59) 104/59  MAP:  [52 mmHg-115 mmHg] 83 mmHg  Arterial Line BP: ()/(42-87) 143/58  FiO2 (%):  [40 %-60 %] 60 %  SpO2:  [89 %-100 %] 99 %  Ventilation Mode: CMV/AC  (Continuous Mandatory Ventilation/ Assist Control)  FiO2 (%): 60 %  Rate Set (breaths/minute): 18 breaths/min  Tidal Volume Set (mL): 530 mL  PEEP (cm H2O): (S) 16 cmH2O  Oxygen Concentration (%): (S) 60 %  Resp: 21      2. INTAKE/ OUTPUT:   I/O last 3 completed shifts:  In: 5197.42 [I.V.:3007.42; NG/GT:870]  Out: 5930 [Urine:4450; Emesis/NG output:1350; Stool:100; Chest Tube:30]    3. PHYSICAL EXAMINATION:   General: Sedated, intubated, laying supine  Neuro: Sedated, opening eyes to voice  Resp: Intubated, at 60% FIO2  CV: RRR, temporary pacemaker on standby  Abdomen: Soft, Non-distended  Incisions: C/D/I  Extremities: warm and well perfused, SCDs in place    4. INVESTIGATIONS:   Arterial Blood Gases   Recent Labs   Lab 02/04/22  0502 02/03/22  2357 02/03/22  2203 02/03/22  1556   PH 7.47* 7.49* 7.45 7.42   PCO2 46* 44 50* 54*   PO2 89 85 66* 80   HCO3 33* 33* 35* 35*     Complete Blood Count   Recent Labs   Lab 02/04/22  0545 02/04/22  0132 02/03/22  0312 02/02/22  0357   WBC 21.1* 23.0* 14.9* 12.8*   HGB 10.0* 11.0* 9.8* 9.4*    289 250 210     Basic Metabolic Panel  Recent Labs   Lab 02/04/22  0809 02/04/22  0658 02/04/22  0619 02/04/22  0545 02/04/22  0156 02/04/22  0132 02/03/22  2309 02/03/22  2204 02/03/22  1358  02/03/22  1351 02/03/22  0321 02/03/22 0312   NA  --   --   --  145*  145*  --  144  --   --   --  143  --  143   POTASSIUM  --   --   --  3.6  3.6  --  3.4  --  3.7  --  3.9  --  3.5   CHLORIDE  --   --   --  108  108  --  107  --   --   --  106  --  107   CO2  --   --   --  30  30  --  29  --   --   --  33*  --  32   BUN  --   --   --  51*  51*  --  52*  --   --   --  43*  --  47*   CR  --   --   --  1.03  1.03  --  1.07  --   --   --  0.96  --  1.07   * 121* 100* 126*  126*   < > 137*   < >  --    < > 157*   < > 123*    < > = values in this interval not displayed.     Liver Function Tests  Recent Labs   Lab 02/04/22  0545 02/04/22  0132 02/03/22 0312 02/02/22  0357 01/29/22 0323 01/28/22  1447 01/28/22  1246   AST 52* 55* 26 15   < > 50*  --    ALT 55 54 34 20   < > 28  --    ALKPHOS 71 81 67 51   < > 44  --    BILITOTAL 0.5 0.5 0.3 0.2   < > 0.6  --    ALBUMIN 2.2* 2.5* 2.3* 2.2*   < > 3.1*  --    INR  --   --   --   --   --  1.38* 1.53*    < > = values in this interval not displayed.     Pancreatic Enzymes  No lab results found in last 7 days.  Coagulation Profile  Recent Labs   Lab 01/28/22  1447 01/28/22  1246   INR 1.38* 1.53*   PTT 31 28         5. RADIOLOGY:   Recent Results (from the past 24 hour(s))   XR Chest Port 1 View    Narrative    EXAM: XR CHEST PORT 1 VIEW  1/29/2022 8:45 AM     HISTORY:  increasing FiO2 requirements       COMPARISON:  Chest x-ray 1/28/2022    FINDINGS: AP radiograph of the chest. Endotracheal tube projecting  over the high thoracic trachea. Right IJ [place]-Nelda catheter with tip  overlying the main pulmonary artery. Partially visualized enteric tube  with tip overlying the stomach. Postoperative chest with intact median  sternotomy wires.    Stable borderline enlargement of the cardiomediastinal silhouette. Low  lung volumes. Small left and possible trace right pleural effusions.  Pulmonary vasculature is indistinct. Perihilar interstitial prominent  opacities. No  pneumothorax. Visualized upper abdomen is unremarkable.      Impression    IMPRESSION:  1. Stable endotracheal tube and right IJ [place]-Nelda catheter.  2. Low lung volumes with unchanged perihilar interstitial prominent  opacities, likely pulmonary edema and/or atelectasis.  3. Increased small left and possible trace right pleural effusions.    I have personally reviewed the examination and initial interpretation  and I agree with the findings.    BENTLEY ANTONIO MD         SYSTEM ID:  [id number]   XR Chest Port 1 View    Impression    RESIDENT PRELIMINARY INTERPRETATION  Impression:   1. Worsening of low lung volumes, small left greater than right  pleural effusions, and perihilar opacities compatible with pulmonary  edema and/or atelectasis. Infection cannot be excluded.  2. Endotracheal tube has been advanced with tip projecting over mid  thoracic trachea. Good position.  3. Stable additional support devices and postsurgical changes.   CT Chest Pulmonary Embolism w Contrast    Impression    RESIDENT PRELIMINARY INTERPRETATION  IMPRESSION:   1. Exam is negative for acute pulmonary embolism.  No evidence of  right heart strain or increased right heart pressures.       2. Marked dependent atelectasis in the right than left lungs. Occult  infection cannot be excluded.    3. Postsurgical changes of median sternotomy and aortic valve repair.  Satisfactory position of support devices as detailed.       In the event of a positive result for acute pulmonary embolism  resulting in right heart strain, consider calling the   [facility] hospital  for PERT (Pulmonary Embolism Response Team)  Activation?    PERT -- Pulmonary Embolism Response Team (Multidisciplinary team  including cardiology, interventional radiology, critical care,  hematology)       =========================================

## 2022-02-04 NOTE — PROGRESS NOTES
CVTS Progress Note    Re: Chest tube management      Heparin held for two hours.   Removed TPW without resistance.  Waited 30 minutes and pulled Meds and pleural without any complications.   Okay to resume heparin in one hour.   Follow up CXR without any pneumo and improving diffuse opacities.     Qi Umaña DNP, CNP  [facility] Cardiothoracic Surgery  O: [phone]  Pgr [phone]

## 2022-02-05 ENCOUNTER — APPOINTMENT (OUTPATIENT)
Dept: GENERAL RADIOLOGY | Facility: CLINIC | Age: 32
End: 2022-02-05
Attending: STUDENT IN AN ORGANIZED HEALTH CARE EDUCATION/TRAINING PROGRAM
Payer: COMMERCIAL

## 2022-02-05 LAB
ALBUMIN SERPL-MCNC: 2.5 G/DL (ref 3.4–5)
ALBUMIN SERPL-MCNC: 2.6 G/DL (ref 3.4–5)
ALP SERPL-CCNC: 68 U/L (ref 40–150)
ALP SERPL-CCNC: 69 U/L (ref 40–150)
ALT SERPL W P-5'-P-CCNC: 62 U/L (ref 0–70)
ALT SERPL W P-5'-P-CCNC: 62 U/L (ref 0–70)
ANION GAP SERPL CALCULATED.3IONS-SCNC: 3 MMOL/L (ref 3–14)
ANION GAP SERPL CALCULATED.3IONS-SCNC: 6 MMOL/L (ref 3–14)
ANION GAP SERPL CALCULATED.3IONS-SCNC: 6 MMOL/L (ref 3–14)
AST SERPL W P-5'-P-CCNC: 41 U/L (ref 0–45)
AST SERPL W P-5'-P-CCNC: 44 U/L (ref 0–45)
BACTERIA BLD CULT: NO GROWTH
BASE EXCESS BLDA CALC-SCNC: 6.7 MMOL/L (ref -9–1.8)
BASE EXCESS BLDA CALC-SCNC: 7.2 MMOL/L (ref -9–1.8)
BASE EXCESS BLDA CALC-SCNC: 8 MMOL/L (ref -9–1.8)
BASE EXCESS BLDA CALC-SCNC: 8.3 MMOL/L (ref -9–1.8)
BASE EXCESS BLDA CALC-SCNC: 8.3 MMOL/L (ref -9–1.8)
BASE EXCESS BLDA CALC-SCNC: 8.8 MMOL/L (ref -9–1.8)
BASE EXCESS BLDA CALC-SCNC: 9.8 MMOL/L (ref -9–1.8)
BILIRUB DIRECT SERPL-MCNC: 0.2 MG/DL (ref 0–0.2)
BILIRUB SERPL-MCNC: 0.4 MG/DL (ref 0.2–1.3)
BILIRUB SERPL-MCNC: 0.6 MG/DL (ref 0.2–1.3)
BUN SERPL-MCNC: 68 MG/DL (ref 7–30)
BUN SERPL-MCNC: 72 MG/DL (ref 7–30)
BUN SERPL-MCNC: 72 MG/DL (ref 7–30)
C DIFF TOX B STL QL: NEGATIVE
CA-I BLD-MCNC: 4.6 MG/DL (ref 4.4–5.2)
CALCIUM SERPL-MCNC: 8.8 MG/DL (ref 8.5–10.1)
CALCIUM SERPL-MCNC: 8.9 MG/DL (ref 8.5–10.1)
CALCIUM SERPL-MCNC: 8.9 MG/DL (ref 8.5–10.1)
CHLORIDE BLD-SCNC: 106 MMOL/L (ref 94–109)
CHLORIDE BLD-SCNC: 106 MMOL/L (ref 94–109)
CHLORIDE BLD-SCNC: 107 MMOL/L (ref 94–109)
CK SERPL-CCNC: 1371 U/L (ref 30–300)
CO2 SERPL-SCNC: 32 MMOL/L (ref 20–32)
CO2 SERPL-SCNC: 32 MMOL/L (ref 20–32)
CO2 SERPL-SCNC: 34 MMOL/L (ref 20–32)
CREAT SERPL-MCNC: 1.26 MG/DL (ref 0.66–1.25)
CREAT SERPL-MCNC: 1.34 MG/DL (ref 0.66–1.25)
CREAT SERPL-MCNC: 1.34 MG/DL (ref 0.66–1.25)
CRP SERPL-MCNC: 150 MG/L (ref 0–8)
ERYTHROCYTE [DISTWIDTH] IN BLOOD BY AUTOMATED COUNT: 13.3 % (ref 10–15)
FLUAV RNA SPEC QL NAA+PROBE: NEGATIVE
FLUBV RNA RESP QL NAA+PROBE: NEGATIVE
GFR SERPL CREATININE-BSD FRML MDRD: 73 ML/MIN/1.73M2
GFR SERPL CREATININE-BSD FRML MDRD: 73 ML/MIN/1.73M2
GFR SERPL CREATININE-BSD FRML MDRD: 78 ML/MIN/1.73M2
GLUCOSE BLD-MCNC: 131 MG/DL (ref 70–99)
GLUCOSE BLD-MCNC: 131 MG/DL (ref 70–99)
GLUCOSE BLD-MCNC: 170 MG/DL (ref 70–99)
GLUCOSE BLDC GLUCOMTR-MCNC: 111 MG/DL (ref 70–99)
GLUCOSE BLDC GLUCOMTR-MCNC: 114 MG/DL (ref 70–99)
GLUCOSE BLDC GLUCOMTR-MCNC: 118 MG/DL (ref 70–99)
GLUCOSE BLDC GLUCOMTR-MCNC: 119 MG/DL (ref 70–99)
GLUCOSE BLDC GLUCOMTR-MCNC: 119 MG/DL (ref 70–99)
GLUCOSE BLDC GLUCOMTR-MCNC: 120 MG/DL (ref 70–99)
GLUCOSE BLDC GLUCOMTR-MCNC: 123 MG/DL (ref 70–99)
GLUCOSE BLDC GLUCOMTR-MCNC: 125 MG/DL (ref 70–99)
GLUCOSE BLDC GLUCOMTR-MCNC: 131 MG/DL (ref 70–99)
GLUCOSE BLDC GLUCOMTR-MCNC: 138 MG/DL (ref 70–99)
GLUCOSE BLDC GLUCOMTR-MCNC: 148 MG/DL (ref 70–99)
GLUCOSE BLDC GLUCOMTR-MCNC: 163 MG/DL (ref 70–99)
GLUCOSE BLDC GLUCOMTR-MCNC: 166 MG/DL (ref 70–99)
HCO3 BLD-SCNC: 32 MMOL/L (ref 21–28)
HCO3 BLD-SCNC: 32 MMOL/L (ref 21–28)
HCO3 BLD-SCNC: 34 MMOL/L (ref 21–28)
HCO3 BLD-SCNC: 35 MMOL/L (ref 21–28)
HCO3 BLD-SCNC: 36 MMOL/L (ref 21–28)
HCT VFR BLD AUTO: 31.3 % (ref 40–53)
HGB BLD-MCNC: 9.6 G/DL (ref 13.3–17.7)
LACTATE SERPL-SCNC: 0.7 MMOL/L (ref 0.7–2)
LACTATE SERPL-SCNC: 1.1 MMOL/L (ref 0.7–2)
MAGNESIUM SERPL-MCNC: 2.7 MG/DL (ref 1.8–2.6)
MAGNESIUM SERPL-MCNC: 2.8 MG/DL (ref 1.8–2.6)
MAGNESIUM SERPL-MCNC: 2.8 MG/DL (ref 1.8–2.6)
MCH RBC QN AUTO: 30.2 PG (ref 26.5–33)
MCHC RBC AUTO-ENTMCNC: 30.7 G/DL (ref 31.5–36.5)
MCV RBC AUTO: 98 FL (ref 78–100)
O2/TOTAL GAS SETTING VFR VENT: 50 %
O2/TOTAL GAS SETTING VFR VENT: 60 %
O2/TOTAL GAS SETTING VFR VENT: 60 %
OXYHGB MFR BLD: 91 % (ref 92–100)
OXYHGB MFR BLD: 95 % (ref 92–100)
OXYHGB MFR BLD: 96 % (ref 92–100)
OXYHGB MFR BLD: 96 % (ref 92–100)
OXYHGB MFR BLD: 97 % (ref 92–100)
OXYHGB MFR BLD: 97 % (ref 92–100)
OXYHGB MFR BLD: 98 % (ref 92–100)
PCO2 BLD: 46 MM HG (ref 35–45)
PCO2 BLD: 48 MM HG (ref 35–45)
PCO2 BLD: 51 MM HG (ref 35–45)
PCO2 BLD: 51 MM HG (ref 35–45)
PCO2 BLD: 54 MM HG (ref 35–45)
PCO2 BLD: 56 MM HG (ref 35–45)
PCO2 BLD: 57 MM HG (ref 35–45)
PH BLD: 7.41 [PH] (ref 7.35–7.45)
PH BLD: 7.43 [PH] (ref 7.35–7.45)
PH BLD: 7.44 [PH] (ref 7.35–7.45)
PHOSPHATE SERPL-MCNC: 5.6 MG/DL (ref 2.5–4.5)
PLATELET # BLD AUTO: 292 10E3/UL (ref 150–450)
PO2 BLD: 100 MM HG (ref 80–105)
PO2 BLD: 125 MM HG (ref 80–105)
PO2 BLD: 64 MM HG (ref 80–105)
PO2 BLD: 82 MM HG (ref 80–105)
PO2 BLD: 93 MM HG (ref 80–105)
PO2 BLD: 94 MM HG (ref 80–105)
PO2 BLD: 95 MM HG (ref 80–105)
POTASSIUM BLD-SCNC: 3.8 MMOL/L (ref 3.4–5.3)
POTASSIUM BLD-SCNC: 3.8 MMOL/L (ref 3.4–5.3)
POTASSIUM BLD-SCNC: 4.3 MMOL/L (ref 3.4–5.3)
POTASSIUM BLD-SCNC: 4.5 MMOL/L (ref 3.4–5.3)
POTASSIUM BLD-SCNC: 4.8 MMOL/L (ref 3.4–5.3)
PROT SERPL-MCNC: 6.7 G/DL (ref 6.8–8.8)
PROT SERPL-MCNC: 6.7 G/DL (ref 6.8–8.8)
RBC # BLD AUTO: 3.18 10E6/UL (ref 4.4–5.9)
RSV RNA SPEC NAA+PROBE: NEGATIVE
SARS-COV-2 RNA RESP QL NAA+PROBE: NEGATIVE
SODIUM SERPL-SCNC: 144 MMOL/L (ref 133–144)
TRIGL SERPL-MCNC: 208 MG/DL
UFH PPP CHRO-ACNC: 0.27 IU/ML
WBC # BLD AUTO: 21.3 10E3/UL (ref 4–11)

## 2022-02-05 PROCEDURE — 82248 BILIRUBIN DIRECT: CPT | Performed by: STUDENT IN AN ORGANIZED HEALTH CARE EDUCATION/TRAINING PROGRAM

## 2022-02-05 PROCEDURE — 250N000011 HC RX IP 250 OP 636: Performed by: STUDENT IN AN ORGANIZED HEALTH CARE EDUCATION/TRAINING PROGRAM

## 2022-02-05 PROCEDURE — 94640 AIRWAY INHALATION TREATMENT: CPT

## 2022-02-05 PROCEDURE — 250N000013 HC RX MED GY IP 250 OP 250 PS 637: Performed by: SURGERY

## 2022-02-05 PROCEDURE — 250N000013 HC RX MED GY IP 250 OP 250 PS 637: Performed by: STUDENT IN AN ORGANIZED HEALTH CARE EDUCATION/TRAINING PROGRAM

## 2022-02-05 PROCEDURE — 84132 ASSAY OF SERUM POTASSIUM: CPT | Performed by: STUDENT IN AN ORGANIZED HEALTH CARE EDUCATION/TRAINING PROGRAM

## 2022-02-05 PROCEDURE — 83735 ASSAY OF MAGNESIUM: CPT | Performed by: STUDENT IN AN ORGANIZED HEALTH CARE EDUCATION/TRAINING PROGRAM

## 2022-02-05 PROCEDURE — 250N000011 HC RX IP 250 OP 636: Performed by: SURGERY

## 2022-02-05 PROCEDURE — 84132 ASSAY OF SERUM POTASSIUM: CPT | Performed by: SURGERY

## 2022-02-05 PROCEDURE — 82040 ASSAY OF SERUM ALBUMIN: CPT | Performed by: STUDENT IN AN ORGANIZED HEALTH CARE EDUCATION/TRAINING PROGRAM

## 2022-02-05 PROCEDURE — 82805 BLOOD GASES W/O2 SATURATION: CPT | Performed by: NURSE PRACTITIONER

## 2022-02-05 PROCEDURE — 250N000009 HC RX 250: Performed by: NURSE PRACTITIONER

## 2022-02-05 PROCEDURE — 82805 BLOOD GASES W/O2 SATURATION: CPT | Performed by: STUDENT IN AN ORGANIZED HEALTH CARE EDUCATION/TRAINING PROGRAM

## 2022-02-05 PROCEDURE — 94640 AIRWAY INHALATION TREATMENT: CPT | Mod: 76

## 2022-02-05 PROCEDURE — 250N000011 HC RX IP 250 OP 636: Performed by: NURSE PRACTITIONER

## 2022-02-05 PROCEDURE — 84478 ASSAY OF TRIGLYCERIDES: CPT | Performed by: STUDENT IN AN ORGANIZED HEALTH CARE EDUCATION/TRAINING PROGRAM

## 2022-02-05 PROCEDURE — 85520 HEPARIN ASSAY: CPT | Performed by: STUDENT IN AN ORGANIZED HEALTH CARE EDUCATION/TRAINING PROGRAM

## 2022-02-05 PROCEDURE — 84100 ASSAY OF PHOSPHORUS: CPT | Performed by: STUDENT IN AN ORGANIZED HEALTH CARE EDUCATION/TRAINING PROGRAM

## 2022-02-05 PROCEDURE — 999N000155 HC STATISTIC RAPCV CVP MONITORING

## 2022-02-05 PROCEDURE — 999N000015 HC STATISTIC ARTERIAL MONITORING DAILY

## 2022-02-05 PROCEDURE — 83605 ASSAY OF LACTIC ACID: CPT | Performed by: STUDENT IN AN ORGANIZED HEALTH CARE EDUCATION/TRAINING PROGRAM

## 2022-02-05 PROCEDURE — 94645 CONT INHLJ TX EACH ADDL HOUR: CPT

## 2022-02-05 PROCEDURE — 71045 X-RAY EXAM CHEST 1 VIEW: CPT

## 2022-02-05 PROCEDURE — 85018 HEMOGLOBIN: CPT | Performed by: STUDENT IN AN ORGANIZED HEALTH CARE EDUCATION/TRAINING PROGRAM

## 2022-02-05 PROCEDURE — 82550 ASSAY OF CK (CPK): CPT | Performed by: STUDENT IN AN ORGANIZED HEALTH CARE EDUCATION/TRAINING PROGRAM

## 2022-02-05 PROCEDURE — 999N000157 HC STATISTIC RCP TIME EA 10 MIN

## 2022-02-05 PROCEDURE — 99291 CRITICAL CARE FIRST HOUR: CPT | Mod: 24 | Performed by: ANESTHESIOLOGY

## 2022-02-05 PROCEDURE — 250N000009 HC RX 250

## 2022-02-05 PROCEDURE — 83605 ASSAY OF LACTIC ACID: CPT | Performed by: SURGERY

## 2022-02-05 PROCEDURE — 82435 ASSAY OF BLOOD CHLORIDE: CPT | Performed by: STUDENT IN AN ORGANIZED HEALTH CARE EDUCATION/TRAINING PROGRAM

## 2022-02-05 PROCEDURE — 87040 BLOOD CULTURE FOR BACTERIA: CPT | Performed by: STUDENT IN AN ORGANIZED HEALTH CARE EDUCATION/TRAINING PROGRAM

## 2022-02-05 PROCEDURE — 71045 X-RAY EXAM CHEST 1 VIEW: CPT | Mod: 26 | Performed by: RADIOLOGY

## 2022-02-05 PROCEDURE — 82330 ASSAY OF CALCIUM: CPT | Performed by: SURGERY

## 2022-02-05 PROCEDURE — 84155 ASSAY OF PROTEIN SERUM: CPT | Performed by: STUDENT IN AN ORGANIZED HEALTH CARE EDUCATION/TRAINING PROGRAM

## 2022-02-05 PROCEDURE — 94003 VENT MGMT INPAT SUBQ DAY: CPT

## 2022-02-05 PROCEDURE — 200N000002 HC R&B ICU UMMC

## 2022-02-05 PROCEDURE — 87493 C DIFF AMPLIFIED PROBE: CPT | Performed by: STUDENT IN AN ORGANIZED HEALTH CARE EDUCATION/TRAINING PROGRAM

## 2022-02-05 PROCEDURE — 86140 C-REACTIVE PROTEIN: CPT | Performed by: STUDENT IN AN ORGANIZED HEALTH CARE EDUCATION/TRAINING PROGRAM

## 2022-02-05 PROCEDURE — 258N000003 HC RX IP 258 OP 636: Performed by: NURSE PRACTITIONER

## 2022-02-05 PROCEDURE — 87637 SARSCOV2&INF A&B&RSV AMP PRB: CPT | Performed by: STUDENT IN AN ORGANIZED HEALTH CARE EDUCATION/TRAINING PROGRAM

## 2022-02-05 RX ORDER — FUROSEMIDE 10 MG/ML
40 INJECTION INTRAMUSCULAR; INTRAVENOUS EVERY 6 HOURS
Status: COMPLETED | OUTPATIENT
Start: 2022-02-05 | End: 2022-02-05

## 2022-02-05 RX ORDER — ACETAMINOPHEN 325 MG/1
650 TABLET ORAL EVERY 4 HOURS PRN
Status: DISCONTINUED | OUTPATIENT
Start: 2022-02-05 | End: 2022-02-15

## 2022-02-05 RX ORDER — POTASSIUM CHLORIDE 1.5 G/1.58G
20 POWDER, FOR SOLUTION ORAL ONCE
Status: COMPLETED | OUTPATIENT
Start: 2022-02-05 | End: 2022-02-05

## 2022-02-05 RX ORDER — MIDAZOLAM HCL IN 0.9 % NACL/PF 1 MG/ML
1-8 PLASTIC BAG, INJECTION (ML) INTRAVENOUS CONTINUOUS
Status: DISCONTINUED | OUTPATIENT
Start: 2022-02-05 | End: 2022-02-09

## 2022-02-05 RX ORDER — DEXAMETHASONE SODIUM PHOSPHATE 10 MG/ML
10 INJECTION, SOLUTION INTRAMUSCULAR; INTRAVENOUS DAILY
Status: DISCONTINUED | OUTPATIENT
Start: 2022-02-06 | End: 2022-02-09

## 2022-02-05 RX ADMIN — ASPIRIN 81 MG CHEWABLE TABLET 81 MG: 81 TABLET CHEWABLE at 08:22

## 2022-02-05 RX ADMIN — ACETYLCYSTEINE 2 ML: 200 SOLUTION ORAL; RESPIRATORY (INHALATION) at 07:42

## 2022-02-05 RX ADMIN — PROPOFOL 75 MCG/KG/MIN: 10 INJECTION, EMULSION INTRAVENOUS at 00:18

## 2022-02-05 RX ADMIN — POTASSIUM CHLORIDE 40 MEQ: 40 SOLUTION ORAL at 08:25

## 2022-02-05 RX ADMIN — PROPOFOL 30 MCG/KG/MIN: 10 INJECTION, EMULSION INTRAVENOUS at 17:28

## 2022-02-05 RX ADMIN — FENTANYL CITRATE 200 MCG/HR: 50 INJECTION INTRAVENOUS at 12:20

## 2022-02-05 RX ADMIN — LABETALOL HYDROCHLORIDE 10 MG: 5 INJECTION, SOLUTION INTRAVENOUS at 21:20

## 2022-02-05 RX ADMIN — PROPOFOL 75 MCG/KG/MIN: 10 INJECTION, EMULSION INTRAVENOUS at 02:55

## 2022-02-05 RX ADMIN — Medication 10 MG: at 20:55

## 2022-02-05 RX ADMIN — POTASSIUM CHLORIDE 20 MEQ: 1.5 POWDER, FOR SOLUTION ORAL at 06:34

## 2022-02-05 RX ADMIN — PROPOFOL 30 MCG/KG/MIN: 10 INJECTION, EMULSION INTRAVENOUS at 19:34

## 2022-02-05 RX ADMIN — Medication 2 PACKET: at 15:54

## 2022-02-05 RX ADMIN — IPRATROPIUM BROMIDE AND ALBUTEROL SULFATE 3 ML: 2.5; .5 SOLUTION RESPIRATORY (INHALATION) at 20:35

## 2022-02-05 RX ADMIN — PANTOPRAZOLE SODIUM 40 MG: 40 TABLET, DELAYED RELEASE ORAL at 08:23

## 2022-02-05 RX ADMIN — ACETYLCYSTEINE 2 ML: 200 SOLUTION ORAL; RESPIRATORY (INHALATION) at 16:11

## 2022-02-05 RX ADMIN — IPRATROPIUM BROMIDE AND ALBUTEROL SULFATE 3 ML: 2.5; .5 SOLUTION RESPIRATORY (INHALATION) at 11:56

## 2022-02-05 RX ADMIN — HYDRALAZINE HYDROCHLORIDE 10 MG: 20 INJECTION INTRAMUSCULAR; INTRAVENOUS at 23:21

## 2022-02-05 RX ADMIN — Medication 50 MCG: at 20:55

## 2022-02-05 RX ADMIN — Medication 20 NG/KG/MIN: at 08:23

## 2022-02-05 RX ADMIN — FUROSEMIDE 80 MG: 10 INJECTION, SOLUTION INTRAVENOUS at 05:26

## 2022-02-05 RX ADMIN — Medication 2 PACKET: at 08:26

## 2022-02-05 RX ADMIN — ACETAMINOPHEN 650 MG: 325 TABLET, FILM COATED ORAL at 15:53

## 2022-02-05 RX ADMIN — PROPOFOL 75 MCG/KG/MIN: 10 INJECTION, EMULSION INTRAVENOUS at 05:23

## 2022-02-05 RX ADMIN — Medication 2 PACKET: at 20:56

## 2022-02-05 RX ADMIN — POTASSIUM CHLORIDE 40 MEQ: 40 SOLUTION ORAL at 20:56

## 2022-02-05 RX ADMIN — Medication 8 MG/HR: at 20:31

## 2022-02-05 RX ADMIN — PROPOFOL 75 MCG/KG/MIN: 10 INJECTION, EMULSION INTRAVENOUS at 04:08

## 2022-02-05 RX ADMIN — IPRATROPIUM BROMIDE AND ALBUTEROL SULFATE 3 ML: 2.5; .5 SOLUTION RESPIRATORY (INHALATION) at 16:11

## 2022-02-05 RX ADMIN — ACETYLCYSTEINE 2 ML: 200 SOLUTION ORAL; RESPIRATORY (INHALATION) at 20:35

## 2022-02-05 RX ADMIN — SENNOSIDES AND DOCUSATE SODIUM 1 TABLET: 50; 8.6 TABLET ORAL at 08:22

## 2022-02-05 RX ADMIN — LABETALOL HYDROCHLORIDE 20 MG: 5 INJECTION, SOLUTION INTRAVENOUS at 03:46

## 2022-02-05 RX ADMIN — PROPOFOL 75 MCG/KG/MIN: 10 INJECTION, EMULSION INTRAVENOUS at 06:37

## 2022-02-05 RX ADMIN — PROPOFOL 60 MCG/KG/MIN: 10 INJECTION, EMULSION INTRAVENOUS at 08:23

## 2022-02-05 RX ADMIN — DEXAMETHASONE SODIUM PHOSPHATE 20 MG: 10 INJECTION, SOLUTION INTRAMUSCULAR; INTRAVENOUS at 08:25

## 2022-02-05 RX ADMIN — OXYCODONE HYDROCHLORIDE 10 MG: 10 TABLET ORAL at 23:45

## 2022-02-05 RX ADMIN — Medication 10 MG: at 22:45

## 2022-02-05 RX ADMIN — LABETALOL HYDROCHLORIDE 10 MG: 5 INJECTION, SOLUTION INTRAVENOUS at 21:11

## 2022-02-05 RX ADMIN — Medication 50 MCG: at 22:45

## 2022-02-05 RX ADMIN — PROPOFOL 30 MCG/KG/MIN: 10 INJECTION, EMULSION INTRAVENOUS at 22:13

## 2022-02-05 RX ADMIN — ACETYLCYSTEINE 2 ML: 200 SOLUTION ORAL; RESPIRATORY (INHALATION) at 11:56

## 2022-02-05 RX ADMIN — MEROPENEM 1 G: 1 INJECTION, POWDER, FOR SOLUTION INTRAVENOUS at 15:53

## 2022-02-05 RX ADMIN — PROPOFOL 60 MCG/KG/MIN: 10 INJECTION, EMULSION INTRAVENOUS at 09:41

## 2022-02-05 RX ADMIN — MEROPENEM 1 G: 1 INJECTION, POWDER, FOR SOLUTION INTRAVENOUS at 08:23

## 2022-02-05 RX ADMIN — Medication 15 NG/KG/MIN: at 21:14

## 2022-02-05 RX ADMIN — HEPARIN SODIUM 3150 UNITS/HR: 1000 INJECTION INTRAVENOUS; SUBCUTANEOUS at 12:20

## 2022-02-05 RX ADMIN — PROPOFOL 70 MCG/KG/MIN: 10 INJECTION, EMULSION INTRAVENOUS at 10:50

## 2022-02-05 RX ADMIN — Medication 2 PACKET: at 12:41

## 2022-02-05 RX ADMIN — HEPARIN SODIUM 3150 UNITS/HR: 1000 INJECTION INTRAVENOUS; SUBCUTANEOUS at 22:57

## 2022-02-05 RX ADMIN — HEPARIN SODIUM 3150 UNITS/HR: 1000 INJECTION INTRAVENOUS; SUBCUTANEOUS at 05:23

## 2022-02-05 RX ADMIN — Medication 15 ML: at 08:23

## 2022-02-05 RX ADMIN — Medication 4 MG/HR: at 10:52

## 2022-02-05 RX ADMIN — PROPOFOL 75 MCG/KG/MIN: 10 INJECTION, EMULSION INTRAVENOUS at 01:20

## 2022-02-05 RX ADMIN — FUROSEMIDE 40 MG: 10 INJECTION, SOLUTION INTRAVENOUS at 12:20

## 2022-02-05 RX ADMIN — SENNOSIDES AND DOCUSATE SODIUM 1 TABLET: 50; 8.6 TABLET ORAL at 21:02

## 2022-02-05 RX ADMIN — ACETAMINOPHEN 650 MG: 325 TABLET, FILM COATED ORAL at 20:56

## 2022-02-05 RX ADMIN — IPRATROPIUM BROMIDE AND ALBUTEROL SULFATE 3 ML: 2.5; .5 SOLUTION RESPIRATORY (INHALATION) at 07:42

## 2022-02-05 RX ADMIN — POLYETHYLENE GLYCOL 3350 17 G: 17 POWDER, FOR SOLUTION ORAL at 08:22

## 2022-02-05 ASSESSMENT — MIFFLIN-ST. JEOR: SCORE: 2687.19

## 2022-02-05 ASSESSMENT — ACTIVITIES OF DAILY LIVING (ADL)
ADLS_ACUITY_SCORE: 15

## 2022-02-05 NOTE — PLAN OF CARE
Major Shift Events: Proned @ 0130.  Neuro: Rass: -5. Pupals 3 E/R. No w/d in extremities. Pt opens eyes with some cares. Prop @ 75 mcg/min/kr and fentanyl @ 200 mcg/min. Tmax: 100.    Resp: ET @ 23 at lips. CMV 60->50% fio2, 530 tv 18 R 16->14 peep. Veletri @ 20 ng/kg/min. q4 hr Peep recruiting maneuver.   CV: NS HR 80-90. SBP<140 per noc CVTS. Esmolol turned on twice. After 2nd time gtt was turned off 250 mL 5% albumin given. Later in shift 20mg of Labatolol given and patient had larger response to labetalol today, then yesterday.  : Godfrey in place.  UO from 45 to 120 mL/hr. Net output was +1.2L.  GI:OG @ 65 set to LIS. Output was 250. Rectal tube in place with minimal output.   Skin/Endo: Insulin Algorithm 4+2 units. Now @ 5 units/hour.  Plan: Follow ABGs. Prone in 18 hours?  For vital signs and complete assessments, please see documentation flowsheets.          Time 2796 2113 7196 0400 0547   PH 7.44 7.44 7.43 7.41 7.43   PaO2 67 64 94 125 100   fio2 50 60 60 50 50   P/F 134 107 156 250 200    In. Venetri 15->20   Fio2: 50->60% Prone Decrease fio2 to 50%% and peep to 14. Repeated gas due to concern for erroneous result.

## 2022-02-05 NOTE — PROGRESS NOTES
CV ICU PROGRESS NOTE  02/05/2022      CO-MORBIDITIES:   Aortic root aneurysm (H)    ASSESSMENT: Chuy Varner is a 31 year old male with a PMHx s/f bicuspid aortic valve, thoracic aortic aneurysm without rupture, hypertension, obesity, and testicular cancer s/p orchiectomy who underwent Bentall procedure with mechanical valve on 1/28 with Dr. Velasco and Dr. Hannah.    OVERNIGHT EVENTS:  Hypertensive when proned. Given PRN labetalol and hydralazine without improvement. Started on nicardipine gtt and is now maxed out. Was proned at at 1000 2/3 until 0400 2/4. PEEP of 14 when proned, 18 when supine. WBC increased to 23 from 14.9 yesterday and he had a Tmax of 102. CXR, respiratory panel, and BCs ordered. Fentanyl @ 200, propofol @ 75, heparin @ 3000.    TODAY'S PROGRESS:   - Consult ID  - Decrease lasix to 40  - Supinate at 8 pm, leave supine overnight if tolerating  - Goal net even  - Versed gtt  - Veletri decreased to 15    PLAN:  Neuro/ pain/ sedation:  # Acute Postoperative pain  - Monitor neurological status. Notify the MD for any acute changes in exam.  - Pain: fentanyl gtt. Scheduled tylenol. PRN tylenol, oxycodone, robaxin  - Sedation: propofol gtt, versed gtt  - RAAS -4 to -5 if proning      Pulmonary care:   # Postoperative ventilation management  # Acute hypoxemic respiratory failure likely ARDS vs. PNA vs. Hypervolemia vs. TRALI  Started on Flolan 1/30. COVID-19.Mycoplasma, Parainfluenza and Influenza Negative. CTA negative for PE on 1/30.  WBC 23.0 2/4 (14.9 the day before). CXR, respiratory panel, and BCs ordered.  - Follow BCs and respiratory panel (2/4)  - Pulmonology saw 2/2, appreciate recs   > prone (14 to 18 hrs per day)   > PEEP 14   > may need to increase PEEP slightly when supine   > as he wakes up more, may benefit from volumes closer to 600 ml for comfort and synchrony (8 ml/kg of his IBW), for now we set it to 530   > pulmonary toilet ie QID duonebs and percussive therapy ie metanebs if  possible (may be difficult with recent chest surgery)   > agree with borad antibiotics to treat pneumonia per the CV ICU team  - Titrate supplemental oxygen to maintain saturation above 90%  - Wean flolan and FiO2 as able  - Continue Dexamethasone 20 mg daily x5 days, 10 mg daily x5, slow taper following (started 1/31)  - Prone today, supinate tonight     Cardiovascular:    # Ascending aortic aneurysm s/p Bentall w/ [place] valve on 1/28 with Dr. Velasco and Dr. Hannah  # Bicuspid aortic valve  # History of thoracic aortic aneurysm without rupture  # History of hypertension  Recent echo on 10/26/21 with LVEF of 60-65%.  CT vascular 12/13/21 showing proximal ascending aorta is severely dilated (55 x 55mm in maximum dimension) and likely bicuspid aortic valve.  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  CT PE 1/30 - negative for acute PE, marked dependent atelectasis R>L.  - Start epmolol gtt, stop nicardipine gtt  - Monitor hemodynamic status  - Goal MAP>65, SBP <`30  - Statin hold  - BB hold  - ASA  - Holding PTA meds: metoprolol succinate 25mg daily  - Labetalol and hydralazine PRN to maintain SBP <130     GI care/ Nutrition:   # Obesity (BMI 46)  - NJ, advance to goal  - PPI  - Continue bowel regimen: miralax, senna    Renal/ Fluid Balance/ Electrolytes:   BL creat appears to be ~ 1.0  - Strict I/O, daily weights  - Lasix 40 q6h, goal net even  - Avoid/limit nephrotoxins as able  - Replete lytes PRN per protocol  - Scheduled potassium replacement     Endocrine:    # Stress induced hyperglycemia, improving  - Insulin gtt  - Goal BG <180 for optimal healing     ID/ Antibiotics:  # Stress induced leukocytosis, resolved  # Possible ventilator associated pneumonia  Febrile and pan-cultured on 1/30/22. Covid pcr, respiratory panel, and respiratory cultures  negative 1/30. Re-cultured 1/31: respiratory and blood cultures NGTD. Re-cultured again 2/4 when WBC increased to 23.  - Follow respiratory panel and BC results 2/4  - Continue to monitor fever curve, WBC and inflammatory markers  - Continue meropenem x7 days (started 1/31)  - Consult ID 2/4: send C dif, blood cultures, repeat COVID, keep meropenem through tomorrow     Heme/Onc:     # Stress induced leukocytosis  # Acute blood loss anemia  # Acute blood loss thrombocytopenia, resolved  # History of testicular cancer s/p orchiectomy  No s/sx active bleeding  - Continue to monitor  - CBC daily  - Continue heparin gtt (hold for CT removal)     MSK/ Skin:  # Sternotomy  # Surgical Incision  - Sternal precautions  - Postoperative incision management per protocol  - PT/OT/CR     Prophylaxis:    - Mechanical prophylaxis for DVT  - Chemical DVT prophylaxis- heparin gtt (hold for CT removal)  - PPI     Lines/ tubes/ drains:  - RIJ MAC (1/28)  - Left radial arterial line (1/28)  - Left PIV (1/28)  - Godfrey (1/28)  - OG (1/28)  - Nasoduodenal (1/31)  - ETT (1/28)  - Bilateral ES catheters (1/28)     Disposition:  - CVICU    Patient seen, findings and plan discussed with CV ICU staff, Dr. Han and CVTS staff Dr. Miller.    Richard Shannon MD, PhD, PGY-3  General Surgery    ====================================    SUBJECTIVE:   Proned overnight for low O2, doing better this morning.     OBJECTIVE:   1. VITAL SIGNS:   Temp:  [96.8  F (36  C)-100  F (37.8  C)] 99.7  F (37.6  C)  Pulse:  [72-96] 88  Resp:  [19-22] 21  BP: (105-108)/(54-58) 107/58  MAP:  [59 mmHg-99 mmHg] 78 mmHg  Arterial Line BP: ()/(37-75) 116/64  FiO2 (%):  [50 %-100 %] 50 %  SpO2:  [22 %-100 %] 97 %  Ventilation Mode: CMV/AC  (Continuous Mandatory Ventilation/ Assist Control)  FiO2 (%): 50 %  Rate Set (breaths/minute): 18 breaths/min  Tidal Volume Set (mL): 530 mL  PEEP (cm H2O): 14 cmH2O  Oxygen Concentration (%): 50 %  Resp: 21      2. INTAKE/  OUTPUT:   I/O last 3 completed shifts:  In: 5496.02 [I.V.:3231.02; NG/GT:750]  Out: 4320 [Urine:3570; Emesis/NG output:650; Stool:100]    3. PHYSICAL EXAMINATION:   General: Sedated, intubated, laying prone  Neuro: Sedated  Resp: Intubated, mechanically ventilated  CV: RRR on telemetry, temporary pacemaker on standby  Abdomen: Soft, Non-distended  Incisions: C/D/I  Extremities: warm and well perfused, SCDs in place    4. INVESTIGATIONS:   Arterial Blood Gases   Recent Labs   Lab 02/05/22  0547 02/05/22  0400 02/05/22  0231 02/04/22  2356   PH 7.43 7.41 7.43 7.44   PCO2 51* 54* 48* 46*   PO2 100 125* 94 64*   HCO3 34* 34* 32* 32*     Complete Blood Count   Recent Labs   Lab 02/05/22  0359 02/04/22  2000 02/04/22  0545 02/04/22  0132   WBC 21.3* 17.8* 21.1* 23.0*   HGB 9.6* 9.3* 10.0* 11.0*    249 271 289     Basic Metabolic Panel  Recent Labs   Lab 02/05/22  0703 02/05/22  0547 02/05/22  0359 02/04/22  2103 02/04/22  1946 02/04/22  1418 02/04/22  1406 02/04/22  0619 02/04/22  0545 02/04/22  0156 02/04/22  0132   NA  --   --  144  144  --   --   --  143  --  145*  145*  --  144   POTASSIUM  --   --  3.8  3.8  --  3.9  --  3.8  --  3.6  3.6  --  3.4   CHLORIDE  --   --  106  106  --   --   --  106  --  108  108  --  107   CO2  --   --  32  32  --   --   --  31  --  30  30  --  29   BUN  --   --  72*  72*  --   --   --  54*  --  51*  51*  --  52*   CR  --   --  1.34*  1.34*  --   --   --  1.00  --  1.03  1.03  --  1.07   * 114* 131*  131*  119*   < > 140*   < > 173*   < > 126*  126*   < > 137*    < > = values in this interval not displayed.     Liver Function Tests  Recent Labs   Lab 02/05/22  0359 02/04/22  0545 02/04/22  0132 02/03/22  0312   AST 41 52* 55* 26   ALT 62 55 54 34   ALKPHOS 68 71 81 67   BILITOTAL 0.4 0.5 0.5 0.3   ALBUMIN 2.5* 2.2* 2.5* 2.3*     Pancreatic Enzymes  No lab results found in last 7 days.  Coagulation Profile  No lab results found in last 7 days.

## 2022-02-06 ENCOUNTER — APPOINTMENT (OUTPATIENT)
Dept: GENERAL RADIOLOGY | Facility: CLINIC | Age: 32
End: 2022-02-06
Attending: SURGERY
Payer: COMMERCIAL

## 2022-02-06 LAB
ALBUMIN SERPL-MCNC: 2.4 G/DL (ref 3.4–5)
ALP SERPL-CCNC: 67 U/L (ref 40–150)
ALT SERPL W P-5'-P-CCNC: 80 U/L (ref 0–70)
ANION GAP SERPL CALCULATED.3IONS-SCNC: 3 MMOL/L (ref 3–14)
AST SERPL W P-5'-P-CCNC: 52 U/L (ref 0–45)
BACTERIA SPT CULT: NO GROWTH
BASE EXCESS BLDA CALC-SCNC: 10.4 MMOL/L (ref -9–1.8)
BASE EXCESS BLDA CALC-SCNC: 11.3 MMOL/L (ref -9–1.8)
BASE EXCESS BLDA CALC-SCNC: 12.1 MMOL/L (ref -9–1.8)
BASE EXCESS BLDA CALC-SCNC: 8.7 MMOL/L (ref -9–1.8)
BASE EXCESS BLDA CALC-SCNC: 9.1 MMOL/L (ref -9–1.8)
BILIRUB SERPL-MCNC: 0.4 MG/DL (ref 0.2–1.3)
BUN SERPL-MCNC: 56 MG/DL (ref 7–30)
CALCIUM SERPL-MCNC: 8.8 MG/DL (ref 8.5–10.1)
CALCIUM SERPL-MCNC: 9 MG/DL (ref 8.5–10.1)
CALCIUM SERPL-MCNC: 9 MG/DL (ref 8.5–10.1)
CHLORIDE BLD-SCNC: 110 MMOL/L (ref 94–109)
CHLORIDE BLD-SCNC: 110 MMOL/L (ref 94–109)
CHLORIDE BLD-SCNC: 111 MMOL/L (ref 94–109)
CO2 SERPL-SCNC: 32 MMOL/L (ref 20–32)
CO2 SERPL-SCNC: 33 MMOL/L (ref 20–32)
CO2 SERPL-SCNC: 33 MMOL/L (ref 20–32)
CREAT SERPL-MCNC: 0.95 MG/DL (ref 0.66–1.25)
CREAT SERPL-MCNC: 1.07 MG/DL (ref 0.66–1.25)
CREAT SERPL-MCNC: 1.07 MG/DL (ref 0.66–1.25)
ERYTHROCYTE [DISTWIDTH] IN BLOOD BY AUTOMATED COUNT: 13.8 % (ref 10–15)
GFR SERPL CREATININE-BSD FRML MDRD: >90 ML/MIN/1.73M2
GLUCOSE BLD-MCNC: 128 MG/DL (ref 70–99)
GLUCOSE BLD-MCNC: 128 MG/DL (ref 70–99)
GLUCOSE BLD-MCNC: 156 MG/DL (ref 70–99)
GLUCOSE BLDC GLUCOMTR-MCNC: 114 MG/DL (ref 70–99)
GLUCOSE BLDC GLUCOMTR-MCNC: 115 MG/DL (ref 70–99)
GLUCOSE BLDC GLUCOMTR-MCNC: 119 MG/DL (ref 70–99)
GLUCOSE BLDC GLUCOMTR-MCNC: 119 MG/DL (ref 70–99)
GLUCOSE BLDC GLUCOMTR-MCNC: 120 MG/DL (ref 70–99)
GLUCOSE BLDC GLUCOMTR-MCNC: 122 MG/DL (ref 70–99)
GLUCOSE BLDC GLUCOMTR-MCNC: 123 MG/DL (ref 70–99)
GLUCOSE BLDC GLUCOMTR-MCNC: 123 MG/DL (ref 70–99)
GLUCOSE BLDC GLUCOMTR-MCNC: 124 MG/DL (ref 70–99)
GLUCOSE BLDC GLUCOMTR-MCNC: 127 MG/DL (ref 70–99)
GLUCOSE BLDC GLUCOMTR-MCNC: 132 MG/DL (ref 70–99)
GLUCOSE BLDC GLUCOMTR-MCNC: 133 MG/DL (ref 70–99)
GLUCOSE BLDC GLUCOMTR-MCNC: 140 MG/DL (ref 70–99)
GLUCOSE BLDC GLUCOMTR-MCNC: 148 MG/DL (ref 70–99)
GLUCOSE BLDC GLUCOMTR-MCNC: 92 MG/DL (ref 70–99)
GRAM STAIN RESULT: NORMAL
GRAM STAIN RESULT: NORMAL
HCO3 BLD-SCNC: 33 MMOL/L (ref 21–28)
HCO3 BLD-SCNC: 33 MMOL/L (ref 21–28)
HCO3 BLD-SCNC: 36 MMOL/L (ref 21–28)
HCO3 BLD-SCNC: 36 MMOL/L (ref 21–28)
HCO3 BLD-SCNC: 37 MMOL/L (ref 21–28)
HCT VFR BLD AUTO: 29.5 % (ref 40–53)
HGB BLD-MCNC: 9 G/DL (ref 13.3–17.7)
MAGNESIUM SERPL-MCNC: 2.8 MG/DL (ref 1.8–2.6)
MAGNESIUM SERPL-MCNC: 2.9 MG/DL (ref 1.8–2.6)
MCH RBC QN AUTO: 30.2 PG (ref 26.5–33)
MCHC RBC AUTO-ENTMCNC: 30.5 G/DL (ref 31.5–36.5)
MCV RBC AUTO: 99 FL (ref 78–100)
O2/TOTAL GAS SETTING VFR VENT: 40 %
O2/TOTAL GAS SETTING VFR VENT: 40 %
O2/TOTAL GAS SETTING VFR VENT: 50 %
OXYHGB MFR BLD: 92 % (ref 92–100)
OXYHGB MFR BLD: 93 % (ref 92–100)
OXYHGB MFR BLD: 94 % (ref 92–100)
OXYHGB MFR BLD: 95 % (ref 92–100)
OXYHGB MFR BLD: 96 % (ref 92–100)
PATH REPORT.COMMENTS IMP SPEC: NORMAL
PATH REPORT.COMMENTS IMP SPEC: NORMAL
PATH REPORT.FINAL DX SPEC: NORMAL
PATH REPORT.GROSS SPEC: NORMAL
PATH REPORT.MICROSCOPIC SPEC OTHER STN: NORMAL
PATH REPORT.RELEVANT HX SPEC: NORMAL
PCO2 BLD: 42 MM HG (ref 35–45)
PCO2 BLD: 45 MM HG (ref 35–45)
PCO2 BLD: 48 MM HG (ref 35–45)
PCO2 BLD: 49 MM HG (ref 35–45)
PCO2 BLD: 51 MM HG (ref 35–45)
PH BLD: 7.46 [PH] (ref 7.35–7.45)
PH BLD: 7.48 [PH] (ref 7.35–7.45)
PH BLD: 7.48 [PH] (ref 7.35–7.45)
PH BLD: 7.49 [PH] (ref 7.35–7.45)
PH BLD: 7.51 [PH] (ref 7.35–7.45)
PHOSPHATE SERPL-MCNC: 2.3 MG/DL (ref 2.5–4.5)
PHOTO IMAGE: NORMAL
PLATELET # BLD AUTO: 276 10E3/UL (ref 150–450)
PO2 BLD: 64 MM HG (ref 80–105)
PO2 BLD: 70 MM HG (ref 80–105)
PO2 BLD: 71 MM HG (ref 80–105)
PO2 BLD: 81 MM HG (ref 80–105)
PO2 BLD: 87 MM HG (ref 80–105)
POTASSIUM BLD-SCNC: 4.1 MMOL/L (ref 3.4–5.3)
POTASSIUM BLD-SCNC: 4.1 MMOL/L (ref 3.4–5.3)
POTASSIUM BLD-SCNC: 4.6 MMOL/L (ref 3.4–5.3)
PROT SERPL-MCNC: 6.5 G/DL (ref 6.8–8.8)
RBC # BLD AUTO: 2.98 10E6/UL (ref 4.4–5.9)
SODIUM SERPL-SCNC: 146 MMOL/L (ref 133–144)
UFH PPP CHRO-ACNC: 0.27 IU/ML
WBC # BLD AUTO: 17.1 10E3/UL (ref 4–11)

## 2022-02-06 PROCEDURE — 999N000157 HC STATISTIC RCP TIME EA 10 MIN

## 2022-02-06 PROCEDURE — 250N000013 HC RX MED GY IP 250 OP 250 PS 637: Performed by: STUDENT IN AN ORGANIZED HEALTH CARE EDUCATION/TRAINING PROGRAM

## 2022-02-06 PROCEDURE — 71045 X-RAY EXAM CHEST 1 VIEW: CPT | Mod: 26 | Performed by: RADIOLOGY

## 2022-02-06 PROCEDURE — 84100 ASSAY OF PHOSPHORUS: CPT | Performed by: STUDENT IN AN ORGANIZED HEALTH CARE EDUCATION/TRAINING PROGRAM

## 2022-02-06 PROCEDURE — 250N000009 HC RX 250: Performed by: SURGERY

## 2022-02-06 PROCEDURE — 82805 BLOOD GASES W/O2 SATURATION: CPT | Performed by: NURSE PRACTITIONER

## 2022-02-06 PROCEDURE — 85520 HEPARIN ASSAY: CPT | Performed by: SURGERY

## 2022-02-06 PROCEDURE — 94640 AIRWAY INHALATION TREATMENT: CPT

## 2022-02-06 PROCEDURE — 82310 ASSAY OF CALCIUM: CPT | Performed by: STUDENT IN AN ORGANIZED HEALTH CARE EDUCATION/TRAINING PROGRAM

## 2022-02-06 PROCEDURE — 250N000013 HC RX MED GY IP 250 OP 250 PS 637: Performed by: SURGERY

## 2022-02-06 PROCEDURE — 250N000009 HC RX 250: Performed by: NURSE PRACTITIONER

## 2022-02-06 PROCEDURE — 250N000011 HC RX IP 250 OP 636: Performed by: SURGERY

## 2022-02-06 PROCEDURE — 83735 ASSAY OF MAGNESIUM: CPT | Performed by: STUDENT IN AN ORGANIZED HEALTH CARE EDUCATION/TRAINING PROGRAM

## 2022-02-06 PROCEDURE — 200N000002 HC R&B ICU UMMC

## 2022-02-06 PROCEDURE — 94640 AIRWAY INHALATION TREATMENT: CPT | Mod: 76

## 2022-02-06 PROCEDURE — 71045 X-RAY EXAM CHEST 1 VIEW: CPT

## 2022-02-06 PROCEDURE — 99291 CRITICAL CARE FIRST HOUR: CPT | Mod: 24 | Performed by: ANESTHESIOLOGY

## 2022-02-06 PROCEDURE — 250N000009 HC RX 250

## 2022-02-06 PROCEDURE — 250N000011 HC RX IP 250 OP 636: Performed by: NURSE PRACTITIONER

## 2022-02-06 PROCEDURE — 94003 VENT MGMT INPAT SUBQ DAY: CPT

## 2022-02-06 PROCEDURE — 250N000011 HC RX IP 250 OP 636: Performed by: STUDENT IN AN ORGANIZED HEALTH CARE EDUCATION/TRAINING PROGRAM

## 2022-02-06 PROCEDURE — 94645 CONT INHLJ TX EACH ADDL HOUR: CPT

## 2022-02-06 PROCEDURE — 85027 COMPLETE CBC AUTOMATED: CPT | Performed by: STUDENT IN AN ORGANIZED HEALTH CARE EDUCATION/TRAINING PROGRAM

## 2022-02-06 PROCEDURE — 258N000003 HC RX IP 258 OP 636: Performed by: NURSE PRACTITIONER

## 2022-02-06 PROCEDURE — 258N000003 HC RX IP 258 OP 636: Performed by: SURGERY

## 2022-02-06 RX ORDER — FUROSEMIDE 10 MG/ML
40 INJECTION INTRAMUSCULAR; INTRAVENOUS EVERY 6 HOURS
Status: DISCONTINUED | OUTPATIENT
Start: 2022-02-06 | End: 2022-02-06

## 2022-02-06 RX ORDER — FUROSEMIDE 10 MG/ML
40 INJECTION INTRAMUSCULAR; INTRAVENOUS EVERY 12 HOURS
Status: DISCONTINUED | OUTPATIENT
Start: 2022-02-06 | End: 2022-02-08

## 2022-02-06 RX ORDER — CARBOXYMETHYLCELLULOSE SODIUM 5 MG/ML
1-2 SOLUTION/ DROPS OPHTHALMIC
Status: DISCONTINUED | OUTPATIENT
Start: 2022-02-06 | End: 2022-03-04 | Stop reason: HOSPADM

## 2022-02-06 RX ADMIN — PROPOFOL 25 MCG/KG/MIN: 10 INJECTION, EMULSION INTRAVENOUS at 16:39

## 2022-02-06 RX ADMIN — FUROSEMIDE 40 MG: 10 INJECTION, SOLUTION INTRAVENOUS at 18:08

## 2022-02-06 RX ADMIN — Medication 2 PACKET: at 11:32

## 2022-02-06 RX ADMIN — Medication 2 PACKET: at 20:48

## 2022-02-06 RX ADMIN — IPRATROPIUM BROMIDE AND ALBUTEROL SULFATE 3 ML: 2.5; .5 SOLUTION RESPIRATORY (INHALATION) at 21:02

## 2022-02-06 RX ADMIN — PROPOFOL 30 MCG/KG/MIN: 10 INJECTION, EMULSION INTRAVENOUS at 11:00

## 2022-02-06 RX ADMIN — POTASSIUM PHOSPHATE, MONOBASIC AND POTASSIUM PHOSPHATE, DIBASIC 15 MMOL: 224; 236 INJECTION, SOLUTION, CONCENTRATE INTRAVENOUS at 06:19

## 2022-02-06 RX ADMIN — METHOCARBAMOL 750 MG: 750 TABLET ORAL at 08:34

## 2022-02-06 RX ADMIN — MEROPENEM 1 G: 1 INJECTION, POWDER, FOR SOLUTION INTRAVENOUS at 08:36

## 2022-02-06 RX ADMIN — Medication 40 MG: at 08:34

## 2022-02-06 RX ADMIN — HEPARIN SODIUM 3150 UNITS/HR: 1000 INJECTION INTRAVENOUS; SUBCUTANEOUS at 16:28

## 2022-02-06 RX ADMIN — HYDRALAZINE HYDROCHLORIDE 10 MG: 20 INJECTION INTRAMUSCULAR; INTRAVENOUS at 18:44

## 2022-02-06 RX ADMIN — Medication 8 MG/HR: at 06:24

## 2022-02-06 RX ADMIN — ASPIRIN 81 MG CHEWABLE TABLET 81 MG: 81 TABLET CHEWABLE at 08:34

## 2022-02-06 RX ADMIN — DEXAMETHASONE SODIUM PHOSPHATE 10 MG: 10 INJECTION INTRAMUSCULAR; INTRAVENOUS at 08:35

## 2022-02-06 RX ADMIN — MEROPENEM 1 G: 1 INJECTION, POWDER, FOR SOLUTION INTRAVENOUS at 16:22

## 2022-02-06 RX ADMIN — SENNOSIDES AND DOCUSATE SODIUM 1 TABLET: 50; 8.6 TABLET ORAL at 08:33

## 2022-02-06 RX ADMIN — POTASSIUM CHLORIDE 40 MEQ: 40 SOLUTION ORAL at 20:48

## 2022-02-06 RX ADMIN — MEROPENEM 1 G: 1 INJECTION, POWDER, FOR SOLUTION INTRAVENOUS at 00:30

## 2022-02-06 RX ADMIN — PROPOFOL 30 MCG/KG/MIN: 10 INJECTION, EMULSION INTRAVENOUS at 04:19

## 2022-02-06 RX ADMIN — FENTANYL CITRATE 200 MCG/HR: 50 INJECTION INTRAVENOUS at 08:34

## 2022-02-06 RX ADMIN — POLYETHYLENE GLYCOL 400 AND PROPYLENE GLYCOL 1 DROP: 4; 3 SOLUTION/ DROPS OPHTHALMIC at 01:25

## 2022-02-06 RX ADMIN — ACETAMINOPHEN 650 MG: 325 TABLET, FILM COATED ORAL at 08:34

## 2022-02-06 RX ADMIN — POLYETHYLENE GLYCOL 3350 17 G: 17 POWDER, FOR SOLUTION ORAL at 08:33

## 2022-02-06 RX ADMIN — OXYCODONE HYDROCHLORIDE 10 MG: 10 TABLET ORAL at 04:51

## 2022-02-06 RX ADMIN — POTASSIUM CHLORIDE 40 MEQ: 40 SOLUTION ORAL at 08:35

## 2022-02-06 RX ADMIN — ACETYLCYSTEINE 2 ML: 200 SOLUTION ORAL; RESPIRATORY (INHALATION) at 21:02

## 2022-02-06 RX ADMIN — FUROSEMIDE 40 MG: 10 INJECTION, SOLUTION INTRAVENOUS at 01:10

## 2022-02-06 RX ADMIN — Medication 2 PACKET: at 08:36

## 2022-02-06 RX ADMIN — Medication 10 MG: at 06:38

## 2022-02-06 RX ADMIN — ACETYLCYSTEINE 2 ML: 200 SOLUTION ORAL; RESPIRATORY (INHALATION) at 07:56

## 2022-02-06 RX ADMIN — ACETAMINOPHEN 650 MG: 325 TABLET, FILM COATED ORAL at 01:10

## 2022-02-06 RX ADMIN — IPRATROPIUM BROMIDE AND ALBUTEROL SULFATE 3 ML: 2.5; .5 SOLUTION RESPIRATORY (INHALATION) at 15:35

## 2022-02-06 RX ADMIN — Medication 2 PACKET: at 16:15

## 2022-02-06 RX ADMIN — FUROSEMIDE 40 MG: 10 INJECTION, SOLUTION INTRAVENOUS at 06:21

## 2022-02-06 RX ADMIN — ACETAMINOPHEN 650 MG: 325 TABLET, FILM COATED ORAL at 18:44

## 2022-02-06 RX ADMIN — PROPOFOL 30 MCG/KG/MIN: 10 INJECTION, EMULSION INTRAVENOUS at 13:45

## 2022-02-06 RX ADMIN — PROPOFOL 30 MCG/KG/MIN: 10 INJECTION, EMULSION INTRAVENOUS at 20:48

## 2022-02-06 RX ADMIN — IPRATROPIUM BROMIDE AND ALBUTEROL SULFATE 3 ML: 2.5; .5 SOLUTION RESPIRATORY (INHALATION) at 07:56

## 2022-02-06 RX ADMIN — Medication 2 DROP: at 22:56

## 2022-02-06 RX ADMIN — Medication 5 NG/KG/MIN: at 11:36

## 2022-02-06 RX ADMIN — ACETYLCYSTEINE 2 ML: 200 SOLUTION ORAL; RESPIRATORY (INHALATION) at 15:35

## 2022-02-06 RX ADMIN — IPRATROPIUM BROMIDE AND ALBUTEROL SULFATE 3 ML: 2.5; .5 SOLUTION RESPIRATORY (INHALATION) at 11:49

## 2022-02-06 RX ADMIN — Medication 12.5 MG: at 12:27

## 2022-02-06 RX ADMIN — Medication 15 ML: at 08:34

## 2022-02-06 RX ADMIN — HUMAN INSULIN 5 UNITS/HR: 100 INJECTION, SOLUTION SUBCUTANEOUS at 18:10

## 2022-02-06 RX ADMIN — PROPOFOL 30 MCG/KG/MIN: 10 INJECTION, EMULSION INTRAVENOUS at 08:35

## 2022-02-06 RX ADMIN — ACETAMINOPHEN 650 MG: 325 TABLET, FILM COATED ORAL at 04:51

## 2022-02-06 RX ADMIN — Medication 12.5 MG: at 20:48

## 2022-02-06 ASSESSMENT — ACTIVITIES OF DAILY LIVING (ADL)
ADLS_ACUITY_SCORE: 17
ADLS_ACUITY_SCORE: 15
ADLS_ACUITY_SCORE: 15
ADLS_ACUITY_SCORE: 17
ADLS_ACUITY_SCORE: 15
ADLS_ACUITY_SCORE: 17
ADLS_ACUITY_SCORE: 15
ADLS_ACUITY_SCORE: 17
ADLS_ACUITY_SCORE: 15
ADLS_ACUITY_SCORE: 17
ADLS_ACUITY_SCORE: 15
ADLS_ACUITY_SCORE: 17
ADLS_ACUITY_SCORE: 17
ADLS_ACUITY_SCORE: 15
ADLS_ACUITY_SCORE: 11
ADLS_ACUITY_SCORE: 15
ADLS_ACUITY_SCORE: 11
ADLS_ACUITY_SCORE: 17
ADLS_ACUITY_SCORE: 15

## 2022-02-06 ASSESSMENT — MIFFLIN-ST. JEOR: SCORE: 2681.29

## 2022-02-06 NOTE — PLAN OF CARE
Major Shift Events:Supinated @ 2030.  Neuro: Rass: -5. Pupals 3 E/R. No w/d in extremities. . Prop @ 30 mcg/min/kr, versed @ mg/hr  and fentanyl @ 200 mcg/min. Tmax 101.1. fan, acetaminophen, and ice packs utilized.   Resp: ET @ 26 at lips. CMV 45% fio2, 530 tv 14->18 R 14 peep. Veletri @ 15 ng/kg/min.   CV: NS HR 80-90. SBP<140 per noc CVTS. 20mg labetalol, 10mg hydralazine given. Esmolol turned on twice. Now off. CVP: 20->21->18  : Godfrey in place.  UO from 100 to 405 mL/hr. Shift et output was -0.6 L. Furosamide 40mg q 6 reordered by CVTS.   GI:OG @ 65 set to LIS. Output was 25 ML. Rectal tube in place with minimal output. ND @ 98. TF: 55mL/hr w/ q4 30mL FWF.   Skin/Endo: Insulin Algorithm 4+1 units. Now @ 5 units/hour.   Plan: Team to consider low dose po antihypertensive. Dexamethasone pancho. Last day of meropenem.  For vital signs and complete assessments, please see documentation flowsheets.           Time 2206 0129 0354   PH 7.41 7.46 7.49   PCo2 57 51 49   PaO2 93 81 64   fio2 50 40 40   P/F 186 202 160     Peep kept @ 14 and fio2 50->40% RR: 14->18 Increase fio2 to 45%

## 2022-02-06 NOTE — PROGRESS NOTES
CV ICU PROGRESS NOTE  02/06/2022      CO-MORBIDITIES:   Aortic root aneurysm (H)    ASSESSMENT: Chuy Varner is a 31 year old male with a PMHx s/f bicuspid aortic valve, thoracic aortic aneurysm without rupture, hypertension, obesity, and testicular cancer s/p orchiectomy who underwent Bentall procedure with mechanical valve on 1/28 with Dr. Velasco and Dr. Hannah.    TODAY'S PROGRESS:   - Decrease lasix to 40 q12  - Metoprolol 12.5 BID  - Veletri down to 5  - May be able to wean PEEP  - Lighten versed  - Fluid goal net even    PLAN:  Neuro/ pain/ sedation:  # Acute Postoperative pain  - Monitor neurological status. Notify the MD for any acute changes in exam.  - Pain: fentanyl gtt. Scheduled tylenol. PRN tylenol, oxycodone, robaxin  - Sedation: propofol gtt, versed gtt  - RAAS -4 to -5 if proning, 0 to -1 when supine     Pulmonary care:   # Postoperative ventilation management  # Acute hypoxemic respiratory failure likely ARDS vs. PNA vs. Hypervolemia vs. TRALI  COVID-19.Mycoplasma, Parainfluenza and Influenza Negative. CTA negative for PE on 1/30.  WBC 23.0 2/4 (14.9 the day before). CXR, respiratory panel, and BCs ordered.  - Follow BCs and respiratory panel (2/4)  - Pulmonology saw 2/2, appreciate recs   > prone (14 to 18 hrs per day)   > PEEP 14   > may need to increase PEEP slightly when supine   > as he wakes up more, may benefit from volumes closer to 600 ml for comfort and synchrony (8 ml/kg of his IBW), for now we set it to 530   > pulmonary toilet ie QID duonebs and percussive therapy ie metanebs if possible (may be difficult with recent chest surgery)   > agree with borad antibiotics to treat pneumonia per the CV ICU team  - Titrate supplemental oxygen to maintain saturation above 90%  - Wean veletri to 5  - Continue Dexamethasone 20 mg daily x5 days, 10 mg daily x5, slow taper following (started 1/31)  - Prone today, supinate tonight     Cardiovascular:    # Ascending aortic aneurysm s/p Bentall w/  [place] valve on 1/28 with Dr. Velasco and Dr. Hannah  # Bicuspid aortic valve  # History of thoracic aortic aneurysm without rupture  # History of hypertension  Recent echo on 10/26/21 with LVEF of 60-65%.  CT vascular 12/13/21 showing proximal ascending aorta is severely dilated (55 x 55mm in maximum dimension) and likely bicuspid aortic valve.  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  CT PE 1/30 - negative for acute PE, marked dependent atelectasis R>L.  - Start epmolol gtt, stop nicardipine gtt  - Monitor hemodynamic status  - Goal MAP>65, SBP <`30  - Statin hold  - ASA 81  - Holding PTA meds: metoprolol succinate 25mg daily- resumed at 12.5 BID  - Labetalol and hydralazine PRN to maintain SBP <130     GI care/ Nutrition:   # Obesity (BMI 46)  - NJ, advance to goal  - PPI  - Continue bowel regimen: miralax, senna    Renal/ Fluid Balance/ Electrolytes:   BL creat appears to be ~ 1.0  - Strict I/O, daily weights  - Lasix 40 q12h, goal net even  - Avoid/limit nephrotoxins as able  - Replete lytes PRN per protocol  - Scheduled potassium replacement     Endocrine:    # Stress induced hyperglycemia, improving  - Insulin gtt  - Goal BG <180 for optimal healing     ID/ Antibiotics:  # Stress induced leukocytosis, resolved  # Possible ventilator associated pneumonia  Febrile and pan-cultured on 1/30/22. Covid pcr, respiratory panel, and respiratory cultures negative 1/30. Re-cultured 1/31: respiratory and blood cultures NGTD. Re-cultured again 2/4 when WBC increased to 23.  - Follow respiratory panel and BC results 2/4  - Continue to monitor fever curve, WBC and inflammatory markers  - Continue meropenem x7 days (started 1/31)  - Consult ID 2/4: send C dif, blood cultures, repeat COVID, keep meropenem through today     Heme/Onc:     # Stress induced  leukocytosis  # Acute blood loss anemia  # Acute blood loss thrombocytopenia, resolved  # History of testicular cancer s/p orchiectomy  No s/sx active bleeding  - Continue to monitor  - CBC daily  - Continue heparin gtt     MSK/ Skin:  # Sternotomy  # Surgical Incision  - Sternal precautions  - Postoperative incision management per protocol  - PT/OT/CR     Prophylaxis:    - Mechanical prophylaxis for DVT  - Chemical DVT prophylaxis- heparin gtt  - PPI     Lines/ tubes/ drains:  - RIJ MAC (1/28)  - Left radial arterial line (1/28)  - Left PIV (1/28)  - Godfrey (1/28)  - OG (1/28)  - Nasoduodenal (1/31)  - ETT (1/28)  - Bilateral ES catheters (1/28)     Disposition:  - CVICU    Patient seen, findings and plan discussed with CV ICU staff, Dr. Han and CVTS staff Dr. Miller.    Richard Shannon MD, PhD, PGY-3  General Surgery    ====================================    SUBJECTIVE:   Did well supine overnight. Tolerated a lung recruitment maneuver yesterday.     OBJECTIVE:   1. VITAL SIGNS:   Temp:  [100.4  F (38  C)-101.5  F (38.6  C)] 100.4  F (38  C)  Pulse:  [] 100  Resp:  [15-22] 20  BP: ()/(54-66) 93/54  MAP:  [65 mmHg-98 mmHg] 90 mmHg  Arterial Line BP: ()/(58-84) 132/70  FiO2 (%):  [40 %-50 %] 40 %  SpO2:  [86 %-100 %] 87 %  Ventilation Mode: CMV/AC  (Continuous Mandatory Ventilation/ Assist Control)  FiO2 (%): 40 %  Rate Set (breaths/minute): 18 breaths/min  Tidal Volume Set (mL): 530 mL  PEEP (cm H2O): 14 cmH2O  Oxygen Concentration (%): 40 %  Resp: 20      2. INTAKE/ OUTPUT:   I/O last 3 completed shifts:  In: 4639.96 [I.V.:2564.96; NG/GT:810]  Out: 6210 [Urine:5435; Emesis/NG output:725; Stool:50]    3. PHYSICAL EXAMINATION:   General: Sedated, intubated, laying supine  Neuro: Sedated  Resp: Intubated, mechanically ventilated  CV: RRR on telemetry, temporary pacemaker on standby  Abdomen: Soft, Non-distended  Incisions: C/D/I   Extremities: warm and well perfused, SCDs in place    4.  INVESTIGATIONS:   Arterial Blood Gases   Recent Labs   Lab 02/06/22  0857 02/06/22  0354 02/06/22  0129 02/05/22 2206   PH 7.48* 7.49* 7.46* 7.41   PCO2 49* 48* 51* 57*   PO2 87 64* 81 93   HCO3 36* 37* 36* 36*     Complete Blood Count   Recent Labs   Lab 02/06/22  0354 02/05/22  0359 02/04/22  2000 02/04/22  0545   WBC 17.1* 21.3* 17.8* 21.1*   HGB 9.0* 9.6* 9.3* 10.0*    292 249 271     Basic Metabolic Panel  Recent Labs   Lab 02/06/22  0906 02/06/22  0654 02/06/22  0603 02/06/22  0510 02/06/22  0354 02/05/22  2359 02/05/22  2206 02/05/22  1625 02/05/22  1328 02/05/22  1055 02/05/22  0849 02/05/22  0547 02/05/22  0359 02/04/22  1418 02/04/22  1406   NA  --   --   --   --  146*  146*  --   --   --  144  --   --   --  144  144  --  143   POTASSIUM  --   --   --   --  4.1  4.1  --  4.8  --  4.5  --  4.3  --  3.8  3.8   < > 3.8   CHLORIDE  --   --   --   --  110*  110*  --   --   --  107  --   --   --  106  106  --  106   CO2  --   --   --   --  33*  33*  --   --   --  34*  --   --   --  32  32  --  31   BUN  --   --   --   --  56*  56*  --   --   --  68*  --   --   --  72*  72*  --  54*   CR  --   --   --   --  1.07  1.07  --   --   --  1.26*  --   --   --  1.34*  1.34*  --  1.00   * 133* 124* 114* 128*  128*  120*   < > 111*   < > 170*   < >  --    < > 131*  131*  119*   < > 173*    < > = values in this interval not displayed.     Liver Function Tests  Recent Labs   Lab 02/06/22  0354 02/05/22  0359 02/04/22  0545 02/04/22  0132   AST 52* 44  41 52* 55*   ALT 80* 62  62 55 54   ALKPHOS 67 69  68 71 81   BILITOTAL 0.4 0.6  0.4 0.5 0.5   ALBUMIN 2.4* 2.6*  2.5* 2.2* 2.5*     Pancreatic Enzymes  No lab results found in last 7 days.  Coagulation Profile  No lab results found in last 7 days.

## 2022-02-06 NOTE — PLAN OF CARE
Neuro: TRENT orientation as patient is intubate/sedated/proned, cough present (prior to versed gtt starting). PERRL, pupils 2 mm b/l. T max 101.8 f, did decrease with tylenol/tepid bath.  CV: SR/ST, HR 80s-100s, MAPs stable. Pulses present.  Pulm: LS coarse, CMV settings, pt asynchronous with vent. Copious secretions from ETT/mouth.   GI: TRENT BS, moderate BM.   : UOP adequate.     Interventions: Versed gtt started d/t high propofol infusion, pt appears to be more compliant with vent. Lasix IVP decreased; flolan decreased to 15 ng/kg/min. C Diff, COVID swab, and BC sent; results pending.

## 2022-02-07 ENCOUNTER — APPOINTMENT (OUTPATIENT)
Dept: GENERAL RADIOLOGY | Facility: CLINIC | Age: 32
End: 2022-02-07
Attending: SURGERY
Payer: COMMERCIAL

## 2022-02-07 LAB
ABO/RH(D): NORMAL
ALBUMIN SERPL-MCNC: 2.6 G/DL (ref 3.4–5)
ALBUMIN UR-MCNC: NEGATIVE MG/DL
ALP SERPL-CCNC: 80 U/L (ref 40–150)
ALT SERPL W P-5'-P-CCNC: 128 U/L (ref 0–70)
ANION GAP SERPL CALCULATED.3IONS-SCNC: 3 MMOL/L (ref 3–14)
ANION GAP SERPL CALCULATED.3IONS-SCNC: 5 MMOL/L (ref 3–14)
ANION GAP SERPL CALCULATED.3IONS-SCNC: 5 MMOL/L (ref 3–14)
ANTIBODY SCREEN: NEGATIVE
APPEARANCE UR: CLEAR
AST SERPL W P-5'-P-CCNC: 58 U/L (ref 0–45)
BASE EXCESS BLDA CALC-SCNC: 5 MMOL/L (ref -9–1.8)
BASE EXCESS BLDA CALC-SCNC: 5 MMOL/L (ref -9–1.8)
BASE EXCESS BLDA CALC-SCNC: 6.8 MMOL/L (ref -9–1.8)
BASE EXCESS BLDA CALC-SCNC: 7.8 MMOL/L (ref -9–1.8)
BILIRUB SERPL-MCNC: 0.5 MG/DL (ref 0.2–1.3)
BILIRUB UR QL STRIP: NEGATIVE
BUN SERPL-MCNC: 56 MG/DL (ref 7–30)
BUN SERPL-MCNC: 60 MG/DL (ref 7–30)
BUN SERPL-MCNC: 61 MG/DL (ref 7–30)
CALCIUM SERPL-MCNC: 8.8 MG/DL (ref 8.5–10.1)
CALCIUM SERPL-MCNC: 8.9 MG/DL (ref 8.5–10.1)
CALCIUM SERPL-MCNC: 9.4 MG/DL (ref 8.5–10.1)
CHLORIDE BLD-SCNC: 112 MMOL/L (ref 94–109)
CHLORIDE BLD-SCNC: 112 MMOL/L (ref 94–109)
CHLORIDE BLD-SCNC: 113 MMOL/L (ref 94–109)
CO2 SERPL-SCNC: 29 MMOL/L (ref 20–32)
CO2 SERPL-SCNC: 30 MMOL/L (ref 20–32)
CO2 SERPL-SCNC: 31 MMOL/L (ref 20–32)
COLOR UR AUTO: NORMAL
CREAT SERPL-MCNC: 0.82 MG/DL (ref 0.66–1.25)
CREAT SERPL-MCNC: 0.85 MG/DL (ref 0.66–1.25)
CREAT SERPL-MCNC: 0.95 MG/DL (ref 0.66–1.25)
ERYTHROCYTE [DISTWIDTH] IN BLOOD BY AUTOMATED COUNT: 14.2 % (ref 10–15)
GFR SERPL CREATININE-BSD FRML MDRD: >90 ML/MIN/1.73M2
GLUCOSE BLD-MCNC: 119 MG/DL (ref 70–99)
GLUCOSE BLD-MCNC: 129 MG/DL (ref 70–99)
GLUCOSE BLD-MCNC: 148 MG/DL (ref 70–99)
GLUCOSE BLDC GLUCOMTR-MCNC: 103 MG/DL (ref 70–99)
GLUCOSE BLDC GLUCOMTR-MCNC: 104 MG/DL (ref 70–99)
GLUCOSE BLDC GLUCOMTR-MCNC: 108 MG/DL (ref 70–99)
GLUCOSE BLDC GLUCOMTR-MCNC: 114 MG/DL (ref 70–99)
GLUCOSE BLDC GLUCOMTR-MCNC: 119 MG/DL (ref 70–99)
GLUCOSE BLDC GLUCOMTR-MCNC: 121 MG/DL (ref 70–99)
GLUCOSE BLDC GLUCOMTR-MCNC: 125 MG/DL (ref 70–99)
GLUCOSE BLDC GLUCOMTR-MCNC: 125 MG/DL (ref 70–99)
GLUCOSE BLDC GLUCOMTR-MCNC: 126 MG/DL (ref 70–99)
GLUCOSE BLDC GLUCOMTR-MCNC: 128 MG/DL (ref 70–99)
GLUCOSE BLDC GLUCOMTR-MCNC: 140 MG/DL (ref 70–99)
GLUCOSE BLDC GLUCOMTR-MCNC: 142 MG/DL (ref 70–99)
GLUCOSE BLDC GLUCOMTR-MCNC: 98 MG/DL (ref 70–99)
GLUCOSE BLDC GLUCOMTR-MCNC: 99 MG/DL (ref 70–99)
GLUCOSE UR STRIP-MCNC: NEGATIVE MG/DL
HCO3 BLD-SCNC: 30 MMOL/L (ref 21–28)
HCO3 BLD-SCNC: 31 MMOL/L (ref 21–28)
HCO3 BLD-SCNC: 32 MMOL/L (ref 21–28)
HCO3 BLD-SCNC: 32 MMOL/L (ref 21–28)
HCT VFR BLD AUTO: 30.8 % (ref 40–53)
HGB BLD-MCNC: 9.4 G/DL (ref 13.3–17.7)
HGB UR QL STRIP: NEGATIVE
KETONES UR STRIP-MCNC: NEGATIVE MG/DL
LEUKOCYTE ESTERASE UR QL STRIP: NEGATIVE
MAGNESIUM SERPL-MCNC: 2.5 MG/DL (ref 1.8–2.6)
MAGNESIUM SERPL-MCNC: 2.7 MG/DL (ref 1.8–2.6)
MCH RBC QN AUTO: 30.6 PG (ref 26.5–33)
MCHC RBC AUTO-ENTMCNC: 30.5 G/DL (ref 31.5–36.5)
MCV RBC AUTO: 100 FL (ref 78–100)
NITRATE UR QL: NEGATIVE
O2/TOTAL GAS SETTING VFR VENT: 45 %
O2/TOTAL GAS SETTING VFR VENT: 50 %
OXYHGB MFR BLD: 91 % (ref 92–100)
OXYHGB MFR BLD: 93 % (ref 92–100)
OXYHGB MFR BLD: 94 % (ref 92–100)
OXYHGB MFR BLD: 98 % (ref 92–100)
PCO2 BLD: 43 MM HG (ref 35–45)
PCO2 BLD: 45 MM HG (ref 35–45)
PCO2 BLD: 48 MM HG (ref 35–45)
PCO2 BLD: 49 MM HG (ref 35–45)
PH BLD: 7.41 [PH] (ref 7.35–7.45)
PH BLD: 7.43 [PH] (ref 7.35–7.45)
PH BLD: 7.43 [PH] (ref 7.35–7.45)
PH BLD: 7.48 [PH] (ref 7.35–7.45)
PH UR STRIP: 5.5 [PH] (ref 5–7)
PHOSPHATE SERPL-MCNC: 3.3 MG/DL (ref 2.5–4.5)
PLATELET # BLD AUTO: 320 10E3/UL (ref 150–450)
PO2 BLD: 105 MM HG (ref 80–105)
PO2 BLD: 67 MM HG (ref 80–105)
PO2 BLD: 71 MM HG (ref 80–105)
PO2 BLD: 72 MM HG (ref 80–105)
POTASSIUM BLD-SCNC: 4.3 MMOL/L (ref 3.4–5.3)
POTASSIUM BLD-SCNC: 4.8 MMOL/L (ref 3.4–5.3)
POTASSIUM BLD-SCNC: 5 MMOL/L (ref 3.4–5.3)
PROT SERPL-MCNC: 7 G/DL (ref 6.8–8.8)
RBC # BLD AUTO: 3.07 10E6/UL (ref 4.4–5.9)
SODIUM SERPL-SCNC: 146 MMOL/L (ref 133–144)
SODIUM SERPL-SCNC: 147 MMOL/L (ref 133–144)
SODIUM SERPL-SCNC: 147 MMOL/L (ref 133–144)
SP GR UR STRIP: 1.02 (ref 1–1.03)
SPECIMEN EXPIRATION DATE: NORMAL
UFH PPP CHRO-ACNC: 0.27 IU/ML
UROBILINOGEN UR STRIP-MCNC: NORMAL MG/DL
WBC # BLD AUTO: 18.8 10E3/UL (ref 4–11)

## 2022-02-07 PROCEDURE — 250N000011 HC RX IP 250 OP 636: Performed by: STUDENT IN AN ORGANIZED HEALTH CARE EDUCATION/TRAINING PROGRAM

## 2022-02-07 PROCEDURE — 200N000002 HC R&B ICU UMMC

## 2022-02-07 PROCEDURE — 82040 ASSAY OF SERUM ALBUMIN: CPT | Performed by: STUDENT IN AN ORGANIZED HEALTH CARE EDUCATION/TRAINING PROGRAM

## 2022-02-07 PROCEDURE — 87040 BLOOD CULTURE FOR BACTERIA: CPT | Performed by: STUDENT IN AN ORGANIZED HEALTH CARE EDUCATION/TRAINING PROGRAM

## 2022-02-07 PROCEDURE — 250N000013 HC RX MED GY IP 250 OP 250 PS 637: Performed by: STUDENT IN AN ORGANIZED HEALTH CARE EDUCATION/TRAINING PROGRAM

## 2022-02-07 PROCEDURE — 82805 BLOOD GASES W/O2 SATURATION: CPT | Performed by: NURSE PRACTITIONER

## 2022-02-07 PROCEDURE — 250N000013 HC RX MED GY IP 250 OP 250 PS 637: Performed by: SURGERY

## 2022-02-07 PROCEDURE — 83735 ASSAY OF MAGNESIUM: CPT | Performed by: STUDENT IN AN ORGANIZED HEALTH CARE EDUCATION/TRAINING PROGRAM

## 2022-02-07 PROCEDURE — 999N000015 HC STATISTIC ARTERIAL MONITORING DAILY

## 2022-02-07 PROCEDURE — 85520 HEPARIN ASSAY: CPT | Performed by: SURGERY

## 2022-02-07 PROCEDURE — 85027 COMPLETE CBC AUTOMATED: CPT | Performed by: STUDENT IN AN ORGANIZED HEALTH CARE EDUCATION/TRAINING PROGRAM

## 2022-02-07 PROCEDURE — 250N000012 HC RX MED GY IP 250 OP 636 PS 637: Performed by: PHYSICIAN ASSISTANT

## 2022-02-07 PROCEDURE — 99291 CRITICAL CARE FIRST HOUR: CPT | Mod: 24 | Performed by: INTERNAL MEDICINE

## 2022-02-07 PROCEDURE — 99233 SBSQ HOSP IP/OBS HIGH 50: CPT | Performed by: INTERNAL MEDICINE

## 2022-02-07 PROCEDURE — 80053 COMPREHEN METABOLIC PANEL: CPT | Performed by: STUDENT IN AN ORGANIZED HEALTH CARE EDUCATION/TRAINING PROGRAM

## 2022-02-07 PROCEDURE — 81003 URINALYSIS AUTO W/O SCOPE: CPT | Performed by: STUDENT IN AN ORGANIZED HEALTH CARE EDUCATION/TRAINING PROGRAM

## 2022-02-07 PROCEDURE — 94003 VENT MGMT INPAT SUBQ DAY: CPT

## 2022-02-07 PROCEDURE — 250N000011 HC RX IP 250 OP 636: Performed by: SURGERY

## 2022-02-07 PROCEDURE — 250N000011 HC RX IP 250 OP 636: Performed by: NURSE PRACTITIONER

## 2022-02-07 PROCEDURE — 71045 X-RAY EXAM CHEST 1 VIEW: CPT | Mod: 26 | Performed by: RADIOLOGY

## 2022-02-07 PROCEDURE — 250N000009 HC RX 250: Performed by: SURGERY

## 2022-02-07 PROCEDURE — 258N000003 HC RX IP 258 OP 636: Performed by: NURSE PRACTITIONER

## 2022-02-07 PROCEDURE — 80048 BASIC METABOLIC PNL TOTAL CA: CPT | Performed by: STUDENT IN AN ORGANIZED HEALTH CARE EDUCATION/TRAINING PROGRAM

## 2022-02-07 PROCEDURE — 86850 RBC ANTIBODY SCREEN: CPT | Performed by: SURGERY

## 2022-02-07 PROCEDURE — 71045 X-RAY EXAM CHEST 1 VIEW: CPT

## 2022-02-07 PROCEDURE — 999N000157 HC STATISTIC RCP TIME EA 10 MIN

## 2022-02-07 PROCEDURE — 94645 CONT INHLJ TX EACH ADDL HOUR: CPT

## 2022-02-07 PROCEDURE — 84100 ASSAY OF PHOSPHORUS: CPT | Performed by: SURGERY

## 2022-02-07 PROCEDURE — 87205 SMEAR GRAM STAIN: CPT | Performed by: STUDENT IN AN ORGANIZED HEALTH CARE EDUCATION/TRAINING PROGRAM

## 2022-02-07 PROCEDURE — 82310 ASSAY OF CALCIUM: CPT | Performed by: STUDENT IN AN ORGANIZED HEALTH CARE EDUCATION/TRAINING PROGRAM

## 2022-02-07 RX ADMIN — HEPARIN SODIUM 3150 UNITS/HR: 1000 INJECTION INTRAVENOUS; SUBCUTANEOUS at 16:42

## 2022-02-07 RX ADMIN — PROPOFOL 40 MCG/KG/MIN: 10 INJECTION, EMULSION INTRAVENOUS at 20:43

## 2022-02-07 RX ADMIN — Medication 2 PACKET: at 15:35

## 2022-02-07 RX ADMIN — Medication 15 ML: at 07:43

## 2022-02-07 RX ADMIN — ACETAMINOPHEN 650 MG: 325 TABLET, FILM COATED ORAL at 11:42

## 2022-02-07 RX ADMIN — PROPOFOL 30 MCG/KG/MIN: 10 INJECTION, EMULSION INTRAVENOUS at 08:41

## 2022-02-07 RX ADMIN — PROPOFOL 40 MCG/KG/MIN: 10 INJECTION, EMULSION INTRAVENOUS at 17:54

## 2022-02-07 RX ADMIN — PROPOFOL 50 MCG/KG/MIN: 10 INJECTION, EMULSION INTRAVENOUS at 13:59

## 2022-02-07 RX ADMIN — Medication 2 PACKET: at 11:44

## 2022-02-07 RX ADMIN — HUMAN INSULIN 5 UNITS/HR: 100 INJECTION, SOLUTION SUBCUTANEOUS at 02:55

## 2022-02-07 RX ADMIN — PROPOFOL 30 MCG/KG/MIN: 10 INJECTION, EMULSION INTRAVENOUS at 05:48

## 2022-02-07 RX ADMIN — HUMAN INSULIN 5 UNITS/HR: 100 INJECTION, SOLUTION SUBCUTANEOUS at 11:42

## 2022-02-07 RX ADMIN — Medication 40 MG: at 07:43

## 2022-02-07 RX ADMIN — ACETAMINOPHEN 650 MG: 325 TABLET, FILM COATED ORAL at 15:46

## 2022-02-07 RX ADMIN — SENNOSIDES AND DOCUSATE SODIUM 1 TABLET: 50; 8.6 TABLET ORAL at 07:43

## 2022-02-07 RX ADMIN — DEXAMETHASONE SODIUM PHOSPHATE 10 MG: 10 INJECTION INTRAMUSCULAR; INTRAVENOUS at 07:44

## 2022-02-07 RX ADMIN — Medication 12.5 MG: at 07:43

## 2022-02-07 RX ADMIN — Medication 12.5 MG: at 19:41

## 2022-02-07 RX ADMIN — Medication 2 PACKET: at 07:44

## 2022-02-07 RX ADMIN — PROPOFOL 40 MCG/KG/MIN: 10 INJECTION, EMULSION INTRAVENOUS at 15:35

## 2022-02-07 RX ADMIN — PROPOFOL 30 MCG/KG/MIN: 10 INJECTION, EMULSION INTRAVENOUS at 02:35

## 2022-02-07 RX ADMIN — Medication 2 PACKET: at 19:41

## 2022-02-07 RX ADMIN — ACETAMINOPHEN 650 MG: 325 TABLET, FILM COATED ORAL at 19:40

## 2022-02-07 RX ADMIN — ACETAMINOPHEN 650 MG: 325 TABLET, FILM COATED ORAL at 07:47

## 2022-02-07 RX ADMIN — POTASSIUM CHLORIDE 40 MEQ: 40 SOLUTION ORAL at 07:48

## 2022-02-07 RX ADMIN — ASPIRIN 81 MG CHEWABLE TABLET 81 MG: 81 TABLET CHEWABLE at 07:44

## 2022-02-07 RX ADMIN — Medication 4 MG/HR: at 04:56

## 2022-02-07 RX ADMIN — HEPARIN SODIUM 3150 UNITS/HR: 1000 INJECTION INTRAVENOUS; SUBCUTANEOUS at 03:59

## 2022-02-07 RX ADMIN — INSULIN GLARGINE 30 UNITS: 100 INJECTION, SOLUTION SUBCUTANEOUS at 13:03

## 2022-02-07 RX ADMIN — MEROPENEM 1 G: 1 INJECTION, POWDER, FOR SOLUTION INTRAVENOUS at 07:44

## 2022-02-07 RX ADMIN — MEROPENEM 1 G: 1 INJECTION, POWDER, FOR SOLUTION INTRAVENOUS at 00:11

## 2022-02-07 RX ADMIN — HEPARIN SODIUM 3150 UNITS/HR: 1000 INJECTION INTRAVENOUS; SUBCUTANEOUS at 08:41

## 2022-02-07 RX ADMIN — PROPOFOL 35 MCG/KG/MIN: 10 INJECTION, EMULSION INTRAVENOUS at 23:05

## 2022-02-07 RX ADMIN — FENTANYL CITRATE 150 MCG/HR: 50 INJECTION INTRAVENOUS at 20:44

## 2022-02-07 RX ADMIN — PROPOFOL 50 MCG/KG/MIN: 10 INJECTION, EMULSION INTRAVENOUS at 11:53

## 2022-02-07 RX ADMIN — POLYETHYLENE GLYCOL 3350 17 G: 17 POWDER, FOR SOLUTION ORAL at 07:43

## 2022-02-07 RX ADMIN — FUROSEMIDE 40 MG: 10 INJECTION, SOLUTION INTRAVENOUS at 05:41

## 2022-02-07 RX ADMIN — ACETAMINOPHEN 650 MG: 325 TABLET, FILM COATED ORAL at 02:58

## 2022-02-07 RX ADMIN — POTASSIUM CHLORIDE 40 MEQ: 40 SOLUTION ORAL at 19:40

## 2022-02-07 RX ADMIN — PROPOFOL 30 MCG/KG/MIN: 10 INJECTION, EMULSION INTRAVENOUS at 00:10

## 2022-02-07 RX ADMIN — HUMAN INSULIN 3 UNITS/HR: 100 INJECTION, SOLUTION SUBCUTANEOUS at 20:44

## 2022-02-07 ASSESSMENT — ACTIVITIES OF DAILY LIVING (ADL)
ADLS_ACUITY_SCORE: 15
ADLS_ACUITY_SCORE: 11
ADLS_ACUITY_SCORE: 13
ADLS_ACUITY_SCORE: 15
ADLS_ACUITY_SCORE: 11
ADLS_ACUITY_SCORE: 13
ADLS_ACUITY_SCORE: 11
ADLS_ACUITY_SCORE: 13
ADLS_ACUITY_SCORE: 15
ADLS_ACUITY_SCORE: 11
ADLS_ACUITY_SCORE: 13
ADLS_ACUITY_SCORE: 11
ADLS_ACUITY_SCORE: 11
ADLS_ACUITY_SCORE: 13
ADLS_ACUITY_SCORE: 11

## 2022-02-07 ASSESSMENT — MIFFLIN-ST. JEOR: SCORE: 2673.58

## 2022-02-07 NOTE — PROGRESS NOTES
CV ICU PROGRESS NOTE  02/07/2022      CO-MORBIDITIES:   Aortic root aneurysm (H)    ASSESSMENT: Chuy Varner is a 31 year old male with a PMHx s/f bicuspid aortic valve, thoracic aortic aneurysm without rupture, hypertension, obesity, and testicular cancer s/p orchiectomy who underwent Bentall procedure with mechanical valve on 1/28 with Dr. Velasco and Dr. Hannah. Course complicated by postop hypoxemic respiratory failure.    TODAY'S PROGRESS:   - Turn flolan off  - Wean FiO2 and PEEP as able  - Lighten versed as able  - Fluid goal net even - will diurese as necessary  - Lantus 30    PLAN:  Neuro/ pain/ sedation:  # Acute Postoperative pain  - Monitor neurological status. Notify the MD for any acute changes in exam.  - Pain: fentanyl gtt. Scheduled tylenol. PRN tylenol, oxycodone, robaxin  - Sedation: propofol gtt, versed gtt  - RAAS -4 to -5 if proning, 0 to -1 when supine     Pulmonary care:   # Postoperative ventilation management  # Acute hypoxemic respiratory failure ARDS vs. PNA vs. Hypervolemia vs. TRALI  COVID-19.Mycoplasma, Parainfluenza and Influenza Negative. CTA negative for PE on 1/30.  WBC 23.0 2/4 (14.9 the day before). CXR, respiratory panel, and BCs ordered.  CXR not consistent with ARDS. Likely element of severe V/Q mismatch and atelectasis.  - Follow BCs and respiratory panel (2/4)  - Pulmonology saw 2/2, appreciate recs   > prone (14 to 18 hrs per day)   > PEEP 14   > may need to increase PEEP slightly when supine   > as he wakes up more, may benefit from volumes closer to 600 ml for comfort and synchrony (8 ml/kg of his IBW), for now we set it to 530   > pulmonary toilet ie QID duonebs and percussive therapy ie metanebs if possible (may be difficult with recent chest surgery)   > agree with borad antibiotics to treat pneumonia per the CV ICU team  - Titrate supplemental oxygen to maintain saturation above 90%  - Wean off flolan  - Continue Dexamethasone 20 mg daily x5 days, 10 mg daily x5,  slow taper following (started 1/31)       Cardiovascular:    # Ascending aortic aneurysm s/p Bentall w/ [place] valve on 1/28 with Dr. Velasco and Dr. Hannah  # Bicuspid aortic valve  # History of thoracic aortic aneurysm without rupture  # History of hypertension  Recent echo on 10/26/21 with LVEF of 60-65%.  CT vascular 12/13/21 showing proximal ascending aorta is severely dilated (55 x 55mm in maximum dimension) and likely bicuspid aortic valve.  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  CT PE 1/30 - negative for acute PE, marked dependent atelectasis R>L.  - Start epmolol gtt, stop nicardipine gtt  - Monitor hemodynamic status  - Goal MAP>65, SBP <130  - Statin hold  - ASA 81  - Holding PTA meds: metoprolol succinate 25mg daily- resumed at 12.5 BID  - Labetalol and hydralazine PRN to maintain SBP <130     GI care/ Nutrition:   # Obesity (BMI 46)  - NJ, advance to goal  - PPI  - Continue bowel regimen: miralax, senna    Renal/ Fluid Balance/ Electrolytes:   BL creat appears to be ~ 1.0  - Strict I/O, daily weights  - Lasix 40 q12h, goal net even  - Avoid/limit nephrotoxins as able  - Replete lytes PRN per protocol  - Scheduled potassium replacement     Endocrine:    # Stress induced hyperglycemia, improving  - Insulin gtt  - Lantus 30  - Goal BG <180 for optimal healing     ID/ Antibiotics:  # Stress induced leukocytosis, resolved  # Possible ventilator associated pneumonia  Febrile and pan-cultured on 1/30/22. Covid pcr, respiratory panel, and respiratory cultures negative 1/30. Re-cultured 1/31: respiratory and blood cultures NGTD. Re-cultured again 2/4 when WBC increased to 23.  - Follow respiratory panel and BC results 2/4  - Continue to monitor fever curve, WBC and inflammatory markers  - Continue meropenem x7 days (started 1/31)  - Consult ID 2/4:  send C dif, blood cultures, repeat COVID, keep meropenem through today  - Consider pan scan tomorrow     Heme/Onc:     # Stress induced leukocytosis  # Acute blood loss anemia  # Acute blood loss thrombocytopenia, resolved  # History of testicular cancer s/p orchiectomy  No s/sx active bleeding  - Continue to monitor  - CBC daily  - Continue heparin gtt     MSK/ Skin:  # Sternotomy  # Surgical Incision  - Sternal precautions  - Postoperative incision management per protocol  - PT/OT/CR     Prophylaxis:    - Mechanical prophylaxis for DVT  - Chemical DVT prophylaxis- heparin gtt  - PPI     Lines/ tubes/ drains:  - RIJ MAC (1/28)  - Left radial arterial line (1/28)  - Left PIV (1/28)  - Godfrey (1/28)  - OG (1/28)  - Nasoduodenal (1/31)  - ETT (1/28)     Disposition:  - CVICU    Patient seen, findings and plan discussed with CV ICU staff, Dr. Rosado and CVTS staff Dr. Brownlee.    Tom Adair MD  Anesthesiology PGY3    ====================================    SUBJECTIVE:   Did well supine overnight. Tolerated a lung recruitment maneuver yesterday.     OBJECTIVE:   1. VITAL SIGNS:   Temp:  [100.4  F (38  C)-102  F (38.9  C)] 100.6  F (38.1  C)  Pulse:  [] 92  Resp:  [20-27] 22  BP: ()/(54-71) 123/66  MAP:  [65 mmHg-98 mmHg] 80 mmHg  Arterial Line BP: ()/(58-80) 121/63  FiO2 (%):  [40 %-50 %] 50 %  SpO2:  [87 %-100 %] 95 %  Ventilation Mode: CMV/AC  (Continuous Mandatory Ventilation/ Assist Control)  FiO2 (%): 50 %  Rate Set (breaths/minute): 18 breaths/min  Tidal Volume Set (mL): 530 mL  PEEP (cm H2O): 14 cmH2O  Oxygen Concentration (%): 50 %  Resp: 22      2. INTAKE/ OUTPUT:   I/O last 3 completed shifts:  In: 4455.4 [I.V.:2365.4; NG/GT:770]  Out: 5790 [Urine:5090; Emesis/NG output:450; Stool:250]    3. PHYSICAL EXAMINATION:   General: Sedated, intubated, laying supine  Neuro: Sedated  Resp: Intubated, mechanically ventilated  CV: RRR on telemetry, temporary pacemaker on standby  Abdomen:  Soft, Non-distended  Incisions: C/D/I   Extremities: warm and well perfused, SCDs in place    4. INVESTIGATIONS:   Arterial Blood Gases   Recent Labs   Lab 02/07/22 0334 02/06/22  2141 02/06/22  1630 02/06/22  0857   PH 7.48* 7.51* 7.48* 7.48*   PCO2 43 42 45 49*   PO2 72* 70* 71* 87   HCO3 32* 33* 33* 36*     Complete Blood Count   Recent Labs   Lab 02/07/22 0334 02/06/22 0354 02/05/22 0359 02/04/22 2000   WBC 18.8* 17.1* 21.3* 17.8*   HGB 9.4* 9.0* 9.6* 9.3*    276 292 249     Basic Metabolic Panel  Recent Labs   Lab 02/07/22  0547 02/07/22 0339 02/07/22 0334 02/07/22  0144 02/06/22  1615 02/06/22  1352 02/06/22  0510 02/06/22  0354 02/05/22  2359 02/05/22  2206 02/05/22  1625 02/05/22  1328   NA  --   --  147*  --   --  146*  --  146*  146*  --   --   --  144   POTASSIUM  --   --  4.3  --   --  4.6  --  4.1  4.1  --  4.8  --  4.5   CHLORIDE  --   --  113*  --   --  111*  --  110*  110*  --   --   --  107   CO2  --   --  31  --   --  32  --  33*  33*  --   --   --  34*   BUN  --   --  56*  --   --  56*  --  56*  56*  --   --   --  68*   CR  --   --  0.85  --   --  0.95  --  1.07  1.07  --   --   --  1.26*   * 125* 129* 119*   < > 156*  148*   < > 128*  128*  120*   < > 111*   < > 170*    < > = values in this interval not displayed.     Liver Function Tests  Recent Labs   Lab 02/07/22 0334 02/06/22 0354 02/05/22 0359 02/04/22  0545   AST 58* 52* 44  41 52*   * 80* 62  62 55   ALKPHOS 80 67 69  68 71   BILITOTAL 0.5 0.4 0.6  0.4 0.5   ALBUMIN 2.6* 2.4* 2.6*  2.5* 2.2*     Pancreatic Enzymes  No lab results found in last 7 days.  Coagulation Profile  No lab results found in last 7 days.

## 2022-02-07 NOTE — PLAN OF CARE
[id number]: Major Shift Events: pt remains sedated on prop, fentanyl and versed. Febrile. SR/ST, metoprolol started today, SBP goal <140 per CVTS. Last CVP 18, remains on CMV setting FiO2 50%, RR 18, peep 14, 530, peep decreased to 12 x2 by CVTS this shift, desaturated to 87-89% peep increased to 14 afterwards, veletri decreased to @ 5 ng/kg/min, durant in place good urine output, rectal tube in place leaking around it. OG to LIWS, TF via NJ @ 55 ml/hr. Insulin gtt @ 5 units/hr.  Plan: continue to wean sedation and notify MD with changes.  For vital signs and complete assessments, please see documentation flowsheets.

## 2022-02-07 NOTE — PLAN OF CARE
Major Shift Events: Febrile, prn tylenol given and ice packs applied to axilla and groin with minimal effect. Pt remains sedated. SBP goal <140, maintained without intervention. PaO2 goal >60/70, maintained without intervention. Infrequent fair cough, cloudy thick ETT secretions. Great UOP, scheduled diuresis. Heparin gtt therapeutic this AM. Insulin gtt @ 5 mL/hr throughout shift. No acute concerns noted.    Plan: Continue to monitor.   For vital signs and complete assessments, please see documentation flowsheets.

## 2022-02-07 NOTE — PROGRESS NOTES
CLINICAL NUTRITION SERVICES - REASSESSMENT NOTE     Nutrition Prescription    RECOMMENDATIONS FOR MDs/PROVIDERS TO ORDER:  None at this time    Malnutrition Status:  Patient does not meet two of the established criteria necessary for diagnosing malnutrition    Recommendations already ordered by Registered Dietitian (RD):  Continue EN as ordered    Future/Additional Recommendations:  Monitor need to meet 100% needs via EN - recommendations in note form 1/31     EVALUATION OF THE PROGRESS TOWARD GOALS   Diet: NPO  Nutrition Support: Vital HP @ 55 mL/hr + 2  pkts Prosource QID via NDT to provide 1320 mL formula, 1640 kcal (15 kcal/kg), 203 g protein (1.9 g/kg), 147 g CHO, 30 g fat, 0 g fiber, and 1104 mL free water. (note, pt receiving addtl kcal from propofol)  Intake: TF meeting 100% needs     NEW FINDINGS  GI/Nutrition: Propofol running 30 mL/hr providing ~800 kcal/day - per rounds, likely increasing propofol. No changes to tube feeding regimen. Last BM 2/7.  - Lantus started.    Weight:  02/07/22  163.3 kg (360 lb) Abnormal    02/06/22  164.1 kg (361 lb 11.2 oz) Abnormal    02/05/22  164.7 kg (363 lb) Abnormal    02/03/22  168 kg (370 lb 6 oz) Abnormal    02/02/22  168.7 kg (371 lb 14.7 oz) Abnormal    02/01/22  165.5 kg (364 lb 13.8 oz) Abnormal    01/31/22  164.8 kg (363 lb 5.1 oz) Abnormal    1/10/22 166.5 kg  10/27/21 164.7 kg    Labs:  K 4.3, phos 3.3, Mg 2.5 - all WNL  Urea nitrogen 56 (H)  , AST 58 - both high  Creatinine 0.85 (WNL) - trending down  GFR <90 (WNL)  2/5: lactic acid 1.1 - consistent    Skin: Per WOC note on 2/4, Pressure Injury: on columbella bilateral nares, hospital acquired, This is a Medical Device Related Pressure Injury (MDRPI) due to Bridle string. Pressure Injury is Stage Deep Tissue Pressure Injury (DTPI)  And mucosal in bilateral nares. Contributing factor of the pressure injury: pressure, immobility and moisture    MALNUTRITION  % Intake: No decreased intake noted -  EN  % Weight Loss: Weight loss does not meet criteria  Subcutaneous Fat Loss: None observed  Muscle Loss: None observed  Fluid Accumulation/Edema: pulmonary edema  Malnutrition Diagnosis: Patient does not meet two of the established criteria necessary for diagnosing malnutrition    Previous Goals   Total avg nutritional intake to meet a minimum of 20 kcal/kg and 1.5 g PRO/kg daily (per dosing wt 108 kg).  Evaluation: Met    Previous Nutrition Diagnosis  Inadequate oral intake vs increased needs related to intubation s/p Bentall procedure as evidenced by pt reliant on TF to meet 100% assessed needs  Evaluation: No change    CURRENT NUTRITION DIAGNOSIS  Inadequate oral intake vs increased needs related to intubation s/p Bentall procedure as evidenced by pt reliant on TF to meet 100% assessed needs    INTERVENTIONS  Implementation  Enteral Nutrition - continue as ordered    Goals  Total avg nutritional intake to meet a minimum of 20 kcal/kg and 1.5 g PRO/kg daily (per dosing wt 108 kg).    Monitoring/Evaluation  Progress toward goals will be monitored and evaluated per protocol.    Briana Palmer  Dietetic Intern

## 2022-02-08 ENCOUNTER — APPOINTMENT (OUTPATIENT)
Dept: GENERAL RADIOLOGY | Facility: CLINIC | Age: 32
End: 2022-02-08
Attending: SURGERY
Payer: COMMERCIAL

## 2022-02-08 LAB
ALBUMIN SERPL-MCNC: 2.5 G/DL (ref 3.4–5)
ALP SERPL-CCNC: 80 U/L (ref 40–150)
ALT SERPL W P-5'-P-CCNC: 141 U/L (ref 0–70)
ANION GAP SERPL CALCULATED.3IONS-SCNC: 1 MMOL/L (ref 3–14)
ANION GAP SERPL CALCULATED.3IONS-SCNC: 4 MMOL/L (ref 3–14)
AST SERPL W P-5'-P-CCNC: 46 U/L (ref 0–45)
BASE EXCESS BLDA CALC-SCNC: 4.3 MMOL/L (ref -9–1.8)
BASE EXCESS BLDA CALC-SCNC: 4.8 MMOL/L (ref -9–1.8)
BASE EXCESS BLDA CALC-SCNC: 4.9 MMOL/L (ref -9–1.8)
BASE EXCESS BLDA CALC-SCNC: 5.1 MMOL/L (ref -9–1.8)
BASE EXCESS BLDA CALC-SCNC: 5.5 MMOL/L (ref -9–1.8)
BASE EXCESS BLDA CALC-SCNC: 5.8 MMOL/L (ref -9–1.8)
BILIRUB SERPL-MCNC: 0.4 MG/DL (ref 0.2–1.3)
BUN SERPL-MCNC: 51 MG/DL (ref 7–30)
BUN SERPL-MCNC: 52 MG/DL (ref 7–30)
CALCIUM SERPL-MCNC: 9 MG/DL (ref 8.5–10.1)
CALCIUM SERPL-MCNC: 9.2 MG/DL (ref 8.5–10.1)
CHLORIDE BLD-SCNC: 108 MMOL/L (ref 94–109)
CHLORIDE BLD-SCNC: 112 MMOL/L (ref 94–109)
CO2 SERPL-SCNC: 30 MMOL/L (ref 20–32)
CO2 SERPL-SCNC: 31 MMOL/L (ref 20–32)
CREAT SERPL-MCNC: 0.79 MG/DL (ref 0.66–1.25)
CREAT SERPL-MCNC: 0.85 MG/DL (ref 0.66–1.25)
CRP SERPL-MCNC: 86 MG/L (ref 0–8)
ERYTHROCYTE [DISTWIDTH] IN BLOOD BY AUTOMATED COUNT: 14.2 % (ref 10–15)
GFR SERPL CREATININE-BSD FRML MDRD: >90 ML/MIN/1.73M2
GFR SERPL CREATININE-BSD FRML MDRD: >90 ML/MIN/1.73M2
GLUCOSE BLD-MCNC: 123 MG/DL (ref 70–99)
GLUCOSE BLD-MCNC: 152 MG/DL (ref 70–99)
GLUCOSE BLDC GLUCOMTR-MCNC: 108 MG/DL (ref 70–99)
GLUCOSE BLDC GLUCOMTR-MCNC: 109 MG/DL (ref 70–99)
GLUCOSE BLDC GLUCOMTR-MCNC: 116 MG/DL (ref 70–99)
GLUCOSE BLDC GLUCOMTR-MCNC: 121 MG/DL (ref 70–99)
GLUCOSE BLDC GLUCOMTR-MCNC: 122 MG/DL (ref 70–99)
GLUCOSE BLDC GLUCOMTR-MCNC: 126 MG/DL (ref 70–99)
GLUCOSE BLDC GLUCOMTR-MCNC: 128 MG/DL (ref 70–99)
GLUCOSE BLDC GLUCOMTR-MCNC: 133 MG/DL (ref 70–99)
GLUCOSE BLDC GLUCOMTR-MCNC: 140 MG/DL (ref 70–99)
GLUCOSE BLDC GLUCOMTR-MCNC: 145 MG/DL (ref 70–99)
GLUCOSE BLDC GLUCOMTR-MCNC: 148 MG/DL (ref 70–99)
GLUCOSE BLDC GLUCOMTR-MCNC: 99 MG/DL (ref 70–99)
HCO3 BLD-SCNC: 30 MMOL/L (ref 21–28)
HCO3 BLD-SCNC: 31 MMOL/L (ref 21–28)
HCO3 BLD-SCNC: 31 MMOL/L (ref 21–28)
HCT VFR BLD AUTO: 31.7 % (ref 40–53)
HGB BLD-MCNC: 9.3 G/DL (ref 13.3–17.7)
MAGNESIUM SERPL-MCNC: 2.4 MG/DL (ref 1.8–2.6)
MAGNESIUM SERPL-MCNC: 2.6 MG/DL (ref 1.8–2.6)
MCH RBC QN AUTO: 30 PG (ref 26.5–33)
MCHC RBC AUTO-ENTMCNC: 29.3 G/DL (ref 31.5–36.5)
MCV RBC AUTO: 102 FL (ref 78–100)
O2/TOTAL GAS SETTING VFR VENT: 55 %
O2/TOTAL GAS SETTING VFR VENT: 60 %
O2/TOTAL GAS SETTING VFR VENT: 65 %
O2/TOTAL GAS SETTING VFR VENT: 65 %
O2/TOTAL GAS SETTING VFR VENT: 70 %
O2/TOTAL GAS SETTING VFR VENT: 80 %
OXYHGB MFR BLD: 91 % (ref 92–100)
OXYHGB MFR BLD: 92 % (ref 92–100)
OXYHGB MFR BLD: 93 % (ref 92–100)
PCO2 BLD: 42 MM HG (ref 35–45)
PCO2 BLD: 42 MM HG (ref 35–45)
PCO2 BLD: 48 MM HG (ref 35–45)
PCO2 BLD: 49 MM HG (ref 35–45)
PCO2 BLD: 50 MM HG (ref 35–45)
PCO2 BLD: 51 MM HG (ref 35–45)
PH BLD: 7.39 [PH] (ref 7.35–7.45)
PH BLD: 7.4 [PH] (ref 7.35–7.45)
PH BLD: 7.46 [PH] (ref 7.35–7.45)
PH BLD: 7.46 [PH] (ref 7.35–7.45)
PLATELET # BLD AUTO: 246 10E3/UL (ref 150–450)
PO2 BLD: 65 MM HG (ref 80–105)
PO2 BLD: 70 MM HG (ref 80–105)
PO2 BLD: 71 MM HG (ref 80–105)
PO2 BLD: 72 MM HG (ref 80–105)
PO2 BLD: 73 MM HG (ref 80–105)
PO2 BLD: 85 MM HG (ref 80–105)
POTASSIUM BLD-SCNC: 4.4 MMOL/L (ref 3.4–5.3)
POTASSIUM BLD-SCNC: 5 MMOL/L (ref 3.4–5.3)
PROT SERPL-MCNC: 7 G/DL (ref 6.8–8.8)
RBC # BLD AUTO: 3.1 10E6/UL (ref 4.4–5.9)
SODIUM SERPL-SCNC: 142 MMOL/L (ref 133–144)
SODIUM SERPL-SCNC: 144 MMOL/L (ref 133–144)
TRIGL SERPL-MCNC: 129 MG/DL
UFH PPP CHRO-ACNC: 0.27 IU/ML
WBC # BLD AUTO: 17.8 10E3/UL (ref 4–11)

## 2022-02-08 PROCEDURE — 71045 X-RAY EXAM CHEST 1 VIEW: CPT | Mod: 26 | Performed by: RADIOLOGY

## 2022-02-08 PROCEDURE — 99291 CRITICAL CARE FIRST HOUR: CPT | Mod: 24 | Performed by: INTERNAL MEDICINE

## 2022-02-08 PROCEDURE — 250N000013 HC RX MED GY IP 250 OP 250 PS 637: Performed by: STUDENT IN AN ORGANIZED HEALTH CARE EDUCATION/TRAINING PROGRAM

## 2022-02-08 PROCEDURE — 82805 BLOOD GASES W/O2 SATURATION: CPT | Performed by: STUDENT IN AN ORGANIZED HEALTH CARE EDUCATION/TRAINING PROGRAM

## 2022-02-08 PROCEDURE — 250N000011 HC RX IP 250 OP 636: Performed by: STUDENT IN AN ORGANIZED HEALTH CARE EDUCATION/TRAINING PROGRAM

## 2022-02-08 PROCEDURE — 999N000157 HC STATISTIC RCP TIME EA 10 MIN

## 2022-02-08 PROCEDURE — 80053 COMPREHEN METABOLIC PANEL: CPT | Performed by: STUDENT IN AN ORGANIZED HEALTH CARE EDUCATION/TRAINING PROGRAM

## 2022-02-08 PROCEDURE — 84478 ASSAY OF TRIGLYCERIDES: CPT | Performed by: SURGERY

## 2022-02-08 PROCEDURE — 250N000011 HC RX IP 250 OP 636: Performed by: SURGERY

## 2022-02-08 PROCEDURE — 71045 X-RAY EXAM CHEST 1 VIEW: CPT | Mod: 77

## 2022-02-08 PROCEDURE — 200N000002 HC R&B ICU UMMC

## 2022-02-08 PROCEDURE — 258N000003 HC RX IP 258 OP 636: Performed by: NURSE PRACTITIONER

## 2022-02-08 PROCEDURE — 82805 BLOOD GASES W/O2 SATURATION: CPT | Performed by: NURSE PRACTITIONER

## 2022-02-08 PROCEDURE — 94003 VENT MGMT INPAT SUBQ DAY: CPT

## 2022-02-08 PROCEDURE — 71045 X-RAY EXAM CHEST 1 VIEW: CPT

## 2022-02-08 PROCEDURE — 86140 C-REACTIVE PROTEIN: CPT | Performed by: PHYSICIAN ASSISTANT

## 2022-02-08 PROCEDURE — 83735 ASSAY OF MAGNESIUM: CPT | Performed by: STUDENT IN AN ORGANIZED HEALTH CARE EDUCATION/TRAINING PROGRAM

## 2022-02-08 PROCEDURE — 82803 BLOOD GASES ANY COMBINATION: CPT | Performed by: STUDENT IN AN ORGANIZED HEALTH CARE EDUCATION/TRAINING PROGRAM

## 2022-02-08 PROCEDURE — 250N000011 HC RX IP 250 OP 636: Performed by: NURSE PRACTITIONER

## 2022-02-08 PROCEDURE — 85027 COMPLETE CBC AUTOMATED: CPT | Performed by: STUDENT IN AN ORGANIZED HEALTH CARE EDUCATION/TRAINING PROGRAM

## 2022-02-08 PROCEDURE — 250N000013 HC RX MED GY IP 250 OP 250 PS 637: Performed by: SURGERY

## 2022-02-08 PROCEDURE — 85520 HEPARIN ASSAY: CPT | Performed by: SURGERY

## 2022-02-08 PROCEDURE — 250N000011 HC RX IP 250 OP 636: Performed by: PHYSICIAN ASSISTANT

## 2022-02-08 RX ORDER — FUROSEMIDE 10 MG/ML
40 INJECTION INTRAMUSCULAR; INTRAVENOUS EVERY 8 HOURS
Status: COMPLETED | OUTPATIENT
Start: 2022-02-08 | End: 2022-02-08

## 2022-02-08 RX ADMIN — PROPOFOL 60 MCG/KG/MIN: 10 INJECTION, EMULSION INTRAVENOUS at 11:47

## 2022-02-08 RX ADMIN — Medication 2 PACKET: at 16:20

## 2022-02-08 RX ADMIN — Medication 2 PACKET: at 11:49

## 2022-02-08 RX ADMIN — PROPOFOL 35 MCG/KG/MIN: 10 INJECTION, EMULSION INTRAVENOUS at 01:40

## 2022-02-08 RX ADMIN — Medication 40 MG: at 08:23

## 2022-02-08 RX ADMIN — PROPOFOL 50 MCG/KG/MIN: 10 INJECTION, EMULSION INTRAVENOUS at 16:56

## 2022-02-08 RX ADMIN — Medication 2 PACKET: at 08:25

## 2022-02-08 RX ADMIN — PROPOFOL 30 MCG/KG/MIN: 10 INJECTION, EMULSION INTRAVENOUS at 04:42

## 2022-02-08 RX ADMIN — Medication 12.5 MG: at 08:23

## 2022-02-08 RX ADMIN — Medication 2 PACKET: at 20:03

## 2022-02-08 RX ADMIN — PROPOFOL 60 MCG/KG/MIN: 10 INJECTION, EMULSION INTRAVENOUS at 13:30

## 2022-02-08 RX ADMIN — PROPOFOL 40 MCG/KG/MIN: 10 INJECTION, EMULSION INTRAVENOUS at 07:49

## 2022-02-08 RX ADMIN — Medication 10 MG: at 01:30

## 2022-02-08 RX ADMIN — HYDROMORPHONE HYDROCHLORIDE 0.4 MG: 0.2 INJECTION, SOLUTION INTRAMUSCULAR; INTRAVENOUS; SUBCUTANEOUS at 01:40

## 2022-02-08 RX ADMIN — Medication 12.5 MG: at 19:56

## 2022-02-08 RX ADMIN — FENTANYL CITRATE 150 MCG/HR: 50 INJECTION INTRAVENOUS at 11:48

## 2022-02-08 RX ADMIN — Medication 15 ML: at 08:23

## 2022-02-08 RX ADMIN — HEPARIN SODIUM 3150 UNITS/HR: 1000 INJECTION INTRAVENOUS; SUBCUTANEOUS at 01:40

## 2022-02-08 RX ADMIN — ASPIRIN 81 MG CHEWABLE TABLET 81 MG: 81 TABLET CHEWABLE at 08:23

## 2022-02-08 RX ADMIN — PROPOFOL 40 MCG/KG/MIN: 10 INJECTION, EMULSION INTRAVENOUS at 09:51

## 2022-02-08 RX ADMIN — ACETAMINOPHEN 650 MG: 325 TABLET, FILM COATED ORAL at 08:57

## 2022-02-08 RX ADMIN — PROPOFOL 40 MCG/KG/MIN: 10 INJECTION, EMULSION INTRAVENOUS at 23:45

## 2022-02-08 RX ADMIN — HEPARIN SODIUM 3150 UNITS/HR: 1000 INJECTION INTRAVENOUS; SUBCUTANEOUS at 10:19

## 2022-02-08 RX ADMIN — FUROSEMIDE 40 MG: 10 INJECTION, SOLUTION INTRAVENOUS at 19:56

## 2022-02-08 RX ADMIN — PROPOFOL 40 MCG/KG/MIN: 10 INJECTION, EMULSION INTRAVENOUS at 22:09

## 2022-02-08 RX ADMIN — DEXAMETHASONE SODIUM PHOSPHATE 10 MG: 10 INJECTION INTRAMUSCULAR; INTRAVENOUS at 08:24

## 2022-02-08 RX ADMIN — HEPARIN SODIUM 3150 UNITS/HR: 1000 INJECTION INTRAVENOUS; SUBCUTANEOUS at 19:52

## 2022-02-08 RX ADMIN — PROPOFOL 60 MCG/KG/MIN: 10 INJECTION, EMULSION INTRAVENOUS at 15:16

## 2022-02-08 RX ADMIN — LABETALOL HYDROCHLORIDE 20 MG: 5 INJECTION, SOLUTION INTRAVENOUS at 04:43

## 2022-02-08 RX ADMIN — FUROSEMIDE 40 MG: 10 INJECTION, SOLUTION INTRAVENOUS at 11:44

## 2022-02-08 RX ADMIN — PROPOFOL 40 MCG/KG/MIN: 10 INJECTION, EMULSION INTRAVENOUS at 19:53

## 2022-02-08 RX ADMIN — POTASSIUM CHLORIDE 40 MEQ: 40 SOLUTION ORAL at 08:24

## 2022-02-08 RX ADMIN — ACETAMINOPHEN 650 MG: 325 TABLET, FILM COATED ORAL at 13:06

## 2022-02-08 RX ADMIN — INSULIN GLARGINE 30 UNITS: 100 INJECTION, SOLUTION SUBCUTANEOUS at 06:34

## 2022-02-08 ASSESSMENT — ACTIVITIES OF DAILY LIVING (ADL)
ADLS_ACUITY_SCORE: 15
ADLS_ACUITY_SCORE: 17
ADLS_ACUITY_SCORE: 15
ADLS_ACUITY_SCORE: 17
ADLS_ACUITY_SCORE: 17
ADLS_ACUITY_SCORE: 15
ADLS_ACUITY_SCORE: 17
ADLS_ACUITY_SCORE: 15
ADLS_ACUITY_SCORE: 17
ADLS_ACUITY_SCORE: 15
ADLS_ACUITY_SCORE: 17
ADLS_ACUITY_SCORE: 15
ADLS_ACUITY_SCORE: 17
ADLS_ACUITY_SCORE: 15
ADLS_ACUITY_SCORE: 15
ADLS_ACUITY_SCORE: 17

## 2022-02-08 ASSESSMENT — MIFFLIN-ST. JEOR: SCORE: 2693.63

## 2022-02-08 NOTE — PLAN OF CARE
Major Shift Events: Titrated propofol to 60 to maintain RASS -3 to -4. Fentanyl weaned to 100. Tmax 38.2C. Vent CMV 70% FiO2, PEEP 10, tital volume 620, and RR 18. Goal to keep PaO2 >60 and SpO2 > 90%. CVTS team spoke to girlfriend about possible trach next week if unable to wean from ventilator.     Plan: OOB 2x/day and wean vent settings as tolerated.  For vital signs and complete assessments, please see documentation flowsheets.

## 2022-02-08 NOTE — PROGRESS NOTES
[facility]  [facility] Progress Note      Patient:  Chuy Varner, Date of birth 1990, Medical record number [id number]  Date of Visit:  02/07/2022         Assessment and Recommendations:   Recommendations:  -   Discontinue the empiric meropenem -- he has completed a ful one week+ day course without evident benefit or positive Microbiology.  (He is not pre-operatively immunosuppressed, so ongoing broad spectrum empiric antimicrobial coverage in the absence of severe sepsis or a focal indication / specific pathogen is unneeded.)  - Agree with plan to repeat CT scan to re-evaluate his post-operative thorax soon.  - Would not give additional empiric antimicrobial drugs without a well-defined specific target (in the absence of galloping septic hemodynamic decompensation).    General ID will follow with you.    Axel Castillo MD  Pager [phone]    Assessment:  A 31 year old gentleman with a history of possible bicuspid aortic valve, thoracic aortic aneurysm without rupture, hypertension, obesity (with a BMI of 46), and testicular cancer s/p orchiectomy who was admitted to the [facility] Cardiovascular Thoracic Surgery service on 1/28/22 following scheduled Bentall procedure with mechanical valve replacement on 1/28/22 for an incidentally discovered 5.3 cm aortic aneurysm.  He has remained post-operatively intubated and sedated on a ventilator at moderately high settings in the CVICU since that surgery, with acute respiratory failure and chest radiology showing persistent pulmonary edema / ARDS like changes.  He has also been persistently febrile (in the 100 - 103+ degree F range) since 1/29/22 midday with a recently rising leukocytosis up to 21.1 today (versus a markel WBC of 10.9 on 1/31/22) despite ongoing empiric meropenem / broad-spectrum antibiotic therapy since 1/30/22 -- both of uncertain cause.  Transplant ID was consulted 2/4/22 regarding his persistent fever  and leukocytosis.    ID issues:    - Cryptogenic persistent fever and rising leukocytosis:  There is no present evidence for an active bacterial infection beyond the persisting fever and the rising leukocytosis.  He lacks evidence for an active bacteremia (with negative blood cultures x 5 -- all drawn peripherally), pneumonia (with negative ETT sputum Gram stains / cultures x 3 and chest x-rays without new focal infiltrates), or UTI (with negative urinalyses x 3), although he is a potential set-up for any of these nosocomial conditions given a central line, intubation / sedation, and the presence of a Godfrey catheter.  He is broadly covered for any bacterial infection anywhere with meropenem (which leaves little by way of holes in its bacterial spectrum of coverage).  Given the time course and rising leukocytosis despite broad antibiotic coverage, a loculated infection within the surgical field is possible.  With a rising WBC, recent extensive antibiotic therapy, and a rectal tube, C difficile infection needs to be ruled out.  There is no evidence of a candidemia by blood cultures.  With the rising leukocytosis and in the absence of any underlying immunocompromise, other infectious causes of fever beyond bacterial causes or candidemia are unlikely, including other fungal, atypical bacterial, mycobacterial, viral, tick borne, zoonotic, or STD infections are quite unlikely.  (There is no likely recent pertinent exposure history.)  The chest radiology is not suggestive of any invasive fungal or other atypical infection and a 1/30/22 nares respiratory virus PCR was also negative.  Non-infectious causes of fever might include a drug fever (perhaps meropenem or propofol) and daily dexamethasone 20 mg doses could still in part drive the leukocytosis.  The meropenem which was empirically started on 1/30/22 (preceded by 24 hours of Zosyn / vancomycin which has roughly the same broad antimicrobial spectrum) does not seem to be  providing any benefit in regard to his fever and leukocytosis -- holding that to a seven day course would be optimal.  So far, there has been no positive Microbiology:  Blood cultures from 2/4/22 x 2, 2/5/22 x 2, and 2/7/22 x 2 are without growth to date.  2/5/22 C difficile and influenza / RSV / SARS CoV-2 PCR assays were negative.  An ETT sputum culture from this 2/7/22 is pending but one from 2/4/22 was without growth.    - Diffuse bilateral pulmonary interstitial infiltrates, acute respiratory failure:  The chest radiology seems most consistent with ARDS plus pulmonary edema.    - QTc interval:  356 msec on 2/4/22 EKG.  - Immunization status:  Tdap received 4/30/17.  No [facility] recorded Covid-19 vaccination but negative screening PCRs on 1/22/22 and 1/30/22.  Otherwise not up to date per [facility] registry.  - Isolation status: Routine.        Interval History:   Mr. Varner remained febrile (in the 101 / 102 degree range) / hypothermic through 2/6/22 midnight but may hae defervesced to some degree (T max 100.6 degrees F) over the past 24 hours, although ongoing proning until yesterday makes that uncertain.  His leukocytosis persists at 17.8 today,  His CRP this AM is down to 86.0, improved from 150 on 2/5/22.  He remains on empiric meropenem since 2/1/22.  For ARDS / post-operative respiratory failure he remains sedated and intubated on high ventilator settings (FiO2 0.50, PEEP 14) but those have improved and he has tolerated being supine for longer periods over the past 24 hours.  Flolan is weaning.  Review of systems is unobtainable.  He has been hemodynamically stable over the past 24 hours.  There has been no positive Microbiology:  Blood cultures from 2/4/22 x 2, 2/5/22 x 2, and 2/7/22 x 2 are without growth to date.  2/5/22 C difficile and influenza / RSV / SARS CoV-2 PCR assays were negative.  An ETT sputum culture from this AM is pending but one from 2/4/22 was without growth.  Chest x-ray today shows unchanged  ARDS versus the past several days.        History of Infectious Disease Illness (copied from the 2/4/22 General ID Consult Note):   Mr. Varner is a 31 year old gentleman with a history of possible bicuspid aortic valve, thoracic aortic aneurysm without rupture, hypertension, obesity (with a BMI of 46), and testicular cancer s/p orchiectomy. With a family history of aortic valve disease, although he was asymptomatic, his primary physician obtained cardiac imaging which revealed a normally functioning aortic valve but with an aortic aneurysm measuring 53 mm  for which he was referred to Cardiology who recommended surgery for the aneurysm. He was underwent a scheduled surgical Bentall procedure with mechanical valve replacement on 1/28/22 and has been post-operatively hospitalized in the CVICU since then on the Cardiovascular Thoracic Surgery service.  His initial early post-operative course was unremarkable, but his initial chest radiology showed pulmonary edema and he developed worsened hypoxia later the night after the surgery which persisted and required nitric oxide and Flolan.  He required proning while on the ventilator on 2/2/22 AM and that has continued intermittently every day.  He also received an initial five days of dexamethasone 20 mg daily followed by taper as well as multiple furosemide doses and has required ongoing pain control management by continuous catheter through the Anesthesiology Pain Service.  Pulmonary Consult was consulted on 2/2/22 for management of ventilation in the setting of ARDS.  In the past ~ 48 hours, he has had difficulties with hypertension when proned.  He began spiking low-grade post-operative fevers in the 100 degree range on 1/29/22 afternoon which catherine to 102+ degrees on 1/30 - 31/22.  The fevers remitted into the 100-degree range again 2/1 - 3/22 (while perhaps on a cooling blanket) but he spiked another fever of 103.0 degrees F again at 3 AM this morning.  Meanwhile, his  peripheral WBC has climbed from a markel of 10.1 on 1/31/22 AM (after initial post-operative steroid rise) up to 23.0 this early morning, despite ongoing broad-spectrum empiric antibiotic coverage with meropenem (since 1/31/22 AM, preceded by 24 hours of Zosyn / IV vancomycin) for a concern for possible sepsis.  The infectious work-up to date has included five (1/30/22 x 2, 1/31/22, 2/4/22 x 2) blood cultures without growth, a negative 1/30/22 nares MRSA PCR screen, negative 1/30/22 and 1/31/22 ETT sputum cultures with a 2/4/22 ETT sputum culture pending (Gram stain > 25 PMNs / lpf, no organisms seen), negative 1/30/22 nares respiratory virus PCR panel and SARS Cov-2 PCR, negative 1/30/22, 1/31/22, and 2/4/22 urinalyses, and recurrently negative (essentially daily) lactic acid assays.  Serial chest x-rays show diffuse interstitial opacities most suggestive for pulmonary edema (versus extensive atelectasis versus ARDS).  He has some bilateral nares mucosal injury due to a bridle string for which he was evaluated by the [facility] RN today.  This afternoon, he has newly had some hypothermia.  A serum creatinine kinase level this morning was elevated at 1,711 and AST has been slightly elevated (52 this AM), but other LFTs have been normal.  Infectious Disease is consulted regarding the fever and leukocytosis.    Review of Systems:  Patient is present intubated, sedated, proned, so current ROS is unobtainable.    CONSTITUTIONAL:  Post-operative fevers 100 - 102+ degrees starting 1/29/22, with leukocytosis..  No reported pre-admission fatigue or anorexia.  EYES: No eye pain, visual changes, or scleral icterus.  ENT:  No recorded preadmission rhinorrhea, sinus pain, otalgia, hearing loss, tinnitus, sore throat, or oral pain.  LYMPH:  Anasarca.  RESPIRATORY:  As per HPI -- acute respiratory failure, ventilated / proned.  CARDIOVASCULAR:  As per HPI.  Hypertensive when proned.  GASTROINTESTINAL:  No known abdominal pain, nausea,  vomiting, diarrhea or constipation.  GENITOURINARY:  Godfrey.  HEME:  Acute leukocytosis.  Mild post-operative anemia following 1/28/22 surgery.  No easy bruising.  ENDOCRINE:  On insulin drip.  MUSCULOSKELETAL:  No recorded pre-admission myalgias / arthralgias.  SKIN:  No rash or pruritus.  NEURO:  Sedated.  PSYCH:  Sedated.    Past Medical History:    Bicuspid aortic valve     History of testicular cancer     Hypertension     Thoracic aortic aneurysm without rupture (H)     Past Surgical History:   Procedure Laterality Date     ORCHIECTOMY SCROTAL       REPLACE VALVE AORTIC N/A 1/28/2022    Procedure: Median Sternotomy.  Cardiopulmonary bypass pump.  Bentall procedure using On-X Ascending Aortic Prosthesis with Vascutek Gelweave Valsa Graft  size 27-29MM .  Intraoperative transesophageal echocardiogram per anesthesia;  Surgeon: Nickolas Velasco MD;  Location: [facility] OR     Family History   Problem Relation Age of Onset     Anesthesia Reaction No family hx of      Deep Vein Thrombosis (DVT) No family hx of      Social History     Tobacco Use     Smoking status: Never Smoker     Smokeless tobacco: Never Used   Substance Use Topics     Alcohol use: Yes     Alcohol/week: 3.0 standard drinks     Types: 3 Standard drinks or equivalent per week     Drug use: Never     Immunization History   Administered Date(s) Administered     Tdap (Adacel,Boostrix) 04/30/2017     Patient Active Problem List   Diagnosis     Bicuspid aortic valve     History of testicular cancer     Hypertension     Thoracic aortic aneurysm without rupture (H)     Aortic root aneurysm (H)          Current Medications & Allergies:       aspirin  81 mg Oral or NG Tube Daily     dexamethasone  10 mg Intravenous Daily     [Held by provider] furosemide  40 mg Intravenous Q12H     insulin glargine  30 Units Subcutaneous QAM AC     metoprolol tartrate  12.5 mg Oral BID     multivitamins w/minerals  15 mL Per Feeding Tube Daily     pantoprazole  40 mg Oral or NG Tube  Daily    Or     pantoprazole  40 mg Oral Daily     polyethylene glycol  17 g Oral Daily     potassium chloride  40 mEq Oral BID     protein modular  2 packet Per Feeding Tube 4x Daily     senna-docusate  1 tablet Oral BID     sodium chloride (PF)  3 mL Intracatheter Q8H     Infusions/Drips:      dextrose       epoprostenol (VELETRI) 20 mcg/mL in sterile water inhalation solution Stopped (02/07/22 1154)     fentaNYL 150 mcg/hr (02/07/22 1804)     heparin 3,150 Units/hr (02/07/22 1804)     insulin regular 4 Units/hr (02/07/22 1804)     midazolam Stopped (02/07/22 1154)     propofol (DIPRIVAN) infusion 40 mcg/kg/min (02/07/22 1804)     BETA BLOCKER NOT PRESCRIBED       No Known Allergies         Physical Exam:     Patient Vitals for the past 24 hrs:   Temp Temp src Pulse Resp SpO2 Weight   02/07/22 1800 100  F (37.8  C) -- 86 -- 93 % --   02/07/22 1745 99.9  F (37.7  C) -- 87 -- 93 % --   02/07/22 1730 100  F (37.8  C) -- 89 -- 93 % --   02/07/22 1715 100  F (37.8  C) -- 87 -- 93 % --   02/07/22 1700 100  F (37.8  C) -- 89 -- 93 % --   02/07/22 1645 100  F (37.8  C) -- 90 -- 93 % --   02/07/22 1630 100  F (37.8  C) -- 86 -- 99 % --   02/07/22 1615 100  F (37.8  C) -- 89 -- 94 % --   02/07/22 1600 100  F (37.8  C) Bladder 90 20 95 % --   02/07/22 1545 100  F (37.8  C) -- 87 -- 94 % --   02/07/22 1530 100.2  F (37.9  C) -- 85 -- 92 % --   02/07/22 1515 100.2  F (37.9  C) -- 87 -- 93 % --   02/07/22 1500 100.4  F (38  C) -- 88 -- 92 % --   02/07/22 1445 100.4  F (38  C) -- 88 -- 92 % --   02/07/22 1430 (!) 100.6  F (38.1  C) -- 90 -- 92 % --   02/07/22 1415 (!) 100.6  F (38.1  C) -- 91 -- 92 % --   02/07/22 1400 (!) 100.6  F (38.1  C) -- 91 -- 93 % --   02/07/22 1345 (!) 100.8  F (38.2  C) -- 90 -- 92 % --   02/07/22 1330 (!) 100.8  F (38.2  C) -- 92 -- 92 % --   02/07/22 1315 (!) 100.9  F (38.3  C) -- 93 -- 93 % --   02/07/22 1300 (!) 100.9  F (38.3  C) -- 93 -- 93 % --   02/07/22 1245 (!) 100.9  F (38.3  C) -- 93 -- 93  % --   02/07/22 1230 (!) 100.8  F (38.2  C) -- 92 -- 93 % --   02/07/22 1215 (!) 100.8  F (38.2  C) -- 94 -- 93 % --   02/07/22 1200 (!) 100.8  F (38.2  C) Bladder 87 25 99 % --   02/07/22 1145 (!) 100.6  F (38.1  C) -- 92 -- 94 % --   02/07/22 1130 (!) 100.6  F (38.1  C) -- 94 -- 94 % --   02/07/22 1115 (!) 100.8  F (38.2  C) -- 86 -- 94 % --   02/07/22 1100 (!) 100.8  F (38.2  C) -- 86 -- 93 % --   02/07/22 1045 (!) 100.6  F (38.1  C) -- 88 -- 93 % --   02/07/22 1030 (!) 100.6  F (38.1  C) -- 90 -- 92 % --   02/07/22 1015 100.4  F (38  C) -- 93 -- 90 % --   02/07/22 1000 (!) 100.6  F (38.1  C) -- 95 -- 90 % --   02/07/22 0945 (!) 100.6  F (38.1  C) -- 92 -- 92 % --   02/07/22 0930 (!) 100.6  F (38.1  C) -- 93 -- 92 % --   02/07/22 0915 (!) 100.6  F (38.1  C) -- 92 -- 92 % --   02/07/22 0900 (!) 100.6  F (38.1  C) -- 92 -- 93 % --   02/07/22 0845 100.4  F (38  C) -- 92 -- 94 % --   02/07/22 0830 100.4  F (38  C) -- 96 -- 95 % --   02/07/22 0815 100.4  F (38  C) -- 101 -- 94 % --   02/07/22 0800 100.4  F (38  C) Bladder 93 -- 95 % --   02/07/22 0745 (!) 100.6  F (38.1  C) -- 92 -- 95 % --   02/07/22 0730 (!) 100.6  F (38.1  C) -- 93 -- 94 % --   02/07/22 0715 (!) 100.8  F (38.2  C) -- 93 -- 94 % --   02/07/22 0700 (!) 100.8  F (38.2  C) -- 93 22 94 % --   02/07/22 0600 (!) 101.1  F (38.4  C) -- 97 21 95 % --   02/07/22 0507 -- -- -- -- 100 % --   02/07/22 0500 (!) 101.3  F (38.5  C) -- 109 26 95 % --   02/07/22 0400 (!) 101.3  F (38.5  C) Bladder 108 22 94 % --   02/07/22 0300 (!) 101.3  F (38.5  C) -- 111 25 94 % --   02/07/22 0200 (!) 101.5  F (38.6  C) -- 109 26 94 % --   02/07/22 0114 -- -- -- 25 -- --   02/07/22 0100 (!) 101.7  F (38.7  C) -- 112 25 96 % --   02/07/22 0000 (!) 101.8  F (38.8  C) Bladder 114 27 94 % (!) 163.3 kg (360 lb)   02/06/22 2300 (!) 102  F (38.9  C) -- 113 27 95 % --   02/06/22 2200 (!) 102  F (38.9  C) -- 115 27 94 % --   02/06/22 2100 (!) 101.5  F (38.6  C) -- 109 26 95 % --   02/06/22  2000 (!) 101.1  F (38.4  C) Bladder 109 26 91 % --   02/06/22 1900 (!) 101.1  F (38.4  C) -- 106 24 93 % --     Ranges for vital signs over the past 24 hours:   Temp:  [99.9  F (37.7  C)-102  F (38.9  C)] 100  F (37.8  C)  Pulse:  [] 86  Resp:  [20-27] 20  MAP:  [66 mmHg-97 mmHg] 79 mmHg  Arterial Line BP: (100-143)/(53-75) 121/62  FiO2 (%):  [45 %-50 %] 50 %  SpO2:  [90 %-100 %] 93 %  Vitals:    02/05/22 0400 02/06/22 0400 02/07/22 0000   Weight: (!) 164.7 kg (363 lb) (!) 164.1 kg (361 lb 11.2 oz) (!) 163.3 kg (360 lb)   Ventilation Mode: CMV/AC  (Continuous Mandatory Ventilation/ Assist Control)  FiO2 (%): 50 %  Rate Set (breaths/minute): 18 breaths/min  Tidal Volume Set (mL): 530 mL  PEEP (cm H2O): 14 cmH2O  Oxygen Concentration (%): 50 %  Resp: 22    Intake/Output Summary (Last 24 hours) at 2/7/2022 1850  Last data filed at 2/7/2022 1800  Gross per 24 hour   Intake 3227.7 ml   Output 5035 ml   Net -1807.3 ml     Physical Examination:  GENERAL:  Intubated, sedated, WDWN, 31 year old man supine in NAD on the ventilator.  HEAD:  NCAT.  EYES:  PERRL, anicteric sclerae.  ENT:  No otorrhea.  Oral ETT.  Right nares NJ and OG tubes present.  Mild nares pressure wounds healing.  NECK:  Passively supple.  Right internal jugular central catheter line site lacks inflammation.  LYMPH:  No cervical lymphadenopathy.  LUNGS:  Coarse rhonchi / UAW noise to auscultation bilaterally.  CARDIOVASCULAR:  RRR, normal S1, S2, without murmur. Sternotomy incision.  Old chest tube sites lack inflammation.  ABDOMEN:  Normal bowel sounds, soft, nondistended.  Rectal tube present.  :  Godfrey with sarah urine.  EXTREMITIES:  Distally warm, no edema.  SKIN:  No acute rash or lesion.  Right internal jugular, arterial, and peripheral IV line sites lack inflammation.  NEUROLOGIC:  Sedated, unresponsive.         Laboratory Data:     Metabolic Studies       Recent Labs   Lab Test 02/07/22  1757 02/07/22  1541 02/07/22  1359 02/07/22  0332  02/07/22 0334 02/05/22  1055 02/05/22  0849 02/05/22  0547 02/05/22  0400 02/05/22 0359 02/04/22  0619 02/04/22  0545   NA  --   --  147*  --  147*   < >  --   --   --  144  144   < > 145*  145*   POTASSIUM  --   --  5.0  --  4.3   < > 4.3  --   --  3.8  3.8   < > 3.6  3.6   CHLORIDE  --   --  112*  --  113*   < >  --   --   --  106  106   < > 108  108   CO2  --   --  30  --  31   < >  --   --   --  32  32   < > 30  30   ANIONGAP  --   --  5  --  3   < >  --   --   --  6  6   < > 7  7   BUN  --   --  61*  --  56*   < >  --   --   --  72*  72*   < > 51*  51*   CR  --   --  0.95  --  0.85   < >  --   --   --  1.34*  1.34*   < > 1.03  1.03   GFRESTIMATED  --   --  >90  --  >90   < >  --   --   --  73  73   < > >90  >90   *   < > 148*   < > 129*   < >  --    < >  --  131*  131*  119*   < > 126*  126*   MAYE  --   --  8.8  --  9.4   < >  --   --   --  8.9  8.9   < > 8.6  8.6   PHOS  --   --   --   --  3.3   < >  --   --   --  5.6*  --  4.0   MAG  --   --   --   --  2.5   < >  --   --   --  2.8*   < > 2.9*   LACT  --   --   --   --   --   --  1.1  --  0.7  --   --   --    CKT  --   --   --   --   --   --   --   --   --  1,371*  --  1,711*    < > = values in this interval not displayed.     Hepatic Studies    Recent Labs   Lab Test 02/07/22  0334 02/06/22  0354 02/05/22  0359   BILITOTAL 0.5 0.4 0.6  0.4   DBIL  --   --  0.2   ALKPHOS 80 67 69  68   PROTTOTAL 7.0 6.5* 6.7*  6.7*   ALBUMIN 2.6* 2.4* 2.6*  2.5*   AST 58* 52* 44  41   * 80* 62  62     Pancreatitis testing    Recent Labs   Lab Test 02/05/22  0359   TRIG 208*     Hematology Studies   Recent Labs   Lab Test 02/07/22  0334 02/06/22  0354 02/05/22  0359 02/04/22 2000   WBC 18.8* 17.1* 21.3* 17.8*   HGB 9.4* 9.0* 9.6* 9.3*   HCT 30.8* 29.5* 31.3* 29.5*    276 292 249     Clotting Studies    Recent Labs   Lab Test 01/28/22  1447 01/28/22  1246 01/10/22  1216   INR 1.38* 1.53* 1.00   PTT 31 28 32     Arterial Blood  Gas Testing    Recent Labs   Lab Test 02/07/22  1534 02/07/22  0958 02/07/22  0334 02/06/22  2141 02/06/22  1630   PH 7.43 7.43 7.48* 7.51* 7.48*   PCO2 48* 45 43 42 45   PO2 71* 105 72* 70* 71*   HCO3 32* 30* 32* 33* 33*   O2PER 45 50 50 50 50     Urine Studies     Recent Labs   Lab Test 02/07/22  0804 02/04/22  0502 01/31/22  1038 01/30/22  0519 01/10/22  1154   URINEPH 5.5 5.5 5.0 6.0 6.0   NITRITE Negative Negative Negative Negative Negative   LEUKEST Negative Small* Negative Negative Negative   WBCU  --  5 1 0 1     Medication levels    Recent Labs   Lab Test 01/31/22  0939   VANCOMYCIN 4.2     Microbiology:    Last Culture results with specimen source  Culture   Date Value Ref Range Status   02/05/2022 No growth after 2 days  Preliminary   02/05/2022 No growth after 2 days  Preliminary   02/04/2022 No growth after 3 days  Preliminary   02/04/2022 No growth after 3 days  Preliminary   02/04/2022 No Growth  Final   01/31/2022 50,000-100,000 CFU/mL Normal michele  Final   01/31/2022 No Growth  Final   01/30/2022 No Growth  Final   01/30/2022 No Growth  Final   01/30/2022 1+ Normal michele  Final         Last check of C difficile  C Difficile Toxin B by PCR   Date Value Ref Range Status   02/05/2022 Negative Negative Final     Comment:     A negative result does not exclude actual disease due to C. difficile and may be due to improper collection, handling and storage of the specimen or the number of organisms in the specimen is below the detection limit of the assay.       Virology:    Coronavirus-19 testing    Recent Labs   Lab Test 02/05/22  1313 01/30/22  0311 01/25/22  0852 11/16/21  0856 09/29/21  1541   [id number] Negative Negative  --   --   --    COVIDPCREXT  --   --  Negative Negative Negative     Respiratory virus (non-coronavirus-19) testing    Recent Labs   Lab Test 02/05/22  1313 01/30/22  0951   IFLUA  --  Not Detected   INFZA Negative  --    FLUAH1  --  Not Detected   [id number]  --  Not Detected   FLUAH3   --  Not Detected   IFLUB  --  Not Detected   INFZB Negative  --    PIV1  --  Not Detected   PIV2  --  Not Detected   PIV3  --  Not Detected   PIV4  --  Not Detected   IRSV Negative  --    RSVA  --  Not Detected   RSVB  --  Not Detected   HMPV  --  Not Detected   ADENOV  --  Not Detected   CORONA  --  Not Detected     Imaging:  Recent Results (from the past 48 hour(s))   XR Chest Port 1 View    Narrative    EXAM: XR CHEST PORT 1 VIEW  2/6/2022 4:25 AM     HISTORY:  ards       COMPARISON:  2/5/2022    FINDINGS: Single view of the chest. Postsurgical changes of the chest  with intact median sternotomy wires. Prosthetic aortic valve.  Endotracheal tube tip projects over the midthoracic trachea.  Gastric  tube tip projects over the stomach. Second enteric tube courses below  the level of the diaphragm and projects beyond the field-of-view.  Esophageal temperature probe projects over the mid esophagus. Right IJ  central venous catheter tip projects near the brachiocephalic  confluence.    Trachea is midline. Stable size of the cardiac silhouette. No  significant pleural effusion or pneumothorax. Increased right basilar  airspace opacity. Persistent retrocardiac opacification the visualized  upper abdomen is unremarkable..      Impression    IMPRESSION:   1.  Increased right basilar opacity which may represent atelectasis,  edema and/or infection. Persistent retrocardiac opacification.  2. Stable support devices.    I have personally reviewed the examination and initial interpretation  and I agree with the findings.    DUNG JOE MD         SYSTEM ID:  [id number]   XR Chest Port 1 View    Narrative    Exam: XR CHEST PORT 1 VIEW, 2/7/2022 1:25 AM    Indication: ards    Comparison: Chest x-ray 2/6/2022    Findings:   Surgical changes of the chest with intact median sternotomy wires. ET  tube tip projects over the midthoracic trachea. Esophageal temperature  probe projects over the high thoracic esophagus. Gastric tube  tip  projects over the stomach. Feeding tube courses past the level of the  diaphragm and excluded from the field-of-view. Unchanged right IJ  central venous catheter tip near the innominate confluence. Aortic  valve prosthesis noted.    Unchanged cardiac mediastinal silhouette. No pneumothorax or pleural  effusions. Pulmonary vasculature remains indistinct. Decrease in right  basilar bandlike atelectasis. Additional improved aeration in the left  lung base. Unchanged mid to upper lung field perihilar interstitial  opacities.      Impression    Impression:   1. Support devices as above including right IJ catheter tip at the  innominate vein confluence.  2. Decreased bibasilar atelectasis.  3. Unchanged perihilar edema/atelectasis.    I have personally reviewed the examination and initial interpretation  and I agree with the findings.    АННА SMITH MD         SYSTEM ID:  [id number]     2/7 CXR:  1. Support devices as above including right IJ catheter tip at the innominate vein confluence.  2. Decreased bibasilar atelectasis.  3. Unchanged perihilar edema/atelectasis.  2/6 CXR:  Increased right basilar opacity which may represent atelectasis,   edema and/or infection. Persistent retrocardiac opacification.  2/5 CXR:  Decreased perihilar and bibasilar predominant pulmonary opacities likely representing improving pulmonary edema and/or atelectasis.  2/4 CXR:  1. No appreciable pneumothorax or pneumomediastinum following mediastinal drain removal.  2. Decreased diffuse interstitial opacities, likely representing improving atelectasis versus pulmonary edema.  3. Unchanged streaky bibasilar atelectasis and trace left pleural   Effusion.  2/4 CXR:  Endotracheal tube.  Continued low lung volumes with streaky bibasilar opacities, likely atelectasis.  Indistinct pulmonary vasculature with diffuse interstitial opacities, likely pulmonary edema.  2/3 CXR:  Stable small bilateral pleural effusions.  Stable  perihilar/retrocardiac and left basilar opacities, slightly increased in the right lung opacities, representing an overall stable mild increase in postsurgical atelectasis/edema. Continued follow-up to exclude infectious component.     1/31 TTE:  Technically difficult study. Extremely poor acoustic windows.  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  Global and regional left ventricular function is hyperkinetic with an EF of 65-70%.  Right ventricular function, chamber size, wall motion, and thickness are normal.  The mean gradient across the aortic valve is11 mmHg.  Dilation of the inferior vena cava is present with abnormal respiratory variation in diameter.  No pericardial fluid.Organizing material noted along RV free wall.    12/13/21 Chest / abdm CT angiogram:  1. The proximal ascending aorta is severely dilated measuring 55 x 55mm in maximum dimension.  2. The aorta returns to normal size at the level of the arch.  3. No dissection, penetrating ulcer, or intramural hematoma in the visualized thoracic aorta.  4. No significant CAD in a right dominant coronary system.  5. Mild concentric LVH.  6. The aortic valve is likely bicuspid.  10/26/21 TTE:  LV normal in size with normal systolic function. LVEF 60-65%.  LV segmental wall motion normal.  Aortic valve likely tri leaflet with no stenosis or regurgitation.  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  Proximal ascending aorta is dilated, measuring 4.6cm with an index of 1.52cm/m2.

## 2022-02-08 NOTE — PROGRESS NOTES
Admitted/transferred from: 4E  Reason for admission/transfer: lateral tx  Patient status upon admission/transfer: on ventilator 70%, vitally stable, sedated  Interventions: settled in room, checked drips, skin check  Plan: wean sedation and ventilator as tolerated  2 RN skin assessment: completed by Marni RN and Pradeep RN  Result of skin assessment and interventions/actions: R radha PI, R cheek skin tear, medial sternotomy wound vac with black foam, CT sites covered with dressings that are CDI  Height, weight, drug calc weight: Done  Patient belongings (see Flowsheet - Adult Profile for details): sent with patient, in room  MDRO education (if applicable): educated girlfriend Debbie of reason for transfer, plan for stay

## 2022-02-08 NOTE — PROGRESS NOTES
Major Shift Events:  Increase in oxygen requirements. On 80% FiO2 and PEEP of 10, up from 50% PEEP 7 from beginning of shift. Dilaudid 0.4 mg x1 while decreasing propofol to attempt spontaneous breathing trial. Propofol increased when oxygen requirements increased as we are unable to perform spontaneous breathing trial. Night physician made aware of increased oxygen demand. Labetalol 20 mg given x1 for SBP in 150s with improvement to goal of <130 SBP from labetalol order.     Neuro: Still not following commands. Pupils equal, round, and briskly reactive. Opens eyes to pain. Does not withdraw extremities to pain but does cough occasionally with stimulation like turns or suctioning.   Cardiac: Sinus rhythm throughout night, briefly sinus tach before labetalol administration. Midline incision looks good with wound vac still intact. No drainage noted in wound vac cannister over night.   Respiratory: Increased oxygen demand. CMV 80%/PEEP 10/Rate 18/Vt 530 with peak pressures riding around 20-25. Secretions are thicker than before but remain clear. Able to suction moderate amount of secretions both orally and through the ETT.   GI: Normoactive bowel sounds. Rectal tube in place with 200 mL output overnight of liquid stool. Small amount of leakage around rectal tube noted during bath. Tube feeding continues at 55 mL/hr through NJ tube and patient is tolerating well. OG hooked to low intermittent suction.   : Godfrey in place with good urine output throughout the night. Dark yellow, clear.   Vascular: Radial, post tibial, and dorsalis pedis pulses are strong. Extremities are warm and appropriately colored.   Skin: Midline incision to chest with wound vac in place. Pressure sore on nose near right nare from NJ tube. Pressure sore right cheek. Covered old chest tube sites. Coccyx looks good, no blanchable redness noted.   Endocrine: Patient remains on insulin drip. Currently running at 2.5 units/hr. Blood glucose ranging from   throughout night, mostly in the low 100 range. Will receive Lantus 30 units this AM with morning meds.   Lines: 3 new PIVs started overnight in right forearm & right wrist (20G x2 in forearm & 18G in wrist). Prior 20G PIV in right forearm discontinued due to bloody drainage from site. 18G PIV remains in left forearm, dressing changed as it was not intact. Double lumen CVC in RIJ. Left radial arterial line, dressing changed as it was not fully intact. NJ @ 98 for enteral feedings. OG to LIS. ETT 26 @ lip. Godfrey catheter. Rectal tube.     Plan: Wean O2 as tolerated. Possibly pull CVC as patient is not on any vasopressors or vesicants. Decrease sedation as tolerated to perform neuro assessment. Discuss discontinuing bowel meds with day team due to diarrhea requiring rectal tube.     For vital signs and complete assessments, please see documentation flowsheets.

## 2022-02-09 ENCOUNTER — APPOINTMENT (OUTPATIENT)
Dept: CT IMAGING | Facility: CLINIC | Age: 32
End: 2022-02-09
Attending: PHYSICIAN ASSISTANT
Payer: COMMERCIAL

## 2022-02-09 ENCOUNTER — APPOINTMENT (OUTPATIENT)
Dept: GENERAL RADIOLOGY | Facility: CLINIC | Age: 32
End: 2022-02-09
Attending: SURGERY
Payer: COMMERCIAL

## 2022-02-09 LAB
ALBUMIN SERPL-MCNC: 2.7 G/DL (ref 3.4–5)
ALBUMIN UR-MCNC: NEGATIVE MG/DL
ALP SERPL-CCNC: 79 U/L (ref 40–150)
ALT SERPL W P-5'-P-CCNC: 122 U/L (ref 0–70)
ANION GAP SERPL CALCULATED.3IONS-SCNC: 5 MMOL/L (ref 3–14)
ANION GAP SERPL CALCULATED.3IONS-SCNC: 8 MMOL/L (ref 3–14)
ANION GAP SERPL CALCULATED.3IONS-SCNC: 8 MMOL/L (ref 3–14)
APPEARANCE UR: CLEAR
AST SERPL W P-5'-P-CCNC: 32 U/L (ref 0–45)
BACTERIA BLD CULT: NO GROWTH
BACTERIA BLD CULT: NO GROWTH
BACTERIA SPT CULT: NO GROWTH
BASE EXCESS BLDA CALC-SCNC: 2.1 MMOL/L (ref -9–1.8)
BASE EXCESS BLDA CALC-SCNC: 4.8 MMOL/L (ref -9–1.8)
BASE EXCESS BLDA CALC-SCNC: 6.1 MMOL/L (ref -9–1.8)
BASE EXCESS BLDV CALC-SCNC: NORMAL MMOL/L
BILIRUB SERPL-MCNC: 0.4 MG/DL (ref 0.2–1.3)
BILIRUB UR QL STRIP: NEGATIVE
BUN SERPL-MCNC: 50 MG/DL (ref 7–30)
BUN SERPL-MCNC: 52 MG/DL (ref 7–30)
BUN SERPL-MCNC: 52 MG/DL (ref 7–30)
CALCIUM SERPL-MCNC: 8.5 MG/DL (ref 8.5–10.1)
CALCIUM SERPL-MCNC: 9 MG/DL (ref 8.5–10.1)
CALCIUM SERPL-MCNC: 9 MG/DL (ref 8.5–10.1)
CHLORIDE BLD-SCNC: 106 MMOL/L (ref 94–109)
CO2 SERPL-SCNC: 27 MMOL/L (ref 20–32)
CO2 SERPL-SCNC: 27 MMOL/L (ref 20–32)
CO2 SERPL-SCNC: 28 MMOL/L (ref 20–32)
COLOR UR AUTO: ABNORMAL
CREAT SERPL-MCNC: 0.75 MG/DL (ref 0.66–1.25)
CREAT SERPL-MCNC: 0.75 MG/DL (ref 0.66–1.25)
CREAT SERPL-MCNC: 0.78 MG/DL (ref 0.66–1.25)
ERYTHROCYTE [DISTWIDTH] IN BLOOD BY AUTOMATED COUNT: 13.9 % (ref 10–15)
GFR SERPL CREATININE-BSD FRML MDRD: >90 ML/MIN/1.73M2
GLUCOSE BLD-MCNC: 123 MG/DL (ref 70–99)
GLUCOSE BLD-MCNC: 123 MG/DL (ref 70–99)
GLUCOSE BLD-MCNC: 151 MG/DL (ref 70–99)
GLUCOSE BLDC GLUCOMTR-MCNC: 101 MG/DL (ref 70–99)
GLUCOSE BLDC GLUCOMTR-MCNC: 105 MG/DL (ref 70–99)
GLUCOSE BLDC GLUCOMTR-MCNC: 107 MG/DL (ref 70–99)
GLUCOSE BLDC GLUCOMTR-MCNC: 108 MG/DL (ref 70–99)
GLUCOSE BLDC GLUCOMTR-MCNC: 109 MG/DL (ref 70–99)
GLUCOSE BLDC GLUCOMTR-MCNC: 121 MG/DL (ref 70–99)
GLUCOSE BLDC GLUCOMTR-MCNC: 125 MG/DL (ref 70–99)
GLUCOSE BLDC GLUCOMTR-MCNC: 142 MG/DL (ref 70–99)
GLUCOSE BLDC GLUCOMTR-MCNC: 144 MG/DL (ref 70–99)
GLUCOSE BLDC GLUCOMTR-MCNC: 152 MG/DL (ref 70–99)
GLUCOSE UR STRIP-MCNC: NEGATIVE MG/DL
GRAM STAIN RESULT: NORMAL
GRAM STAIN RESULT: NORMAL
HCO3 BLD-SCNC: 28 MMOL/L (ref 21–28)
HCO3 BLD-SCNC: 30 MMOL/L (ref 21–28)
HCO3 BLD-SCNC: 30 MMOL/L (ref 21–28)
HCO3 BLDV-SCNC: NORMAL MMOL/L
HCT VFR BLD AUTO: 31.5 % (ref 40–53)
HGB BLD-MCNC: 9.5 G/DL (ref 13.3–17.7)
HGB UR QL STRIP: ABNORMAL
KETONES UR STRIP-MCNC: NEGATIVE MG/DL
LEUKOCYTE ESTERASE UR QL STRIP: NEGATIVE
MAGNESIUM SERPL-MCNC: 2.3 MG/DL (ref 1.8–2.6)
MCH RBC QN AUTO: 30.3 PG (ref 26.5–33)
MCHC RBC AUTO-ENTMCNC: 30.2 G/DL (ref 31.5–36.5)
MCV RBC AUTO: 100 FL (ref 78–100)
NITRATE UR QL: NEGATIVE
O2/TOTAL GAS SETTING VFR VENT: 30 %
O2/TOTAL GAS SETTING VFR VENT: 55 %
O2/TOTAL GAS SETTING VFR VENT: 65 %
O2/TOTAL GAS SETTING VFR VENT: 65 %
OXYHGB MFR BLD: 85 % (ref 92–100)
OXYHGB MFR BLD: 95 % (ref 92–100)
OXYHGB MFR BLD: 95 % (ref 92–100)
OXYHGB MFR BLDV: NORMAL %
PCO2 BLD: 41 MM HG (ref 35–45)
PCO2 BLD: 48 MM HG (ref 35–45)
PCO2 BLD: 49 MM HG (ref 35–45)
PCO2 BLDV: NORMAL MM[HG]
PH BLD: 7.37 [PH] (ref 7.35–7.45)
PH BLD: 7.41 [PH] (ref 7.35–7.45)
PH BLD: 7.47 [PH] (ref 7.35–7.45)
PH BLDV: NORMAL [PH]
PH UR STRIP: 5 [PH] (ref 5–7)
PLATELET # BLD AUTO: 220 10E3/UL (ref 150–450)
PO2 BLD: 55 MM HG (ref 80–105)
PO2 BLD: 80 MM HG (ref 80–105)
PO2 BLD: 84 MM HG (ref 80–105)
PO2 BLDV: NORMAL MM[HG]
POTASSIUM BLD-SCNC: 3.9 MMOL/L (ref 3.4–5.3)
POTASSIUM BLD-SCNC: 3.9 MMOL/L (ref 3.4–5.3)
POTASSIUM BLD-SCNC: 4.7 MMOL/L (ref 3.4–5.3)
PROT SERPL-MCNC: 7.3 G/DL (ref 6.8–8.8)
RBC # BLD AUTO: 3.14 10E6/UL (ref 4.4–5.9)
RBC URINE: 3 /HPF
SODIUM SERPL-SCNC: 139 MMOL/L (ref 133–144)
SODIUM SERPL-SCNC: 141 MMOL/L (ref 133–144)
SODIUM SERPL-SCNC: 141 MMOL/L (ref 133–144)
SP GR UR STRIP: 1.03 (ref 1–1.03)
UFH PPP CHRO-ACNC: 0.12 IU/ML
UFH PPP CHRO-ACNC: 0.31 IU/ML
UFH PPP CHRO-ACNC: 0.35 IU/ML
UROBILINOGEN UR STRIP-MCNC: NORMAL MG/DL
WBC # BLD AUTO: 18.6 10E3/UL (ref 4–11)
WBC URINE: 75 /HPF

## 2022-02-09 PROCEDURE — 80048 BASIC METABOLIC PNL TOTAL CA: CPT | Performed by: STUDENT IN AN ORGANIZED HEALTH CARE EDUCATION/TRAINING PROGRAM

## 2022-02-09 PROCEDURE — 250N000013 HC RX MED GY IP 250 OP 250 PS 637: Performed by: STUDENT IN AN ORGANIZED HEALTH CARE EDUCATION/TRAINING PROGRAM

## 2022-02-09 PROCEDURE — 250N000012 HC RX MED GY IP 250 OP 636 PS 637: Performed by: PHYSICIAN ASSISTANT

## 2022-02-09 PROCEDURE — 94003 VENT MGMT INPAT SUBQ DAY: CPT

## 2022-02-09 PROCEDURE — 94668 MNPJ CHEST WALL SBSQ: CPT

## 2022-02-09 PROCEDURE — 70487 CT MAXILLOFACIAL W/DYE: CPT

## 2022-02-09 PROCEDURE — 85027 COMPLETE CBC AUTOMATED: CPT | Performed by: STUDENT IN AN ORGANIZED HEALTH CARE EDUCATION/TRAINING PROGRAM

## 2022-02-09 PROCEDURE — 99233 SBSQ HOSP IP/OBS HIGH 50: CPT | Performed by: INTERNAL MEDICINE

## 2022-02-09 PROCEDURE — 85520 HEPARIN ASSAY: CPT | Performed by: STUDENT IN AN ORGANIZED HEALTH CARE EDUCATION/TRAINING PROGRAM

## 2022-02-09 PROCEDURE — 71260 CT THORAX DX C+: CPT | Mod: 26 | Performed by: RADIOLOGY

## 2022-02-09 PROCEDURE — 74177 CT ABD & PELVIS W/CONTRAST: CPT

## 2022-02-09 PROCEDURE — 999N000157 HC STATISTIC RCP TIME EA 10 MIN

## 2022-02-09 PROCEDURE — 80053 COMPREHEN METABOLIC PANEL: CPT | Performed by: STUDENT IN AN ORGANIZED HEALTH CARE EDUCATION/TRAINING PROGRAM

## 2022-02-09 PROCEDURE — 250N000011 HC RX IP 250 OP 636: Performed by: STUDENT IN AN ORGANIZED HEALTH CARE EDUCATION/TRAINING PROGRAM

## 2022-02-09 PROCEDURE — 70487 CT MAXILLOFACIAL W/DYE: CPT | Mod: 26 | Performed by: STUDENT IN AN ORGANIZED HEALTH CARE EDUCATION/TRAINING PROGRAM

## 2022-02-09 PROCEDURE — 74177 CT ABD & PELVIS W/CONTRAST: CPT | Mod: 26 | Performed by: RADIOLOGY

## 2022-02-09 PROCEDURE — 83735 ASSAY OF MAGNESIUM: CPT | Performed by: STUDENT IN AN ORGANIZED HEALTH CARE EDUCATION/TRAINING PROGRAM

## 2022-02-09 PROCEDURE — 81001 URINALYSIS AUTO W/SCOPE: CPT | Performed by: SURGERY

## 2022-02-09 PROCEDURE — 250N000013 HC RX MED GY IP 250 OP 250 PS 637: Performed by: SURGERY

## 2022-02-09 PROCEDURE — 999N000015 HC STATISTIC ARTERIAL MONITORING DAILY

## 2022-02-09 PROCEDURE — 82805 BLOOD GASES W/O2 SATURATION: CPT | Performed by: NURSE PRACTITIONER

## 2022-02-09 PROCEDURE — 36600 WITHDRAWAL OF ARTERIAL BLOOD: CPT

## 2022-02-09 PROCEDURE — 250N000011 HC RX IP 250 OP 636: Performed by: NURSE PRACTITIONER

## 2022-02-09 PROCEDURE — 250N000013 HC RX MED GY IP 250 OP 250 PS 637: Performed by: PHYSICIAN ASSISTANT

## 2022-02-09 PROCEDURE — 258N000003 HC RX IP 258 OP 636: Performed by: NURSE PRACTITIONER

## 2022-02-09 PROCEDURE — 85520 HEPARIN ASSAY: CPT | Performed by: INTERNAL MEDICINE

## 2022-02-09 PROCEDURE — 200N000002 HC R&B ICU UMMC

## 2022-02-09 PROCEDURE — 85520 HEPARIN ASSAY: CPT | Performed by: SURGERY

## 2022-02-09 PROCEDURE — 71045 X-RAY EXAM CHEST 1 VIEW: CPT

## 2022-02-09 PROCEDURE — 250N000011 HC RX IP 250 OP 636: Performed by: PHYSICIAN ASSISTANT

## 2022-02-09 PROCEDURE — 99291 CRITICAL CARE FIRST HOUR: CPT | Mod: 24 | Performed by: INTERNAL MEDICINE

## 2022-02-09 PROCEDURE — 250N000011 HC RX IP 250 OP 636: Performed by: SURGERY

## 2022-02-09 PROCEDURE — 82805 BLOOD GASES W/O2 SATURATION: CPT | Performed by: STUDENT IN AN ORGANIZED HEALTH CARE EDUCATION/TRAINING PROGRAM

## 2022-02-09 PROCEDURE — 999N000155 HC STATISTIC RAPCV CVP MONITORING

## 2022-02-09 PROCEDURE — 71045 X-RAY EXAM CHEST 1 VIEW: CPT | Mod: 26 | Performed by: RADIOLOGY

## 2022-02-09 RX ORDER — FUROSEMIDE 10 MG/ML
40 INJECTION INTRAMUSCULAR; INTRAVENOUS EVERY 8 HOURS
Status: COMPLETED | OUTPATIENT
Start: 2022-02-09 | End: 2022-02-09

## 2022-02-09 RX ORDER — CARVEDILOL 6.25 MG/1
6.25 TABLET ORAL 2 TIMES DAILY WITH MEALS
Status: DISCONTINUED | OUTPATIENT
Start: 2022-02-09 | End: 2022-02-10

## 2022-02-09 RX ORDER — HYDRALAZINE HYDROCHLORIDE 20 MG/ML
20 INJECTION INTRAMUSCULAR; INTRAVENOUS EVERY 30 MIN PRN
Status: DISCONTINUED | OUTPATIENT
Start: 2022-02-09 | End: 2022-02-22

## 2022-02-09 RX ORDER — DEXTROSE MONOHYDRATE 25 G/50ML
25-50 INJECTION, SOLUTION INTRAVENOUS
Status: DISCONTINUED | OUTPATIENT
Start: 2022-02-09 | End: 2022-02-26

## 2022-02-09 RX ORDER — NICOTINE POLACRILEX 4 MG
15-30 LOZENGE BUCCAL
Status: DISCONTINUED | OUTPATIENT
Start: 2022-02-09 | End: 2022-02-26

## 2022-02-09 RX ORDER — IOPAMIDOL 755 MG/ML
135 INJECTION, SOLUTION INTRAVASCULAR ONCE
Status: COMPLETED | OUTPATIENT
Start: 2022-02-09 | End: 2022-02-09

## 2022-02-09 RX ADMIN — PROPOFOL 50 MCG/KG/MIN: 10 INJECTION, EMULSION INTRAVENOUS at 17:27

## 2022-02-09 RX ADMIN — CARVEDILOL 6.25 MG: 6.25 TABLET, FILM COATED ORAL at 17:55

## 2022-02-09 RX ADMIN — ASPIRIN 81 MG CHEWABLE TABLET 81 MG: 81 TABLET CHEWABLE at 07:51

## 2022-02-09 RX ADMIN — LABETALOL HYDROCHLORIDE 20 MG: 5 INJECTION, SOLUTION INTRAVENOUS at 12:17

## 2022-02-09 RX ADMIN — HEPARIN SODIUM 3300 UNITS/HR: 1000 INJECTION INTRAVENOUS; SUBCUTANEOUS at 19:06

## 2022-02-09 RX ADMIN — PROPOFOL 40 MCG/KG/MIN: 10 INJECTION, EMULSION INTRAVENOUS at 04:34

## 2022-02-09 RX ADMIN — HEPARIN SODIUM 3300 UNITS/HR: 1000 INJECTION INTRAVENOUS; SUBCUTANEOUS at 11:45

## 2022-02-09 RX ADMIN — Medication 12.5 MG: at 07:51

## 2022-02-09 RX ADMIN — CARVEDILOL 6.25 MG: 6.25 TABLET, FILM COATED ORAL at 12:17

## 2022-02-09 RX ADMIN — Medication 2 PACKET: at 12:18

## 2022-02-09 RX ADMIN — FUROSEMIDE 40 MG: 10 INJECTION, SOLUTION INTRAVENOUS at 13:41

## 2022-02-09 RX ADMIN — FUROSEMIDE 40 MG: 10 INJECTION, SOLUTION INTRAVENOUS at 21:04

## 2022-02-09 RX ADMIN — PROPOFOL 40 MCG/KG/MIN: 10 INJECTION, EMULSION INTRAVENOUS at 06:58

## 2022-02-09 RX ADMIN — FENTANYL CITRATE 100 MCG/HR: 50 INJECTION INTRAVENOUS at 10:39

## 2022-02-09 RX ADMIN — Medication 2 PACKET: at 21:03

## 2022-02-09 RX ADMIN — LABETALOL HYDROCHLORIDE 20 MG: 5 INJECTION, SOLUTION INTRAVENOUS at 17:02

## 2022-02-09 RX ADMIN — INSULIN ASPART 1 UNITS: 100 INJECTION, SOLUTION INTRAVENOUS; SUBCUTANEOUS at 13:41

## 2022-02-09 RX ADMIN — HYDRALAZINE HYDROCHLORIDE 10 MG: 20 INJECTION INTRAMUSCULAR; INTRAVENOUS at 16:15

## 2022-02-09 RX ADMIN — PROPOFOL 50 MCG/KG/MIN: 10 INJECTION, EMULSION INTRAVENOUS at 21:51

## 2022-02-09 RX ADMIN — INSULIN ASPART 1 UNITS: 100 INJECTION, SOLUTION INTRAVENOUS; SUBCUTANEOUS at 16:52

## 2022-02-09 RX ADMIN — PANTOPRAZOLE SODIUM 40 MG: 40 TABLET, DELAYED RELEASE ORAL at 07:51

## 2022-02-09 RX ADMIN — PROPOFOL 40 MCG/KG/MIN: 10 INJECTION, EMULSION INTRAVENOUS at 02:05

## 2022-02-09 RX ADMIN — HEPARIN SODIUM 3150 UNITS/HR: 1000 INJECTION INTRAVENOUS; SUBCUTANEOUS at 03:27

## 2022-02-09 RX ADMIN — Medication 50 MCG: at 10:01

## 2022-02-09 RX ADMIN — Medication 15 ML: at 07:51

## 2022-02-09 RX ADMIN — POTASSIUM CHLORIDE 40 MEQ: 40 SOLUTION ORAL at 07:51

## 2022-02-09 RX ADMIN — PROPOFOL 40 MCG/KG/MIN: 10 INJECTION, EMULSION INTRAVENOUS at 09:28

## 2022-02-09 RX ADMIN — PROPOFOL 50 MCG/KG/MIN: 10 INJECTION, EMULSION INTRAVENOUS at 23:43

## 2022-02-09 RX ADMIN — Medication 50 MCG: at 16:44

## 2022-02-09 RX ADMIN — PROPOFOL 50 MCG/KG/MIN: 10 INJECTION, EMULSION INTRAVENOUS at 11:44

## 2022-02-09 RX ADMIN — Medication 2 PACKET: at 16:49

## 2022-02-09 RX ADMIN — IOPAMIDOL 135 ML: 755 INJECTION, SOLUTION INTRAVENOUS at 19:25

## 2022-02-09 RX ADMIN — PROPOFOL 50 MCG/KG/MIN: 10 INJECTION, EMULSION INTRAVENOUS at 15:23

## 2022-02-09 RX ADMIN — INSULIN ASPART 1 UNITS: 100 INJECTION, SOLUTION INTRAVENOUS; SUBCUTANEOUS at 21:01

## 2022-02-09 RX ADMIN — DEXAMETHASONE SODIUM PHOSPHATE 10 MG: 10 INJECTION INTRAMUSCULAR; INTRAVENOUS at 07:53

## 2022-02-09 RX ADMIN — Medication 2 PACKET: at 07:53

## 2022-02-09 RX ADMIN — LABETALOL HYDROCHLORIDE 20 MG: 5 INJECTION, SOLUTION INTRAVENOUS at 22:47

## 2022-02-09 RX ADMIN — HYDRALAZINE HYDROCHLORIDE 10 MG: 20 INJECTION INTRAMUSCULAR; INTRAVENOUS at 14:11

## 2022-02-09 ASSESSMENT — ACTIVITIES OF DAILY LIVING (ADL)
ADLS_ACUITY_SCORE: 15
ADLS_ACUITY_SCORE: 15
ADLS_ACUITY_SCORE: 17
ADLS_ACUITY_SCORE: 17
ADLS_ACUITY_SCORE: 15
ADLS_ACUITY_SCORE: 17
ADLS_ACUITY_SCORE: 17
ADLS_ACUITY_SCORE: 19
ADLS_ACUITY_SCORE: 15
ADLS_ACUITY_SCORE: 17
ADLS_ACUITY_SCORE: 15

## 2022-02-09 ASSESSMENT — MIFFLIN-ST. JEOR: SCORE: 2691.72

## 2022-02-09 NOTE — PROGRESS NOTES
[facility] General Infectious Disease Progress Note -- Sign Off     Patient:  Chuy Varner, Date of birth 1990, Medical record number [id number]  Date of Visit:  02/09/2022         Assessment and Recommendations:   Recommendations:  - Since he is not immunosuppressed and lacks evidence of active infection at present, would not give additional empiric antimicrobial drugs without a well-defined focal target / isolated pathogen (in the absence of galloping septic hemodynamic decompensation), especially since his is not .    Since he appears to be free of active infection at this time, General ID will sign off now.  Thanks for allowing us to participate in the care of this gentleman.  Please page if additional ID related questions or concerns arise.    Axel Castillo MD  Pager [phone]    Assessment:  A 31 year old gentleman with a history of possible bicuspid aortic valve, thoracic aortic aneurysm without rupture, hypertension, obesity (with a BMI of 46), and testicular cancer s/p orchiectomy who was admitted to the [facility] Cardiovascular Thoracic Surgery service on 1/28/22 following scheduled Bentall procedure with mechanical valve replacement on 1/28/22 for an incidentally discovered 5.3 cm aortic aneurysm.  He has remained post-operatively intubated and sedated on a ventilator at moderately high settings in the CVICU since that surgery, with acute respiratory failure and chest radiology showing persistent pulmonary edema / ARDS like changes.  He has also been persistently febrile (in the 100 - 103+ degree F range) since 1/29/22 midday with a recently rising leukocytosis up to 21.1 today (versus a markel WBC of 10.9 on 1/31/22) despite ongoing empiric meropenem / broad-spectrum antibiotic therapy since 1/30/22 -- both of uncertain cause.  Transplant ID was consulted 2/4/22 regarding his persistent fever and leukocytosis.    ID issues:    - Cryptogenic persistent fever and  rising leukocytosis:  There is no present evidence for an active bacterial infection beyond the persisting fever and the rising leukocytosis.  He lacks evidence for an active bacteremia (with negative blood cultures x 5 -- all drawn peripherally), pneumonia (with negative ETT sputum Gram stains / cultures x 3 and chest x-rays without new focal infiltrates), or UTI (with negative urinalyses x 3), although he is a potential set-up for any of these nosocomial conditions given a central line, intubation / sedation, and the presence of a Godfrey catheter.  He was broadly covered for any bacterial infection anywhere with meropenem through 2/7/22 (which left little by way of holes in its bacterial spectrum of coverage) without any evident benefit.  Given the time course and rising leukocytosis despite broad antibiotic coverage, a loculated infection within the surgical field is possible -- a repeat chest CT scan is pending from 2/9/22.  With a rising WBC, recent extensive antibiotic therapy, and a rectal tube, C difficile infection was ruled out but a negative 2/5/22 PCR  There is no evidence of a candidemia by blood cultures.  With the rising leukocytosis and in the absence of any underlying immunocompromise, other infectious causes of fever beyond bacterial causes or candidemia are unlikely, including other fungal, atypical bacterial, mycobacterial, viral, tick borne, zoonotic, or STD infections are quite unlikely.  (There is no likely recent pertinent exposure history.)  His chest radiology is not suggestive of any invasive fungal or other atypical infection and a 1/30/22 nares respiratory virus PCR was also negative.  Non-infectious causes of fever might include a drug fever (perhaps propofol) and daily dexamethasone 20 mg doses could still in part be driving the leukocytosis.  Empiric meropenem was started on 1/30/22 (preceded by 24 hours of Zosyn / vancomycin which has roughly the same broad antimicrobial spectrum) and  given for a one week course through 2/7/22 without any evident benefit in regard to his fever and leukocytosis.  So far, there has been no positive Microbiology:  Blood cultures from 2/4/22 x 2, 2/5/22 x 2, and 2/7/22 x 2 are without growth to date.  2/5/22 C difficile and influenza / RSV / SARS CoV-2 PCR assays were negative.  ETT sputum cultures from 2/7/22 and 2/4/22 are without growth.  Overall, he appears to lacks any identifiable active infection.    - Diffuse bilateral pulmonary interstitial infiltrates, acute respiratory failure:  The chest radiology seems most consistent with pulmonary edema plus atelectasis (and perhaps some component of ARDS) -- diuresis is being pursued.    - QTc interval:  356 msec on 2/4/22 EKG.  - Immunization status:  Tdap received 4/30/17.  No [facility] recorded Covid-19 vaccination but negative screening PCRs on 1/22/22 and 1/30/22.  Otherwise not up to date per [facility] registry.  - Isolation status: Routine.        Interval History:   Mr. Varner is less febrile (T max 101.3 degrees F) over the past 48 hours, with possibly a further decreasing temperature today, despite being off all antimicrobials since 2/7/22 PM when empiric meropenem (1/31 - 2/7/22) was discontinued.  His peripheral leukocytosis is stabel in the 17 - 18K range over the past four days.  He remains intubated and quite sedated although now consistently supine, but his ventilator settings are being weaned (FiO2 0.55, PEEP 5), although he did desaturate last night requiring an acute increase in his FiO2 (which has come down since).  He has been hemodynamically stable without pressor support.  He is being diuresed but his weight remains at about his admission weight.  A review of systems is unobtainable.  There is no positive Microbiology:  Blood cultures from 2/4/22 x 2, 2/5/22 x 2, and 2/7/22 x 2 are without growth to date.  2/5/22 C difficile and influenza / RSV / SARS CoV-2 PCR assays were negative.  Both 2/4/22 and  2/7/22 ETT sputum cultures are without growth.  Chest x-ray today shows unchanged ARDS versus the past several days.  Today's 2/9/22 chest x-ray shows unchanged diffuse (more lower bilateral) infiltrates of atelectasis or edema.  A 2/9/22 sinus CT scan does not show obvious sinusitis.  A 2/9/22 chest / abdomen CT is pending.        History of Infectious Disease Illness (copied from the 2/4/22 General ID Consult Note):   Mr. Varner is a 31 year old gentleman with a history of possible bicuspid aortic valve, thoracic aortic aneurysm without rupture, hypertension, obesity (with a BMI of 46), and testicular cancer s/p orchiectomy. With a family history of aortic valve disease, although he was asymptomatic, his primary physician obtained cardiac imaging which revealed a normally functioning aortic valve but with an aortic aneurysm measuring 53 mm  for which he was referred to Cardiology who recommended surgery for the aneurysm. He was underwent a scheduled surgical Bentall procedure with mechanical valve replacement on 1/28/22 and has been post-operatively hospitalized in the CVICU since then on the Cardiovascular Thoracic Surgery service.  His initial early post-operative course was unremarkable, but his initial chest radiology showed pulmonary edema and he developed worsened hypoxia later the night after the surgery which persisted and required nitric oxide and Flolan.  He required proning while on the ventilator on 2/2/22 AM and that has continued intermittently every day.  He also received an initial five days of dexamethasone 20 mg daily followed by taper as well as multiple furosemide doses and has required ongoing pain control management by continuous catheter through the Anesthesiology Pain Service.  Pulmonary Consult was consulted on 2/2/22 for management of ventilation in the setting of ARDS.  In the past ~ 48 hours, he has had difficulties with hypertension when proned.  He began spiking low-grade  post-operative fevers in the 100 degree range on 1/29/22 afternoon which catherine to 102+ degrees on 1/30 - 31/22.  The fevers remitted into the 100-degree range again 2/1 - 3/22 (while perhaps on a cooling blanket) but he spiked another fever of 103.0 degrees F again at 3 AM this morning.  Meanwhile, his peripheral WBC has climbed from a markel of 10.1 on 1/31/22 AM (after initial post-operative steroid rise) up to 23.0 this early morning, despite ongoing broad-spectrum empiric antibiotic coverage with meropenem (since 1/31/22 AM, preceded by 24 hours of Zosyn / IV vancomycin) for a concern for possible sepsis.  The infectious work-up to date has included five (1/30/22 x 2, 1/31/22, 2/4/22 x 2) blood cultures without growth, a negative 1/30/22 nares MRSA PCR screen, negative 1/30/22 and 1/31/22 ETT sputum cultures with a 2/4/22 ETT sputum culture pending (Gram stain > 25 PMNs / lpf, no organisms seen), negative 1/30/22 nares respiratory virus PCR panel and SARS Cov-2 PCR, negative 1/30/22, 1/31/22, and 2/4/22 urinalyses, and recurrently negative (essentially daily) lactic acid assays.  Serial chest x-rays show diffuse interstitial opacities most suggestive for pulmonary edema (versus extensive atelectasis versus ARDS).  He has some bilateral nares mucosal injury due to a bridle string for which he was evaluated by the [facility] RN today.  This afternoon, he has newly had some hypothermia.  A serum creatinine kinase level this morning was elevated at 1,711 and AST has been slightly elevated (52 this AM), but other LFTs have been normal.  Infectious Disease is consulted regarding the fever and leukocytosis.    Review of Systems:  ROS is unobtainable due to intubattion and sedated.    Past Medical History:    Bicuspid aortic valve     History of testicular cancer     Hypertension     Thoracic aortic aneurysm without rupture (H)     Past Surgical History:   Procedure Laterality Date     ORCHIECTOMY SCROTAL       REPLACE VALVE  AORTIC N/A 1/28/2022    Procedure: Median Sternotomy.  Cardiopulmonary bypass pump.  Bentall procedure using On-X Ascending Aortic Prosthesis with Vascutek Gelweave Valsa Graft  size 27-29MM .  Intraoperative transesophageal echocardiogram per anesthesia;  Surgeon: Nickolas Velasco MD;  Location:  OR     Social History     Tobacco Use     Smoking status: Never Smoker     Smokeless tobacco: Never Used   Substance Use Topics     Alcohol use: Yes     Alcohol/week: 3.0 standard drinks     Types: 3 Standard drinks or equivalent per week     Drug use: Never          Current Medications & Allergies:  No present antimicrobials.       aspirin  81 mg Oral or NG Tube Daily     carvedilol  6.25 mg Oral or Feeding Tube BID w/meals     furosemide  40 mg Intravenous Q8H     insulin aspart  1-10 Units Subcutaneous Q4H     insulin glargine  30 Units Subcutaneous BID     multivitamins w/minerals  15 mL Per Feeding Tube Daily     pantoprazole  40 mg Oral or NG Tube Daily    Or     pantoprazole  40 mg Oral Daily     polyethylene glycol  17 g Oral Daily     protein modular  2 packet Per Feeding Tube 4x Daily     senna-docusate  1 tablet Oral BID     sodium chloride (PF)  3 mL Intracatheter Q8H     Infusions/Drips:      dextrose       fentaNYL 100 mcg/hr (02/09/22 1039)     heparin 3,300 Units/hr (02/09/22 1145)     propofol (DIPRIVAN) infusion 50 mcg/kg/min (02/09/22 1144)     BETA BLOCKER NOT PRESCRIBED       No Known Allergies         Physical Exam:     Patient Vitals for the past 24 hrs:   BP Temp Temp src Pulse Resp SpO2 Weight   02/09/22 1217 (!) 158/85 -- -- 101 -- -- --   02/09/22 1100 (!) 151/87 99.7  F (37.6  C) -- 101 14 100 % --   02/09/22 1030 (!) 148/88 -- -- -- -- -- --   02/09/22 1000 -- 100  F (37.8  C) -- 108 25 90 % --   02/09/22 0900 -- -- -- 101 -- 93 % --   02/09/22 0800 -- 99.5  F (37.5  C) Bladder 100 19 97 % --   02/09/22 0751 (!) 148/76 -- -- 100 -- -- --   02/09/22 0600 -- 100  F (37.8  C) -- 89 20 98 % --    02/09/22 0500 -- 100.4  F (38  C) -- 92 20 97 % --   02/09/22 0400 -- (!) 100.6  F (38.1  C) Bladder 94 20 95 % --   02/09/22 0300 -- (!) 100.6  F (38.1  C) -- 95 20 98 % (!) 165.1 kg (364 lb)   02/09/22 0200 -- (!) 100.9  F (38.3  C) -- 98 20 98 % --   02/09/22 0100 -- (!) 101.3  F (38.5  C) -- 99 20 96 % --   02/09/22 0000 -- (!) 101.3  F (38.5  C) Bladder 100 20 97 % --   02/08/22 2300 -- (!) 100.8  F (38.2  C) -- 98 -- 96 % --   02/08/22 2200 -- (!) 100.6  F (38.1  C) -- 97 -- 96 % --   02/08/22 2100 -- 100.4  F (38  C) -- 87 -- 95 % --   02/08/22 2000 -- 100.2  F (37.9  C) Bladder 85 20 95 % --   02/08/22 1900 -- 100  F (37.8  C) -- 91 -- 97 % --   02/08/22 1800 -- 100.4  F (38  C) -- 86 -- 96 % --   02/08/22 1700 -- 100.4  F (38  C) -- 87 -- 95 % --   02/08/22 1600 -- (!) 100.6  F (38.1  C) Bladder 87 20 94 % --   02/08/22 1500 -- 100  F (37.8  C) -- 92 -- 94 % --   02/08/22 1400 -- (!) 100.8  F (38.2  C) -- 92 21 92 % --   02/08/22 1300 -- (!) 100.9  F (38.3  C) -- 96 22 91 % --     Ranges for vital signs over the past 24 hours:   Temp:  [99.5  F (37.5  C)-101.3  F (38.5  C)] 99.7  F (37.6  C)  Pulse:  [] 101  Resp:  [14-25] 14  BP: (148-158)/(76-88) 158/85  MAP:  [69 mmHg-95 mmHg] 95 mmHg  Arterial Line BP: ()/(56-82) 134/73  FiO2 (%):  [65 %] 65 %  SpO2:  [90 %-100 %] 100 %  Vitals:    02/07/22 0000 02/08/22 0600 02/09/22 0300   Weight: (!) 163.3 kg (360 lb) (!) 165.3 kg (364 lb 6.7 oz) (!) 165.1 kg (364 lb)   Ventilation Mode: (S) CPAP/PS  (Continuous positive airway pressure with Pressure Support)  FiO2 (%): 65 %  Rate Set (breaths/minute): 18 breaths/min  Tidal Volume Set (mL): 620 mL  PEEP (cm H2O): 5 cmH2O  Pressure Support (cm H2O): 10 cmH2O  Oxygen Concentration (%): 65 %  Resp: 14    Intake/Output Summary (Last 24 hours) at 2/9/2022 1243  Last data filed at 2/9/2022 1200  Gross per 24 hour   Intake 3955.2 ml   Output 4870 ml   Net -914.8 ml     Physical Examination:  GENERAL:   Intubated, sedated, WDWN, 31 year old man supine in NAD on the ventilator.  HEAD:  NCAT.  EYES:  PERRL, anicteric sclerae.  ENT:  No otorrhea.  Oral ETT.  Right nares NJ and OG tubes present.  Mild nares pressure wounds healing.  NECK:  Passively supple.  Right internal jugular central catheter line site lacks inflammation.  LYMPH:  No cervical lymphadenopathy.  LUNGS:  Coarse rhonchi / UAW noise to auscultation bilaterally.  CARDIOVASCULAR:  RRR, normal S1, S2, without murmur. Sternotomy incision.  Old chest tube sites lack inflammation.  ABDOMEN:  Normal bowel sounds, soft, nondistended.  Rectal tube present.  :  Godfrey with sarah urine.  EXTREMITIES:  Distally warm, no edema.  SKIN:  No acute rash or lesion.  Right internal jugular, arterial, and peripheral IV line sites lack inflammation.  NEUROLOGIC:  Sedated, non-interactive.         Laboratory Data:     Metabolic Studies       Recent Labs   Lab Test 02/09/22  1227 02/09/22  0431 02/09/22  0333 02/08/22  1509 02/08/22  1353 02/07/22  0339 02/07/22  0334 02/05/22  1055 02/05/22  0849 02/05/22  0547 02/05/22  0400 02/05/22  0359 02/04/22  0619 02/04/22  0545   NA  --   --  141  141  --  142   < > 147*   < >  --   --   --  144  144   < > 145*  145*   POTASSIUM  --   --  3.9  3.9  --  5.0   < > 4.3   < > 4.3  --   --  3.8  3.8   < > 3.6  3.6   CHLORIDE  --   --  106  106  --  108   < > 113*   < >  --   --   --  106  106   < > 108  108   CO2  --   --  27  27  --  30   < > 31   < >  --   --   --  32  32   < > 30  30   ANIONGAP  --   --  8  8  --  4   < > 3   < >  --   --   --  6  6   < > 7  7   BUN  --   --  52*  52*  --  51*   < > 56*   < >  --   --   --  72*  72*   < > 51*  51*   CR  --   --  0.75  0.75  --  0.85   < > 0.85   < >  --   --   --  1.34*  1.34*   < > 1.03  1.03   GFRESTIMATED  --   --  >90  >90  --  >90   < > >90   < >  --   --   --  73  73   < > >90  >90   *   < > 123*  123*   < > 152*   < > 129*   < >  --    < >  --   131*  131*  119*   < > 126*  126*   MAYE  --   --  9.0  9.0  --  9.0   < > 9.4   < >  --   --   --  8.9  8.9   < > 8.6  8.6   PHOS  --   --   --   --   --   --  3.3   < >  --   --   --  5.6*  --  4.0   MAG  --   --  2.3  --  2.6   < > 2.5   < >  --   --   --  2.8*   < > 2.9*   LACT  --   --   --   --   --   --   --   --  1.1  --  0.7  --   --   --    CKT  --   --   --   --   --   --   --   --   --   --   --  1,371*  --  1,711*    < > = values in this interval not displayed.     Hepatic Studies    Recent Labs   Lab Test 02/09/22 0333 02/08/22 0415 02/06/22 0354 02/05/22  0359   BILITOTAL 0.4 0.4   < > 0.6  0.4   DBIL  --   --   --  0.2   ALKPHOS 79 80   < > 69  68   PROTTOTAL 7.3 7.0   < > 6.7*  6.7*   ALBUMIN 2.7* 2.5*   < > 2.6*  2.5*   AST 32 46*   < > 44  41   * 141*   < > 62  62    < > = values in this interval not displayed.     Pancreatitis testing    Recent Labs   Lab Test 02/08/22  0415   TRIG 129     Hematology Studies   Recent Labs   Lab Test 02/09/22 0333 02/08/22 0415 02/07/22  0334 02/06/22  0354   WBC 18.6* 17.8* 18.8* 17.1*   HGB 9.5* 9.3* 9.4* 9.0*   HCT 31.5* 31.7* 30.8* 29.5*    246 320 276     Clotting Studies    Recent Labs   Lab Test 01/28/22  1447 01/28/22  1246 01/10/22  1216   INR 1.38* 1.53* 1.00   PTT 31 28 32     Arterial Blood Gas Testing    Recent Labs   Lab Test 02/09/22 0357 02/08/22  2211 02/08/22  1622 02/08/22  1045 02/08/22  0841   PH 7.47* 7.46* 7.46* 7.40 7.40   PCO2 41 42 42 50* 49*   PO2 80 71* 72* 73* 65*   HCO3 30* 30* 30* 31* 30*   O2PER 30  65 65 65 70 60     Urine Studies     Recent Labs   Lab Test 02/07/22  0804 02/04/22  0502 01/31/22  1038 01/30/22  0519 01/10/22  1154   URINEPH 5.5 5.5 5.0 6.0 6.0   NITRITE Negative Negative Negative Negative Negative   LEUKEST Negative Small* Negative Negative Negative   WBCU  --  5 1 0 1     Medication levels    Recent Labs   Lab Test 01/31/22  0939   VANCOMYCIN 4.2     Microbiology:    Last Culture  results with specimen source  Culture   Date Value Ref Range Status   02/07/2022 No growth after 2 days  Preliminary   02/07/2022 No Growth  Final   02/07/2022 No growth after 2 days  Preliminary   02/05/2022 No growth after 3 days  Preliminary   02/05/2022 No growth after 3 days  Preliminary   02/04/2022 No Growth  Final   02/04/2022 No Growth  Final   02/04/2022 No Growth  Final   01/31/2022 50,000-100,000 CFU/mL Normal michele  Final   01/31/2022 No Growth  Final   01/30/2022 No Growth  Final   01/30/2022 No Growth  Final   01/30/2022 1+ Normal michele  Final         Last check of C difficile  C Difficile Toxin B by PCR   Date Value Ref Range Status   02/05/2022 Negative Negative Final     Comment:     A negative result does not exclude actual disease due to C. difficile and may be due to improper collection, handling and storage of the specimen or the number of organisms in the specimen is below the detection limit of the assay.       Virology:    Coronavirus-19 testing    Recent Labs   Lab Test 02/05/22  1313 01/30/22  0311 01/25/22  0852 11/16/21  0856 09/29/21  1541   [id number] Negative Negative  --   --   --    COVIDPCREXT  --   --  Negative Negative Negative     Respiratory virus (non-coronavirus-19) testing    Recent Labs   Lab Test 02/05/22  1313 01/30/22  0951   IFLUA  --  Not Detected   INFZA Negative  --    FLUAH1  --  Not Detected   [id number]  --  Not Detected   FLUAH3  --  Not Detected   IFLUB  --  Not Detected   INFZB Negative  --    PIV1  --  Not Detected   PIV2  --  Not Detected   PIV3  --  Not Detected   PIV4  --  Not Detected   IRSV Negative  --    RSVA  --  Not Detected   RSVB  --  Not Detected   HMPV  --  Not Detected   ADENOV  --  Not Detected   CORONA  --  Not Detected     Imaging:  Recent Results (from the past 48 hour(s))   XR Chest Port 1 View    Narrative    Exam: XR CHEST PORT 1 VIEW, 2/8/2022 1:15 AM    Indication: ards    Comparison: Chest x-ray 2/7/2022, 1:24 AM    Findings:   Endotracheal  tube tip projects over the mid to lower thoracic trachea.  Gastric tube tip projects over the stomach. Feeding tube courses past  the level of the diaphragm with tip excluded from the field-of-view.  Esophageal temperature probe tip projects over the mid thoracic  esophagus. Right IJ central venous catheter tip projects over the high  SVC/confluence.    Enlarged cardiomediastinal silhouette. No pneumothorax. New small left  pleural effusion. Unchanged low lung volumes with prominent  interstitial opacities. Increased left basilar opacification. Aortic  valve prosthesis noted.      Impression    Impression:   1. ET tube tip projects over the mid to lower thoracic trachea.  Otherwise additional support devices are unchanged as above.  2. Small left pleural effusion and increased atelectasis.  3. Unchanged low lung volumes with prominent interstitial markings  likely representing atelectasis or edema.    I have personally reviewed the examination and initial interpretation  and I agree with the findings.    RAUDEL DYER MD         SYSTEM ID:  [id number]   XR Chest Port 1 View    Narrative    EXAM: XR CHEST PORT 1 VIEW  2/8/2022 8:24 AM     HISTORY:  Acute desat, assess for signs of mucus plug       COMPARISON:  Chest x-ray 2/8/2022 at 1:11 AM.    FINDINGS:   AP portable chest. Median sternotomy wires appear intact. Aortic valve  replacement. Endotracheal tube projects over the midthoracic trachea.  Enteric tube courses below the diaphragm, with tip inferiorly out of  view. NG/gastric catheter projects over the stomach. Esophageal  temperature probe over the upper/mid thoracic esophagus. Right IJ  catheter sheath. Stable low lung volumes. No significant appreciable  pleural effusions. No pneumothorax. Slight increase in the  perihilar/retrocardiac opacities. No new focal opacities. Unremarkable  limited upper abdomen radiographically.      Impression    IMPRESSION:  1. Stable postsurgical changes of aortic valve  replacement with mild  increase in the postsurgical pulmonary edema/atelectasis. A follow-up  exclude infectious component.  2. No significant pleural effusions appreciated. No new focal  opacities. No pneumothorax.  3. Stable positioning of support devices.    I have personally reviewed the examination and initial interpretation  and I agree with the findings.    TAE BRISCOE MD         SYSTEM ID:  [id number]   XR Chest Port 1 View    Narrative    Exam: XR CHEST PORT 1 VIEW, 2/9/2022 8:46 AM    Indication: s/p Bentall    Comparison: Yesterday    Findings:   Endotracheal tube in the mid thoracic trachea. Right IJ sheath tip in  the low right internal jugular vein. Temperature probe in the upper  esophagus. Enteric tube tip and sidehole project over the fundus of  the stomach. Feeding tube seen coursing through the mediastinum. Tip  projects off the film. Prosthetic aortic valve.    Continued perihilar and bibasilar opacities indicative of atelectasis.  Edema could have a similar appearance. Heart is enlarged but stable.      Impression    Impression:   1. Stable support devices  2. Unchanged perihilar and lower lobe opacities indicative of  atelectasis. Edema would be in the differential.    BENTLEY ANTONIO MD         SYSTEM ID:  [id number]     2/9 Chest / abdm CT:  Pending.  2/9 Sinus CT:  No suspicious osseous lesion or soft tissue infection.  Paranasal sinus and mastoid air cell effusions are nonspecific and could be seen in an intubated patient, however sinusitis cannot be excluded.  2/9 CXR:  Unchanged perihilar and lower lobe opacities indicative of atelectasis. Edema would be in the differential.  2/8 CXRs:  Stable postsurgical changes of aortic valve replacement with mild increase in the postsurgical pulmonary edema/atelectasis. A follow-up exclude infectious component.  No significant pleural effusions appreciated. No new focal opacities. No pneumothorax.  Stable positioning of support devices.  Unchanged low  lung volumes with prominent interstitial markings likely representing atelectasis or edema.  2/7 CXR:  Support devices as above including right IJ catheter tip at the innominate vein confluence.  Decreased bibasilar atelectasis.  Unchanged perihilar edema/atelectasis.  2/6 CXR:  Increased right basilar opacity which may represent atelectasis,   edema and/or infection. Persistent retrocardiac opacification.  2/5 CXR:  Decreased perihilar and bibasilar predominant pulmonary opacities likely representing improving pulmonary edema and/or atelectasis.  2/4 CXR:  1. No appreciable pneumothorax or pneumomediastinum following mediastinal drain removal.  2. Decreased diffuse interstitial opacities, likely representing improving atelectasis versus pulmonary edema.  3. Unchanged streaky bibasilar atelectasis and trace left pleural   Effusion.  2/4 CXR:  Endotracheal tube.  Continued low lung volumes with streaky bibasilar opacities, likely atelectasis.  Indistinct pulmonary vasculature with diffuse interstitial opacities, likely pulmonary edema.  2/3 CXR:  Stable small bilateral pleural effusions.  Stable perihilar/retrocardiac and left basilar opacities, slightly increased in the right lung opacities, representing an overall stable mild increase in postsurgical atelectasis/edema. Continued follow-up to exclude infectious component.     1/31 TTE:  Technically difficult study. Extremely poor acoustic windows.  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  Global and regional left ventricular function is hyperkinetic with an EF of 65-70%.  Right ventricular function, chamber size, wall motion, and thickness are normal.  The mean gradient across the aortic valve is11 mmHg.  Dilation of the inferior vena cava is present with abnormal respiratory variation in diameter.  No pericardial fluid.Organizing material noted along RV free wall.    12/13/21 Chest /  [facility] CT angiogram:  1. The proximal ascending aorta is severely dilated measuring 55 x 55mm in maximum dimension.  2. The aorta returns to normal size at the level of the arch.  3. No dissection, penetrating ulcer, or intramural hematoma in the visualized thoracic aorta.  4. No significant CAD in a right dominant coronary system.  5. Mild concentric LVH.  6. The aortic valve is likely bicuspid.  10/26/21 TTE:  LV normal in size with normal systolic function. LVEF 60-65%.  LV segmental wall motion normal.  Aortic valve likely tri leaflet with no stenosis or regurgitation.  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  Proximal ascending aorta is dilated, measuring 4.6cm with an index of 1.52cm/m2.

## 2022-02-09 NOTE — PLAN OF CARE
ICU End of Shift Summary. See flowsheets for vital signs and detailed assessment.    Changes this shift: RASS -3 to -4. Pt opens eyes when asked. No extremity movement. Propofol @ 40, fentanyl @100.  Pt remains in SR, BP stable,  tmax 101.3. Heparin increased 3300 units/hr per protocol. No vent changes overnight. Insulin drip stopped blood sugars have been stable (99-120s). 8pm Potassium chloride held d/t potassium of 5.0. Team was notified..never heard back. 40mg Lasix given..2250 ml out via durant.     Plan: Wean vent and sedation as tolerated. Trend labs. Continue to plan of care.

## 2022-02-09 NOTE — PROGRESS NOTES
CV ICU PROGRESS NOTE  02/09/2022      CO-MORBIDITIES:   Aortic root aneurysm (H)    ASSESSMENT: Chuy Varner is a 31 year old male with a PMHx s/f bicuspid aortic valve, thoracic aortic aneurysm without rupture, hypertension, obesity, and testicular cancer s/p orchiectomy who underwent Bentall procedure with mechanical valve on 1/28 with Dr. Velasco and Dr. Hannah. Course complicated by postop hypoxemic respiratory failure.    TODAY'S PROGRESS:   - Wean FiO2 and PEEP as able  - 40 Lasix BID  - RAAS 0 -1  - Discontinue decadron  - Discontinue metoprolol, start Coreg 6.25 BID  - Decrease free water to 30 q4h  - Remove A line and central line  - Pan scan including sinuses    PLAN:  Neuro/ pain/ sedation:  # Acute Postoperative pain  - Monitor neurological status. Notify the MD for any acute changes in exam.  - Pain: fentanyl gtt. Scheduled tylenol. PRN tylenol, oxycodone, robaxin  - Sedation: propofol gtt  - RAAS -0 to -1     Pulmonary care:   # Postoperative ventilation management  # Acute hypoxemic respiratory failure ARDS vs. PNA vs. Hypervolemia vs. TRALI  COVID-19.Mycoplasma, Parainfluenza and Influenza Negative. CTA negative for PE on 1/30.  WBC 23.0 2/4 (14.9 the day before). CXR, respiratory panel, and BCs ordered.  CXR not consistent with ARDS. Likely element of severe V/Q mismatch and atelectasis.  Sudden drop in sats overnight 2/8 requiring FiO2 80%  - Follow BCs and respiratory panel (2/4)  - Pulmonology saw 2/2, appreciate recs   > prone (14 to 18 hrs per day)   > PEEP 14   > may need to increase PEEP slightly when supine   > as he wakes up more, may benefit from volumes closer to 600 ml for comfort and synchrony (8 ml/kg of his IBW), for now we set it to 530   > pulmonary toilet ie QID duonebs and percussive therapy ie metanebs if possible (may be difficult with recent chest surgery)   > agree with borad antibiotics to treat pneumonia per the CV ICU team  - Titrate supplemental oxygen to maintain  saturation above 90%  - Discontinue Dexamethasone  - Up to chair today    Cardiovascular:    # Ascending aortic aneurysm s/p Bentall w/ Jerry valve on 1/28 with Dr. Velasco and Dr. Hannah  # Bicuspid aortic valve  # History of thoracic aortic aneurysm without rupture  # History of hypertension  Recent echo on 10/26/21 with LVEF of 60-65%.  CT vascular 12/13/21 showing proximal ascending aorta is severely dilated (55 x 55mm in maximum dimension) and likely bicuspid aortic valve.  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  CT PE 1/30 - negative for acute PE, marked dependent atelectasis R>L.  - Monitor hemodynamic status  - Goal MAP>65, SBP <130  - Statin hold  - ASA 81  - Holding PTA meds: metoprolol succinate 25mg daily- transition to Coreg 6.25  - Labetalol and hydralazine PRN to maintain SBP <130     GI care/ Nutrition:   # Obesity (BMI 46)  - NJ, advance to goal  - PPI  - Continue bowel regimen: miralax, senna    Renal/ Fluid Balance/ Electrolytes:   BL creat appears to be ~ 1.0  - Strict I/O, daily weights  - Lasix 40 q12h, goal net even  - Avoid/limit nephrotoxins as able  - Replete lytes PRN per protocol  - Scheduled potassium replacement     Endocrine:    # Stress induced hyperglycemia, improving  - HDSSI  - Lantus 30  - Goal BG <180 for optimal healing     ID/ Antibiotics:  # Stress induced leukocytosis, resolved  # Possible ventilator associated pneumonia  Febrile and pan-cultured on 1/30/22. Covid pcr, respiratory panel, and respiratory cultures negative 1/30. Re-cultured 1/31: respiratory and blood cultures NGTD. Re-cultured again 2/4 when WBC increased to 23.  - Follow respiratory panel and BC results 2/4  - Continue to monitor fever curve, WBC and inflammatory markers  - Meropenem x7 days - completed 2/7  - Consult ID 2/4: send C dif, blood  cultures, repeat COVID  - CT C/A/P and sinuses   - Remove A line and central line for at least 24 hours     Heme/Onc:     # Stress induced leukocytosis  # Acute blood loss anemia  # Acute blood loss thrombocytopenia, resolved  # History of testicular cancer s/p orchiectomy  No s/sx active bleeding  - Continue to monitor  - CBC daily  - Continue heparin gtt     MSK/ Skin:  # Sternotomy  # Surgical Incision  - Sternal precautions  - Postoperative incision management per protocol  - PT/OT/CR     Prophylaxis:    - Mechanical prophylaxis for DVT  - Chemical DVT prophylaxis- heparin gtt  - PPI     Lines/ tubes/ drains:  - Left PIV (1/28)  - Godfrey (1/28)  - OG (1/28)  - Nasoduodenal (1/31)  - ETT (1/28)     Disposition:  - CVICU    Patient seen, findings and plan discussed with CV ICU staff, Dr. Rosado and CVTS staff Dr. Velasco.    Tom Adair MD  Anesthesiology PGY3    ====================================    SUBJECTIVE:   NAEO. Stable O2 requirements while supine.    OBJECTIVE:   1. VITAL SIGNS:   Temp:  [100  F (37.8  C)-101.3  F (38.5  C)] 100  F (37.8  C)  Pulse:  [] 100  Resp:  [19-23] 20  BP: (148)/(76) 148/76  MAP:  [69 mmHg-96 mmHg] 93 mmHg  Arterial Line BP: ()/(56-82) 118/82  FiO2 (%):  [60 %-70 %] 65 %  SpO2:  [91 %-98 %] 98 %  Ventilation Mode: CMV/AC  (Continuous Mandatory Ventilation/ Assist Control)  FiO2 (%): 65 %  Rate Set (breaths/minute): 18 breaths/min  Tidal Volume Set (mL): 620 mL  PEEP (cm H2O): 10 cmH2O  Oxygen Concentration (%): 65 %  Resp: 20      2. INTAKE/ OUTPUT:   I/O last 3 completed shifts:  In: 4473.17 [I.V.:2128.17; NG/GT:1080]  Out: 5335 [Urine:4935; Emesis/NG output:350; Stool:50]    3. PHYSICAL EXAMINATION:   General: Sedated, intubated  Neuro: Sedated, following commands when weaned  Resp: Intubated, mechanically ventilated  CV: RRR on telemetry  Abdomen: Soft, Non-distended  Incisions: C/D/I   Extremities: warm and well perfused, SCDs in place    4.  INVESTIGATIONS:   Arterial Blood Gases   Recent Labs   Lab 02/09/22  0357 02/08/22  2211 02/08/22  1622 02/08/22  1045   PH 7.47* 7.46* 7.46* 7.40   PCO2 41 42 42 50*   PO2 80 71* 72* 73*   HCO3 30* 30* 30* 31*     Complete Blood Count   Recent Labs   Lab 02/09/22  0333 02/08/22  0415 02/07/22  0334 02/06/22  0354   WBC 18.6* 17.8* 18.8* 17.1*   HGB 9.5* 9.3* 9.4* 9.0*    246 320 276     Basic Metabolic Panel  Recent Labs   Lab 02/09/22  0431 02/09/22 0333 02/09/22 0330 02/09/22  0229 02/08/22  1509 02/08/22  1353 02/08/22  0420 02/08/22  0415 02/07/22  2352 02/07/22  2235   NA  --  141  141  --   --   --  142  --  144  --  146*   POTASSIUM  --  3.9  3.9  --   --   --  5.0  --  4.4  --  4.8   CHLORIDE  --  106  106  --   --   --  108  --  112*  --  112*   CO2  --  27  27  --   --   --  30  --  31  --  29   BUN  --  52*  52*  --   --   --  51*  --  52*  --  60*   CR  --  0.75  0.75  --   --   --  0.85  --  0.79  --  0.82   * 123*  123* 125* 107*   < > 152*   < > 123*   < > 119*    < > = values in this interval not displayed.     Liver Function Tests  Recent Labs   Lab 02/09/22 0333 02/08/22 0415 02/07/22  0334 02/06/22  0354   AST 32 46* 58* 52*   * 141* 128* 80*   ALKPHOS 79 80 80 67   BILITOTAL 0.4 0.4 0.5 0.4   ALBUMIN 2.7* 2.5* 2.6* 2.4*     Pancreatic Enzymes  No lab results found in last 7 days.  Coagulation Profile  No lab results found in last 7 days.

## 2022-02-10 ENCOUNTER — APPOINTMENT (OUTPATIENT)
Dept: ULTRASOUND IMAGING | Facility: CLINIC | Age: 32
End: 2022-02-10
Attending: SURGERY
Payer: COMMERCIAL

## 2022-02-10 ENCOUNTER — APPOINTMENT (OUTPATIENT)
Dept: OCCUPATIONAL THERAPY | Facility: CLINIC | Age: 32
End: 2022-02-10
Attending: SURGERY
Payer: COMMERCIAL

## 2022-02-10 ENCOUNTER — APPOINTMENT (OUTPATIENT)
Dept: GENERAL RADIOLOGY | Facility: CLINIC | Age: 32
End: 2022-02-10
Attending: SURGERY
Payer: COMMERCIAL

## 2022-02-10 LAB
ALBUMIN SERPL-MCNC: 2.6 G/DL (ref 3.4–5)
ALP SERPL-CCNC: 75 U/L (ref 40–150)
ALT SERPL W P-5'-P-CCNC: 89 U/L (ref 0–70)
ANION GAP SERPL CALCULATED.3IONS-SCNC: 7 MMOL/L (ref 3–14)
APTT PPP: 57 SECONDS (ref 22–38)
AST SERPL W P-5'-P-CCNC: 21 U/L (ref 0–45)
BACTERIA BLD CULT: NO GROWTH
BACTERIA BLD CULT: NO GROWTH
BASE EXCESS BLDA CALC-SCNC: 3.9 MMOL/L (ref -9–1.8)
BASE EXCESS BLDA CALC-SCNC: 4.4 MMOL/L (ref -9–1.8)
BILIRUB SERPL-MCNC: 0.5 MG/DL (ref 0.2–1.3)
BUN SERPL-MCNC: 66 MG/DL (ref 7–30)
CALCIUM SERPL-MCNC: 8.8 MG/DL (ref 8.5–10.1)
CHLORIDE BLD-SCNC: 104 MMOL/L (ref 94–109)
CO2 SERPL-SCNC: 27 MMOL/L (ref 20–32)
CREAT SERPL-MCNC: 1.02 MG/DL (ref 0.66–1.25)
ERYTHROCYTE [DISTWIDTH] IN BLOOD BY AUTOMATED COUNT: 14.2 % (ref 10–15)
GFR SERPL CREATININE-BSD FRML MDRD: >90 ML/MIN/1.73M2
GLUCOSE BLD-MCNC: 144 MG/DL (ref 70–99)
GLUCOSE BLDC GLUCOMTR-MCNC: 114 MG/DL (ref 70–99)
GLUCOSE BLDC GLUCOMTR-MCNC: 122 MG/DL (ref 70–99)
GLUCOSE BLDC GLUCOMTR-MCNC: 128 MG/DL (ref 70–99)
GLUCOSE BLDC GLUCOMTR-MCNC: 133 MG/DL (ref 70–99)
GLUCOSE BLDC GLUCOMTR-MCNC: 136 MG/DL (ref 70–99)
GLUCOSE BLDC GLUCOMTR-MCNC: 140 MG/DL (ref 70–99)
GLUCOSE BLDC GLUCOMTR-MCNC: 150 MG/DL (ref 70–99)
HCO3 BLD-SCNC: 29 MMOL/L (ref 21–28)
HCO3 BLD-SCNC: 30 MMOL/L (ref 21–28)
HCT VFR BLD AUTO: 30.4 % (ref 40–53)
HGB BLD-MCNC: 9.2 G/DL (ref 13.3–17.7)
MAGNESIUM SERPL-MCNC: 2.4 MG/DL (ref 1.8–2.6)
MAGNESIUM SERPL-MCNC: 2.5 MG/DL (ref 1.8–2.6)
MCH RBC QN AUTO: 29.8 PG (ref 26.5–33)
MCHC RBC AUTO-ENTMCNC: 30.3 G/DL (ref 31.5–36.5)
MCV RBC AUTO: 98 FL (ref 78–100)
O2/TOTAL GAS SETTING VFR VENT: 50 %
O2/TOTAL GAS SETTING VFR VENT: 65 %
OXYHGB MFR BLD: 95 % (ref 92–100)
OXYHGB MFR BLD: 97 % (ref 92–100)
PCO2 BLD: 44 MM HG (ref 35–45)
PCO2 BLD: 50 MM HG (ref 35–45)
PH BLD: 7.38 [PH] (ref 7.35–7.45)
PH BLD: 7.43 [PH] (ref 7.35–7.45)
PHOSPHATE SERPL-MCNC: 6 MG/DL (ref 2.5–4.5)
PLATELET # BLD AUTO: 138 10E3/UL (ref 150–450)
PO2 BLD: 103 MM HG (ref 80–105)
PO2 BLD: 86 MM HG (ref 80–105)
POTASSIUM BLD-SCNC: 3.8 MMOL/L (ref 3.4–5.3)
POTASSIUM BLD-SCNC: 3.9 MMOL/L (ref 3.4–5.3)
PROT SERPL-MCNC: 7.1 G/DL (ref 6.8–8.8)
RADIOLOGIST FLAGS: ABNORMAL
RADIOLOGIST FLAGS: ABNORMAL
RBC # BLD AUTO: 3.09 10E6/UL (ref 4.4–5.9)
SODIUM SERPL-SCNC: 138 MMOL/L (ref 133–144)
UFH PPP CHRO-ACNC: 0.19 IU/ML
UFH PPP CHRO-ACNC: <0.1 IU/ML
WBC # BLD AUTO: 16.7 10E3/UL (ref 4–11)

## 2022-02-10 PROCEDURE — 36415 COLL VENOUS BLD VENIPUNCTURE: CPT | Performed by: SURGERY

## 2022-02-10 PROCEDURE — 258N000003 HC RX IP 258 OP 636: Performed by: SURGERY

## 2022-02-10 PROCEDURE — 250N000013 HC RX MED GY IP 250 OP 250 PS 637: Performed by: STUDENT IN AN ORGANIZED HEALTH CARE EDUCATION/TRAINING PROGRAM

## 2022-02-10 PROCEDURE — 94668 MNPJ CHEST WALL SBSQ: CPT

## 2022-02-10 PROCEDURE — 86022 PLATELET ANTIBODIES: CPT | Performed by: SURGERY

## 2022-02-10 PROCEDURE — 93970 EXTREMITY STUDY: CPT

## 2022-02-10 PROCEDURE — 97110 THERAPEUTIC EXERCISES: CPT | Mod: GO | Performed by: OCCUPATIONAL THERAPIST

## 2022-02-10 PROCEDURE — 200N000002 HC R&B ICU UMMC

## 2022-02-10 PROCEDURE — 82805 BLOOD GASES W/O2 SATURATION: CPT | Performed by: NURSE PRACTITIONER

## 2022-02-10 PROCEDURE — 93970 EXTREMITY STUDY: CPT | Mod: 26 | Performed by: RADIOLOGY

## 2022-02-10 PROCEDURE — 97165 OT EVAL LOW COMPLEX 30 MIN: CPT | Mod: GO | Performed by: OCCUPATIONAL THERAPIST

## 2022-02-10 PROCEDURE — 258N000003 HC RX IP 258 OP 636: Performed by: NURSE PRACTITIONER

## 2022-02-10 PROCEDURE — 84132 ASSAY OF SERUM POTASSIUM: CPT | Performed by: SURGERY

## 2022-02-10 PROCEDURE — 999N000065 XR CHEST PORT 1 VIEW

## 2022-02-10 PROCEDURE — 36600 WITHDRAWAL OF ARTERIAL BLOOD: CPT

## 2022-02-10 PROCEDURE — 36415 COLL VENOUS BLD VENIPUNCTURE: CPT | Performed by: STUDENT IN AN ORGANIZED HEALTH CARE EDUCATION/TRAINING PROGRAM

## 2022-02-10 PROCEDURE — 83735 ASSAY OF MAGNESIUM: CPT | Performed by: SURGERY

## 2022-02-10 PROCEDURE — 250N000011 HC RX IP 250 OP 636: Performed by: STUDENT IN AN ORGANIZED HEALTH CARE EDUCATION/TRAINING PROGRAM

## 2022-02-10 PROCEDURE — 250N000013 HC RX MED GY IP 250 OP 250 PS 637: Performed by: SURGERY

## 2022-02-10 PROCEDURE — 250N000011 HC RX IP 250 OP 636: Performed by: SURGERY

## 2022-02-10 PROCEDURE — 97530 THERAPEUTIC ACTIVITIES: CPT | Mod: GO | Performed by: OCCUPATIONAL THERAPIST

## 2022-02-10 PROCEDURE — 250N000011 HC RX IP 250 OP 636: Performed by: NURSE PRACTITIONER

## 2022-02-10 PROCEDURE — 999N000157 HC STATISTIC RCP TIME EA 10 MIN

## 2022-02-10 PROCEDURE — 83735 ASSAY OF MAGNESIUM: CPT | Performed by: STUDENT IN AN ORGANIZED HEALTH CARE EDUCATION/TRAINING PROGRAM

## 2022-02-10 PROCEDURE — 250N000013 HC RX MED GY IP 250 OP 250 PS 637: Performed by: PHYSICIAN ASSISTANT

## 2022-02-10 PROCEDURE — 82805 BLOOD GASES W/O2 SATURATION: CPT | Performed by: STUDENT IN AN ORGANIZED HEALTH CARE EDUCATION/TRAINING PROGRAM

## 2022-02-10 PROCEDURE — 85520 HEPARIN ASSAY: CPT | Performed by: SURGERY

## 2022-02-10 PROCEDURE — 87077 CULTURE AEROBIC IDENTIFY: CPT | Performed by: STUDENT IN AN ORGANIZED HEALTH CARE EDUCATION/TRAINING PROGRAM

## 2022-02-10 PROCEDURE — 85014 HEMATOCRIT: CPT | Performed by: STUDENT IN AN ORGANIZED HEALTH CARE EDUCATION/TRAINING PROGRAM

## 2022-02-10 PROCEDURE — 87205 SMEAR GRAM STAIN: CPT | Performed by: STUDENT IN AN ORGANIZED HEALTH CARE EDUCATION/TRAINING PROGRAM

## 2022-02-10 PROCEDURE — 87040 BLOOD CULTURE FOR BACTERIA: CPT | Performed by: SURGERY

## 2022-02-10 PROCEDURE — 80053 COMPREHEN METABOLIC PANEL: CPT | Performed by: STUDENT IN AN ORGANIZED HEALTH CARE EDUCATION/TRAINING PROGRAM

## 2022-02-10 PROCEDURE — 71045 X-RAY EXAM CHEST 1 VIEW: CPT | Mod: 26 | Performed by: RADIOLOGY

## 2022-02-10 PROCEDURE — 83735 ASSAY OF MAGNESIUM: CPT | Performed by: INTERNAL MEDICINE

## 2022-02-10 PROCEDURE — 84100 ASSAY OF PHOSPHORUS: CPT | Performed by: INTERNAL MEDICINE

## 2022-02-10 PROCEDURE — 85520 HEPARIN ASSAY: CPT | Performed by: INTERNAL MEDICINE

## 2022-02-10 PROCEDURE — 94003 VENT MGMT INPAT SUBQ DAY: CPT

## 2022-02-10 PROCEDURE — 99291 CRITICAL CARE FIRST HOUR: CPT | Mod: 24 | Performed by: INTERNAL MEDICINE

## 2022-02-10 PROCEDURE — 85730 THROMBOPLASTIN TIME PARTIAL: CPT | Performed by: SURGERY

## 2022-02-10 RX ORDER — HYDRALAZINE HYDROCHLORIDE 25 MG/1
25 TABLET, FILM COATED ORAL EVERY 6 HOURS SCHEDULED
Status: DISCONTINUED | OUTPATIENT
Start: 2022-02-10 | End: 2022-02-11

## 2022-02-10 RX ORDER — CARVEDILOL 6.25 MG/1
12.5 TABLET ORAL 2 TIMES DAILY
Status: DISCONTINUED | OUTPATIENT
Start: 2022-02-10 | End: 2022-02-11

## 2022-02-10 RX ORDER — FUROSEMIDE 10 MG/ML
40 INJECTION INTRAMUSCULAR; INTRAVENOUS EVERY 6 HOURS
Status: COMPLETED | OUTPATIENT
Start: 2022-02-10 | End: 2022-02-10

## 2022-02-10 RX ORDER — CARVEDILOL 6.25 MG/1
6.25 TABLET ORAL ONCE
Status: COMPLETED | OUTPATIENT
Start: 2022-02-10 | End: 2022-02-10

## 2022-02-10 RX ORDER — LABETALOL HYDROCHLORIDE 5 MG/ML
20 INJECTION, SOLUTION INTRAVENOUS EVERY 4 HOURS PRN
Status: DISCONTINUED | OUTPATIENT
Start: 2022-02-10 | End: 2022-02-22

## 2022-02-10 RX ADMIN — FENTANYL CITRATE 100 MCG/HR: 50 INJECTION INTRAVENOUS at 11:28

## 2022-02-10 RX ADMIN — Medication 2 PACKET: at 08:25

## 2022-02-10 RX ADMIN — HYDRALAZINE HYDROCHLORIDE 20 MG: 20 INJECTION INTRAMUSCULAR; INTRAVENOUS at 02:29

## 2022-02-10 RX ADMIN — ACETAMINOPHEN 650 MG: 325 TABLET, FILM COATED ORAL at 15:22

## 2022-02-10 RX ADMIN — CARVEDILOL 6.25 MG: 6.25 TABLET, FILM COATED ORAL at 08:24

## 2022-02-10 RX ADMIN — HYDRALAZINE HYDROCHLORIDE 20 MG: 20 INJECTION INTRAMUSCULAR; INTRAVENOUS at 00:34

## 2022-02-10 RX ADMIN — Medication 2 PACKET: at 11:29

## 2022-02-10 RX ADMIN — CARVEDILOL 6.25 MG: 6.25 TABLET, FILM COATED ORAL at 11:28

## 2022-02-10 RX ADMIN — LABETALOL HYDROCHLORIDE 20 MG: 5 INJECTION, SOLUTION INTRAVENOUS at 09:02

## 2022-02-10 RX ADMIN — PROPOFOL 50 MCG/KG/MIN: 10 INJECTION, EMULSION INTRAVENOUS at 01:37

## 2022-02-10 RX ADMIN — HEPARIN SODIUM 3450 UNITS/HR: 1000 INJECTION INTRAVENOUS; SUBCUTANEOUS at 10:07

## 2022-02-10 RX ADMIN — HEPARIN SODIUM 3300 UNITS/HR: 1000 INJECTION INTRAVENOUS; SUBCUTANEOUS at 03:18

## 2022-02-10 RX ADMIN — PROPOFOL 60 MCG/KG/MIN: 10 INJECTION, EMULSION INTRAVENOUS at 12:55

## 2022-02-10 RX ADMIN — Medication 2 PACKET: at 15:23

## 2022-02-10 RX ADMIN — PROPOFOL 65 MCG/KG/MIN: 10 INJECTION, EMULSION INTRAVENOUS at 10:02

## 2022-02-10 RX ADMIN — PROPOFOL 65 MCG/KG/MIN: 10 INJECTION, EMULSION INTRAVENOUS at 06:52

## 2022-02-10 RX ADMIN — INSULIN ASPART 1 UNITS: 100 INJECTION, SOLUTION INTRAVENOUS; SUBCUTANEOUS at 23:52

## 2022-02-10 RX ADMIN — PROPOFOL 20 MCG/KG/MIN: 10 INJECTION, EMULSION INTRAVENOUS at 20:47

## 2022-02-10 RX ADMIN — HYDRALAZINE HYDROCHLORIDE 25 MG: 25 TABLET ORAL at 11:28

## 2022-02-10 RX ADMIN — PROPOFOL 50 MCG/KG/MIN: 10 INJECTION, EMULSION INTRAVENOUS at 14:34

## 2022-02-10 RX ADMIN — HYDRALAZINE HYDROCHLORIDE 25 MG: 25 TABLET ORAL at 23:52

## 2022-02-10 RX ADMIN — Medication 2 PACKET: at 19:58

## 2022-02-10 RX ADMIN — FUROSEMIDE 40 MG: 10 INJECTION, SOLUTION INTRAVENOUS at 16:36

## 2022-02-10 RX ADMIN — FUROSEMIDE 40 MG: 10 INJECTION, SOLUTION INTRAVENOUS at 11:29

## 2022-02-10 RX ADMIN — BIVALIRUDIN 0.15 MG/KG/HR: 250 INJECTION, POWDER, LYOPHILIZED, FOR SOLUTION INTRAVENOUS at 11:37

## 2022-02-10 RX ADMIN — PROPOFOL 75 MCG/KG/MIN: 10 INJECTION, EMULSION INTRAVENOUS at 04:33

## 2022-02-10 RX ADMIN — PANTOPRAZOLE SODIUM 40 MG: 40 TABLET, DELAYED RELEASE ORAL at 08:24

## 2022-02-10 RX ADMIN — PROPOFOL 60 MCG/KG/MIN: 10 INJECTION, EMULSION INTRAVENOUS at 03:21

## 2022-02-10 RX ADMIN — CARVEDILOL 12.5 MG: 6.25 TABLET, FILM COATED ORAL at 19:58

## 2022-02-10 RX ADMIN — LABETALOL HYDROCHLORIDE 20 MG: 5 INJECTION, SOLUTION INTRAVENOUS at 02:49

## 2022-02-10 RX ADMIN — ASPIRIN 81 MG CHEWABLE TABLET 81 MG: 81 TABLET CHEWABLE at 08:24

## 2022-02-10 RX ADMIN — PROPOFOL 65 MCG/KG/MIN: 10 INJECTION, EMULSION INTRAVENOUS at 08:24

## 2022-02-10 RX ADMIN — AMOXICILLIN AND CLAVULANATE POTASSIUM 1 TABLET: 875; 125 TABLET, FILM COATED ORAL at 20:48

## 2022-02-10 RX ADMIN — HYDRALAZINE HYDROCHLORIDE 20 MG: 20 INJECTION INTRAMUSCULAR; INTRAVENOUS at 09:42

## 2022-02-10 RX ADMIN — PROPOFOL 75 MCG/KG/MIN: 10 INJECTION, EMULSION INTRAVENOUS at 05:46

## 2022-02-10 RX ADMIN — HYDRALAZINE HYDROCHLORIDE 25 MG: 25 TABLET ORAL at 18:30

## 2022-02-10 RX ADMIN — INSULIN ASPART 1 UNITS: 100 INJECTION, SOLUTION INTRAVENOUS; SUBCUTANEOUS at 15:21

## 2022-02-10 RX ADMIN — ACETAMINOPHEN 650 MG: 325 TABLET, FILM COATED ORAL at 00:25

## 2022-02-10 RX ADMIN — Medication 15 ML: at 08:25

## 2022-02-10 RX ADMIN — BIVALIRUDIN 0.15 MG/KG/HR: 250 INJECTION, POWDER, LYOPHILIZED, FOR SOLUTION INTRAVENOUS at 21:36

## 2022-02-10 RX ADMIN — AMOXICILLIN AND CLAVULANATE POTASSIUM 1 TABLET: 875; 125 TABLET, FILM COATED ORAL at 15:02

## 2022-02-10 ASSESSMENT — ACTIVITIES OF DAILY LIVING (ADL)
ADLS_ACUITY_SCORE: 19
ADLS_ACUITY_SCORE: 21
ADLS_ACUITY_SCORE: 19
ADLS_ACUITY_SCORE: 19
ADLS_ACUITY_SCORE: 21
ADLS_ACUITY_SCORE: 19
PREVIOUS_RESPONSIBILITIES: MEAL PREP;HOUSEKEEPING;SHOPPING;YARDWORK;MEDICATION MANAGEMENT;FINANCES;DRIVING;WORK
ADLS_ACUITY_SCORE: 19

## 2022-02-10 NOTE — PLAN OF CARE
ICU End of Shift Summary. See flowsheets for vital signs and detailed assessment.    Changes this shift: Attempted to wean sedation- pt did not tolerate this AM. P/S a couple hours 5/10, changed back to full vent support this evening- TV increased to 1000 and RR decreased to 12 (per CVTS, this was done because this is what pt was pulling/doing on P/S), PIP increased to mid 30's with vent changes, attempted to call CVTS x2, waiting to hear back. HTN throughout shift, multiple PRN's given with eventual SBP < 130 this evening. Sent to CT for pan scan, art line and internal jugular to be pulled this evening for line holiday- pt continues to have low grade fevers.     Plan: Continue with current plan of care. Wean sedation as able. Will notify CVTS with any acute changes.

## 2022-02-10 NOTE — PROGRESS NOTES
CV ICU PROGRESS NOTE  02/10/2022      CO-MORBIDITIES:   Aortic root aneurysm (H)    ASSESSMENT: Chuy Varner is a 31 year old male with a PMHx s/f bicuspid aortic valve, thoracic aortic aneurysm without rupture, hypertension, obesity, and testicular cancer s/p orchiectomy who underwent Bentall procedure with mechanical valve on 1/28 with Dr. Velasco and Dr. Hannah. Course complicated by postop hypoxemic respiratory failure.    TODAY'S PROGRESS:   - Wean FiO2 and PEEP as able  - 40 Lasix BID  - RAAS 0 -1  - Increase Coreg 12.5 BID  - Up to chair  - PS  - PO hydralazine 25 TID    PLAN:  Neuro/ pain/ sedation:  # Acute Postoperative pain  - Monitor neurological status. Notify the MD for any acute changes in exam.  - Pain: fentanyl gtt. Scheduled tylenol. PRN tylenol, oxycodone, robaxin  - Sedation: propofol gtt  - RAAS -0 to -1     Pulmonary care:   # Postoperative ventilation management  # Acute hypoxemic respiratory failure ARDS vs. PNA vs. Hypervolemia vs. TRALI  COVID-19.Mycoplasma, Parainfluenza and Influenza Negative. CTA negative for PE on 1/30.  WBC 23.0 2/4 (14.9 the day before). CXR, respiratory panel, and BCs ordered.  CXR not consistent with ARDS. Likely element of severe V/Q mismatch and atelectasis.  Sudden drop in sats overnight 2/8 requiring FiO2 80%  - Follow BCs and respiratory panel (2/4)  - Pulmonology saw 2/2, appreciate recs   > prone (14 to 18 hrs per day)   > PEEP 14   > may need to increase PEEP slightly when supine   > as he wakes up more, may benefit from volumes closer to 600 ml for comfort and synchrony (8 ml/kg of his IBW), for now we set it to 530   > pulmonary toilet ie QID duonebs and percussive therapy ie metanebs if possible (may be difficult with recent chest surgery)   > agree with borad antibiotics to treat pneumonia per the CV ICU team  - Titrate supplemental oxygen to maintain saturation above 90%  - Up to chair today    Cardiovascular:    # Ascending aortic aneurysm s/p  Bentall w/ Jerry valve on 1/28 with Dr. Velasco and Dr. Hannah  # Bicuspid aortic valve  # History of thoracic aortic aneurysm without rupture  # History of hypertension  Recent echo on 10/26/21 with LVEF of 60-65%.  CT vascular 12/13/21 showing proximal ascending aorta is severely dilated (55 x 55mm in maximum dimension) and likely bicuspid aortic valve.  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  CT PE 1/30 - negative for acute PE, marked dependent atelectasis R>L.  - Monitor hemodynamic status  - Goal MAP>65, SBP <130  - Statin hold  - ASA 81  - Holding PTA meds: metoprolol succinate 25mg daily  - Increase Coreg 12.5 BID  - PO Hydralazine 25 TID  - Labetalol and hydralazine PRN to maintain SBP <130     GI care/ Nutrition:   # Obesity (BMI 46)  - NJ, advance to goal  - PPI  - Continue bowel regimen: miralax, senna    Renal/ Fluid Balance/ Electrolytes:   BL creat appears to be ~ 1.0  - Strict I/O, daily weights  - Lasix 40 q12h, goal net negative 1L  - Avoid/limit nephrotoxins as able  - Replete lytes PRN per protocol  - Scheduled potassium replacement     Endocrine:    # Stress induced hyperglycemia, improving  - HDSSI  - Lantus 30  - Goal BG <180 for optimal healing     ID/ Antibiotics:  # Stress induced leukocytosis, resolved  # Possible ventilator associated pneumonia  Febrile and pan-cultured on 1/30/22. Covid pcr, respiratory panel, and respiratory cultures negative 1/30. Re-cultured 1/31: respiratory and blood cultures NGTD. Re-cultured again 2/4 when WBC increased to 23.  - Follow respiratory panel and BC results 2/4  - Continue to monitor fever curve, WBC and inflammatory markers  - Meropenem x7 days - completed 2/7  - Consult ID 2/4: send C dif, blood cultures, repeat COVID  - CT C/A/P and sinuses   - Remove A line and central line  - Augmentin  PO if spiking fever for possible sinusitis     Heme/Onc:     # Stress induced leukocytosis  # Acute blood loss anemia  # Acute blood loss thrombocytopenia, resolved  # History of testicular cancer s/p orchiectomy  No s/sx active bleeding  - Continue to monitor  - CBC daily  - Heparin resistant  - 4T is 4 - HIT panel, transition to Bival, b/l LE duplex US     MSK/ Skin:  # Sternotomy  # Surgical Incision  - Sternal precautions  - Postoperative incision management per protocol  - PT/OT/CR     Prophylaxis:    - Mechanical prophylaxis for DVT  - Chemical DVT prophylaxis- bival  - PPI     Lines/ tubes/ drains:  - Left PIV (1/28)  - Godfrey (2/10)  - OG (1/28)  - Nasoduodenal (1/31)  - ETT (1/28)     Disposition:  - CVICU    Patient seen, findings and plan discussed with CV ICU staff, Dr. Rosado and CVTS staff Dr. Velasco.    Tom Adair MD  Anesthesiology PGY3    ====================================    SUBJECTIVE:   NAEO. Stable O2 requirements overnight. Godfrey replace    OBJECTIVE:   1. VITAL SIGNS:   Temp:  [99.4  F (37.4  C)-100.3  F (37.9  C)] 100.3  F (37.9  C)  Pulse:  [] 97  Resp:  [12-25] 12  BP: (112-165)/() 112/50  MAP:  [95 mmHg] 95 mmHg  Arterial Line BP: (134)/(73) 134/73  FiO2 (%):  [55 %-65 %] 65 %  SpO2:  [90 %-100 %] 97 %  Ventilation Mode: CMV/AC  (Continuous Mandatory Ventilation/ Assist Control)  FiO2 (%): 65 %  Rate Set (breaths/minute): 12 breaths/min  Tidal Volume Set (mL): 1000 mL  PEEP (cm H2O): 10 cmH2O  Pressure Support (cm H2O): 10 cmH2O  Oxygen Concentration (%): 65 %  Resp: 12      2. INTAKE/ OUTPUT:   I/O last 3 completed shifts:  In: 3976.34 [I.V.:2156.34; NG/GT:610]  Out: 4525 [Urine:4025; Emesis/NG output:400; Stool:100]    3. PHYSICAL EXAMINATION:   General: Sedated, intubated  Neuro: Sedated, arouses appropriately when weaned  Resp: Intubated, mechanically ventilated, CTA  CV: RRR  Abdomen: Soft, Non-distended  Incisions: C/D/I   Extremities: warm and well  perfused, SCDs in place    4. INVESTIGATIONS:   Arterial Blood Gases   Recent Labs   Lab 02/10/22  0436 02/09/22  2242 02/09/22  1239 02/09/22  0357   PH 7.43 7.41 7.37 7.47*   PCO2 44 48* 49* 41   PO2 103 55* 84 80   HCO3 29* 30* 28 30*     Complete Blood Count   Recent Labs   Lab 02/10/22  0548 02/09/22  0333 02/08/22  0415 02/07/22  0334   WBC 16.7* 18.6* 17.8* 18.8*   HGB 9.2* 9.5* 9.3* 9.4*   * 220 246 320     Basic Metabolic Panel  Recent Labs   Lab 02/10/22  0548 02/10/22  0323 02/10/22  0019 02/09/22  2100 02/09/22  1752 02/09/22  0431 02/09/22  0333 02/08/22  1509 02/08/22  1353     --   --   --  139  --  141  141  --  142   POTASSIUM 3.8  --   --   --  4.7  --  3.9  3.9  --  5.0   CHLORIDE 104  --   --   --  106  --  106  106  --  108   CO2 27  --   --   --  28  --  27  27  --  30   BUN 66*  --   --   --  50*  --  52*  52*  --  51*   CR 1.02  --   --   --  0.78  --  0.75  0.75  --  0.85   * 136* 114* 142* 151*   < > 123*  123*   < > 152*    < > = values in this interval not displayed.     Liver Function Tests  Recent Labs   Lab 02/10/22  0548 02/09/22  0333 02/08/22  0415 02/07/22  0334   AST 21 32 46* 58*   ALT 89* 122* 141* 128*   ALKPHOS 75 79 80 80   BILITOTAL 0.5 0.4 0.4 0.5   ALBUMIN 2.6* 2.7* 2.5* 2.6*     Pancreatic Enzymes  No lab results found in last 7 days.  Coagulation Profile  No lab results found in last 7 days.

## 2022-02-10 NOTE — PROGRESS NOTES
Care Management Follow Up    Length of Stay (days): 13    Expected Discharge Date:       Concerns to be Addressed: Insurance Information       Additional Information:  RNCC received message from patients insurance [employer] with the following information    Intake assisting with discharge needs: [phone]  RNMarilyn CAGLE: [phone]  Benefit checks: [phone]      Sanya Adan, RN    Sanya Adan RN, BSN  5B RN Care Coordinator  [phone] phone  [phone] pager    Weekend or Holiday Care Coordinator [phone] pager  Job Code [id number]

## 2022-02-10 NOTE — PLAN OF CARE
ICU End of Shift Summary. See flowsheets for vital signs and detailed assessment.    Changes this shift: RASS -4.  Sedated with Propofol and Fentanyl. Hypertensive through shift. PRN labetolol and hydralazine given, not effective.  Called the CVTS team and they wished to increase propofol to achieve systolic BP <130.  Arterial line and central line pulled tonight.  Tmax 100.4, PRN tylenol given. Exchanged durant for UA/UC.     Plan: Continue plan of care.     Problem: Adult Inpatient Plan of Care  Goal: Absence of Hospital-Acquired Illness or Injury  Outcome: No Change  Intervention: Identify and Manage Fall Risk  Recent Flowsheet Documentation  Taken 2/10/2022 0000 by Juliane Cox RN  Safety Promotion/Fall Prevention:    room near nurse's station    room door open  Taken 2/9/2022 2000 by Juliane Cox RN  Safety Promotion/Fall Prevention:    room near nurse's station    room door open  Intervention: Prevent Skin Injury  Recent Flowsheet Documentation  Taken 2/10/2022 0200 by Juliane Cox RN  Body Position:    left    turned  Taken 2/10/2022 0000 by Juliane Cox RN  Body Position:    right    turned  Taken 2/9/2022 2200 by Juliane Cox RN  Body Position:    left    turned  Taken 2/9/2022 2000 by Juliane Cox RN  Body Position:    right    turned  Intervention: Prevent and Manage VTE (Venous Thromboembolism) Risk  Recent Flowsheet Documentation  Taken 2/10/2022 0000 by Juliane Cox RN  VTE Prevention/Management: anticoagulant therapy maintained  Taken 2/9/2022 2000 by Juliane Cox RN  VTE Prevention/Management: anticoagulant therapy maintained  Intervention: Prevent Infection  Recent Flowsheet Documentation  Taken 2/10/2022 0000 by Juliane Cox RN  Infection Prevention:    environmental surveillance performed    equipment surfaces disinfected  Taken 2/9/2022 2000 by Juliane Cox RN  Infection Prevention:    environmental surveillance performed    equipment surfaces  disinfected

## 2022-02-10 NOTE — PROGRESS NOTES
02/10/22 1053   Quick Adds   Type of Visit Initial Occupational Therapy Evaluation  (Cardiac Rehab)   Living Environment   People in home significant other   Current Living Arrangements house   Home Accessibility stairs to enter home;stairs within home   Number of Stairs, Main Entrance 1   Number of Stairs, Within Home, Primary greater than 10 stairs   Stair Railings, Within Home, Primary railings safe and in good condition   Transportation Anticipated car, drives self;family or friend will provide   Living Environment Comments Pt intubated/sedated. PLOF and home environment gathered from pt's girlfriend. Pt lives with his girlfriend Debbie in a house w/ 1-2 steps to enter and a flight of stairs to the basement where laundry is located. Pt does not need to access basement, girlfriend does laundry.    Self-Care   Usual Activity Tolerance good   Current Activity Tolerance poor   Equipment Currently Used at Home none   Activity/Exercise/Self-Care Comment Pt was IND w/ ADLs at baseline.    Instrumental Activities of Daily Living (IADL)   Previous Responsibilities meal prep;housekeeping;shopping;yardwork;medication management;finances;driving;work   IADL Comments Pt is IND w/ IADLs performance. Pt and girlfriend share IADL responsibilities. Pt is primarily responsible for yardwork. Pt works as a  (desk job).   Disability/Function   Hearing Difficulty or Deaf no   Wear Glasses or Blind no   Concentrating, Remembering or Making Decisions Difficulty no   Difficulty Communicating no   Difficulty Eating/Swallowing no   Walking or Climbing Stairs Difficulty no   Dressing/Bathing Difficulty no   Toileting issues no   Doing Errands Independently Difficulty (such as shopping) no   Fall history within last six months no   Change in Functional Status Since Onset of Current Illness/Injury yes  (Impaired ADL/IADL performance)   General Information   Onset of Illness/Injury or Date of Surgery 01/28/22   Referring  "Physician Curly Stark, NP   Patient/Family Therapy Goal Statement (OT) Unable to state. Pt's girlfriend hoping pt will not need tracheostomy.   Additional Occupational Profile Info/Pertinent History of Current Problem Per chart: \"Chuy Varner is a 31 year old male with a PMHx s/f bicuspid aortic valve, thoracic aortic aneurysm without rupture, hypertension, obesity, and testicular cancer s/p orchiectomy who underwent Bentall procedure with mechanical valve on 1/28 with Dr. Velasco and Dr. Hannah. Course complicated by postop hypoxemic respiratory failure.\"   Left Upper Extremity (Weight-bearing Status) partial weight-bearing (PWB)  (10 lb limit per sternal precautions)   Right Upper Extremity (Weight-bearing Status) partial weight-bearing (PWB)  (10 lb limit per sternal precautions)   Left Lower Extremity (Weight-bearing Status) full weight-bearing (FWB)   Right Lower Extremity (Weight-bearing Status) full weight-bearing (FWB)   General Observations and Info Activity: Ambulate w/ assist   Cognitive Status Examination   Cognitive Status Comments Pt intubated/sedated, unable to follow commands   Visual Perception   Impact of Vision Impairment on Function (Vision) Will further assess.   Sensory   Sensory Comments Will further assess.   Pain Assessment   Patient Currently in Pain No   Integumentary/Edema   Integumentary/Edema Comments Pt w/ 1-2+ pitting edema in feet. Will monitor.   Range of Motion Comprehensive   General Range of Motion no range of motion deficits identified   Comment, General Range of Motion No ROM deficits in BUE/BLE during PROM   Strength Comprehensive (MMT)   Comment, General Manual Muscle Testing (MMT) Assessment Will further assess   Bed Mobility   Comment (Bed Mobility) Total assist   Transfers   Transfer Comments Total assist   Bathing Assessment   [place] Level (Bathing) dependent (less than 25% patient effort)   Upper Body Dressing Assessment   [place] Level (Upper Body " Dressing) dependent (less than 25% patient effort)   Lower Body Dressing Assessment   [place] Level (Lower Body Dressing) dependent (less than 25% patient effort)   Grooming Assessment   [place] Level (Grooming) dependent (less than 25% patient effort)   Eating/Self Feeding   [place] Level (Feeding) dependent (less than 25% patient effort)   Toileting   [place] Level (Toileting) dependent (less than 25% patient effort)   Clinical Impression   Criteria for Skilled Therapeutic Interventions Met (OT) yes;meets criteria;skilled treatment is necessary   OT Diagnosis Impaired ADL/IADL performance   OT Problem List-Impairments impacting ADL problems related to;activity tolerance impaired;strength;post-surgical precautions   Assessment of Occupational Performance 3-5 Performance Deficits   Identified Performance Deficits dressing, bathing, functional mobility, work   Planned Therapy Interventions (OT) ADL retraining;IADL retraining;balance training;cognition;fine motor coordination training;stretching;strengthening;transfer training;home program guidelines;progressive activity/exercise;risk factor education   Intervention Comments education in sternal precautions during ADL performance   Clinical Decision Making Complexity (OT) moderate complexity   Therapy Frequency (OT) 3x/week  (Will increase frequency when pt more alert)   Predicted Duration of Therapy x2 weeks   Anticipated Equipment Needs Upon Discharge (OT)   (TBD)   Risk & Benefits of therapy have been explained evaluation/treatment results reviewed;care plan/treatment goals reviewed;risks/benefits reviewed;current/potential barriers reviewed;participants voiced agreement with care plan;participants included;spouse/significant other   Comment-Clinical Impression Pt presents today well below baseline for ADL performance, currently intubated/sedated, medical team focused on reducing need for trach. OT/CR will follow at low frequency to prevent ROM  loss, will increase frequency when pt alert and following commands.   OT Discharge Planning    OT Discharge Recommendation (DC Rec) Transitional Care Facility   OT Rationale for DC Rec Pt intubated/sedated, not medically appropriate for discharge at this time.   OT Brief overview of current status  OT lift, Ax2-3   Total Evaluation Time (Minutes)   Total Evaluation Time (Minutes) 5

## 2022-02-10 NOTE — PLAN OF CARE
ICU End of Shift Summary. See flowsheets for vital signs and detailed assessment.    Changes this shift: Increased coreg dose and started PO hydralazine for SBP goal <130 after PRN labetalol and hydralazine this AM. Able to wean down propofol after blood pressure control achieved, but remains RASS -4 and not following commands. Tmax 101.6 - tylenol given and CVTS aware; BC x2 sent. Pending sputum collection. PST 8/10 for 4 hours and 5/8 since for 3 hours. TV decreased to 680mL. OGT to gravity with minimal output. Lasix 40mg x2. Heparin transitioned to angiomax d/t persistent subtherapeutic Xa levels - HIT panel ordered. Wound vac to sternal incision.     Plan: Pending sputum collection. Christina recheck at 2000.     Problem: Respiratory Compromise (Cardiovascular Surgery)  Goal: Effective Oxygenation and Ventilation  Outcome: No Change

## 2022-02-11 ENCOUNTER — APPOINTMENT (OUTPATIENT)
Dept: GENERAL RADIOLOGY | Facility: CLINIC | Age: 32
End: 2022-02-11
Attending: SURGERY
Payer: COMMERCIAL

## 2022-02-11 ENCOUNTER — APPOINTMENT (OUTPATIENT)
Dept: GENERAL RADIOLOGY | Facility: CLINIC | Age: 32
End: 2022-02-11
Attending: STUDENT IN AN ORGANIZED HEALTH CARE EDUCATION/TRAINING PROGRAM
Payer: COMMERCIAL

## 2022-02-11 ENCOUNTER — APPOINTMENT (OUTPATIENT)
Dept: OCCUPATIONAL THERAPY | Facility: CLINIC | Age: 32
End: 2022-02-11
Attending: SURGERY
Payer: COMMERCIAL

## 2022-02-11 LAB
ALBUMIN SERPL-MCNC: 2.8 G/DL (ref 3.4–5)
ALP SERPL-CCNC: 79 U/L (ref 40–150)
ALT SERPL W P-5'-P-CCNC: 74 U/L (ref 0–70)
ANION GAP SERPL CALCULATED.3IONS-SCNC: 7 MMOL/L (ref 3–14)
APTT PPP: 74 SECONDS (ref 22–38)
AST SERPL W P-5'-P-CCNC: 20 U/L (ref 0–45)
BASE EXCESS BLDV CALC-SCNC: 3.9 MMOL/L (ref -7.7–1.9)
BILIRUB SERPL-MCNC: 0.4 MG/DL (ref 0.2–1.3)
BKR LAB AP TR RXN INTERPRETATION: NORMAL
BKR LAB AP TRAN RXN RECOMMENDATION: NORMAL
BKR LAB AP TRANS SIGNS AND SX: NORMAL
BKR LAB AP TRANSFUSION REACTION: NORMAL
BUN SERPL-MCNC: 69 MG/DL (ref 7–30)
CALCIUM SERPL-MCNC: 9 MG/DL (ref 8.5–10.1)
CHLORIDE BLD-SCNC: 106 MMOL/L (ref 94–109)
CO2 SERPL-SCNC: 28 MMOL/L (ref 20–32)
CREAT SERPL-MCNC: 0.84 MG/DL (ref 0.66–1.25)
ERYTHROCYTE [DISTWIDTH] IN BLOOD BY AUTOMATED COUNT: 14.8 % (ref 10–15)
GFR SERPL CREATININE-BSD FRML MDRD: >90 ML/MIN/1.73M2
GLUCOSE BLD-MCNC: 168 MG/DL (ref 70–99)
GLUCOSE BLDC GLUCOMTR-MCNC: 159 MG/DL (ref 70–99)
GLUCOSE BLDC GLUCOMTR-MCNC: 160 MG/DL (ref 70–99)
GLUCOSE BLDC GLUCOMTR-MCNC: 165 MG/DL (ref 70–99)
GLUCOSE BLDC GLUCOMTR-MCNC: 182 MG/DL (ref 70–99)
GLUCOSE BLDC GLUCOMTR-MCNC: 190 MG/DL (ref 70–99)
HCO3 BLDV-SCNC: 29 MMOL/L (ref 21–28)
HCT VFR BLD AUTO: 32.2 % (ref 40–53)
HGB BLD-MCNC: 9.8 G/DL (ref 13.3–17.7)
HOLD SPECIMEN HIT: NORMAL
MAGNESIUM SERPL-MCNC: 2.8 MG/DL (ref 1.8–2.6)
MAGNESIUM SERPL-MCNC: 2.9 MG/DL (ref 1.8–2.6)
MCH RBC QN AUTO: 30.2 PG (ref 26.5–33)
MCHC RBC AUTO-ENTMCNC: 30.4 G/DL (ref 31.5–36.5)
MCV RBC AUTO: 99 FL (ref 78–100)
O2/TOTAL GAS SETTING VFR VENT: 45 %
OXYHGB MFR BLDV: 93 % (ref 70–75)
PATH REPORT.COMMENTS IMP SPEC: NORMAL
PATH REPORT.COMMENTS IMP SPEC: NORMAL
PCO2 BLDV: 42 MM HG (ref 40–50)
PF4 HEPARIN CMPLX AB SER QL: POSITIVE
PH BLDV: 7.44 [PH] (ref 7.32–7.43)
PLATELET # BLD AUTO: 166 10E3/UL (ref 150–450)
PO2 BLDV: 70 MM HG (ref 25–47)
POTASSIUM BLD-SCNC: 3.7 MMOL/L (ref 3.4–5.3)
PROT SERPL-MCNC: 7.7 G/DL (ref 6.8–8.8)
RBC # BLD AUTO: 3.25 10E6/UL (ref 4.4–5.9)
SODIUM SERPL-SCNC: 141 MMOL/L (ref 133–144)
WBC # BLD AUTO: 19.7 10E3/UL (ref 4–11)

## 2022-02-11 PROCEDURE — 71045 X-RAY EXAM CHEST 1 VIEW: CPT

## 2022-02-11 PROCEDURE — 94668 MNPJ CHEST WALL SBSQ: CPT

## 2022-02-11 PROCEDURE — 74018 RADEX ABDOMEN 1 VIEW: CPT

## 2022-02-11 PROCEDURE — 250N000013 HC RX MED GY IP 250 OP 250 PS 637: Performed by: SURGERY

## 2022-02-11 PROCEDURE — 200N000002 HC R&B ICU UMMC

## 2022-02-11 PROCEDURE — 250N000011 HC RX IP 250 OP 636: Performed by: STUDENT IN AN ORGANIZED HEALTH CARE EDUCATION/TRAINING PROGRAM

## 2022-02-11 PROCEDURE — 250N000013 HC RX MED GY IP 250 OP 250 PS 637: Performed by: STUDENT IN AN ORGANIZED HEALTH CARE EDUCATION/TRAINING PROGRAM

## 2022-02-11 PROCEDURE — 97530 THERAPEUTIC ACTIVITIES: CPT | Mod: GO | Performed by: OCCUPATIONAL THERAPIST

## 2022-02-11 PROCEDURE — 71045 X-RAY EXAM CHEST 1 VIEW: CPT | Mod: 26 | Performed by: RADIOLOGY

## 2022-02-11 PROCEDURE — 99291 CRITICAL CARE FIRST HOUR: CPT | Mod: 24 | Performed by: INTERNAL MEDICINE

## 2022-02-11 PROCEDURE — 83735 ASSAY OF MAGNESIUM: CPT | Performed by: STUDENT IN AN ORGANIZED HEALTH CARE EDUCATION/TRAINING PROGRAM

## 2022-02-11 PROCEDURE — 74018 RADEX ABDOMEN 1 VIEW: CPT | Mod: 26 | Performed by: RADIOLOGY

## 2022-02-11 PROCEDURE — 82805 BLOOD GASES W/O2 SATURATION: CPT | Performed by: STUDENT IN AN ORGANIZED HEALTH CARE EDUCATION/TRAINING PROGRAM

## 2022-02-11 PROCEDURE — 94003 VENT MGMT INPAT SUBQ DAY: CPT

## 2022-02-11 PROCEDURE — 94681 O2 UPTK CO2 OUTP % O2 XTRC: CPT

## 2022-02-11 PROCEDURE — 999N000065 XR ABDOMEN PORT 1 VIEWS

## 2022-02-11 PROCEDURE — 85027 COMPLETE CBC AUTOMATED: CPT | Performed by: STUDENT IN AN ORGANIZED HEALTH CARE EDUCATION/TRAINING PROGRAM

## 2022-02-11 PROCEDURE — 36415 COLL VENOUS BLD VENIPUNCTURE: CPT | Performed by: STUDENT IN AN ORGANIZED HEALTH CARE EDUCATION/TRAINING PROGRAM

## 2022-02-11 PROCEDURE — 85730 THROMBOPLASTIN TIME PARTIAL: CPT | Performed by: SURGERY

## 2022-02-11 PROCEDURE — 250N000011 HC RX IP 250 OP 636: Performed by: SURGERY

## 2022-02-11 PROCEDURE — 258N000003 HC RX IP 258 OP 636: Performed by: SURGERY

## 2022-02-11 PROCEDURE — 80053 COMPREHEN METABOLIC PANEL: CPT | Performed by: STUDENT IN AN ORGANIZED HEALTH CARE EDUCATION/TRAINING PROGRAM

## 2022-02-11 PROCEDURE — 999N000157 HC STATISTIC RCP TIME EA 10 MIN

## 2022-02-11 PROCEDURE — G0463 HOSPITAL OUTPT CLINIC VISIT: HCPCS

## 2022-02-11 RX ORDER — POTASSIUM CHLORIDE 1.5 G/1.58G
20 POWDER, FOR SOLUTION ORAL ONCE
Status: COMPLETED | OUTPATIENT
Start: 2022-02-11 | End: 2022-02-11

## 2022-02-11 RX ORDER — LOSARTAN POTASSIUM 25 MG/1
25 TABLET ORAL DAILY
Status: CANCELLED | OUTPATIENT
Start: 2022-02-11

## 2022-02-11 RX ORDER — ACETAMINOPHEN 325 MG/1
975 TABLET ORAL 3 TIMES DAILY
Status: DISCONTINUED | OUTPATIENT
Start: 2022-02-11 | End: 2022-02-15

## 2022-02-11 RX ORDER — FENTANYL CITRATE 50 UG/ML
INJECTION, SOLUTION INTRAMUSCULAR; INTRAVENOUS
Status: DISCONTINUED
Start: 2022-02-11 | End: 2022-02-11 | Stop reason: WASHOUT

## 2022-02-11 RX ORDER — CARVEDILOL 25 MG/1
25 TABLET ORAL 2 TIMES DAILY
Status: DISCONTINUED | OUTPATIENT
Start: 2022-02-11 | End: 2022-02-12

## 2022-02-11 RX ORDER — CARVEDILOL 6.25 MG/1
12.5 TABLET ORAL ONCE
Status: COMPLETED | OUTPATIENT
Start: 2022-02-11 | End: 2022-02-11

## 2022-02-11 RX ORDER — FUROSEMIDE 10 MG/ML
40 INJECTION INTRAMUSCULAR; INTRAVENOUS
Status: COMPLETED | OUTPATIENT
Start: 2022-02-11 | End: 2022-02-11

## 2022-02-11 RX ORDER — HYDRALAZINE HYDROCHLORIDE 50 MG/1
50 TABLET, FILM COATED ORAL EVERY 6 HOURS SCHEDULED
Status: DISCONTINUED | OUTPATIENT
Start: 2022-02-11 | End: 2022-02-11

## 2022-02-11 RX ORDER — HYDRALAZINE HYDROCHLORIDE 25 MG/1
25 TABLET, FILM COATED ORAL ONCE
Status: COMPLETED | OUTPATIENT
Start: 2022-02-11 | End: 2022-02-11

## 2022-02-11 RX ADMIN — LABETALOL HYDROCHLORIDE 20 MG: 5 INJECTION, SOLUTION INTRAVENOUS at 16:10

## 2022-02-11 RX ADMIN — Medication 15 ML: at 07:55

## 2022-02-11 RX ADMIN — CARVEDILOL 12.5 MG: 6.25 TABLET, FILM COATED ORAL at 10:42

## 2022-02-11 RX ADMIN — BIVALIRUDIN 0.15 MG/KG/HR: 250 INJECTION, POWDER, LYOPHILIZED, FOR SOLUTION INTRAVENOUS at 07:56

## 2022-02-11 RX ADMIN — INSULIN ASPART 3 UNITS: 100 INJECTION, SOLUTION INTRAVENOUS; SUBCUTANEOUS at 12:39

## 2022-02-11 RX ADMIN — Medication 2 PACKET: at 15:49

## 2022-02-11 RX ADMIN — PROPOFOL 20 MCG/KG/MIN: 10 INJECTION, EMULSION INTRAVENOUS at 02:03

## 2022-02-11 RX ADMIN — CARVEDILOL 25 MG: 25 TABLET, FILM COATED ORAL at 20:57

## 2022-02-11 RX ADMIN — ASPIRIN 81 MG CHEWABLE TABLET 81 MG: 81 TABLET CHEWABLE at 07:55

## 2022-02-11 RX ADMIN — HYDRALAZINE HYDROCHLORIDE 75 MG: 50 TABLET, FILM COATED ORAL at 21:02

## 2022-02-11 RX ADMIN — PROPOFOL 40 MCG/KG/MIN: 10 INJECTION, EMULSION INTRAVENOUS at 17:09

## 2022-02-11 RX ADMIN — LABETALOL HYDROCHLORIDE 20 MG: 5 INJECTION, SOLUTION INTRAVENOUS at 02:03

## 2022-02-11 RX ADMIN — HYDRALAZINE HYDROCHLORIDE 20 MG: 20 INJECTION INTRAMUSCULAR; INTRAVENOUS at 08:07

## 2022-02-11 RX ADMIN — PROPOFOL 40 MCG/KG/MIN: 10 INJECTION, EMULSION INTRAVENOUS at 10:16

## 2022-02-11 RX ADMIN — ACETAMINOPHEN 650 MG: 325 TABLET, FILM COATED ORAL at 12:23

## 2022-02-11 RX ADMIN — Medication 2 PACKET: at 20:59

## 2022-02-11 RX ADMIN — LABETALOL HYDROCHLORIDE 20 MG: 5 INJECTION, SOLUTION INTRAVENOUS at 06:36

## 2022-02-11 RX ADMIN — FENTANYL CITRATE 100 MCG/HR: 50 INJECTION INTRAVENOUS at 14:40

## 2022-02-11 RX ADMIN — ACETAMINOPHEN 975 MG: 325 TABLET, FILM COATED ORAL at 20:57

## 2022-02-11 RX ADMIN — HYDRALAZINE HYDROCHLORIDE 50 MG: 50 TABLET, FILM COATED ORAL at 12:23

## 2022-02-11 RX ADMIN — INSULIN ASPART 2 UNITS: 100 INJECTION, SOLUTION INTRAVENOUS; SUBCUTANEOUS at 21:04

## 2022-02-11 RX ADMIN — ACETAMINOPHEN 650 MG: 325 TABLET, FILM COATED ORAL at 07:58

## 2022-02-11 RX ADMIN — HYDRALAZINE HYDROCHLORIDE 20 MG: 20 INJECTION INTRAMUSCULAR; INTRAVENOUS at 07:37

## 2022-02-11 RX ADMIN — BIVALIRUDIN 0.15 MG/KG/HR: 250 INJECTION, POWDER, LYOPHILIZED, FOR SOLUTION INTRAVENOUS at 18:37

## 2022-02-11 RX ADMIN — Medication 40 MG: at 07:55

## 2022-02-11 RX ADMIN — PROPOFOL 20 MCG/KG/MIN: 10 INJECTION, EMULSION INTRAVENOUS at 06:36

## 2022-02-11 RX ADMIN — HYDRALAZINE HYDROCHLORIDE 20 MG: 20 INJECTION INTRAMUSCULAR; INTRAVENOUS at 14:35

## 2022-02-11 RX ADMIN — HYDRALAZINE HYDROCHLORIDE 25 MG: 25 TABLET ORAL at 10:42

## 2022-02-11 RX ADMIN — HYDRALAZINE HYDROCHLORIDE 20 MG: 20 INJECTION INTRAMUSCULAR; INTRAVENOUS at 08:49

## 2022-02-11 RX ADMIN — INSULIN ASPART 2 UNITS: 100 INJECTION, SOLUTION INTRAVENOUS; SUBCUTANEOUS at 15:57

## 2022-02-11 RX ADMIN — AMOXICILLIN AND CLAVULANATE POTASSIUM 1 TABLET: 875; 125 TABLET, FILM COATED ORAL at 20:58

## 2022-02-11 RX ADMIN — PROPOFOL 40 MCG/KG/MIN: 10 INJECTION, EMULSION INTRAVENOUS at 14:41

## 2022-02-11 RX ADMIN — PROPOFOL 40 MCG/KG/MIN: 10 INJECTION, EMULSION INTRAVENOUS at 13:02

## 2022-02-11 RX ADMIN — Medication 2 PACKET: at 08:00

## 2022-02-11 RX ADMIN — INSULIN ASPART 1 UNITS: 100 INJECTION, SOLUTION INTRAVENOUS; SUBCUTANEOUS at 08:00

## 2022-02-11 RX ADMIN — CARVEDILOL 12.5 MG: 6.25 TABLET, FILM COATED ORAL at 07:55

## 2022-02-11 RX ADMIN — PROPOFOL 20 MCG/KG/MIN: 10 INJECTION, EMULSION INTRAVENOUS at 20:38

## 2022-02-11 RX ADMIN — FUROSEMIDE 40 MG: 10 INJECTION, SOLUTION INTRAVENOUS at 10:46

## 2022-02-11 RX ADMIN — AMOXICILLIN AND CLAVULANATE POTASSIUM 1 TABLET: 875; 125 TABLET, FILM COATED ORAL at 08:00

## 2022-02-11 RX ADMIN — POTASSIUM CHLORIDE 20 MEQ: 1.5 POWDER, FOR SOLUTION ORAL at 06:10

## 2022-02-11 RX ADMIN — Medication 2 PACKET: at 12:24

## 2022-02-11 RX ADMIN — INSULIN ASPART 1 UNITS: 100 INJECTION, SOLUTION INTRAVENOUS; SUBCUTANEOUS at 04:33

## 2022-02-11 RX ADMIN — LABETALOL HYDROCHLORIDE 20 MG: 5 INJECTION, SOLUTION INTRAVENOUS at 12:42

## 2022-02-11 RX ADMIN — HYDRALAZINE HYDROCHLORIDE 25 MG: 25 TABLET ORAL at 06:10

## 2022-02-11 RX ADMIN — ACETAMINOPHEN 975 MG: 325 TABLET, FILM COATED ORAL at 15:54

## 2022-02-11 RX ADMIN — FUROSEMIDE 40 MG: 10 INJECTION, SOLUTION INTRAVENOUS at 15:48

## 2022-02-11 ASSESSMENT — ACTIVITIES OF DAILY LIVING (ADL)
ADLS_ACUITY_SCORE: 21
ADLS_ACUITY_SCORE: 19
ADLS_ACUITY_SCORE: 21
ADLS_ACUITY_SCORE: 19
ADLS_ACUITY_SCORE: 19
ADLS_ACUITY_SCORE: 21
ADLS_ACUITY_SCORE: 19
ADLS_ACUITY_SCORE: 21
ADLS_ACUITY_SCORE: 19
ADLS_ACUITY_SCORE: 21
ADLS_ACUITY_SCORE: 19
ADLS_ACUITY_SCORE: 21
ADLS_ACUITY_SCORE: 19

## 2022-02-11 ASSESSMENT — MIFFLIN-ST. JEOR: SCORE: 2640.01

## 2022-02-11 NOTE — PROGRESS NOTES
CV ICU PROGRESS NOTE  02/11/2022      CO-MORBIDITIES:   Aortic root aneurysm (H)    ASSESSMENT: Chuy Varner is a 31 year old male with a PMHx s/f bicuspid aortic valve, thoracic aortic aneurysm without rupture, hypertension, obesity, and testicular cancer s/p orchiectomy who underwent Bentall procedure with mechanical valve on 1/28 with Dr. Velasco and Dr. Hannah. Course complicated by postop hypoxemic respiratory failure.    TODAY'S PROGRESS:   - Wean FiO2 and PEEP as able  - 40 Lasix BID  - RAAS 0 -1  - Increase Coreg 25 BID  - Up to chair  - PS  - PO hydralazine 50 q6h --> can increase to 75 this evening if not meeting goals    PLAN:  Neuro/ pain/ sedation:  # Acute Postoperative pain  - Monitor neurological status. Notify the MD for any acute changes in exam.  - Pain: fentanyl gtt. Scheduled tylenol. PRN tylenol, oxycodone, robaxin  - Sedation: propofol gtt  - RAAS -0 to -1     Pulmonary care:   # Postoperative ventilation management  # Acute hypoxemic respiratory failure ARDS vs. PNA vs. Hypervolemia vs. TRALI  COVID-19.Mycoplasma, Parainfluenza and Influenza Negative. CTA negative for PE on 1/30.  WBC 23.0 2/4 (14.9 the day before). CXR, respiratory panel, and BCs ordered.  CXR not consistent with ARDS. Likely element of severe V/Q mismatch and atelectasis.  Sudden drop in sats overnight 2/8 requiring FiO2 80%  - Follow BCs and respiratory panel (2/4)  - Pulmonology saw 2/2, appreciate recs   > prone (14 to 18 hrs per day)   > PEEP 14   > may need to increase PEEP slightly when supine   > as he wakes up more, may benefit from volumes closer to 600 ml for comfort and synchrony (8 ml/kg of his IBW), for now we set it to 530   > pulmonary toilet ie QID duonebs and percussive therapy ie metanebs if possible (may be difficult with recent chest surgery)   > agree with borad antibiotics to treat pneumonia per the CV ICU team  - Titrate supplemental oxygen to maintain saturation above 90%  - Up to chair  today    Cardiovascular:    # Ascending aortic aneurysm s/p Bentall w/ [place] valve on 1/28 with Dr. Velasco and Dr. Hannah  # Bicuspid aortic valve  # History of thoracic aortic aneurysm without rupture  # History of hypertension  Recent echo on 10/26/21 with LVEF of 60-65%.  CT vascular 12/13/21 showing proximal ascending aorta is severely dilated (55 x 55mm in maximum dimension) and likely bicuspid aortic valve.  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  CT PE 1/30 - negative for acute PE, marked dependent atelectasis R>L.  - Monitor hemodynamic status  - Goal MAP>65, SBP <130  - Statin hold  - ASA 81  - Holding PTA meds: metoprolol succinate 25mg daily  - Increase Coreg 25 BID  - PO Hydralazine 50 q6h  - Labetalol and hydralazine PRN to maintain SBP <130     GI care/ Nutrition:   # Obesity (BMI 46)  - [place], advance to goal  - PPI  - Continue bowel regimen: miralax, senna    Renal/ Fluid Balance/ Electrolytes:   BL creat appears to be ~ 1.0  - Strict I/O, daily weights  - Lasix 40 q12h, goal net negative 1L  - Avoid/limit nephrotoxins as able  - Replete lytes PRN per protocol  - Scheduled potassium replacement     Endocrine:    # Stress induced hyperglycemia, improving  - HDSSI  - Lantus 30  - Goal BG <180 for optimal healing     ID/ Antibiotics:  # Stress induced leukocytosis, resolved  # Possible ventilator associated pneumonia  Febrile and pan-cultured on 1/30/22. Covid pcr, respiratory panel, and respiratory cultures negative 1/30. Re-cultured 1/31: respiratory and blood cultures NGTD. Re-cultured again 2/4 when WBC increased to 23.  - Follow respiratory panel and BC results 2/4  - Continue to monitor fever curve, WBC and inflammatory markers  - Meropenem x7 days - completed 2/7  - Consult ID 2/4: send C dif, blood cultures, repeat COVID  - CT C/A/P and  sinuses   - Remove A line and central line  - Augmentin PO if spiking fever for possible sinusitis     Heme/Onc:     # Stress induced leukocytosis  # Acute blood loss anemia  # Acute blood loss thrombocytopenia, resolved  # History of testicular cancer s/p orchiectomy  # RLE DVT  No s/sx active bleeding  - Continue to monitor  - CBC daily  - Heparin resistant  - 4T is 4 - HIT panel, transition to Bival, b/l LE duplex US with RLE occlusive thrombus at gastrocnemius. Had brief episode of hypoxia today, can consider CT PE if acute change in oxygenation.     MSK/ Skin:  # Sternotomy  # Surgical Incision  - Sternal precautions  - Postoperative incision management per protocol  - PT/OT/CR     Prophylaxis:    - Mechanical prophylaxis for DVT  - Chemical DVT prophylaxis- bival  - PPI     Lines/ tubes/ drains:  - Left PIV (1/28)  - Godfrey (2/10)  - OG (1/28)  - Nasoduodenal (1/31)  - ETT (1/28)     Disposition:  - CVICU    Patient seen, findings and plan discussed with CV ICU staff, Dr. Rosado and CVTS staff Dr. Velasco.    Tom Adair MD  Anesthesiology PGY3    ====================================    SUBJECTIVE:   NAEO. Some brief self resolved hypoxia this a.m. when moving to bed this a.m.    OBJECTIVE:   1. VITAL SIGNS:   Temp:  [98.5  F (36.9  C)-101.6  F (38.7  C)] 99.9  F (37.7  C)  Pulse:  [] 122  Resp:  [15-28] 24  BP: (101-155)/(48-85) 128/67  FiO2 (%):  [40 %-100 %] 70 %  SpO2:  [87 %-98 %] 95 %  Ventilation Mode: CMV/AC  (Continuous Mandatory Ventilation/ Assist Control)  FiO2 (%): 70 %  Rate Set (breaths/minute): 12 breaths/min  Tidal Volume Set (mL): 680 mL  PEEP (cm H2O): 10 cmH2O  Pressure Support (cm H2O): 5 cmH2O  Oxygen Concentration (%): 70 %  Resp: 24      2. INTAKE/ OUTPUT:   I/O last 3 completed shifts:  In: 3623.73 [I.V.:1621.73; NG/GT:682]  Out: 5800 [Urine:5175; Emesis/NG output:225; Stool:400]    3. PHYSICAL EXAMINATION:   General: Sedated, intubated  Neuro: Sedated, following  commands when weaned.  Resp: Intubated, CTA bilaterally  CV: RRR  Abdomen: Soft, Non-distended  Incisions: C/D/I   Extremities: warm and well perfused, SCDs in place    4. INVESTIGATIONS:   Arterial Blood Gases   Recent Labs   Lab 02/10/22  0828 02/10/22  0436 02/09/22  2242 02/09/22  1239   PH 7.38 7.43 7.41 7.37   PCO2 50* 44 48* 49*   PO2 86 103 55* 84   HCO3 30* 29* 30* 28     Complete Blood Count   Recent Labs   Lab 02/11/22  0512 02/10/22  0548 02/09/22  0333 02/08/22  0415   WBC 19.7* 16.7* 18.6* 17.8*   HGB 9.8* 9.2* 9.5* 9.3*    138* 220 246     Basic Metabolic Panel  Recent Labs   Lab 02/11/22  0759 02/11/22  0512 02/11/22 0432 02/10/22  2351 02/10/22  2049 02/10/22  0817 02/10/22  0548 02/09/22  2100 02/09/22  1752 02/09/22  0431 02/09/22  0333   NA  --  141  --   --   --   --  138  --  139  --  141  141   POTASSIUM  --  3.7  --   --  3.9  --  3.8  --  4.7  --  3.9  3.9   CHLORIDE  --  106  --   --   --   --  104  --  106  --  106  106   CO2  --  28  --   --   --   --  27  --  28  --  27  27   BUN  --  69*  --   --   --   --  66*  --  50*  --  52*  52*   CR  --  0.84  --   --   --   --  1.02  --  0.78  --  0.75  0.75   * 168* 160* 140*  --    < > 144*   < > 151*   < > 123*  123*    < > = values in this interval not displayed.     Liver Function Tests  Recent Labs   Lab 02/11/22  0512 02/10/22  0548 02/09/22  0333 02/08/22  0415   AST 20 21 32 46*   ALT 74* 89* 122* 141*   ALKPHOS 79 75 79 80   BILITOTAL 0.4 0.5 0.4 0.4   ALBUMIN 2.8* 2.6* 2.7* 2.5*     Pancreatic Enzymes  No lab results found in last 7 days.  Coagulation Profile  Recent Labs   Lab 02/11/22  0512 02/10/22  1354   PTT 74* 57*

## 2022-02-11 NOTE — PLAN OF CARE
ICU End of Shift Summary. See flowsheets for vital signs and detailed assessment.    Changes this shift: No acute changes overnight. No vent changes made. Remains on prop at 20 and fentanyl at 100. Not following commands. Does not track. Rectal tube remains in place. Godfrey with adequate output.     Plan: Wean sedation. Continue to  monitor and report any changes to team.

## 2022-02-11 NOTE — PROGRESS NOTES
[facility] Nurse Inpatient Pressure Injury Assessment   Reason for consultation: Evaluate and treat nasal pressure injury       ASSESSMENT  Pressure Injury: on columbella bilateral nares  , hospital acquired ,   This is a Medical Device Related Pressure Injury (MDRPI) due to Bridle string  Pressure Injury is Stage 2  And mucosal in bilateral nares   Contributing factor of the pressure injury: pressure, immobility and moisture  Status: improving       TREATMENT PLAN  Septum: BID cleanse with saline and apply a thin layer of vaseline over scab. Make sure there is no tension on bridle string.  If needing to prone again, place mepilex lite and optifoam over area   Punch out area in zflow pillow to accommodate medical devices      Orders Written     WOC Nurse follow-up plan:twice weekly  Nursing to notify the Provider(s) and re-consult the WO Nurse if wound(s) deteriorates or new skin concern.    Patient History  According to provider note(s):  Acute Hypoxemic Respiratory Failure due to Shunt Physiology  # Pulmonary Shunting from Bilateral Lower Lobe Atelectasis vs. Pneumonia     Chuy Varner is a 31 year old male with PMH of likely bicuspid aortic valve, thoracic aortic aneurysm without rupture, hypertension, obesity, and testicular cancer s/p orchiectomy who underwent Bentall procedure with mechanical valve on 1/28 with Dr. Velasco ad Dr. Hannah. Patient was admitted to CVICU for post-operative management; post-op course complicated by acute hypoxic respiratory failure despite PEEP titration.     Objective Data  Containment of urine/stool: Indwelling catheter and Internal fecal management    Current Diet/ Nutrition:  Orders Placed This Encounter      NPO for Medical/Clinical Reasons Except for: Other; Specify: NPO until Extubated      Output:   I/O last 3 completed shifts:  In: 3623.73 [I.V.:1621.73; NG/GT:682]  Out: 5800 [Urine:5175; Emesis/NG output:225; Stool:400]    Risk Assessment:   Sensory Perception: 2-->very  limited  Moisture: 3-->occasionally moist  Activity: 2-->chairfast  Mobility: 2-->very limited  Nutrition: 3-->adequate  Friction and Shear: 2-->potential problem  Ben Score: 14      Labs:   Recent Labs   Lab 02/11/22  0512 02/09/22  0333 02/08/22  0415   ALBUMIN 2.8*   < > 2.5*   HGB 9.8*   < > 9.3*   WBC 19.7*   < > 17.8*   CRP  --   --  86.0*    < > = values in this interval not displayed.       Physical Exam  Skin inspection: focused septum/columbella/bilateral nares           Date of last Photo 2/4 and 2/11  Wound History: proned  Measurements (length x width x depth, in cm) 0.5 cm x 1 cm  x  0.1 cm   Wound Base: 100% superficial scab   Palpation of the wound bed: normal   Periwound skin: intact  Color: normal and consistent with surrounding tissue  Temperature: normal   Drainage:, none  Description of drainage: none  Odor: none  Pain: unable to assess due to  sedation ,     Interventions  Current support surface: Standard  ICU mattress  Current off-loading measures: Heel off-loading boot(s) and Pillows  Repositioning aid: Z-jimi positioner(s) and Pillows  Visual inspection of wound(s) completed   Tube Securement: as per protocol  Wound Care: was not done per plan of care.  Supplies: floor stock  Educated provided: importance of repositioning, plan of care, Hygiene and Off-loading pressure  Education provided to: spouse and nurse  Discussed importance of:off-loading pressure to wound  Discussed plan of care with Family and Nurse    Yancy Carter RN CWOCN

## 2022-02-12 LAB
ALBUMIN SERPL-MCNC: 2.8 G/DL (ref 3.4–5)
ALBUMIN UR-MCNC: 20 MG/DL
ALP SERPL-CCNC: 77 U/L (ref 40–150)
ALT SERPL W P-5'-P-CCNC: 71 U/L (ref 0–70)
ANION GAP SERPL CALCULATED.3IONS-SCNC: 9 MMOL/L (ref 3–14)
APPEARANCE UR: CLEAR
APTT PPP: 41 SECONDS (ref 22–38)
APTT PPP: 74 SECONDS (ref 22–38)
AST SERPL W P-5'-P-CCNC: 31 U/L (ref 0–45)
BACTERIA BLD CULT: NO GROWTH
BACTERIA BLD CULT: NO GROWTH
BASE EXCESS BLDA CALC-SCNC: 5.4 MMOL/L (ref -9–1.8)
BILIRUB SERPL-MCNC: 0.6 MG/DL (ref 0.2–1.3)
BILIRUB UR QL STRIP: NEGATIVE
BUN SERPL-MCNC: 92 MG/DL (ref 7–30)
C DIFF TOX B STL QL: NEGATIVE
CALCIUM SERPL-MCNC: 9.2 MG/DL (ref 8.5–10.1)
CHLORIDE BLD-SCNC: 110 MMOL/L (ref 94–109)
CO2 SERPL-SCNC: 27 MMOL/L (ref 20–32)
COLOR UR AUTO: ABNORMAL
CREAT SERPL-MCNC: 1.22 MG/DL (ref 0.66–1.25)
ERYTHROCYTE [DISTWIDTH] IN BLOOD BY AUTOMATED COUNT: 15.1 % (ref 10–15)
GFR SERPL CREATININE-BSD FRML MDRD: 81 ML/MIN/1.73M2
GLUCOSE BLD-MCNC: 179 MG/DL (ref 70–99)
GLUCOSE BLDC GLUCOMTR-MCNC: 167 MG/DL (ref 70–99)
GLUCOSE BLDC GLUCOMTR-MCNC: 169 MG/DL (ref 70–99)
GLUCOSE BLDC GLUCOMTR-MCNC: 171 MG/DL (ref 70–99)
GLUCOSE BLDC GLUCOMTR-MCNC: 179 MG/DL (ref 70–99)
GLUCOSE BLDC GLUCOMTR-MCNC: 188 MG/DL (ref 70–99)
GLUCOSE BLDC GLUCOMTR-MCNC: 190 MG/DL (ref 70–99)
GLUCOSE UR STRIP-MCNC: NEGATIVE MG/DL
HCO3 BLD-SCNC: 30 MMOL/L (ref 21–28)
HCT VFR BLD AUTO: 31.4 % (ref 40–53)
HGB BLD-MCNC: 9.3 G/DL (ref 13.3–17.7)
HGB UR QL STRIP: ABNORMAL
HYALINE CASTS: 1 /LPF
KETONES UR STRIP-MCNC: NEGATIVE MG/DL
LACTATE SERPL-SCNC: 1.1 MMOL/L (ref 0.7–2)
LEUKOCYTE ESTERASE UR QL STRIP: ABNORMAL
MAGNESIUM SERPL-MCNC: 3.3 MG/DL (ref 1.8–2.6)
MCH RBC QN AUTO: 30.3 PG (ref 26.5–33)
MCHC RBC AUTO-ENTMCNC: 29.6 G/DL (ref 31.5–36.5)
MCV RBC AUTO: 102 FL (ref 78–100)
NITRATE UR QL: NEGATIVE
O2/TOTAL GAS SETTING VFR VENT: 45 %
OXYHGB MFR BLD: 94 % (ref 92–100)
PCO2 BLD: 45 MM HG (ref 35–45)
PH BLD: 7.44 [PH] (ref 7.35–7.45)
PH UR STRIP: 6 [PH] (ref 5–7)
PLATELET # BLD AUTO: 184 10E3/UL (ref 150–450)
PO2 BLD: 74 MM HG (ref 80–105)
POTASSIUM BLD-SCNC: 3.1 MMOL/L (ref 3.4–5.3)
POTASSIUM BLD-SCNC: 3.5 MMOL/L (ref 3.4–5.3)
POTASSIUM BLD-SCNC: 3.9 MMOL/L (ref 3.4–5.3)
PROT SERPL-MCNC: 7.5 G/DL (ref 6.8–8.8)
RBC # BLD AUTO: 3.07 10E6/UL (ref 4.4–5.9)
RBC URINE: 31 /HPF
SARS-COV-2 RNA RESP QL NAA+PROBE: NEGATIVE
SODIUM SERPL-SCNC: 146 MMOL/L (ref 133–144)
SP GR UR STRIP: 1.02 (ref 1–1.03)
UROBILINOGEN UR STRIP-MCNC: 2 MG/DL
WBC # BLD AUTO: 30.1 10E3/UL (ref 4–11)
WBC URINE: 29 /HPF

## 2022-02-12 PROCEDURE — 81001 URINALYSIS AUTO W/SCOPE: CPT | Performed by: STUDENT IN AN ORGANIZED HEALTH CARE EDUCATION/TRAINING PROGRAM

## 2022-02-12 PROCEDURE — 85027 COMPLETE CBC AUTOMATED: CPT | Performed by: STUDENT IN AN ORGANIZED HEALTH CARE EDUCATION/TRAINING PROGRAM

## 2022-02-12 PROCEDURE — 87149 DNA/RNA DIRECT PROBE: CPT | Performed by: INTERNAL MEDICINE

## 2022-02-12 PROCEDURE — 94003 VENT MGMT INPAT SUBQ DAY: CPT

## 2022-02-12 PROCEDURE — 250N000013 HC RX MED GY IP 250 OP 250 PS 637: Performed by: SURGERY

## 2022-02-12 PROCEDURE — 84132 ASSAY OF SERUM POTASSIUM: CPT | Performed by: INTERNAL MEDICINE

## 2022-02-12 PROCEDURE — 85730 THROMBOPLASTIN TIME PARTIAL: CPT | Performed by: INTERNAL MEDICINE

## 2022-02-12 PROCEDURE — 36415 COLL VENOUS BLD VENIPUNCTURE: CPT | Performed by: SURGERY

## 2022-02-12 PROCEDURE — 250N000011 HC RX IP 250 OP 636: Performed by: SURGERY

## 2022-02-12 PROCEDURE — 250N000013 HC RX MED GY IP 250 OP 250 PS 637: Performed by: STUDENT IN AN ORGANIZED HEALTH CARE EDUCATION/TRAINING PROGRAM

## 2022-02-12 PROCEDURE — 87493 C DIFF AMPLIFIED PROBE: CPT | Performed by: STUDENT IN AN ORGANIZED HEALTH CARE EDUCATION/TRAINING PROGRAM

## 2022-02-12 PROCEDURE — 36415 COLL VENOUS BLD VENIPUNCTURE: CPT | Performed by: INTERNAL MEDICINE

## 2022-02-12 PROCEDURE — 258N000003 HC RX IP 258 OP 636: Performed by: SURGERY

## 2022-02-12 PROCEDURE — 36415 COLL VENOUS BLD VENIPUNCTURE: CPT | Performed by: STUDENT IN AN ORGANIZED HEALTH CARE EDUCATION/TRAINING PROGRAM

## 2022-02-12 PROCEDURE — 84132 ASSAY OF SERUM POTASSIUM: CPT | Performed by: SURGERY

## 2022-02-12 PROCEDURE — 87077 CULTURE AEROBIC IDENTIFY: CPT | Performed by: INTERNAL MEDICINE

## 2022-02-12 PROCEDURE — 82805 BLOOD GASES W/O2 SATURATION: CPT | Performed by: SURGERY

## 2022-02-12 PROCEDURE — 82040 ASSAY OF SERUM ALBUMIN: CPT | Performed by: STUDENT IN AN ORGANIZED HEALTH CARE EDUCATION/TRAINING PROGRAM

## 2022-02-12 PROCEDURE — 80053 COMPREHEN METABOLIC PANEL: CPT | Performed by: STUDENT IN AN ORGANIZED HEALTH CARE EDUCATION/TRAINING PROGRAM

## 2022-02-12 PROCEDURE — 85730 THROMBOPLASTIN TIME PARTIAL: CPT | Performed by: SURGERY

## 2022-02-12 PROCEDURE — 83605 ASSAY OF LACTIC ACID: CPT | Performed by: STUDENT IN AN ORGANIZED HEALTH CARE EDUCATION/TRAINING PROGRAM

## 2022-02-12 PROCEDURE — 999N000157 HC STATISTIC RCP TIME EA 10 MIN

## 2022-02-12 PROCEDURE — 83735 ASSAY OF MAGNESIUM: CPT | Performed by: STUDENT IN AN ORGANIZED HEALTH CARE EDUCATION/TRAINING PROGRAM

## 2022-02-12 PROCEDURE — U0005 INFEC AGEN DETEC AMPLI PROBE: HCPCS | Performed by: INTERNAL MEDICINE

## 2022-02-12 PROCEDURE — 99291 CRITICAL CARE FIRST HOUR: CPT | Mod: 24 | Performed by: INTERNAL MEDICINE

## 2022-02-12 PROCEDURE — 200N000002 HC R&B ICU UMMC

## 2022-02-12 PROCEDURE — 94668 MNPJ CHEST WALL SBSQ: CPT

## 2022-02-12 RX ORDER — CARVEDILOL 25 MG/1
25 TABLET ORAL ONCE
Status: COMPLETED | OUTPATIENT
Start: 2022-02-12 | End: 2022-02-12

## 2022-02-12 RX ORDER — CARVEDILOL 25 MG/1
50 TABLET ORAL 2 TIMES DAILY
Status: DISCONTINUED | OUTPATIENT
Start: 2022-02-12 | End: 2022-03-04 | Stop reason: HOSPADM

## 2022-02-12 RX ORDER — ACETAZOLAMIDE 250 MG/1
500 TABLET ORAL
Status: DISCONTINUED | OUTPATIENT
Start: 2022-02-12 | End: 2022-02-13

## 2022-02-12 RX ORDER — POTASSIUM CHLORIDE 1.5 G/1.58G
60 POWDER, FOR SOLUTION ORAL ONCE
Status: COMPLETED | OUTPATIENT
Start: 2022-02-12 | End: 2022-02-12

## 2022-02-12 RX ORDER — POTASSIUM CHLORIDE 1.5 G/1.58G
40 POWDER, FOR SOLUTION ORAL ONCE
Status: COMPLETED | OUTPATIENT
Start: 2022-02-12 | End: 2022-02-12

## 2022-02-12 RX ADMIN — HYDRALAZINE HYDROCHLORIDE 75 MG: 50 TABLET, FILM COATED ORAL at 12:55

## 2022-02-12 RX ADMIN — BIVALIRUDIN 0.15 MG/KG/HR: 250 INJECTION, POWDER, LYOPHILIZED, FOR SOLUTION INTRAVENOUS at 04:34

## 2022-02-12 RX ADMIN — ACETAMINOPHEN 975 MG: 325 TABLET, FILM COATED ORAL at 19:59

## 2022-02-12 RX ADMIN — INSULIN ASPART 2 UNITS: 100 INJECTION, SOLUTION INTRAVENOUS; SUBCUTANEOUS at 20:03

## 2022-02-12 RX ADMIN — INSULIN ASPART 2 UNITS: 100 INJECTION, SOLUTION INTRAVENOUS; SUBCUTANEOUS at 13:13

## 2022-02-12 RX ADMIN — ACETAMINOPHEN 975 MG: 325 TABLET, FILM COATED ORAL at 13:03

## 2022-02-12 RX ADMIN — CARVEDILOL 50 MG: 25 TABLET, FILM COATED ORAL at 20:01

## 2022-02-12 RX ADMIN — HYDRALAZINE HYDROCHLORIDE 75 MG: 50 TABLET, FILM COATED ORAL at 05:32

## 2022-02-12 RX ADMIN — CARVEDILOL 25 MG: 25 TABLET, FILM COATED ORAL at 12:55

## 2022-02-12 RX ADMIN — INSULIN ASPART 2 UNITS: 100 INJECTION, SOLUTION INTRAVENOUS; SUBCUTANEOUS at 04:27

## 2022-02-12 RX ADMIN — HYDRALAZINE HYDROCHLORIDE 75 MG: 50 TABLET, FILM COATED ORAL at 18:37

## 2022-02-12 RX ADMIN — POTASSIUM CHLORIDE 60 MEQ: 1.5 POWDER, FOR SOLUTION ORAL at 12:55

## 2022-02-12 RX ADMIN — LABETALOL HYDROCHLORIDE 20 MG: 5 INJECTION, SOLUTION INTRAVENOUS at 02:03

## 2022-02-12 RX ADMIN — Medication 2 PACKET: at 19:59

## 2022-02-12 RX ADMIN — AMOXICILLIN AND CLAVULANATE POTASSIUM 1 TABLET: 875; 125 TABLET, FILM COATED ORAL at 19:58

## 2022-02-12 RX ADMIN — Medication 2 PACKET: at 12:56

## 2022-02-12 RX ADMIN — BIVALIRUDIN 0.18 MG/KG/HR: 250 INJECTION, POWDER, LYOPHILIZED, FOR SOLUTION INTRAVENOUS at 13:10

## 2022-02-12 RX ADMIN — ACETAZOLAMIDE 500 MG: 250 TABLET ORAL at 18:36

## 2022-02-12 RX ADMIN — INSULIN ASPART 2 UNITS: 100 INJECTION, SOLUTION INTRAVENOUS; SUBCUTANEOUS at 15:22

## 2022-02-12 RX ADMIN — ACETAMINOPHEN 975 MG: 325 TABLET, FILM COATED ORAL at 08:32

## 2022-02-12 RX ADMIN — Medication 15 ML: at 08:33

## 2022-02-12 RX ADMIN — LABETALOL HYDROCHLORIDE 20 MG: 5 INJECTION, SOLUTION INTRAVENOUS at 06:49

## 2022-02-12 RX ADMIN — BIVALIRUDIN 0.18 MG/KG/HR: 250 INJECTION, POWDER, LYOPHILIZED, FOR SOLUTION INTRAVENOUS at 21:42

## 2022-02-12 RX ADMIN — INSULIN ASPART 3 UNITS: 100 INJECTION, SOLUTION INTRAVENOUS; SUBCUTANEOUS at 08:46

## 2022-02-12 RX ADMIN — INSULIN ASPART 2 UNITS: 100 INJECTION, SOLUTION INTRAVENOUS; SUBCUTANEOUS at 00:26

## 2022-02-12 RX ADMIN — PANTOPRAZOLE SODIUM 40 MG: 40 TABLET, DELAYED RELEASE ORAL at 08:33

## 2022-02-12 RX ADMIN — ASPIRIN 81 MG CHEWABLE TABLET 81 MG: 81 TABLET CHEWABLE at 08:33

## 2022-02-12 RX ADMIN — CARVEDILOL 25 MG: 25 TABLET, FILM COATED ORAL at 08:33

## 2022-02-12 RX ADMIN — Medication 2 PACKET: at 08:33

## 2022-02-12 RX ADMIN — PROPOFOL 20 MCG/KG/MIN: 10 INJECTION, EMULSION INTRAVENOUS at 02:02

## 2022-02-12 RX ADMIN — HYDRALAZINE HYDROCHLORIDE 75 MG: 50 TABLET, FILM COATED ORAL at 00:36

## 2022-02-12 RX ADMIN — POTASSIUM CHLORIDE 40 MEQ: 1.5 POWDER, FOR SOLUTION ORAL at 06:24

## 2022-02-12 RX ADMIN — AMOXICILLIN AND CLAVULANATE POTASSIUM 1 TABLET: 875; 125 TABLET, FILM COATED ORAL at 08:43

## 2022-02-12 RX ADMIN — Medication 2 PACKET: at 15:24

## 2022-02-12 ASSESSMENT — ACTIVITIES OF DAILY LIVING (ADL)
ADLS_ACUITY_SCORE: 19

## 2022-02-12 ASSESSMENT — MIFFLIN-ST. JEOR
SCORE: 2581.05
SCORE: 2600.55

## 2022-02-12 NOTE — PLAN OF CARE
ICU End of Shift Summary. See flowsheets for vital signs and detailed assessment.    Changes this shift: Acute hypoxic episode after AM turn requiring 70% FiO2 and PEEP increased to 10. Pressure supported 50% 8/10 since 0900. Up to chair [id number]. Abdominal RR and gasping noted - CVTS aware and assessed at bedside. VBG sent. Following commands at 0800 assessment with decreased propofol, but now sedated after increasing propofol d/t hypertension per CVTS request. Coreg and hydralazine increased with multiple PRN labetalol and hydralazine doses given. Goal SBP <130. TF at goal. Rectal tube in place. Godfrey in place - lasix x2, ~700mL net negative. HIT panel positive, bival remains infusing. 1800 held d/t soft BP's (MAP's 60's) after getting back to bed.    Plan: SBP<130, wean propofol as able. Vent wean as able. Likely trach on Monday.    Problem: Adult Inpatient Plan of Care  Goal: Readiness for Transition of Care  Outcome: No Change     Problem: Respiratory Compromise (Cardiovascular Surgery)  Goal: Effective Oxygenation and Ventilation  Outcome: No Change

## 2022-02-12 NOTE — PLAN OF CARE
ICU End of Shift Summary. See flowsheets for vital signs and detailed assessment.     Changes this shift: CMV overnight, did increase Fi02 to 55%, able to wean back to 45% by morning. Did not follow commands throughout shift. Propofol weaned off over the AM, pt appears more alert and attempting to track but still not following commands. Pt did become hypotensive with MAP's in low 60's after 2000 dose of oral hydralazine and coreg but able to come up on own. Two doses of IV labetalol given for SBP>130.  TF at goal. Rectal tube in place, 100 total out. Godfrey in place-good UOP. Bival remains infusing, increased to 0.18, recheck at 0800. K 3.1, replaced. Recheck at 1000.     Plan: SBP<130. Vent wean as able. Likely trach on Monday.

## 2022-02-12 NOTE — PROGRESS NOTES
CV ICU PROGRESS NOTE  02/12/2022      CO-MORBIDITIES:   Aortic root aneurysm (H)    ASSESSMENT: Chuy Varner is a 31 year old male with a PMHx s/f bicuspid aortic valve, thoracic aortic aneurysm without rupture, hypertension, obesity, and testicular cancer s/p orchiectomy who underwent Bentall procedure with mechanical valve on 1/28 with Dr. Velasco and Dr. Hannah. Course complicated by postop hypoxemic respiratory failure.    TODAY'S PROGRESS:   - Encourage UOOB activity  - Increase Coreg  - Increase lantus dosing  - Aggressively treat hypokalemia  - Pan culture + c dif, new durant for increasing leukocytosis     PLAN:  Neuro/ pain/ sedation:  # Acute Postoperative pain  - Monitor neurological status. Notify the MD for any acute changes in exam.  - Pain: fentanyl gtt. Scheduled tylenol. PRN tylenol, oxycodone, robaxin   - Sedation: propofol gtt (currently off)  - RAAS -0 to -1  - denies pain, continue laura + prns      Pulmonary care:   # Postoperative ventilation management  # Acute hypoxemic respiratory failure ARDS vs. PNA vs. Hypervolemia vs. TRALI  COVID-19.Mycoplasma, Parainfluenza and Influenza Negative. CTA negative for PE on 1/30.  CXR not consistent with ARDS. Likely element of severe V/Q mismatch and atelectasis.  Sudden drop in sats overnight 2/8 requiring FiO2 80%  - Follow BCs and respiratory panel (2/4)  - Pulmonology saw 2/2, appreciate recs   > prone (14 to 18 hrs per day)   > PEEP 14   > may need to increase PEEP slightly when supine  - Titrate supplemental oxygen to maintain saturation above 90%  - Up to chair today. Aggressive pulm weaning, consideration for trach early next week if not liberated in the coming days     Cardiovascular:    # Ascending aortic aneurysm s/p Bentall w/ [place] valve on 1/28 with Dr. Velasco and Dr. Hannah  # Bicuspid aortic valve  # History of thoracic aortic aneurysm without rupture  # History of hypertension  Recent echo on 10/26/21 with LVEF of 60-65%.  CT vascular  12/13/21 showing proximal ascending aorta is severely dilated (55 x 55mm in maximum dimension) and likely bicuspid aortic valve.  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  CT PE 1/30 - negative for acute PE, marked dependent atelectasis R>L.  - Monitor hemodynamic status  - Goal MAP>65, SBP <130  - Statin hold  - ASA 81  - Holding PTA meds: metoprolol succinate 25mg daily  - Increase Coreg 25 BID increase to 50 BID   - PO Hydralazine 50 q6h  - Labetalol and hydralazine PRN to maintain SBP <130     GI care/ Nutrition:   # Obesity (BMI 46)  - NJ, advance to goal  - PPI  - Continue bowel regimen: miralax, senna    Renal/ Fluid Balance/ Electrolytes:   BL creat appears to be ~ 1.0  - Strict I/O, daily weights  - no additional lasix today   - Avoid/limit nephrotoxins as able  - Replete lytes PRN per protocol  - Scheduled potassium replacement  - additional 60 po potassium      Endocrine:    # Stress induced hyperglycemia, improving  - HDSSI  - Lantus 30 increase to 40mg daily  - Goal BG <180 for optimal healing     ID/ Antibiotics:  # Stress induced leukocytosis, resolved  # Possible ventilator associated pneumonia  Febrile and pan-cultured on 1/30/22. Covid pcr, respiratory panel, and respiratory cultures negative 1/30. Re-cultured 1/31: respiratory and blood cultures NGTD. Re-cultured again 2/4 when WBC increased to 23.  - Continue to monitor fever curve, WBC and inflammatory markers  - Meropenem x7 days - completed 2/7  - Consult ID 2/4: send C dif, blood cultures, repeat COVID  - CT C/A/P and sinuses 2/11  - Augmentin PO for sinusititis  - Fevers + increasing leukocytosis. add lactate, pan culture, new soila durant dif pending.      Heme/Onc:     # Stress induced leukocytosis  # Acute blood loss anemia  # Acute blood loss thrombocytopenia, resolved  # History  of testicular cancer s/p orchiectomy  # RLE DVT  No s/sx active bleeding  - Continue to monitor  - CBC daily  - Heparin resistant  - Bivalirudin gtt  - 4T is 4 - HIT panel, transition to Bival, b/l LE duplex US with RLE occlusive thrombus at gastrocnemius. Had brief episode of hypoxia today, can consider CT PE if acute change in oxygenation.     MSK/ Skin:  # Sternotomy  # Surgical Incision  - Sternal precautions  - Postoperative incision management per protocol  - PT/OT/CR  - pressure wound pending WOC evaluation      Prophylaxis:    - Mechanical prophylaxis for DVT  - Chemical DVT prophylaxis- bival  - PPI     Lines/ tubes/ drains:  - Left PIV (1/28)  - Godfrey   - OG (1/28)  - Nasoduodenal (1/31)  - ETT (1/28)     Disposition:  - [facility]ICU    Patient seen, findings and plan discussed with CV ICU staff, Dr. Rosado and CVTS staff Dr. Velasco.    Nakita Weeks MD   General Surgery PGY3  [phone]      ====================================    SUBJECTIVE:   Up to chair this morning, more alert. Appears to be tracking. Girlfriend reports he was interacting with her earlier in morning.     OBJECTIVE:   1. VITAL SIGNS:   Temp:  [98.8  F (37.1  C)-102  F (38.9  C)] 102  F (38.9  C)  Pulse:  [] 106  Resp:  [17-25] 22  BP: ()/(44-75) 128/63  FiO2 (%):  [45 %-50 %] 45 %  SpO2:  [91 %-95 %] 93 %  Ventilation Mode: CMV/AC  (Continuous Mandatory Ventilation/ Assist Control)  FiO2 (%): 45 %  Rate Set (breaths/minute): 12 breaths/min  Tidal Volume Set (mL): 680 mL  PEEP (cm H2O): 10 cmH2O  Pressure Support (cm H2O): 8 cmH2O  Oxygen Concentration (%): 45 %  Resp: 22      2. INTAKE/ OUTPUT:   I/O last 3 completed shifts:  In: 3364.18 [I.V.:1332.18; NG/GT:767]  Out: 3898 [Urine:3373; Emesis/NG output:325; Stool:200]    3. PHYSICAL EXAMINATION:   General: Up to chair, comfortable  Neuro: following commands when weaned.  Resp: Intubated, CTA bilaterally  CV: RRR  Abdomen: Soft, Non-distended  Incisions: C/D/I    Extremities: warm and well perfused, SCDs in place    4. INVESTIGATIONS:   Arterial Blood Gases   Recent Labs   Lab 02/12/22  0434 02/10/22  0828 02/10/22  0436 02/09/22  2242   PH 7.44 7.38 7.43 7.41   PCO2 45 50* 44 48*   PO2 74* 86 103 55*   HCO3 30* 30* 29* 30*     Complete Blood Count   Recent Labs   Lab 02/12/22 0456 02/11/22  0512 02/10/22  0548 02/09/22  0333   WBC 30.1* 19.7* 16.7* 18.6*   HGB 9.3* 9.8* 9.2* 9.5*    166 138* 220     Basic Metabolic Panel  Recent Labs   Lab 02/12/22  0846 02/12/22  0456 02/12/22  0426 02/12/22  0026 02/11/22  0759 02/11/22  0512 02/10/22  2351 02/10/22  2049 02/10/22  0817 02/10/22  0548 02/09/22  2100 02/09/22  1752   NA  --  146*  --   --   --  141  --   --   --  138  --  139   POTASSIUM  --  3.1*  --   --   --  3.7  --  3.9  --  3.8  --  4.7   CHLORIDE  --  110*  --   --   --  106  --   --   --  104  --  106   CO2  --  27  --   --   --  28  --   --   --  27  --  28   BUN  --  92*  --   --   --  69*  --   --   --  66*  --  50*   CR  --  1.22  --   --   --  0.84  --   --   --  1.02  --  0.78   * 179* 171* 169*   < > 168*   < >  --    < > 144*   < > 151*    < > = values in this interval not displayed.     Liver Function Tests  Recent Labs   Lab 02/12/22  0456 02/11/22  0512 02/10/22  0548 02/09/22  0333   AST 31 20 21 32   ALT 71* 74* 89* 122*   ALKPHOS 77 79 75 79   BILITOTAL 0.6 0.4 0.5 0.4   ALBUMIN 2.8* 2.8* 2.6* 2.7*     Pancreatic Enzymes  No lab results found in last 7 days.  Coagulation Profile  Recent Labs   Lab 02/12/22  0456 02/11/22  0512 02/10/22  1354   PTT 41* 74* 57*

## 2022-02-13 LAB
ALBUMIN SERPL-MCNC: 2.8 G/DL (ref 3.4–5)
ALP SERPL-CCNC: 76 U/L (ref 40–150)
ALT SERPL W P-5'-P-CCNC: 79 U/L (ref 0–70)
ANION GAP SERPL CALCULATED.3IONS-SCNC: 4 MMOL/L (ref 3–14)
ANION GAP SERPL CALCULATED.3IONS-SCNC: 8 MMOL/L (ref 3–14)
APTT PPP: 47 SECONDS (ref 22–38)
AST SERPL W P-5'-P-CCNC: 39 U/L (ref 0–45)
BACTERIA SPT CULT: ABNORMAL
BACTERIA SPT CULT: ABNORMAL
BILIRUB SERPL-MCNC: 0.7 MG/DL (ref 0.2–1.3)
BUN SERPL-MCNC: 114 MG/DL (ref 7–30)
BUN SERPL-MCNC: 121 MG/DL (ref 7–30)
CALCIUM SERPL-MCNC: 9.4 MG/DL (ref 8.5–10.1)
CALCIUM SERPL-MCNC: 9.6 MG/DL (ref 8.5–10.1)
CHLORIDE BLD-SCNC: 116 MMOL/L (ref 94–109)
CHLORIDE BLD-SCNC: 119 MMOL/L (ref 94–109)
CO2 SERPL-SCNC: 28 MMOL/L (ref 20–32)
CO2 SERPL-SCNC: 30 MMOL/L (ref 20–32)
CREAT SERPL-MCNC: 1.38 MG/DL (ref 0.66–1.25)
CREAT SERPL-MCNC: 1.39 MG/DL (ref 0.66–1.25)
ENTEROCOCCUS FAECALIS: NOT DETECTED
ENTEROCOCCUS FAECIUM: NOT DETECTED
ERYTHROCYTE [DISTWIDTH] IN BLOOD BY AUTOMATED COUNT: 15 % (ref 10–15)
GFR SERPL CREATININE-BSD FRML MDRD: 70 ML/MIN/1.73M2
GFR SERPL CREATININE-BSD FRML MDRD: 70 ML/MIN/1.73M2
GLUCOSE BLD-MCNC: 189 MG/DL (ref 70–99)
GLUCOSE BLD-MCNC: 205 MG/DL (ref 70–99)
GLUCOSE BLDC GLUCOMTR-MCNC: 156 MG/DL (ref 70–99)
GLUCOSE BLDC GLUCOMTR-MCNC: 168 MG/DL (ref 70–99)
GLUCOSE BLDC GLUCOMTR-MCNC: 178 MG/DL (ref 70–99)
GLUCOSE BLDC GLUCOMTR-MCNC: 180 MG/DL (ref 70–99)
GLUCOSE BLDC GLUCOMTR-MCNC: 185 MG/DL (ref 70–99)
GLUCOSE BLDC GLUCOMTR-MCNC: 193 MG/DL (ref 70–99)
GRAM STAIN RESULT: ABNORMAL
GRAM STAIN RESULT: ABNORMAL
HCT VFR BLD AUTO: 31.5 % (ref 40–53)
HGB BLD-MCNC: 9.1 G/DL (ref 13.3–17.7)
LISTERIA SPECIES (DETECTED/NOT DETECTED): NOT DETECTED
MAGNESIUM SERPL-MCNC: 3.5 MG/DL (ref 1.8–2.6)
MCH RBC QN AUTO: 30.1 PG (ref 26.5–33)
MCHC RBC AUTO-ENTMCNC: 28.9 G/DL (ref 31.5–36.5)
MCV RBC AUTO: 104 FL (ref 78–100)
PLATELET # BLD AUTO: 223 10E3/UL (ref 150–450)
POTASSIUM BLD-SCNC: 3.5 MMOL/L (ref 3.4–5.3)
POTASSIUM BLD-SCNC: 3.5 MMOL/L (ref 3.4–5.3)
PROT SERPL-MCNC: 7.8 G/DL (ref 6.8–8.8)
RBC # BLD AUTO: 3.02 10E6/UL (ref 4.4–5.9)
SODIUM SERPL-SCNC: 152 MMOL/L (ref 133–144)
SODIUM SERPL-SCNC: 153 MMOL/L (ref 133–144)
STAPHYLOCOCCUS AUREUS: NOT DETECTED
STAPHYLOCOCCUS EPIDERMIDIS: DETECTED
STAPHYLOCOCCUS LUGDUNENSIS: NOT DETECTED
STREPTOCOCCUS AGALACTIAE: NOT DETECTED
STREPTOCOCCUS ANGINOSUS GROUP: NOT DETECTED
STREPTOCOCCUS PNEUMONIAE: NOT DETECTED
STREPTOCOCCUS PYOGENES: NOT DETECTED
STREPTOCOCCUS SPECIES: NOT DETECTED
WBC # BLD AUTO: 28.7 10E3/UL (ref 4–11)

## 2022-02-13 PROCEDURE — 80053 COMPREHEN METABOLIC PANEL: CPT | Performed by: STUDENT IN AN ORGANIZED HEALTH CARE EDUCATION/TRAINING PROGRAM

## 2022-02-13 PROCEDURE — 250N000013 HC RX MED GY IP 250 OP 250 PS 637: Performed by: STUDENT IN AN ORGANIZED HEALTH CARE EDUCATION/TRAINING PROGRAM

## 2022-02-13 PROCEDURE — 85027 COMPLETE CBC AUTOMATED: CPT | Performed by: STUDENT IN AN ORGANIZED HEALTH CARE EDUCATION/TRAINING PROGRAM

## 2022-02-13 PROCEDURE — 250N000011 HC RX IP 250 OP 636: Performed by: SURGERY

## 2022-02-13 PROCEDURE — 94003 VENT MGMT INPAT SUBQ DAY: CPT

## 2022-02-13 PROCEDURE — 250N000013 HC RX MED GY IP 250 OP 250 PS 637: Performed by: SURGERY

## 2022-02-13 PROCEDURE — 85730 THROMBOPLASTIN TIME PARTIAL: CPT | Performed by: SURGERY

## 2022-02-13 PROCEDURE — 36415 COLL VENOUS BLD VENIPUNCTURE: CPT | Performed by: STUDENT IN AN ORGANIZED HEALTH CARE EDUCATION/TRAINING PROGRAM

## 2022-02-13 PROCEDURE — 200N000002 HC R&B ICU UMMC

## 2022-02-13 PROCEDURE — 258N000003 HC RX IP 258 OP 636: Performed by: SURGERY

## 2022-02-13 PROCEDURE — 82310 ASSAY OF CALCIUM: CPT | Performed by: STUDENT IN AN ORGANIZED HEALTH CARE EDUCATION/TRAINING PROGRAM

## 2022-02-13 PROCEDURE — 250N000011 HC RX IP 250 OP 636: Performed by: STUDENT IN AN ORGANIZED HEALTH CARE EDUCATION/TRAINING PROGRAM

## 2022-02-13 PROCEDURE — 999N000157 HC STATISTIC RCP TIME EA 10 MIN

## 2022-02-13 PROCEDURE — 250N000012 HC RX MED GY IP 250 OP 636 PS 637: Performed by: STUDENT IN AN ORGANIZED HEALTH CARE EDUCATION/TRAINING PROGRAM

## 2022-02-13 PROCEDURE — 99291 CRITICAL CARE FIRST HOUR: CPT | Mod: 24 | Performed by: INTERNAL MEDICINE

## 2022-02-13 PROCEDURE — 83735 ASSAY OF MAGNESIUM: CPT | Performed by: STUDENT IN AN ORGANIZED HEALTH CARE EDUCATION/TRAINING PROGRAM

## 2022-02-13 RX ORDER — LOPERAMIDE HCL 2 MG
2 CAPSULE ORAL 4 TIMES DAILY PRN
Status: DISCONTINUED | OUTPATIENT
Start: 2022-02-13 | End: 2022-02-26

## 2022-02-13 RX ORDER — VANCOMYCIN HYDROCHLORIDE 1 G/200ML
1000 INJECTION, SOLUTION INTRAVENOUS EVERY 12 HOURS
Status: DISCONTINUED | OUTPATIENT
Start: 2022-02-14 | End: 2022-02-14

## 2022-02-13 RX ORDER — DIPHENHYDRAMINE HYDROCHLORIDE 50 MG/ML
50 INJECTION INTRAMUSCULAR; INTRAVENOUS ONCE
Status: COMPLETED | OUTPATIENT
Start: 2022-02-13 | End: 2022-02-13

## 2022-02-13 RX ORDER — PIPERACILLIN SODIUM, TAZOBACTAM SODIUM 3; .375 G/15ML; G/15ML
3.38 INJECTION, POWDER, LYOPHILIZED, FOR SOLUTION INTRAVENOUS EVERY 6 HOURS
Status: DISCONTINUED | OUTPATIENT
Start: 2022-02-13 | End: 2022-02-14

## 2022-02-13 RX ORDER — POTASSIUM CHLORIDE 1.5 G/1.58G
20 POWDER, FOR SOLUTION ORAL ONCE
Status: COMPLETED | OUTPATIENT
Start: 2022-02-13 | End: 2022-02-13

## 2022-02-13 RX ORDER — TRAZODONE HYDROCHLORIDE 50 MG/1
50 TABLET, FILM COATED ORAL
Status: DISCONTINUED | OUTPATIENT
Start: 2022-02-13 | End: 2022-03-04 | Stop reason: HOSPADM

## 2022-02-13 RX ADMIN — ACETAMINOPHEN 650 MG: 325 TABLET, FILM COATED ORAL at 01:58

## 2022-02-13 RX ADMIN — PIPERACILLIN AND TAZOBACTAM 3.38 G: 3; .375 INJECTION, POWDER, LYOPHILIZED, FOR SOLUTION INTRAVENOUS at 20:57

## 2022-02-13 RX ADMIN — INSULIN ASPART 1 UNITS: 100 INJECTION, SOLUTION INTRAVENOUS; SUBCUTANEOUS at 16:32

## 2022-02-13 RX ADMIN — AMOXICILLIN AND CLAVULANATE POTASSIUM 1 TABLET: 875; 125 TABLET, FILM COATED ORAL at 08:01

## 2022-02-13 RX ADMIN — BIVALIRUDIN 0.18 MG/KG/HR: 250 INJECTION, POWDER, LYOPHILIZED, FOR SOLUTION INTRAVENOUS at 14:10

## 2022-02-13 RX ADMIN — CARVEDILOL 50 MG: 25 TABLET, FILM COATED ORAL at 07:59

## 2022-02-13 RX ADMIN — ACETAMINOPHEN 975 MG: 325 TABLET, FILM COATED ORAL at 14:07

## 2022-02-13 RX ADMIN — INSULIN ASPART 2 UNITS: 100 INJECTION, SOLUTION INTRAVENOUS; SUBCUTANEOUS at 19:55

## 2022-02-13 RX ADMIN — INSULIN ASPART 2 UNITS: 100 INJECTION, SOLUTION INTRAVENOUS; SUBCUTANEOUS at 08:05

## 2022-02-13 RX ADMIN — Medication 40 MG: at 07:59

## 2022-02-13 RX ADMIN — ACETAMINOPHEN 975 MG: 325 TABLET, FILM COATED ORAL at 07:59

## 2022-02-13 RX ADMIN — BIVALIRUDIN 0.18 MG/KG/HR: 250 INJECTION, POWDER, LYOPHILIZED, FOR SOLUTION INTRAVENOUS at 21:59

## 2022-02-13 RX ADMIN — Medication 10 MG: at 19:47

## 2022-02-13 RX ADMIN — Medication 2 DROP: at 06:01

## 2022-02-13 RX ADMIN — Medication 2 DROP: at 01:58

## 2022-02-13 RX ADMIN — CARVEDILOL 50 MG: 25 TABLET, FILM COATED ORAL at 19:47

## 2022-02-13 RX ADMIN — BIVALIRUDIN 0.18 MG/KG/HR: 250 INJECTION, POWDER, LYOPHILIZED, FOR SOLUTION INTRAVENOUS at 05:49

## 2022-02-13 RX ADMIN — HYDRALAZINE HYDROCHLORIDE 75 MG: 50 TABLET, FILM COATED ORAL at 00:10

## 2022-02-13 RX ADMIN — Medication 2 DROP: at 20:01

## 2022-02-13 RX ADMIN — POTASSIUM CHLORIDE 20 MEQ: 1.5 POWDER, FOR SOLUTION ORAL at 06:26

## 2022-02-13 RX ADMIN — Medication 15 ML: at 07:59

## 2022-02-13 RX ADMIN — DIPHENHYDRAMINE HYDROCHLORIDE 50 MG: 50 INJECTION, SOLUTION INTRAMUSCULAR; INTRAVENOUS at 20:01

## 2022-02-13 RX ADMIN — HYDRALAZINE HYDROCHLORIDE 75 MG: 50 TABLET, FILM COATED ORAL at 14:07

## 2022-02-13 RX ADMIN — OXYCODONE HYDROCHLORIDE 5 MG: 5 TABLET ORAL at 00:11

## 2022-02-13 RX ADMIN — INSULIN GLARGINE 50 UNITS: 100 INJECTION, SOLUTION SUBCUTANEOUS at 13:12

## 2022-02-13 RX ADMIN — ASPIRIN 81 MG CHEWABLE TABLET 81 MG: 81 TABLET CHEWABLE at 07:59

## 2022-02-13 RX ADMIN — Medication 2 PACKET: at 08:02

## 2022-02-13 RX ADMIN — POTASSIUM CHLORIDE 20 MEQ: 1.5 POWDER, FOR SOLUTION ORAL at 16:21

## 2022-02-13 RX ADMIN — Medication 2 PACKET: at 16:23

## 2022-02-13 RX ADMIN — HYDRALAZINE HYDROCHLORIDE 75 MG: 50 TABLET, FILM COATED ORAL at 06:01

## 2022-02-13 RX ADMIN — ACETAMINOPHEN 975 MG: 325 TABLET, FILM COATED ORAL at 19:48

## 2022-02-13 RX ADMIN — INSULIN ASPART 2 UNITS: 100 INJECTION, SOLUTION INTRAVENOUS; SUBCUTANEOUS at 04:07

## 2022-02-13 RX ADMIN — INSULIN ASPART 3 UNITS: 100 INJECTION, SOLUTION INTRAVENOUS; SUBCUTANEOUS at 12:01

## 2022-02-13 RX ADMIN — POLYETHYLENE GLYCOL 400 AND PROPYLENE GLYCOL 1 DROP: 4; 3 SOLUTION/ DROPS OPHTHALMIC at 08:04

## 2022-02-13 RX ADMIN — Medication 2 PACKET: at 19:55

## 2022-02-13 RX ADMIN — INSULIN ASPART 2 UNITS: 100 INJECTION, SOLUTION INTRAVENOUS; SUBCUTANEOUS at 00:12

## 2022-02-13 RX ADMIN — VANCOMYCIN HYDROCHLORIDE 2500 MG: 500 INJECTION, POWDER, LYOPHILIZED, FOR SOLUTION INTRAVENOUS at 21:59

## 2022-02-13 RX ADMIN — Medication 2 PACKET: at 11:53

## 2022-02-13 RX ADMIN — ACETAZOLAMIDE 500 MG: 250 TABLET ORAL at 08:01

## 2022-02-13 ASSESSMENT — ACTIVITIES OF DAILY LIVING (ADL)
ADLS_ACUITY_SCORE: 19

## 2022-02-13 NOTE — PROGRESS NOTES
CV ICU PROGRESS NOTE  02/13/2022      CO-MORBIDITIES:   Aortic root aneurysm (H)    ASSESSMENT: Chuy Varner is a 31 year old male with a PMHx s/f bicuspid aortic valve, thoracic aortic aneurysm without rupture, hypertension, obesity, and testicular cancer s/p orchiectomy who underwent Bentall procedure with mechanical valve on 1/28 with Dr. Vealsco and Dr. Hannah. Course complicated by postop hypoxemic respiratory failure.    TODAY'S PROGRESS:   - Add trazadone, melatonin for sleep hygiene  - Decrease hydralazine to TID frequency   - Increase lantus to 50mg daily     PLAN:  Neuro/ pain/ sedation:  # Acute Postoperative pain  - Monitor neurological status. Notify the MD for any acute changes in exam.  - Pain: fentanyl gtt. Scheduled tylenol. PRN tylenol, oxycodone, robaxin   - Sedation: propofol gtt (currently off)  - RAAS -0 to -1  - denies pain, continue laura + prns   - melatonin trazadone qhs     Pulmonary care:   # Postoperative ventilation management  # Acute hypoxemic respiratory failure ARDS vs. PNA vs. Hypervolemia vs. TRALI  COVID-19.Mycoplasma, Parainfluenza and Influenza Negative. CTA negative for PE on 1/30.  CXR not consistent with ARDS. Likely element of severe V/Q mismatch and atelectasis.  Sudden drop in sats overnight 2/8 requiring FiO2 80%  - Follow BCs and respiratory panel (2/4)  - Pulmonology saw 2/2, appreciate recs   > prone (14 to 18 hrs per day)   > PEEP 14   > may need to increase PEEP slightly when supine  - Titrate supplemental oxygen to maintain saturation above 90%  - Up to chair today. Aggressive pulm weaning, consideration for trach early next week    Cardiovascular:    # Ascending aortic aneurysm s/p Bentall w/ [place] valve on 1/28 with Dr. Velasco and Dr. Hannah  # Bicuspid aortic valve  # History of thoracic aortic aneurysm without rupture  # History of hypertension  Recent echo on 10/26/21 with LVEF of 60-65%.  CT vascular 12/13/21 showing proximal ascending aorta is severely  dilated (55 x 55mm in maximum dimension) and likely bicuspid aortic valve.  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  CT PE 1/30 - negative for acute PE, marked dependent atelectasis R>L.  - Monitor hemodynamic status  - Goal MAP>65, SBP <130  - Statin hold  - ASA 81  - Holding PTA meds: metoprolol succinate 25mg daily  - Increase Coreg 25 BID increase to 50 BID   - PO Hydralazine 50 q6h  - Labetalol and hydralazine PRN to maintain SBP <130  - some hypotension with meds   - decrease hydralazine to TID      GI care/ Nutrition:   # Obesity (BMI 46)  - NJ, advance to goal  - PPI  - Continue bowel regimen: miralax, senna    Renal/ Fluid Balance/ Electrolytes:   BL creat appears to be ~ 1.0  - Strict I/O, daily weights  - no additional lasix today   - Avoid/limit nephrotoxins as able  - Replete lytes PRN per protocol  - Scheduled potassium replacement     Endocrine:    # Stress induced hyperglycemia, improving  - HDSSI  - Lantus increase to 50mg daily   - Goal BG <180 for optimal healing     ID/ Antibiotics:  # Stress induced leukocytosis, resolved  # Possible ventilator associated pneumonia  Febrile and pan-cultured on 1/30/22. Covid pcr, respiratory panel, and respiratory cultures negative 1/30. Re-cultured 1/31: respiratory and blood cultures NGTD. Re-cultured again 2/4 when WBC increased to 23.  - Continue to monitor fever curve, WBC and inflammatory markers  - Meropenem x7 days - completed 2/7  - Consult ID 2/4: send C dif, blood cultures, repeat COVID  - CT C/A/P and sinuses 2/11  - Augmentin PO for sinusititis would cover UA+  - Fevers + increasing leukocytosis.  - Will discuss with ID any additional recommendations      Heme/Onc:     # Stress induced leukocytosis  # Acute blood loss anemia  # Acute blood loss thrombocytopenia, resolved  # History of  testicular cancer s/p orchiectomy  # RLE DVT  # HIIT positive   No s/sx active bleeding  - Continue to monitor  - CBC daily  - Heparin resistant  - Bivalirudin gtt  - 4T is 4 - HIT panel, transition to Bival, b/l LE duplex US with RLE occlusive thrombus at gastrocnemius.     MSK/ Skin:  # Sternotomy  # Surgical Incision  # Buttock pressure injury   - Sternal precautions  - Postoperative incision management per protocol  - PT/OT/CR  - WOCN consulted for pressure injury      Prophylaxis:    - Mechanical prophylaxis for DVT  - Chemical DVT prophylaxis- bival  - PPI     Lines/ tubes/ drains:  - Left PIV (1/28)  - Godfrey   - OG (1/28)  - Nasoduodenal (1/31)  - ETT (1/28)  - Rectal tube      Disposition:  - [facility]    Patient seen, findings and plan discussed with CV ICU staff, Dr. Rosado and CVTS staff Dr. Velasco.    Nakita Weeks MD   General Surgery PGY3  [phone]      ====================================    SUBJECTIVE:   Some increased swelling/edema to RLE, does not appear to be causing any more pain. Continues to be febrile.     OBJECTIVE:   1. VITAL SIGNS:   Temp:  [101.2  F (38.4  C)-102  F (38.9  C)] 101.8  F (38.8  C)  Pulse:  [] 98  Resp:  [15-29] 20  BP: ()/(49-73) 98/54  FiO2 (%):  [45 %-60 %] 55 %  SpO2:  [89 %-95 %] 94 %  Ventilation Mode: (S) CPAP/PS  (Continuous positive airway pressure with Pressure Support)  FiO2 (%): 55 %  Rate Set (breaths/minute): 12 breaths/min  Tidal Volume Set (mL): 680 mL  PEEP (cm H2O): 8 cmH2O  Pressure Support (cm H2O): 8 cmH2O  Oxygen Concentration (%): 55 %  Resp: 20      2. INTAKE/ OUTPUT:   I/O last 3 completed shifts:  In: 3081.6 [I.V.:726.6; NG/GT:1035]  Out: 5290 [Urine:4460; Emesis/NG output:130; Stool:700]    3. PHYSICAL EXAMINATION:   General: Up to chair, comfortable. Tracking conversation with eyes  Neuro: following commands  Resp: Intubated, CTA bilaterally  CV: RRR  Abdomen: Soft, Non-distended  Incisions: C/D/I   Extremities: warm and well  perfused, SCDs in place    4. INVESTIGATIONS:   Arterial Blood Gases   Recent Labs   Lab 02/12/22  0434 02/10/22  0828 02/10/22  0436 02/09/22  2242   PH 7.44 7.38 7.43 7.41   PCO2 45 50* 44 48*   PO2 74* 86 103 55*   HCO3 30* 30* 29* 30*     Complete Blood Count   Recent Labs   Lab 02/13/22  0532 02/12/22  0456 02/11/22  0512 02/10/22  0548   WBC 28.7* 30.1* 19.7* 16.7*   HGB 9.1* 9.3* 9.8* 9.2*    184 166 138*     Basic Metabolic Panel  Recent Labs   Lab 02/13/22  0758 02/13/22  0532 02/13/22  0350 02/13/22  0004 02/12/22  2002 02/12/22  1716 02/12/22  1312 02/12/22  1026 02/12/22  0846 02/12/22  0456 02/11/22 0759 02/11/22  0512 02/10/22  0817 02/10/22  0548   NA  --  152*  --   --   --   --   --   --   --  146*  --  141  --  138   POTASSIUM  --  3.5  --   --   --  3.9  --  3.5  --  3.1*  --  3.7   < > 3.8   CHLORIDE  --  116*  --   --   --   --   --   --   --  110*  --  106  --  104   CO2  --  28  --   --   --   --   --   --   --  27  --  28  --  27   BUN  --  114*  --   --   --   --   --   --   --  92*  --  69*  --  66*   CR  --  1.39*  --   --   --   --   --   --   --  1.22  --  0.84  --  1.02   * 189* 185* 168*   < >  --    < >  --    < > 179*   < > 168*   < > 144*    < > = values in this interval not displayed.     Liver Function Tests  Recent Labs   Lab 02/13/22  0532 02/12/22  0456 02/11/22  0512 02/10/22  0548   AST 39 31 20 21   ALT 79* 71* 74* 89*   ALKPHOS 76 77 79 75   BILITOTAL 0.7 0.6 0.4 0.5   ALBUMIN 2.8* 2.8* 2.8* 2.6*     Pancreatic Enzymes  No lab results found in last 7 days.  Coagulation Profile  Recent Labs   Lab 02/13/22  0532 02/12/22  2031 02/12/22  0456 02/11/22  0512   PTT 47* 74* 41* 74*

## 2022-02-13 NOTE — PLAN OF CARE
ICU End of Shift Summary. See flowsheets for vital signs and detailed assessment.    Changes this shift: Patient lethargic, RASS -1, tracking but not following commands. Melatonin added to help with sleep, if ineffective then PRN trazodone available now as well. No movement of extremities. Febrile to 101.8. R leg more swollen than left, but with good pulses, warm, no color changes. PS 8/8 for 3-4 hours, placed back on CMV d/t appearing tired, increased nasal flaring, increased use of accessory muscles with respiration. Moderate, very thick white secretions from ETT and PO suctioning. Lantus dose increased. Rectal tube in place, 300mL today- PRN immodium ordered. FWF increased to 100mL q4h d/t elevated sodiums. Good urine output with durant. Angiomax gtt continues, therapeutic.     Plan: Surgery consulted for trach placement this week. Continue with plan of care.   Problem: Risk for Delirium  Goal: Optimal Coping  Outcome: No Change     Problem: Risk for Delirium  Goal: Improved Behavioral Control  Outcome: No Change     Problem: Risk for Delirium  Goal: Improved Attention and Thought Clarity  Outcome: No Change

## 2022-02-13 NOTE — PLAN OF CARE
ICU End of Shift Summary. See flowsheets for vital signs and detailed assessment.     Changes this shift: CMV overnight, did increase Fi02 at 55%. Did not follow commands throughout shift, no extremity movement noted. Appears alert and able to track more than the previous night. Did close eyes for 20 minutes every hour but overall was not able to sleep overnight. PRN oxy given for sleep with no change. BP stable, no PRN BP meds given. TF at goal. Rectal tube in place, 500 total out. C-diff negative. Godfrey in place-good UOP. Bival remains infusing at 0.18, therapeutic. Pt with increasing RLE swelling, now with pitting edema to foot and ankle. RLE cool to touch at 2000, CVTS at bedside to assess. 2+ pulses RLE, no discoloration. K 3.5, replaced with 20 mEq. Recheck tomorrow AM. Sodium 152, team paged with increasing value.      Plan: SBP<130. Vent wean as able. Continue to closely monitor RLE.

## 2022-02-13 NOTE — PLAN OF CARE
ICU End of Shift Summary. See flowsheets for vital signs and detailed assessment.    Changes this shift: Sedation weaned off, patient alert, inconsistently nodding yes/no to questions but no extremity movement to commands. Febrile 102, bcx done, durant exchanged and UA sent. PS 6/8 for about 7 hours, flipped back to CMV d/t dropping sats and appearing more labored with breathing. 60mEq k+ replaced. Cdiff sample sent. Small amount of coffee ground appearance output from OG, OG now clamped.     Plan: Keep intubated overnight. Continue with plan of care.   Problem: Risk for Delirium  Goal: Optimal Coping  2/12/2022 1848 by Pablo Andrew, RN  Outcome: No Change     Problem: Risk for Delirium  Goal: Improved Behavioral Control  2/12/2022 1645 by Pablo Andrew, RN  Outcome: No Change     Problem: Risk for Delirium  Goal: Improved Attention and Thought Clarity  2/12/2022 1848 by Pablo Andrew, RN  Outcome: No Change

## 2022-02-14 ENCOUNTER — APPOINTMENT (OUTPATIENT)
Dept: OCCUPATIONAL THERAPY | Facility: CLINIC | Age: 32
End: 2022-02-14
Attending: SURGERY
Payer: COMMERCIAL

## 2022-02-14 LAB
ALBUMIN SERPL-MCNC: 2.4 G/DL (ref 3.4–5)
ALP SERPL-CCNC: 73 U/L (ref 40–150)
ALT SERPL W P-5'-P-CCNC: 102 U/L (ref 0–70)
ANION GAP SERPL CALCULATED.3IONS-SCNC: 4 MMOL/L (ref 3–14)
ANION GAP SERPL CALCULATED.3IONS-SCNC: 5 MMOL/L (ref 3–14)
ANION GAP SERPL CALCULATED.3IONS-SCNC: 6 MMOL/L (ref 3–14)
ANION GAP SERPL CALCULATED.3IONS-SCNC: 7 MMOL/L (ref 3–14)
APTT PPP: 59 SECONDS (ref 22–38)
AST SERPL W P-5'-P-CCNC: 58 U/L (ref 0–45)
BASE EXCESS BLDV CALC-SCNC: 1.2 MMOL/L (ref -7.7–1.9)
BASE EXCESS BLDV CALC-SCNC: 2.6 MMOL/L (ref -7.7–1.9)
BILIRUB SERPL-MCNC: 0.7 MG/DL (ref 0.2–1.3)
BUN SERPL-MCNC: 121 MG/DL (ref 7–30)
BUN SERPL-MCNC: 122 MG/DL (ref 7–30)
BUN SERPL-MCNC: 124 MG/DL (ref 7–30)
BUN SERPL-MCNC: 128 MG/DL (ref 7–30)
CALCIUM SERPL-MCNC: 9 MG/DL (ref 8.5–10.1)
CALCIUM SERPL-MCNC: 9.1 MG/DL (ref 8.5–10.1)
CALCIUM SERPL-MCNC: 9.3 MG/DL (ref 8.5–10.1)
CALCIUM SERPL-MCNC: 9.4 MG/DL (ref 8.5–10.1)
CHLORIDE BLD-SCNC: 126 MMOL/L (ref 94–109)
CHLORIDE BLD-SCNC: 127 MMOL/L (ref 94–109)
CHLORIDE BLD-SCNC: 128 MMOL/L (ref 94–109)
CHLORIDE BLD-SCNC: 128 MMOL/L (ref 94–109)
CO2 SERPL-SCNC: 25 MMOL/L (ref 20–32)
CO2 SERPL-SCNC: 26 MMOL/L (ref 20–32)
CO2 SERPL-SCNC: 27 MMOL/L (ref 20–32)
CO2 SERPL-SCNC: 28 MMOL/L (ref 20–32)
CREAT SERPL-MCNC: 1.31 MG/DL (ref 0.66–1.25)
CREAT SERPL-MCNC: 1.41 MG/DL (ref 0.66–1.25)
CREAT SERPL-MCNC: 1.42 MG/DL (ref 0.66–1.25)
CREAT SERPL-MCNC: 1.6 MG/DL (ref 0.66–1.25)
ERYTHROCYTE [DISTWIDTH] IN BLOOD BY AUTOMATED COUNT: 15.1 % (ref 10–15)
GFR SERPL CREATININE-BSD FRML MDRD: 59 ML/MIN/1.73M2
GFR SERPL CREATININE-BSD FRML MDRD: 68 ML/MIN/1.73M2
GFR SERPL CREATININE-BSD FRML MDRD: 68 ML/MIN/1.73M2
GFR SERPL CREATININE-BSD FRML MDRD: 75 ML/MIN/1.73M2
GLUCOSE BLD-MCNC: 175 MG/DL (ref 70–99)
GLUCOSE BLD-MCNC: 189 MG/DL (ref 70–99)
GLUCOSE BLD-MCNC: 190 MG/DL (ref 70–99)
GLUCOSE BLD-MCNC: 198 MG/DL (ref 70–99)
GLUCOSE BLDC GLUCOMTR-MCNC: 118 MG/DL (ref 70–99)
GLUCOSE BLDC GLUCOMTR-MCNC: 150 MG/DL (ref 70–99)
GLUCOSE BLDC GLUCOMTR-MCNC: 160 MG/DL (ref 70–99)
GLUCOSE BLDC GLUCOMTR-MCNC: 161 MG/DL (ref 70–99)
GLUCOSE BLDC GLUCOMTR-MCNC: 166 MG/DL (ref 70–99)
GLUCOSE BLDC GLUCOMTR-MCNC: 174 MG/DL (ref 70–99)
HCO3 BLDV-SCNC: 27 MMOL/L (ref 21–28)
HCO3 BLDV-SCNC: 28 MMOL/L (ref 21–28)
HCT VFR BLD AUTO: 29.6 % (ref 40–53)
HGB BLD-MCNC: 8.5 G/DL (ref 13.3–17.7)
MAGNESIUM SERPL-MCNC: 3.5 MG/DL (ref 1.8–2.6)
MCH RBC QN AUTO: 29.8 PG (ref 26.5–33)
MCHC RBC AUTO-ENTMCNC: 28.7 G/DL (ref 31.5–36.5)
MCV RBC AUTO: 104 FL (ref 78–100)
O2/TOTAL GAS SETTING VFR VENT: 40 %
O2/TOTAL GAS SETTING VFR VENT: 60 %
OXYHGB MFR BLDV: 70 % (ref 70–75)
OXYHGB MFR BLDV: 89 % (ref 70–75)
PCO2 BLDV: 47 MM HG (ref 40–50)
PCO2 BLDV: 49 MM HG (ref 40–50)
PH BLDV: 7.36 [PH] (ref 7.32–7.43)
PH BLDV: 7.39 [PH] (ref 7.32–7.43)
PLATELET # BLD AUTO: 244 10E3/UL (ref 150–450)
PO2 BLDV: 41 MM HG (ref 25–47)
PO2 BLDV: 63 MM HG (ref 25–47)
POTASSIUM BLD-SCNC: 3.1 MMOL/L (ref 3.4–5.3)
POTASSIUM BLD-SCNC: 3.4 MMOL/L (ref 3.4–5.3)
POTASSIUM BLD-SCNC: 3.4 MMOL/L (ref 3.4–5.3)
POTASSIUM BLD-SCNC: 3.7 MMOL/L (ref 3.4–5.3)
POTASSIUM BLD-SCNC: 4.2 MMOL/L (ref 3.4–5.3)
PROT SERPL-MCNC: 7.1 G/DL (ref 6.8–8.8)
RBC # BLD AUTO: 2.85 10E6/UL (ref 4.4–5.9)
SODIUM SERPL-SCNC: 158 MMOL/L (ref 133–144)
SODIUM SERPL-SCNC: 159 MMOL/L (ref 133–144)
SODIUM SERPL-SCNC: 160 MMOL/L (ref 133–144)
SODIUM SERPL-SCNC: 160 MMOL/L (ref 133–144)
VIT B12 SERPL-MCNC: 1370 PG/ML (ref 193–986)
WBC # BLD AUTO: 18.5 10E3/UL (ref 4–11)

## 2022-02-14 PROCEDURE — 258N000003 HC RX IP 258 OP 636: Performed by: SURGERY

## 2022-02-14 PROCEDURE — 250N000011 HC RX IP 250 OP 636: Performed by: SURGERY

## 2022-02-14 PROCEDURE — 84132 ASSAY OF SERUM POTASSIUM: CPT | Performed by: INTERNAL MEDICINE

## 2022-02-14 PROCEDURE — 83735 ASSAY OF MAGNESIUM: CPT | Performed by: STUDENT IN AN ORGANIZED HEALTH CARE EDUCATION/TRAINING PROGRAM

## 2022-02-14 PROCEDURE — 85730 THROMBOPLASTIN TIME PARTIAL: CPT | Performed by: SURGERY

## 2022-02-14 PROCEDURE — 82805 BLOOD GASES W/O2 SATURATION: CPT | Performed by: STUDENT IN AN ORGANIZED HEALTH CARE EDUCATION/TRAINING PROGRAM

## 2022-02-14 PROCEDURE — 84132 ASSAY OF SERUM POTASSIUM: CPT | Performed by: STUDENT IN AN ORGANIZED HEALTH CARE EDUCATION/TRAINING PROGRAM

## 2022-02-14 PROCEDURE — 250N000013 HC RX MED GY IP 250 OP 250 PS 637: Performed by: STUDENT IN AN ORGANIZED HEALTH CARE EDUCATION/TRAINING PROGRAM

## 2022-02-14 PROCEDURE — 250N000013 HC RX MED GY IP 250 OP 250 PS 637: Performed by: SURGERY

## 2022-02-14 PROCEDURE — 999N000285 HC STATISTIC VASC ACCESS LAB DRAW WITH PIV START

## 2022-02-14 PROCEDURE — 272N000201 ZZ HC ADHESIVE SKIN CLOSURE, DERMABOND

## 2022-02-14 PROCEDURE — 272N000473 HC KIT, VPS RHYTHM STYLET

## 2022-02-14 PROCEDURE — 85027 COMPLETE CBC AUTOMATED: CPT | Performed by: STUDENT IN AN ORGANIZED HEALTH CARE EDUCATION/TRAINING PROGRAM

## 2022-02-14 PROCEDURE — 36415 COLL VENOUS BLD VENIPUNCTURE: CPT | Performed by: SURGERY

## 2022-02-14 PROCEDURE — 82746 ASSAY OF FOLIC ACID SERUM: CPT | Performed by: STUDENT IN AN ORGANIZED HEALTH CARE EDUCATION/TRAINING PROGRAM

## 2022-02-14 PROCEDURE — 82607 VITAMIN B-12: CPT | Performed by: STUDENT IN AN ORGANIZED HEALTH CARE EDUCATION/TRAINING PROGRAM

## 2022-02-14 PROCEDURE — 272N000452 HC KIT SHRLOCK 5FR POWER PICC TRIPLE LUMEN

## 2022-02-14 PROCEDURE — 80053 COMPREHEN METABOLIC PANEL: CPT | Performed by: STUDENT IN AN ORGANIZED HEALTH CARE EDUCATION/TRAINING PROGRAM

## 2022-02-14 PROCEDURE — 97530 THERAPEUTIC ACTIVITIES: CPT | Mod: GO | Performed by: OCCUPATIONAL THERAPIST

## 2022-02-14 PROCEDURE — G0463 HOSPITAL OUTPT CLINIC VISIT: HCPCS

## 2022-02-14 PROCEDURE — 250N000011 HC RX IP 250 OP 636: Performed by: STUDENT IN AN ORGANIZED HEALTH CARE EDUCATION/TRAINING PROGRAM

## 2022-02-14 PROCEDURE — 97110 THERAPEUTIC EXERCISES: CPT | Mod: GO | Performed by: OCCUPATIONAL THERAPIST

## 2022-02-14 PROCEDURE — 99291 CRITICAL CARE FIRST HOUR: CPT | Mod: 24 | Performed by: INTERNAL MEDICINE

## 2022-02-14 PROCEDURE — 200N000002 HC R&B ICU UMMC

## 2022-02-14 PROCEDURE — 36415 COLL VENOUS BLD VENIPUNCTURE: CPT | Performed by: STUDENT IN AN ORGANIZED HEALTH CARE EDUCATION/TRAINING PROGRAM

## 2022-02-14 PROCEDURE — 94003 VENT MGMT INPAT SUBQ DAY: CPT

## 2022-02-14 PROCEDURE — 999N000157 HC STATISTIC RCP TIME EA 10 MIN

## 2022-02-14 PROCEDURE — 84132 ASSAY OF SERUM POTASSIUM: CPT | Performed by: SURGERY

## 2022-02-14 PROCEDURE — 999N000248 HC STATISTIC IV INSERT WITH US BY RN

## 2022-02-14 RX ORDER — LIDOCAINE 40 MG/G
CREAM TOPICAL
Status: ACTIVE | OUTPATIENT
Start: 2022-02-14 | End: 2022-02-17

## 2022-02-14 RX ORDER — POTASSIUM CHLORIDE 1.5 G/1.58G
40 POWDER, FOR SOLUTION ORAL ONCE
Status: COMPLETED | OUTPATIENT
Start: 2022-02-14 | End: 2022-02-14

## 2022-02-14 RX ORDER — AMINO AC/PROTEIN HYDR/WHEY PRO 10G-100/30
1 LIQUID (ML) ORAL 3 TIMES DAILY
Status: DISCONTINUED | OUTPATIENT
Start: 2022-02-14 | End: 2022-02-14

## 2022-02-14 RX ORDER — AMINO AC/PROTEIN HYDR/WHEY PRO 10G-100/30
2 LIQUID (ML) ORAL 3 TIMES DAILY
Status: DISCONTINUED | OUTPATIENT
Start: 2022-02-14 | End: 2022-02-21

## 2022-02-14 RX ADMIN — PIPERACILLIN AND TAZOBACTAM 3.38 G: 3; .375 INJECTION, POWDER, LYOPHILIZED, FOR SOLUTION INTRAVENOUS at 03:12

## 2022-02-14 RX ADMIN — INSULIN ASPART 2 UNITS: 100 INJECTION, SOLUTION INTRAVENOUS; SUBCUTANEOUS at 04:18

## 2022-02-14 RX ADMIN — BIVALIRUDIN 0.18 MG/KG/HR: 250 INJECTION, POWDER, LYOPHILIZED, FOR SOLUTION INTRAVENOUS at 06:16

## 2022-02-14 RX ADMIN — HYDRALAZINE HYDROCHLORIDE 75 MG: 50 TABLET, FILM COATED ORAL at 22:35

## 2022-02-14 RX ADMIN — Medication 15 ML: at 08:05

## 2022-02-14 RX ADMIN — HYDRALAZINE HYDROCHLORIDE 75 MG: 50 TABLET, FILM COATED ORAL at 13:15

## 2022-02-14 RX ADMIN — INSULIN ASPART 2 UNITS: 100 INJECTION, SOLUTION INTRAVENOUS; SUBCUTANEOUS at 00:17

## 2022-02-14 RX ADMIN — BIVALIRUDIN 0.18 MG/KG/HR: 250 INJECTION, POWDER, LYOPHILIZED, FOR SOLUTION INTRAVENOUS at 14:20

## 2022-02-14 RX ADMIN — ASPIRIN 81 MG CHEWABLE TABLET 81 MG: 81 TABLET CHEWABLE at 08:04

## 2022-02-14 RX ADMIN — POTASSIUM CHLORIDE 40 MEQ: 1.5 POWDER, FOR SOLUTION ORAL at 06:00

## 2022-02-14 RX ADMIN — AMOXICILLIN AND CLAVULANATE POTASSIUM 1 TABLET: 875; 125 TABLET, FILM COATED ORAL at 19:59

## 2022-02-14 RX ADMIN — HYDRALAZINE HYDROCHLORIDE 75 MG: 50 TABLET, FILM COATED ORAL at 06:44

## 2022-02-14 RX ADMIN — CARVEDILOL 50 MG: 25 TABLET, FILM COATED ORAL at 19:59

## 2022-02-14 RX ADMIN — POTASSIUM CHLORIDE 40 MEQ: 1.5 POWDER, FOR SOLUTION ORAL at 13:15

## 2022-02-14 RX ADMIN — INSULIN GLARGINE 50 UNITS: 100 INJECTION, SOLUTION SUBCUTANEOUS at 08:07

## 2022-02-14 RX ADMIN — Medication 2 DROP: at 00:07

## 2022-02-14 RX ADMIN — Medication 40 MG: at 08:05

## 2022-02-14 RX ADMIN — ACETAMINOPHEN 975 MG: 325 TABLET, FILM COATED ORAL at 13:16

## 2022-02-14 RX ADMIN — Medication 2 PACKET: at 20:00

## 2022-02-14 RX ADMIN — Medication 10 MG: at 19:59

## 2022-02-14 RX ADMIN — Medication 2 PACKET: at 08:07

## 2022-02-14 RX ADMIN — CARVEDILOL 50 MG: 25 TABLET, FILM COATED ORAL at 08:05

## 2022-02-14 RX ADMIN — INSULIN ASPART 1 UNITS: 100 INJECTION, SOLUTION INTRAVENOUS; SUBCUTANEOUS at 16:14

## 2022-02-14 RX ADMIN — TRAZODONE HYDROCHLORIDE 50 MG: 50 TABLET ORAL at 00:02

## 2022-02-14 RX ADMIN — Medication 1 PACKET: at 13:21

## 2022-02-14 RX ADMIN — ACETAMINOPHEN 975 MG: 325 TABLET, FILM COATED ORAL at 08:04

## 2022-02-14 RX ADMIN — AMOXICILLIN AND CLAVULANATE POTASSIUM 1 TABLET: 875; 125 TABLET, FILM COATED ORAL at 12:41

## 2022-02-14 RX ADMIN — INSULIN ASPART 1 UNITS: 100 INJECTION, SOLUTION INTRAVENOUS; SUBCUTANEOUS at 20:00

## 2022-02-14 RX ADMIN — BIVALIRUDIN 0.18 MG/KG/HR: 250 INJECTION, POWDER, LYOPHILIZED, FOR SOLUTION INTRAVENOUS at 23:02

## 2022-02-14 RX ADMIN — ACETAMINOPHEN 975 MG: 325 TABLET, FILM COATED ORAL at 19:58

## 2022-02-14 ASSESSMENT — ACTIVITIES OF DAILY LIVING (ADL)
ADLS_ACUITY_SCORE: 19
ADLS_ACUITY_SCORE: 19
ADLS_ACUITY_SCORE: 17
ADLS_ACUITY_SCORE: 17
ADLS_ACUITY_SCORE: 19
ADLS_ACUITY_SCORE: 19
ADLS_ACUITY_SCORE: 17
ADLS_ACUITY_SCORE: 19
ADLS_ACUITY_SCORE: 17
ADLS_ACUITY_SCORE: 19
ADLS_ACUITY_SCORE: 19
ADLS_ACUITY_SCORE: 17
ADLS_ACUITY_SCORE: 19
ADLS_ACUITY_SCORE: 17
ADLS_ACUITY_SCORE: 19
ADLS_ACUITY_SCORE: 19

## 2022-02-14 ASSESSMENT — MIFFLIN-ST. JEOR: SCORE: 2559.27

## 2022-02-14 NOTE — PROGRESS NOTES
CV ICU PROGRESS NOTE  February 14, 2022      CO-MORBIDITIES:   Aortic root aneurysm (H)    ASSESSMENT: Chuy Varner is a 31 year old male with a PMHx s/f bicuspid aortic valve, thoracic aortic aneurysm without rupture, hypertension, obesity, and testicular cancer s/p orchiectomy who underwent Bentall procedure with mechanical valve on 1/28 with Dr. Velasco and Dr. Hannah. Course complicated by postop hypoxemic respiratory failure, HIT positivity, remains intubated, now minimally sedated, off pressors.    TODAY'S PROGRESS:   - Extubation trial today   - pulling 600+ mL TV, adequate RSBI on pressure support, squeezing hand to commands  - Repleting free water deficit via NJ   - but holding feeds for extubation as above  - Patient's partner noted a rash overnight, resolved with benadryl, vanc + zosyn had been started prophylactically by night team, discontinue this AM    PLAN:  Neuro/ pain/ sedation:  # Acute Postoperative pain  - Monitor neurological status. Notify the MD for any acute changes in exam.  - Pain: well controlled on current regimen   - Scheduled tylenol   - PRN tylenol, dilaudid, robaxin, oxycodone  - Sedation: none  - RASS goal 0 to -1  - Sleep hygiene:   - melatonin trazadone qhs     Pulmonary care:   # Postoperative ventilation management  # Acute hypoxemic respiratory failure (ARDS vs. PNA vs. Hypervolemia vs. TRALI vs. Severe atelectasis)  COVID-19, Mycoplasma, Parainfluenza, and Influenza Negative. CTA negative for PE on 1/30.  CXR not consistent with ARDS. Likely element of severe V/Q mismatch and atelectasis.  Sudden drop in sats overnight on 2/8 requiring FiO2 80%.  - Possibly c/b PE given acute FiO2 increase, HIT positivity   - remains on bivalirudin  - Follow BCs and respiratory panels (2/12)   - Growing staph epi in blood and sputum   - Only 1/2 bottles of blood, on one side -- likely contaminant  - Titrate supplemental oxygen to maintain saturation above 90%  - Extubation trial 02/14/22  as above     Cardiovascular:    # Ascending aortic aneurysm s/p Bentall w/ [place] valve on 1/28 with Dr. Velasco and Dr. Hannah  # Bicuspid aortic valve  # History of thoracic aortic aneurysm without rupture  # History of hypertension  Recent echo on 10/26/21 with LVEF of 60-65%.  Post-op echo with LVEF >60%  CT PE 1/30 - negative for acute PE, marked dependent atelectasis R>L, but since found to have DVT, and HIT +  - Goal MAP>65, SBP <130  - ASA 81mg  - Holding PTA meds: metoprolol succinate 25mg daily  - Management of HTN   - Increase Coreg maxed at 50 BID   - Hydralazine 75 mg q8   - Labetalol and hydralazine PRN to maintain SBP <130     GI / Nutrition:   # Obesity (BMI 46)  # Nutrition  - NJ feeds at goal   - holding for extubation  - PPI  - Continue bowel regimen: miralax, senna  # Elevated ALT  - Newly elevated ALT, but ALP and AST remain normal, CT liver on 02/09 was unremarkable, although stones were noted int he gallbladder (no cholecystitis)  - Monitor LFTs every day for now    Renal/ Fluid Balance/ Electrolytes:   BL creat appears to be ~ 1.0  - Strict I/O, daily weights  - Avoid/limit nephrotoxins as able  - Replete lytes PRN per protocol   - Scheduled potassium replacement (repleted potassium 40 mEq this AM post-BMP)  # Hypernatremia  Free water deficit 11.7 L (02/14)  - correct 200 free water q2 via NJ tube   - hold for now in anticipation of extubation  - is not on medications/drips that could be switched to hypotonic solution     Endocrine:    # Stress induced hyperglycemia, improving  - HDSSI  - Lantus 50mg daily   - keep same, since he won't be NPO for too long before extubation trial this afternoon  - Goal BG <180 for optimal healing     ID/ Antibiotics:  # Stress induced leukocytosis, resolved  # Possible ventilator associated pneumonia  Febrile and pan-cultured on 1/30/22. Pan-scanned 02/09 with sinuses. Found small fluid collection in sinus.  Covid pcr, respiratory panel, and respiratory  cultures all negative 1/30.   Re-cultured 1/31: respiratory and blood cultures NGTD.   Re-cultured again 2/4 when WBC increased to 23. NGTD.  Re-cultured 02/12: 1 tube of staph epidermidis and respiratory culture on 02/10 with staph epidermidis (likely contaminant)  - Continue to monitor fever curve, WBC and inflammatory markers  - Meropenem x7 days - completed 2/7  - Consult ID 2/4: send C dif, blood cultures, repeat COVID   - COVID negative   - C. Diff negative   - cultures NGTD other than noted above  - CT C/A/P and sinuses 2/11   - Augmentin PO for sinusititis would cover UA+  - Fevers + increasing leukocytosis.   - Possible that fever worsened by untreated DVT d/t HIT +   - RLE has occluded popliteal vein, nonocclusive thrombus in femoral   - LLE is clear of thrombus  - Could consider re-consult to ID if fevers, leukocytosis reoccur     Heme/Onc:     # Stress induced leukocytosis  # Acute blood loss anemia  # Acute blood loss thrombocytopenia, resolved  # History of testicular cancer s/p orchiectomy  # RLE DVT  # HIT positive   No s/sx active bleeding. Has RLE DVT as above.  - Continue to monitor  - CBC daily  - Heparin resistant   - Bivalirudin gtt     MSK/ Skin:  # Sternotomy  # Surgical Incision  # Buttock pressure injury   - Sternal precautions  - Postoperative incision management per protocol  - PT/OT/CR  - WOCN consulted for pressure injury   - CTAP did not show stranding c/f infection    - has small sacral ulcer     Prophylaxis:    - Mechanical prophylaxis for DVT  - Chemical DVT prophylaxis- bivalirudin  - PPI  - HOB to 30 degrees     Lines/ tubes/ drains:  - Left PIV (1/28)  - Godfrey   - OG (1/28)  - Nasoduodenal (1/31)  - ETT (1/28)  - Rectal tube      Disposition:  - CVICU     Patient seen, findings and plan discussed with CV ICU staff    Xander Garcia, MS4    Nakita Weeks MD   General Surgery PGY3  [phone]      ====================================    SUBJECTIVE:   When visiting room,  patient was with eyes open, directing gaze. Was asked if he was experiencing pain, able to shake head 'no.'    OBJECTIVE:   1. VITAL SIGNS:   Temp:  [98.6  F (37  C)-101.8  F (38.8  C)] 100.5  F (38.1  C)  Pulse:  [] 99  Resp:  [18-25] 19  BP: ()/(49-68) 115/60  FiO2 (%):  [40 %-55 %] 40 %  SpO2:  [92 %-98 %] 93 %  Ventilation Mode: CMV/AC  (Continuous Mandatory Ventilation/ Assist Control)  FiO2 (%): 40 %  Rate Set (breaths/minute): 12 breaths/min  Tidal Volume Set (mL): 680 mL  PEEP (cm H2O): 8 cmH2O  Pressure Support (cm H2O): 8 cmH2O  Oxygen Concentration (%): 55 %  Resp: 19      2. INTAKE/ OUTPUT:   I/O last 3 completed shifts:  In: 3432.6 [I.V.:1237.6; NG/GT:875]  Out: 5240 [Urine:3890; Stool:1350]    3. PHYSICAL EXAMINATION:   General: intubated male patient seated in chair  Neuro: RASS 0 to -1, directs gaze, squeezes hand weakly to command, nods head yes and no, denies pain  Resp: Intubated, mechanically ventilated, pulling adequate tidal volumes with low RSBI on pressure support  CV: RRR  Abdomen: Soft, Non-distended, Non-tender  Incisions: Covered, but surrounding tissue is nonerythematous, dry, well-healing  Extremities: warm and well perfused    4. INVESTIGATIONS:   Arterial Blood Gases   Recent Labs   Lab 02/12/22  0434 02/10/22  0828 02/10/22  0436 02/09/22  2242   PH 7.44 7.38 7.43 7.41   PCO2 45 50* 44 48*   PO2 74* 86 103 55*   HCO3 30* 30* 29* 30*     Complete Blood Count   Recent Labs   Lab 02/14/22  0418 02/13/22  0532 02/12/22  0456 02/11/22  0512   WBC 18.5* 28.7* 30.1* 19.7*   HGB 8.5* 9.1* 9.3* 9.8*    223 184 166     Basic Metabolic Panel  Recent Labs   Lab 02/14/22  0418 02/14/22  0415 02/14/22  0005 02/13/22  1954 02/13/22  1630 02/13/22  1404 02/13/22  0758 02/13/22  0532 02/12/22 2002 02/12/22  1716 02/12/22  0846 02/12/22  0456   *  --   --   --   --  153*  --  152*  --   --   --  146*   POTASSIUM 3.1*  --   --   --   --  3.5  --  3.5  --  3.9   < > 3.1*    CHLORIDE 126*  --   --   --   --  119*  --  116*  --   --   --  110*   CO2 26  --   --   --   --  30  --  28  --   --   --  27   *  --   --   --   --  121*  --  114*  --   --   --  92*   CR 1.31*  --   --   --   --  1.38*  --  1.39*  --   --   --  1.22   * 174* 166* 180*   < > 205*   < > 189*   < >  --    < > 179*    < > = values in this interval not displayed.     Liver Function Tests  Recent Labs   Lab 02/14/22 0418 02/13/22 0532 02/12/22 0456 02/11/22 0512   AST 58* 39 31 20   * 79* 71* 74*   ALKPHOS 73 76 77 79   BILITOTAL 0.7 0.7 0.6 0.4   ALBUMIN 2.4* 2.8* 2.8* 2.8*     Pancreatic Enzymes  No lab results found in last 7 days.  Coagulation Profile  Recent Labs   Lab 02/14/22 0418 02/13/22 0532 02/12/22 2031 02/12/22 0456   PTT 59* 47* 74* 41*         5. RADIOLOGY:   No results found for this or any previous visit (from the past 24 hour(s)).    =========================================

## 2022-02-14 NOTE — PROGRESS NOTES
Patient extubated to HFNC 45L/60% per MD order. Tolerated well. Raspy voice, but not acute signs of distress. Will continue to monitor.

## 2022-02-14 NOTE — PHARMACY-VANCOMYCIN DOSING SERVICE
"      Pharmacy Vancomycin Initial Note  Date of Service 2022  Patient's  1990  31 year old, male    Indication: Skin and Soft Tissue Infection    Current estimated CrCl = Estimated Creatinine Clearance: 123.2 mL/min (A) (based on SCr of 1.38 mg/dL (H)).    Creatinine for last 3 days  2022:  5:12 AM Creatinine 0.84 mg/dL  2022:  4:56 AM Creatinine 1.22 mg/dL  2022:  5:32 AM Creatinine 1.39 mg/dL;  2:04 PM Creatinine 1.38 mg/dL    Recent Vancomycin Level(s) for last 3 days  No results found for requested labs within last 72 hours.      Vancomycin IV Administrations (past 72 hours)      No vancomycin orders with administrations in past 72 hours.                Nephrotoxins and other renal medications (From now, onward)    Start     Dose/Rate Route Frequency Ordered Stop    22 0930  vancomycin (VANCOCIN) 1000 mg in dextrose 5% 200 mL PREMIX         1,000 mg  200 mL/hr over 1 Hours Intravenous EVERY 12 HOURS 22 2100  piperacillin-tazobactam (ZOSYN) 3.375 g vial to attach to  mL bag        Note to Pharmacy: For [facility], [facility] and [facility]: For Zosyn-naive patients, use the \"Zosyn initial dose + extended infusion\" order panel.    3.375 g  over 30 Minutes Intravenous EVERY 6 HOURS 22 2100  vancomycin (VANCOCIN) 2,500 mg in sodium chloride 0.9 % 250 mL intermittent infusion         2,500 mg (central catheter)  over 90 Minutes Intravenous ONCE 22            Contrast Orders - past 72 hours (72h ago, onward)            None          Insight[employer] Prediction of Planned Initial Vancomycin Regimen    Loading dose: 2500 mg x 1   Regimen: 1000 mg IV every 12 hours.  Exposure target: AUC24 (range)400-600 mg/L.hr   AUC24,ss: 434 mg/L.hr  Probability of AUC24 > 400: 59 %  Ctrough,ss: 14.2 mg/L  Probability of Ctrough,ss > 20: 22 %  Probability of nephrotoxicity (Lodise MAGI ): 9 %          Plan:  1. Start vancomycin  2500 mg iv x 1 then 1000 " mg iv q12h   2. Vancomycin monitoring method: AUC  3. Vancomycin therapeutic monitoring goal: 400-600 mg*h/L  4. Pharmacy will check vancomycin levels as appropriate in 1-3 Days.    5. Serum creatinine levels will be ordered daily for the first week of therapy and at least twice weekly for subsequent weeks.      Vesta Pat, PharmD

## 2022-02-14 NOTE — OP NOTE
CO-SURGEON NOTE    Procedure Date: 01/28/2022     PREOPERATIVE DIAGNOSES:    1.  Ascending aortic aneurysm.  2.  Aortic root aneurysm.  3.  Possible bicuspid aortic valve.     POSTOPERATIVE DIAGNOSES:   1.  Ascending aortic aneurysm.  2.  Aortic root aneurysm.  3.  Bicuspid aortic valve.     OPERATIVE PROCEDURE:  Aortic root replacement with 27-29 mm On-X ascending aortic prosthesis with Vascutek Gelweave Valsalva graft (Bentall procedure with coronary implantation).     SURGEON:  Nickolas Velasco MD.     COSURGEON:  Newton Hannah MD.  The role of a second attending surgeon was necessary because of the complexity and high risk nature of the operation. I specifically performed a portion of the proximal annular sutures, as well as a portion of the distal aortic anastomosis.     OPERATIVE FINDINGS:  The patient's sternum was of adequate quality.  Pericardial space was free of any adhesions.  There was normal biventricular function.  The aortic valve was bicuspid with complete fusion of the right coronary and noncoronary leaflets.  There was some thickening and calcification of the leaflets.  There was a severely aneurysmal ascending aorta with aneurysm of the root.     DESCRIPTION OF PROCEDURE:  Please see the separate operative report by Dr. Velasco for details.     Newton Hannah MD

## 2022-02-14 NOTE — PLAN OF CARE
Patient resting in his chair. Unchanged neuro status. Sats mid 90s on 50% FiO2.exp wheezes on right side; Suctioned thick secretions x 2. Loose/water stools via rectal tube. Good urine output. Good pulses and perfusion. Temp=37.0 Small open area to buttock remains unchanged.GF visiting with patient and very pleasant and polite. Will transfer to bed before shift change.

## 2022-02-14 NOTE — PROGRESS NOTES
CLINICAL NUTRITION SERVICES - REASSESSMENT NOTE     Nutrition Prescription    Malnutrition Status:    Moderate malnutrition in the context of acute illness    Interventions by Registered Dietitian (RD):  Increased TF regimen to better meet caloric needs:   TwoCal HN @ 65 mL/hr (1560 mL/day) + Prosource (2 pkt TID) to provide 3360 kcals (32 kcal/kg/day, 70% MREE), 197 g PRO (1.9 g/kg/day), 1092 mL H2O, 342 g CHO and 8 g Fiber daily.    Future Recommendations:  Repeat metabolic cart study as appropriate  If loose stools (>500 ml/day) consider adding banatrol        EVALUATION OF THE PROGRESS TOWARD GOALS   Diet: NPO (intubated)   Nutrition Support: Vital HP @ 55 mL/hr + Prosource (2 pkts QID) via NDT to provide 1320 mL formula, 1640 kcal (15 kcal/kg), 203 g protein (1.9 g/kg), 147 g CHO, 30 g fat, 0 g fiber, and 1104 mL free water. Micro/Nx:    Intake: Met 40% calorie needs, 95% protein needs over past 5-days (from TF + propofol).      NEW FINDINGS   GI/Nutrition:  Pt currently receiving feeds at goal rate. No longer receiving significant kcal from propofol. Stool output variable (200-1750 ml/day). Overall, pt has lost 14 kg (8%) in the past 2 weeks. New driest weight of 152 kg today. Suspect weight loss r/t combination of nutrition inadequacy and fluctuation in fluid status.     Obtained metabolic cart study on 2/11 @ 1647. MREE = 4871 kcal/day (equiv to 46 kcal/kg) with RQ = 0.69. In the 24 hours preceding the study, pt received 2179 kcals (equiv to 21 kcal/kg or 45% MREE) from TF/modulars/propofol. RQ is within physiologic range and logical given provisions received prior to study. Based on study results, will aim to meet 60-80% MREE. See updated needs below.    UPDATED ASSESSED NUTRITION NEEDS   Based on dosing weight of 105 kg (adjusted for obesity, based on IBW of 89 kg and driest weight of 152 kg on 2/14):   Estimated Energy Needs: 2900 - 3900 kcal/day (28 - 37 kcal/kg/day)   Justification: 60-80%  MREE  Estimated Protein Needs: 155 - 210 g protein/day (1.5 - 2 g/kg/day)   Justification: Increased needs, obesity       Renal: Lasix, held today. K+ 3.1 Na 158 with  ml q2h, Cr 1.3 and .    Skin: Stage 2 pressure injury on columbella/bilateral nares is improving per WOC RN. No additional nutritional interventions at this time.      MALNUTRITION  % Intake: </= 50% for >/= 5 days (severe)  % Weight Loss: 8% in <1 month (severe)  Subcutaneous Fat Loss: Orbital region: Mild  Muscle Loss: Difficult to assess w/ body habitus   Fluid Accumulation/Edema: Mild  Malnutrition Diagnosis: Moderate malnutrition in the context of acute illness    Previous Goals   Total avg nutritional intake to meet a minimum of 20 kcal/kg and 1.5 g PRO/kg daily (per dosing wt 108 kg).  Evaluation: Met protein, but not caloric goals.     Previous Nutrition Diagnosis  Inadequate oral intake  Evaluation: No change    CURRENT NUTRITION DIAGNOSIS  Inadequate energy intake related to inadequate enteral infusion in setting of increased nutrition needs as evidenced by metabolic cart study, received ~40% energy needs on avg over past 5 days (from TF + propofol)       INTERVENTIONS  See interventions at top of progress note    Goals  Total avg nutritional intake to meet a minimum of 28 kcal/kg (60% MREE) and 1.5 g PRO/kg daily (per dosing wt 105 kg).    Monitoring/Evaluation  Progress toward goals will be monitored and evaluated per protocol.    Nannette Holguin, RD, LD  [id number]  Pgr: [phone]

## 2022-02-14 NOTE — PROGRESS NOTES
[facility] Inpatient Pressure Injury Assessment   Reason for consultation: Evaluate and treat nasal pressure injury       ASSESSMENT  Pressure Injury: on columbella bilateral nares  , hospital acquired ,   This is a Medical Device Related Pressure Injury (MDRPI) due to Bridle string  Pressure Injury is Stage 2  And mucosal in bilateral nares   Contributing factor of the pressure injury: pressure, immobility and moisture  Proning  Status: improving on Right, resolved on Left    New:  Right buttock superficial abrasions from unknown source  Status:  healing   TREATMENT PLAN  Septum: BID cleanse with saline and apply a thin layer of vaseline over wound. Make sure there is no tension on bridle string.  Orders Reviewed and Updated     Right buttock wounds  Apply criticaid paste to wounds twice daily and as needed     WO Nurse follow-up plan:twice weekly  Nursing to notify the Provider(s) and re-consult the WO Nurse if wound(s) deteriorates or new skin concern.    Patient History  According to provider note(s):  Acute Hypoxemic Respiratory Failure due to Shunt Physiology  # Pulmonary Shunting from Bilateral Lower Lobe Atelectasis vs. Pneumonia     Chuy Varner is a 31 year old male with PMH of likely bicuspid aortic valve, thoracic aortic aneurysm without rupture, hypertension, obesity, and testicular cancer s/p orchiectomy who underwent Bentall procedure with mechanical valve on 1/28 with Dr. Velasco ad Dr. Hannah. Patient was admitted to CVICU for post-operative management; post-op course complicated by acute hypoxic respiratory failure despite PEEP titration.     Objective Data  Containment of urine/stool: Indwelling catheter and Internal fecal management    Current Diet/ Nutrition:  Orders Placed This Encounter      NPO for Medical/Clinical Reasons Except for: Other; Specify: NPO until Extubated      Output:   I/O last 3 completed shifts:  In: 3702.6 [I.V.:1272.6; NG/GT:1385]  Out: 4675 [Urine:3325;  Stool:1350]    Risk Assessment:   Sensory Perception: 1-->completely limited  Moisture: 3-->occasionally moist  Activity: 2-->chairfast  Mobility: 1-->completely immobile  Nutrition: 3-->adequate  Friction and Shear: 2-->potential problem  Ben Score: 12      Labs:   Recent Labs   Lab 02/14/22  0418 02/09/22  0333 02/08/22  0415   ALBUMIN 2.4*   < > 2.5*   HGB 8.5*   < > 9.3*   WBC 18.5*   < > 17.8*   CRP  --   --  86.0*    < > = values in this interval not displayed.       Physical Exam  Skin inspection: focused septum/columbella/bilateral nares           Date of last Photo 2/4 and 2/11and 2/14  Wound History: previously proned. Extubated today  Measurements (length x width x depth, in cm) 0.5 cm x 0.8 cm  x  0.1 cm   Wound Base: 100% superficial scab   Palpation of the wound bed: normal   Periwound skin: intact  Color: normal and consistent with surrounding tissue  Temperature: normal   Drainage:, none  Description of drainage: none  Odor: none  Pain: mild,     Wound location:  Buttocks      Date of photo:  2/14/22  History:  Pt has a rectal tube with minimal bypassing of stool   Location:  Right fleshy buttock, approximately 2 cm superior to gluteal fold  Two identical 0.3 cm x 0.3 x 0.2 cm wounds with thin red fibrinous scabs  Drainage:  None  Pain:  none    Interventions  Current support surface: Standard  Low air loss mattress  Current off-loading measures: Heel off-loading boot(s) and Pillows  Repositioning aid: Pillows  Visual inspection of wound(s) completed   Tube Securement: as per protocol  Wound Care: was not done per plan of care.  Supplies: floor stock  Educated provided: importance of repositioning, plan of care, Hygiene and Off-loading pressure  Education provided to: spouse and nurse  Discussed importance of:off-loading pressure to wound  Discussed plan of care with Family and Nurse

## 2022-02-14 NOTE — PLAN OF CARE
ICU End of Shift Summary. See flowsheets for vital signs and detailed assessment.     Changes this shift:  CVTS paged at beginning of shift for new redness and warmth to chest and neck. Per pt's significant other, redness started on left chest wall around 1600 but rapidly spread from 1488-7853. 50 mg IV benadryl given with no change in 30 minutes. Redness and warmth did significantly improve over next hour. Zosyn and Vanco started for concern of sternal incision infection. CMV overnight, FiO2 titrated down to 40%. Was able to slightly squeeze both hands to command. Appears alert and able to track more than the previous night. PRN trazadone given for sleep, pt able to get a few hours of sleep. BP stable, no PRN BP meds given. Held 2200 hydralazine due to MAP <65. TF at goal. 100q4 flushes. Rectal tube in place, 450 total out. Godfrey in place-good UOP. Angiomax remains infusing at 0.18, therapeutic. RLE cool to touch but warmer than previous evening. 2+ pulses RLE, no discoloration. K 3.1, replaced with 40 mEq, recheck ordered for 1000.     Plan: SBP<130. Vent wean as able. Continue to closely monitor RLE.

## 2022-02-14 NOTE — PLAN OF CARE
ICU End of Shift Summary. See flowsheets for vital signs and detailed assessment.    Changes this shift: Patient is alert and able to follow common. Tried PS since 8:30 am, then extubated at 1535. HFNC 60% and 45 L using, SPO2:92-93%. Sodium 159, free water flush 200/q2h. Potassium had been replaced, rechecked 4.2. Due to frequent lab draw, PICC had been ordered.    Plan:  Will remove Godfrey and place PICC later today.    Problem: Activity Intolerance (Cardiovascular Surgery)  Goal: Improved Activity Tolerance  Outcome: Improving     Problem: Pain (Cardiovascular Surgery)  Goal: Acceptable Pain Control  Outcome: Improving     Problem: Risk for Delirium  Goal: Improved Attention and Thought Clarity  Intervention: Maximize Cognitive Function  Recent Flowsheet Documentation  Taken 2/14/2022 1600 by Remington Farfan RN  Sensory Stimulation Regulation:   television on   visual stimulation provided  Reorientation Measures: reorientation provided  Taken 2/14/2022 1200 by Remington Farfan RN  Sensory Stimulation Regulation:   television on   visual stimulation provided  Reorientation Measures: reorientation provided  Taken 2/14/2022 0800 by Remington Farfan RN  Sensory Stimulation Regulation:   television on   visual stimulation provided  Reorientation Measures: reorientation provided

## 2022-02-15 ENCOUNTER — APPOINTMENT (OUTPATIENT)
Dept: GENERAL RADIOLOGY | Facility: CLINIC | Age: 32
End: 2022-02-15
Attending: STUDENT IN AN ORGANIZED HEALTH CARE EDUCATION/TRAINING PROGRAM
Payer: COMMERCIAL

## 2022-02-15 ENCOUNTER — APPOINTMENT (OUTPATIENT)
Dept: OCCUPATIONAL THERAPY | Facility: CLINIC | Age: 32
End: 2022-02-15
Attending: SURGERY
Payer: COMMERCIAL

## 2022-02-15 LAB
ALBUMIN SERPL-MCNC: 2.4 G/DL (ref 3.4–5)
ALP SERPL-CCNC: 83 U/L (ref 40–150)
ALT SERPL W P-5'-P-CCNC: 189 U/L (ref 0–70)
ANION GAP SERPL CALCULATED.3IONS-SCNC: 4 MMOL/L (ref 3–14)
ANION GAP SERPL CALCULATED.3IONS-SCNC: 4 MMOL/L (ref 3–14)
ANION GAP SERPL CALCULATED.3IONS-SCNC: 5 MMOL/L (ref 3–14)
APTT PPP: 59 SECONDS (ref 22–38)
AST SERPL W P-5'-P-CCNC: 116 U/L (ref 0–45)
BACTERIA BLD CULT: NO GROWTH
BACTERIA BLD CULT: NO GROWTH
BASE EXCESS BLDV CALC-SCNC: 3.4 MMOL/L (ref -7.7–1.9)
BILIRUB SERPL-MCNC: 0.4 MG/DL (ref 0.2–1.3)
BUN SERPL-MCNC: 110 MG/DL (ref 7–30)
BUN SERPL-MCNC: 115 MG/DL (ref 7–30)
BUN SERPL-MCNC: 77 MG/DL (ref 7–30)
BUN SERPL-MCNC: 85 MG/DL (ref 7–30)
BUN SERPL-MCNC: 94 MG/DL (ref 7–30)
BUN SERPL-MCNC: 96 MG/DL (ref 7–30)
CALCIUM SERPL-MCNC: 8.8 MG/DL (ref 8.5–10.1)
CALCIUM SERPL-MCNC: 9.1 MG/DL (ref 8.5–10.1)
CALCIUM SERPL-MCNC: 9.1 MG/DL (ref 8.5–10.1)
CALCIUM SERPL-MCNC: 9.2 MG/DL (ref 8.5–10.1)
CHLORIDE BLD-SCNC: 126 MMOL/L (ref 94–109)
CHLORIDE BLD-SCNC: 128 MMOL/L (ref 94–109)
CHLORIDE BLD-SCNC: 128 MMOL/L (ref 94–109)
CHLORIDE BLD-SCNC: 129 MMOL/L (ref 94–109)
CHLORIDE BLD-SCNC: 129 MMOL/L (ref 94–109)
CHLORIDE BLD-SCNC: 130 MMOL/L (ref 94–109)
CO2 SERPL-SCNC: 25 MMOL/L (ref 20–32)
CO2 SERPL-SCNC: 26 MMOL/L (ref 20–32)
CO2 SERPL-SCNC: 26 MMOL/L (ref 20–32)
CO2 SERPL-SCNC: 27 MMOL/L (ref 20–32)
CO2 SERPL-SCNC: 27 MMOL/L (ref 20–32)
CO2 SERPL-SCNC: 28 MMOL/L (ref 20–32)
CREAT SERPL-MCNC: 0.96 MG/DL (ref 0.66–1.25)
CREAT SERPL-MCNC: 1.01 MG/DL (ref 0.66–1.25)
CREAT SERPL-MCNC: 1.11 MG/DL (ref 0.66–1.25)
CREAT SERPL-MCNC: 1.17 MG/DL (ref 0.66–1.25)
CREAT SERPL-MCNC: 1.21 MG/DL (ref 0.66–1.25)
CREAT SERPL-MCNC: 1.36 MG/DL (ref 0.66–1.25)
CREAT UR-MCNC: 65 MG/DL
ERYTHROCYTE [DISTWIDTH] IN BLOOD BY AUTOMATED COUNT: 15.4 % (ref 10–15)
FOLATE SERPL-MCNC: 18.3 NG/ML
FRACT EXCRET NA UR+SERPL-RTO: 0.2 %
GFR SERPL CREATININE-BSD FRML MDRD: 71 ML/MIN/1.73M2
GFR SERPL CREATININE-BSD FRML MDRD: 82 ML/MIN/1.73M2
GFR SERPL CREATININE-BSD FRML MDRD: 85 ML/MIN/1.73M2
GFR SERPL CREATININE-BSD FRML MDRD: >90 ML/MIN/1.73M2
GLUCOSE BLD-MCNC: 188 MG/DL (ref 70–99)
GLUCOSE BLD-MCNC: 197 MG/DL (ref 70–99)
GLUCOSE BLD-MCNC: 208 MG/DL (ref 70–99)
GLUCOSE BLD-MCNC: 211 MG/DL (ref 70–99)
GLUCOSE BLD-MCNC: 217 MG/DL (ref 70–99)
GLUCOSE BLD-MCNC: 236 MG/DL (ref 70–99)
GLUCOSE BLDC GLUCOMTR-MCNC: 168 MG/DL (ref 70–99)
GLUCOSE BLDC GLUCOMTR-MCNC: 176 MG/DL (ref 70–99)
GLUCOSE BLDC GLUCOMTR-MCNC: 183 MG/DL (ref 70–99)
GLUCOSE BLDC GLUCOMTR-MCNC: 189 MG/DL (ref 70–99)
GLUCOSE BLDC GLUCOMTR-MCNC: 190 MG/DL (ref 70–99)
GLUCOSE BLDC GLUCOMTR-MCNC: 192 MG/DL (ref 70–99)
GLUCOSE BLDC GLUCOMTR-MCNC: 197 MG/DL (ref 70–99)
HCO3 BLDV-SCNC: 28 MMOL/L (ref 21–28)
HCT VFR BLD AUTO: 29.7 % (ref 40–53)
HGB BLD-MCNC: 8.4 G/DL (ref 13.3–17.7)
MAGNESIUM SERPL-MCNC: 3.3 MG/DL (ref 1.8–2.6)
MCH RBC QN AUTO: 29.8 PG (ref 26.5–33)
MCHC RBC AUTO-ENTMCNC: 28.3 G/DL (ref 31.5–36.5)
MCV RBC AUTO: 105 FL (ref 78–100)
O2/TOTAL GAS SETTING VFR VENT: 60 %
OSMOLALITY UR: 902 MMOL/KG (ref 100–1200)
OXYHGB MFR BLDV: 69 % (ref 70–75)
PCO2 BLDV: 42 MM HG (ref 40–50)
PH BLDV: 7.43 [PH] (ref 7.32–7.43)
PLATELET # BLD AUTO: 332 10E3/UL (ref 150–450)
PO2 BLDV: 39 MM HG (ref 25–47)
POTASSIUM BLD-SCNC: 3.6 MMOL/L (ref 3.4–5.3)
POTASSIUM BLD-SCNC: 3.6 MMOL/L (ref 3.4–5.3)
POTASSIUM BLD-SCNC: 3.7 MMOL/L (ref 3.4–5.3)
POTASSIUM BLD-SCNC: 3.8 MMOL/L (ref 3.4–5.3)
POTASSIUM BLD-SCNC: 3.8 MMOL/L (ref 3.4–5.3)
POTASSIUM BLD-SCNC: 4 MMOL/L (ref 3.4–5.3)
PROT SERPL-MCNC: 7.3 G/DL (ref 6.8–8.8)
RBC # BLD AUTO: 2.82 10E6/UL (ref 4.4–5.9)
SCANNED LAB RESULT: ABNORMAL
SODIUM SERPL-SCNC: 156 MMOL/L (ref 133–144)
SODIUM SERPL-SCNC: 158 MMOL/L (ref 133–144)
SODIUM SERPL-SCNC: 160 MMOL/L (ref 133–144)
SODIUM SERPL-SCNC: 160 MMOL/L (ref 133–144)
SODIUM SERPL-SCNC: 161 MMOL/L (ref 133–144)
SODIUM SERPL-SCNC: 162 MMOL/L (ref 133–144)
SODIUM UR-SCNC: 21 MMOL/L
WBC # BLD AUTO: 15.5 10E3/UL (ref 4–11)

## 2022-02-15 PROCEDURE — 250N000013 HC RX MED GY IP 250 OP 250 PS 637: Performed by: STUDENT IN AN ORGANIZED HEALTH CARE EDUCATION/TRAINING PROGRAM

## 2022-02-15 PROCEDURE — 250N000011 HC RX IP 250 OP 636: Performed by: SURGERY

## 2022-02-15 PROCEDURE — 71045 X-RAY EXAM CHEST 1 VIEW: CPT | Mod: 26 | Performed by: RADIOLOGY

## 2022-02-15 PROCEDURE — 83735 ASSAY OF MAGNESIUM: CPT | Performed by: STUDENT IN AN ORGANIZED HEALTH CARE EDUCATION/TRAINING PROGRAM

## 2022-02-15 PROCEDURE — 97110 THERAPEUTIC EXERCISES: CPT | Mod: GO

## 2022-02-15 PROCEDURE — 200N000002 HC R&B ICU UMMC

## 2022-02-15 PROCEDURE — 82805 BLOOD GASES W/O2 SATURATION: CPT | Performed by: STUDENT IN AN ORGANIZED HEALTH CARE EDUCATION/TRAINING PROGRAM

## 2022-02-15 PROCEDURE — 97535 SELF CARE MNGMENT TRAINING: CPT | Mod: GO

## 2022-02-15 PROCEDURE — 82310 ASSAY OF CALCIUM: CPT | Performed by: INTERNAL MEDICINE

## 2022-02-15 PROCEDURE — 84300 ASSAY OF URINE SODIUM: CPT | Performed by: STUDENT IN AN ORGANIZED HEALTH CARE EDUCATION/TRAINING PROGRAM

## 2022-02-15 PROCEDURE — 80053 COMPREHEN METABOLIC PANEL: CPT | Performed by: INTERNAL MEDICINE

## 2022-02-15 PROCEDURE — 250N000013 HC RX MED GY IP 250 OP 250 PS 637: Performed by: SURGERY

## 2022-02-15 PROCEDURE — 83935 ASSAY OF URINE OSMOLALITY: CPT | Performed by: STUDENT IN AN ORGANIZED HEALTH CARE EDUCATION/TRAINING PROGRAM

## 2022-02-15 PROCEDURE — 258N000003 HC RX IP 258 OP 636: Performed by: STUDENT IN AN ORGANIZED HEALTH CARE EDUCATION/TRAINING PROGRAM

## 2022-02-15 PROCEDURE — 85730 THROMBOPLASTIN TIME PARTIAL: CPT | Performed by: SURGERY

## 2022-02-15 PROCEDURE — 85027 COMPLETE CBC AUTOMATED: CPT | Performed by: STUDENT IN AN ORGANIZED HEALTH CARE EDUCATION/TRAINING PROGRAM

## 2022-02-15 PROCEDURE — 999N000215 HC STATISTIC HFNC ADULT NON-CPAP

## 2022-02-15 PROCEDURE — 999N000157 HC STATISTIC RCP TIME EA 10 MIN

## 2022-02-15 PROCEDURE — 99291 CRITICAL CARE FIRST HOUR: CPT | Mod: 24 | Performed by: INTERNAL MEDICINE

## 2022-02-15 PROCEDURE — 71045 X-RAY EXAM CHEST 1 VIEW: CPT

## 2022-02-15 PROCEDURE — 999N000043 HC STATISTIC CTO2 CONT OXYGEN TECH TIME EA 90 MIN

## 2022-02-15 PROCEDURE — 258N000003 HC RX IP 258 OP 636: Performed by: SURGERY

## 2022-02-15 RX ORDER — DEXTROSE MONOHYDRATE 50 MG/ML
INJECTION, SOLUTION INTRAVENOUS CONTINUOUS
Status: DISCONTINUED | OUTPATIENT
Start: 2022-02-15 | End: 2022-02-16

## 2022-02-15 RX ORDER — ACETAMINOPHEN 325 MG/1
650 TABLET ORAL EVERY 6 HOURS PRN
Status: DISCONTINUED | OUTPATIENT
Start: 2022-02-15 | End: 2022-03-04 | Stop reason: HOSPADM

## 2022-02-15 RX ORDER — HYDROCHLOROTHIAZIDE 25 MG/1
25 TABLET ORAL ONCE
Status: COMPLETED | OUTPATIENT
Start: 2022-02-15 | End: 2022-02-15

## 2022-02-15 RX ORDER — POTASSIUM CHLORIDE 1.5 G/1.58G
20 POWDER, FOR SOLUTION ORAL ONCE
Status: COMPLETED | OUTPATIENT
Start: 2022-02-15 | End: 2022-02-15

## 2022-02-15 RX ORDER — POTASSIUM CHLORIDE 29.8 MG/ML
20 INJECTION INTRAVENOUS ONCE
Status: COMPLETED | OUTPATIENT
Start: 2022-02-15 | End: 2022-02-15

## 2022-02-15 RX ORDER — CARBOXYMETHYLCELLULOSE SODIUM 5 MG/ML
1 SOLUTION/ DROPS OPHTHALMIC
Status: DISCONTINUED | OUTPATIENT
Start: 2022-02-15 | End: 2022-02-15

## 2022-02-15 RX ADMIN — CARVEDILOL 50 MG: 25 TABLET, FILM COATED ORAL at 08:04

## 2022-02-15 RX ADMIN — CARVEDILOL 50 MG: 25 TABLET, FILM COATED ORAL at 20:23

## 2022-02-15 RX ADMIN — INSULIN ASPART 2 UNITS: 100 INJECTION, SOLUTION INTRAVENOUS; SUBCUTANEOUS at 03:44

## 2022-02-15 RX ADMIN — INSULIN GLARGINE 50 UNITS: 100 INJECTION, SOLUTION SUBCUTANEOUS at 08:10

## 2022-02-15 RX ADMIN — INSULIN ASPART 3 UNITS: 100 INJECTION, SOLUTION INTRAVENOUS; SUBCUTANEOUS at 00:03

## 2022-02-15 RX ADMIN — DEXTROSE MONOHYDRATE: 50 INJECTION, SOLUTION INTRAVENOUS at 10:39

## 2022-02-15 RX ADMIN — ACETAMINOPHEN 975 MG: 325 TABLET, FILM COATED ORAL at 08:04

## 2022-02-15 RX ADMIN — HYDRALAZINE HYDROCHLORIDE 75 MG: 50 TABLET, FILM COATED ORAL at 06:12

## 2022-02-15 RX ADMIN — DEXTROSE MONOHYDRATE: 50 INJECTION, SOLUTION INTRAVENOUS at 03:35

## 2022-02-15 RX ADMIN — HYDRALAZINE HYDROCHLORIDE 75 MG: 50 TABLET, FILM COATED ORAL at 23:16

## 2022-02-15 RX ADMIN — AMOXICILLIN AND CLAVULANATE POTASSIUM 1 TABLET: 875; 125 TABLET, FILM COATED ORAL at 08:03

## 2022-02-15 RX ADMIN — DEXTROSE MONOHYDRATE: 50 INJECTION, SOLUTION INTRAVENOUS at 16:49

## 2022-02-15 RX ADMIN — INSULIN ASPART 3 UNITS: 100 INJECTION, SOLUTION INTRAVENOUS; SUBCUTANEOUS at 20:40

## 2022-02-15 RX ADMIN — INSULIN ASPART 2 UNITS: 100 INJECTION, SOLUTION INTRAVENOUS; SUBCUTANEOUS at 11:33

## 2022-02-15 RX ADMIN — BIVALIRUDIN 0.18 MG/KG/HR: 250 INJECTION, POWDER, LYOPHILIZED, FOR SOLUTION INTRAVENOUS at 23:17

## 2022-02-15 RX ADMIN — Medication 2 PACKET: at 08:06

## 2022-02-15 RX ADMIN — HYDROCHLOROTHIAZIDE 25 MG: 25 TABLET ORAL at 12:37

## 2022-02-15 RX ADMIN — Medication 1 PACKET: at 12:38

## 2022-02-15 RX ADMIN — ACETAMINOPHEN 650 MG: 325 TABLET, FILM COATED ORAL at 16:48

## 2022-02-15 RX ADMIN — POTASSIUM CHLORIDE 20 MEQ: 29.8 INJECTION, SOLUTION INTRAVENOUS at 20:24

## 2022-02-15 RX ADMIN — Medication 1 PACKET: at 20:24

## 2022-02-15 RX ADMIN — Medication 10 MG: at 20:23

## 2022-02-15 RX ADMIN — AMOXICILLIN AND CLAVULANATE POTASSIUM 1 TABLET: 875; 125 TABLET, FILM COATED ORAL at 20:23

## 2022-02-15 RX ADMIN — Medication 2 PACKET: at 13:00

## 2022-02-15 RX ADMIN — HYDRALAZINE HYDROCHLORIDE 75 MG: 50 TABLET, FILM COATED ORAL at 13:00

## 2022-02-15 RX ADMIN — Medication 15 ML: at 08:04

## 2022-02-15 RX ADMIN — BIVALIRUDIN 0.18 MG/KG/HR: 250 INJECTION, POWDER, LYOPHILIZED, FOR SOLUTION INTRAVENOUS at 06:45

## 2022-02-15 RX ADMIN — ASPIRIN 81 MG CHEWABLE TABLET 81 MG: 81 TABLET CHEWABLE at 08:03

## 2022-02-15 RX ADMIN — POTASSIUM CHLORIDE 20 MEQ: 1.5 POWDER, FOR SOLUTION ORAL at 06:11

## 2022-02-15 RX ADMIN — INSULIN ASPART 2 UNITS: 100 INJECTION, SOLUTION INTRAVENOUS; SUBCUTANEOUS at 23:20

## 2022-02-15 RX ADMIN — Medication 40 MG: at 08:10

## 2022-02-15 RX ADMIN — Medication 2 PACKET: at 20:25

## 2022-02-15 RX ADMIN — INSULIN ASPART 2 UNITS: 100 INJECTION, SOLUTION INTRAVENOUS; SUBCUTANEOUS at 15:49

## 2022-02-15 RX ADMIN — TRAZODONE HYDROCHLORIDE 50 MG: 50 TABLET ORAL at 20:57

## 2022-02-15 RX ADMIN — BIVALIRUDIN 0.18 MG/KG/HR: 250 INJECTION, POWDER, LYOPHILIZED, FOR SOLUTION INTRAVENOUS at 13:37

## 2022-02-15 RX ADMIN — INSULIN ASPART 3 UNITS: 100 INJECTION, SOLUTION INTRAVENOUS; SUBCUTANEOUS at 08:09

## 2022-02-15 RX ADMIN — DEXTROSE MONOHYDRATE: 50 INJECTION, SOLUTION INTRAVENOUS at 17:45

## 2022-02-15 ASSESSMENT — ACTIVITIES OF DAILY LIVING (ADL)
ADLS_ACUITY_SCORE: 17

## 2022-02-15 ASSESSMENT — MIFFLIN-ST. JEOR
SCORE: 2571.52
SCORE: 2560.18

## 2022-02-15 NOTE — PROGRESS NOTES
CV ICU PROGRESS NOTE  February 15, 2022      CO-MORBIDITIES:   Aortic root aneurysm (H)     ASSESSMENT: Chuy Varner is a 31 year old male with a PMHx s/f bicuspid aortic valve, thoracic aortic aneurysm without rupture, hypertension, obesity, and testicular cancer s/p orchiectomy who underwent Bentall procedure with mechanical valve on 1/28 with Dr. Velasco and Dr. Hannah. Course complicated by postop hypoxemic respiratory failure, HIT positivity, now extubated onto high-flow, off pressors, with worsening hypernatremia.     TODAY'S PROGRESS:   - Started D5W IV overnight (150 mL/hr) for Na of 161  - Continues to have high UOP  - Giving hydrochlorothiazide and f/u UOP changes, Uosm, Serum osm, ordered FENa, Lucille , UCr    PLAN:  Neuro/ pain/ sedation:  # Acute Postoperative pain  - Monitor neurological status. Notify the MD for any acute changes in exam.  - Pain: well controlled on current regimen              - Scheduled tylenol    - Discontinued for elevated LFTs              - PRN tylenol, dilaudid, robaxin, oxycodone  - Sedation: none  - RASS goal 0 to -1  - Sleep hygiene:              - Melatonin trazadone qhs     Pulmonary care:   # Postoperative ventilation management  # Acute hypoxemic respiratory failure (ARDS vs. PNA vs. Hypervolemia vs. TRALI vs. severe atelectasis)  COVID-19, Mycoplasma, Parainfluenza, and Influenza negative. CTA negative for PE on 1/30.  CXR not consistent with ARDS. Likely element of severe V/Q mismatch and atelectasis.  Sudden drop in sats overnight on 2/8 requiring FiO2 80%.  - Possibly c/b PE given acute FiO2 increase, HIT positivity              - Remains on bivalirudin  - Follow BCs and respiratory panels (2/12)              - Growing staph epi in blood and sputum              - Only 1/2 bottles of blood, on one side -- likely contaminant  - Keep on High Flow NC, but titrate down FiO2 as able to maintain SpO2 > 92%     Cardiovascular:    # Ascending aortic aneurysm s/p Bentall w/  Jerry valve on 1/28 with Dr. Velasco and Dr. Hannah  # Bicuspid aortic valve  # History of thoracic aortic aneurysm without rupture  # History of hypertension  Recent echo on 10/26/21 with LVEF of 60-65%.  Post-op echo with LVEF >60%  - Goal MAP>65, SBP <130  - ASA 81mg  - Holding PTA meds: metoprolol succinate 25mg daily  - Management of HTN              - Coreg maxed at 50 BID              - Hydralazine 75 mg q8              - Labetalol and hydralazine PRN to maintain SBP <130     GI / Nutrition:   # Obesity (BMI 46)  # Nutrition  - NJ feeds at goal  - PPI  - Continue bowel regimen: miralax, senna PRN (not scheduled with rectal tube)  # Elevated ALT  - Newly elevated ALT, but ALP and AST remain normal, CT liver on 02/09 was unremarkable, although stones were noted int he gallbladder (no cholecystitis)  - Monitor LFTs every day    Renal/ Fluid Balance/ Electrolytes:   BL creat appears to be ~ 1.0  - Strict I/O, daily weights  - Avoid/limit nephrotoxins as able  - Replete lytes PRN per protocol              - Scheduled potassium replacement (repleted potassium 40 mEq this AM post-BMP)    # Hypernatremia  Free water deficit >13L (02/15)  - Hydrochlorothiazide (02/15) for possible diabetes insipidus (from renal injury)  - Correct 200 free water q2 via NJ tube  - Correct 150 mL D5W IV and f/u Na  - f/u Urine osms, Serum osms, Lucille, UCr, FENa     Endocrine:    # Stress induced hyperglycemia, improving  - HDSSI  - Lantus 50mg daily              - keep same, since he won't be NPO for too long before extubation trial this afternoon  - Goal BG <180 for optimal healing     ID/ Antibiotics:  # Stress induced leukocytosis, resolved  # Possible ventilator associated pneumonia  Febrile and pan-cultured on 1/30/22. Pan-scanned 02/09 with sinuses. Found small fluid collection in sinus.  Covid pcr, respiratory panel, and respiratory cultures all negative 1/30.   Re-cultured 1/31: respiratory and blood cultures NGTD.   Re-cultured  again 2/4 when WBC increased to 23. NGTD.  Re-cultured 02/12: 1 tube of staph epidermidis and respiratory culture on 02/10 with staph epidermidis (likely contaminant)  - Continue to monitor fever curve, WBC and inflammatory markers  - Consult ID 2/4: send C dif, blood cultures, repeat COVID              - COVID negative              - C. diff negative              - Cultures NGTD other than noted above  - CT C/A/P and sinuses 2/11              - Augmentin PO for sinusititis; ends 02/17  - Could consider re-consult to ID if fevers, leukocytosis reoccur, but downtrending on Augmentin, bivalirudin (sinusitis and clot)     Heme/Onc:     # Stress induced leukocytosis  # Acute blood loss anemia  # Acute blood loss thrombocytopenia, resolved  # History of testicular cancer s/p orchiectomy  # RLE DVT  # HIT positive   No s/sx active bleeding. Has RLE DVT as above.  - Continue to monitor  - CBC daily  - Heparin resistant              - Bivalirudin gtt     MSK/ Skin:  # Sternotomy  # Surgical Incision  # Buttock pressure injury   - Sternal precautions  - Postoperative incision management per protocol  - PT/OT/CR  - WOCN consulted for pressure injury              - CTAP did not show stranding c/f infection               - Has small sacral ulcer     Prophylaxis:    - Mechanical prophylaxis for DVT  - Chemical DVT prophylaxis- bivalirudin  - PPI  - HOB to 30 degrees     Lines/ tubes/ drains:  - Left PIV (1/28)  - Godfrey (pull 02/15)  - Nasoduodenal (1/31)  - ETT (1/28)  - Rectal tube      Disposition:  - [facility]     Patient seen, findings and plan discussed with CV ICU staff     Xander Garcia, MS4     Resident/Fellow Attestation   I, Jean Angulo, was present with the medical student who participated in the service and in the documentation of the note.  I have verified the history and personally performed the physical exam and medical decision making.  I agree with the assessment and plan of care as documented in the  note.      Jean Angulo MD  Anesthesiology, CA-2, PGY3      ====================================    SUBJECTIVE:   Patient declines pain this AM, nods yes and no to questions, directs gaze. States is not feeling tired from breathing on high flow, not having difficulty breathing    OBJECTIVE:   1. VITAL SIGNS:   Temp:  [36.4  C (97.6  F)-37.8  C (100.1  F)] 37.2  C (99  F)  Pulse:  [] 84  Resp:  [16-20] 18  BP: ()/(55-80) 108/65  FiO2 (%):  [60 %] 60 %  SpO2:  [92 %-96 %] 95 %  Ventilation Mode: CPAP/PS  (Continuous positive airway pressure with Pressure Support)  FiO2 (%): 60 %  Rate Set (breaths/minute): 12 breaths/min  Tidal Volume Set (mL): 680 mL  PEEP (cm H2O): 8 cmH2O  Pressure Support (cm H2O): 8 cmH2O  Oxygen Concentration (%): 40 %  Resp: 18      2. INTAKE/ OUTPUT:   I/O last 3 completed shifts:  In: 6795.1 [I.V.:2455.1; NG/GT:2845]  Out: 3735 [Urine:3535; Stool:200]    3. PHYSICAL EXAMINATION:   General: Sitting at an angle in bed, directs gaze to voice  Neuro: Directs gaze to voice, able to nod yes and no to questions, moves limbs spontaneously, does not speak in response to questions  Resp: Breathing non-labored on HFNC  CV: RRR on telemetry  Abdomen: Soft, Non-distended, Non-tender, rectal tube is draining stool  Incisions: c/d/i  Extremities: warm and well perfused    4. INVESTIGATIONS:   Arterial Blood Gases   Recent Labs   Lab 02/12/22  0434 02/10/22  0828 02/10/22  0436 02/09/22  2242   PH 7.44 7.38 7.43 7.41   PCO2 45 50* 44 48*   PO2 74* 86 103 55*   HCO3 30* 30* 29* 30*     Complete Blood Count   Recent Labs   Lab 02/15/22  0350 02/14/22  0418 02/13/22  0532 02/12/22  0456   WBC 15.5* 18.5* 28.7* 30.1*   HGB 8.4* 8.5* 9.1* 9.3*    244 223 184     Basic Metabolic Panel  Recent Labs   Lab 02/15/22  1837 02/15/22  1546 02/15/22  1341 02/15/22  1131 02/15/22  1029 02/15/22  0808 02/15/22  0619   *  --  158*  --  160*  --  160*   POTASSIUM 3.8  --  3.6  --  3.8  --  4.0    CHLORIDE 126*  --  128*  --  128*  --  129*   CO2 26  --  25  --  27  --  26   BUN 77*  --  85*  --  94*  --  96*   CR 0.96  --  1.01  --  1.17  --  1.11   * 183* 217* 168* 208*   < > 236*    < > = values in this interval not displayed.     Liver Function Tests  Recent Labs   Lab 02/15/22  0350 02/14/22  0418 02/13/22  0532 02/12/22  0456   * 58* 39 31   * 102* 79* 71*   ALKPHOS 83 73 76 77   BILITOTAL 0.4 0.7 0.7 0.6   ALBUMIN 2.4* 2.4* 2.8* 2.8*     Pancreatic Enzymes  No lab results found in last 7 days.  Coagulation Profile  Recent Labs   Lab 02/15/22  0350 02/14/22  0418 02/13/22  0532 02/12/22  2031   PTT 59* 59* 47* 74*         5. RADIOLOGY:   Recent Results (from the past 24 hour(s))   XR Chest Port 1 View    Narrative    EXAM: XR CHEST PORT 1 VW  2/15/2022 5:35 AM      HISTORY: Evaluate for interval change.    COMPARISON: Radiograph dated 2/11/2022    FINDINGS: AP portable semiupright radiograph of the chest. New right  PICC with distal tip in the low SVC. Postsurgical changes of thoracic  surgery. Median sternotomy wires are intact. Cardiac valve  replacement. Partially visualized feeding tube is not well-visualized  although appears to course into the left upper quadrant and outside  the field of view.     Trachea is clear. Cardiac silhouette is enlarged and stable. Pulmonary  vasculature is prominent and relatively indistinct. No pneumothorax.  Trace bilateral pleural effusions. Perihilar and bibasilar predominant  opacities. Low lung volumes.      Impression    IMPRESSION:     1. Increased perihilar and bibasilar predominant opacities likely  represent pulmonary edema.  2. New right PICC with distal tip in the low SVC.    I have personally reviewed the examination and initial interpretation  and I agree with the findings.    KIMMIE CANTRELL MD         SYSTEM ID:  [id number]       =========================================

## 2022-02-15 NOTE — PROGRESS NOTES
CLINICAL NUTRITION SERVICES - BRIEF NOTE   (see RD note on 2/14 for full assessment)    Loose stools (400-1750 ml/day), presumably r/t increased FWF, at least in part.  Will add supplemental fiber to benefit gut microbiome and in effort to thicken stools    Nutrition changes today:  Banatrol (1 pkt BID, 4 g fiber)     Nannette Holguin, RD, LD  [id number]  Pgr: [phone]

## 2022-02-15 NOTE — PROCEDURES
[facility]    Triple Lumen PICC Placement    Date/Time: 2/14/2022 6:35 PM  Performed by: Almita Turner RN  Authorized by: Nakita Weeks MD   Indications: vascular access      UNIVERSAL PROTOCOL   Site Marked: Yes  Prior Images Obtained and Reviewed:  Yes  Required items: Required blood products, implants, devices and special equipment available    Patient identity confirmed:  Arm band and hospital-assigned identification number  NA - No sedation, light sedation, or local anesthesia  Confirmation Checklist:  Patient's identity using two indicators, relevant allergies, procedure was appropriate and matched the consent or emergent situation and correct equipment/implants were available  Time out: Immediately prior to the procedure a time out was called    Universal Protocol: the Joint Commission Universal Protocol was followed    Preparation: Patient was prepped and draped in usual sterile fashion       ANESTHESIA    Anesthesia: Local infiltration  Local Anesthetic:  Lidocaine 1% without epinephrine  Anesthetic Total (mL):  3.5      SEDATION    Patient Sedated: No        Preparation: skin prepped with ChloraPrep  Skin prep agent: skin prep agent completely dried prior to procedure  Sterile barriers: maximum sterile barriers were used: cap, mask, sterile gown, sterile gloves, and large sterile sheet  Hand hygiene: hand hygiene performed prior to central venous catheter insertion  Type of line used: PICC and Power PICC  Catheter type: triple lumen  Lumen type: non-valved  Catheter size: 5 Fr  Brand: Bard  Lot number: [id number]  Placement method: venipuncture, MST, ultrasound and tip confirmation system  Number of attempts: 1  Successful placement: yes  Orientation: right  Location: basilic vein (0.45 cm vein diameter)  Arm circumference: adults 10 cm  Extremity circumference: 38  Visible catheter length: 3  Total catheter length: 48  Dressing and securement:  blood cleaned with CHG, chlorhexidine disc applied, glue, statlock, site cleaned and sterile dressing applied  Post procedure assessment: free fluid flow and blood return through all ports (BARD)  PROCEDURE   Patient Tolerance:  Patient tolerated the procedure well with no immediate complicationsDescribe Procedure: PICC was verified by BARD, ready to use.

## 2022-02-15 NOTE — PLAN OF CARE
ICU End of Shift Summary. See flowsheets for vital signs and detailed assessment.    Changes this shift: RASS 0, following simple commands. SR 80-90s, MAP and BP within goal, bivalirudin gtt continues. Increased WOB, increased O2 needs, CXR completed. Na up to 162, D5 gtt started. K 3.6, replaced with 20meq. No drainage from incision sites.     Plan: Monitor respiratory status.     Problem: Respiratory Compromise (Cardiovascular Surgery)  Goal: Effective Oxygenation and Ventilation  Outcome: Declining  Intervention: Promote Airway Secretion Clearance  Recent Flowsheet Documentation  Taken 2/15/2022 0400 by Matt Garland RN  Cough And Deep Breathing: unable to perform  Taken 2/15/2022 0000 by Matt Garland RN  Cough And Deep Breathing: unable to perform  Taken 2/14/2022 2000 by Matt Galrand RN  Cough And Deep Breathing: unable to perform

## 2022-02-16 ENCOUNTER — APPOINTMENT (OUTPATIENT)
Dept: OCCUPATIONAL THERAPY | Facility: CLINIC | Age: 32
End: 2022-02-16
Attending: SURGERY
Payer: COMMERCIAL

## 2022-02-16 ENCOUNTER — APPOINTMENT (OUTPATIENT)
Dept: CT IMAGING | Facility: CLINIC | Age: 32
End: 2022-02-16
Attending: SURGERY
Payer: COMMERCIAL

## 2022-02-16 LAB
ALBUMIN SERPL-MCNC: 2.3 G/DL (ref 3.4–5)
ALBUMIN UR-MCNC: 20 MG/DL
ALP SERPL-CCNC: 87 U/L (ref 40–150)
ALT SERPL W P-5'-P-CCNC: 311 U/L (ref 0–70)
AMMONIA PLAS-SCNC: 35 UMOL/L (ref 10–50)
ANION GAP SERPL CALCULATED.3IONS-SCNC: 3 MMOL/L (ref 3–14)
ANION GAP SERPL CALCULATED.3IONS-SCNC: 3 MMOL/L (ref 3–14)
ANION GAP SERPL CALCULATED.3IONS-SCNC: 4 MMOL/L (ref 3–14)
ANION GAP SERPL CALCULATED.3IONS-SCNC: 5 MMOL/L (ref 3–14)
ANION GAP SERPL CALCULATED.3IONS-SCNC: 5 MMOL/L (ref 3–14)
APPEARANCE UR: CLEAR
APTT PPP: 55 SECONDS (ref 22–38)
AST SERPL W P-5'-P-CCNC: 141 U/L (ref 0–45)
BASE EXCESS BLDV CALC-SCNC: 0.7 MMOL/L (ref -7.7–1.9)
BASE EXCESS BLDV CALC-SCNC: 0.9 MMOL/L (ref -7.7–1.9)
BILIRUB SERPL-MCNC: 0.4 MG/DL (ref 0.2–1.3)
BILIRUB UR QL STRIP: NEGATIVE
BUN SERPL-MCNC: 45 MG/DL (ref 7–30)
BUN SERPL-MCNC: 49 MG/DL (ref 7–30)
BUN SERPL-MCNC: 51 MG/DL (ref 7–30)
BUN SERPL-MCNC: 54 MG/DL (ref 7–30)
BUN SERPL-MCNC: 54 MG/DL (ref 7–30)
BUN SERPL-MCNC: 58 MG/DL (ref 7–30)
BUN SERPL-MCNC: 58 MG/DL (ref 7–30)
BUN SERPL-MCNC: 62 MG/DL (ref 7–30)
BUN SERPL-MCNC: 62 MG/DL (ref 7–30)
CALCIUM SERPL-MCNC: 7.7 MG/DL (ref 8.5–10.1)
CALCIUM SERPL-MCNC: 8.3 MG/DL (ref 8.5–10.1)
CALCIUM SERPL-MCNC: 8.6 MG/DL (ref 8.5–10.1)
CALCIUM SERPL-MCNC: 8.9 MG/DL (ref 8.5–10.1)
CALCIUM SERPL-MCNC: 9.1 MG/DL (ref 8.5–10.1)
CHLORIDE BLD-SCNC: 115 MMOL/L (ref 94–109)
CHLORIDE BLD-SCNC: 117 MMOL/L (ref 94–109)
CHLORIDE BLD-SCNC: 123 MMOL/L (ref 94–109)
CHLORIDE BLD-SCNC: 124 MMOL/L (ref 94–109)
CHLORIDE BLD-SCNC: 125 MMOL/L (ref 94–109)
CHLORIDE BLD-SCNC: 125 MMOL/L (ref 94–109)
CHLORIDE BLD-SCNC: 126 MMOL/L (ref 94–109)
CO2 SERPL-SCNC: 24 MMOL/L (ref 20–32)
CO2 SERPL-SCNC: 25 MMOL/L (ref 20–32)
CO2 SERPL-SCNC: 26 MMOL/L (ref 20–32)
CO2 SERPL-SCNC: 26 MMOL/L (ref 20–32)
COLOR UR AUTO: YELLOW
CREAT SERPL-MCNC: 0.7 MG/DL (ref 0.66–1.25)
CREAT SERPL-MCNC: 0.74 MG/DL (ref 0.66–1.25)
CREAT SERPL-MCNC: 0.76 MG/DL (ref 0.66–1.25)
CREAT SERPL-MCNC: 0.81 MG/DL (ref 0.66–1.25)
CREAT SERPL-MCNC: 0.82 MG/DL (ref 0.66–1.25)
CREAT SERPL-MCNC: 0.82 MG/DL (ref 0.66–1.25)
CREAT SERPL-MCNC: 0.83 MG/DL (ref 0.66–1.25)
CREAT SERPL-MCNC: 0.85 MG/DL (ref 0.66–1.25)
CREAT SERPL-MCNC: 0.9 MG/DL (ref 0.66–1.25)
ERYTHROCYTE [DISTWIDTH] IN BLOOD BY AUTOMATED COUNT: 15.4 % (ref 10–15)
GFR SERPL CREATININE-BSD FRML MDRD: >90 ML/MIN/1.73M2
GLUCOSE BLD-MCNC: 150 MG/DL (ref 70–99)
GLUCOSE BLD-MCNC: 151 MG/DL (ref 70–99)
GLUCOSE BLD-MCNC: 161 MG/DL (ref 70–99)
GLUCOSE BLD-MCNC: 161 MG/DL (ref 70–99)
GLUCOSE BLD-MCNC: 162 MG/DL (ref 70–99)
GLUCOSE BLD-MCNC: 164 MG/DL (ref 70–99)
GLUCOSE BLD-MCNC: 173 MG/DL (ref 70–99)
GLUCOSE BLD-MCNC: 374 MG/DL (ref 70–99)
GLUCOSE BLD-MCNC: 512 MG/DL (ref 70–99)
GLUCOSE BLDC GLUCOMTR-MCNC: 128 MG/DL (ref 70–99)
GLUCOSE BLDC GLUCOMTR-MCNC: 130 MG/DL (ref 70–99)
GLUCOSE BLDC GLUCOMTR-MCNC: 138 MG/DL (ref 70–99)
GLUCOSE BLDC GLUCOMTR-MCNC: 158 MG/DL (ref 70–99)
GLUCOSE BLDC GLUCOMTR-MCNC: 164 MG/DL (ref 70–99)
GLUCOSE UR STRIP-MCNC: NEGATIVE MG/DL
HCO3 BLDV-SCNC: 25 MMOL/L (ref 21–28)
HCO3 BLDV-SCNC: 26 MMOL/L (ref 21–28)
HCT VFR BLD AUTO: 29.3 % (ref 40–53)
HGB BLD-MCNC: 8.1 G/DL (ref 13.3–17.7)
HGB UR QL STRIP: ABNORMAL
KETONES UR STRIP-MCNC: NEGATIVE MG/DL
LEUKOCYTE ESTERASE UR QL STRIP: ABNORMAL
MAGNESIUM SERPL-MCNC: 2.6 MG/DL (ref 1.8–2.6)
MCH RBC QN AUTO: 29 PG (ref 26.5–33)
MCHC RBC AUTO-ENTMCNC: 27.6 G/DL (ref 31.5–36.5)
MCV RBC AUTO: 105 FL (ref 78–100)
NITRATE UR QL: NEGATIVE
O2/TOTAL GAS SETTING VFR VENT: 50 %
O2/TOTAL GAS SETTING VFR VENT: 50 %
OXYHGB MFR BLDV: 69 % (ref 70–75)
PCO2 BLDV: 39 MM HG (ref 40–50)
PCO2 BLDV: 40 MM HG (ref 40–50)
PH BLDV: 7.41 [PH] (ref 7.32–7.43)
PH BLDV: 7.42 [PH] (ref 7.32–7.43)
PH UR STRIP: 5.5 [PH] (ref 5–7)
PLATELET # BLD AUTO: 396 10E3/UL (ref 150–450)
PO2 BLDV: 37 MM HG (ref 25–47)
PO2 BLDV: 40 MM HG (ref 25–47)
POTASSIUM BLD-SCNC: 3.6 MMOL/L (ref 3.4–5.3)
POTASSIUM BLD-SCNC: 3.7 MMOL/L (ref 3.4–5.3)
POTASSIUM BLD-SCNC: 3.9 MMOL/L (ref 3.4–5.3)
POTASSIUM BLD-SCNC: 3.9 MMOL/L (ref 3.4–5.3)
POTASSIUM BLD-SCNC: 4.1 MMOL/L (ref 3.4–5.3)
POTASSIUM BLD-SCNC: 4.2 MMOL/L (ref 3.4–5.3)
POTASSIUM BLD-SCNC: 4.2 MMOL/L (ref 3.4–5.3)
PROT SERPL-MCNC: 6.8 G/DL (ref 6.8–8.8)
RBC # BLD AUTO: 2.79 10E6/UL (ref 4.4–5.9)
RBC URINE: 3 /HPF
SODIUM SERPL-SCNC: 144 MMOL/L (ref 133–144)
SODIUM SERPL-SCNC: 145 MMOL/L (ref 133–144)
SODIUM SERPL-SCNC: 152 MMOL/L (ref 133–144)
SODIUM SERPL-SCNC: 152 MMOL/L (ref 133–144)
SODIUM SERPL-SCNC: 154 MMOL/L (ref 133–144)
SODIUM SERPL-SCNC: 155 MMOL/L (ref 133–144)
SODIUM SERPL-SCNC: 156 MMOL/L (ref 133–144)
SP GR UR STRIP: 1.03 (ref 1–1.03)
TRANSITIONAL EPI: <1 /HPF
UROBILINOGEN UR STRIP-MCNC: NORMAL MG/DL
WBC # BLD AUTO: 13.6 10E3/UL (ref 4–11)
WBC URINE: 13 /HPF

## 2022-02-16 PROCEDURE — 70450 CT HEAD/BRAIN W/O DYE: CPT | Mod: 26 | Performed by: RADIOLOGY

## 2022-02-16 PROCEDURE — 250N000013 HC RX MED GY IP 250 OP 250 PS 637: Performed by: STUDENT IN AN ORGANIZED HEALTH CARE EDUCATION/TRAINING PROGRAM

## 2022-02-16 PROCEDURE — 85027 COMPLETE CBC AUTOMATED: CPT | Performed by: STUDENT IN AN ORGANIZED HEALTH CARE EDUCATION/TRAINING PROGRAM

## 2022-02-16 PROCEDURE — 250N000013 HC RX MED GY IP 250 OP 250 PS 637: Performed by: SURGERY

## 2022-02-16 PROCEDURE — 258N000003 HC RX IP 258 OP 636: Performed by: SURGERY

## 2022-02-16 PROCEDURE — 87086 URINE CULTURE/COLONY COUNT: CPT | Performed by: SURGERY

## 2022-02-16 PROCEDURE — 85730 THROMBOPLASTIN TIME PARTIAL: CPT | Performed by: SURGERY

## 2022-02-16 PROCEDURE — 82310 ASSAY OF CALCIUM: CPT | Performed by: INTERNAL MEDICINE

## 2022-02-16 PROCEDURE — 250N000013 HC RX MED GY IP 250 OP 250 PS 637: Performed by: INTERNAL MEDICINE

## 2022-02-16 PROCEDURE — 82803 BLOOD GASES ANY COMBINATION: CPT | Performed by: STUDENT IN AN ORGANIZED HEALTH CARE EDUCATION/TRAINING PROGRAM

## 2022-02-16 PROCEDURE — 200N000002 HC R&B ICU UMMC

## 2022-02-16 PROCEDURE — 999N000215 HC STATISTIC HFNC ADULT NON-CPAP

## 2022-02-16 PROCEDURE — 97530 THERAPEUTIC ACTIVITIES: CPT | Mod: GO

## 2022-02-16 PROCEDURE — 99291 CRITICAL CARE FIRST HOUR: CPT | Mod: 24 | Performed by: INTERNAL MEDICINE

## 2022-02-16 PROCEDURE — 999N000157 HC STATISTIC RCP TIME EA 10 MIN

## 2022-02-16 PROCEDURE — G0463 HOSPITAL OUTPT CLINIC VISIT: HCPCS

## 2022-02-16 PROCEDURE — 250N000011 HC RX IP 250 OP 636: Performed by: SURGERY

## 2022-02-16 PROCEDURE — 80048 BASIC METABOLIC PNL TOTAL CA: CPT | Performed by: SURGERY

## 2022-02-16 PROCEDURE — 83735 ASSAY OF MAGNESIUM: CPT | Performed by: STUDENT IN AN ORGANIZED HEALTH CARE EDUCATION/TRAINING PROGRAM

## 2022-02-16 PROCEDURE — 80053 COMPREHEN METABOLIC PANEL: CPT | Performed by: INTERNAL MEDICINE

## 2022-02-16 PROCEDURE — 70450 CT HEAD/BRAIN W/O DYE: CPT

## 2022-02-16 PROCEDURE — 82805 BLOOD GASES W/O2 SATURATION: CPT | Performed by: STUDENT IN AN ORGANIZED HEALTH CARE EDUCATION/TRAINING PROGRAM

## 2022-02-16 PROCEDURE — 99254 IP/OBS CNSLTJ NEW/EST MOD 60: CPT | Mod: GC | Performed by: PSYCHIATRY & NEUROLOGY

## 2022-02-16 PROCEDURE — 81001 URINALYSIS AUTO W/SCOPE: CPT | Performed by: SURGERY

## 2022-02-16 PROCEDURE — 97110 THERAPEUTIC EXERCISES: CPT | Mod: GO

## 2022-02-16 PROCEDURE — 82140 ASSAY OF AMMONIA: CPT | Performed by: STUDENT IN AN ORGANIZED HEALTH CARE EDUCATION/TRAINING PROGRAM

## 2022-02-16 RX ORDER — POTASSIUM CHLORIDE 1.5 G/1.58G
20 POWDER, FOR SOLUTION ORAL ONCE
Status: COMPLETED | OUTPATIENT
Start: 2022-02-16 | End: 2022-02-16

## 2022-02-16 RX ORDER — POTASSIUM CHLORIDE 29.8 MG/ML
20 INJECTION INTRAVENOUS ONCE
Status: COMPLETED | OUTPATIENT
Start: 2022-02-16 | End: 2022-02-16

## 2022-02-16 RX ORDER — DEXTROSE MONOHYDRATE 50 MG/ML
INJECTION, SOLUTION INTRAVENOUS CONTINUOUS
Status: DISCONTINUED | OUTPATIENT
Start: 2022-02-16 | End: 2022-02-17

## 2022-02-16 RX ORDER — HYDROCHLOROTHIAZIDE 25 MG/1
25 TABLET ORAL
Status: DISCONTINUED | OUTPATIENT
Start: 2022-02-16 | End: 2022-02-17

## 2022-02-16 RX ADMIN — AMOXICILLIN AND CLAVULANATE POTASSIUM 1 TABLET: 875; 125 TABLET, FILM COATED ORAL at 19:38

## 2022-02-16 RX ADMIN — POTASSIUM CHLORIDE 20 MEQ: 1.5 POWDER, FOR SOLUTION ORAL at 19:38

## 2022-02-16 RX ADMIN — POTASSIUM CHLORIDE 20 MEQ: 29.8 INJECTION, SOLUTION INTRAVENOUS at 01:41

## 2022-02-16 RX ADMIN — CARVEDILOL 50 MG: 25 TABLET, FILM COATED ORAL at 08:33

## 2022-02-16 RX ADMIN — HYDRALAZINE HYDROCHLORIDE 75 MG: 50 TABLET, FILM COATED ORAL at 22:14

## 2022-02-16 RX ADMIN — HYDROCHLOROTHIAZIDE 25 MG: 25 TABLET ORAL at 15:46

## 2022-02-16 RX ADMIN — Medication 1 PACKET: at 19:49

## 2022-02-16 RX ADMIN — INSULIN GLARGINE 50 UNITS: 100 INJECTION, SOLUTION SUBCUTANEOUS at 08:34

## 2022-02-16 RX ADMIN — INSULIN ASPART 1 UNITS: 100 INJECTION, SOLUTION INTRAVENOUS; SUBCUTANEOUS at 12:06

## 2022-02-16 RX ADMIN — DEXTROSE MONOHYDRATE: 50 INJECTION, SOLUTION INTRAVENOUS at 15:49

## 2022-02-16 RX ADMIN — Medication 10 MG: at 19:38

## 2022-02-16 RX ADMIN — Medication 2 PACKET: at 19:49

## 2022-02-16 RX ADMIN — Medication 40 MG: at 08:38

## 2022-02-16 RX ADMIN — Medication 2 PACKET: at 08:38

## 2022-02-16 RX ADMIN — ASPIRIN 81 MG CHEWABLE TABLET 81 MG: 81 TABLET CHEWABLE at 08:33

## 2022-02-16 RX ADMIN — Medication 1 PACKET: at 08:38

## 2022-02-16 RX ADMIN — AMOXICILLIN AND CLAVULANATE POTASSIUM 1 TABLET: 875; 125 TABLET, FILM COATED ORAL at 08:33

## 2022-02-16 RX ADMIN — CARVEDILOL 50 MG: 25 TABLET, FILM COATED ORAL at 19:39

## 2022-02-16 RX ADMIN — HYDRALAZINE HYDROCHLORIDE 75 MG: 50 TABLET, FILM COATED ORAL at 13:43

## 2022-02-16 RX ADMIN — DEXTROSE MONOHYDRATE: 50 INJECTION, SOLUTION INTRAVENOUS at 17:16

## 2022-02-16 RX ADMIN — Medication 2 PACKET: at 13:43

## 2022-02-16 RX ADMIN — TRAZODONE HYDROCHLORIDE 50 MG: 50 TABLET ORAL at 22:14

## 2022-02-16 RX ADMIN — INSULIN ASPART 1 UNITS: 100 INJECTION, SOLUTION INTRAVENOUS; SUBCUTANEOUS at 08:35

## 2022-02-16 RX ADMIN — BIVALIRUDIN 0.18 MG/KG/HR: 250 INJECTION, POWDER, LYOPHILIZED, FOR SOLUTION INTRAVENOUS at 06:50

## 2022-02-16 RX ADMIN — BIVALIRUDIN 0.18 MG/KG/HR: 250 INJECTION, POWDER, LYOPHILIZED, FOR SOLUTION INTRAVENOUS at 15:45

## 2022-02-16 RX ADMIN — Medication 15 ML: at 08:33

## 2022-02-16 RX ADMIN — HYDRALAZINE HYDROCHLORIDE 75 MG: 50 TABLET, FILM COATED ORAL at 05:24

## 2022-02-16 ASSESSMENT — ACTIVITIES OF DAILY LIVING (ADL)
ADLS_ACUITY_SCORE: 17

## 2022-02-16 NOTE — PLAN OF CARE
ICU End of Shift Summary. See flowsheets for vital signs and detailed assessment.    Changes this shift: Pt slow to follow commands and still not speaking. Seems delirious but did get some sleep this shift. Continues to be diaphoretic but afebrile. Pressures stable. Weaned high flow down to 30% 45L. External durant leaking most of this shift. Replaced K with 20meq twice this shift, AM recheck 4.2. Stopped D5 and switched to a 30 Q2 flush at midnight for a normalized Na but it is now trending upwards again. Waiting for plan from MD.     Plan: Continue to monitor and update family. Continue to address AMS.

## 2022-02-16 NOTE — PLAN OF CARE
ICU End of Shift Summary. See flowsheets for vital signs and detailed assessment.    Changes this shift: Patient follows commands but remains very weak. HFNC 50L FiO2 50%. Sodium levels trending q4h latest 156 (see flowsheets), D5 gtt continues at 150ml/hr. SR 80s-90s with adequate BP. Up to chair for most of the day.     Plan: Continue to monitor.      Susan Mckinney RN      Problem: Risk for Delirium  Goal: Optimal Coping  Outcome: No Change  Goal: Improved Behavioral Control  Outcome: No Change  Goal: Improved Attention and Thought Clarity  Outcome: No Change  Goal: Improved Sleep  Outcome: No Change  Intervention: Promote Sleep  Recent Flowsheet Documentation  Taken 2/15/2022 1600 by Susan Mckinney RN  Sleep/Rest Enhancement: consistent schedule promoted  Taken 2/15/2022 1200 by Susan Mckinney RN  Sleep/Rest Enhancement: consistent schedule promoted  Taken 2/15/2022 0800 by Susan Mckinney RN  Sleep/Rest Enhancement: consistent schedule promoted     Problem: Activity Intolerance (Cardiovascular Surgery)  Goal: Improved Activity Tolerance  Outcome: No Change     Problem: Adjustment to Surgery (Cardiovascular Surgery)  Goal: Optimal Coping with Heart Surgery  Outcome: No Change     Problem: Bleeding (Cardiovascular Surgery)  Goal: Absence of Bleeding  Outcome: No Change  Intervention: Monitor and Manage Bleeding  Recent Flowsheet Documentation  Taken 2/15/2022 1600 by Susan Mckinney RN  Bleeding Management: prepared for surgical intervention  Taken 2/15/2022 1200 by Susan Mckinney RN  Bleeding Management: prepared for surgical intervention  Taken 2/15/2022 0800 by Susan Mckinney RN  Bleeding Management: prepared for surgical intervention     Problem: Bowel Elimination Impaired (Cardiovascular Surgery)  Goal: Effective Bowel Elimination  Outcome: No Change     Problem: Cardiac Function Impaired (Cardiovascular Surgery)  Goal: Effective Cardiac Function  Outcome: No Change     Problem: Cerebral Tissue Perfusion Risk  (Cardiovascular Surgery)  Goal: Effective Cerebral Perfusion  Outcome: No Change     Problem: Fluid Imbalance (Cardiovascular Surgery)  Goal: Fluid Balance  Outcome: No Change     Problem: Infection (Cardiovascular Surgery)  Goal: Absence of Infection Signs and Symptoms  Outcome: No Change  Intervention: Prevent or Manage Infection  Recent Flowsheet Documentation  Taken 2/15/2022 1600 by Susan Mckinney RN  Infection Prevention:    cohorting utilized    environmental surveillance performed    hand hygiene promoted    personal protective equipment utilized    rest/sleep promoted    single patient room provided  Taken 2/15/2022 1200 by Susan Mckinney RN  Infection Prevention:    cohorting utilized    environmental surveillance performed    hand hygiene promoted    personal protective equipment utilized    rest/sleep promoted    single patient room provided  Taken 2/15/2022 0800 by Susan Mckinney RN  Infection Prevention:    cohorting utilized    environmental surveillance performed    hand hygiene promoted    personal protective equipment utilized    rest/sleep promoted    single patient room provided     Problem: Ongoing Anesthesia Effects (Cardiovascular Surgery)  Goal: Anesthesia/Sedation Recovery  Outcome: No Change  Intervention: Optimize Anesthesia Recovery  Recent Flowsheet Documentation  Taken 2/15/2022 1600 by Susan Mckinney RN  Safety Promotion/Fall Prevention: activity supervised  Taken 2/15/2022 1200 by Susan Mckinney RN  Safety Promotion/Fall Prevention: activity supervised  Taken 2/15/2022 0800 by Susan Mckinney RN  Safety Promotion/Fall Prevention: activity supervised     Problem: Pain (Cardiovascular Surgery)  Goal: Acceptable Pain Control  Outcome: No Change     Problem: Postoperative Nausea and Vomiting (Cardiovascular Surgery)  Goal: Nausea and Vomiting Relief  Outcome: No Change     Problem: Postoperative Urinary Retention (Cardiovascular Surgery)  Goal: Effective Urinary Elimination  Outcome: No  Change     Problem: Respiratory Compromise (Cardiovascular Surgery)  Goal: Effective Oxygenation and Ventilation  Outcome: No Change  Intervention: Optimize Oxygenation and Ventilation  Recent Flowsheet Documentation  Taken 2/15/2022 1600 by Susan Mckinney RN  Chest Tube Safety: suction checked  Taken 2/15/2022 1200 by Susan Mckinney RN  Chest Tube Safety: suction checked  Taken 2/15/2022 0800 by Susan Mckinney RN  Chest Tube Safety: suction checked     Problem: Adult Inpatient Plan of Care  Goal: Absence of Hospital-Acquired Illness or Injury  Outcome: No Change  Intervention: Identify and Manage Fall Risk  Recent Flowsheet Documentation  Taken 2/15/2022 1600 by Susan Mckinney RN  Safety Promotion/Fall Prevention: activity supervised  Taken 2/15/2022 1200 by Susan Mckinney RN  Safety Promotion/Fall Prevention: activity supervised  Taken 2/15/2022 0800 by Susan Mckinney RN  Safety Promotion/Fall Prevention: activity supervised  Intervention: Prevent Skin Injury  Recent Flowsheet Documentation  Taken 2/15/2022 1600 by Susan Mckinney RN  Body Position:    turned    right  Taken 2/15/2022 1200 by Susan Mckinney RN  Body Position:    turned    right  Taken 2/15/2022 0800 by Susan Mckinney RN  Body Position:    turned    right  Intervention: Prevent and Manage VTE (Venous Thromboembolism) Risk  Recent Flowsheet Documentation  Taken 2/15/2022 1600 by Susan Mckinney RN  VTE Prevention/Management: anticoagulant therapy maintained  Taken 2/15/2022 1200 by Susan Mckinney RN  VTE Prevention/Management: anticoagulant therapy maintained  Taken 2/15/2022 0800 by Susan Mckinney RN  VTE Prevention/Management: anticoagulant therapy maintained  Intervention: Prevent Infection  Recent Flowsheet Documentation  Taken 2/15/2022 1600 by Susan Mckinney RN  Infection Prevention:    cohorting utilized    environmental surveillance performed    hand hygiene promoted    personal protective equipment utilized    rest/sleep promoted    single  patient room provided  Taken 2/15/2022 1200 by Susan Mckinney RN  Infection Prevention:    cohorting utilized    environmental surveillance performed    hand hygiene promoted    personal protective equipment utilized    rest/sleep promoted    single patient room provided  Taken 2/15/2022 0800 by Susan Mckinney RN  Infection Prevention:    cohorting utilized    environmental surveillance performed    hand hygiene promoted    personal protective equipment utilized    rest/sleep promoted    single patient room provided  Goal: Optimal Comfort and Wellbeing  Outcome: No Change  Goal: Readiness for Transition of Care  Outcome: No Change     Problem: Discharge Planning  Goal: Discharge Planning (Adult, OB, Behavioral, Peds)  Outcome: No Change     Problem: OT General Care Plan  Goal: Toilet Transfer/Toileting (OT)  Description: Toilet Transfer/Toileting (OT)  Outcome: No Change     Problem: Infection (Cardiovascular Surgery)  Goal: Absence of Infection Signs and Symptoms  Intervention: Prevent or Manage Infection  Recent Flowsheet Documentation  Taken 2/15/2022 1600 by Susan Mckinney RN  Infection Prevention:    cohorting utilized    environmental surveillance performed    hand hygiene promoted    personal protective equipment utilized    rest/sleep promoted    single patient room provided  Taken 2/15/2022 1200 by Susan Mckinney RN  Infection Prevention:    cohorting utilized    environmental surveillance performed    hand hygiene promoted    personal protective equipment utilized    rest/sleep promoted    single patient room provided  Taken 2/15/2022 0800 by Susan Mckinney RN  Infection Prevention:    cohorting utilized    environmental surveillance performed    hand hygiene promoted    personal protective equipment utilized    rest/sleep promoted    single patient room provided     Problem: Adult Inpatient Plan of Care  Goal: Absence of Hospital-Acquired Illness or Injury  Outcome: No Change  Intervention: Identify and  Manage Fall Risk  Recent Flowsheet Documentation  Taken 2/15/2022 1600 by Susan Mckinney RN  Safety Promotion/Fall Prevention: activity supervised  Taken 2/15/2022 1200 by Susan Mckinney RN  Safety Promotion/Fall Prevention: activity supervised  Taken 2/15/2022 0800 by Susan Mckinney RN  Safety Promotion/Fall Prevention: activity supervised  Intervention: Prevent Skin Injury  Recent Flowsheet Documentation  Taken 2/15/2022 1600 by Susan Mckinney RN  Body Position:    turned    right  Taken 2/15/2022 1200 by Susan Mckinney RN  Body Position:    turned    right  Taken 2/15/2022 0800 by Susan Mckinney RN  Body Position:    turned    right  Intervention: Prevent and Manage VTE (Venous Thromboembolism) Risk  Recent Flowsheet Documentation  Taken 2/15/2022 1600 by Susan Mckinney RN  VTE Prevention/Management: anticoagulant therapy maintained  Taken 2/15/2022 1200 by Susan Mckinney RN  VTE Prevention/Management: anticoagulant therapy maintained  Taken 2/15/2022 0800 by Susan Mckinney RN  VTE Prevention/Management: anticoagulant therapy maintained  Intervention: Prevent Infection  Recent Flowsheet Documentation  Taken 2/15/2022 1600 by Susan Mckinney RN  Infection Prevention:    cohorting utilized    environmental surveillance performed    hand hygiene promoted    personal protective equipment utilized    rest/sleep promoted    single patient room provided  Taken 2/15/2022 1200 by Susan Mckinney RN  Infection Prevention:    cohorting utilized    environmental surveillance performed    hand hygiene promoted    personal protective equipment utilized    rest/sleep promoted    single patient room provided  Taken 2/15/2022 0800 by Susan Mckinney RN  Infection Prevention:    cohorting utilized    environmental surveillance performed    hand hygiene promoted    personal protective equipment utilized    rest/sleep promoted    single patient room provided

## 2022-02-16 NOTE — PROGRESS NOTES
[facility] Nurse Inpatient Pressure Injury Assessment   Reason for consultation: Evaluate and treat nasal pressure injury       ASSESSMENT  Pressure Injury: on columbella bilateral nares  , hospital acquired ,   This is a Medical Device Related Pressure Injury (MDRPI) due to Bridle string  Pressure Injury is Stage 2  And mucosal in bilateral nares   Contributing factor of the pressure injury: pressure, immobility and moisture  Proning  Status: improving on Right, resolved on Left    New:  Right buttock superficial abrasions from unknown source  Status:  healing   TREATMENT PLAN  Septum: BID cleanse with saline and apply a thin layer of vaseline over wound. Make sure there is no tension on bridle string.  Orders Reviewed and Updated     Right buttock wounds  Apply criticaid paste to wounds twice daily and as needed     WOC Nurse follow-up plan:weekly  Nursing to notify the Provider(s) and re-consult the WOC Nurse if wound(s) deteriorates or new skin concern.    Patient History  According to provider note(s):  Acute Hypoxemic Respiratory Failure due to Shunt Physiology  # Pulmonary Shunting from Bilateral Lower Lobe Atelectasis vs. Pneumonia     Chuy Varner is a 31 year old male with PMH of likely bicuspid aortic valve, thoracic aortic aneurysm without rupture, hypertension, obesity, and testicular cancer s/p orchiectomy who underwent Bentall procedure with mechanical valve on 1/28 with Dr. Velasco ad Dr. Hannah. Patient was admitted to CVICU for post-operative management; post-op course complicated by acute hypoxic respiratory failure despite PEEP titration.     Objective Data  Containment of urine/stool: Indwelling catheter and Internal fecal management    Current Diet/ Nutrition:  Orders Placed This Encounter      NPO for Medical/Clinical Reasons Except for: Meds, Ice Chips      Output:   I/O last 3 completed shifts:  In: 7087.6 [I.V.:3697.6; NG/GT:1830]  Out: 3775 [Urine:3175; Stool:600]    Risk Assessment:   Sensory  Perception: 2-->very limited  Moisture: 3-->occasionally moist  Activity: 2-->chairfast  Mobility: 2-->very limited  Nutrition: 3-->adequate  Friction and Shear: 2-->potential problem  Ben Score: 14      Labs:   Recent Labs   Lab 02/16/22  0315   ALBUMIN 2.3*   HGB 8.1*   WBC 13.6*       Physical Exam  Skin inspection: focused septum/columbella/bilateral nares           Date of last Photo 2/4 and 2/11and 2/14  Wound History: previously proned. Extubated now on high flow O2   Measurements (length x width x depth, in cm) 0.5 cm x 0.8 cm  x  0.1 cm   Wound Base: 100% superficial scab   Palpation of the wound bed: normal   Periwound skin: intact  Color: normal and consistent with surrounding tissue  Temperature: normal   Drainage:, none  Description of drainage: none  Odor: none  Pain: mild,     Wound location:  Buttocks      Date of photo:  2/14/22  History:  Pt has a rectal tube with minimal bypassing of stool   Location:  Right fleshy buttock, approximately 2 cm superior to gluteal fold  Two identical 0.3 cm x 0.3 x 0.2 cm wounds with thin red fibrinous scabs  Drainage:  None  Pain:  none    Interventions  Current support surface: Standard  Low air loss mattress  Current off-loading measures: Heel off-loading boot(s) and Pillows  Repositioning aid: Pillows  Visual inspection of wound(s) completed   Tube Securement: as per protocol  Wound Care: was not done per plan of care.  Supplies: floor stock  Educated provided: importance of repositioning, plan of care, Hygiene and Off-loading pressure  Education provided to: spouse and nurse  Discussed importance of:off-loading pressure to wound  Discussed plan of care with Family and Nurse

## 2022-02-16 NOTE — PROGRESS NOTES
CV ICU PROGRESS NOTE  February 16, 2022      CO-MORBIDITIES:   Aortic root aneurysm (H)    ASSESSMENT: Chuy Varner is a 31 year old male with a PMHx s/f bicuspid aortic valve, thoracic aortic aneurysm without rupture, hypertension, obesity, and testicular cancer s/p orchiectomy who underwent Bentall procedure with mechanical valve on 1/28 with Dr. Velasco and Dr. Hannah. 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    TODAY'S PROGRESS:   Overnight  - Measured a sodium at 144 overnight   - Changed free water NJ from 200 q2 --> 30 q2   - Held IV D5W   - Recheck was 154  Goals:  - Ammonia level today  - Neuro consult for aphasia  - Lymphedema wraps  - Possible RUQ U/S   - Will decide depending on what feeds we need to hold (tammy. free water)  - Correct HyperNa+ (resume NJ free water)   - Resume free water via NJ   - Continue to hold IV D5 until recheck this PM    PLAN:  Neuro/ pain/ sedation:  # Acute Postoperative pain  - Monitor neurological status. Notify the MD for any acute changes in exam.   - still does not speak, possible delirium per RN note   - check ammonia level -> 35 (normal)   - Neuro consult for possible aphasia, appreciate recs  - Pain: well controlled on current regimen              - PRN tylenol, dilaudid, robaxin, oxycodone    - No PRNs used  - Sedation: none  - RASS goal 0 to -1  - Sleep hygiene:               - Melatonin trazadone qhs    Pulmonary care:   # Postoperative ventilation management  # Acute hypoxemic respiratory failure (ARDS vs. PNA vs. Hypervolemia vs. TRALI vs. severe atelectasis)  COVID-19, Mycoplasma, Parainfluenza, and Influenza negative. CTA negative for PE on 1/30.  CXR not consistent with ARDS. Likely element of severe V/Q mismatch and atelectasis.  Sudden drop in sats overnight on 2/8 requiring FiO2 80%.  - Possibly c/b PE given acute FiO2 increase,  HIT positivity              - Remains on bivalirudin  - Follow BCs and respiratory panels (2/12)              - Growing staph epi in blood and sputum              - Only 1/2 bottles of blood, on one side - likely contaminant  - Keep on High Flow NC, but titrate down FiO2 as able to maintain SpO2 > 92%   - 45 LPM HFNC keep the LPM   - Titrated FiO2 down to 30%   - Continue to titrate down to 25%   - ensure PT/OT to see him    Cardiovascular:    # Ascending aortic aneurysm s/p Bentall w/ Jerry valve on 1/28 with Dr. Velasco and Dr. Hannah  # Bicuspid aortic valve  # History of thoracic aortic aneurysm without rupture  # History of hypertension  Recent echo on 10/26/21 with LVEF of 60-65%.  Post-op echo with LVEF >60%  - Goal MAP>65, SBP <130  - ASA 81mg  - Holding PTA meds: metoprolol succinate 25mg daily  - Management of HTN              - Coreg maxed at 50 BID              - Hydralazine 75 mg q8              - Labetalol and hydralazine PRN to maintain SBP <130    GI / Nutrition:   # Obesity (BMI 46)  # Nutrition  - NJ feeds at goal   - remain NPO while on HFNC (and potentially needing BiPAP intermittently)  - PPI  - Continue bowel regimen: miralax, senna PRN (not scheduled with rectal tube)  # Elevated ALT, AST  - Newly elevated ALT (02/15), now AST elevated, but ALP, CT liver on 02/09 was unremarkable, although stones were noted in the gallbladder (no cholecystitis)  - Monitor LFTs qd   - Discontinued acetaminophen   - Consider RUQ ultrasound    - depending on what needs to be held from NJ (tammy. Free water)    - no RUQ if need to hold free water    - LFT elevation could be from augmentin (ends on 02/17)   - Order ammonia level to inform AMS    Renal/ Fluid Balance/ Electrolytes:   BL creat appears to be ~ 1.0  - Strict I/O   - As close to net 0 or negative as possible (not having increasing O2 demands)  - Avoid/limit nephrotoxins as able  - Replete lytes PRN per protocol              - Scheduled potassium  replacement (repleted potassium 40 mEq this AM post-BMP)  # Hypernatremia  - FENa < 1%, high UOP  - Uosm = 902 pre-hydrochlorothiazide   - Consider re-measure Uosm post-hydrochlorothiazide   - re-dose hydrochlorothiazide 25 mg BID  - Continue NJ free water 200 q2   - reinitiate IV D5W at  150/hr pending f/u on Na this PM    Endocrine:    # Stress induced hyperglycemia, improving  - HDSSI  - Lantus 50mg daily              - keep same, since he won't be NPO for too long before extubation trial this afternoon  - Goal BG <180 for optimal healing    ID/ Antibiotics:  # Stress induced leukocytosis, resolved  # Possible ventilator associated pneumonia  Febrile and pan-cultured on 1/30/22. Pan-scanned 02/09 with sinuses. Found small fluid collection in sinus.  Covid pcr, respiratory panel, and respiratory cultures all negative 1/30.  Re-cultured 1/31: respiratory and blood cultures NGTD.  Re-cultured again 2/4 when WBC increased to 23. NGTD.  Re-cultured 02/12: 1 tube of staph epidermidis and respiratory culture on 02/10 with staph epidermidis (likely contaminant)   - Continue to monitor fever curve, WBC and inflammatory markers  - Consult ID 2/4: send C dif, blood cultures, repeat COVID              - COVID negative              - C. diff negative              - Cultures NGTD other than noted above  - CT C/A/P and sinuses 2/11              - Augmentin PO for sinusititis; ends 02/17  - Could consider re-consult to ID if fevers, leukocytosis reoccur, but downtrending on Augmentin, bivalirudin (sinusitis and clot)    Heme/Onc:     # Stress induced leukocytosis  # Acute blood loss anemia  # Acute blood loss thrombocytopenia, resolved  # History of testicular cancer s/p orchiectomy  # RLE DVT  # HIT positive   No s/sx active bleeding. Has RLE DVT as above.   - Lymphedema wraps  - CBC daily  - Heparin resistant              - Continue Bivalirudin gtt    MSK/ Skin:  # Sternotomy  # Surgical Incision  # Buttock pressure injury   -  Sternal precautions  - Postoperative incision management per protocol  - PT/OT/CR   - Ensure that PT/OT sees him (02/16) for dispo  - WOCN consulted for pressure injury   - Has known, small sacral ulcer              - CTAP did not show stranding c/f infection      Prophylaxis:    - Mechanical prophylaxis for DVT  - Chemical DVT prophylaxis- bivalirudin  - PPI - until 03/16 (30d from extubation) per institutional protocol  - HOB to 30 degrees     Lines/ tubes/ drains:  - PIV x 3  - Triple lumen PICC  - Condom urine cath  - Nasoduodenal (1/31)  - Rectal tube      Disposition:  - CVICU     Patient seen, findings and plan discussed with CV ICU staff     Xander Garcia, MS4    Resident/Fellow Attestation   I, Jean Angulo, was present with the medical student who participated in the service and in the documentation of the note.  I have verified the history and personally performed the physical exam and medical decision making.  I agree with the assessment and plan of care as documented in the note.      Jean Angulo MD  Anesthesiology, CA-2, PGY3    ====================================    SUBJECTIVE:   Patient is not having pain this AM. Slept better last night (got trazodone).    OBJECTIVE:   1. VITAL SIGNS:   Temp:  [36.8  C (98.2  F)-37.4  C (99.3  F)] 37.4  C (99.3  F)  Pulse:  [75-92] 89  Resp:  [16-22] 20  BP: ()/(58-77) 113/68  FiO2 (%):  [30 %-50 %] 30 %  SpO2:  [92 %-98 %] 96 %  FiO2 (%): 30 %  Resp: 20    2. INTAKE/ OUTPUT:   I/O last 3 completed shifts:  In: 5822.6 [I.V.:2317.6; NG/GT:2010]  Out: 4075 [Urine:3475; Stool:600]    3. PHYSICAL EXAMINATION:   General: Adult male, seated upright in chair, directs gaze, does not appear uncomfortable  Neuro: Eyes open spontaneously, directs gaze to speech, Nods yes and no to questions, follows commands (hold up 1 finger, hold up 2 fingers), unable to speak when asked (does not attempt to phonate), moves all extremities spontaneously and without focal  deficits  Resp: Breathing on HFNC, nonlabored  CV: RRR on telemetry  Abdomen: Soft, Non-distended, Non-tender x 4   Incisions: CDI  Extremities: warm and well perfused    4. INVESTIGATIONS:   Arterial Blood Gases   Recent Labs   Lab 02/12/22  0434 02/10/22  0828 02/10/22  0436 02/09/22  2242   PH 7.44 7.38 7.43 7.41   PCO2 45 50* 44 48*   PO2 74* 86 103 55*   HCO3 30* 30* 29* 30*     Complete Blood Count   Recent Labs   Lab 02/16/22  0315 02/15/22  0350 02/14/22  0418 02/13/22  0532   WBC 13.6* 15.5* 18.5* 28.7*   HGB 8.1* 8.4* 8.5* 9.1*    332 244 223     Basic Metabolic Panel  Recent Labs   Lab 02/16/22  1849 02/16/22  1734 02/16/22  1555 02/16/22  1408 02/16/22  1204 02/16/22  1027   * 145*  --  155*  --  154*   POTASSIUM 3.9 3.7  --  3.9  --  4.1   CHLORIDE 124* 117*  --  126*  --  126*   CO2 24 24  --  24  --  25   BUN 49* 45*  --  51*  --  54*   CR 0.76 0.70  --  0.74  --  0.83   * 374* 138* 151*   < > 173*    < > = values in this interval not displayed.     Liver Function Tests  Recent Labs   Lab 02/16/22  0315 02/15/22  0350 02/14/22  0418 02/13/22  0532   * 116* 58* 39   * 189* 102* 79*   ALKPHOS 87 83 73 76   BILITOTAL 0.4 0.4 0.7 0.7   ALBUMIN 2.3* 2.4* 2.4* 2.8*     Pancreatic Enzymes  No lab results found in last 7 days.  Coagulation Profile  Recent Labs   Lab 02/16/22  0523 02/15/22  0350 02/14/22  0418 02/13/22  0532   PTT 55* 59* 59* 47*         5. RADIOLOGY:   No results found for this or any previous visit (from the past 24 hour(s)).    =========================================

## 2022-02-16 NOTE — PROGRESS NOTES
Major Shift Events:  02 weaned to 30% 35L, ot. Had head CT was stable on oxy mask in CT. Has been able to use urinal to pee and say a few words with max effort.  Plan: consult speech, lymphedema and have abdominal US  For vital signs and complete assessments, please see documentation flowsheets.

## 2022-02-16 NOTE — CONSULTS
[facility]    Stroke Neurocritical Consult Note    Reason for Consult: Difficulty speaking    Chief Complaint: Difficulty speaking     HPI  Chuy Varner is a 31 year old male w pmhx of bicuspid aortic valve, unraptured thoracic aortic aneurysm, HTN, obesity, testicular CA s/p orchiectomy, s/p Bentall procedure with a mechanical valve 1/28. Course complicated by intubation for postop hypoxemic resp failure, HIT, s/p extubation, primary team consulting for speech difficulty.    Since extubation patient has not been able to speak, per primary team follows commands and appears to understand, but is completely mute. Has a significant generalized weakness but no obvious laterality to exam, primary team concerned for a new infarct.    Stroke Evaluation Summarized    MRI/Head CT CTH pending   Intracranial Vasculature -   Cervical Vasculature -     Echocardiogram S/p Bentall, EF 65-70%,    EKG/Telemetry SR   Other Testing Not Applicable     LDL  No lab value available in past 30 days   A1C  No lab value available in past 30 days   Troponin No lab value available in past 48 hrs       Impression    32yo man s/p Bentall procedure, s/p hypoxic resp failure with intubation now extubated, consulted for expressive aphasia.    Broad differential including delirium 2/2 a toxic metabolic cause (leukocytoses - possible PNA, elevated liver labs with normal ammonia), or hypophonia post extended intubation, unfortunately unable to check writing due to global weakness.     Ischemic infarct lower on differential given lack of clear focality on exam besides speech difficulty, however reasonable to rule out with imaging given cardiac intervention. Hypoxic ischemic injury, 2/2 resp failure also on differential.     Recommendations  -CTH non con to ro large new infarct or bleed  -If symptoms persist consider MRI brain  -Continue workup of tox metabolic causes of delirium  -Speech therapy  "eval     Patient Follow-up    - final recommendation pending work-up    Thank you for this consult. We will continue to follow.     The Stroke Staff is Dr. Uriarte.    Lazarus Roman MD  Vascular Neurology Fellow  To page me or covering stroke neurology team member, click here: [facility]COM   Choose \"On Call\" tab at top, then search dropdown box for \"Neurology Adult\", select location, press Enter, then look for stroke/neuro ICU/telestroke.    _____________________________________________________    Clinically Significant Risk Factors Present on Admission                  Past Medical History   No past medical history on file.  Past Surgical History   Past Surgical History:   Procedure Laterality Date     ORCHIECTOMY SCROTAL       PICC TRIPLE LUMEN PLACEMENT Right 02/14/2022    Right basilic, 48 cm, 4 cm external length     REPLACE VALVE AORTIC N/A 01/28/2022    Procedure: Median Sternotomy.  Cardiopulmonary bypass pump.  Bentall procedure using On-X Ascending Aortic Prosthesis with Vascutek Gelweave Valsa Graft  size 27-29MM .  Intraoperative transesophageal echocardiogram per anesthesia;  Surgeon: Nickolas Velasco MD;  Location: [facility] OR     Medications   Home Meds  Prior to Admission medications    Medication Sig Start Date End Date Taking? Authorizing Provider   metoprolol succinate ER (TOPROL-XL) 25 MG 24 hr tablet Take 25 mg by mouth every morning  10/27/21  Yes Reported, Patient   Multiple Vitamin (QUINTABS) TABS Take 1 tablet by mouth every morning    Yes Reported, Patient       Scheduled Meds    amoxicillin-clavulanate  1 tablet Oral BID     aspirin  81 mg Oral or NG Tube Daily     banatrol plus  1 packet Per Feeding Tube BID     carvedilol  50 mg Oral or Feeding Tube BID     hydrALAZINE  75 mg Oral or Feeding Tube Q8H DIAMOND     hydrochlorothiazide  25 mg Oral BID     insulin aspart  1-10 Units Subcutaneous Q4H     insulin glargine  50 Units Subcutaneous Daily     melatonin  10 mg Oral or Feeding Tube QPM     multivitamins " w/minerals  15 mL Per Feeding Tube Daily     pantoprazole  40 mg Oral or NG Tube Daily     protein modular  2 packet Per Feeding Tube TID     sodium chloride (PF)  10-40 mL Intracatheter Q8H       Infusion Meds    bivalirudin ANTICOAGULANT (ANGIOMAX) 250 mg/250 mL in 0.9% Sodium Chloride 0.18 mg/kg/hr (02/16/22 1200)     dextrose       - MEDICATION INSTRUCTIONS -       BETA BLOCKER NOT PRESCRIBED         PRN Meds  acetaminophen, bisacodyl, artificial tears ophthalmic solution, dextrose, glucose **OR** dextrose **OR** glucagon, HOLD MEDICATION, hydrALAZINE, HYDROmorphone **OR** HYDROmorphone, labetalol, lidocaine 4%, lidocaine 4%, lidocaine (buffered or not buffered), lidocaine (buffered or not buffered), loperamide, magnesium hydroxide, methocarbamol, naloxone **OR** naloxone **OR** naloxone **OR** naloxone, - MEDICATION INSTRUCTIONS -, ondansetron **OR** ondansetron, oxyCODONE **OR** oxyCODONE, polyethylene glycol-propylene glycol PF, prochlorperazine **OR** prochlorperazine, BETA BLOCKER NOT PRESCRIBED, sodium chloride (PF), sodium chloride (PF), sodium chloride (PF), traZODone    Allergies   Allergies   Allergen Reactions     Heparin Heparin Induced Thrombocytopenia     HIT + 2/10/22, KATARZYNA confirmed     Family History   Family History   Problem Relation Age of Onset     Anesthesia Reaction No family hx of      Deep Vein Thrombosis (DVT) No family hx of      Social History   Social History     Tobacco Use     Smoking status: Never Smoker     Smokeless tobacco: Never Used   Substance Use Topics     Alcohol use: Yes     Alcohol/week: 3.0 standard drinks     Types: 3 Standard drinks or equivalent per week     Drug use: Never       Review of Systems   Review of systems not obtained due to patient factors - mental status       PHYSICAL EXAMINATION   Temp:  [98.2  F (36.8  C)-99  F (37.2  C)] 98.5  F (36.9  C)  Pulse:  [75-89] 84  Resp:  [16-22] 20  BP: ()/(58-77) 108/67  FiO2 (%):  [30 %-50 %] 30 %  SpO2:  [92  "%-98 %] 96 %    Neurologic  Mental Status:  Sleeping but awakes to loud voice, somnolent throughout exam, follows commands with some delay, unable to name, severe difficulty repeating able to whisper out a barely legible \"hello\" with faint, choked voice  Cranial Nerves:  visual fields intact to threat, PERRL, EOMI with normal smooth pursuit, facial sensation intact and symmetric, facial movements symmetric, hearing not formally tested but intact to conversation, tongue protrusion midline  Motor: deltoid 0/5 bilat, bicep and tricep 2/5 bilat LE 0/5 hip flex 2/5 distally  Sensory:  light touch sensation intact and symmetric throughout upper and lower extremities, no extinction on double simultaneous stimulation   Coordination: Unable to perfrom  Station/Gait:  deferred    Dysphagia Screen  Per Nursing    Stroke Scales    NIHSS  Interval     Interval Comments     1a. Level of Consciousness 1-->Not alert, but arousable by minor stimulation to obey, answer, or respond   1b. LOC Questions 2-->Answers neither question correctly   1c. LOC Commands 0-->Performs both tasks correctly   2.   Best Gaze 0-->Normal   3.   Visual 0-->No visual loss   4.   Facial Palsy 0-->Normal symmetrical movements   5a. Motor Arm, Left 3-->No effort against gravity, limb falls   5b. Motor Arm, Right 3-->No effort against gravity, limb falls   6a. Motor Leg, Left 3-->No effort against gravity, leg falls to bed immediately   6b. Motor Leg, right 3-->No effort against gravity, leg falls to bed immediately   7.   Limb Ataxia 0-->Absent   8.   Sensory 0-->Normal, no sensory loss   9.   Best Language 3-->Mute, global aphasia, no usable speech or auditory comprehension   10. Dysarthria 2-->Severe dysarthria, patients speech is so slurred as to be unintelligible in the absence of or out of proportion to any dysphasia, or is mute/anarthric   11. Extinction and Inattention  0-->No abnormality   Total 20 (02/16/22 3234)       Modified [place] Score " (Pre-morbid)  0-No deficits    Imaging  I personally reviewed all imaging; relevant findings per HPI.    Labs Data   CBC  Recent Labs   Lab 02/16/22  0315 02/15/22  0350 02/14/22 0418   WBC 13.6* 15.5* 18.5*   RBC 2.79* 2.82* 2.85*   HGB 8.1* 8.4* 8.5*   HCT 29.3* 29.7* 29.6*    332 244     Basic Metabolic Panel   Recent Labs   Lab 02/16/22  1204 02/16/22  1027 02/16/22  0831 02/16/22  0523 02/16/22  0445 02/16/22 0315   NA  --  154*  --  156*  --  154*  154*   POTASSIUM  --  4.1  --  4.2  --  4.1  4.1   CHLORIDE  --  126*  --  126*  --  125*  125*   CO2  --  25  --  26  --  25  25   BUN  --  54*  --  62*  --  58*  58*   CR  --  0.83  --  0.81  --  0.82  0.82   * 173* 164* 162*   < > 161*  161*   MAYE  --  9.1  --  8.9  --  8.6  8.6    < > = values in this interval not displayed.     Liver Panel  Recent Labs   Lab 02/16/22  0315 02/15/22  0350 02/14/22 0418   PROTTOTAL 6.8 7.3 7.1   ALBUMIN 2.3* 2.4* 2.4*   BILITOTAL 0.4 0.4 0.7   ALKPHOS 87 83 73   * 116* 58*   * 189* 102*     INR    Recent Labs   Lab Test 01/28/22  1447 01/28/22  1246 01/10/22  1216   INR 1.38* 1.53* 1.00      Lipid Profile    Recent Labs   Lab Test 02/08/22  0415 02/05/22  0359 02/04/22  0545   TRIG 129 208* 219*     A1C  No lab results found.  Troponin I  No results for input(s): TROPONIN, GHTROP in the last 168 hours.       Stroke Consult Data Data   This was a non-emergent, non-telestroke consult.

## 2022-02-17 ENCOUNTER — APPOINTMENT (OUTPATIENT)
Dept: GENERAL RADIOLOGY | Facility: CLINIC | Age: 32
End: 2022-02-17
Attending: SURGERY
Payer: COMMERCIAL

## 2022-02-17 ENCOUNTER — APPOINTMENT (OUTPATIENT)
Dept: SPEECH THERAPY | Facility: CLINIC | Age: 32
End: 2022-02-17
Attending: SURGERY
Payer: COMMERCIAL

## 2022-02-17 ENCOUNTER — APPOINTMENT (OUTPATIENT)
Dept: OCCUPATIONAL THERAPY | Facility: CLINIC | Age: 32
End: 2022-02-17
Attending: SURGERY
Payer: COMMERCIAL

## 2022-02-17 LAB
ALBUMIN SERPL-MCNC: 2.2 G/DL (ref 3.4–5)
ALP SERPL-CCNC: 89 U/L (ref 40–150)
ALT SERPL W P-5'-P-CCNC: 263 U/L (ref 0–70)
ANION GAP SERPL CALCULATED.3IONS-SCNC: 3 MMOL/L (ref 3–14)
ANION GAP SERPL CALCULATED.3IONS-SCNC: 6 MMOL/L (ref 3–14)
ANION GAP SERPL CALCULATED.3IONS-SCNC: 6 MMOL/L (ref 3–14)
ANION GAP SERPL CALCULATED.3IONS-SCNC: 7 MMOL/L (ref 3–14)
ANION GAP SERPL CALCULATED.3IONS-SCNC: 7 MMOL/L (ref 3–14)
ANION GAP SERPL CALCULATED.3IONS-SCNC: 8 MMOL/L (ref 3–14)
APTT PPP: 60 SECONDS (ref 22–38)
AST SERPL W P-5'-P-CCNC: 101 U/L (ref 0–45)
BACTERIA BLD CULT: ABNORMAL
BACTERIA BLD CULT: ABNORMAL
BACTERIA BLD CULT: NO GROWTH
BACTERIA UR CULT: NO GROWTH
BASE EXCESS BLDV CALC-SCNC: 2.5 MMOL/L (ref -7.7–1.9)
BILIRUB SERPL-MCNC: 0.4 MG/DL (ref 0.2–1.3)
BUN SERPL-MCNC: 36 MG/DL (ref 7–30)
BUN SERPL-MCNC: 37 MG/DL (ref 7–30)
BUN SERPL-MCNC: 38 MG/DL (ref 7–30)
BUN SERPL-MCNC: 39 MG/DL (ref 7–30)
BUN SERPL-MCNC: 42 MG/DL (ref 7–30)
BUN SERPL-MCNC: 42 MG/DL (ref 7–30)
CALCIUM SERPL-MCNC: 8.4 MG/DL (ref 8.5–10.1)
CALCIUM SERPL-MCNC: 8.5 MG/DL (ref 8.5–10.1)
CALCIUM SERPL-MCNC: 8.7 MG/DL (ref 8.5–10.1)
CALCIUM SERPL-MCNC: 8.8 MG/DL (ref 8.5–10.1)
CHLORIDE BLD-SCNC: 110 MMOL/L (ref 94–109)
CHLORIDE BLD-SCNC: 111 MMOL/L (ref 94–109)
CHLORIDE BLD-SCNC: 114 MMOL/L (ref 94–109)
CHLORIDE BLD-SCNC: 117 MMOL/L (ref 94–109)
CHLORIDE BLD-SCNC: 118 MMOL/L (ref 94–109)
CHLORIDE BLD-SCNC: 120 MMOL/L (ref 94–109)
CO2 SERPL-SCNC: 23 MMOL/L (ref 20–32)
CO2 SERPL-SCNC: 24 MMOL/L (ref 20–32)
CO2 SERPL-SCNC: 24 MMOL/L (ref 20–32)
CO2 SERPL-SCNC: 26 MMOL/L (ref 20–32)
CREAT SERPL-MCNC: 0.62 MG/DL (ref 0.66–1.25)
CREAT SERPL-MCNC: 0.65 MG/DL (ref 0.66–1.25)
CREAT SERPL-MCNC: 0.68 MG/DL (ref 0.66–1.25)
CREAT SERPL-MCNC: 0.7 MG/DL (ref 0.66–1.25)
CREAT SERPL-MCNC: 0.72 MG/DL (ref 0.66–1.25)
CREAT SERPL-MCNC: 0.77 MG/DL (ref 0.66–1.25)
ERYTHROCYTE [DISTWIDTH] IN BLOOD BY AUTOMATED COUNT: 15.2 % (ref 10–15)
GFR SERPL CREATININE-BSD FRML MDRD: >90 ML/MIN/1.73M2
GLUCOSE BLD-MCNC: 159 MG/DL (ref 70–99)
GLUCOSE BLD-MCNC: 165 MG/DL (ref 70–99)
GLUCOSE BLD-MCNC: 166 MG/DL (ref 70–99)
GLUCOSE BLD-MCNC: 168 MG/DL (ref 70–99)
GLUCOSE BLD-MCNC: 168 MG/DL (ref 70–99)
GLUCOSE BLD-MCNC: 303 MG/DL (ref 70–99)
GLUCOSE BLDC GLUCOMTR-MCNC: 126 MG/DL (ref 70–99)
GLUCOSE BLDC GLUCOMTR-MCNC: 128 MG/DL (ref 70–99)
GLUCOSE BLDC GLUCOMTR-MCNC: 145 MG/DL (ref 70–99)
GLUCOSE BLDC GLUCOMTR-MCNC: 149 MG/DL (ref 70–99)
GLUCOSE BLDC GLUCOMTR-MCNC: 156 MG/DL (ref 70–99)
GLUCOSE BLDC GLUCOMTR-MCNC: 160 MG/DL (ref 70–99)
HCO3 BLDV-SCNC: 27 MMOL/L (ref 21–28)
HCT VFR BLD AUTO: 32.1 % (ref 40–53)
HGB BLD-MCNC: 9.1 G/DL (ref 13.3–17.7)
MCH RBC QN AUTO: 29.1 PG (ref 26.5–33)
MCHC RBC AUTO-ENTMCNC: 28.3 G/DL (ref 31.5–36.5)
MCV RBC AUTO: 103 FL (ref 78–100)
O2/TOTAL GAS SETTING VFR VENT: 4 %
OSMOLALITY UR: 586 MMOL/KG (ref 100–1200)
OXYHGB MFR BLDV: 59 % (ref 70–75)
PCO2 BLDV: 38 MM HG (ref 40–50)
PH BLDV: 7.46 [PH] (ref 7.32–7.43)
PLATELET # BLD AUTO: 448 10E3/UL (ref 150–450)
PO2 BLDV: 34 MM HG (ref 25–47)
POTASSIUM BLD-SCNC: 4.1 MMOL/L (ref 3.4–5.3)
POTASSIUM BLD-SCNC: 4.2 MMOL/L (ref 3.4–5.3)
POTASSIUM BLD-SCNC: 4.5 MMOL/L (ref 3.4–5.3)
PROT SERPL-MCNC: 6.7 G/DL (ref 6.8–8.8)
RBC # BLD AUTO: 3.13 10E6/UL (ref 4.4–5.9)
SODIUM SERPL-SCNC: 139 MMOL/L (ref 133–144)
SODIUM SERPL-SCNC: 142 MMOL/L (ref 133–144)
SODIUM SERPL-SCNC: 144 MMOL/L (ref 133–144)
SODIUM SERPL-SCNC: 148 MMOL/L (ref 133–144)
SODIUM SERPL-SCNC: 148 MMOL/L (ref 133–144)
SODIUM SERPL-SCNC: 149 MMOL/L (ref 133–144)
WBC # BLD AUTO: 13.8 10E3/UL (ref 4–11)

## 2022-02-17 PROCEDURE — 71045 X-RAY EXAM CHEST 1 VIEW: CPT | Mod: 26 | Performed by: RADIOLOGY

## 2022-02-17 PROCEDURE — 82040 ASSAY OF SERUM ALBUMIN: CPT | Performed by: STUDENT IN AN ORGANIZED HEALTH CARE EDUCATION/TRAINING PROGRAM

## 2022-02-17 PROCEDURE — 83935 ASSAY OF URINE OSMOLALITY: CPT | Performed by: STUDENT IN AN ORGANIZED HEALTH CARE EDUCATION/TRAINING PROGRAM

## 2022-02-17 PROCEDURE — 92610 EVALUATE SWALLOWING FUNCTION: CPT | Mod: GN

## 2022-02-17 PROCEDURE — 258N000003 HC RX IP 258 OP 636: Performed by: SURGERY

## 2022-02-17 PROCEDURE — 97530 THERAPEUTIC ACTIVITIES: CPT | Mod: GO | Performed by: OCCUPATIONAL THERAPIST

## 2022-02-17 PROCEDURE — 200N000002 HC R&B ICU UMMC

## 2022-02-17 PROCEDURE — 82310 ASSAY OF CALCIUM: CPT | Performed by: INTERNAL MEDICINE

## 2022-02-17 PROCEDURE — 999N000157 HC STATISTIC RCP TIME EA 10 MIN

## 2022-02-17 PROCEDURE — 80053 COMPREHEN METABOLIC PANEL: CPT | Performed by: STUDENT IN AN ORGANIZED HEALTH CARE EDUCATION/TRAINING PROGRAM

## 2022-02-17 PROCEDURE — 250N000013 HC RX MED GY IP 250 OP 250 PS 637: Performed by: STUDENT IN AN ORGANIZED HEALTH CARE EDUCATION/TRAINING PROGRAM

## 2022-02-17 PROCEDURE — 258N000003 HC RX IP 258 OP 636: Performed by: STUDENT IN AN ORGANIZED HEALTH CARE EDUCATION/TRAINING PROGRAM

## 2022-02-17 PROCEDURE — 85027 COMPLETE CBC AUTOMATED: CPT | Performed by: STUDENT IN AN ORGANIZED HEALTH CARE EDUCATION/TRAINING PROGRAM

## 2022-02-17 PROCEDURE — 99232 SBSQ HOSP IP/OBS MODERATE 35: CPT | Mod: GC | Performed by: PSYCHIATRY & NEUROLOGY

## 2022-02-17 PROCEDURE — 250N000013 HC RX MED GY IP 250 OP 250 PS 637: Performed by: SURGERY

## 2022-02-17 PROCEDURE — 250N000011 HC RX IP 250 OP 636: Performed by: SURGERY

## 2022-02-17 PROCEDURE — 85730 THROMBOPLASTIN TIME PARTIAL: CPT | Performed by: SURGERY

## 2022-02-17 PROCEDURE — 82805 BLOOD GASES W/O2 SATURATION: CPT | Performed by: STUDENT IN AN ORGANIZED HEALTH CARE EDUCATION/TRAINING PROGRAM

## 2022-02-17 PROCEDURE — 71045 X-RAY EXAM CHEST 1 VIEW: CPT

## 2022-02-17 PROCEDURE — 0CJS8ZZ INSPECTION OF LARYNX, VIA NATURAL OR ARTIFICIAL OPENING ENDOSCOPIC: ICD-10-PCS | Performed by: OTOLARYNGOLOGY

## 2022-02-17 PROCEDURE — 99291 CRITICAL CARE FIRST HOUR: CPT | Mod: 24 | Performed by: INTERNAL MEDICINE

## 2022-02-17 RX ORDER — DEXTROSE MONOHYDRATE 50 MG/ML
INJECTION, SOLUTION INTRAVENOUS CONTINUOUS
Status: DISCONTINUED | OUTPATIENT
Start: 2022-02-17 | End: 2022-02-17

## 2022-02-17 RX ORDER — HYDROCHLOROTHIAZIDE 25 MG/1
25 TABLET ORAL
Status: COMPLETED | OUTPATIENT
Start: 2022-02-17 | End: 2022-02-17

## 2022-02-17 RX ORDER — DEXTROSE MONOHYDRATE 50 MG/ML
INJECTION, SOLUTION INTRAVENOUS CONTINUOUS
Status: CANCELLED | OUTPATIENT
Start: 2022-02-17

## 2022-02-17 RX ADMIN — INSULIN ASPART 1 UNITS: 100 INJECTION, SOLUTION INTRAVENOUS; SUBCUTANEOUS at 00:56

## 2022-02-17 RX ADMIN — AMOXICILLIN AND CLAVULANATE POTASSIUM 1 TABLET: 875; 125 TABLET, FILM COATED ORAL at 20:17

## 2022-02-17 RX ADMIN — INSULIN ASPART 1 UNITS: 100 INJECTION, SOLUTION INTRAVENOUS; SUBCUTANEOUS at 03:36

## 2022-02-17 RX ADMIN — HYDRALAZINE HYDROCHLORIDE 75 MG: 50 TABLET, FILM COATED ORAL at 06:32

## 2022-02-17 RX ADMIN — DEXTROSE MONOHYDRATE: 50 INJECTION, SOLUTION INTRAVENOUS at 13:13

## 2022-02-17 RX ADMIN — HYDROCHLOROTHIAZIDE 25 MG: 25 TABLET ORAL at 17:33

## 2022-02-17 RX ADMIN — Medication 10 MG: at 20:20

## 2022-02-17 RX ADMIN — DEXTROSE MONOHYDRATE: 50 INJECTION, SOLUTION INTRAVENOUS at 02:32

## 2022-02-17 RX ADMIN — AMOXICILLIN AND CLAVULANATE POTASSIUM 1 TABLET: 875; 125 TABLET, FILM COATED ORAL at 09:06

## 2022-02-17 RX ADMIN — BIVALIRUDIN 0.18 MG/KG/HR: 250 INJECTION, POWDER, LYOPHILIZED, FOR SOLUTION INTRAVENOUS at 17:36

## 2022-02-17 RX ADMIN — INSULIN GLARGINE 50 UNITS: 100 INJECTION, SOLUTION SUBCUTANEOUS at 09:14

## 2022-02-17 RX ADMIN — INSULIN ASPART 1 UNITS: 100 INJECTION, SOLUTION INTRAVENOUS; SUBCUTANEOUS at 09:14

## 2022-02-17 RX ADMIN — BIVALIRUDIN 0.18 MG/KG/HR: 250 INJECTION, POWDER, LYOPHILIZED, FOR SOLUTION INTRAVENOUS at 18:17

## 2022-02-17 RX ADMIN — CARVEDILOL 50 MG: 25 TABLET, FILM COATED ORAL at 09:06

## 2022-02-17 RX ADMIN — BIVALIRUDIN 0.18 MG/KG/HR: 250 INJECTION, POWDER, LYOPHILIZED, FOR SOLUTION INTRAVENOUS at 08:56

## 2022-02-17 RX ADMIN — Medication 2 PACKET: at 14:09

## 2022-02-17 RX ADMIN — HYDROCHLOROTHIAZIDE 25 MG: 25 TABLET ORAL at 09:13

## 2022-02-17 RX ADMIN — HYDRALAZINE HYDROCHLORIDE 75 MG: 50 TABLET, FILM COATED ORAL at 21:41

## 2022-02-17 RX ADMIN — BIVALIRUDIN 0.18 MG/KG/HR: 250 INJECTION, POWDER, LYOPHILIZED, FOR SOLUTION INTRAVENOUS at 00:53

## 2022-02-17 RX ADMIN — INSULIN ASPART 1 UNITS: 100 INJECTION, SOLUTION INTRAVENOUS; SUBCUTANEOUS at 13:07

## 2022-02-17 RX ADMIN — Medication 40 MG: at 09:06

## 2022-02-17 RX ADMIN — Medication 1 PACKET: at 09:12

## 2022-02-17 RX ADMIN — HYDRALAZINE HYDROCHLORIDE 75 MG: 50 TABLET, FILM COATED ORAL at 14:08

## 2022-02-17 RX ADMIN — Medication 2 PACKET: at 20:20

## 2022-02-17 RX ADMIN — Medication 2 PACKET: at 20:15

## 2022-02-17 RX ADMIN — CARVEDILOL 50 MG: 25 TABLET, FILM COATED ORAL at 20:17

## 2022-02-17 RX ADMIN — ASPIRIN 81 MG CHEWABLE TABLET 81 MG: 81 TABLET CHEWABLE at 09:06

## 2022-02-17 RX ADMIN — Medication 2 PACKET: at 09:13

## 2022-02-17 RX ADMIN — Medication 15 ML: at 09:06

## 2022-02-17 ASSESSMENT — ACTIVITIES OF DAILY LIVING (ADL)
ADLS_ACUITY_SCORE: 19
ADLS_ACUITY_SCORE: 17
ADLS_ACUITY_SCORE: 19
ADLS_ACUITY_SCORE: 17
ADLS_ACUITY_SCORE: 19
ADLS_ACUITY_SCORE: 17

## 2022-02-17 NOTE — PROGRESS NOTES
[facility]    Stroke Progress Note    Interval EventsPatient speech improved able to utter single words with great difficulty    HPI Summary    Chuy Varner is a 31 year old male w pmhx of bicuspid aortic valve, unraptured thoracic aortic aneurysm, HTN, obesity, testicular CA s/p orchiectomy, s/p Bentall procedure with a mechanical valve 1/28. Course complicated by intubation for postop hypoxemic resp failure, HIT, s/p extubation, primary team consulting for speech difficulty.     Since extubation patient has not been able to speak, per primary team follows commands and appears to understand, but is completely mute. Has a significant generalized weakness but no obvious laterality to exam, consulted for concern for infarct.       MRI/Head CT CTH chronic L thalamic lacunar infarct   Intracranial Vasculature -   Cervical Vasculature -      Echocardiogram S/p Bentall, EF 65-70%,    EKG/Telemetry SR   Other Testing Not Applicable      LDL  No lab value available in past 30 days   A1C  No lab value available in past 30 days   Troponin No lab value available in past 48 hrs      Impression     Difficulty speaking in setting of prolonged intubation, now improving; likely incidental chronic L thalamic infarct 2/2 small vessel disease in setting of hypertension.    L thalamic infarcts can cause aphasia, but patients overall presentation more consistent with hypophonia from intubation and overall deconditioning post cardiac surgery, infarct appears chronic. Should get basic workup and follow up op for the chronic infarct.     Plan  -MRI brain w wo, MRA H+N - if unable to get MRA promptly please order CTA H+N  -Aspirin 81mg every day   -Atorvastatin 40mg at bedtime  -[facility]  -Lipid panel, HbA1C  -Follow-up op stroke neuro    Patient Follow-up    - final recommendation pending work-up    We will continue to follow.     The Stroke Staff is   "Nicholas Roman MD  Vascular Neurology Fellow  To page me or covering stroke neurology team member, click here: AMCOM   Choose \"On Call\" tab at top, then search dropdown box for \"Neurology Adult\", select location, press Enter, then look for stroke/neuro ICU/telestroke.    ______________________________________________________    Clinically Significant Risk Factors Present on Admission                  Medications   Scheduled Meds    amoxicillin-clavulanate  1 tablet Oral BID     aspirin  81 mg Oral or NG Tube Daily     banatrol plus  2 packet Per Feeding Tube BID     carvedilol  50 mg Oral or Feeding Tube BID     hydrALAZINE  75 mg Oral or Feeding Tube Q8H DIAMOND     hydrochlorothiazide  25 mg Oral BID     insulin aspart  1-10 Units Subcutaneous Q4H     insulin glargine  50 Units Subcutaneous Daily     melatonin  10 mg Oral or Feeding Tube QPM     multivitamins w/minerals  15 mL Per Feeding Tube Daily     pantoprazole  40 mg Oral or NG Tube Daily     protein modular  2 packet Per Feeding Tube TID     sodium chloride (PF)  10-40 mL Intracatheter Q8H       Infusion Meds    bivalirudin ANTICOAGULANT (ANGIOMAX) 250 mg/250 mL in 0.9% Sodium Chloride 0.18 mg/kg/hr (02/17/22 1200)     dextrose       D5W 100 mL/hr at 02/17/22 1500     - MEDICATION INSTRUCTIONS -       BETA BLOCKER NOT PRESCRIBED         PRN Meds  acetaminophen, bisacodyl, artificial tears ophthalmic solution, dextrose, glucose **OR** dextrose **OR** glucagon, HOLD MEDICATION, hydrALAZINE, HYDROmorphone **OR** HYDROmorphone, labetalol, lidocaine 4%, lidocaine 4%, lidocaine (buffered or not buffered), lidocaine (buffered or not buffered), loperamide, magnesium hydroxide, methocarbamol, naloxone **OR** naloxone **OR** naloxone **OR** naloxone, - MEDICATION INSTRUCTIONS -, ondansetron **OR** ondansetron, oxyCODONE **OR** oxyCODONE, polyethylene glycol-propylene glycol PF, prochlorperazine **OR** prochlorperazine, BETA BLOCKER NOT PRESCRIBED, sodium chloride " (PF), sodium chloride (PF), sodium chloride (PF), traZODone       PHYSICAL EXAMINATION  Temp:  [98.5  F (36.9  C)-99.3  F (37.4  C)] 99.2  F (37.3  C)  Pulse:  [81-93] 85  Resp:  [20-26] 24  BP: (105-131)/(52-82) 114/69  FiO2 (%):  [30 %] 30 %  SpO2:  [91 %-99 %] 96 %      Neurologic  Mental Status:  Awake and alert, follows commands, names and repeats with difficulty, choked quiet voice, improved from yesterday  Cranial Nerves:  visual fields intact to threat, PERRL, EOMI with normal smooth pursuit, facial sensation intact and symmetric, facial movements symmetric, hearing not formally tested but intact to conversation, tongue protrusion midline  Motor: deltoid 0/5 bilat, bicep and tricep 2/5 bilat LE 0/5 hip flex 2/5 distally  Sensory:  light touch sensation intact and symmetric throughout upper and lower extremities, no extinction on double simultaneous stimulation   Coordination: Unable to perfrom  Station/Gait:  deferred    Stroke Scales    NIHSS  Interval     Interval Comments     1a. Level of Consciousness 1-->Not alert, but arousable by minor stimulation to obey, answer, or respond   1b. LOC Questions 2-->Answers neither question correctly   1c. LOC Commands 0-->Performs both tasks correctly   2.   Best Gaze 0-->Normal   3.   Visual 0-->No visual loss   4.   Facial Palsy 0-->Normal symmetrical movements   5a. Motor Arm, Left 3-->No effort against gravity, limb falls   5b. Motor Arm, Right 3-->No effort against gravity, limb falls   6a. Motor Leg, Left 3-->No effort against gravity, leg falls to bed immediately   6b. Motor Leg, right 3-->No effort against gravity, leg falls to bed immediately   7.   Limb Ataxia 0-->Absent   8.   Sensory 0-->Normal, no sensory loss   9.   Best Language 3-->Mute, global aphasia, no usable speech or auditory comprehension   10. Dysarthria 2-->Severe dysarthria, patients speech is so slurred as to be unintelligible in the absence of or out of proportion to any dysphasia, or is  mute/anarthric   11. Extinction and Inattention  0-->No abnormality   Total 20 (02/16/22 1454)       Modified [place] Score (Pre-morbid)  0-No deficits    Imaging  I personally reviewed all imaging; relevant findings per HPI.     Lab Results Data   CBC  Recent Labs   Lab 02/17/22  0330 02/16/22 0315 02/15/22  0350   WBC 13.8* 13.6* 15.5*   RBC 3.13* 2.79* 2.82*   HGB 9.1* 8.1* 8.4*   HCT 32.1* 29.3* 29.7*    396 332     Basic Metabolic Panel    Recent Labs   Lab 02/17/22  1303 02/17/22  1001 02/17/22  0912 02/17/22  0630 02/17/22  0335 02/17/22  0330   NA  --  148*  --  148*  --  149*   POTASSIUM  --  4.1  --  4.1  --  4.1   CHLORIDE  --  117*  --  118*  --  120*   CO2  --  23  --  24  --  26   BUN  --  38*  --  42*  --  42*   CR  --  0.70  --  0.65*  --  0.77   * 166* 156* 168*   < > 165*   MAYE  --  8.8  --  8.7  --  8.8    < > = values in this interval not displayed.     Liver Panel  Recent Labs   Lab 02/17/22  0330 02/16/22  0315 02/15/22  0350   PROTTOTAL 6.7* 6.8 7.3   ALBUMIN 2.2* 2.3* 2.4*   BILITOTAL 0.4 0.4 0.4   ALKPHOS 89 87 83   * 141* 116*   * 311* 189*     INR    Recent Labs   Lab Test 01/28/22  1447 01/28/22  1246 01/10/22  1216   INR 1.38* 1.53* 1.00      Lipid Profile    Recent Labs   Lab Test 02/08/22  0415 02/05/22  0359 02/04/22  0545   TRIG 129 208* 219*     A1C  No lab results found.  Troponin I  No results for input(s): TROPONIN, GHTROP in the last 168 hours.

## 2022-02-17 NOTE — CONSULTS
"Otolaryngology Consult Note  February 17, 2022      CC: Dysphagia and hoarsness     HPI: Chuy Varner is a 31 year old male with a PMHx s/f bicuspid aortic valve, thoracic aortic aneurysm without rupture, hypertension, obesity, and testicular cancer s/p orchiectomy who underwent Bentall procedure with mechanical valve on 1/28. He was intubated 1/28/2022 for acute hypoxic respiratory failure. 8.0 ETT used for intubation.  He was extubate 2/14/2022. Since extubation patient has required HFNC. He was transitioned off of oxygen this morning for approx 6hrs but then was placed back on.     I was able to gather a history from the patient although he seemed very somnolent. He states his hoarse voice noted after extubation. It has gotten better. This has never happened before. He is unsure if he has other intubation history. He has only tried water and thinks that it is going well. Speech performed bedside swallow today with notable oropharyngneal dysphagia.     PMH: as stated above     Past Surgical History:   Procedure Laterality Date    ORCHIECTOMY SCROTAL      PICC TRIPLE LUMEN PLACEMENT Right 02/14/2022    Right basilic, 48 cm, 4 cm external length    REPLACE VALVE AORTIC N/A 01/28/2022    Procedure: Median Sternotomy.  Cardiopulmonary bypass pump.  Bentall procedure using On-X Ascending Aortic Prosthesis with Vascutek Gelweave Valsa Graft  size 27-29MM .  Intraoperative transesophageal echocardiogram per anesthesia;  Surgeon: Nickolas Velasco MD;  Location: [facility] OR          Allergies   Allergen Reactions    Heparin Heparin Induced Thrombocytopenia     HIT + 2/10/22, KATARZYNA confirmed       ROS: 12 point review of systems is negative unless noted in HPI.    PHYSICAL EXAM:  General: laying in bed, responsive but seems very tired/confused at times, no acute distress  /69   Pulse 85   Temp 99.2  F (37.3  C) (Axillary)   Resp 24   Ht 1.905 m (6' 3\")   Wt (!) 152 kg (335 lb 1.6 oz)   SpO2 96%   BMI 41.88 kg/m    HEAD: " normocephalic, atraumatic  Nose: no anterior drainage  Neck: no LAD, trachea midline  Respiratory: on face mask,  breathing non-labored, no stridor    FIBEROPTIC ENDOSCOPY:  Due to dysphagia, fiberoptic laryngoscopy was indicated. After obtaining verbal consent, the nose was topically decongested and anesthetized. The fiberoptic laryngoscope was passed under endoscopic vision through the left nasal passage. The turbinates were normal. The inferior and middle meati were clear bilaterally without purulence, masses, or polyps. The nasopharynx was clear. The eustachian tubes were clear. The soft palate appeared normal with good mobility. The epiglottis was sharp, and the visualized portion of the vallecula was clear. No lesion on vocal cords. Glottic gap and supraglottic squeeze but cords still mobile. Visualized portion of Subglottis  without stenosis The arytenoids were clear, and there was no pooling in the hypopharynx.      Assessment and Plan  Chuy Varner is a 31 year old male with a past medical history of  PMHx s/f bicuspid aortic valve, thoracic aortic aneurysm without rupture, hypertension, obesity, and testicular cancer s/p orchiectomy who underwent Bentall procedure with mechanical valve on 1/28. Prolonged intubation 1/28-2/14 now with hoarsness and oropharyngeal dysphagia (SLP bedside eval 2/17). Flexible laryngoscopic exam with mobile cords bilaterally. He has a small glottic gap secondary to prolonged intubation but is able to compensate with supraglottic squeeze. His hoarseness will resolve with time. There is no anatomical reason found of flexible laryngoscope for his dysphagia.  for his. He was notably very tired and sometimes would answer questions inappropriately. Which could contribute to his voice and swallow. He should continue to work with SLP to regain function of swallow and voice.     - No surgical intervention needed from ENT  - Continue to work with SLP on swallow and voice     Patient A&P  discussed with Dr. Penaloza.     Tootie Nunez MD PGY3  Otolaryngology-Head & Neck Surgery  Please page ENT with questions by dialing [phone] and entering job code [phone] when prompted.    I, Joaquina Penaloza MD, discussed this patient with the resident/fellow at the time of consultation. I have reviewed the note, labs, imaging and am in agreement with the assessment and plan. I did not see the patient.  Joaquina Penaloza MD

## 2022-02-17 NOTE — PLAN OF CARE
ICU End of Shift Summary. See flowsheets for vital signs and detailed assessment.    Changes this shift: Pt lethargic but able to follow commands. Speech is mumbled but using voice more frequently. Pt on room air satting upper 90s. Pressures stable. Large urine and stool output this shift. Increased D5 to 125/hr due to sodium levels not decreasing. Free water flush remains at 200 Q2. Angiomax remains therapeutic.     Plan: Continue POC. Transfer to [facility] as able. Up to chair today.

## 2022-02-17 NOTE — PROGRESS NOTES
02/17/22 1253   General Information   Onset of Illness/Injury or Date of Surgery 01/28/22   Referring Physician Silvano Bagley MD   Patient/Family Therapy Goal Statement (SLP) Wants water    Pertinent History of Current Problem Chuy Varner is a 31 year old male with a PMHx s/f bicuspid aortic valve, thoracic aortic aneurysm without rupture, hypertension, obesity, and testicular cancer s/p orchiectomy who underwent Bentall procedure with mechanical valve on 1/28 with Dr. Velasco and Dr. Hannah. 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   General Observations Pt is alert but fatigued. Able to maintain alertness with cues and support of his s/o.    Past History of Dysphagia None PTA per pt and s/o report   Type of Evaluation   Type of Evaluation Swallow Evaluation   Oral Motor   Oral Musculature generally intact   Structural Abnormalities none present   Mucosal Quality dry;sticky   Dentition (Oral Motor)   Dentition (Oral Motor) adequate dentition   Facial Symmetry (Oral Motor)   Facial Symmetry (Oral Motor) WNL   Lip Function (Oral Motor)   Lip Range of Motion (Oral Motor) WNL   Tongue Function (Oral Motor)   Tongue ROM (Oral Motor) WNL   Vocal Quality/Secretion Management (Oral Motor)   Vocal Quality (Oral Motor) hoarse  (gargled speech)   Secretion Management (Oral Motor) oral suctioning required  (sticky and thick secretions)   General Swallowing Observations   Current Diet/Method of Nutritional Intake (General Swallowing Observations, NIS) NPO;nasogastric tube (NG)   Respiratory Support (General Swallowing Observations) supplemental oxygen;oxygen mask   Swallowing Evaluation Clinical swallow evaluation   Clinical Swallow Evaluation   Feeding Assistance dependent   Clinical Swallow Evaluation Textures Trialed thin liquids   Clinical Swallow Eval: Thin Liquid Texture Trial   Mode of  Presentation, Thin Liquids spoon;straw;fed by clinician   Volume of Liquid or Food Presented 2 oz    Oral Phase of Swallow premature pharyngeal entry   Pharyngeal Phase of Swallow impaired;throat clearing   Diagnostic Statement Intermittent throat clearing with teaspoons and single straw sips of thin liquids. Suspect this is partially related to secretion management as thick secretions were visualized, some where able to be cleared however suspect more   Esophageal Phase of Swallow   Patient reports or presents with symptoms of esophageal dysphagia No   Swallowing Recommendations   Diet Consistency Recommendations ice chips only   Supervision Level for Intake 1:1 supervision needed   Mode of Delivery Recommendations bolus size, small;slow rate of intake   Swallowing Maneuver Recommendations effortful (hard) swallow;extra swallow   Monitoring/Assistance Required (Eating/Swallowing) stop eating activities when fatigue is present;monitor for cough or change in vocal quality with intake   Recommended Feeding/Eating Techniques (Swallow Eval) maintain upright posture during/after eating for 30 minutes;minimize distractions during oral intake;provide assist with feeding   Medication Administration Recommendations, Swallowing (SLP) Non oral means at this time    Instrumental Assessment Recommendations instrumental evaluation not recommended at this time  (pending clinical course )   General Therapy Interventions   Planned Therapy Interventions Dysphagia Treatment   Dysphagia treatment Oropharyngeal exercise training;Modified diet education;Instruction of safe swallow strategies   Clinical Impression   Criteria for Skilled Therapeutic Interventions Met (SLP Eval) Yes, treatment indicated   SLP Diagnosis Oropharyngeal dysphagia    Risks & Benefits of therapy have been explained evaluation/treatment results reviewed;care plan/treatment goals reviewed;participants voiced agreement with care plan;participants  included;spouse/significant other;patient   Clinical Impression Comments Pt seen for clinical swallow evaluation. He is fatigued but able to participate. He has thick and sticky secretions, some of which were able to be cleared. Ice chips, teaspoons of thin, and single straw sips consumed. No overt s/sx of aspiration with ice chips. Intermittent throat clearing and coughing with thin liquids. However, it is difficult to determine if related to aspiration or secondary to secretion managment. This in combination with his mentation increases his risk for aspiration and no further trials were provided. Oropharyngeal dysphagia secondary to prolonged intubation and altered mentation with garbled speech. Continue NPO status - complete frequent oral cares and allow ice chips. Initiate SLP services for dysphagia.    SLP Discharge Planning   SLP Discharge Recommendation Transitional Care Facility;Acute Rehab Center-Motivated patient will benefit from intensive, interdisciplinary therapy.  Anticipate will be able to tolerate 3 hours of therapy per day   SLP Rationale for DC Rec Dysphagia    SLP Brief overview of current status  Oropharyngeal dysphagia secondary to prolonged intubation and altered mentation with garbled speech. Continue NPO status - complete frequent oral cares and allow ice chips. Initiate SLP services for dysphagia.     Total Evaluation Time   Total Evaluation Time (Minutes) 13   SLP Goals   Therapy Frequency (SLP Eval) 5 times/wk   SLP Predicated Duration/Target Date for Goal Attainment 03/31/22   SLP Goals Swallow   SLP: Safely tolerate diet without signs/symptoms of aspiration Regular diet;Thin liquids

## 2022-02-17 NOTE — PROGRESS NOTES
Care Management Follow Up    Length of Stay (days): 20    Expected Discharge Date:   TBD     Concerns to be Addressed: all concerns addressed in this encounter     Patient plan of care discussed at interdisciplinary rounds: Yes    Anticipated Discharge Disposition: TBD     Anticipated Discharge Services: None  Anticipated Discharge DME:  TBD    Additional Information:  This writer received a call from [facility] care coordinator. She can assist in discharge planning if needed.   If patient will be transitioning to a rehab facility- will need authorization.      Intake assisting with discharge needs/authorization for facility placement: [phone]  Marilyn POWELL: [phone]  Benefit checks: [phone]      Kacey Mckeon, RN  Float RN Care Coordinator  Pager [phone] (unit RNCC pager)     For Weekend & Holiday on call RN Care Coordinator:  (Home discharge with needs including home care, Assisted living facility returns, Durable medical equip, IV antibiotics)   [place]    Pager [phone] or dial [phone] and enter job code [phone] at prompt  [employer] (Sunday Only) Pager [phone]    For weekend social work needs, contact information below:  (Transitional care unit, Long-term care unit, Hospice, Counseling, Domestic violence,Vulnerable adult, Health Care Directive)  4A, 4C, 4E, 5A, 5B  Pager [phone]  6A, 6B, 6C, 6D   Pager [phone]  7A, 7B, 7C, 7D, 5C  Pager [phone]  [employer] (Saturday Only) Pager [phone]    After hours for all units- (only  is available -[phone]/everyday)  Pager [phone]         Statement Selected

## 2022-02-17 NOTE — PROGRESS NOTES
CV ICU PROGRESS NOTE  February 17, 2022      CO-MORBIDITIES:   Aortic root aneurysm (H)    ASSESSMENT: Chuy Varner is a 30 yo M with a PMHx of bicuspid aortic valve, thoracic aortic aneurysm without rupture, hypertension, obesity, and testicular cancer s/p orchiectomy who underwent Bentall procedure with mechanical valve on 1/28 with Dr. Velasco and Dr. Hannah. 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    TODAY'S PROGRESS:   Overnight:  - Na re-check was 152 (raised D5 to 125ml/hr)  - Subsequently lowered down to 50ml/hr D5  Goals:  - Neuro following   - CT w/left thalamic infarct (old) otherwise unremarkable   - Recommended speech therapy eval, & MRI  - Ensure lymphedema wraps are applied (consult in)  - Continue to correct HyperNa+  - Added respiratory therapy orders (flutter, metaneb)    PLAN:  Neuro/ pain/ sedation:  # Acute Postoperative pain  - Monitor neurological status. Notify the MD for any acute changes in exam.              - Neurology following, appreciate recs    - dysarthric on exam -> speech therapy eval    - CT head -> no acute path (L thalamic infarct, old)    - Neurology would like an MRI before he leaves the hospital/ICU (not now)    - ENT see him for scope (for possibility of laryngeal injury [intubated ~2.5 wk])  - Pain: remains well controlled on current regimen              - PRN tylenol, dilaudid, robaxin, oxycodone                          - still no PRNs used  - RASS goal 0 to -1  - Sleep hygiene:              - Melatonin, trazadone qhs    Pulmonary care:   # Acute hypoxemic respiratory failure (ARDS vs. PNA vs. Hypervolemia vs. TRALI vs. severe atelectasis) s/p extubation  - Extubated, breathing on Room Air   - conditional VBGs -- draw one if increased somnolence or distressed breathing   - metanebs (percussive therapy)   - flutter valve QID to encourage coughing,  clearance of airways   - PT/OT are seeing him, he's getting up to chair, sitting up    Cardiovascular:    # Ascending aortic aneurysm s/p Bentall w/ Jerry valve on 1/28 with Dr. Velasco and Dr. Hannah  # Hx of HTN  - Goal MAP>65, SBP <130   - ASA 81mg  - Holding PTA meds: metoprolol succinate 25mg daily  - Management of HTN              - Coreg 50 BID, Hydralazine 75 TID              - Labetalol and hydralazine PRN to maintain SBP <130    - Not needing PRNs, no HTN or HypoTN    GI care:   # Obesity (BMI 46)  # Nutrition  - NJ feeds at goal              - consider swallow study once AMS, breathing stabilize  # Diarrhea/Incontinence  Suspect caused by large amounts of free water via NJ vs augmentin vs recent colonization of c. diff   - rectal tube is a risk for injury in conscious patient   - try to use rectal bag for now   - can re-send C. Diff 02/19 if no improvement (no imodium for now)  # Elevated ALT, AST  # Cholelithiasis  Delong sign negative. Canceled RUQ U/S  - LFT elevation likely from augmentin (ends 02/18)  - Monitor LFTs qd    Renal/ Fluid Balance/ Electrolytes:   # I/O Management  BL creat appears to be ~ 1.0  - Strict I/O              - As close to net 0 or negative as possible   - UOP numbers have been inaccurate (condom cath leak)  - Avoid/limit nephrotoxins as able  - Replete lytes PRN per protocol9  # Hypernatremia  - Uosm = 902 pre-hydrochlorothiazide              - Consider re-measure Uosm post-hydrochlorothiazide              - Continue hydrochlorothiazide 25 mg BID  - Continue NJ free water 200 q2              - F/u on Na+ and adjust IV D5 as needed (at 100/hr)    Endocrine:    # Stress induced hyperglycemia, improving  - HDSSI  - Lantus 50mg daily  - Goal BG <180 for optimal healing    ID/ Antibiotics:  # Stress induced leukocytosis, resolved  # Possible ventilator associated pneumonia  Pan cultures and re-cultures all remained unrevealing. Fever likely from previously untreated DVT vs  "sinusitis.  - Continue to monitor fever curve, WBC and inflammatory markers  - Augmentin PO for sinusititis; ends 02/17  - Consider re-consult to ID if fevers, leukocytosis reoccur    Heme:     # History of testicular cancer s/p orchiectomy  # RLE DVT  # HIT positive   RLE DVT              - Lymphedema wraps, consult placed  - CBC daily  - Heparin resistant              - Continue Bivalirudin gtt    MSK/ Skin:  # Sternotomy  # Surgical Incision  # Buttock pressure injury   - Sternal precautions  - Postoperative incision management per protocol  - PT/OT  - WOCN consulted for pressure injury              - Has known, small sacral ulcer              - CTAP did not show stranding c/f infection      Prophylaxis:    - Mechanical prophylaxis for DVT  - Chemical DVT prophylaxis- bivalirudin  - PPI - until 03/16 (30d from extubation) per institutional protocol  - HOB to 30 degrees     Lines/ tubes/ drains:  - PIV x 3  - Triple lumen PICC  - Condom urine cath  - Nasoduodenal (1/31)  - Rectal tube      Disposition:  - CVICU    Patient seen, findings and plan discussed with CV ICU staff     Xander Garcia, MS4    Resident/Fellow Attestation   I, Jean Angulo, was present with the medical student who participated in the service and in the documentation of the note.  I have verified the history and personally performed the physical exam and medical decision making.  I agree with the assessment and plan of care as documented in the note.      Jean Angulo MD  Anesthesiology, CA-2, PGY3      ====================================    SUBJECTIVE:   Chuy is without pain overnight, sleeping soundly this AM. Per RN, able to nod yes and no to questions, was able to state that his name is \"Chuy.\"    OBJECTIVE:   1. VITAL SIGNS:   Temp:  [36.9  C (98.5  F)-37.7  C (99.8  F)] 37.7  C (99.8  F)  Pulse:  [81-96] 96  Resp:  [20-28] 28  BP: (105-131)/(52-82) 126/78  SpO2:  [91 %-100 %] 100 %  FiO2 (%): 30 %  Resp: 28      2. INTAKE/ " OUTPUT:   I/O last 3 completed shifts:  In: 7205.09 [I.V.:3045.09; NG/GT:2730]  Out: 2685 [Urine:2685]    3. PHYSICAL EXAMINATION:   General: adult male, sleeping/resting in bed  Neuro: opens eyes and directs eyes to voice, nods yes and no to questions, follows commands (raise 1 finger, raise 2 fingers), per RN was able to state his name, although labored/dysarthric  Resp: Breathing non-labored on Room Air! Lungs CTAB  CV: RRR, normal S1, S2, sinus on telemetry  Abdomen: Soft, Non-distended, Non-tender, Delong's sign is negative in the RUQ, no abdominal pain x4  Incisions: c/d/i  Extremities: warm and well perfused, the R calf is swollen as compared to the left, no discoloration or duskiness, similar to prior    4. INVESTIGATIONS:   Arterial Blood Gases   Recent Labs   Lab 02/12/22  0434   PH 7.44   PCO2 45   PO2 74*   HCO3 30*     Complete Blood Count   Recent Labs   Lab 02/17/22  0330 02/16/22  0315 02/15/22  0350 02/14/22  0418   WBC 13.8* 13.6* 15.5* 18.5*   HGB 9.1* 8.1* 8.4* 8.5*    396 332 244     Basic Metabolic Panel  Recent Labs   Lab 02/17/22  1736 02/17/22  1731 02/17/22  1410 02/17/22  1303 02/17/22  1001 02/17/22  0912 02/17/22  0630     --  144  --  148*  --  148*   POTASSIUM 4.1  --  4.5  --  4.1  --  4.1   CHLORIDE 110*  --  114*  --  117*  --  118*   CO2 23  --  23  --  23  --  24   BUN 36*  --  37*  --  38*  --  42*   CR 0.62*  --  0.68  --  0.70  --  0.65*   * 126* 168* 160* 166*   < > 168*    < > = values in this interval not displayed.     Liver Function Tests  Recent Labs   Lab 02/17/22  0330 02/16/22  0315 02/15/22  0350 02/14/22  0418   * 141* 116* 58*   * 311* 189* 102*   ALKPHOS 89 87 83 73   BILITOTAL 0.4 0.4 0.4 0.7   ALBUMIN 2.2* 2.3* 2.4* 2.4*     Pancreatic Enzymes  No lab results found in last 7 days.  Coagulation Profile  Recent Labs   Lab 02/17/22  0330 02/16/22  0523 02/15/22  0350 02/14/22  0418   PTT 60* 55* 59* 59*         5. RADIOLOGY:    Recent Results (from the past 24 hour(s))   XR Chest Port 1 View    Narrative    Exam: XR CHEST PORT 1 VIEW, 2/17/2022 9:40 AM    Comparison: 2/15/2022    History: Reassess atelectasis    Findings:  AP view the chest. Postsurgical chest with intact median sternotomy  wires. Right upper central ectatic with tip in the low SVC. Cardiac  valve replacement. Enteric tube traverses below the field-of-view.    Trachea is midline. Stable enlarged cardiac silhouette. Perihilar and  bibasilar predominant pulmonary opacities. Low lung volumes. There is  no pneumothorax. Trace bilateral pleural effusions. The upper abdomen  is unremarkable.      Impression    Impression:   Stable to slightly improved appearance of perihilar and bibasilar  predominant opacities that likely represent pulmonary edema.    I have personally reviewed the examination and initial interpretation  and I agree with the findings.    TAE BRISCOE MD         SYSTEM ID:  [id number]       =========================================

## 2022-02-18 ENCOUNTER — APPOINTMENT (OUTPATIENT)
Dept: SPEECH THERAPY | Facility: CLINIC | Age: 32
End: 2022-02-18
Attending: SURGERY
Payer: COMMERCIAL

## 2022-02-18 ENCOUNTER — APPOINTMENT (OUTPATIENT)
Dept: OCCUPATIONAL THERAPY | Facility: CLINIC | Age: 32
End: 2022-02-18
Attending: SURGERY
Payer: COMMERCIAL

## 2022-02-18 LAB
ALBUMIN SERPL-MCNC: 2.4 G/DL (ref 3.4–5)
ALP SERPL-CCNC: 97 U/L (ref 40–150)
ALT SERPL W P-5'-P-CCNC: 228 U/L (ref 0–70)
ANION GAP SERPL CALCULATED.3IONS-SCNC: 5 MMOL/L (ref 3–14)
ANION GAP SERPL CALCULATED.3IONS-SCNC: 6 MMOL/L (ref 3–14)
ANION GAP SERPL CALCULATED.3IONS-SCNC: 7 MMOL/L (ref 3–14)
APTT PPP: 55 SECONDS (ref 22–38)
AST SERPL W P-5'-P-CCNC: 135 U/L (ref 0–45)
BILIRUB SERPL-MCNC: 0.6 MG/DL (ref 0.2–1.3)
BUN SERPL-MCNC: 32 MG/DL (ref 7–30)
BUN SERPL-MCNC: 33 MG/DL (ref 7–30)
BUN SERPL-MCNC: 33 MG/DL (ref 7–30)
CALCIUM SERPL-MCNC: 8.1 MG/DL (ref 8.5–10.1)
CALCIUM SERPL-MCNC: 8.4 MG/DL (ref 8.5–10.1)
CALCIUM SERPL-MCNC: 8.7 MG/DL (ref 8.5–10.1)
CALCIUM SERPL-MCNC: 8.7 MG/DL (ref 8.5–10.1)
CALCIUM SERPL-MCNC: 9 MG/DL (ref 8.5–10.1)
CHLORIDE BLD-SCNC: 110 MMOL/L (ref 94–109)
CHLORIDE BLD-SCNC: 112 MMOL/L (ref 94–109)
CHLORIDE BLD-SCNC: 114 MMOL/L (ref 94–109)
CHOLEST SERPL-MCNC: 118 MG/DL
CO2 SERPL-SCNC: 22 MMOL/L (ref 20–32)
CO2 SERPL-SCNC: 24 MMOL/L (ref 20–32)
CO2 SERPL-SCNC: 24 MMOL/L (ref 20–32)
CO2 SERPL-SCNC: 25 MMOL/L (ref 20–32)
CO2 SERPL-SCNC: 26 MMOL/L (ref 20–32)
CREAT SERPL-MCNC: 0.61 MG/DL (ref 0.66–1.25)
CREAT SERPL-MCNC: 0.63 MG/DL (ref 0.66–1.25)
CREAT SERPL-MCNC: 0.64 MG/DL (ref 0.66–1.25)
CREAT SERPL-MCNC: 0.65 MG/DL (ref 0.66–1.25)
CREAT SERPL-MCNC: 0.66 MG/DL (ref 0.66–1.25)
ERYTHROCYTE [DISTWIDTH] IN BLOOD BY AUTOMATED COUNT: 14.8 % (ref 10–15)
GFR SERPL CREATININE-BSD FRML MDRD: >90 ML/MIN/1.73M2
GLUCOSE BLD-MCNC: 138 MG/DL (ref 70–99)
GLUCOSE BLD-MCNC: 149 MG/DL (ref 70–99)
GLUCOSE BLD-MCNC: 151 MG/DL (ref 70–99)
GLUCOSE BLD-MCNC: 158 MG/DL (ref 70–99)
GLUCOSE BLD-MCNC: 160 MG/DL (ref 70–99)
GLUCOSE BLDC GLUCOMTR-MCNC: 119 MG/DL (ref 70–99)
GLUCOSE BLDC GLUCOMTR-MCNC: 127 MG/DL (ref 70–99)
GLUCOSE BLDC GLUCOMTR-MCNC: 142 MG/DL (ref 70–99)
GLUCOSE BLDC GLUCOMTR-MCNC: 144 MG/DL (ref 70–99)
GLUCOSE BLDC GLUCOMTR-MCNC: 144 MG/DL (ref 70–99)
GLUCOSE BLDC GLUCOMTR-MCNC: 159 MG/DL (ref 70–99)
HBA1C MFR BLD: 5.8 % (ref 0–5.6)
HCT VFR BLD AUTO: 34.7 % (ref 40–53)
HDLC SERPL-MCNC: 18 MG/DL
HGB BLD-MCNC: 10.3 G/DL (ref 13.3–17.7)
LACTATE SERPL-SCNC: 1.3 MMOL/L (ref 0.7–2)
LDLC SERPL CALC-MCNC: 74 MG/DL
MCH RBC QN AUTO: 29.4 PG (ref 26.5–33)
MCHC RBC AUTO-ENTMCNC: 29.7 G/DL (ref 31.5–36.5)
MCV RBC AUTO: 99 FL (ref 78–100)
NONHDLC SERPL-MCNC: 100 MG/DL
PLATELET # BLD AUTO: 469 10E3/UL (ref 150–450)
POTASSIUM BLD-SCNC: 3.5 MMOL/L (ref 3.4–5.3)
POTASSIUM BLD-SCNC: 3.8 MMOL/L (ref 3.4–5.3)
POTASSIUM BLD-SCNC: 3.9 MMOL/L (ref 3.4–5.3)
PROT SERPL-MCNC: 6.8 G/DL (ref 6.8–8.8)
RBC # BLD AUTO: 3.5 10E6/UL (ref 4.4–5.9)
SODIUM SERPL-SCNC: 141 MMOL/L (ref 133–144)
SODIUM SERPL-SCNC: 143 MMOL/L (ref 133–144)
TRIGL SERPL-MCNC: 132 MG/DL
WBC # BLD AUTO: 17 10E3/UL (ref 4–11)

## 2022-02-18 PROCEDURE — 258N000003 HC RX IP 258 OP 636: Performed by: SURGERY

## 2022-02-18 PROCEDURE — 80053 COMPREHEN METABOLIC PANEL: CPT | Performed by: INTERNAL MEDICINE

## 2022-02-18 PROCEDURE — 85730 THROMBOPLASTIN TIME PARTIAL: CPT | Performed by: SURGERY

## 2022-02-18 PROCEDURE — 85027 COMPLETE CBC AUTOMATED: CPT | Performed by: SURGERY

## 2022-02-18 PROCEDURE — 36592 COLLECT BLOOD FROM PICC: CPT | Performed by: SURGERY

## 2022-02-18 PROCEDURE — 250N000011 HC RX IP 250 OP 636: Performed by: SURGERY

## 2022-02-18 PROCEDURE — 97530 THERAPEUTIC ACTIVITIES: CPT | Mod: GO | Performed by: OCCUPATIONAL THERAPIST

## 2022-02-18 PROCEDURE — 82947 ASSAY GLUCOSE BLOOD QUANT: CPT | Performed by: STUDENT IN AN ORGANIZED HEALTH CARE EDUCATION/TRAINING PROGRAM

## 2022-02-18 PROCEDURE — 250N000013 HC RX MED GY IP 250 OP 250 PS 637: Performed by: SURGERY

## 2022-02-18 PROCEDURE — 99291 CRITICAL CARE FIRST HOUR: CPT | Mod: GC | Performed by: INTERNAL MEDICINE

## 2022-02-18 PROCEDURE — 97535 SELF CARE MNGMENT TRAINING: CPT | Mod: GO | Performed by: OCCUPATIONAL THERAPIST

## 2022-02-18 PROCEDURE — 80048 BASIC METABOLIC PNL TOTAL CA: CPT | Performed by: INTERNAL MEDICINE

## 2022-02-18 PROCEDURE — 250N000013 HC RX MED GY IP 250 OP 250 PS 637: Performed by: STUDENT IN AN ORGANIZED HEALTH CARE EDUCATION/TRAINING PROGRAM

## 2022-02-18 PROCEDURE — 83036 HEMOGLOBIN GLYCOSYLATED A1C: CPT | Performed by: STUDENT IN AN ORGANIZED HEALTH CARE EDUCATION/TRAINING PROGRAM

## 2022-02-18 PROCEDURE — 83605 ASSAY OF LACTIC ACID: CPT | Performed by: SURGERY

## 2022-02-18 PROCEDURE — 82310 ASSAY OF CALCIUM: CPT | Performed by: INTERNAL MEDICINE

## 2022-02-18 PROCEDURE — 92526 ORAL FUNCTION THERAPY: CPT | Mod: GN

## 2022-02-18 PROCEDURE — 80061 LIPID PANEL: CPT | Performed by: STUDENT IN AN ORGANIZED HEALTH CARE EDUCATION/TRAINING PROGRAM

## 2022-02-18 PROCEDURE — 120N000003 HC R&B IMCU UMMC

## 2022-02-18 RX ADMIN — Medication 40 MG: at 08:43

## 2022-02-18 RX ADMIN — ASPIRIN 81 MG CHEWABLE TABLET 81 MG: 81 TABLET CHEWABLE at 08:43

## 2022-02-18 RX ADMIN — Medication 15 ML: at 08:43

## 2022-02-18 RX ADMIN — INSULIN ASPART 1 UNITS: 100 INJECTION, SOLUTION INTRAVENOUS; SUBCUTANEOUS at 00:25

## 2022-02-18 RX ADMIN — Medication 10 MG: at 20:10

## 2022-02-18 RX ADMIN — Medication 2 PACKET: at 20:15

## 2022-02-18 RX ADMIN — CARVEDILOL 50 MG: 25 TABLET, FILM COATED ORAL at 08:43

## 2022-02-18 RX ADMIN — ACETAMINOPHEN 650 MG: 325 TABLET, FILM COATED ORAL at 10:45

## 2022-02-18 RX ADMIN — INSULIN ASPART 1 UNITS: 100 INJECTION, SOLUTION INTRAVENOUS; SUBCUTANEOUS at 12:09

## 2022-02-18 RX ADMIN — CARVEDILOL 50 MG: 25 TABLET, FILM COATED ORAL at 20:10

## 2022-02-18 RX ADMIN — Medication 2 PACKET: at 08:44

## 2022-02-18 RX ADMIN — HYDRALAZINE HYDROCHLORIDE 75 MG: 50 TABLET, FILM COATED ORAL at 06:00

## 2022-02-18 RX ADMIN — BIVALIRUDIN 0.18 MG/KG/HR: 250 INJECTION, POWDER, LYOPHILIZED, FOR SOLUTION INTRAVENOUS at 11:22

## 2022-02-18 RX ADMIN — Medication 2 PACKET: at 13:18

## 2022-02-18 RX ADMIN — Medication 2 PACKET: at 12:03

## 2022-02-18 RX ADMIN — BIVALIRUDIN 0.18 MG/KG/HR: 250 INJECTION, POWDER, LYOPHILIZED, FOR SOLUTION INTRAVENOUS at 01:23

## 2022-02-18 RX ADMIN — INSULIN ASPART 1 UNITS: 100 INJECTION, SOLUTION INTRAVENOUS; SUBCUTANEOUS at 03:23

## 2022-02-18 RX ADMIN — BIVALIRUDIN 0.18 MG/KG/HR: 250 INJECTION, POWDER, LYOPHILIZED, FOR SOLUTION INTRAVENOUS at 21:43

## 2022-02-18 RX ADMIN — HYDRALAZINE HYDROCHLORIDE 75 MG: 50 TABLET, FILM COATED ORAL at 13:18

## 2022-02-18 RX ADMIN — HYDRALAZINE HYDROCHLORIDE 75 MG: 50 TABLET, FILM COATED ORAL at 21:47

## 2022-02-18 RX ADMIN — INSULIN GLARGINE 50 UNITS: 100 INJECTION, SOLUTION SUBCUTANEOUS at 08:44

## 2022-02-18 RX ADMIN — INSULIN ASPART 1 UNITS: 100 INJECTION, SOLUTION INTRAVENOUS; SUBCUTANEOUS at 16:53

## 2022-02-18 RX ADMIN — BIVALIRUDIN 0.18 MG/KG/HR: 250 INJECTION, POWDER, LYOPHILIZED, FOR SOLUTION INTRAVENOUS at 13:14

## 2022-02-18 ASSESSMENT — ACTIVITIES OF DAILY LIVING (ADL)
ADLS_ACUITY_SCORE: 19
ADLS_ACUITY_SCORE: 17
ADLS_ACUITY_SCORE: 17
ADLS_ACUITY_SCORE: 19
ADLS_ACUITY_SCORE: 21
ADLS_ACUITY_SCORE: 17
ADLS_ACUITY_SCORE: 21
ADLS_ACUITY_SCORE: 17
ADLS_ACUITY_SCORE: 19
ADLS_ACUITY_SCORE: 17
ADLS_ACUITY_SCORE: 19

## 2022-02-18 NOTE — PLAN OF CARE
ICU End of Shift Summary. See flowsheets for vital signs and detailed assessment.    Changes this shift: Alert, slow to follow commands, speech saw patient today- cleared for ice chips if alert and sitting up tall; ENT consult today for garbled/hoarse voice- note pending; tolerated therapy well today with PT up to chair for >3hr; hemodynamically stable, angiomax infusing for DVT and heart valve; sats in mid 90s on room air, encourage use of incentive spirometer and flutter, thick secretions; hyptenatremia improving, last Na 136, FWF reduced to standard 30 q4hr and D5 dc'd; voiding frequently and 2 loose stools this shift- okay to place rectal pouch or tube if frequent stools overnight    Plan: Continue to monitor and follow CVTS plan of care    Problem: Adjustment to Surgery (Cardiovascular Surgery)  Goal: Optimal Coping with Heart Surgery  Outcome: Ongoing, Progressing     Problem: Risk for Delirium  Goal: Optimal Coping  Outcome: Ongoing, Progressing

## 2022-02-18 NOTE — PLAN OF CARE
ICU End of Shift Summary. See flowsheets for vital signs and detailed assessment.    Changes this shift: Tmax: 100.4. Oriented to person and place. Follows commands, DAVALOS. Garbled speech. SR in the 90s. BP stable. On RA. Adequate UOP from external cath, large urinary incontinence x2. Several loose BM, rectal tube placed. PTT stable, no change to angiomax gtt, recheck tomorrow AM.    Plan: Continue plan of care

## 2022-02-18 NOTE — PLAN OF CARE
Transferred to: 6B at 1730  Status at time of transfer: A&O, but forget. Follows commands, slow to respond. Garbled speech. VSS - afebrile, SR-ST, normotensive, on RA. Uses urinal to void. Multiple loose stools x3 - intermittently incontinent. Sternal chest incision intact. chest rash noted, team aware. Moves with assist/2 and lift. R 3x PICC runing angiomax 0.18 - therapeutic.   Belongings: sent with patient  Godfrey removed? (if no, why?): external cath removed this AM  Chart and medications: sent with patient  Family notified: girlfriend Debbie present

## 2022-02-18 NOTE — PROGRESS NOTES
CV ICU PROGRESS NOTE  February 18, 2022      CO-MORBIDITIES:   Aortic root aneurysm (H)    ASSESSMENT: Chuy Varner is a 30 yo M with a PMHx of bicuspid aortic valve, thoracic aortic aneurysm without rupture, hypertension, obesity, and testicular cancer s/p orchiectomy who underwent Bentall procedure with mechanical valve on 1/28 with Dr. Velasco and Dr. Hannah. 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    TODAY'S PROGRESS:   Overnight:  - Had a fever at 101 overnight  - Papular rash/ small pustules appeared on chest last night  Goals:  - Transfer today  - Advance diet per speech therapy   - f/u w/speech therapy about reason for dysarthria/hoarseness    PLAN:    Neuro/ pain/ sedation:  # Acute Postoperative pain  - Monitor neurological status. Notify the MD for any acute changes in exam.              - Neurology following, appreciate recs                          - dysarthric on exam -> speech therapy evaluation 02/17     - need to f/u with speech about their suspected reason for dysarthria                          - Neurology would like an MRI before he leaves the hospital (not now)     - Please f/u on this as inpatient after transfer                          - ENT scoped, no reason for long term dysarthria/problems with phonation  - Pain: remains well controlled on current regimen               - PRN tylenol, dilaudid, robaxin, oxycodone  - RASS goal 0 to -1  - Sleep hygiene:              - Melatonin, trazadone at bedtime  # Aged (pre-existing) Thalamic Infarct  - Not contributing to current dysarthria vs delirium vs deconditioning per Neuro  - Please start statin after transfer, before discharge (as long as LFTs remain WNL)   - Lipid panel, HgbA1c ordered per neuro for stroke Hx   - Start atorvastatin 40 at bedtime before discharge    Pulmonary care:   # Acute hypoxemic respiratory failure (ARDS  vs. PNA vs. Hypervolemia vs. TRALI vs. severe atelectasis) s/p extubation  - Extubated, breathing on Room Air              - conditional VBGs -- draw one if increased somnolence or distressed breathing   - continue respiratory therapy    - metanebs (percussive therapy)               - flutter valve QID to encourage coughing, clearance of airways    Cardiovascular:    # Ascending aortic aneurysm s/p Bentall w/ Jerry valve on 1/28 with Dr. Velasco and Dr. Hannah  # Hx of HTN  - Goal MAP>65, SBP <130   - ASA 81mg  - Holding PTA meds: metoprolol succinate 25mg daily  - Management of HTN              - Coreg 50 BID, Hydralazine 75 TID              - Labetalol and hydralazine PRN to maintain SBP <130    GI care:   # Obesity (BMI 46)  # Nutrition  - NJ feeds at goal              - can take food PO now per Speech Language   - Diet -> full liquid, mild thickness  # Diarrhea/Incontinence  Suspect caused by large amounts of free water via NJ vs augmentin vs recent colonization of C. diff              - hold off on rectal tube as able (since he is now conscious)              - Receiving team can re-send C. Diff 02/19 if no improvement (no imodium for now)  # Elevated ALT, AST  # Cholelithiasis  Delong sign negative. Downtrending LFTs. Suspect elevations d/t Augmentin   - please f/u after transfer and restart statins as able    Renal/ Fluid Balance/ Electrolytes:   # I/O Management  BL creat appears to be ~ 1.0  - Strict I/O              - As close to net 0 or negative as possible               - UOP numbers have been inaccurate (condom cath leak)  - Avoid/limit nephrotoxins as able  - Replete lytes PRN per protocol  # Hypernatremia  - Uosm = 902 pre-hydrochlorothiazide; F/u Uosm < 600 (no effect)              - continue hydrochlorothiazide 25 mg BID to maintain correction of hyperNa+ (and patient remains net volume up)  - Continue NJ free water 30 q4              - F/u on Na+ and adjust IV D5 as needed (at 0.00/hr)      Endocrine:    # Stress induced hyperglycemia, improving  - HDSSI  - Lantus 50mg daily  - Goal BG <180 for optimal healing    ID/ Antibiotics:  # Stress induced leukocytosis, resolved  # Possible ventilator associated pneumonia  Pan cultures and re-cultures all remained unrevealing. Fever likely from previously untreated DVT vs sinusitis.  - Continue to monitor fever curve, WBC and inflammatory markers  - Augmentin PO for sinusititis; ends 02/17  - Consider re-consult to ID if fevers, leukocytosis reoccur  - Consider re-send C. Difficile (02/19)    Heme:     # History of testicular cancer s/p orchiectomy  # RLE DVT  # HIT positive   RLE DVT              - Lymphedema wraps, consult placed  - CBC daily  - Heparin resistant              - Continue Bivalirudin gtt    MSK/ Skin:  # Sternotomy  # Surgical Incision  # Buttock pressure injury   - Sternal precautions  - Postoperative incision management per protocol  - PT/OT  - WOCN following for pressure injuries  # Maculopapular Rash  Patient has maculopapular rash per Exam below.  - Likely represents folliculitis, monitor for now     Prophylaxis:    - Mechanical prophylaxis for DVT  - Chemical DVT prophylaxis- bivalirudin  - PPI - until 03/16 (30d from extubation) per institutional protocol  - HOB to 30 degrees     Lines/ tubes/ drains:  - PIV x 3  - Triple lumen PICC  - Condom urine cath  - Nasoduodenal (1/31)  - rectal tube order in (not currently placed)     Disposition:  - Transfer     Xander Garcia, MS4    Resident/Fellow Attestation   I, Jean Angulo, was present with the medical student who participated in the service and in the documentation of the note.  I have verified the history and personally performed the physical exam and medical decision making.  I agree with the assessment and plan of care as documented in the note.      Jean Angulo MD  Anesthesiology, CA-2, PGY3    ====================================    SUBJECTIVE:   No acute overnight  events.    OBJECTIVE:   1. VITAL SIGNS:   Temp:  [99.2  F (37.3  C)-101  F (38.3  C)] 99.7  F (37.6  C)  Pulse:  [81-99] 98  Resp:  [23-30] 26  BP: (110-131)/(62-83) 119/73  SpO2:  [90 %-100 %] 95 %  Resp: 26    2. INTAKE/ OUTPUT:   I/O last 3 completed shifts:  In: 5079.26 [I.V.:1874.26; NG/GT:1710]  Out: 3200 [Urine:3200]    3. PHYSICAL EXAMINATION:   General: adult male, sleeping/resting in bed  Neuro: opens eyes and directs eyes to voice, nods yes and no to questions, follows commands (raise 1 finger, raise 2 fingers), per RN was able to state his name, although labored/dysarthric  Resp: Breathing non-labored on Room Air! Lungs CTAB  CV: RRR, normal S1, S2, sinus on telemetry  Abdomen: Soft, Non-distended, Non-tender x 4  Skin: there is a papular rash/ small pustules largely concentrated around hair follicles which spans from the R shoulder, across the chest, to the left shoulder; legs are clear, abdomen has scattered similar lesions around the belt area  Incisions: c/d/i  Extremities: warm and well perfused, the R calf is swollen as compared to the left, no discoloration or duskiness, similar to prior    4. INVESTIGATIONS:   Arterial Blood Gases   Recent Labs   Lab 02/12/22  0434   PH 7.44   PCO2 45   PO2 74*   HCO3 30*     Complete Blood Count   Recent Labs   Lab 02/18/22  0316 02/17/22  0330 02/16/22  0315 02/15/22  0350   WBC 17.0* 13.8* 13.6* 15.5*   HGB 10.3* 9.1* 8.1* 8.4*   * 448 396 332     Basic Metabolic Panel  Recent Labs   Lab 02/18/22  0605 02/18/22  0321 02/18/22  0316 02/18/22  0228 02/18/22  0022 02/17/22  2208     --  141 143  --  142   POTASSIUM 3.9  --  3.9 3.9  --  4.2   CHLORIDE 112*  --  110* 112*  --  111*   CO2 24  --  26 25  --  24   BUN 33*  --  32* 32*  --  39*   CR 0.66  --  0.65* 0.61*  --  0.72   * 144* 149* 160*   < > 159*    < > = values in this interval not displayed.     Liver Function Tests  Recent Labs   Lab 02/18/22  0316 02/17/22  0330 02/16/22 0315  02/15/22  0350   * 101* 141* 116*   * 263* 311* 189*   ALKPHOS 97 89 87 83   BILITOTAL 0.6 0.4 0.4 0.4   ALBUMIN 2.4* 2.2* 2.3* 2.4*     Pancreatic Enzymes  No lab results found in last 7 days.  Coagulation Profile  Recent Labs   Lab 02/18/22  0316 02/17/22  0330 02/16/22  0523 02/15/22  0350   PTT 55* 60* 55* 59*         5. RADIOLOGY:   Recent Results (from the past 24 hour(s))   XR Chest Port 1 View    Narrative    Exam: XR CHEST PORT 1 VIEW, 2/17/2022 9:40 AM    Comparison: 2/15/2022    History: Reassess atelectasis    Findings:  AP view the chest. Postsurgical chest with intact median sternotomy  wires. Right upper central ectatic with tip in the low SVC. Cardiac  valve replacement. Enteric tube traverses below the field-of-view.    Trachea is midline. Stable enlarged cardiac silhouette. Perihilar and  bibasilar predominant pulmonary opacities. Low lung volumes. There is  no pneumothorax. Trace bilateral pleural effusions. The upper abdomen  is unremarkable.      Impression    Impression:   Stable to slightly improved appearance of perihilar and bibasilar  predominant opacities that likely represent pulmonary edema.    I have personally reviewed the examination and initial interpretation  and I agree with the findings.    TAE BRISCOE MD         SYSTEM ID:  [id number]       =========================================

## 2022-02-18 NOTE — PLAN OF CARE
Shift from 8031-6363:    ICU End of Shift Summary. See flowsheets for vital signs and detailed assessment.     Changes this shift: Alert/lethargic, slow to follow commands. Hemodynamically stable, angiomax infusing at 0.18. Sats in mid 90s on room air, encourage use of incentive spirometer and flutter, thick secretions. Na 142, FWF 30 q4hr and D5 dc'd. External catheter placed due to incontinence. 2 loose stools this shift.      Plan: Continue to monitor and follow CVTS plan of care.

## 2022-02-19 ENCOUNTER — APPOINTMENT (OUTPATIENT)
Dept: GENERAL RADIOLOGY | Facility: CLINIC | Age: 32
End: 2022-02-19
Attending: NURSE PRACTITIONER
Payer: COMMERCIAL

## 2022-02-19 LAB
ALBUMIN SERPL-MCNC: 2.5 G/DL (ref 3.4–5)
ALP SERPL-CCNC: 91 U/L (ref 40–150)
ALT SERPL W P-5'-P-CCNC: 183 U/L (ref 0–70)
ANION GAP SERPL CALCULATED.3IONS-SCNC: 3 MMOL/L (ref 3–14)
ANION GAP SERPL CALCULATED.3IONS-SCNC: 5 MMOL/L (ref 3–14)
ANION GAP SERPL CALCULATED.3IONS-SCNC: 6 MMOL/L (ref 3–14)
ANION GAP SERPL CALCULATED.3IONS-SCNC: 7 MMOL/L (ref 3–14)
APTT PPP: 57 SECONDS (ref 22–38)
AST SERPL W P-5'-P-CCNC: 142 U/L (ref 0–45)
BILIRUB SERPL-MCNC: 0.5 MG/DL (ref 0.2–1.3)
BUN SERPL-MCNC: 33 MG/DL (ref 7–30)
BUN SERPL-MCNC: 34 MG/DL (ref 7–30)
BUN SERPL-MCNC: 35 MG/DL (ref 7–30)
BUN SERPL-MCNC: 36 MG/DL (ref 7–30)
BUN SERPL-MCNC: 39 MG/DL (ref 7–30)
BUN SERPL-MCNC: 43 MG/DL (ref 7–30)
CALCIUM SERPL-MCNC: 8.3 MG/DL (ref 8.5–10.1)
CALCIUM SERPL-MCNC: 8.4 MG/DL (ref 8.5–10.1)
CALCIUM SERPL-MCNC: 8.5 MG/DL (ref 8.5–10.1)
CALCIUM SERPL-MCNC: 8.5 MG/DL (ref 8.5–10.1)
CALCIUM SERPL-MCNC: 8.6 MG/DL (ref 8.5–10.1)
CALCIUM SERPL-MCNC: 8.6 MG/DL (ref 8.5–10.1)
CHLORIDE BLD-SCNC: 110 MMOL/L (ref 94–109)
CHLORIDE BLD-SCNC: 112 MMOL/L (ref 94–109)
CHLORIDE BLD-SCNC: 113 MMOL/L (ref 94–109)
CHLORIDE BLD-SCNC: 113 MMOL/L (ref 94–109)
CO2 SERPL-SCNC: 22 MMOL/L (ref 20–32)
CO2 SERPL-SCNC: 23 MMOL/L (ref 20–32)
CO2 SERPL-SCNC: 24 MMOL/L (ref 20–32)
CO2 SERPL-SCNC: 24 MMOL/L (ref 20–32)
CO2 SERPL-SCNC: 25 MMOL/L (ref 20–32)
CO2 SERPL-SCNC: 27 MMOL/L (ref 20–32)
CREAT SERPL-MCNC: 0.58 MG/DL (ref 0.66–1.25)
CREAT SERPL-MCNC: 0.63 MG/DL (ref 0.66–1.25)
CREAT SERPL-MCNC: 0.63 MG/DL (ref 0.66–1.25)
CREAT SERPL-MCNC: 0.66 MG/DL (ref 0.66–1.25)
CREAT SERPL-MCNC: 0.67 MG/DL (ref 0.66–1.25)
CREAT SERPL-MCNC: 0.7 MG/DL (ref 0.66–1.25)
ERYTHROCYTE [DISTWIDTH] IN BLOOD BY AUTOMATED COUNT: 15.6 % (ref 10–15)
FACT X ACT/NOR PPP CHRO: 108 % (ref 70–130)
GFR SERPL CREATININE-BSD FRML MDRD: >90 ML/MIN/1.73M2
GLUCOSE BLD-MCNC: 131 MG/DL (ref 70–99)
GLUCOSE BLD-MCNC: 134 MG/DL (ref 70–99)
GLUCOSE BLD-MCNC: 134 MG/DL (ref 70–99)
GLUCOSE BLD-MCNC: 135 MG/DL (ref 70–99)
GLUCOSE BLD-MCNC: 141 MG/DL (ref 70–99)
GLUCOSE BLD-MCNC: 156 MG/DL (ref 70–99)
GLUCOSE BLDC GLUCOMTR-MCNC: 105 MG/DL (ref 70–99)
GLUCOSE BLDC GLUCOMTR-MCNC: 115 MG/DL (ref 70–99)
GLUCOSE BLDC GLUCOMTR-MCNC: 133 MG/DL (ref 70–99)
GLUCOSE BLDC GLUCOMTR-MCNC: 142 MG/DL (ref 70–99)
GLUCOSE BLDC GLUCOMTR-MCNC: 142 MG/DL (ref 70–99)
GLUCOSE BLDC GLUCOMTR-MCNC: 152 MG/DL (ref 70–99)
HCT VFR BLD AUTO: 35.1 % (ref 40–53)
HGB BLD-MCNC: 10.5 G/DL (ref 13.3–17.7)
HOLD SPECIMEN: NORMAL
MAGNESIUM SERPL-MCNC: 2.1 MG/DL (ref 1.8–2.6)
MCH RBC QN AUTO: 30.1 PG (ref 26.5–33)
MCHC RBC AUTO-ENTMCNC: 29.9 G/DL (ref 31.5–36.5)
MCV RBC AUTO: 101 FL (ref 78–100)
PHOSPHATE SERPL-MCNC: 3.9 MG/DL (ref 2.5–4.5)
PLATELET # BLD AUTO: 414 10E3/UL (ref 150–450)
POTASSIUM BLD-SCNC: 3.8 MMOL/L (ref 3.4–5.3)
POTASSIUM BLD-SCNC: 3.9 MMOL/L (ref 3.4–5.3)
POTASSIUM BLD-SCNC: 4.2 MMOL/L (ref 3.4–5.3)
POTASSIUM BLD-SCNC: 4.3 MMOL/L (ref 3.4–5.3)
PROT SERPL-MCNC: 6.7 G/DL (ref 6.8–8.8)
RBC # BLD AUTO: 3.49 10E6/UL (ref 4.4–5.9)
SODIUM SERPL-SCNC: 140 MMOL/L (ref 133–144)
SODIUM SERPL-SCNC: 140 MMOL/L (ref 133–144)
SODIUM SERPL-SCNC: 141 MMOL/L (ref 133–144)
SODIUM SERPL-SCNC: 142 MMOL/L (ref 133–144)
SODIUM SERPL-SCNC: 142 MMOL/L (ref 133–144)
SODIUM SERPL-SCNC: 143 MMOL/L (ref 133–144)
WBC # BLD AUTO: 16.9 10E3/UL (ref 4–11)

## 2022-02-19 PROCEDURE — 85260 CLOT FACTOR X STUART-POWER: CPT | Performed by: SURGERY

## 2022-02-19 PROCEDURE — 250N000013 HC RX MED GY IP 250 OP 250 PS 637: Performed by: SURGERY

## 2022-02-19 PROCEDURE — 83735 ASSAY OF MAGNESIUM: CPT | Performed by: SURGERY

## 2022-02-19 PROCEDURE — 82310 ASSAY OF CALCIUM: CPT | Performed by: INTERNAL MEDICINE

## 2022-02-19 PROCEDURE — 84100 ASSAY OF PHOSPHORUS: CPT | Performed by: SURGERY

## 2022-02-19 PROCEDURE — 71045 X-RAY EXAM CHEST 1 VIEW: CPT | Mod: 26 | Performed by: STUDENT IN AN ORGANIZED HEALTH CARE EDUCATION/TRAINING PROGRAM

## 2022-02-19 PROCEDURE — 36592 COLLECT BLOOD FROM PICC: CPT | Performed by: SURGERY

## 2022-02-19 PROCEDURE — 85027 COMPLETE CBC AUTOMATED: CPT | Performed by: SURGERY

## 2022-02-19 PROCEDURE — 250N000013 HC RX MED GY IP 250 OP 250 PS 637: Performed by: STUDENT IN AN ORGANIZED HEALTH CARE EDUCATION/TRAINING PROGRAM

## 2022-02-19 PROCEDURE — 250N000011 HC RX IP 250 OP 636: Performed by: SURGERY

## 2022-02-19 PROCEDURE — 85730 THROMBOPLASTIN TIME PARTIAL: CPT | Performed by: SURGERY

## 2022-02-19 PROCEDURE — 71045 X-RAY EXAM CHEST 1 VIEW: CPT

## 2022-02-19 PROCEDURE — 82040 ASSAY OF SERUM ALBUMIN: CPT | Performed by: STUDENT IN AN ORGANIZED HEALTH CARE EDUCATION/TRAINING PROGRAM

## 2022-02-19 PROCEDURE — 258N000003 HC RX IP 258 OP 636: Performed by: SURGERY

## 2022-02-19 PROCEDURE — 999N000044 HC STATISTIC CVC DRESSING CHANGE

## 2022-02-19 PROCEDURE — 84132 ASSAY OF SERUM POTASSIUM: CPT | Performed by: INTERNAL MEDICINE

## 2022-02-19 PROCEDURE — 250N000011 HC RX IP 250 OP 636: Performed by: NURSE PRACTITIONER

## 2022-02-19 PROCEDURE — 36592 COLLECT BLOOD FROM PICC: CPT | Performed by: INTERNAL MEDICINE

## 2022-02-19 PROCEDURE — 120N000003 HC R&B IMCU UMMC

## 2022-02-19 PROCEDURE — 250N000012 HC RX MED GY IP 250 OP 636 PS 637: Performed by: STUDENT IN AN ORGANIZED HEALTH CARE EDUCATION/TRAINING PROGRAM

## 2022-02-19 RX ORDER — POTASSIUM CHLORIDE 1.5 G/1.58G
20 POWDER, FOR SOLUTION ORAL ONCE
Status: COMPLETED | OUTPATIENT
Start: 2022-02-19 | End: 2022-02-19

## 2022-02-19 RX ORDER — FUROSEMIDE 10 MG/ML
40 INJECTION INTRAMUSCULAR; INTRAVENOUS ONCE
Status: COMPLETED | OUTPATIENT
Start: 2022-02-19 | End: 2022-02-19

## 2022-02-19 RX ORDER — WARFARIN SODIUM 7.5 MG/1
7.5 TABLET ORAL
Status: COMPLETED | OUTPATIENT
Start: 2022-02-19 | End: 2022-02-19

## 2022-02-19 RX ADMIN — Medication 2 PACKET: at 08:10

## 2022-02-19 RX ADMIN — HYDRALAZINE HYDROCHLORIDE 75 MG: 50 TABLET, FILM COATED ORAL at 14:27

## 2022-02-19 RX ADMIN — ASPIRIN 81 MG CHEWABLE TABLET 81 MG: 81 TABLET CHEWABLE at 08:08

## 2022-02-19 RX ADMIN — Medication 2 PACKET: at 20:30

## 2022-02-19 RX ADMIN — Medication 40 MG: at 08:08

## 2022-02-19 RX ADMIN — Medication 10 MG: at 20:03

## 2022-02-19 RX ADMIN — CARVEDILOL 50 MG: 25 TABLET, FILM COATED ORAL at 20:03

## 2022-02-19 RX ADMIN — FUROSEMIDE 40 MG: 10 INJECTION, SOLUTION INTRAVENOUS at 12:12

## 2022-02-19 RX ADMIN — HYDRALAZINE HYDROCHLORIDE 75 MG: 50 TABLET, FILM COATED ORAL at 21:45

## 2022-02-19 RX ADMIN — POTASSIUM CHLORIDE 20 MEQ: 1.5 POWDER, FOR SOLUTION ORAL at 08:09

## 2022-02-19 RX ADMIN — INSULIN ASPART 1 UNITS: 100 INJECTION, SOLUTION INTRAVENOUS; SUBCUTANEOUS at 04:51

## 2022-02-19 RX ADMIN — BIVALIRUDIN 0.18 MG/KG/HR: 250 INJECTION, POWDER, LYOPHILIZED, FOR SOLUTION INTRAVENOUS at 21:38

## 2022-02-19 RX ADMIN — BIVALIRUDIN 0.18 MG/KG/HR: 250 INJECTION, POWDER, LYOPHILIZED, FOR SOLUTION INTRAVENOUS at 05:27

## 2022-02-19 RX ADMIN — INSULIN ASPART 1 UNITS: 100 INJECTION, SOLUTION INTRAVENOUS; SUBCUTANEOUS at 00:27

## 2022-02-19 RX ADMIN — TRAZODONE HYDROCHLORIDE 50 MG: 50 TABLET ORAL at 00:11

## 2022-02-19 RX ADMIN — WARFARIN SODIUM 7.5 MG: 7.5 TABLET ORAL at 19:11

## 2022-02-19 RX ADMIN — Medication 15 ML: at 08:08

## 2022-02-19 RX ADMIN — INSULIN ASPART 1 UNITS: 100 INJECTION, SOLUTION INTRAVENOUS; SUBCUTANEOUS at 12:37

## 2022-02-19 RX ADMIN — Medication 2 PACKET: at 14:23

## 2022-02-19 RX ADMIN — CARVEDILOL 50 MG: 25 TABLET, FILM COATED ORAL at 08:09

## 2022-02-19 RX ADMIN — Medication 2 PACKET: at 11:04

## 2022-02-19 RX ADMIN — Medication 2 PACKET: at 20:04

## 2022-02-19 RX ADMIN — BIVALIRUDIN 0.18 MG/KG/HR: 250 INJECTION, POWDER, LYOPHILIZED, FOR SOLUTION INTRAVENOUS at 14:05

## 2022-02-19 RX ADMIN — INSULIN GLARGINE 50 UNITS: 100 INJECTION, SOLUTION SUBCUTANEOUS at 11:02

## 2022-02-19 RX ADMIN — HYDRALAZINE HYDROCHLORIDE 75 MG: 50 TABLET, FILM COATED ORAL at 05:18

## 2022-02-19 ASSESSMENT — ACTIVITIES OF DAILY LIVING (ADL)
ADLS_ACUITY_SCORE: 19
ADLS_ACUITY_SCORE: 19
ADLS_ACUITY_SCORE: 21
ADLS_ACUITY_SCORE: 21
ADLS_ACUITY_SCORE: 19
ADLS_ACUITY_SCORE: 21
ADLS_ACUITY_SCORE: 19
ADLS_ACUITY_SCORE: 21
ADLS_ACUITY_SCORE: 21
ADLS_ACUITY_SCORE: 19
ADLS_ACUITY_SCORE: 21
ADLS_ACUITY_SCORE: 19

## 2022-02-19 NOTE — PHARMACY-ANTICOAGULATION SERVICE
Clinical Pharmacy - Warfarin Dosing Consult     Pharmacy has been consulted to manage this patient s warfarin therapy.  Indication: On-X Aortic Valve (Suggested goal INR 2.0-3.0 for first 3 months then INR 1.5-2.0)  Therapy Goal: Chr Factor 10: 20-40% (note that Pharmacy consult indicates INR goal 2-3, which is inappropriate given concomitant bivalrudin; primary team notified of CFX goal 40-20% via text page)  Warfarin Prior to Admission: No  Significant drug interactions: ASA 81, bivalrudin bridge (h/o HIT)  Recent documented change in oral intake/nutrition: Yes (enteral feeding, full liquid diet)  Dose Comments: warfarin monitoring via CFX w/ bivalrudin bridge    INR   Date Value Ref Range Status   01/28/2022 1.38 (H) 0.85 - 1.15 Final   01/28/2022 1.53 (H) 0.85 - 1.15 Final     Factor 10 Chromogenic   Date Value Ref Range Status   02/19/2022 108 70 - 130 % Final       Recommend warfarin 7.5 mg today.  Pharmacy will monitor Chuy YESSICA EdmondsTelly daily and order warfarin doses to achieve specified goal.      Please contact pharmacy as soon as possible if the warfarin needs to be held for a procedure or if the warfarin goals change.      Presley Webber, EstephaniaD, BCPS

## 2022-02-19 NOTE — PROGRESS NOTES
Cardiovascular Surgery Progress Note  02/19/2022         Assessment and Plan:     Chuy Varner is a 32 yo M with a PMHx of bicuspid aortic valve, thoracic aortic aneurysm without rupture, hypertension, obesity, and testicular cancer s/p orchiectomy who underwent Bentall procedure with mechanical valve on 1/28 with Dr. Velasco and Dr. Hannah. 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     Cardiovascular:   # Ascending aortic aneurysm s/p Bentall w/ Jerry valve on 1/28 with Dr. Velasco and Dr. Hannah  # Hx of HTN  - Goal MAP>65, SBP <130   - ASA 81mg  - Management of HTN              - Coreg 50 BID, Hydralazine 75 TID              - Labetalol and hydralazine PRN to maintain SBP <130  - Anticoagulatin for valve, will need coumadin with INR goal 2-3 for three months, then 1.5-2.0 for life, with ASA. Start Coumadin today, Continue Bival as bridge.     Chest tubes and TPW removed 2/4    Pulmonary:  # Acute hypoxemic respiratory failure (ARDS vs. PNA vs. Hypervolemia vs. TRALI vs. severe atelectasis) s/p extubation  - Extubated 2/14 to HFNC, now breathing on Room Air  - conditional VBGs -- draw one if increased somnolence or distressed breathing  - continue respiratory therapy                          - metanebs (percussive therapy)                          - flutter valve QID to encourage coughing, clearance of airways    Neurology / MSK:  Neuro/ pain/ sedation:  # Acute Postoperative pain  - Monitor neurological status. Notify the MD for any acute changes in exam.  - Neurology following, appreciate recs  - speech therapy evaluation 02/17 for dysarthric   # Dysarthria   - ENT scoped, no reason for long term dysarthria/problems with phonation  - Pain well controlled on PRN tylenol, dilaudid, robaxin, oxycodone  - Sleep hygiene:  Melatonin, trazadone at bedtime  # Thalamic infarct on head CT 2/16, old, age unknown  - Not contributing to current dysarthria vs delirium vs deconditioning per Neuro  - Neurology would like an MRI before he leaves the hospital   - Per Neuro, start statin, Lipitor 40 mg daily pending LFT's               - Lipid panel, HgbA1c ordered per neuro for stroke Hx      / Renal:  -Hypernatremia   BL creat appears to be ~ 1.0  - I/O's inaccurate   - Avoid/limit nephrotoxins as able  - Replete lytes PRN per protocol  - Uosm = 902 pre-hydrochlorothiazide; F/u Uosm < 600 (no effect)  -  hydrochlorothiazide 25 mg BID last given 2/17 for correction of hyperNa+ and hypervolemia.   - Continue NJ free water 30 q4  - Na+ wnl, 143  - Pre-op weight 365 lbs 11 oz, today's wt 335 lbs 11 oz, up 7 lbs from yesterday     - Moderate amount of right MARIAM, generalized edema as well.   - Consult PT/OT for right lymphedema wrap.   Diuresis: Lasix 40 mg IV X1 and reevaluate this afternoon.     GI / FEN:   # Obesity (BMI 46)  # Nutrition  - NJ feeds at goal  - Speech following okay to take full liquid, mild thickness 1/18  # Diarrhea/Incontinence  Suspect caused by large amounts of free water via NJ vs augmentin vs recent colonization of C. diff  - Consider repeat C. Diff if no improvement (no imodium for now)  # Elevated ALT, AST  - Monitor for now   # Cholelithiasis  Delong sign negative. Downtrending LFTs. Suspect elevations d/t Augmentin             Endocrine:  # Stress induced hyperglycemia, improving  - HDSSI  - Hgb A1c 5.8 2/18  - Lantus 50mg daily, plan to decrease as using less sliding scale insulin and good BG control   - Goal BG <180 for optimal healing  - Consider consulting Endocrinology Monday if persistent need for insulin with no history of DM     Infectious Disease:  # Stress induced leukocytosis, resolved  # Possible ventilator associated pneumonia  Pan cultures and re-cultures all remained unrevealing. Fever likely from  previously untreated DVT vs sinusitis.  - Continue to monitor fever curve, WBC and inflammatory markers  - Augmentin PO for sinusititis; ended 02/17  - Consider re-consult to ID if fevers, leukocytosis reoccur  - WBC 16.9 (17.0), Tmax 99.9 last evening   - Consider re-send C. Difficile (02/19)  - CXR today     Hematology:   # History of testicular cancer s/p orchiectomy  # RLE DVT  # HIT positive   - Right LE Lymphedema wrap, reconsult PT/OT   - CBC daily, Hgb 10.5  - Continue Bivalirudin gtt as bridge to coumadin   - start coumadin with INR goal 2-3     Anticoagulation:   - ASA 81 mg daily   - Bival drip bridge to therapeutic coumadin, INR goal 2-3 for three months, then 1.5-2.0 lifelong     MSK/ Skin:  # Sternotomy  # Surgical Incision  # Buttock pressure injury   - Sternal precautions  - Postoperative incision management per protocol  - PT/OT  - WOCN following for pressure injuries  # Maculopapular Rash  Patient has maculopapular rash per Exam below.  - Likely represents folliculitis, monitor for now, improving        Prophylaxis:    Mechanical prophylaxis for DVT  - Chemical DVT prophylaxis- bivalirudin  - PPI - until 03/16 (30d from extubation) per institutional protocol  - HOB to 30 degrees    Disposition:   - Transferred to  on 2/18  - Therapies recommending discharge to TCU     Discussed with Dr Velasco through both written and verbal communication.      Qi Umaña, DNP, CNP  [facility] Cardiothoracic Surgery  O: [phone]  Pgr [phone]        Interval History:     No overnight events.  Pain is well managed on current regimen.   Tolerating diet and tube feeds, is passing flatus, + BM, has had one loose stool today. No nausea or vomiting.  Breathing well without complaints.   Working with therapies.   Denies chest pain, palpitations, dizziness, syncopal symptoms, fevers, chills, myalgias, or sternal popping/clicking.         Physical Exam:   Blood pressure 114/64, pulse 103, temperature 99.1  F (37.3  C),  "temperature source Axillary, resp. rate 26, height 1.905 m (6' 3\"), weight (!) 152.3 kg (335 lb 11.2 oz), SpO2 94 %.  Vitals:    02/16/22 2000 02/18/22 0600 02/19/22 0447   Weight: (!) 152 kg (335 lb 1.6 oz) 149.1 kg (328 lb 11.2 oz) (!) 152.3 kg (335 lb 11.2 oz)      MAPs: 80's    Gen: Alert, NAD. Significant other is at the bedside.   Neuro: opens eyes and directs eyes to voice, nods yes and no to questions, follows commands, although labored/dysarthric  CV: RRR, normal S1 S2, no murmurs, rubs or gallops. Crisp valve click.   Pulm: CTA, no wheezing or rhonchi, normal breathing on RA  Abd: nondistended, normal BS, soft, nontender  Ext: Large amount of RLE peripheral edema, 2+ pitting. Generalized edema.   Incision: clean, dry, intact, no erythema, sternum stable  Tubes/drain sites: clean and dry         Data:    Imaging:  reviewed recent imaging, no acute concerns    Labs:  BMP  Recent Labs   Lab 02/19/22  0807 02/19/22  0544 02/19/22  0450 02/19/22  0218 02/19/22  0013 02/18/22  1920 02/18/22  1208 02/18/22  1006   NA  --  143  --  142  --  143  --  143   POTASSIUM  --  3.8  --  3.8  --  3.8  --  3.5   CHLORIDE  --  113*  --  112*  --  112*  --  114*   MAYE  --  8.4*  --  8.5  --  8.7  --  8.1*   CO2  --  23  --  27  --  24  --  22   BUN  --  33*  --  35*  --  33*  --  32*   CR  --  0.63*  --  0.63*  --  0.64*  --  0.63*   * 135* 152* 134*   < > 138*   < > 158*    < > = values in this interval not displayed.     CBC  Recent Labs   Lab 02/19/22  0544 02/18/22  0316 02/17/22  0330 02/16/22 0315   WBC 16.9* 17.0* 13.8* 13.6*   RBC 3.49* 3.50* 3.13* 2.79*   HGB 10.5* 10.3* 9.1* 8.1*   HCT 35.1* 34.7* 32.1* 29.3*   * 99 103* 105*   MCH 30.1 29.4 29.1 29.0   MCHC 29.9* 29.7* 28.3* 27.6*   RDW 15.6* 14.8 15.2* 15.4*    469* 448 396     INR  No lab results found in last 7 days.   Hepatic Panel  Recent Labs   Lab 02/19/22  0544 02/18/22 0316 02/17/22 0330 02/16/22 0315   * 135* 101* 141* "   * 228* 263* 311*   ALKPHOS 91 97 89 87   BILITOTAL 0.5 0.6 0.4 0.4   ALBUMIN 2.5* 2.4* 2.2* 2.3*     GLUCOSE:   Recent Labs   Lab 02/19/22  0807 02/19/22  0544 02/19/22  0450 02/19/22  0218 02/19/22  0013 02/18/22  1920   * 135* 152* 134* 142* 138*

## 2022-02-19 NOTE — PLAN OF CARE
NEURO: Alert and oriented x4. Follows commands.    RESPIRATORY: Room air.   CARDIO: ST/SR. VSS. Afebrile.   GI/: Adequate urine output. Uses urinal. At times in incontinent. 1 BM over shift.   SKIN: No new deficits noted.   ACTIVITY: Assist of 2. Ceiling lift.   PAIN: Denies pain.  LINES: R PICC.   PLAN: Continue plan of care. Notify team with any changes/concerns.

## 2022-02-19 NOTE — PLAN OF CARE
Transfer  Transferred from: , arrived 6B at about 1745.  Via:bed  Reason for transfer: Pt appropriate for 6B- improved patient condition  Family: Sig other arrived soon after transfer  Belongings: Received with pt  Chart: Received with pt  Medications: Meds received from old unit with pt  Code Status verified on armband: yes  2 RN Skin Assessment Completed By: Maame BELTRAN RN & this writer.  Mid sternal incision, old CT sites, and scattered bruising. R buttocks with small moisture wound/irritation.  Suction/Ambu bag/Flowmeter at bedside: yes     NEURO: A&O to self, place. Appears to be oriented to situation.  RESPIRATORY: O2 sats mid-90s on RA. Lungs coarse/dim.  CARDIO: AVSS. Afebrile.  GI/: Reportedly sometimes incontinent of bowel and bladder. Requested urinal and bedband, good output. Urine tea-colored. BM loose/watery.  SKIN: Bruising. No new deficits noted other than above-mentioned.  ACTIVITY: Assist of 2 to reposition and for clean-ups. Ceiling lift-assist to chair.  PAIN: Denies pain.  LINES: R DL PICC. Angiomax gtt at 0.18mg/kg/hr.  PLAN: Continue plan of care. Notify team with changes/concerns.

## 2022-02-19 NOTE — PLAN OF CARE
Pt transferred to  from  at about 1745. Per  nurse Kacey BELCHER, MDs stated that patient does not need a dose of KCl for K+ level of 3.5 at 10:06 today. No longer checking frequent BMPs, so next check will be tomorrow morning.

## 2022-02-19 NOTE — PLAN OF CARE
"Goal Outcome Evaluation:    Plan of Care Reviewed With: patient, significant other     BP 96/58 (BP Location: Left arm)   Pulse 98   Temp 98.5  F (36.9  C) (Oral)   Resp 20   Ht 1.905 m (6' 3\")   Wt (!) 152.3 kg (335 lb 11.2 oz)   SpO2 94%   BMI 41.96 kg/m        0730 - 1930      Neuro: A&Ox4.   Cardiac: SR. VSS.   Respiratory: Sating 94% on RA.  GI/: Adequate urine output. BM X 2  Diet/appetite: Tolerating current diet. Eating poorly. On tube feeding at goal rate of 65 ml/hr.  Activity: Lift assist. Turned and repositioned in bed with the assist of two every two hours.  Pain: At acceptable level on current regimen.   Skin: No new deficits noted.  LDA's:Triple lumen PICC.  New this shift: Lasix once. Chest xray.    Plan: Continue with POC. Notify primary team with changes.  "

## 2022-02-20 ENCOUNTER — APPOINTMENT (OUTPATIENT)
Dept: OCCUPATIONAL THERAPY | Facility: CLINIC | Age: 32
End: 2022-02-20
Attending: NURSE PRACTITIONER
Payer: COMMERCIAL

## 2022-02-20 ENCOUNTER — APPOINTMENT (OUTPATIENT)
Dept: SPEECH THERAPY | Facility: CLINIC | Age: 32
End: 2022-02-20
Attending: SURGERY
Payer: COMMERCIAL

## 2022-02-20 LAB
ALBUMIN SERPL-MCNC: 2.5 G/DL (ref 3.4–5)
ALP SERPL-CCNC: 91 U/L (ref 40–150)
ALT SERPL W P-5'-P-CCNC: 167 U/L (ref 0–70)
ANION GAP SERPL CALCULATED.3IONS-SCNC: 10 MMOL/L (ref 3–14)
ANION GAP SERPL CALCULATED.3IONS-SCNC: 3 MMOL/L (ref 3–14)
ANION GAP SERPL CALCULATED.3IONS-SCNC: 6 MMOL/L (ref 3–14)
ANION GAP SERPL CALCULATED.3IONS-SCNC: 7 MMOL/L (ref 3–14)
ANION GAP SERPL CALCULATED.3IONS-SCNC: 8 MMOL/L (ref 3–14)
ANION GAP SERPL CALCULATED.3IONS-SCNC: 9 MMOL/L (ref 3–14)
APTT PPP: 65 SECONDS (ref 22–38)
AST SERPL W P-5'-P-CCNC: 127 U/L (ref 0–45)
BILIRUB SERPL-MCNC: 0.5 MG/DL (ref 0.2–1.3)
BUN SERPL-MCNC: 33 MG/DL (ref 7–30)
BUN SERPL-MCNC: 36 MG/DL (ref 7–30)
BUN SERPL-MCNC: 38 MG/DL (ref 7–30)
BUN SERPL-MCNC: 38 MG/DL (ref 7–30)
BUN SERPL-MCNC: 39 MG/DL (ref 7–30)
BUN SERPL-MCNC: 39 MG/DL (ref 7–30)
CALCIUM SERPL-MCNC: 8.2 MG/DL (ref 8.5–10.1)
CALCIUM SERPL-MCNC: 8.3 MG/DL (ref 8.5–10.1)
CALCIUM SERPL-MCNC: 8.4 MG/DL (ref 8.5–10.1)
CALCIUM SERPL-MCNC: 8.4 MG/DL (ref 8.5–10.1)
CALCIUM SERPL-MCNC: 8.6 MG/DL (ref 8.5–10.1)
CALCIUM SERPL-MCNC: 8.6 MG/DL (ref 8.5–10.1)
CHLORIDE BLD-SCNC: 106 MMOL/L (ref 94–109)
CHLORIDE BLD-SCNC: 107 MMOL/L (ref 94–109)
CHLORIDE BLD-SCNC: 109 MMOL/L (ref 94–109)
CHLORIDE BLD-SCNC: 112 MMOL/L (ref 94–109)
CO2 SERPL-SCNC: 22 MMOL/L (ref 20–32)
CO2 SERPL-SCNC: 22 MMOL/L (ref 20–32)
CO2 SERPL-SCNC: 23 MMOL/L (ref 20–32)
CO2 SERPL-SCNC: 23 MMOL/L (ref 20–32)
CO2 SERPL-SCNC: 24 MMOL/L (ref 20–32)
CO2 SERPL-SCNC: 26 MMOL/L (ref 20–32)
CREAT SERPL-MCNC: 0.56 MG/DL (ref 0.66–1.25)
CREAT SERPL-MCNC: 0.58 MG/DL (ref 0.66–1.25)
CREAT SERPL-MCNC: 0.62 MG/DL (ref 0.66–1.25)
CREAT SERPL-MCNC: 0.63 MG/DL (ref 0.66–1.25)
CREAT SERPL-MCNC: 0.65 MG/DL (ref 0.66–1.25)
CREAT SERPL-MCNC: 0.67 MG/DL (ref 0.66–1.25)
ERYTHROCYTE [DISTWIDTH] IN BLOOD BY AUTOMATED COUNT: 15.9 % (ref 10–15)
FACT X ACT/NOR PPP CHRO: 97 % (ref 70–130)
GFR SERPL CREATININE-BSD FRML MDRD: >90 ML/MIN/1.73M2
GLUCOSE BLD-MCNC: 116 MG/DL (ref 70–99)
GLUCOSE BLD-MCNC: 122 MG/DL (ref 70–99)
GLUCOSE BLD-MCNC: 123 MG/DL (ref 70–99)
GLUCOSE BLD-MCNC: 124 MG/DL (ref 70–99)
GLUCOSE BLD-MCNC: 137 MG/DL (ref 70–99)
GLUCOSE BLD-MCNC: 138 MG/DL (ref 70–99)
GLUCOSE BLDC GLUCOMTR-MCNC: 111 MG/DL (ref 70–99)
GLUCOSE BLDC GLUCOMTR-MCNC: 115 MG/DL (ref 70–99)
GLUCOSE BLDC GLUCOMTR-MCNC: 121 MG/DL (ref 70–99)
GLUCOSE BLDC GLUCOMTR-MCNC: 122 MG/DL (ref 70–99)
GLUCOSE BLDC GLUCOMTR-MCNC: 122 MG/DL (ref 70–99)
GLUCOSE BLDC GLUCOMTR-MCNC: 126 MG/DL (ref 70–99)
GLUCOSE BLDC GLUCOMTR-MCNC: 133 MG/DL (ref 70–99)
HCT VFR BLD AUTO: 36.8 % (ref 40–53)
HGB BLD-MCNC: 11.1 G/DL (ref 13.3–17.7)
INR PPP: 1.53 (ref 0.85–1.15)
LACTATE SERPL-SCNC: 1.3 MMOL/L (ref 0.7–2)
MAGNESIUM SERPL-MCNC: 2.1 MG/DL (ref 1.8–2.6)
MCH RBC QN AUTO: 30.2 PG (ref 26.5–33)
MCHC RBC AUTO-ENTMCNC: 30.2 G/DL (ref 31.5–36.5)
MCV RBC AUTO: 100 FL (ref 78–100)
PHOSPHATE SERPL-MCNC: 4.2 MG/DL (ref 2.5–4.5)
PLATELET # BLD AUTO: 369 10E3/UL (ref 150–450)
POTASSIUM BLD-SCNC: 3.5 MMOL/L (ref 3.4–5.3)
POTASSIUM BLD-SCNC: 3.5 MMOL/L (ref 3.4–5.3)
POTASSIUM BLD-SCNC: 3.6 MMOL/L (ref 3.4–5.3)
POTASSIUM BLD-SCNC: 3.7 MMOL/L (ref 3.4–5.3)
POTASSIUM BLD-SCNC: 3.9 MMOL/L (ref 3.4–5.3)
POTASSIUM BLD-SCNC: 3.9 MMOL/L (ref 3.4–5.3)
PROCALCITONIN SERPL-MCNC: <0.05 NG/ML
PROT SERPL-MCNC: 6.6 G/DL (ref 6.8–8.8)
RBC # BLD AUTO: 3.67 10E6/UL (ref 4.4–5.9)
SODIUM SERPL-SCNC: 139 MMOL/L (ref 133–144)
SODIUM SERPL-SCNC: 139 MMOL/L (ref 133–144)
SODIUM SERPL-SCNC: 140 MMOL/L (ref 133–144)
SODIUM SERPL-SCNC: 140 MMOL/L (ref 133–144)
SODIUM SERPL-SCNC: 141 MMOL/L (ref 133–144)
SODIUM SERPL-SCNC: 142 MMOL/L (ref 133–144)
WBC # BLD AUTO: 17.5 10E3/UL (ref 4–11)

## 2022-02-20 PROCEDURE — 82310 ASSAY OF CALCIUM: CPT | Performed by: INTERNAL MEDICINE

## 2022-02-20 PROCEDURE — 258N000003 HC RX IP 258 OP 636: Performed by: SURGERY

## 2022-02-20 PROCEDURE — 85730 THROMBOPLASTIN TIME PARTIAL: CPT | Performed by: SURGERY

## 2022-02-20 PROCEDURE — 250N000013 HC RX MED GY IP 250 OP 250 PS 637: Performed by: STUDENT IN AN ORGANIZED HEALTH CARE EDUCATION/TRAINING PROGRAM

## 2022-02-20 PROCEDURE — 84100 ASSAY OF PHOSPHORUS: CPT | Performed by: SURGERY

## 2022-02-20 PROCEDURE — 250N000011 HC RX IP 250 OP 636: Performed by: SURGERY

## 2022-02-20 PROCEDURE — 250N000011 HC RX IP 250 OP 636: Performed by: NURSE PRACTITIONER

## 2022-02-20 PROCEDURE — 83605 ASSAY OF LACTIC ACID: CPT | Performed by: SURGERY

## 2022-02-20 PROCEDURE — 36415 COLL VENOUS BLD VENIPUNCTURE: CPT | Performed by: INTERNAL MEDICINE

## 2022-02-20 PROCEDURE — 250N000013 HC RX MED GY IP 250 OP 250 PS 637: Performed by: SURGERY

## 2022-02-20 PROCEDURE — 85610 PROTHROMBIN TIME: CPT | Performed by: NURSE PRACTITIONER

## 2022-02-20 PROCEDURE — 85260 CLOT FACTOR X STUART-POWER: CPT | Performed by: SURGERY

## 2022-02-20 PROCEDURE — 250N000013 HC RX MED GY IP 250 OP 250 PS 637: Performed by: NURSE PRACTITIONER

## 2022-02-20 PROCEDURE — 84145 PROCALCITONIN (PCT): CPT | Performed by: NURSE PRACTITIONER

## 2022-02-20 PROCEDURE — 83735 ASSAY OF MAGNESIUM: CPT | Performed by: SURGERY

## 2022-02-20 PROCEDURE — 92526 ORAL FUNCTION THERAPY: CPT | Mod: GN

## 2022-02-20 PROCEDURE — 82040 ASSAY OF SERUM ALBUMIN: CPT | Performed by: STUDENT IN AN ORGANIZED HEALTH CARE EDUCATION/TRAINING PROGRAM

## 2022-02-20 PROCEDURE — 36592 COLLECT BLOOD FROM PICC: CPT | Performed by: INTERNAL MEDICINE

## 2022-02-20 PROCEDURE — 85027 COMPLETE CBC AUTOMATED: CPT | Performed by: SURGERY

## 2022-02-20 PROCEDURE — 120N000003 HC R&B IMCU UMMC

## 2022-02-20 PROCEDURE — 97140 MANUAL THERAPY 1/> REGIONS: CPT | Mod: GO

## 2022-02-20 PROCEDURE — 36592 COLLECT BLOOD FROM PICC: CPT | Performed by: SURGERY

## 2022-02-20 RX ORDER — FUROSEMIDE 10 MG/ML
40 INJECTION INTRAMUSCULAR; INTRAVENOUS 2 TIMES DAILY WITH MEALS
Status: COMPLETED | OUTPATIENT
Start: 2022-02-20 | End: 2022-02-20

## 2022-02-20 RX ORDER — POTASSIUM CHLORIDE 1.5 G/1.58G
20 POWDER, FOR SOLUTION ORAL ONCE
Status: COMPLETED | OUTPATIENT
Start: 2022-02-20 | End: 2022-02-20

## 2022-02-20 RX ORDER — HYDRALAZINE HYDROCHLORIDE 50 MG/1
50 TABLET, FILM COATED ORAL EVERY 8 HOURS SCHEDULED
Status: DISCONTINUED | OUTPATIENT
Start: 2022-02-20 | End: 2022-02-21

## 2022-02-20 RX ORDER — LISINOPRIL 2.5 MG/1
5 TABLET ORAL DAILY
Status: DISCONTINUED | OUTPATIENT
Start: 2022-02-20 | End: 2022-02-21

## 2022-02-20 RX ORDER — WARFARIN SODIUM 7.5 MG/1
7.5 TABLET ORAL
Status: COMPLETED | OUTPATIENT
Start: 2022-02-20 | End: 2022-02-20

## 2022-02-20 RX ORDER — POTASSIUM CHLORIDE 7.45 MG/ML
10 INJECTION INTRAVENOUS
Status: COMPLETED | OUTPATIENT
Start: 2022-02-20 | End: 2022-02-20

## 2022-02-20 RX ADMIN — POTASSIUM CHLORIDE 20 MEQ: 1.5 POWDER, FOR SOLUTION ORAL at 23:18

## 2022-02-20 RX ADMIN — FUROSEMIDE 40 MG: 10 INJECTION, SOLUTION INTRAVENOUS at 18:26

## 2022-02-20 RX ADMIN — BIVALIRUDIN 0.18 MG/KG/HR: 250 INJECTION, POWDER, LYOPHILIZED, FOR SOLUTION INTRAVENOUS at 15:09

## 2022-02-20 RX ADMIN — CARVEDILOL 50 MG: 25 TABLET, FILM COATED ORAL at 20:09

## 2022-02-20 RX ADMIN — LISINOPRIL 5 MG: 2.5 TABLET ORAL at 12:07

## 2022-02-20 RX ADMIN — Medication 40 MG: at 08:26

## 2022-02-20 RX ADMIN — CARVEDILOL 50 MG: 25 TABLET, FILM COATED ORAL at 08:26

## 2022-02-20 RX ADMIN — POTASSIUM CHLORIDE 10 MEQ: 7.46 INJECTION, SOLUTION INTRAVENOUS at 20:04

## 2022-02-20 RX ADMIN — Medication 2 PACKET: at 14:30

## 2022-02-20 RX ADMIN — BIVALIRUDIN 0.18 MG/KG/HR: 250 INJECTION, POWDER, LYOPHILIZED, FOR SOLUTION INTRAVENOUS at 23:54

## 2022-02-20 RX ADMIN — INSULIN GLARGINE 50 UNITS: 100 INJECTION, SOLUTION SUBCUTANEOUS at 08:26

## 2022-02-20 RX ADMIN — HYDRALAZINE HYDROCHLORIDE 50 MG: 50 TABLET, FILM COATED ORAL at 14:30

## 2022-02-20 RX ADMIN — Medication 15 ML: at 08:26

## 2022-02-20 RX ADMIN — Medication 2 PACKET: at 20:09

## 2022-02-20 RX ADMIN — Medication 10 MG: at 20:09

## 2022-02-20 RX ADMIN — Medication 2 PACKET: at 08:37

## 2022-02-20 RX ADMIN — ASPIRIN 81 MG CHEWABLE TABLET 81 MG: 81 TABLET CHEWABLE at 08:26

## 2022-02-20 RX ADMIN — WARFARIN SODIUM 7.5 MG: 7.5 TABLET ORAL at 18:27

## 2022-02-20 RX ADMIN — FUROSEMIDE 40 MG: 10 INJECTION, SOLUTION INTRAVENOUS at 09:30

## 2022-02-20 RX ADMIN — POTASSIUM CHLORIDE 20 MEQ: 1.5 POWDER, FOR SOLUTION ORAL at 02:52

## 2022-02-20 RX ADMIN — POTASSIUM CHLORIDE 10 MEQ: 7.46 INJECTION, SOLUTION INTRAVENOUS at 21:07

## 2022-02-20 RX ADMIN — BIVALIRUDIN 0.18 MG/KG/HR: 250 INJECTION, POWDER, LYOPHILIZED, FOR SOLUTION INTRAVENOUS at 06:01

## 2022-02-20 RX ADMIN — HYDRALAZINE HYDROCHLORIDE 50 MG: 50 TABLET, FILM COATED ORAL at 22:06

## 2022-02-20 RX ADMIN — METHOCARBAMOL 750 MG: 750 TABLET ORAL at 17:00

## 2022-02-20 RX ADMIN — HYDRALAZINE HYDROCHLORIDE 75 MG: 50 TABLET, FILM COATED ORAL at 05:30

## 2022-02-20 ASSESSMENT — ACTIVITIES OF DAILY LIVING (ADL)
ADLS_ACUITY_SCORE: 27
ADLS_ACUITY_SCORE: 25
ADLS_ACUITY_SCORE: 27
ADLS_ACUITY_SCORE: 29
ADLS_ACUITY_SCORE: 25
ADLS_ACUITY_SCORE: 27
ADLS_ACUITY_SCORE: 25
ADLS_ACUITY_SCORE: 25
ADLS_ACUITY_SCORE: 27
ADLS_ACUITY_SCORE: 25
ADLS_ACUITY_SCORE: 25
ADLS_ACUITY_SCORE: 27
ADLS_ACUITY_SCORE: 25
ADLS_ACUITY_SCORE: 27
ADLS_ACUITY_SCORE: 25
ADLS_ACUITY_SCORE: 21
ADLS_ACUITY_SCORE: 27
ADLS_ACUITY_SCORE: 25
ADLS_ACUITY_SCORE: 29
ADLS_ACUITY_SCORE: 21
ADLS_ACUITY_SCORE: 25

## 2022-02-20 NOTE — PROGRESS NOTES
19:00-23:00  A/O x4 with slow garbled speech. SR on tele and stating > 90% on RA. Adequate UOP with external catheter, and no BM during shift. TF running at goal of 65mL/hr in NG. Lift assist/A2 with repositioning. R. TL PICC SL in red and white lumen and infusing bivalirudin at .18 mg/kg/hr in grey lumen. No new skin issues noted. Continue POC.

## 2022-02-20 NOTE — PROGRESS NOTES
Cardiovascular Surgery Progress Note  02/20/2022         Assessment and Plan:     Chuy Varner is a 32 yo M with a PMHx of bicuspid aortic valve, thoracic aortic aneurysm without rupture, hypertension, obesity, and testicular cancer s/p orchiectomy who underwent Bentall procedure with mechanical valve on 1/28 with Dr. Velasco and Dr. Hannah. 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     Cardiovascular:   # Ascending aortic aneurysm s/p Bentall w/ Jerry valve on 1/28 with Dr. Velasco and Dr. Hannah  # Hx of HTN  - Goal MAP>65, SBP <130   - ASA 81mg  - Management of HTN              - Coreg 50 BID, Hydralazine 75 TID   - start Lisinopril 5 mg daily and decrease Hydralazine to 50 mg TID, plan to titrate up Lisinopril and wean off hydralazine as able.               - Labetalol and hydralazine PRN to maintain SBP <130, has not needed in several days.   - Anticoagulatin for valve, will need coumadin with INR goal 2-3 for three months, then 1.5-2.0 for life, with ASA. Started Coumadin 2/19, Continue Bival as bridge.     Chest tubes and TPW removed 2/4    Pulmonary:  # Acute hypoxemic respiratory failure (ARDS vs. PNA vs. Hypervolemia vs. TRALI vs. severe atelectasis) s/p extubation  - Extubated 2/14 to HFNC, now breathing on Room Air  - conditional VBGs -- draw one if increased somnolence or distressed breathing  - continue respiratory therapy                          - metanebs (percussive therapy)                          - flutter valve QID to encourage coughing, clearance of airways    Neurology / MSK:  Neuro/ pain/ sedation:  # Acute Postoperative pain  - Monitor neurological status. Notify the MD for any acute changes in exam.  - Neurology following, appreciate recs  - speech therapy evaluation 02/17 for dysarthric   # Dysarthria -  improving   - ENT scoped, no reason for long term dysarthria/problems with phonation  - Pain well controlled on PRN tylenol, dilaudid, robaxin, oxycodone,   - has not needed IV dilaudid for several days, will discontinue   - Sleep hygiene: Melatonin, trazadone prn at bedtime  # Thalamic infarct on head CT 2/16, old, age unknown  - Not contributing to current dysarthria vs delirium vs deconditioning per Neuro  - Neurology would like an MRI before he leaves the hospital, would recheck with neurology as he had a head CT with evidence of old infarct as above.    - Per Neuro, start statin, Lipitor 40 mg daily pending LFT's               - Lipid panel, HgbA1c ordered per neuro for stroke Hx      / Renal:  -Hypernatremia   BL creat appears to be ~ 1.0  - I/O's inaccurate   - Avoid/limit nephrotoxins as able  - Replete lytes PRN per protocol  - Uosm = 902 pre-hydrochlorothiazide; F/u Uosm < 600 (no effect)  -  hydrochlorothiazide 25 mg BID last given 2/17 for correction of hyperNa+ and hypervolemia.   - Continue NJ free water 30 q4  - Na+ wnl, 142  - Pre-op weight 365 lbs 11 oz, today's wt 337 lbs 4 oz, trending up   - Moderate amount of right MARIAM, generalized edema as well.   - Re Consulted PT/OT for right lymphedema wrap 2/19.   Diuresis: repeat IV Lasix 40 mg IV BID today, reevaluate in am     GI / FEN:   # Obesity (BMI 46)  # Nutrition  - NJ feeds at goal  - Speech following okay to advance to soft and bite sized diet with mild thickness 1/20  - start calorie counts  # Diarrhea/Incontinence  Suspect caused by large amounts of free water via NJ vs augmentin vs recent colonization of C. diff  - Consider repeat C. Diff if no improvement (no imodium for now)  - Less frequent loose stools, but with rising WBC will check for Cdiff  # Elevated ALT, AST  - Monitor for now   # Cholelithiasis  Delong sign negative. Downtrending LFTs. Suspect elevations d/t Augmentin             Endocrine:  # Stress induced hyperglycemia,  improving  - HDSSI  - Hgb A1c 5.8 2/18  - Lantus 50mg daily, plan to decrease as using less sliding scale insulin and good BG control   - Goal BG <180 for optimal healing  - -120's with decreasing need of sliding scale insulin   - Continue to monitor BG and plan to wean down Lantus as able  - Consider Calorie Counts if patient tolerating po intake    Infectious Disease:  # Stress induced leukocytosis  # Possible ventilator associated pneumonia  # Sinusitis, treated with Augmentin   Pan cultures and re-cultures all remained unrevealing. Fever likely from previously untreated DVT vs sinusitis.  - Continue to monitor fever curve, WBC and inflammatory markers  - Pending lab results, consider re-consulting ID with fever and leukocytosis   - WBC 17.5 (16.9), Tmax 99.8 this am. Lactate 1.3  - Check C. Difficile, UA, and Procal today   - CXR today     Hematology:   # History of testicular cancer s/p orchiectomy  # RLE DVT  # HIT positive   - Right LE Lymphedema wrap, reconsult PT/OT   - CBC daily, Hgb 11.1  - Continue Bivalirudin gtt as bridge to coumadin   - started coumadin 2/19 with INR goal 2-3, pharmacy to dose   -      Anticoagulation:   - ASA 81 mg daily   - Bival drip bridge to therapeutic coumadin, INR goal 2-3 for three months, then 1.5-2.0 lifelong   - Follow Chromogenic Factor X, goal 20-40 while on Bival. Chromogenic Factor X - 97    MSK/ Skin:  # Sternotomy  # Surgical Incision  # Buttock pressure injury   - Sternal precautions  - Postoperative incision management per protocol  - PT/OT  - WOCN following for pressure injuries  # Maculopapular Rash  Patient has maculopapular rash per Exam below.  - Likely represents folliculitis, monitor for now, improving        Prophylaxis:    Mechanical prophylaxis for DVT  - Chemical DVT prophylaxis- bivalirudin  - PPI - until 03/16 (30d from extubation) per institutional protocol  - HOB to 30 degrees    Disposition:   - Transferred to  on 2/18  - Therapies  "recommending discharge to TCU     Discussed with Dr. Hannah and Dr Velasco through both written and verbal communication.      Qi Umaña, DNP, CNP  [facility] Cardiothoracic Surgery  O: [phone]  Pgr [phone]        Interval History:     No overnight events. Didn't sleep well.   Pain is well managed on current regimen.   Tolerating diet and tube feeds, is passing flatus, + BM, had three loose stool yesterday. No nausea or vomiting.  Breathing well without complaints.   Working with therapies.   Denies chest pain, palpitations, dizziness, syncopal symptoms, fevers, chills, myalgias, or sternal popping/clicking.         Physical Exam:   Blood pressure 119/64, pulse 95, temperature 99.8  F (37.7  C), temperature source Axillary, resp. rate 22, height 1.905 m (6' 3\"), weight (!) 153 kg (337 lb 4.8 oz), SpO2 96 %.  Vitals:    02/18/22 0600 02/19/22 0447 02/20/22 0600   Weight: 149.1 kg (328 lb 11.2 oz) (!) 152.3 kg (335 lb 11.2 oz) (!) 153 kg (337 lb 4.8 oz)      MAPs: 80's    Gen: Alert, NAD. Talking, more awake today. Significant other is at the bedside.   Neuro: Follows commands, very deconditioned   CV: RRR, normal S1 S2, no murmurs, rubs or gallops. Crisp valve click.   Pulm: CTA, no wheezing or rhonchi, normal breathing on RA  Abd: nondistended, normal BS, soft, nontender  Ext: Large amount of RLE peripheral edema, 2+ pitting and generalized edema. Has RLE wraps on.   Incision: clean, dry, intact, no erythema, sternum stable  Tubes/drain sites: clean and dry         Data:    Imaging:  reviewed recent imaging, no acute concerns    CXR 2/19/2022: Findings:   Portable upright AP view of the chest. Right upper extremity PICC and  enteric tube in similar position.  Low lung volumes. Stable enlargement of cardiac silhouette. No  significant change in perihilar and bibasilar predominant pulmonary  opacities. No appreciable pneumothorax. No significant pleural  Effusion.                                                "                Impression: No significant change in perihilar and bibasilar prominent  pulmonary opacities likely representative of atelectasis and/or  pulmonary edema    Labs:  BMP  Recent Labs   Lab 02/20/22  0814 02/20/22  0746 02/20/22  0603 02/20/22  0451 02/20/22  0149 02/20/22  0045 02/19/22  2234   NA  --  142 140  --  141  --  140   POTASSIUM  --  3.9 3.9  --  3.7  --  3.9   CHLORIDE  --  112* 112*  --  112*  --  110*   MAYE  --  8.6 8.6  --  8.4*  --  8.6   CO2  --  22 22  --  26  --  24   BUN  --  39* 39*  --  38*  --  43*   CR  --  0.56* 0.63*  --  0.62*  --  0.70   * 138* 137* 126* 122*   < > 134*    < > = values in this interval not displayed.     CBC  Recent Labs   Lab 02/20/22  0603 02/19/22  0544 02/18/22 0316 02/17/22  0330   WBC 17.5* 16.9* 17.0* 13.8*   RBC 3.67* 3.49* 3.50* 3.13*   HGB 11.1* 10.5* 10.3* 9.1*   HCT 36.8* 35.1* 34.7* 32.1*    101* 99 103*   MCH 30.2 30.1 29.4 29.1   MCHC 30.2* 29.9* 29.7* 28.3*   RDW 15.9* 15.6* 14.8 15.2*    414 469* 448     INR  Recent Labs   Lab 02/20/22  0603   INR 1.53*      Hepatic Panel  Recent Labs   Lab 02/20/22  0603 02/19/22  0544 02/18/22 0316 02/17/22  0330   * 142* 135* 101*   * 183* 228* 263*   ALKPHOS 91 91 97 89   BILITOTAL 0.5 0.5 0.6 0.4   ALBUMIN 2.5* 2.5* 2.4* 2.2*     GLUCOSE:   Recent Labs   Lab 02/20/22  0814 02/20/22  0746 02/20/22  0603 02/20/22  0451 02/20/22  0149 02/20/22  0045   * 138* 137* 126* 122* 121*

## 2022-02-20 NOTE — PLAN OF CARE
End of Shift Summary. See flowsheets for vital signs and detailed assessment.    Changes this shift: Pt is alert, follows commands, occasionally disoriented to place and situation. Garbled speech remains. SR 80-90's with 1st degree AVB. VSS on RA. TF remain at goal of 65mL/hr. External cath replaced x3, bed change x3 with unmeasured urine. No new skin concerns. K replaced.     Plan:  Continue with plan of care, update team of any changes.

## 2022-02-21 ENCOUNTER — APPOINTMENT (OUTPATIENT)
Dept: ULTRASOUND IMAGING | Facility: CLINIC | Age: 32
End: 2022-02-21
Attending: PHYSICIAN ASSISTANT
Payer: COMMERCIAL

## 2022-02-21 ENCOUNTER — APPOINTMENT (OUTPATIENT)
Dept: OCCUPATIONAL THERAPY | Facility: CLINIC | Age: 32
End: 2022-02-21
Attending: SURGERY
Payer: COMMERCIAL

## 2022-02-21 ENCOUNTER — APPOINTMENT (OUTPATIENT)
Dept: SPEECH THERAPY | Facility: CLINIC | Age: 32
End: 2022-02-21
Attending: SURGERY
Payer: COMMERCIAL

## 2022-02-21 LAB
ALBUMIN SERPL-MCNC: 2.5 G/DL (ref 3.4–5)
ALBUMIN UR-MCNC: 20 MG/DL
ALP SERPL-CCNC: 83 U/L (ref 40–150)
ALT SERPL W P-5'-P-CCNC: 148 U/L (ref 0–70)
ANION GAP SERPL CALCULATED.3IONS-SCNC: 4 MMOL/L (ref 3–14)
ANION GAP SERPL CALCULATED.3IONS-SCNC: 5 MMOL/L (ref 3–14)
ANION GAP SERPL CALCULATED.3IONS-SCNC: 5 MMOL/L (ref 3–14)
ANION GAP SERPL CALCULATED.3IONS-SCNC: 7 MMOL/L (ref 3–14)
APPEARANCE UR: CLEAR
APTT PPP: 64 SECONDS (ref 22–38)
AST SERPL W P-5'-P-CCNC: 99 U/L (ref 0–45)
BILIRUB SERPL-MCNC: 0.5 MG/DL (ref 0.2–1.3)
BILIRUB UR QL STRIP: NEGATIVE
BUN SERPL-MCNC: 34 MG/DL (ref 7–30)
BUN SERPL-MCNC: 35 MG/DL (ref 7–30)
BUN SERPL-MCNC: 36 MG/DL (ref 7–30)
BUN SERPL-MCNC: 36 MG/DL (ref 7–30)
C DIFF TOX B STL QL: NEGATIVE
CALCIUM SERPL-MCNC: 8.2 MG/DL (ref 8.5–10.1)
CALCIUM SERPL-MCNC: 8.2 MG/DL (ref 8.5–10.1)
CALCIUM SERPL-MCNC: 8.3 MG/DL (ref 8.5–10.1)
CALCIUM SERPL-MCNC: 8.5 MG/DL (ref 8.5–10.1)
CHLORIDE BLD-SCNC: 107 MMOL/L (ref 94–109)
CHLORIDE BLD-SCNC: 108 MMOL/L (ref 94–109)
CHLORIDE BLD-SCNC: 109 MMOL/L (ref 94–109)
CHLORIDE BLD-SCNC: 110 MMOL/L (ref 94–109)
CO2 SERPL-SCNC: 23 MMOL/L (ref 20–32)
CO2 SERPL-SCNC: 24 MMOL/L (ref 20–32)
CO2 SERPL-SCNC: 25 MMOL/L (ref 20–32)
CO2 SERPL-SCNC: 27 MMOL/L (ref 20–32)
COLOR UR AUTO: ABNORMAL
CREAT SERPL-MCNC: 0.59 MG/DL (ref 0.66–1.25)
CREAT SERPL-MCNC: 0.6 MG/DL (ref 0.66–1.25)
CREAT SERPL-MCNC: 0.61 MG/DL (ref 0.66–1.25)
CREAT SERPL-MCNC: 0.62 MG/DL (ref 0.66–1.25)
ERYTHROCYTE [DISTWIDTH] IN BLOOD BY AUTOMATED COUNT: 15.9 % (ref 10–15)
FACT X ACT/NOR PPP CHRO: 101 % (ref 70–130)
GFR SERPL CREATININE-BSD FRML MDRD: >90 ML/MIN/1.73M2
GLUCOSE BLD-MCNC: 102 MG/DL (ref 70–99)
GLUCOSE BLD-MCNC: 119 MG/DL (ref 70–99)
GLUCOSE BLD-MCNC: 123 MG/DL (ref 70–99)
GLUCOSE BLD-MCNC: 125 MG/DL (ref 70–99)
GLUCOSE BLDC GLUCOMTR-MCNC: 114 MG/DL (ref 70–99)
GLUCOSE BLDC GLUCOMTR-MCNC: 121 MG/DL (ref 70–99)
GLUCOSE BLDC GLUCOMTR-MCNC: 122 MG/DL (ref 70–99)
GLUCOSE BLDC GLUCOMTR-MCNC: 91 MG/DL (ref 70–99)
GLUCOSE BLDC GLUCOMTR-MCNC: 96 MG/DL (ref 70–99)
GLUCOSE UR STRIP-MCNC: NEGATIVE MG/DL
HCT VFR BLD AUTO: 33.3 % (ref 40–53)
HGB BLD-MCNC: 10 G/DL (ref 13.3–17.7)
HGB BLD-MCNC: 10.2 G/DL (ref 13.3–17.7)
HGB UR QL STRIP: ABNORMAL
INR PPP: 1.5 (ref 0.85–1.15)
KETONES UR STRIP-MCNC: NEGATIVE MG/DL
LACTATE SERPL-SCNC: 1.7 MMOL/L (ref 0.7–2)
LEUKOCYTE ESTERASE UR QL STRIP: NEGATIVE
MAGNESIUM SERPL-MCNC: 2.1 MG/DL (ref 1.8–2.6)
MAGNESIUM SERPL-MCNC: 2.1 MG/DL (ref 1.8–2.6)
MCH RBC QN AUTO: 29.4 PG (ref 26.5–33)
MCHC RBC AUTO-ENTMCNC: 30 G/DL (ref 31.5–36.5)
MCV RBC AUTO: 98 FL (ref 78–100)
MUCOUS THREADS #/AREA URNS LPF: PRESENT /LPF
NITRATE UR QL: NEGATIVE
PH UR STRIP: 5 [PH] (ref 5–7)
PHOSPHATE SERPL-MCNC: 4.4 MG/DL (ref 2.5–4.5)
PLATELET # BLD AUTO: 382 10E3/UL (ref 150–450)
POTASSIUM BLD-SCNC: 3.5 MMOL/L (ref 3.4–5.3)
POTASSIUM BLD-SCNC: 3.7 MMOL/L (ref 3.4–5.3)
POTASSIUM BLD-SCNC: 3.8 MMOL/L (ref 3.4–5.3)
POTASSIUM BLD-SCNC: 4.5 MMOL/L (ref 3.4–5.3)
PROT SERPL-MCNC: 6.4 G/DL (ref 6.8–8.8)
RADIOLOGIST FLAGS: ABNORMAL
RBC # BLD AUTO: 3.4 10E6/UL (ref 4.4–5.9)
RBC URINE: 1 /HPF
SARS-COV-2 RNA RESP QL NAA+PROBE: NEGATIVE
SODIUM SERPL-SCNC: 137 MMOL/L (ref 133–144)
SODIUM SERPL-SCNC: 139 MMOL/L (ref 133–144)
SP GR UR STRIP: 1.02 (ref 1–1.03)
UROBILINOGEN UR STRIP-MCNC: NORMAL MG/DL
WBC # BLD AUTO: 15.9 10E3/UL (ref 4–11)
WBC URINE: 4 /HPF

## 2022-02-21 PROCEDURE — 250N000011 HC RX IP 250 OP 636: Performed by: PHYSICIAN ASSISTANT

## 2022-02-21 PROCEDURE — 250N000013 HC RX MED GY IP 250 OP 250 PS 637: Performed by: STUDENT IN AN ORGANIZED HEALTH CARE EDUCATION/TRAINING PROGRAM

## 2022-02-21 PROCEDURE — 85018 HEMOGLOBIN: CPT | Performed by: PHYSICIAN ASSISTANT

## 2022-02-21 PROCEDURE — 82310 ASSAY OF CALCIUM: CPT | Performed by: INTERNAL MEDICINE

## 2022-02-21 PROCEDURE — 80053 COMPREHEN METABOLIC PANEL: CPT | Performed by: STUDENT IN AN ORGANIZED HEALTH CARE EDUCATION/TRAINING PROGRAM

## 2022-02-21 PROCEDURE — 85027 COMPLETE CBC AUTOMATED: CPT | Performed by: SURGERY

## 2022-02-21 PROCEDURE — 85610 PROTHROMBIN TIME: CPT | Performed by: NURSE PRACTITIONER

## 2022-02-21 PROCEDURE — 93971 EXTREMITY STUDY: CPT | Mod: LT,XS

## 2022-02-21 PROCEDURE — 36415 COLL VENOUS BLD VENIPUNCTURE: CPT | Performed by: INTERNAL MEDICINE

## 2022-02-21 PROCEDURE — 92526 ORAL FUNCTION THERAPY: CPT | Mod: GN

## 2022-02-21 PROCEDURE — 81001 URINALYSIS AUTO W/SCOPE: CPT | Performed by: NURSE PRACTITIONER

## 2022-02-21 PROCEDURE — 97530 THERAPEUTIC ACTIVITIES: CPT | Mod: GO | Performed by: OCCUPATIONAL THERAPIST

## 2022-02-21 PROCEDURE — 120N000003 HC R&B IMCU UMMC

## 2022-02-21 PROCEDURE — U0003 INFECTIOUS AGENT DETECTION BY NUCLEIC ACID (DNA OR RNA); SEVERE ACUTE RESPIRATORY SYNDROME CORONAVIRUS 2 (SARS-COV-2) (CORONAVIRUS DISEASE [COVID-19]), AMPLIFIED PROBE TECHNIQUE, MAKING USE OF HIGH THROUGHPUT TECHNOLOGIES AS DESCRIBED BY CMS-2020-01-R: HCPCS | Performed by: PHYSICIAN ASSISTANT

## 2022-02-21 PROCEDURE — 97110 THERAPEUTIC EXERCISES: CPT | Mod: GO | Performed by: OCCUPATIONAL THERAPIST

## 2022-02-21 PROCEDURE — 85260 CLOT FACTOR X STUART-POWER: CPT | Performed by: SURGERY

## 2022-02-21 PROCEDURE — 250N000011 HC RX IP 250 OP 636: Performed by: SURGERY

## 2022-02-21 PROCEDURE — 36592 COLLECT BLOOD FROM PICC: CPT | Performed by: PHYSICIAN ASSISTANT

## 2022-02-21 PROCEDURE — 250N000013 HC RX MED GY IP 250 OP 250 PS 637: Performed by: SURGERY

## 2022-02-21 PROCEDURE — 36592 COLLECT BLOOD FROM PICC: CPT | Performed by: INTERNAL MEDICINE

## 2022-02-21 PROCEDURE — 83735 ASSAY OF MAGNESIUM: CPT | Performed by: SURGERY

## 2022-02-21 PROCEDURE — 85730 THROMBOPLASTIN TIME PARTIAL: CPT | Performed by: SURGERY

## 2022-02-21 PROCEDURE — 97140 MANUAL THERAPY 1/> REGIONS: CPT | Mod: GO | Performed by: OCCUPATIONAL THERAPIST

## 2022-02-21 PROCEDURE — 250N000013 HC RX MED GY IP 250 OP 250 PS 637: Performed by: NURSE PRACTITIONER

## 2022-02-21 PROCEDURE — 93971 EXTREMITY STUDY: CPT | Mod: RT

## 2022-02-21 PROCEDURE — 999N000127 HC STATISTIC PERIPHERAL IV START W US GUIDANCE

## 2022-02-21 PROCEDURE — 87493 C DIFF AMPLIFIED PROBE: CPT | Performed by: NURSE PRACTITIONER

## 2022-02-21 PROCEDURE — 83605 ASSAY OF LACTIC ACID: CPT | Performed by: INTERNAL MEDICINE

## 2022-02-21 PROCEDURE — 258N000003 HC RX IP 258 OP 636: Performed by: SURGERY

## 2022-02-21 PROCEDURE — 82040 ASSAY OF SERUM ALBUMIN: CPT | Performed by: STUDENT IN AN ORGANIZED HEALTH CARE EDUCATION/TRAINING PROGRAM

## 2022-02-21 PROCEDURE — 84100 ASSAY OF PHOSPHORUS: CPT | Performed by: SURGERY

## 2022-02-21 PROCEDURE — 93971 EXTREMITY STUDY: CPT | Mod: 26 | Performed by: RADIOLOGY

## 2022-02-21 PROCEDURE — 250N000013 HC RX MED GY IP 250 OP 250 PS 637: Performed by: PHYSICIAN ASSISTANT

## 2022-02-21 RX ORDER — AMINO AC/PROTEIN HYDR/WHEY PRO 10G-100/30
2 LIQUID (ML) ORAL 2 TIMES DAILY
Status: DISCONTINUED | OUTPATIENT
Start: 2022-02-21 | End: 2022-02-23

## 2022-02-21 RX ORDER — LISINOPRIL 2.5 MG/1
2.5 TABLET ORAL ONCE
Status: COMPLETED | OUTPATIENT
Start: 2022-02-21 | End: 2022-02-21

## 2022-02-21 RX ORDER — WARFARIN SODIUM 10 MG/1
10 TABLET ORAL
Status: COMPLETED | OUTPATIENT
Start: 2022-02-21 | End: 2022-02-21

## 2022-02-21 RX ORDER — POTASSIUM CHLORIDE 29.8 MG/ML
20 INJECTION INTRAVENOUS ONCE
Status: COMPLETED | OUTPATIENT
Start: 2022-02-21 | End: 2022-02-21

## 2022-02-21 RX ORDER — LISINOPRIL 2.5 MG/1
7.5 TABLET ORAL DAILY
Status: DISCONTINUED | OUTPATIENT
Start: 2022-02-22 | End: 2022-02-22

## 2022-02-21 RX ORDER — NYSTATIN 100000/ML
1000000 SUSPENSION, ORAL (FINAL DOSE FORM) ORAL 4 TIMES DAILY
Status: DISCONTINUED | OUTPATIENT
Start: 2022-02-21 | End: 2022-02-27

## 2022-02-21 RX ORDER — FUROSEMIDE 10 MG/ML
40 INJECTION INTRAMUSCULAR; INTRAVENOUS 2 TIMES DAILY WITH MEALS
Status: COMPLETED | OUTPATIENT
Start: 2022-02-21 | End: 2022-02-21

## 2022-02-21 RX ORDER — POTASSIUM CHLORIDE 750 MG/1
40 TABLET, EXTENDED RELEASE ORAL 2 TIMES DAILY
Status: DISCONTINUED | OUTPATIENT
Start: 2022-02-21 | End: 2022-02-21

## 2022-02-21 RX ORDER — POTASSIUM CHLORIDE 1.5 G/1.58G
40 POWDER, FOR SOLUTION ORAL 2 TIMES DAILY
Status: COMPLETED | OUTPATIENT
Start: 2022-02-21 | End: 2022-02-21

## 2022-02-21 RX ADMIN — Medication 2 PACKET: at 08:32

## 2022-02-21 RX ADMIN — Medication 40 MG: at 08:30

## 2022-02-21 RX ADMIN — BIVALIRUDIN 0.18 MG/KG/HR: 250 INJECTION, POWDER, LYOPHILIZED, FOR SOLUTION INTRAVENOUS at 16:36

## 2022-02-21 RX ADMIN — HYDRALAZINE HYDROCHLORIDE 50 MG: 50 TABLET, FILM COATED ORAL at 05:58

## 2022-02-21 RX ADMIN — FUROSEMIDE 40 MG: 10 INJECTION, SOLUTION INTRAVENOUS at 14:49

## 2022-02-21 RX ADMIN — CARVEDILOL 50 MG: 25 TABLET, FILM COATED ORAL at 21:27

## 2022-02-21 RX ADMIN — ASPIRIN 81 MG CHEWABLE TABLET 81 MG: 81 TABLET CHEWABLE at 08:30

## 2022-02-21 RX ADMIN — Medication 2 PACKET: at 14:49

## 2022-02-21 RX ADMIN — BIVALIRUDIN 0.18 MG/KG/HR: 250 INJECTION, POWDER, LYOPHILIZED, FOR SOLUTION INTRAVENOUS at 07:54

## 2022-02-21 RX ADMIN — POTASSIUM CHLORIDE 40 MEQ: 1.5 POWDER, FOR SOLUTION ORAL at 14:49

## 2022-02-21 RX ADMIN — Medication 2 PACKET: at 21:35

## 2022-02-21 RX ADMIN — Medication 2 PACKET: at 08:31

## 2022-02-21 RX ADMIN — FUROSEMIDE 40 MG: 10 INJECTION, SOLUTION INTRAVENOUS at 21:45

## 2022-02-21 RX ADMIN — Medication 10 MG: at 21:27

## 2022-02-21 RX ADMIN — LISINOPRIL 5 MG: 2.5 TABLET ORAL at 08:30

## 2022-02-21 RX ADMIN — POTASSIUM CHLORIDE 40 MEQ: 1.5 POWDER, FOR SOLUTION ORAL at 21:36

## 2022-02-21 RX ADMIN — CARVEDILOL 50 MG: 25 TABLET, FILM COATED ORAL at 08:30

## 2022-02-21 RX ADMIN — WARFARIN SODIUM 10 MG: 10 TABLET ORAL at 18:32

## 2022-02-21 RX ADMIN — LISINOPRIL 2.5 MG: 2.5 TABLET ORAL at 14:49

## 2022-02-21 RX ADMIN — NYSTATIN 1000000 UNITS: 100000 SUSPENSION ORAL at 21:44

## 2022-02-21 RX ADMIN — NYSTATIN 1000000 UNITS: 100000 SUSPENSION ORAL at 16:52

## 2022-02-21 RX ADMIN — POTASSIUM CHLORIDE 20 MEQ: 29.8 INJECTION, SOLUTION INTRAVENOUS at 04:33

## 2022-02-21 RX ADMIN — INSULIN GLARGINE 50 UNITS: 100 INJECTION, SOLUTION SUBCUTANEOUS at 08:40

## 2022-02-21 RX ADMIN — Medication 15 ML: at 08:30

## 2022-02-21 ASSESSMENT — ACTIVITIES OF DAILY LIVING (ADL)
ADLS_ACUITY_SCORE: 27
ADLS_ACUITY_SCORE: 21
ADLS_ACUITY_SCORE: 27
ADLS_ACUITY_SCORE: 27
ADLS_ACUITY_SCORE: 21
ADLS_ACUITY_SCORE: 27
ADLS_ACUITY_SCORE: 21
ADLS_ACUITY_SCORE: 21
ADLS_ACUITY_SCORE: 27
ADLS_ACUITY_SCORE: 27
ADLS_ACUITY_SCORE: 21
ADLS_ACUITY_SCORE: 21
ADLS_ACUITY_SCORE: 27
ADLS_ACUITY_SCORE: 21
ADLS_ACUITY_SCORE: 27
ADLS_ACUITY_SCORE: 21
ADLS_ACUITY_SCORE: 27
ADLS_ACUITY_SCORE: 27
ADLS_ACUITY_SCORE: 21
ADLS_ACUITY_SCORE: 27

## 2022-02-21 NOTE — PROGRESS NOTES
Cardiovascular Surgery Progress Note  02/21/2022         Assessment and Plan:     Chuy Varner is a 32 yo M with a PMHx of bicuspid aortic valve, thoracic aortic aneurysm without rupture, hypertension, obesity, and testicular cancer s/p orchiectomy who underwent Bentall procedure with mechanical valve on 1/28 with Dr. Velasco and Dr. Hannah. 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     Cardiovascular:   # Ascending aortic aneurysm s/p Bentall w/ Jerry valve on 1/28 with Dr. Velasco and Dr. Hannah  # Hx of HTN  - Goal MAP>65, SBP <130   - ASA 81mg  - Management of HTN              - Coreg 50 BID   -  2/20 started Lisinopril 5 mg daily and decreased Hydralazine to 50 mg TID, plan to titrate up Lisinopril and wean off hydralazine as able.    - Will stop hydralazine 2/21 and increase Lisinopril to 7.5 mg daily.               - Labetalol and hydralazine PRN to maintain SBP <130, has not needed in several days.   - Anticoagulatin for valve, will need coumadin with INR goal 2-3 for three months, then 1.5-2.0 for life, with ASA. Started Coumadin 2/19, Continue Bival as bridge.     Chest tubes and TPW removed 2/4    Pulmonary:  # Acute hypoxemic respiratory failure (ARDS vs. PNA vs. Hypervolemia vs. TRALI vs. severe atelectasis) s/p extubation  - Extubated 2/14 to HFNC, now breathing on Room Air  - conditional VBGs -- draw one if increased somnolence or distressed breathing  - continue respiratory therapy                          - metanebs (percussive therapy)                          - flutter valve QID to encourage coughing, clearance of airways    Neurology / MSK:  Neuro/ pain/ sedation:  # Acute Postoperative pain  - Monitor neurological status. Notify the MD for any acute changes in exam.  - Neurology following, appreciate recs  -  speech therapy evaluation 02/17 for dysarthric   # Dysarthria - improving   - ENT scoped, no reason for long term dysarthria/problems with phonation  - Pain well controlled on PRN tylenol, dilaudid, robaxin, oxycodone,   - has not needed IV dilaudid for several days, will discontinue   - Sleep hygiene: Melatonin, trazadone prn at bedtime  # Thalamic infarct on head CT 2/16, old, age unknown  - Not contributing to current dysarthria vs delirium vs deconditioning per Neuro  - Neurology would like an MRI before he leaves the hospital, would recheck with neurology as he had a head CT with evidence of old infarct as above.    - Per Neuro, start statin, Lipitor 40 mg daily pending LFT's               - Lipid panel, HgbA1c ordered per neuro for stroke Hx      / Renal:  -Hypernatremia   BL creat appears to be ~ 1.0  - I/O's inaccurate   - Avoid/limit nephrotoxins as able  - Replete lytes PRN per protocol  - Uosm = 902 pre-hydrochlorothiazide; F/u Uosm < 600 (no effect)  -  hydrochlorothiazide 25 mg BID last given 2/17 for correction of hyperNa+ and hypervolemia.   - Continue NJ free water 30 q4  - Na+ wnl  - Pre-op weight 365 lbs 11 oz, weight down today 147.7 kg  - Moderate amount of right MARIAM, generalized edema as well.   - Re Consulted PT/OT for right lymphedema wrap 2/19.   Diuresis: repeat IV Lasix 40 mg IV BID today to keep negative, got same 2/20, reevaluate in am.    GI / FEN:   # Obesity (BMI 46)  # Nutrition  - NJ feeds at goal  - Speech following okay to advance to soft and bite sized diet with mild thickness 1/20  - start calorie counts  # Diarrhea/Incontinence  Suspect caused by large amounts of free water via NJ vs augmentin vs recent colonization of C. diff  - Consider repeat C. Diff if no improvement (no imodium for now)  - Less frequent loose stools, Cdiff-negative 2/20  # Elevated ALT, AST  - Monitor for now, trending down, no abdominal pain.    # Cholelithiasis  Delong sign negative. Downtrending LFTs.  Suspect elevations d/t Augmentin             Endocrine:  # Stress induced hyperglycemia, improving  - HDSSI  - Hgb A1c 5.8 2/18  - Lantus 50mg daily, plan to decrease as using less sliding scale insulin and good BG control   - Goal BG <180 for optimal healing  - -120's with decreasing need of sliding scale insulin   - Continue to monitor BG and plan to wean down Lantus as able  - Consider Calorie Counts if patient tolerating po intake    Infectious Disease:  # Stress induced leukocytosis  # Possible ventilator associated pneumonia  # Sinusitis, treated with Augmentin   Pan cultures and re-cultures all remained unrevealing. Fever likely from previously untreated DVT vs sinusitis.  - Continue to monitor fever curve, WBC and inflammatory markers  - Pending lab results, consider re-consulting ID with fever and leukocytosis   - WBC 15.9 and trending down, AF. Lactate 1.3  - Check C. Difficile, UA, and Procal today-unremarkable.    - CXR no change      Hematology:   # History of testicular cancer s/p orchiectomy  # RLE DVT  # HIT positive   - Right LE Lymphedema wrap, reconsult PT/OT   - CBC daily, Hgb 10 from 11.1, will recheck.   - Continue Bivalirudin gtt as bridge to coumadin   - started coumadin 2/19 with INR goal 2-3, pharmacy to dose   - Right upper extremity swelling, will get US bilaterally 2/21     Anticoagulation:   - ASA 81 mg daily   - Bival drip bridge to therapeutic coumadin, INR goal 2-3 for three months, then 1.5-2.0 lifelong   - Follow Chromogenic Factor X, goal 20-40 while on Bival. Chromogenic Factor X - 97    MSK/ Skin:  # Sternotomy  # Surgical Incision  # Buttock pressure injury   - Sternal precautions  - Postoperative incision management per protocol  - PT/OT  - WOCN following for pressure injuries  # Maculopapular Rash  Patient has maculopapular rash per Exam below.  - Likely represents folliculitis, monitor for now, improving        Prophylaxis:    Mechanical prophylaxis for DVT  - Chemical  "DVT prophylaxis- bivalirudin  - PPI - until 03/16 (30d from extubation) per institutional protocol  - HOB to 30 degrees    Disposition:   - Transferred to  on 2/18  - Therapies recommending discharge to TCU, likely by end of week.     Discussed with  Dr Velasco through both written and verbal communication.      Zuleika Kraft PA-C  Cardiothoracic Surgery  Pager [phone]  February 21, 2022        Interval History:     No overnight events.   Pain is well managed on current regimen.   Tolerating diet and tube feeds, is passing flatus, + BM, No nausea or vomiting.  Breathing well without complaints.   Working with therapies.   Denies chest pain, palpitations, dizziness, syncopal symptoms, fevers, chills, myalgias, or sternal popping/clicking.         Physical Exam:   Blood pressure 106/64, pulse 92, temperature 97.8  F (36.6  C), temperature source Oral, resp. rate 18, height 1.905 m (6' 3\"), weight 147.7 kg (325 lb 9.6 oz), SpO2 98 %.  Vitals:    02/19/22 0447 02/20/22 0600 02/21/22 0417   Weight: (!) 152.3 kg (335 lb 11.2 oz) (!) 153 kg (337 lb 4.8 oz) 147.7 kg (325 lb 9.6 oz)        Gen: Alert, NAD. Talking, more awake today. Significant other is at the bedside.   Neuro: Follows commands, very deconditioned   CV: RRR, normal S1 S2, no murmurs, rubs or gallops. Crisp valve click.   Pulm: CTA, no wheezing or rhonchi, normal breathing on RA  Abd: nondistended, normal BS, soft, nontender  Ext: Large amount of RLE peripheral edema, 2+ pitting and generalized edema. Has RLE wraps on.   Incision: clean, dry, intact, no erythema, sternum stable  Tubes/drain sites: clean and dry         Data:    Imaging:  reviewed recent imaging, no acute concerns  2/19/22  9:41 AM [id number] [facility]        PACS Images     Show images for XR Chest Port 1 View    Study Result    Narrative & Impression   Exam: XR CHEST PORT 1 VIEW, 2/19/2022 9:41 AM     Indication: elevated WBC and follow up " opacities     Comparison: 2/17/2022     Findings:   Portable upright AP view of the chest. Right upper extremity PICC and  enteric tube in similar position.     Low lung volumes. Stable enlargement of cardiac silhouette. No  significant change in perihilar and bibasilar predominant pulmonary  opacities. No appreciable pneumothorax. No significant pleural  effusion.                                                                      Impression: No significant change in perihilar and bibasilar prominent  pulmonary opacities likely representative of atelectasis and/or  pulmonary edema     I have personally reviewed the examination and initial interpretation  and I agree with the findings.     MICAH VIERA MD                 Labs:  BMP  Recent Labs   Lab 02/21/22  1228 02/21/22  0953 02/21/22  0756 02/21/22  0557 02/21/22  0425 02/21/22  0149 02/20/22  2330 02/20/22  2156   NA  --  139  --  139  --  139  --  139   POTASSIUM  --  3.7  --  4.5  --  3.5  --  3.5   CHLORIDE  --  109  --  110*  --  108  --  106   MAYE  --  8.2*  --  8.3*  --  8.2*  --  8.2*   CO2  --  23  --  24  --  27  --  24   BUN  --  35*  --  36*  --  36*  --  38*   CR  --  0.60*  --  0.59*  --  0.61*  --  0.67   * 125* 121* 102*   < > 123*   < > 123*    < > = values in this interval not displayed.     CBC  Recent Labs   Lab 02/21/22  0426 02/20/22  0603 02/19/22  0544 02/18/22  0316   WBC 15.9* 17.5* 16.9* 17.0*   RBC 3.40* 3.67* 3.49* 3.50*   HGB 10.0* 11.1* 10.5* 10.3*   HCT 33.3* 36.8* 35.1* 34.7*   MCV 98 100 101* 99   MCH 29.4 30.2 30.1 29.4   MCHC 30.0* 30.2* 29.9* 29.7*   RDW 15.9* 15.9* 15.6* 14.8    369 414 469*     INR  Recent Labs   Lab 02/21/22  0425 02/20/22  0603   INR 1.50* 1.53*      Hepatic Panel  Recent Labs   Lab 02/21/22  0149 02/20/22  0603 02/19/22  0544 02/18/22  0316   AST 99* 127* 142* 135*   * 167* 183* 228*   ALKPHOS 83 91 91 97   BILITOTAL 0.5 0.5 0.5 0.6   ALBUMIN 2.5* 2.5* 2.5* 2.4*     GLUCOSE:    Recent Labs   Lab 02/21/22  1228 02/21/22  0953 02/21/22  0756 02/21/22  0557 02/21/22  0425 02/21/22  0149   * 125* 121* 102* 114* 123*

## 2022-02-21 NOTE — PROGRESS NOTES
CLINICAL NUTRITION SERVICES - REASSESSMENT NOTE     Nutrition Prescription    RECOMMENDATIONS FOR MDs/PROVIDERS TO ORDER:  Monitor isaiah cts, ability to consume least 1700 Kcals and 125 gm protein (~60% est needs) before considering end of nutrition support.     Malnutrition Status:    Severe malnutrition in the context of acute illness.    Recommendations already ordered by Registered Dietitian (RD):  1. Calorie counts x 3 days 2/21-2/23      2. Begin ONS, Ensure Enlive, BID between meals     3. Modify to a cyclic TF regimen to stimulate appetite as below:   Two Isaiah HN (cycle) x  14 hours (18h00 to 08h00) @ 60 mL/hr  [840 mL]+ 2 pkt BID Prosource daily to provide: 1840 Kcals (18 Kcal/kg), 115 gm protein ( 1.1 gm/kg), 184 gm CHO, 5 gm fiber, and 588 mL free water daily.   + FWF 30 mL q4hrs while TF running    4. Banatrol 1 pkt BID. Adds 80 kcal, 20 g CHO, 4 g fiber    Future/Additional Recommendations:  Monitor PO intake, calorie counts, wt trends, ability to further decrease tube feedings.     EVALUATION OF THE PROGRESS TOWARD GOALS   Diet: Regular, thin liquids  (advanced today from minced & moisted, mildly thick)    Nutrition Support: TwoCal HN @ 65 mL/hr (1560 mL/day) + Prosource (2 pkt TID) + Banatrol (1 pkt BID) to provide 3360 kcals (32 kcal/kg/day, 70% MREE), 197 g PRO (1.9 g/kg/day), 1092 mL H2O, 342 g CHO and 12 g Fiber daily.     Intake:     Per RN notes, poor appetite and needs encouragement to eat. Patient reports eating 1/3 - 1/2 of trays today. Says he is feeling full from tube feeding. Open to trying ONS between meals.    TF average 1462 mL/day over past week. With 2 pkt TID Prosource + Banatrol 1 pkt BID, total provisions = 3244 kcal/day (31 kcal/kg), 189 g protein (1.8 g/kg), 340 g CHO, 133 g fat, 11 g fiber daily.                                                                                                                                                                          NEW FINDINGS    -General:   Dysarthria improving per MD note 2/20  CT 2/16 found old thalamic stroke, per neuro not contributing to current dysarthria vs delirium vs deconditioning  Calorie counts ordered today    -Wt: Wt loss 2.8% in past week, 9.6% in past 2 weeks  02/21/22 0417 147.7 kg (325 lb 9.6 oz)   02/20/22 0600 153 kg (337 lb 4.8 oz) Abnormal    02/19/22 0447 152.3 kg (335 lb 11.2 oz) Abnormal    02/18/22 0600 149.1 kg (328 lb 11.2 oz)   02/16/22 2000 152 kg (335 lb 1.6 oz) Abnormal    02/15/22 2300 152 kg (335 lb) Abnormal    02/15/22 0000 153.1 kg (337 lb 8 oz) Abnormal    02/14/22 0000 151.9 kg (334 lb 12.8 oz) Abnormal    02/12/22 2000 154 kg (339 lb 9.6 oz) Abnormal    02/12/22 0400 156 kg (343 lb 14.4 oz) Abnormal    02/11/22 0400 159.9 kg (352 lb 9.6 oz) Abnormal    02/09/22 0300 165.1 kg (364 lb) Abnormal    02/08/22 0600 165.3 kg (364 lb 6.7 oz) Abnormal    02/07/22 0000 163.3 kg (360 lb) Abnormal    02/06/22 0400 164.1 kg (361 lb 11.2 oz) Abnormal    02/05/22 0400 164.7 kg (363 lb) Abnormal    02/03/22 0400 168 kg (370 lb 6 oz) Abnormal    02/02/22 0400 168.7 kg (371 lb 14.7 oz) Abnormal    02/01/22 0000 165.5 kg (364 lb 13.8 oz) Abnormal    01/31/22 1600 164.8 kg (363 lb 5.1 oz) Abnormal    01/29/22 0000 165.2 kg (364 lb 3.2 oz) Abnormal    01/28/22 0551 165.9 kg (365 lb 11.9 oz) Abnormal        -Labs:  BUN 35 (H), Cr 0.6 (L)  Na+, K+, Mg++, Phos all WNL   (H), AST 99(H)  Alk phos and Total bili WNL  Glucose 125, HgbA1c 5.8 on 2/18     -GI:   Less frequent loose stools  C.Diff negative 2/21    -Skin: Per WOC RN note 2/16: pressure injury on bilateral nares improved on right side, resolved on left. Right buttock superficial abrasions healing.    -Meds:   Prosource 2 pkts TID  Banatrol 1 pkt BID  Lasix  Lantus  Novolog q4  MVI  Protonix  Coumadin started 2/19   Replacement protocols K+, Mg++, Phos    -Diet/SLP: Advanced to regular today per SLP. Thin liquids.    -EN Access: NDT    MALNUTRITION  % Intake:  </= 50% for >/= 5 days (severe)  % Weight Loss: > 2% in 1 week (severe)  Subcutaneous Fat Loss: None observed  Muscle Loss: None observed  Fluid Accumulation/Edema: None noted  Malnutrition Diagnosis: Severe malnutrition in the context of acute illness.    Previous Goals   Total avg nutritional intake to meet a minimum of 28 kcal/kg (60% MREE) and 1.5 g PRO/kg daily (per dosing wt 105 kg).  Evaluation: Met    Previous Nutrition Diagnosis  Inadequate energy intake related to inadequate enteral infusion in setting of increased nutrition needs as evidenced by metabolic cart study, received ~40% energy needs on avg over past 5 days (from TF + propofol)    Evaluation: Improving    CURRENT NUTRITION DIAGNOSIS  Inadequate oral intake related to difficulty swallowing post-extubation last week as evidenced by patient reliant on TF to meet needs, NPO status until 2/18, did not begin eating orally until 2/20.    INTERVENTIONS  Implementation  Collaboration with other nutrition professionals - NSA to document melissa cts  Collaboration with other providers - PACherC, bedside RN   Enteral Nutrition - Modify schedule to nocturnal/cyclic   Medical Food/supplement therapy - ONS BID     Goals  Total oral intake of at least 1700 Kcals and 125 gm protein (~60% est needs) before considering end of nutrition support.     Monitoring/Evaluation  Progress toward goals will be monitored and evaluated per protocol.    Shonna Mcfarlane  Dietetic Intern

## 2022-02-21 NOTE — PLAN OF CARE
Neuro: A&Ox1-2. Calls appropriately. Partner at  this shift encouraging him.   Cardiac: SR. VSS. Afebrile   Respiratory: SPO2 >92 on RA. Lungs diminished. Education provided to pt and partner about using IS/Flutter valve frequently and consistently. Pt gave return demonstrations, needs more encouragement and reminders.   GI/: Adequate urine output. external catheter. Changed x2 this shift. Frequent linen changes r/t catheter leaking and pt sweating. Last BM 2/19  Diet/appetite: Tolerating chopped/soft regular diet. Poor appetite. Needs encouragement. TF infusing per order  Activity:  Assist of 2,.  Pain: Denies.  Skin: No new deficits noted.  LDA's: PICC   Plan: Continue with POC. Notify primary team with changes       Goal Outcome Evaluation:    Plan of Care Reviewed With: patient, significant other      Outcome Evaluation: Pt is responding well to therapy and participating in care to the best of his ability.

## 2022-02-21 NOTE — PLAN OF CARE
Major Shift Events: None.  Neuro: RASS -1 to 0. Ox4. Pt making illogical statements at times. Voice is whisper/slightly horse. Pt moves all extremities to command. Severe weakness. Left pupal 3mm R pupal 4mm (not new finding).  Resp: RA Dim LS.  CV: NSR w/ 1st AVB. SBP goal <130 and map goal >65.   : Issues with external male catheters. Urostomy cut to size over penis to collect urine. Urine sample colleted and sent down.   GI: BM on shift. Stool sample collected and send down.   Plan: Team to decrease frequency of BMP checks. PT/OT  For vital signs and complete assessments, please see documentation flowsheets.     Goal Outcome Evaluation:    Plan of Care Reviewed With: patient, significant other          Outcome Evaluation: Pt is resooning well to anesthesia care

## 2022-02-22 ENCOUNTER — APPOINTMENT (OUTPATIENT)
Dept: OCCUPATIONAL THERAPY | Facility: CLINIC | Age: 32
End: 2022-02-22
Attending: SURGERY
Payer: COMMERCIAL

## 2022-02-22 ENCOUNTER — APPOINTMENT (OUTPATIENT)
Dept: SPEECH THERAPY | Facility: CLINIC | Age: 32
End: 2022-02-22
Attending: SURGERY
Payer: COMMERCIAL

## 2022-02-22 ENCOUNTER — APPOINTMENT (OUTPATIENT)
Dept: PHYSICAL THERAPY | Facility: CLINIC | Age: 32
End: 2022-02-22
Attending: SURGERY
Payer: COMMERCIAL

## 2022-02-22 LAB
ALBUMIN SERPL-MCNC: 2.6 G/DL (ref 3.4–5)
ALP SERPL-CCNC: 82 U/L (ref 40–150)
ALT SERPL W P-5'-P-CCNC: 130 U/L (ref 0–70)
ANION GAP SERPL CALCULATED.3IONS-SCNC: 6 MMOL/L (ref 3–14)
APTT PPP: 45 SECONDS (ref 22–38)
AST SERPL W P-5'-P-CCNC: 76 U/L (ref 0–45)
BILIRUB SERPL-MCNC: 0.3 MG/DL (ref 0.2–1.3)
BUN SERPL-MCNC: 32 MG/DL (ref 7–30)
CALCIUM SERPL-MCNC: 8.3 MG/DL (ref 8.5–10.1)
CHLORIDE BLD-SCNC: 108 MMOL/L (ref 94–109)
CO2 SERPL-SCNC: 25 MMOL/L (ref 20–32)
CREAT SERPL-MCNC: 0.58 MG/DL (ref 0.66–1.25)
ERYTHROCYTE [DISTWIDTH] IN BLOOD BY AUTOMATED COUNT: 15.9 % (ref 10–15)
FACT X ACT/NOR PPP CHRO: 98 % (ref 70–130)
GFR SERPL CREATININE-BSD FRML MDRD: >90 ML/MIN/1.73M2
GLUCOSE BLD-MCNC: 112 MG/DL (ref 70–99)
GLUCOSE BLDC GLUCOMTR-MCNC: 111 MG/DL (ref 70–99)
GLUCOSE BLDC GLUCOMTR-MCNC: 115 MG/DL (ref 70–99)
GLUCOSE BLDC GLUCOMTR-MCNC: 115 MG/DL (ref 70–99)
GLUCOSE BLDC GLUCOMTR-MCNC: 116 MG/DL (ref 70–99)
GLUCOSE BLDC GLUCOMTR-MCNC: 117 MG/DL (ref 70–99)
GLUCOSE BLDC GLUCOMTR-MCNC: 118 MG/DL (ref 70–99)
HCT VFR BLD AUTO: 31.7 % (ref 40–53)
HGB BLD-MCNC: 9.6 G/DL (ref 13.3–17.7)
HOLD SPECIMEN: NORMAL
INR PPP: 1.57 (ref 0.85–1.15)
MCH RBC QN AUTO: 29.7 PG (ref 26.5–33)
MCHC RBC AUTO-ENTMCNC: 30.3 G/DL (ref 31.5–36.5)
MCV RBC AUTO: 98 FL (ref 78–100)
PLATELET # BLD AUTO: 344 10E3/UL (ref 150–450)
POTASSIUM BLD-SCNC: 4 MMOL/L (ref 3.4–5.3)
PROT SERPL-MCNC: 6.4 G/DL (ref 6.8–8.8)
RBC # BLD AUTO: 3.23 10E6/UL (ref 4.4–5.9)
SODIUM SERPL-SCNC: 139 MMOL/L (ref 133–144)
WBC # BLD AUTO: 14.2 10E3/UL (ref 4–11)

## 2022-02-22 PROCEDURE — 250N000013 HC RX MED GY IP 250 OP 250 PS 637: Performed by: PHYSICIAN ASSISTANT

## 2022-02-22 PROCEDURE — 250N000013 HC RX MED GY IP 250 OP 250 PS 637: Performed by: SURGERY

## 2022-02-22 PROCEDURE — 250N000011 HC RX IP 250 OP 636: Performed by: SURGERY

## 2022-02-22 PROCEDURE — 36415 COLL VENOUS BLD VENIPUNCTURE: CPT

## 2022-02-22 PROCEDURE — 97530 THERAPEUTIC ACTIVITIES: CPT | Mod: GO | Performed by: OCCUPATIONAL THERAPIST

## 2022-02-22 PROCEDURE — 92526 ORAL FUNCTION THERAPY: CPT | Mod: GN

## 2022-02-22 PROCEDURE — 85610 PROTHROMBIN TIME: CPT | Performed by: NURSE PRACTITIONER

## 2022-02-22 PROCEDURE — 258N000003 HC RX IP 258 OP 636: Performed by: SURGERY

## 2022-02-22 PROCEDURE — 97162 PT EVAL MOD COMPLEX 30 MIN: CPT | Mod: GP

## 2022-02-22 PROCEDURE — 97530 THERAPEUTIC ACTIVITIES: CPT | Mod: GP

## 2022-02-22 PROCEDURE — 250N000013 HC RX MED GY IP 250 OP 250 PS 637: Performed by: NURSE PRACTITIONER

## 2022-02-22 PROCEDURE — 250N000013 HC RX MED GY IP 250 OP 250 PS 637: Performed by: STUDENT IN AN ORGANIZED HEALTH CARE EDUCATION/TRAINING PROGRAM

## 2022-02-22 PROCEDURE — 36415 COLL VENOUS BLD VENIPUNCTURE: CPT | Performed by: SURGERY

## 2022-02-22 PROCEDURE — 120N000003 HC R&B IMCU UMMC

## 2022-02-22 PROCEDURE — 85730 THROMBOPLASTIN TIME PARTIAL: CPT | Performed by: SURGERY

## 2022-02-22 PROCEDURE — 85027 COMPLETE CBC AUTOMATED: CPT | Performed by: SURGERY

## 2022-02-22 PROCEDURE — 85260 CLOT FACTOR X STUART-POWER: CPT | Performed by: SURGERY

## 2022-02-22 PROCEDURE — 97110 THERAPEUTIC EXERCISES: CPT | Mod: GP

## 2022-02-22 PROCEDURE — 80053 COMPREHEN METABOLIC PANEL: CPT | Performed by: STUDENT IN AN ORGANIZED HEALTH CARE EDUCATION/TRAINING PROGRAM

## 2022-02-22 RX ORDER — FUROSEMIDE 40 MG
40 TABLET ORAL
Status: DISCONTINUED | OUTPATIENT
Start: 2022-02-22 | End: 2022-02-24

## 2022-02-22 RX ORDER — LISINOPRIL 2.5 MG/1
2.5 TABLET ORAL ONCE
Status: COMPLETED | OUTPATIENT
Start: 2022-02-22 | End: 2022-02-22

## 2022-02-22 RX ORDER — LISINOPRIL 10 MG/1
10 TABLET ORAL DAILY
Status: DISCONTINUED | OUTPATIENT
Start: 2022-02-23 | End: 2022-02-23

## 2022-02-22 RX ORDER — WARFARIN SODIUM 10 MG/1
10 TABLET ORAL
Status: COMPLETED | OUTPATIENT
Start: 2022-02-22 | End: 2022-02-22

## 2022-02-22 RX ADMIN — CARVEDILOL 50 MG: 25 TABLET, FILM COATED ORAL at 20:38

## 2022-02-22 RX ADMIN — NYSTATIN 1000000 UNITS: 100000 SUSPENSION ORAL at 12:00

## 2022-02-22 RX ADMIN — Medication 2 PACKET: at 08:21

## 2022-02-22 RX ADMIN — NYSTATIN 1000000 UNITS: 100000 SUSPENSION ORAL at 15:32

## 2022-02-22 RX ADMIN — Medication 15 ML: at 08:20

## 2022-02-22 RX ADMIN — ASPIRIN 81 MG CHEWABLE TABLET 81 MG: 81 TABLET CHEWABLE at 08:20

## 2022-02-22 RX ADMIN — Medication 40 MG: at 08:21

## 2022-02-22 RX ADMIN — Medication 10 MG: at 20:38

## 2022-02-22 RX ADMIN — BIVALIRUDIN 0.18 MG/KG/HR: 250 INJECTION, POWDER, LYOPHILIZED, FOR SOLUTION INTRAVENOUS at 18:31

## 2022-02-22 RX ADMIN — CARVEDILOL 50 MG: 25 TABLET, FILM COATED ORAL at 08:20

## 2022-02-22 RX ADMIN — INSULIN GLARGINE 50 UNITS: 100 INJECTION, SOLUTION SUBCUTANEOUS at 08:22

## 2022-02-22 RX ADMIN — NYSTATIN 1000000 UNITS: 100000 SUSPENSION ORAL at 08:21

## 2022-02-22 RX ADMIN — LISINOPRIL 7.5 MG: 2.5 TABLET ORAL at 08:20

## 2022-02-22 RX ADMIN — NYSTATIN 1000000 UNITS: 100000 SUSPENSION ORAL at 20:38

## 2022-02-22 RX ADMIN — BIVALIRUDIN 0.18 MG/KG/HR: 250 INJECTION, POWDER, LYOPHILIZED, FOR SOLUTION INTRAVENOUS at 01:15

## 2022-02-22 RX ADMIN — WARFARIN SODIUM 10 MG: 10 TABLET ORAL at 17:57

## 2022-02-22 RX ADMIN — LISINOPRIL 2.5 MG: 2.5 TABLET ORAL at 10:02

## 2022-02-22 RX ADMIN — BIVALIRUDIN 0.18 MG/KG/HR: 250 INJECTION, POWDER, LYOPHILIZED, FOR SOLUTION INTRAVENOUS at 10:03

## 2022-02-22 RX ADMIN — FUROSEMIDE 40 MG: 40 TABLET ORAL at 15:32

## 2022-02-22 ASSESSMENT — ACTIVITIES OF DAILY LIVING (ADL)
ADLS_ACUITY_SCORE: 21

## 2022-02-22 NOTE — PROGRESS NOTES
Cardiovascular Surgery Progress Note  02/22/2022         Assessment and Plan:     Chuy Varner is a 30 yo M with a PMHx of bicuspid aortic valve, thoracic aortic aneurysm without rupture, hypertension, obesity, and testicular cancer s/p orchiectomy who underwent Bentall procedure with mechanical valve on 1/28 with Dr. Velasco and Dr. Hannah. 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     Cardiovascular:   # Ascending aortic aneurysm s/p Bentall w/ Jerry valve on 1/28 with Dr. Velasco and Dr. Hannah  # Hx of HTN  - Goal MAP>65, SBP <130   - ASA 81mg  - Management of HTN              - Coreg 50 BID   -  2/20 started Lisinopril 5 mg daily and decreased Hydralazine to 50 mg TID, plan to titrate up Lisinopril and wean off hydralazine as able.    -Stopped hydralazine 2/21 and increased Lisinopril to 7.5 mg daily, b/p's 140's.    - Increase Lisinopril to 10 mg daily and titrate further as needed.               - Labetalol and hydralazine PRN to maintain SBP <130, has not needed in several days., will discontinue    - Anticoagulatin for valve, will need coumadin with INR goal 2-3 for three months, then 1.5-2.0 for life, with ASA. Started Coumadin 2/19, Continue Bival as bridge.     Chest tubes and TPW removed 2/4    Pulmonary:  # Acute hypoxemic respiratory failure (ARDS vs. PNA vs. Hypervolemia vs. TRALI vs. severe atelectasis) s/p extubation  - Extubated 2/14 to HFNC, now breathing on Room Air  - conditional VBGs -- draw one if increased somnolence or distressed breathing  - continue respiratory therapy                          - metanebs (percussive therapy)                          - flutter valve QID to encourage coughing, clearance of airways    Neurology / MSK:  Neuro/ pain/ sedation:  # Acute Postoperative pain  - Monitor  neurological status. Notify the MD for any acute changes in exam.  - Neurology following, appreciate recs  - speech therapy evaluation 02/17 for dysarthric   # Dysarthria - improving   - ENT scoped, no reason for long term dysarthria/problems with phonation  - Pain well controlled on PRN tylenol, dilaudid, robaxin, oxycodone,   - has not needed IV dilaudid for several days, will discontinue   - Sleep hygiene: Melatonin, trazadone prn at bedtime  # Thalamic infarct on head CT 2/16, old, age unknown  - Not contributing to current dysarthria vs delirium vs deconditioning per Neuro  - Neurology would like an MRI before he leaves the hospital, would recheck with neurology as he had a head CT with evidence of old infarct as above.    - Per Neuro, start statin, Lipitor 40 mg daily pending LFT's               - Lipid panel, HgbA1c ordered per neuro for stroke Hx  # Symptoms of depression with insomnia   - reports feeling down and not being able to sleep despite melatonin and prn Trazodone   - Mental health consult, may need antidepressant. Patient and SO agreeable to MH visit.       / Renal:  -Hypernatremia   BL creat appears to be ~ 1.0  - I/O's inaccurate   - Avoid/limit nephrotoxins as able  - Replete lytes PRN per protocol  - Uosm = 902 pre-hydrochlorothiazide; F/u Uosm < 600 (no effect)  -  hydrochlorothiazide 25 mg BID last given 2/17 for correction of hyperNa+ and hypervolemia.   - Continue NJ free water 30 q4  - Na+ wnl  - Pre-op weight 365 lbs 11 oz, weight down today 325 lbs 6 oz  - Moderate amount of MARIAM, has wraps on with decreased edema. Has windy UE edema with windy non-occlusive DVTs- elevate both arms and limit B/p's    - Re Consulted PT/OT for right lymphedema wrap 2/19.   Diuresis: Decrease IV Lasix to 40 mg po BID today to keep negative to net even.     GI / FEN:   # Obesity (BMI 46)  # Nutrition  - NJ feeds at goal, starting cycling TF's this am, 8P-8A  - Speech following okay to advanced to soft and bite  sized diet with mild thickness 1/20  - started calorie counts 2/21  # Diarrhea/Incontinence  Suspect caused by large amounts of free water via NJ vs augmentin vs recent colonization of C. diff  - Consider repeat C. Diff if no improvement (no imodium for now)  - Less frequent loose stools, Cdiff-negative 2/20  # Elevated ALT, AST  - Monitor for now, trending down, no abdominal pain.    # Cholelithiasis  Downtrending LFTs. Suspect elevations d/t Augmentin             Endocrine:  # Stress induced hyperglycemia, improving  - HDSSI  - Hgb A1c 5.8 2/18  - Decrease Lantus to 40 U daily, plan to decrease as using less sliding scale insulin and good BG control   - Goal BG <180 for optimal healing  - -120's without any sliding scale insulin   - Continue to monitor BG    Infectious Disease:  # Stress induced leukocytosis  # Possible ventilator associated pneumonia  # Sinusitis, treated with Augmentin   Pan cultures and re-cultures all remained unrevealing. Fever likely from previously untreated DVT vs sinusitis.  - Continue to monitor fever curve, WBC and inflammatory markers  - WBC 14.2 (15.9) trending down, AF. Lactate 1.3  - Checked C. Difficile 2/21 negative, UA, and Procal today-unremarkable.    - CXR no change      Hematology:   # History of testicular cancer s/p orchiectomy  # RLE, Cesar UE DVT's  # HIT positive   - Right LE Lymphedema wrap, reconsult PT/OT   - CBC daily, Hgb 9.6, stable   - Continue Bivalirudin gtt as bridge to coumadin, following Chromogenic Factor X  - started coumadin 2/19 with INR goal 2-3, pharmacy to dose   - Right upper extremity US 2/21 with occlusive thrombus of one of the paired right brachial veins at the upper arm, with PICC in place, which was removed 2/21.   - LUE US 2/21 wotj occlusive superficial thrombus in left cephalic vein at the antecubital fossa. No evidence of left upper extremity deep venous thrombosis     Anticoagulation:   - ASA 81 mg daily   - Bival drip bridge to  "therapeutic coumadin, INR goal 2-3 for three months, then 1.5-2.0 lifelong   - Follow Chromogenic Factor X, goal 20-40 while on Bival. Chromogenic Factor X - 98    MSK/ Skin:  # Sternotomy  # Surgical Incision  # Buttock pressure injury   - Sternal precautions  - Postoperative incision management per protocol  - PT/OT  - WOCN following for pressure injuries  # Maculopapular Rash  Patient has maculopapular rash per Exam below.  - Likely represents folliculitis, monitor for now, improving        Prophylaxis:    Mechanical prophylaxis for DVT  - Chemical DVT prophylaxis- bivalirudin  - PPI - until 03/16 (30d from extubation) per institutional protocol  - HOB to 30 degrees    Disposition:   - Transferred to  on 2/18  - Therapies recommending discharge to TCU, possibly Thursday or Friday pending INR     Discussed with  Dr Velasco through both written and verbal communication.      Qi Umaña, DNP, CNP  [facility] Cardiothoracic Surgery  O: [phone]  Pgr [phone]          Interval History:     No overnight events.   Pain is well managed on current regimen.   Tolerating diet and tube feeds, is passing flatus, + BM, No nausea or vomiting.  Breathing well without complaints.   Working with therapies.   Denies chest pain, palpitations, dizziness, syncopal symptoms, fevers, chills, myalgias, or sternal popping/clicking.         Physical Exam:   Blood pressure (!) 146/70, pulse 100, temperature 98.4  F (36.9  C), temperature source Oral, resp. rate 18, height 1.905 m (6' 3\"), weight 147.6 kg (325 lb 6.4 oz), SpO2 95 %.  Vitals:    02/21/22 0417 02/22/22 0400 02/22/22 0453   Weight: 147.7 kg (325 lb 9.6 oz) (!) 155.6 kg (343 lb) 147.6 kg (325 lb 6.4 oz)        Gen: Alert, NAD. Talking, more awake today. Significant other is at the bedside.   Neuro: Follows commands, very deconditioned   CV: RRR, normal S1 S2, no murmurs, rubs or gallops. Crisp valve click.   Pulm: CTA, no wheezing or rhonchi, normal breathing on RA  Abd: " nondistended, normal BS, soft, nontender  Ext: Large amount of RLE peripheral edema, 2+ pitting and generalized edema. Has RLE wraps on.   Incision: clean, dry, intact, no erythema, sternum stable  Tubes/drain sites: clean and dry         Data:    Imaging:  reviewed recent imaging, no acute concerns  2/19/22  9:41 AM [id number] [facility]        PACS Images     Show images for XR Chest Port 1 View    Study Result    Narrative & Impression   Exam: XR CHEST PORT 1 VIEW, 2/19/2022 9:41 AM     Indication: elevated WBC and follow up opacities     Comparison: 2/17/2022     Findings:   Portable upright AP view of the chest. Right upper extremity PICC and  enteric tube in similar position.     Low lung volumes. Stable enlargement of cardiac silhouette. No  significant change in perihilar and bibasilar predominant pulmonary  opacities. No appreciable pneumothorax. No significant pleural  effusion.                                                                      Impression: No significant change in perihilar and bibasilar prominent  pulmonary opacities likely representative of atelectasis and/or  pulmonary edema     I have personally reviewed the examination and initial interpretation  and I agree with the findings.     MICAH VIERA MD                 Labs:  BMP  Recent Labs   Lab 02/22/22  0803 02/22/22  0636 02/22/22  0410 02/21/22  2358 02/21/22  1534 02/21/22  1350 02/21/22  1228 02/21/22  0953 02/21/22  0756 02/21/22  0557   NA  --  139  --   --   --  137  --  139  --  139   POTASSIUM  --  4.0  --   --   --  3.8  --  3.7  --  4.5   CHLORIDE  --  108  --   --   --  107  --  109  --  110*   MAYE  --  8.3*  --   --   --  8.5  --  8.2*  --  8.3*   CO2  --  25  --   --   --  25  --  23  --  24   BUN  --  32*  --   --   --  34*  --  35*  --  36*   CR  --  0.58*  --   --   --  0.62*  --  0.60*  --  0.59*   * 112* 117* 116*   < > 119*   < > 125*   < > 102*    < > = values in this interval not  displayed.     CBC  Recent Labs   Lab 02/22/22  0636 02/21/22  1701 02/21/22  0426 02/20/22  0603 02/19/22  0544   WBC 14.2*  --  15.9* 17.5* 16.9*   RBC 3.23*  --  3.40* 3.67* 3.49*   HGB 9.6* 10.2* 10.0* 11.1* 10.5*   HCT 31.7*  --  33.3* 36.8* 35.1*   MCV 98  --  98 100 101*   MCH 29.7  --  29.4 30.2 30.1   MCHC 30.3*  --  30.0* 30.2* 29.9*   RDW 15.9*  --  15.9* 15.9* 15.6*     --  382 369 414     INR  Recent Labs   Lab 02/22/22  0636 02/21/22  0425 02/20/22  0603   INR 1.57* 1.50* 1.53*      Hepatic Panel  Recent Labs   Lab 02/22/22  0636 02/21/22  0149 02/20/22  0603 02/19/22  0544   AST 76* 99* 127* 142*   * 148* 167* 183*   ALKPHOS 82 83 91 91   BILITOTAL 0.3 0.5 0.5 0.5   ALBUMIN 2.6* 2.5* 2.5* 2.5*     GLUCOSE:   Recent Labs   Lab 02/22/22  0803 02/22/22  0636 02/22/22  0410 02/21/22  2358 02/21/22  1923 02/21/22  1534   * 112* 117* 116* 91 96

## 2022-02-22 NOTE — PLAN OF CARE
Neuro: A&Ox 3-4. Intermittently confused to time. Very weak in all extremities.   Cardiac: SR. VSS.   Respiratory: Sating  on RA.  GI/: Adequate urine output per primofit.   Diet/appetite: Tolerating regular diet. Fair appetite.melissa counts through tomorrow. Needs assistance w/ eating. Girl friend at bedside to help. Cycled Tf [phone].   Activity:  Assist of  2 w/ lift, up to chair.  Pain: At acceptable level on current regimen.   Skin: No new deficits noted.   LDA's: left piv w/ angiomax cont at 29.9 ml/hr. PTT therapeutic this am.      Plan: Continue with POC. Notify primary team with changes.

## 2022-02-22 NOTE — PROGRESS NOTES
"SPIRITUAL HEALTH SERVICES Progress Note  [facility] ([place]) 6B    Visited patient, Chuy, per length of stay. Chuy's significant other, Debbie, present.  Chuy declined spiritual health services but thanked me. Debbie says, she will \"stay right by his side.\"    Will continue to follow this patient as he remains on the unit.    Tyrone Anderson MDiv  Chaplain Resident  Pager [phone]    * [facility] remains available 24/7 for emergent requests/referrals, either by having the switchboard page the on-call  or by entering an ASAP/STAT consult in Epic (this will also page the on-call ). Routine Epic consults receive an initial response within 24 hours.*  "

## 2022-02-22 NOTE — PLAN OF CARE
"Goal Outcome Evaluation:    Plan of Care Reviewed With: patient, significant other      Blood pressure 123/70, pulse 98, temperature 97.6  F (36.4  C), temperature source Oral, resp. rate 18, height 1.905 m (6' 3\"), weight 147.7 kg (325 lb 9.6 oz), SpO2 96 %.  Pt VSS, on RA.  Pt denies any pain.  Disoriented to time occasionally with some illogical statements.  Pt very weak, unable to move extremities.  Up to chair with lift x 1.  TF's changed to cycled as of this evening, infusing at 60cc/hr with 30 q 4 flush.  Pt started on regular diet and liquids, pt SO assisting with feeding, doing small bites.  BG q 4 hours.  External cath in place with good UOP.  Incontinent of stool x 1.  BUE US done today, + for occlusive clots.  R  PICC line pulled per MD order.  L PIV inserted.  MD ok'd to do BP on R forearm.  Angiomax gtt infusing at 0.18 mg/kg/hr.  K+ replaced x 1.  Soft touch call light within reach.  Continue to monitor.           "

## 2022-02-22 NOTE — PROGRESS NOTES
"   02/22/22 1352   Quick Adds   Type of Visit Initial PT Evaluation   Living Environment   People in Home significant other   Current Living Arrangements house   Home Accessibility stairs to enter home;stairs within home   Number of Stairs, Main Entrance 1   Number of Stairs, Within Home, Primary other (see comments)  (greater than 10)   Stair Railings, Within Home, Primary railings safe and in good condition   Transportation Anticipated car, drives self;family or friend will provide   Living Environment Comments Lives with gelyfrienmony Lewis in house. Laundry in basement which girlfriend does. Has four dogs.   Self-Care   Usual Activity Tolerance good   Current Activity Tolerance fair   Equipment Currently Used at Home none   Fall history within last six months no   Activity/Exercise/Self-Care Comment Patient IND with ADLs, share cooking/cleaning with girlfriend, patient primarily completes yardwork. No reglar exercise but has stationary bike at home. 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    General Information   Onset of Illness/Injury or Date of Surgery 01/28/22   Referring Physician Ivory Wen RN   Patient/Family Therapy Goals Statement (PT) return home and to PLOF   Pertinent History of Current Problem (include personal factors and/or comorbidities that impact the POC) Per chart, \"Chuy Varner is a 32 yo M with a PMHx of bicuspid aortic valve, thoracic aortic aneurysm without rupture, hypertension, obesity, and testicular cancer s/p orchiectomy who underwent Bentall procedure with mechanical valve on 1/28 with Dr. Velasco and Dr. Hannah. 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 \"   Existing Precautions/Restrictions sternal   Cognition   Affect/Mental Status (Cognition) confused   Cognitive Status Comments pleasant " but confused with some repeated questions.   Pain Assessment   Patient Currently in Pain Yes, see Vital Sign flowsheet   Posture    Posture Protracted shoulders;Forward head position   Range of Motion (ROM)   ROM Comment AROM limitations due to weakness   Strength (Manual Muscle Testing)   Strength Comments Generalized weakness, R leg weaker than L leg. Able to complete B SLR with Min A. Anticipate strength grossly <3/5   Bed Mobility   Comment, (Bed Mobility) supine<>sit dependent with OH lift   Transfers   Comment, (Transfers) OH lift for transfers   Gait/Stairs (Locomotion)   Comment, (Gait/Stairs) unable to ambulate   Balance   Balance Comments sat EOB for two minutes with CGA   Muscle Tone   Muscle Tone no deficits were identified   Clinical Impression   Criteria for Skilled Therapeutic Intervention Yes, treatment indicated   PT Diagnosis (PT) impaired functional mobility   Influenced by the following impairments weakness, deconditioning, balance impairments   Functional limitations due to impairments bed mobility, transfers, gait   Clinical Presentation (PT Evaluation Complexity) Evolving/Changing   Clinical Presentation Rationale clinical judgment   Clinical Decision Making (Complexity) moderate complexity   Planned Therapy Interventions (PT) balance training;bed mobility training;gait training;home exercise program;neuromuscular re-education;motor coordination training;patient/family education;postural re-education;ROM (range of motion);strengthening;stretching;transfer training   Anticipated Equipment Needs at Discharge (PT)   (TBD)   Risk & Benefits of therapy have been explained evaluation/treatment results reviewed;care plan/treatment goals reviewed;risks/benefits reviewed;current/potential barriers reviewed;patient;spouse/significant other   PT Discharge Planning   PT Discharge Recommendation (DC Rec) Acute Rehab Center-Motivated patient will benefit from intensive, interdisciplinary therapy.   Anticipate will be able to tolerate 3 hours of therapy per day   PT Rationale for DC Rec Patient is well below PLOF, currently dependent with assist x2 with all functional mobility, lift dependent. Would benefit from intensive therapies to progress IND with functional mobility.    PT Brief overview of current status OH lift for transfers   Total Evaluation Time   Total Evaluation Time (Minutes) 10   Physical Therapy Goals   PT Frequency 6x/week   PT Predicated Duration/Target Date for Goal Attainment 03/08/22   PT Goals Bed Mobility;Transfers;Gait   PT: Bed Mobility Modified independent;Supine to/from sit;Rolling;Within precautions   PT: Transfers Modified independent;Sit to/from stand;Bed to/from chair;Assistive device;Within precautions   PT: Gait 50 feet;Supervision/stand-by assist;Within precautions;Assistive device

## 2022-02-22 NOTE — PROGRESS NOTES
Care Management Follow Up    Length of Stay (days): 24    Expected Discharge Date: 02/23/2022     Concerns to be Addressed: all concerns addressed in this encounter     Patient plan of care discussed at interdisciplinary rounds: No    Anticipated Discharge Disposition: LTACH, Acute Rehab     Anticipated Discharge Services: None  Anticipated Discharge DME:      Patient/family educated on Medicare website which has current facility and service quality ratings: no  Education Provided on the Discharge Plan:    Patient/Family in Agreement with the Plan:  (pt unable to talk d/t vent, GF answered questions)    Referrals Placed by CM/SW:    Private pay costs discussed: Not applicable    Additional Information:   following up to discuss ARU options. Met w/ pt and SO, Debbie, at bedside. Pt confused and ultimately had to discuss options with pt's SO outside of room as pt because anxious. Pt's only two options for ARU are [facility] and [place] [facility]. SO asked questions about TCU as there are TCUs in [place]. We discussed the difference between ARU and TCU level of care and that pt currently would benefit from ARU level of care. SO agrees but wants to talk more with pt and his mother. May need to involve pt's mother in discharge planning as she is next legal decision maker for pt.       PERCY JolleySW

## 2022-02-22 NOTE — PROGRESS NOTES
Calorie Count  Intake recorded for: 2/21  Total Kcals: 106 Total Protein: 12g  Kcals from Hospital Food: 106   Protein: 12g  Kcals from Outside Food (average):0 Protein: 0g  # Meals Ordered from Kitchen: 2  # Meals Recorded: 1 meal- 50% Herb baked chicken, 25% puree rice, green beans    # Supplements Recorded: no intake recorded

## 2022-02-22 NOTE — PLAN OF CARE
Neuro: A&Ox3, occasionally illogical sentences. Soft Spoken. Severe weakness in all extremities.     Cardiac: SR on monitor, VSS  Respiratory: Sating >95% on RA.  GI/: Adequate urine output. Prrimofit in placet, positional. 40 iv lasix given during shift. Large BM x2, incontinence cares provided.   Diet/appetite: Tolerating regular diet, with feedings assistance. TF cycling until 0800. Running at goal rate of 60ml/hr with FWF 30 ml q4hrs. q4 blood sugar checks.   Activity:  Assist of 2 and lift.  Pain: Denies  Skin: No new deficits noted.  LDA's: L PIV with angiomax infusing, ptt check this AM.     Plan: Continue with POC. Notify primary team with changes.       Initially lab was having trouble drawing labs this AM, writer was told another phlebotomist would be sent to try to draw labs. Vascular access paged by lab and writer to come attempt to draw asap.

## 2022-02-23 ENCOUNTER — APPOINTMENT (OUTPATIENT)
Dept: OCCUPATIONAL THERAPY | Facility: CLINIC | Age: 32
End: 2022-02-23
Attending: SURGERY
Payer: COMMERCIAL

## 2022-02-23 ENCOUNTER — APPOINTMENT (OUTPATIENT)
Dept: PHYSICAL THERAPY | Facility: CLINIC | Age: 32
End: 2022-02-23
Attending: SURGERY
Payer: COMMERCIAL

## 2022-02-23 ENCOUNTER — APPOINTMENT (OUTPATIENT)
Dept: CARDIOLOGY | Facility: CLINIC | Age: 32
End: 2022-02-23
Attending: PHYSICIAN ASSISTANT
Payer: COMMERCIAL

## 2022-02-23 LAB
ALBUMIN SERPL-MCNC: 2.4 G/DL (ref 3.4–5)
ALP SERPL-CCNC: 85 U/L (ref 40–150)
ALT SERPL W P-5'-P-CCNC: 108 U/L (ref 0–70)
ANION GAP SERPL CALCULATED.3IONS-SCNC: 7 MMOL/L (ref 3–14)
APTT PPP: 60 SECONDS (ref 22–38)
AST SERPL W P-5'-P-CCNC: 52 U/L (ref 0–45)
BILIRUB SERPL-MCNC: 0.3 MG/DL (ref 0.2–1.3)
BUN SERPL-MCNC: 25 MG/DL (ref 7–30)
CALCIUM SERPL-MCNC: 8.7 MG/DL (ref 8.5–10.1)
CHLORIDE BLD-SCNC: 108 MMOL/L (ref 94–109)
CO2 SERPL-SCNC: 21 MMOL/L (ref 20–32)
CREAT SERPL-MCNC: 0.58 MG/DL (ref 0.66–1.25)
ERYTHROCYTE [DISTWIDTH] IN BLOOD BY AUTOMATED COUNT: 15.5 % (ref 10–15)
FACT X ACT/NOR PPP CHRO: 91 % (ref 70–130)
GFR SERPL CREATININE-BSD FRML MDRD: >90 ML/MIN/1.73M2
GLUCOSE BLD-MCNC: 110 MG/DL (ref 70–99)
GLUCOSE BLDC GLUCOMTR-MCNC: 100 MG/DL (ref 70–99)
GLUCOSE BLDC GLUCOMTR-MCNC: 104 MG/DL (ref 70–99)
GLUCOSE BLDC GLUCOMTR-MCNC: 109 MG/DL (ref 70–99)
GLUCOSE BLDC GLUCOMTR-MCNC: 111 MG/DL (ref 70–99)
GLUCOSE BLDC GLUCOMTR-MCNC: 86 MG/DL (ref 70–99)
GLUCOSE BLDC GLUCOMTR-MCNC: 88 MG/DL (ref 70–99)
GLUCOSE BLDC GLUCOMTR-MCNC: 99 MG/DL (ref 70–99)
HCT VFR BLD AUTO: 33.5 % (ref 40–53)
HGB BLD-MCNC: 9.6 G/DL (ref 13.3–17.7)
INR PPP: 1.66 (ref 0.85–1.15)
LACTATE SERPL-SCNC: 1.3 MMOL/L (ref 0.7–2)
LVEF ECHO: NORMAL
MAGNESIUM SERPL-MCNC: 2.2 MG/DL (ref 1.6–2.3)
MCH RBC QN AUTO: 29 PG (ref 26.5–33)
MCHC RBC AUTO-ENTMCNC: 28.7 G/DL (ref 31.5–36.5)
MCV RBC AUTO: 101 FL (ref 78–100)
PHOSPHATE SERPL-MCNC: 4.6 MG/DL (ref 2.5–4.5)
PLATELET # BLD AUTO: 293 10E3/UL (ref 150–450)
POTASSIUM BLD-SCNC: 4.1 MMOL/L (ref 3.4–5.3)
PROT SERPL-MCNC: 6.3 G/DL (ref 6.8–8.8)
RBC # BLD AUTO: 3.31 10E6/UL (ref 4.4–5.9)
SODIUM SERPL-SCNC: 136 MMOL/L (ref 133–144)
WBC # BLD AUTO: 11.3 10E3/UL (ref 4–11)

## 2022-02-23 PROCEDURE — 83605 ASSAY OF LACTIC ACID: CPT | Performed by: SURGERY

## 2022-02-23 PROCEDURE — 93321 DOPPLER ECHO F-UP/LMTD STD: CPT | Mod: 26 | Performed by: INTERNAL MEDICINE

## 2022-02-23 PROCEDURE — 250N000013 HC RX MED GY IP 250 OP 250 PS 637: Performed by: PHYSICIAN ASSISTANT

## 2022-02-23 PROCEDURE — 97140 MANUAL THERAPY 1/> REGIONS: CPT | Mod: GO | Performed by: OCCUPATIONAL THERAPIST

## 2022-02-23 PROCEDURE — 80053 COMPREHEN METABOLIC PANEL: CPT | Performed by: STUDENT IN AN ORGANIZED HEALTH CARE EDUCATION/TRAINING PROGRAM

## 2022-02-23 PROCEDURE — 85027 COMPLETE CBC AUTOMATED: CPT | Performed by: SURGERY

## 2022-02-23 PROCEDURE — 250N000013 HC RX MED GY IP 250 OP 250 PS 637: Performed by: SURGERY

## 2022-02-23 PROCEDURE — 250N000013 HC RX MED GY IP 250 OP 250 PS 637: Performed by: STUDENT IN AN ORGANIZED HEALTH CARE EDUCATION/TRAINING PROGRAM

## 2022-02-23 PROCEDURE — 97530 THERAPEUTIC ACTIVITIES: CPT | Mod: GP | Performed by: PHYSICAL THERAPIST

## 2022-02-23 PROCEDURE — 250N000012 HC RX MED GY IP 250 OP 636 PS 637: Performed by: NURSE PRACTITIONER

## 2022-02-23 PROCEDURE — 97535 SELF CARE MNGMENT TRAINING: CPT | Mod: GO | Performed by: OCCUPATIONAL THERAPIST

## 2022-02-23 PROCEDURE — 250N000011 HC RX IP 250 OP 636: Performed by: SURGERY

## 2022-02-23 PROCEDURE — 120N000003 HC R&B IMCU UMMC

## 2022-02-23 PROCEDURE — G0463 HOSPITAL OUTPT CLINIC VISIT: HCPCS

## 2022-02-23 PROCEDURE — 83735 ASSAY OF MAGNESIUM: CPT | Performed by: SURGERY

## 2022-02-23 PROCEDURE — 93325 DOPPLER ECHO COLOR FLOW MAPG: CPT

## 2022-02-23 PROCEDURE — 97110 THERAPEUTIC EXERCISES: CPT | Mod: GP | Performed by: PHYSICAL THERAPIST

## 2022-02-23 PROCEDURE — 36415 COLL VENOUS BLD VENIPUNCTURE: CPT | Performed by: SURGERY

## 2022-02-23 PROCEDURE — 85610 PROTHROMBIN TIME: CPT | Performed by: NURSE PRACTITIONER

## 2022-02-23 PROCEDURE — 85260 CLOT FACTOR X STUART-POWER: CPT | Performed by: SURGERY

## 2022-02-23 PROCEDURE — 250N000013 HC RX MED GY IP 250 OP 250 PS 637: Performed by: NURSE PRACTITIONER

## 2022-02-23 PROCEDURE — 258N000003 HC RX IP 258 OP 636: Performed by: SURGERY

## 2022-02-23 PROCEDURE — 85730 THROMBOPLASTIN TIME PARTIAL: CPT | Performed by: SURGERY

## 2022-02-23 PROCEDURE — 93308 TTE F-UP OR LMTD: CPT | Mod: 26 | Performed by: INTERNAL MEDICINE

## 2022-02-23 PROCEDURE — 84100 ASSAY OF PHOSPHORUS: CPT | Performed by: SURGERY

## 2022-02-23 PROCEDURE — 93325 DOPPLER ECHO COLOR FLOW MAPG: CPT | Mod: 26 | Performed by: INTERNAL MEDICINE

## 2022-02-23 PROCEDURE — 93321 DOPPLER ECHO F-UP/LMTD STD: CPT

## 2022-02-23 PROCEDURE — 90834 PSYTX W PT 45 MINUTES: CPT | Performed by: STUDENT IN AN ORGANIZED HEALTH CARE EDUCATION/TRAINING PROGRAM

## 2022-02-23 RX ORDER — ATORVASTATIN CALCIUM 40 MG/1
40 TABLET, FILM COATED ORAL EVERY EVENING
Status: DISCONTINUED | OUTPATIENT
Start: 2022-02-23 | End: 2022-03-04 | Stop reason: HOSPADM

## 2022-02-23 RX ORDER — LISINOPRIL 20 MG/1
20 TABLET ORAL DAILY
Status: DISCONTINUED | OUTPATIENT
Start: 2022-02-24 | End: 2022-03-02

## 2022-02-23 RX ADMIN — NYSTATIN 1000000 UNITS: 100000 SUSPENSION ORAL at 09:05

## 2022-02-23 RX ADMIN — CARVEDILOL 50 MG: 25 TABLET, FILM COATED ORAL at 20:48

## 2022-02-23 RX ADMIN — FUROSEMIDE 40 MG: 40 TABLET ORAL at 16:19

## 2022-02-23 RX ADMIN — WARFARIN SODIUM 12.5 MG: 10 TABLET ORAL at 17:48

## 2022-02-23 RX ADMIN — NYSTATIN 1000000 UNITS: 100000 SUSPENSION ORAL at 20:49

## 2022-02-23 RX ADMIN — BIVALIRUDIN 0.18 MG/KG/HR: 250 INJECTION, POWDER, LYOPHILIZED, FOR SOLUTION INTRAVENOUS at 03:04

## 2022-02-23 RX ADMIN — ASPIRIN 81 MG CHEWABLE TABLET 81 MG: 81 TABLET CHEWABLE at 09:06

## 2022-02-23 RX ADMIN — LISINOPRIL 10 MG: 10 TABLET ORAL at 09:06

## 2022-02-23 RX ADMIN — BIVALIRUDIN 0.18 MG/KG/HR: 250 INJECTION, POWDER, LYOPHILIZED, FOR SOLUTION INTRAVENOUS at 12:03

## 2022-02-23 RX ADMIN — BIVALIRUDIN 0.18 MG/KG/HR: 250 INJECTION, POWDER, LYOPHILIZED, FOR SOLUTION INTRAVENOUS at 20:48

## 2022-02-23 RX ADMIN — FUROSEMIDE 40 MG: 40 TABLET ORAL at 09:06

## 2022-02-23 RX ADMIN — CARVEDILOL 50 MG: 25 TABLET, FILM COATED ORAL at 09:05

## 2022-02-23 RX ADMIN — ATORVASTATIN CALCIUM 40 MG: 40 TABLET, FILM COATED ORAL at 20:48

## 2022-02-23 RX ADMIN — NYSTATIN 1000000 UNITS: 100000 SUSPENSION ORAL at 16:20

## 2022-02-23 RX ADMIN — Medication 40 MG: at 10:01

## 2022-02-23 RX ADMIN — Medication 15 ML: at 09:04

## 2022-02-23 RX ADMIN — INSULIN GLARGINE 40 UNITS: 100 INJECTION, SOLUTION SUBCUTANEOUS at 10:40

## 2022-02-23 RX ADMIN — Medication 10 MG: at 20:48

## 2022-02-23 ASSESSMENT — ACTIVITIES OF DAILY LIVING (ADL)
ADLS_ACUITY_SCORE: 21
ADLS_ACUITY_SCORE: 21
ADLS_ACUITY_SCORE: 19
ADLS_ACUITY_SCORE: 21
ADLS_ACUITY_SCORE: 19
ADLS_ACUITY_SCORE: 19
ADLS_ACUITY_SCORE: 21
ADLS_ACUITY_SCORE: 19
ADLS_ACUITY_SCORE: 19
ADLS_ACUITY_SCORE: 21
ADLS_ACUITY_SCORE: 21
ADLS_ACUITY_SCORE: 19

## 2022-02-23 NOTE — PROGRESS NOTES
Cardiovascular Surgery Progress Note  02/23/2022         Assessment and Plan:     Chuy Varner is a 30 yo M with a PMHx of bicuspid aortic valve, thoracic aortic aneurysm without rupture, hypertension, obesity, and testicular cancer s/p orchiectomy who underwent Bentall procedure with mechanical valve on 1/28 with Dr. Velasco and Dr. Hannah. 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      Cardiovascular:   # Ascending aortic aneurysm s/p Bentall w/ Jerry valve on 1/28 with Dr. Velasco and Dr. Hannah  # Hx of HTN  - Goal MAP>65, SBP <130   - Coreg 50 BID  Lisinopril 10mg daily (increase to 20mg 2/24)  - ASA 81mg  Atorvastatin 40mg   - Anticoagulatin for valve, will need coumadin with INR goal 2-3 for three months, then 1.5-2.0 for life, with ASA. Started Coumadin 2/19, Continue Bival as bridge.   - Baseline TTE per Dr. Velasco 2/23     Chest tubes and TPW removed 2/4     Pulmonary:  # Acute hypoxemic respiratory failure (ARDS vs. PNA vs. Hypervolemia vs. TRALI vs. severe atelectasis) s/p extubation  - Extubated 2/14 to HFNC, now breathing on Room Air  - conditional VBGs - draw one if increased somnolence or distressed breathing  - continue respiratory therapy  - metanebs (percussive therapy)  - flutter valve QID to encourage coughing, clearance of airways     Neurology / MSK:  Neuro/ pain/ sedation:  # Acute Postoperative pain  - Monitor neurological status. Notify the MD for any acute changes in exam.  - Neurology following, appreciate recs  - Pain well controlled on PRN tylenol, dilaudid, robaxin, oxycodone,     # Dysarthria - improving   - speech therapy evaluation 02/17 for dysarthric  Reconsulted 2/23 or swallow evaluation  - ENT scoped, no reason for long term dysarthria/problems with phonation  - Sleep hygiene:  Melatonin, trazadone prn at bedtime    # Thalamic infarct on head CT 2/16, old, age unknown  - Not contributing to current dysarthria vs delirium vs deconditioning per Neuro  - Neurology would like an MRI before he leaves the hospital, would recheck with neurology as he had a head CT with evidence of old infarct as above.    - Lipid panel, HgbA1c ordered per neuro for stroke Hx    # Symptoms of depression with insomnia   - reports feeling down and not being able to sleep despite melatonin and prn Trazodone   - Mental health consult, may need antidepressant. Patient and SO agreeable to MH visit.       / Renal:  -Hypernatremia, improving  BL creat appears to be ~ 1.0  - Daily weights and I/O's  - Avoid/limit nephrotoxins as able  - Replete lytes PRN per protocol  -  hydrochlorothiazide 25 mg BID last given 2/17 for correction of hyperNa+ and hypervolemia.   - Continue NJ free water 30 q4  - Na+ wnl  - Pre-op weight 365 lbs 11 oz, weight down today 325 lbs 6 oz  - Moderate amount of MARIAM, has wraps on with decreased edema. Has windy UE edema with windy non-occlusive DVTs- elevate both arms and limit B/p's    - Re Consulted PT/OT for right lymphedema wrap 2/19.   Diuresis: Decrease IV Lasix to 40 mg po BID today to keep negative to net even.      GI / FEN:   # Obesity (BMI 46)  # Nutrition  - NJ feeds at goal, starting cycling TF's this am, 8P-8A  - Speech following okay to advanced to soft and bite sized diet with mild thickness 1/20  - started calorie counts 2/21    Addendum: Remove NJ Tube. Patient is tolerating regular diet without complication.    # Diarrhea/Incontinence  Suspect caused by large amounts of free water via NJ vs augmentin vs recent colonization of C. diff  - Consider repeat C. Diff if no improvement (no imodium for now)  - Less frequent loose stools, Cdiff-negative 2/20    # Elevated ALT, AST, imrpoving  - Monitor for now, trending down, no abdominal pain.      # Cholelithiasis  Downtrending LFTs.  Suspect elevations d/t Augmentin    Endocrine:  # Stress induced hyperglycemia, improving  - HDSSI  - Hgb A1c 5.8 2/18  - Decrease Lantus to 40 U daily, plan to decrease as using less sliding scale insulin and good BG control   - Goal BG <180 for optimal healing  - -120's without any sliding scale insulin   - Continue to monitor BG     Infectious Disease:  # Stress induced leukocytosis  # Possible ventilator associated pneumonia  # Sinusitis, treated with Augmentin   Pan cultures and re-cultures all remained unrevealing. Fever likely from previously untreated DVT vs sinusitis.  - Continue to monitor fever curve, WBC and inflammatory markers  - WBC 11.3 (14.2) trending down, AF. Lactate 1.3  - Checked C. Difficile 2/21 negative, UA, and Procal today-unremarkable.    - CXR no change       Hematology:   # History of testicular cancer s/p orchiectomy  # RLE, Cesar UE DVT's  # HIT positive   - Right LE Lymphedema wrap, reconsult PT/OT   - CBC daily, Hgb 9.6, stable   - Continue Bivalirudin gtt as bridge to coumadin, following Chromogenic Factor X  - started coumadin 2/19 with INR goal 2-3, pharmacy to dose   - Right upper extremity US 2/21 with occlusive thrombus of one of the paired right brachial veins at the upper arm, with PICC in place, which was removed 2/21.   - LUE US 2/21 wotj occlusive superficial thrombus in left cephalic vein at the antecubital fossa. No evidence of left upper extremity deep venous thrombosis     Anticoagulation:   - ASA 81 mg daily   - Bival drip bridge to therapeutic coumadin, INR goal 2-3 for three months, then 1.5-2.0 lifelong   - Follow Chromogenic Factor X, goal 20-40 while on Bival. Chromogenic Factor X - 98     MSK/ Skin:  # Sternotomy  # Surgical Incision  # Buttock pressure injury   - Sternal precautions  - Postoperative incision management per protocol  - PT/OT  - WOCN following for pressure injuries    # Maculopapular Rash, improving  Patient has maculopapular rash per Exam  "below.  - Likely represents folliculitis, monitor for now, improving         Prophylaxis:    Mechanical prophylaxis for DVT  - Chemical DVT prophylaxis- bivalirudin and Warfarin  - PPI - until 03/16 (30d from extubation) per institutional protocol  - HOB to 30 degrees     Disposition:   - Transferred to  on 2/18  - Therapies recommending discharge to TCU, possibly Thursday or Friday pending INR        Discussed with Surgeon, Dr. Velasco via written and verbal commnication.     Keila Churchill PA-c  Pager: [phone]  8:05 AM February 23, 2022           Interval History:     No overnight events.  States pain is well managed on current regimen. Slept well overnight.  Tolerating diet and tube feeds, is passing flatus, BM x 1. No nausea or vomiting.  Breathing well without complaints.   Working with therapies and ambulating in halls without assistance.   Denies chest pain, palpitations, dizziness, syncopal symptoms, fevers, chills, myalgias, or sternal popping/clicking.         Physical Exam:   Blood pressure 125/76, pulse 100, temperature 98.8  F (37.1  C), temperature source Oral, resp. rate 20, height 1.905 m (6' 3\"), weight 147.9 kg (326 lb 1.6 oz), SpO2 95 %.  Vitals:    02/22/22 0400 02/22/22 0453 02/23/22 0316   Weight: (!) 155.6 kg (343 lb) 147.6 kg (325 lb 6.4 oz) 147.9 kg (326 lb 1.6 oz)      Current Weight: 147.9 Kg Trend: -0.3 Kg  Admit weight: 165.9 Kg    Daily Fluid status; net loss: +657 (1950 O)  mL    Net loss SA: +6326 mL  MAPs: 82-91  LVEF: 65-70%    Gen: A&Ox4, NAD  Neuro: no focal deficits   CV: RRR, normal S1 S2, no murmurs, rubs or gallops. No appreciable JVD   Vascular: Peripheral pulses present Radial 2+, Dorsalis Pedis 2+, Posterior Tibialis 2+.   Pulm: CTA, no wheezing or rhonchi, normal breathing on RA  Abd: nondistended, normal BS, soft, nontender  Ext: negative peripheral edema, 0+ pitting. Warm.   Incision: clean, dry, intact, no erythema, sternum stable  Tubes/drain sites: dressing clean and " dry         Data:    Imaging:  reviewed recent imaging    Chest x-ray  Interpretation:    Echocardiogram  Interpretation:    CT scan:    Labs:  CBC  Recent Labs   Lab 02/23/22  0616 02/22/22  0636 02/21/22  1701 02/21/22  0426 02/20/22  0603   WBC 11.3* 14.2*  --  15.9* 17.5*   RBC 3.31* 3.23*  --  3.40* 3.67*   HGB 9.6* 9.6* 10.2* 10.0* 11.1*   HCT 33.5* 31.7*  --  33.3* 36.8*   * 98  --  98 100   MCH 29.0 29.7  --  29.4 30.2   MCHC 28.7* 30.3*  --  30.0* 30.2*   RDW 15.5* 15.9*  --  15.9* 15.9*    344  --  382 369     CMP:  Last Comprehensive Metabolic Panel:  Sodium   Date Value Ref Range Status   02/23/2022 136 133 - 144 mmol/L Final     Potassium   Date Value Ref Range Status   02/23/2022 4.1 3.4 - 5.3 mmol/L Final   02/05/2022 4.3 3.4 - 5.3 mmol/L Final     Chloride   Date Value Ref Range Status   02/23/2022 108 94 - 109 mmol/L Final     Carbon Dioxide (CO2)   Date Value Ref Range Status   02/23/2022 21 20 - 32 mmol/L Final     Anion Gap   Date Value Ref Range Status   02/23/2022 7 3 - 14 mmol/L Final     Glucose   Date Value Ref Range Status   02/23/2022 110 (H) 70 - 99 mg/dL Final     GLUCOSE BY METER POCT   Date Value Ref Range Status   02/23/2022 104 (H) 70 - 99 mg/dL Final     Urea Nitrogen   Date Value Ref Range Status   02/23/2022 25 7 - 30 mg/dL Final     Creatinine   Date Value Ref Range Status   02/23/2022 0.58 (L) 0.66 - 1.25 mg/dL Final     GFR Estimate   Date Value Ref Range Status   02/23/2022 >90 >60 mL/min/1.73m2 Final     Comment:     Effective December 21, 2021 eGFRcr in adults is calculated using the 2021 CKD-EPI creatinine equation which includes age and gender ( et al., NEJ, DOI: 10.1056/VDAMgx4358515)     Calcium   Date Value Ref Range Status   02/23/2022 8.7 8.5 - 10.1 mg/dL Final     Bilirubin Total   Date Value Ref Range Status   02/23/2022 0.3 0.2 - 1.3 mg/dL Final     Alkaline Phosphatase   Date Value Ref Range Status   02/23/2022 85 40 - 150 U/L Final     ALT    Date Value Ref Range Status   02/23/2022 108 (H) 0 - 70 U/L Final     AST   Date Value Ref Range Status   02/23/2022 52 (H) 0 - 45 U/L Final     BMP  Recent Labs   Lab 02/23/22  0727 02/23/22  0616 02/23/22 0416 02/23/22  0048 02/22/22  0803 02/22/22  0636 02/21/22  1534 02/21/22  1350 02/21/22  1228 02/21/22  0953   NA  --  136  --   --   --  139  --  137  --  139   POTASSIUM  --  4.1  --   --   --  4.0  --  3.8  --  3.7   CHLORIDE  --  108  --   --   --  108  --  107  --  109   MAYE  --  8.7  --   --   --  8.3*  --  8.5  --  8.2*   CO2  --  21  --   --   --  25  --  25  --  23   BUN  --  25  --   --   --  32*  --  34*  --  35*   CR  --  0.58*  --   --   --  0.58*  --  0.62*  --  0.60*   * 110* 111* 88   < > 112*   < > 119*   < > 125*    < > = values in this interval not displayed.     INR  Recent Labs   Lab 02/23/22  0616 02/22/22  0636 02/21/22  0425 02/20/22  0603   INR 1.66* 1.57* 1.50* 1.53*      Hepatic Panel  Recent Labs   Lab 02/23/22  0616 02/22/22  0636 02/21/22  0149 02/20/22  0603   AST 52* 76* 99* 127*   * 130* 148* 167*   ALKPHOS 85 82 83 91   BILITOTAL 0.3 0.3 0.5 0.5   ALBUMIN 2.4* 2.6* 2.5* 2.5*     GLUCOSE:   Recent Labs   Lab 02/23/22  0727 02/23/22  0616 02/23/22 0416 02/23/22  0048 02/22/22  2017 02/22/22  1542   * 110* 111* 88 118* 111*

## 2022-02-23 NOTE — PROGRESS NOTES
[facility] Nurse Inpatient Pressure Injury Assessment   Reason for consultation: Evaluate and treat nasal pressure injury       ASSESSMENT  Pressure Injury: on columbella bilateral nares  , hospital acquired ,   This is a Medical Device Related Pressure Injury (MDRPI) due to Bridle string  Pressure Injury is Stage 2  And mucosal in bilateral nares   Contributing factor of the pressure injury: pressure, immobility and moisture  Proning  Status: healed    New:  Right buttock superficial abrasions from unknown source  Status:  healed   TREATMENT PLAN  NA    WOC Nurse follow-up plan:signing off  Nursing to notify the Provider(s) and re-consult the WO Nurse if wound(s) deteriorates or new skin concern.    Patient History  According to provider note(s):  Acute Hypoxemic Respiratory Failure due to Shunt Physiology  # Pulmonary Shunting from Bilateral Lower Lobe Atelectasis vs. Pneumonia     Chuy Varner is a 31 year old male with PMH of likely bicuspid aortic valve, thoracic aortic aneurysm without rupture, hypertension, obesity, and testicular cancer s/p orchiectomy who underwent Bentall procedure with mechanical valve on 1/28 with Dr. Velasco ad Dr. Hannah. Patient was admitted to CVICU for post-operative management; post-op course complicated by acute hypoxic respiratory failure despite PEEP titration.     Objective Data  Containment of urine/stool: Indwelling catheter and Internal fecal management    Current Diet/ Nutrition:  Orders Placed This Encounter      Regular Diet Adult Thin Liquids (level 0)      Output:   I/O last 3 completed shifts:  In: 2378.17 [P.O.:600; I.V.:698.17; NG/GT:240]  Out: 3600 [Urine:3600]    Risk Assessment:   Sensory Perception: 3-->slightly limited  Moisture: 3-->occasionally moist  Activity: 2-->chairfast  Mobility: 2-->very limited  Nutrition: 3-->adequate  Friction and Shear: 1-->problem  Ben Score: 14    Labs:   Recent Labs   Lab 02/23/22  0616 02/19/22  0544 02/18/22  0316   ALBUMIN  2.4*   < > 2.4*   HGB 9.6*   < > 10.3*   INR 1.66*   < >  --    WBC 11.3*   < > 17.0*   A1C  --   --  5.8*    < > = values in this interval not displayed.     Physical Exam  Skin inspection: focused septum/columbella/bilateral nares           Date of last Photo 2/4 and 2/11and 2/14 2/23  Wound History: previously proned. Extubated now on high flow O2       Wound location:  Buttocks      Date of photo:  2/14/22  History:  Pt has a rectal tube with minimal bypassing of stool   Location:  Right fleshy buttock, approximately 2 cm superior to gluteal fold      Interventions  Current support surface: Standard  Low air loss mattress  Current off-loading measures: Heel off-loading boot(s) and Pillows  Repositioning aid: Pillows  Visual inspection of wound(s) completed   Tube Securement: as per protocol  Wound Care: was not done per plan of care.  Supplies: floor stock  Educated provided: importance of repositioning, plan of care, Hygiene and Off-loading pressure  Education provided to: spouse and nurse  Discussed importance of:off-loading pressure to wound  Discussed plan of care with Family and Nurse    Samantha Choudhury RN CWOCN

## 2022-02-23 NOTE — PROGRESS NOTES
Per phone conversation with CVTS okay to discharge NJ tube.  Cecilia Goyal RN on 2/23/2022 at 1:16 PM

## 2022-02-23 NOTE — PROGRESS NOTES
"CLINICAL NUTRITION SERVICES - BRIEF NOTE      Nutrition Prescription     RECOMMENDATIONS FOR MDs/PROVIDERS TO ORDER:  Encourage PO intake, monitor calorie counts for adequacy.     Recommendations already ordered by Registered Dietitian (RD):  Continue Ensure Enlive BID between meals  Extend melissa counts 2/24-2/26  Discontinuing EN order per provider pulling FT     Future/Additional Recommendations:  Monitor PO intake, melissa counts, wt trends, hydration status.  Offer additional supplements/snacks as needed.    *Please see full assessment note from 02/21/22    New Findings:  Calorie counts: Average 315 kcal, 20 g protein (11% energy and 13% protein needs)  2/21: 106 kcal, 12g protein  2/22: 523 kcal, 27 g protein      Patient reports improved appetite, drinking supplements, trying to eat food but \"it's too salty\". Partner brought in food from outside yesterday, not noted in calorie count so actual intake may be higher than documented.    NJ removed this afternoon per CVTS team as pt tolerating diet, reports good appetite, and feels tube is inhibiting eating/swallowing.  Spoke with RN, discussed continuing to monitor and encourage PO intake for pt's high calorie needs.    Interventions  Collaboration with other providers - CVTS PA  Medical food supplement therapy - Continue Ensure Enlive BID between meals    RD to follow per protocol.    Shonna Mcfarlane  Dietetic Intern    And     Bartolo Rosario, CHEYANNE, LD, CNSC  [address] pager: [phone]   [address] Phone: *[phone]           "

## 2022-02-23 NOTE — PLAN OF CARE
End of Shift  Neuro: A&Ox4.   Cardiac: SR with 1st degree AV block, VS stable and afebrile.  Respiratory: Sating 94% and > on room air  GI/: Adequate urine output. BM X1  Diet/appetite: TF discontinued and NJ tube discontinued Regular diet with calorie count Fair appetite need encouragement. Patient is full feed due to generalized weakness.  Activity:  Assist of 2 with lift, stood with PT x 2 assists today. Up in chair twice and tolerated.   Pain: Patient has denied any pain/discomfort today.  Skin: No new deficits noted.  LDA's: PIV x1    Plan: Continue with POC. Notify primary team with changes.   Cecilia Goyal RN on 2/23/2022 at 4:07 PM

## 2022-02-23 NOTE — CONSULTS
Health Psychology                                                                                                                          Gabriela Malone, Ph.D., L.P [phone]  Malika Waller, Ph.D., L.P. [phone]  Angela Saab, Ph.D, L..P. [phone]  Samantha Walker, Ph.D., L.P. [phone]  Wesley Mcclain, Ph.D., A.B.P.P., L.P. [phone]         Mena Nicole, Ph.D., L.P. [phone]       Melania Lei, Ph.D., A.B.P.P., LP [phone]           [facility], 3rd Floor  [address] [place] [address]    Confidential Summary of Inpatient Health Psychology Consultation*    Date of Service:  02/23/22    Referring Provider:  JANNETTE Weiss, CNP, Cardiovascular Surgery Team    Reason for Consultation:  Support in coping with symptoms of depression, difficulty sleeping.    Sources of Information:  Information was obtained from a clinical interview with the patient and review of medical record.    History of Present Illness:  Per EMR: Chuy Varner is a 32 yo M with a PMHx of bicuspid aortic valve, thoracic aortic aneurysm without rupture, hypertension, obesity, and testicular cancer s/p orchiectomy who underwent Bentall procedure with mechanical valve on 1/28 with Dr. Velasco and Dr. Hannah. 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     Met with Chuy and his girlfriend Debbie.  Introduced Health Psychology services and discussed nature of referral.     Chuy described feeling very surprised by learning about what he has been through.  He is feeling hopeful about his recovery and was able to acknowledge that there have been improvements going on in his health status but that he also has a long way to go.     Chuy denied depressive symptoms at this time apart  from feeling of guilt for how his health issues are impacting his girlfriend. He exhibited mild anxiety about decisions, the future, and everything he has been through.     Provided examples of anxiety management tools he can use while in the hospital.  Guided him through a grounding exercise and discussed progressive muscle relaxation.  He said he studied psychology while attending college and recognized the PMR practice.    Chuy focused on the decision about where to go for rehab initially so we talked about this.  Debbie was able to remind him that he would need to go to an inpatient facility. Initially Chuy was describing his wishes to do outpatient rehabilitation and have his father drive him who lives in another state and would need to fly here to do that.       Medical History:  See below lists for past medical history, past surgical history, and current medications    No past medical history on file.    Past Surgical History:   Procedure Laterality Date     ORCHIECTOMY SCROTAL       PICC TRIPLE LUMEN PLACEMENT Right 02/14/2022    Right basilic, 48 cm, 4 cm external length     REPLACE VALVE AORTIC N/A 01/28/2022    Procedure: Median Sternotomy.  Cardiopulmonary bypass pump.  Bentall procedure using On-X Ascending Aortic Prosthesis with Vascutek Gelweave Valsa Graft  size 27-29MM .  Intraoperative transesophageal echocardiogram per anesthesia;  Surgeon: Nickolas Velasco MD;  Location: [facility] OR       Current Facility-Administered Medications   Medication     acetaminophen (TYLENOL) tablet 650 mg     aspirin (ASA) chewable tablet 81 mg     atorvastatin (LIPITOR) tablet 40 mg     banatrol plus packet 2 packet     bisacodyl (DULCOLAX) Suppository 10 mg     bivalirudin (ANGIOMAX) 250 mg in sodium chloride 0.9 % 250 mL ANTICOAGULANT infusion     carboxymethylcellulose PF (REFRESH PLUS) 0.5 % ophthalmic solution 1-2 drop     carvedilol (COREG) tablet 50 mg     dextrose 10% infusion     glucose gel 15-30 g    Or      dextrose 50 % injection 25-50 mL    Or     glucagon injection 1 mg     furosemide (LASIX) tablet 40 mg     HOLD: All Oral Medications     HYDROmorphone (DILAUDID) injection 0.2 mg    Or     HYDROmorphone (DILAUDID) injection 0.4 mg     insulin aspart (NovoLOG) injection (RAPID ACTING)     insulin glargine (LANTUS PEN) injection 40 Units     lidocaine (LMX4) kit     lidocaine 1 % 0.1-1 mL     [START ON 2/24/2022] lisinopril (ZESTRIL) tablet 20 mg     loperamide (IMODIUM) capsule 2 mg     magnesium hydroxide (MILK OF MAGNESIA) suspension 30 mL     melatonin tablet 10 mg     methocarbamol (ROBAXIN) tablet 750 mg     multivitamins w/minerals liquid 15 mL     naloxone (NARCAN) injection 0.2 mg    Or     naloxone (NARCAN) injection 0.4 mg    Or     naloxone (NARCAN) injection 0.2 mg    Or     naloxone (NARCAN) injection 0.4 mg     No lozenges or gum should be given while patient on BIPAP/AVAPS/AVAPS AE     nystatin (MYCOSTATIN) suspension 1,000,000 Units     ondansetron (ZOFRAN-ODT) ODT tab 4 mg    Or     ondansetron (ZOFRAN) injection 4 mg     oxyCODONE (ROXICODONE) tablet 5 mg    Or     oxyCODONE IR (ROXICODONE) tablet 10 mg     pantoprazole (PROTONIX) 2 mg/mL suspension 40 mg     polyethylene glycol-propylene glycol PF (SYSTANE ULTRA PF) opthalmic solution 1 drop     prochlorperazine (COMPAZINE) injection 10 mg    Or     prochlorperazine (COMPAZINE) tablet 10 mg     protein modular (PROSOURCE TF) 2 packet     sodium chloride (PF) 0.9% PF flush 10-20 mL     sodium chloride (PF) 0.9% PF flush 10-40 mL     sodium chloride (PF) 0.9% PF flush 3 mL     traZODone (DESYREL) tablet 50 mg     warfarin ANTICOAGULANT (COUMADIN) tablet 12.5 mg     Warfarin Therapy Reminder (Check START DATE - warfarin may be starting in the FUTURE)         Psychiatric History:  Chuy denied personal history of clinical significant psychiatric symptoms as well as treatment.  He endorsed family history of mental health symptoms including depression,  anxiety, and he mentioned bipolar disorder.  Mood disorders prevalent in multiple family members.    Social History:  Chuy and Debbie provided background information about Chuy's social history.  He currently lives with Debbie in [place] and works for the [employer].  Chuy said he attended some college classes but did not finish a degree. They were living in [place] prior to moving to [place]. Currently, Chuy's mother is staying at their apartment with their dogs and they have a complex relationship.  Debbie said that Chuy essentially raised himself and took care of himself independently as a teenager due to inconsistency of parents. Chuy has three full siblings and other half siblings.     Mental Status/Interview:  Appearance/Behavior/Orientation: Alert and oriented to person, place, time, and most of situation. No evidence of psychomotor agitation.  Some distractibility.   Cooperation/Reliability: Patient appeared to honestly respond to questions about psychosocial functioning and is deemed a reliable historian.   Cognition/Memory/Judgment: Not formally assessed, yet no overt difficulties apparent upon interview. Fund of knowledge consistent with age, level of education, and life experience. Abstract reasoning appropriate, no difficulties with judgment apparent. Chuy did exhibit some difficulty conceptualizing his rehab needs and plans for this, possibly due to ongoing delirium symptoms or memory issues.  Speech/Language: Speech was clear, logical and coherent, of normal rate, rhythm and volume.   Thought Content/Form: Appropriate to interview and situation. Overall logical and organized. Patient denied any difficulties with a thought disorder, including auditory or visual hallucinations. Denied any history of obsessive-compulsive disorder or of bernard.   Mood/Affect: Mood fair; affect was mood congruent.    Appetite: Patient described fair appetite.    Insight/Motivation: Insight and judgment  fair  Suicide/Assault: Patient denies suicidal or assaultive ideation, plan, or intent.     Impression:  Chuy denied history of clinically significant psychiatric symptoms.  He noted and exhibited some signs of anxiety and there has also been reports of depressive symptoms. It's difficult to fully assess hCuy's psychological status given some residual, mild disorientation, conservative diagnoses will be assigned.    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     Health Psychology can follow-up with Chuy next week to re-assess status and needs.        Visit start time: 1:30  Visit end time: 2:10    Angela Saab, PhD,   Clinical Health Psychologist  Phone: [phone]  Pager: [phone]      *In accordance with the Rules of the [place] Board of [employer], it is noted that psychological descriptions and scientific procedures underlying psychological evaluations have limitations.  Absolute predictions cannot be made based on information in this report.

## 2022-02-23 NOTE — PLAN OF CARE
Goal Outcome Evaluation:    Plan of Care Reviewed With: patient, significant other     Neuro: A/Ox4, occasionally forgetful. Follows commands appropriately. Speech slow but appropriate. Intermittent anxiety, appreciating company throughout night.  Cardiac: SR with HR 80-90s. SBP within goal range, < 130.    Respiratory: O2 sats stable on RA. Denies SOB.   GI/: Frequent voiding via primofit. Incontinent BM x 2. NJ wdl with TF cycled 6p-8a @ 60ml/hr. Pt c/o stomach discomfort intermittently.   Diet/appetite: Tolerating regular thin liquid diet well, pt very adamant about intake as he is hoping for NJ to be taken out soon.   Activity:  Ax lift. Frequent turning/reposition.  Pain: At acceptable level on current regimen. Denies pain/pain meds.  Skin: Intact, no new deficits noted. See flowsheets.  LDA's: L PIV wdl, infusing angiomax @ 0.18mg/kg/hr.    Plan: Continue with POC. Notify primary team with changes.

## 2022-02-24 ENCOUNTER — APPOINTMENT (OUTPATIENT)
Dept: PHYSICAL THERAPY | Facility: CLINIC | Age: 32
End: 2022-02-24
Attending: SURGERY
Payer: COMMERCIAL

## 2022-02-24 ENCOUNTER — APPOINTMENT (OUTPATIENT)
Dept: OCCUPATIONAL THERAPY | Facility: CLINIC | Age: 32
End: 2022-02-24
Attending: SURGERY
Payer: COMMERCIAL

## 2022-02-24 ENCOUNTER — APPOINTMENT (OUTPATIENT)
Dept: SPEECH THERAPY | Facility: CLINIC | Age: 32
End: 2022-02-24
Attending: PHYSICIAN ASSISTANT
Payer: COMMERCIAL

## 2022-02-24 LAB
ALBUMIN SERPL-MCNC: 2.6 G/DL (ref 3.4–5)
ALP SERPL-CCNC: 78 U/L (ref 40–150)
ALT SERPL W P-5'-P-CCNC: 96 U/L (ref 0–70)
ANION GAP SERPL CALCULATED.3IONS-SCNC: 4 MMOL/L (ref 3–14)
APTT PPP: 68 SECONDS (ref 22–38)
AST SERPL W P-5'-P-CCNC: 31 U/L (ref 0–45)
BILIRUB SERPL-MCNC: 0.4 MG/DL (ref 0.2–1.3)
BUN SERPL-MCNC: 23 MG/DL (ref 7–30)
CALCIUM SERPL-MCNC: 8.7 MG/DL (ref 8.5–10.1)
CHLORIDE BLD-SCNC: 108 MMOL/L (ref 94–109)
CO2 SERPL-SCNC: 27 MMOL/L (ref 20–32)
CREAT SERPL-MCNC: 0.58 MG/DL (ref 0.66–1.25)
ERYTHROCYTE [DISTWIDTH] IN BLOOD BY AUTOMATED COUNT: 15.6 % (ref 10–15)
FACT X ACT/NOR PPP CHRO: 85 % (ref 70–130)
GFR SERPL CREATININE-BSD FRML MDRD: >90 ML/MIN/1.73M2
GLUCOSE BLD-MCNC: 93 MG/DL (ref 70–99)
GLUCOSE BLDC GLUCOMTR-MCNC: 110 MG/DL (ref 70–99)
GLUCOSE BLDC GLUCOMTR-MCNC: 113 MG/DL (ref 70–99)
GLUCOSE BLDC GLUCOMTR-MCNC: 81 MG/DL (ref 70–99)
GLUCOSE BLDC GLUCOMTR-MCNC: 81 MG/DL (ref 70–99)
GLUCOSE BLDC GLUCOMTR-MCNC: 82 MG/DL (ref 70–99)
GLUCOSE BLDC GLUCOMTR-MCNC: 89 MG/DL (ref 70–99)
GLUCOSE BLDC GLUCOMTR-MCNC: 93 MG/DL (ref 70–99)
GLUCOSE BLDC GLUCOMTR-MCNC: 93 MG/DL (ref 70–99)
GLUCOSE BLDC GLUCOMTR-MCNC: 94 MG/DL (ref 70–99)
GLUCOSE BLDC GLUCOMTR-MCNC: 97 MG/DL (ref 70–99)
HCT VFR BLD AUTO: 34.1 % (ref 40–53)
HGB BLD-MCNC: 10.3 G/DL (ref 13.3–17.7)
INR PPP: 1.79 (ref 0.85–1.15)
MAGNESIUM SERPL-MCNC: 2.2 MG/DL (ref 1.6–2.3)
MCH RBC QN AUTO: 29.9 PG (ref 26.5–33)
MCHC RBC AUTO-ENTMCNC: 30.2 G/DL (ref 31.5–36.5)
MCV RBC AUTO: 99 FL (ref 78–100)
PHOSPHATE SERPL-MCNC: 4.6 MG/DL (ref 2.5–4.5)
PLATELET # BLD AUTO: 317 10E3/UL (ref 150–450)
POTASSIUM BLD-SCNC: 3.8 MMOL/L (ref 3.4–5.3)
PROT SERPL-MCNC: 6.3 G/DL (ref 6.8–8.8)
RBC # BLD AUTO: 3.44 10E6/UL (ref 4.4–5.9)
SODIUM SERPL-SCNC: 139 MMOL/L (ref 133–144)
WBC # BLD AUTO: 8.9 10E3/UL (ref 4–11)

## 2022-02-24 PROCEDURE — 97535 SELF CARE MNGMENT TRAINING: CPT | Mod: GO | Performed by: OCCUPATIONAL THERAPIST

## 2022-02-24 PROCEDURE — 250N000013 HC RX MED GY IP 250 OP 250 PS 637: Performed by: STUDENT IN AN ORGANIZED HEALTH CARE EDUCATION/TRAINING PROGRAM

## 2022-02-24 PROCEDURE — 85027 COMPLETE CBC AUTOMATED: CPT | Performed by: SURGERY

## 2022-02-24 PROCEDURE — 83735 ASSAY OF MAGNESIUM: CPT | Performed by: SURGERY

## 2022-02-24 PROCEDURE — 92526 ORAL FUNCTION THERAPY: CPT | Mod: GN

## 2022-02-24 PROCEDURE — 120N000003 HC R&B IMCU UMMC

## 2022-02-24 PROCEDURE — 85610 PROTHROMBIN TIME: CPT | Performed by: NURSE PRACTITIONER

## 2022-02-24 PROCEDURE — 250N000011 HC RX IP 250 OP 636: Performed by: SURGERY

## 2022-02-24 PROCEDURE — 97110 THERAPEUTIC EXERCISES: CPT | Mod: GP | Performed by: PHYSICAL THERAPIST

## 2022-02-24 PROCEDURE — 84100 ASSAY OF PHOSPHORUS: CPT | Performed by: SURGERY

## 2022-02-24 PROCEDURE — 250N000013 HC RX MED GY IP 250 OP 250 PS 637: Performed by: PHYSICIAN ASSISTANT

## 2022-02-24 PROCEDURE — 80053 COMPREHEN METABOLIC PANEL: CPT | Performed by: STUDENT IN AN ORGANIZED HEALTH CARE EDUCATION/TRAINING PROGRAM

## 2022-02-24 PROCEDURE — 97530 THERAPEUTIC ACTIVITIES: CPT | Mod: GO | Performed by: OCCUPATIONAL THERAPIST

## 2022-02-24 PROCEDURE — 85260 CLOT FACTOR X STUART-POWER: CPT | Performed by: SURGERY

## 2022-02-24 PROCEDURE — 97110 THERAPEUTIC EXERCISES: CPT | Mod: GO | Performed by: OCCUPATIONAL THERAPIST

## 2022-02-24 PROCEDURE — 97530 THERAPEUTIC ACTIVITIES: CPT | Mod: GP | Performed by: PHYSICAL THERAPIST

## 2022-02-24 PROCEDURE — 250N000013 HC RX MED GY IP 250 OP 250 PS 637: Performed by: SURGERY

## 2022-02-24 PROCEDURE — 258N000003 HC RX IP 258 OP 636: Performed by: SURGERY

## 2022-02-24 PROCEDURE — 250N000013 HC RX MED GY IP 250 OP 250 PS 637: Performed by: PSYCHIATRY & NEUROLOGY

## 2022-02-24 PROCEDURE — 36415 COLL VENOUS BLD VENIPUNCTURE: CPT | Performed by: STUDENT IN AN ORGANIZED HEALTH CARE EDUCATION/TRAINING PROGRAM

## 2022-02-24 PROCEDURE — 85730 THROMBOPLASTIN TIME PARTIAL: CPT | Performed by: SURGERY

## 2022-02-24 RX ORDER — PANTOPRAZOLE SODIUM 40 MG/1
40 TABLET, DELAYED RELEASE ORAL
Status: DISCONTINUED | OUTPATIENT
Start: 2022-02-24 | End: 2022-03-04 | Stop reason: HOSPADM

## 2022-02-24 RX ORDER — FUROSEMIDE 40 MG
40 TABLET ORAL
Status: DISCONTINUED | OUTPATIENT
Start: 2022-02-24 | End: 2022-02-26

## 2022-02-24 RX ORDER — POTASSIUM CHLORIDE 750 MG/1
20 TABLET, EXTENDED RELEASE ORAL DAILY
Status: DISCONTINUED | OUTPATIENT
Start: 2022-02-24 | End: 2022-03-02

## 2022-02-24 RX ORDER — POTASSIUM CHLORIDE 750 MG/1
20 TABLET, EXTENDED RELEASE ORAL ONCE
Status: COMPLETED | OUTPATIENT
Start: 2022-02-24 | End: 2022-02-24

## 2022-02-24 RX ADMIN — LISINOPRIL 20 MG: 20 TABLET ORAL at 09:30

## 2022-02-24 RX ADMIN — BIVALIRUDIN 0.18 MG/KG/HR: 250 INJECTION, POWDER, LYOPHILIZED, FOR SOLUTION INTRAVENOUS at 05:03

## 2022-02-24 RX ADMIN — Medication 10 MG: at 20:01

## 2022-02-24 RX ADMIN — NYSTATIN 1000000 UNITS: 100000 SUSPENSION ORAL at 12:28

## 2022-02-24 RX ADMIN — BIVALIRUDIN 0.18 MG/KG/HR: 250 INJECTION, POWDER, LYOPHILIZED, FOR SOLUTION INTRAVENOUS at 12:32

## 2022-02-24 RX ADMIN — WARFARIN SODIUM 14 MG: 10 TABLET ORAL at 18:10

## 2022-02-24 RX ADMIN — FUROSEMIDE 40 MG: 40 TABLET ORAL at 16:35

## 2022-02-24 RX ADMIN — PANTOPRAZOLE SODIUM 40 MG: 40 TABLET, DELAYED RELEASE ORAL at 09:32

## 2022-02-24 RX ADMIN — NYSTATIN 1000000 UNITS: 100000 SUSPENSION ORAL at 16:35

## 2022-02-24 RX ADMIN — POTASSIUM CHLORIDE 20 MEQ: 750 TABLET, EXTENDED RELEASE ORAL at 09:31

## 2022-02-24 RX ADMIN — ATORVASTATIN CALCIUM 40 MG: 40 TABLET, FILM COATED ORAL at 20:01

## 2022-02-24 RX ADMIN — NYSTATIN 1000000 UNITS: 100000 SUSPENSION ORAL at 20:01

## 2022-02-24 RX ADMIN — ASPIRIN 81 MG CHEWABLE TABLET 81 MG: 81 TABLET CHEWABLE at 09:32

## 2022-02-24 RX ADMIN — NYSTATIN 1000000 UNITS: 100000 SUSPENSION ORAL at 09:33

## 2022-02-24 RX ADMIN — CARVEDILOL 50 MG: 25 TABLET, FILM COATED ORAL at 20:01

## 2022-02-24 RX ADMIN — BIVALIRUDIN 0.18 MG/KG/HR: 250 INJECTION, POWDER, LYOPHILIZED, FOR SOLUTION INTRAVENOUS at 20:52

## 2022-02-24 RX ADMIN — POTASSIUM CHLORIDE 20 MEQ: 750 TABLET, EXTENDED RELEASE ORAL at 09:33

## 2022-02-24 RX ADMIN — CARVEDILOL 50 MG: 25 TABLET, FILM COATED ORAL at 09:32

## 2022-02-24 RX ADMIN — FUROSEMIDE 40 MG: 40 TABLET ORAL at 09:31

## 2022-02-24 ASSESSMENT — ACTIVITIES OF DAILY LIVING (ADL)
ADLS_ACUITY_SCORE: 19

## 2022-02-24 NOTE — PROGRESS NOTES
Cardiovascular Surgery Progress Note  02/24/2022         Assessment and Plan:     Chuy Varner is a 32 yo M with a PMHx of bicuspid aortic valve, thoracic aortic aneurysm without rupture, hypertension, obesity, and testicular cancer s/p orchiectomy who underwent Bentall procedure with mechanical valve on 1/28 with Dr. Velasco and Dr. Hannah. 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      Cardiovascular:   # Ascending aortic aneurysm s/p Bentall w/ Jerry valve on 1/28 with Dr. Velasco and Dr. Hannah  # Hx of HTN  - Goal MAP>65, SBP <130   - Coreg 50 BID  Lisinopril 10mg daily (increase to 20mg 2/24)  - ASA 81mg  Atorvastatin 40mg   - Anticoagulatin for valve, will need coumadin with INR goal 2-3 for three months, then 1.5-2.0 for life, with ASA. Started Coumadin 2/19, Continue Bival as bridge.   - Baseline TTE per Dr. Velasco 2/23     Chest tubes and TPW removed 2/4     Pulmonary:  # Acute hypoxemic respiratory failure (ARDS vs. PNA vs. Hypervolemia vs. TRALI vs. severe atelectasis) s/p extubation  - Extubated 2/14 to HFNC, now breathing on Room Air  - conditional VBGs - draw one if increased somnolence or distressed breathing  - continue respiratory therapy  - metanebs (percussive therapy)  - flutter valve QID to encourage coughing, clearance of airways     Neurology / MSK:  Neuro/ pain/ sedation:  # Acute Postoperative pain  - Monitor neurological status. Notify the MD for any acute changes in exam.  - Neurology following, appreciate recs  - Pain well controlled on PRN tylenol, dilaudid, robaxin, oxycodone,      # Dysarthria - improving   - speech therapy evaluation 02/17 for dysarthric  Reconsulted 2/23 or swallow evaluation  - ENT scoped, no reason for long term dysarthria/problems with phonation  - Sleep hygiene:  Melatonin, trazadone prn at bedtime     # Thalamic infarct on head CT 2/16, old, age unknown  - Not contributing to current dysarthria vs delirium vs deconditioning per Neuro  - Neurology would like an MRI: to be done outpatient per Dr. Velasco  - Lipid panel, HgbA1c ordered per neuro for stroke Hx     # Symptoms of depression with insomnia   - reports feeling down and not being able to sleep despite melatonin and prn Trazodone   - Mental health consult, may need antidepressant. Patient and SO agreeable to MH visit.       / Renal:  -Hypernatremia, improving  BL creat appears to be ~ 1.0  Most recent Creatinine: 0.58  - Daily weights and I/O's  - Avoid/limit nephrotoxins as able  Replete lytes PRN per protocol  - Pre-op weight 365 lbs  - Moderate amount of MARIAM, has wraps on with decreased edema. Has windy UE edema with windy non-occlusive DVTs- elevate both arms and limit B/p's    - Re Consulted PT/OT for right lymphedema wrap 2/19.   Diuresis: Furosemide 40mg bid with potassium supplementation      GI / FEN:   # Obesity (BMI 46)  # Nutrition  - Remove NJ Tube 2/23. Patient is tolerating regular diet without complication.  - Speech following okay to advanced to soft and bite sized diet with mild thickness 1/20  - started calorie counts 2/24     # Diarrhea/Incontinence  Suspect caused by large amounts of free water via NJ vs augmentin vs recent colonization of C. diff  - Less frequent loose stools, Cdiff-negative 2/20     # Elevated ALT, AST, imrpoving  - Monitor for now, trending down, no abdominal pain.       # Cholelithiasis  Downtrending LFTs. Suspect elevations d/t Augmentin     Endocrine:  # Stress induced hyperglycemia, improving  - HDSSI  - Hgb A1c 5.8 2/18  - Decrease Lantus to 20 U daily, plan to decrease as using less sliding scale insulin and good BG control   - Goal BG <180 for optimal healing  - -120's without any sliding scale insulin   - Continue to monitor BG     Infectious Disease:  # Stress  induced leukocytosis  # Possible ventilator associated pneumonia  # Sinusitis, treated with Augmentin   Pan cultures and re-cultures all remained unrevealing. Fever likely from previously untreated DVT vs sinusitis.  - Continue to monitor fever curve, WBC and inflammatory markers  - WBC 8.9 (11.2) trending down, AF. Lactate 1.3  - Checked C. Difficile 2/21 negative, UA, and Procal today-unremarkable.    - CXR no change       Hematology:   # History of testicular cancer s/p orchiectomy  # RLE, Cesar UE DVT's  # HIT positive   - Right LE Lymphedema wrap, reconsult PT/OT   - CBC daily, Hgb 10.3, stable   - Continue Bivalirudin gtt as bridge to coumadin, following Chromogenic Factor X  - started coumadin 2/19 with INR goal 2-3, pharmacy to dose   - Right upper extremity US 2/21 with occlusive thrombus of one of the paired right brachial veins at the upper arm, with PICC in place, which was removed 2/21.   - LUE US 2/21 wotj occlusive superficial thrombus in left cephalic vein at the antecubital fossa. No evidence of left upper extremity deep venous thrombosis     Anticoagulation:   - ASA 81 mg daily   - Bival drip bridge to therapeutic coumadin, INR goal 2-3 for three months, then 1.5-2.0 lifelong   - Follow Chromogenic Factor X, goal 20-40 while on Bival. Chromogenic Factor X - 85  - Most recent INR: 1.79     MSK/ Skin:  # Sternotomy  # Surgical Incision  # Buttock pressure injury   - Sternal precautions  - Postoperative incision management per protocol  - PT/OT  - WOCN following for pressure injuries     # Maculopapular Rash, improving  Patient has maculopapular rash per Exam below.  - Likely represents folliculitis, monitor for now, improving         Prophylaxis:    Mechanical prophylaxis for DVT  - Chemical DVT prophylaxis- bivalirudin and Warfarin  - PPI - until 03/16 (30d from extubation) per institutional protocol  - HOB to 30 degrees     Disposition:   - Transferred to  on 2/18  - Therapies recommending  "discharge to ARU, possibly Friday pending INR     Discussed with Surgeon, Dr. Velasco via written and verbal commnication.     Keila Churchill PA-c  Pager: [phone]  7:54 AM February 24, 2022           Interval History:     No overnight events.  States pain is well managed on current regimen. Slept well overnight.  Tolerating diet, is passing flatus, BM x 1. No nausea or vomiting.  Breathing well without complaints.   Working with therapies and ambulating in halls without assistance.   Denies chest pain, palpitations, dizziness, syncopal symptoms, fevers, chills, myalgias, or sternal popping/clicking.         Physical Exam:   Blood pressure 121/71, pulse 94, temperature (P) 99.3  F (37.4  C), temperature source (P) Axillary, resp. rate (P) 18, height 1.905 m (6' 3\"), weight 148.8 kg (328 lb), SpO2 99 %.  Vitals:    02/22/22 0453 02/23/22 0316 02/24/22 0500   Weight: 147.6 kg (325 lb 6.4 oz) 147.9 kg (326 lb 1.6 oz) 148.8 kg (328 lb)      Current Weight: 148.8 Kg Trend: +0.9 Kg  Admit weight: 165.9 Kg    Daily Fluid status; net loss: -3247.4  mL    Net loss SA: +3542 mL  MAPs: 80-90  LVEF: 60-65%    Gen: A&Ox4, NAD  Neuro: no focal deficits   CV: RRR, normal S1 S2, no murmurs, rubs or gallops. No appreciable JVD  Vascular: Peripheral pulses present Radial 2+, Dorsalis Pedis 2+, Posterior Tibialis 2+.   Pulm: CTA, no wheezing or rhonchi, normal breathing on RA  Abd: nondistended, normal BS, soft, nontender  Ext: positive peripheral edema, 1+ pitting. Warm with lymphedema wraps on   Incision: clean, dry, intact, no erythema, sternum stable  Tubes/drain sites: dressing clean and dry         Data:    Imaging:  reviewed recent imaging    Chest x-ray  Interpretation:    Echocardiogram 2/23/22  Interpretation Summary  S/P Aortic root replacement with 27-29 mm On-X ascending aortic prosthesis  with Vascutek Gelweave Valsalva graft (Bentall procedure with coronary  implantation).on 1/31/2022.  Global and regional left ventricular " function is normal with an EF of 60-65%.  Right ventricular function, chamber size, wall motion, and thickness are  normal.  A mechanical aortic valve is present.  Doppler interrogation of the aortic valve is normal.  The mean gradient across the aortic valve is7 mmHg.  Trivial pericardial effusion is present.    CT scan:    Labs:  CBC  Recent Labs   Lab 02/24/22  0453 02/23/22  0616 02/22/22  0636 02/21/22  1701 02/21/22  0426   WBC 8.9 11.3* 14.2*  --  15.9*   RBC 3.44* 3.31* 3.23*  --  3.40*   HGB 10.3* 9.6* 9.6* 10.2* 10.0*   HCT 34.1* 33.5* 31.7*  --  33.3*   MCV 99 101* 98  --  98   MCH 29.9 29.0 29.7  --  29.4   MCHC 30.2* 28.7* 30.3*  --  30.0*   RDW 15.6* 15.5* 15.9*  --  15.9*    293 344  --  382     CMP:  Last Comprehensive Metabolic Panel:  Sodium   Date Value Ref Range Status   02/24/2022 139 133 - 144 mmol/L Final     Potassium   Date Value Ref Range Status   02/24/2022 3.8 3.4 - 5.3 mmol/L Final   02/05/2022 4.3 3.4 - 5.3 mmol/L Final     Chloride   Date Value Ref Range Status   02/24/2022 108 94 - 109 mmol/L Final     Carbon Dioxide (CO2)   Date Value Ref Range Status   02/24/2022 27 20 - 32 mmol/L Final     Anion Gap   Date Value Ref Range Status   02/24/2022 4 3 - 14 mmol/L Final     Glucose   Date Value Ref Range Status   02/24/2022 93 70 - 99 mg/dL Final     GLUCOSE BY METER POCT   Date Value Ref Range Status   02/24/2022 93 70 - 99 mg/dL Final     Urea Nitrogen   Date Value Ref Range Status   02/24/2022 23 7 - 30 mg/dL Final     Creatinine   Date Value Ref Range Status   02/24/2022 0.58 (L) 0.66 - 1.25 mg/dL Final     GFR Estimate   Date Value Ref Range Status   02/24/2022 >90 >60 mL/min/1.73m2 Final     Comment:     Effective December 21, 2021 eGFRcr in adults is calculated using the 2021 CKD-EPI creatinine equation which includes age and gender (Renata et al., NEJM, DOI: 10.1056/VDSIta7931871)     Calcium   Date Value Ref Range Status   02/24/2022 8.7 8.5 - 10.1 mg/dL Final     Bilirubin  Total   Date Value Ref Range Status   02/24/2022 0.4 0.2 - 1.3 mg/dL Final     Alkaline Phosphatase   Date Value Ref Range Status   02/24/2022 78 40 - 150 U/L Final     ALT   Date Value Ref Range Status   02/24/2022 96 (H) 0 - 70 U/L Final     AST   Date Value Ref Range Status   02/24/2022 31 0 - 45 U/L Final     BMP  Recent Labs   Lab 02/24/22  0730 02/24/22  0506 02/24/22  0453 02/24/22  0039 02/23/22  0727 02/23/22  0616 02/22/22  0803 02/22/22  0636 02/21/22  1534 02/21/22  1350   NA  --   --  139  --   --  136  --  139  --  137   POTASSIUM  --   --  3.8  --   --  4.1  --  4.0  --  3.8   CHLORIDE  --   --  108  --   --  108  --  108  --  107   MAYE  --   --  8.7  --   --  8.7  --  8.3*  --  8.5   CO2  --   --  27  --   --  21  --  25  --  25   BUN  --   --  23  --   --  25  --  32*  --  34*   CR  --   --  0.58*  --   --  0.58*  --  0.58*  --  0.62*   GLC 93 89 93 81   < > 110*   < > 112*   < > 119*    < > = values in this interval not displayed.     INR  Recent Labs   Lab 02/24/22  0453 02/23/22  0616 02/22/22  0636 02/21/22  0425   INR 1.79* 1.66* 1.57* 1.50*      Hepatic Panel  Recent Labs   Lab 02/24/22  0453 02/23/22  0616 02/22/22  0636 02/21/22  0149   AST 31 52* 76* 99*   ALT 96* 108* 130* 148*   ALKPHOS 78 85 82 83   BILITOTAL 0.4 0.3 0.3 0.5   ALBUMIN 2.6* 2.4* 2.6* 2.5*     GLUCOSE:   Recent Labs   Lab 02/24/22  0730 02/24/22  0506 02/24/22  0453 02/24/22  0039 02/23/22  2334 02/23/22  1942   GLC 93 89 93 81 86 99

## 2022-02-24 NOTE — PROGRESS NOTES
"Care Management Follow Up    Length of Stay (days): 27    Expected Discharge Date: 02/27/2022     Concerns to be Addressed: Discharge planning  Patient plan of care discussed at interdisciplinary rounds: Yes    Anticipated Discharge Disposition:  ARU vs TCU     Anticipated Discharge Services: Inpatient rehabilitation vs post acute rehab    Anticipated Discharge DME:  TBD    Patient/family educated on Medicare website which has current facility and service quality ratings: Yes  Education Provided on the Discharge Plan:  Yes  Patient/Family in Agreement with the Plan:  Yes    Private pay costs discussed: Not at this time    Additional Information:  SW met with pt and S/O at bedside, with pt's mother on speaker phone, to discuss discharge options. SW explained the difference between ARU and TCU requirements, reiterated that IDT is still strongly recommending ARU. SW notified pt and family that SW asked [facility] to begin following pt this morning for potential placement. Pt agreed that \"it doesn't hurt to keep options open.\" Pt continues to prefer TCU and be as close to home as possible ([facility]). SW described barriers to TCU placement for pt, such as his young age and medical complexities. Pt stated that he wants to try TCU placement. SW provided both pt and S/O copies of 'Care Compare' lists of ARUs in the state and TCU within a 50 mi radius of [facility], per pt's request. SW requested minimum of 5 TCU options, and any additional ARU preferences, for SW to send referrals to tomorrow; pt and S/O agreed.      ORVILLE Davalos, LSW  [facility]   Pager: [phone]      "

## 2022-02-24 NOTE — PROGRESS NOTES
Care Management Follow Up    Length of Stay (days): 26    Expected Discharge Date: 02/26/2022     Concerns to be Addressed: Discharge follow up for placement      Additional Information:  RNCC followed up with patients SO on if they had had time to discuss ARU options.     SO said that patient is determined to be closest to home and would like to explore the TCU options that are closest to [place] even if the rehab may take longer. RNCC explained the differences in ARU to SO and why they are recommending ARU over TCU. SO understood but again relayed that patient is telling her he is going to be close to home. Currently 2.5 hours from their home.    SO shared that currently patients mom is at his home caring for their dogs. And has been notified that she must return to work this Monday. SO said that Sunday will be a rough day as she will need to leave the hospital and go home to [place] and is expecting patient to have a significantly hard time with this.       CARINA Romero RN, BSN  5B RN Care Coordinator  [phone] phone  [phone] pager    Weekend or Holiday Care Coordinator [phone] pager  Job Code [id number]

## 2022-02-24 NOTE — PLAN OF CARE
Speech Language Therapy Discharge Summary    Reason for therapy discharge:    All goals and outcomes met, no further needs identified.    Progress towards therapy goal(s). See goals on Care Plan in Epic electronic health record for goal details.  Goals met    Therapy recommendation(s):    Recommend regular diet and thin liquids with supervision, feeding assist, and meal tray set-up.  Ensure pt is fully alert and upright for all PO, given small bites/sips, alternating bites/sips. No additional IP SLP services indicated for dysphagia, pt may benefit from SLP consult at next level of care for cognitve-commuication evaluation, or defer to OT for eval/tx of functional cognition.

## 2022-02-24 NOTE — PLAN OF CARE
Goal Outcome Evaluation:    Plan of Care Reviewed With: patient, significant other     Neuro: A/Ox4, able to make needs known. Calls appropriately.  Cardiac: SR/ST with HR 80-100s. Goal SBP < 130, VSS.   Respiratory: O2 sats stable on RA while awake, desat to 80s while sleeping- 2L NC placed. Denies SOB.  GI/: Voiding frequently. Primofit in place. No BM, passing flatus.  Diet/appetite: Tolerating regular diet with good appetite.   Activity:  Ax lift. Frequent turning/repositioning.  Pain: At acceptable level on current regimen, denies pain.   Skin: Intact, no new deficits noted. See flowsheets.  LDA's: PIV wdl, infusing angiomax @ 0.18mg/kg/hr. Primofit.    Plan: Pt & fiance with concerns about discharge plan/location, wanting to discuss with team today. Continue with POC. Notify primary team with changes.

## 2022-02-25 ENCOUNTER — APPOINTMENT (OUTPATIENT)
Dept: PHYSICAL THERAPY | Facility: CLINIC | Age: 32
End: 2022-02-25
Attending: SURGERY
Payer: COMMERCIAL

## 2022-02-25 ENCOUNTER — APPOINTMENT (OUTPATIENT)
Dept: OCCUPATIONAL THERAPY | Facility: CLINIC | Age: 32
End: 2022-02-25
Attending: SURGERY
Payer: COMMERCIAL

## 2022-02-25 ENCOUNTER — APPOINTMENT (OUTPATIENT)
Dept: CT IMAGING | Facility: CLINIC | Age: 32
End: 2022-02-25
Attending: PHYSICIAN ASSISTANT
Payer: COMMERCIAL

## 2022-02-25 LAB
ALBUMIN SERPL-MCNC: 2.6 G/DL (ref 3.4–5)
ALP SERPL-CCNC: 88 U/L (ref 40–150)
ALT SERPL W P-5'-P-CCNC: 84 U/L (ref 0–70)
ANION GAP SERPL CALCULATED.3IONS-SCNC: 7 MMOL/L (ref 3–14)
APTT PPP: 67 SECONDS (ref 22–38)
AST SERPL W P-5'-P-CCNC: 28 U/L (ref 0–45)
BILIRUB SERPL-MCNC: 0.4 MG/DL (ref 0.2–1.3)
BUN SERPL-MCNC: 21 MG/DL (ref 7–30)
CALCIUM SERPL-MCNC: 8.8 MG/DL (ref 8.5–10.1)
CHLORIDE BLD-SCNC: 107 MMOL/L (ref 94–109)
CO2 SERPL-SCNC: 26 MMOL/L (ref 20–32)
CREAT SERPL-MCNC: 0.6 MG/DL (ref 0.66–1.25)
ERYTHROCYTE [DISTWIDTH] IN BLOOD BY AUTOMATED COUNT: 15.4 % (ref 10–15)
FACT X ACT/NOR PPP CHRO: 74 % (ref 70–130)
GFR SERPL CREATININE-BSD FRML MDRD: >90 ML/MIN/1.73M2
GLUCOSE BLD-MCNC: 87 MG/DL (ref 70–99)
GLUCOSE BLDC GLUCOMTR-MCNC: 105 MG/DL (ref 70–99)
GLUCOSE BLDC GLUCOMTR-MCNC: 79 MG/DL (ref 70–99)
GLUCOSE BLDC GLUCOMTR-MCNC: 82 MG/DL (ref 70–99)
GLUCOSE BLDC GLUCOMTR-MCNC: 86 MG/DL (ref 70–99)
GLUCOSE BLDC GLUCOMTR-MCNC: 90 MG/DL (ref 70–99)
GLUCOSE BLDC GLUCOMTR-MCNC: 95 MG/DL (ref 70–99)
HCT VFR BLD AUTO: 32.9 % (ref 40–53)
HGB BLD-MCNC: 9.9 G/DL (ref 13.3–17.7)
INR PPP: 1.91 (ref 0.85–1.15)
INR PPP: 2.05 (ref 0.85–1.15)
MAGNESIUM SERPL-MCNC: 2.1 MG/DL (ref 1.6–2.3)
MCH RBC QN AUTO: 29.2 PG (ref 26.5–33)
MCHC RBC AUTO-ENTMCNC: 30.1 G/DL (ref 31.5–36.5)
MCV RBC AUTO: 97 FL (ref 78–100)
PHOSPHATE SERPL-MCNC: 4.1 MG/DL (ref 2.5–4.5)
PLATELET # BLD AUTO: 295 10E3/UL (ref 150–450)
POTASSIUM BLD-SCNC: 4 MMOL/L (ref 3.4–5.3)
PROT SERPL-MCNC: 6.4 G/DL (ref 6.8–8.8)
RBC # BLD AUTO: 3.39 10E6/UL (ref 4.4–5.9)
SODIUM SERPL-SCNC: 140 MMOL/L (ref 133–144)
WBC # BLD AUTO: 8.3 10E3/UL (ref 4–11)

## 2022-02-25 PROCEDURE — 85610 PROTHROMBIN TIME: CPT | Performed by: PHYSICIAN ASSISTANT

## 2022-02-25 PROCEDURE — 97110 THERAPEUTIC EXERCISES: CPT | Mod: GP | Performed by: PHYSICAL THERAPIST

## 2022-02-25 PROCEDURE — 36415 COLL VENOUS BLD VENIPUNCTURE: CPT

## 2022-02-25 PROCEDURE — 214N000001 HC R&B CCU UMMC

## 2022-02-25 PROCEDURE — 71250 CT THORAX DX C-: CPT | Mod: 26 | Performed by: RADIOLOGY

## 2022-02-25 PROCEDURE — 84100 ASSAY OF PHOSPHORUS: CPT | Performed by: SURGERY

## 2022-02-25 PROCEDURE — 97140 MANUAL THERAPY 1/> REGIONS: CPT | Mod: GO | Performed by: OCCUPATIONAL THERAPIST

## 2022-02-25 PROCEDURE — 99223 1ST HOSP IP/OBS HIGH 75: CPT | Mod: 24 | Performed by: PHYSICAL MEDICINE & REHABILITATION

## 2022-02-25 PROCEDURE — 250N000013 HC RX MED GY IP 250 OP 250 PS 637: Performed by: PHYSICIAN ASSISTANT

## 2022-02-25 PROCEDURE — 85260 CLOT FACTOR X STUART-POWER: CPT | Performed by: SURGERY

## 2022-02-25 PROCEDURE — 80053 COMPREHEN METABOLIC PANEL: CPT | Performed by: STUDENT IN AN ORGANIZED HEALTH CARE EDUCATION/TRAINING PROGRAM

## 2022-02-25 PROCEDURE — 258N000003 HC RX IP 258 OP 636: Performed by: SURGERY

## 2022-02-25 PROCEDURE — 250N000011 HC RX IP 250 OP 636: Performed by: SURGERY

## 2022-02-25 PROCEDURE — 250N000013 HC RX MED GY IP 250 OP 250 PS 637: Performed by: SURGERY

## 2022-02-25 PROCEDURE — 71250 CT THORAX DX C-: CPT

## 2022-02-25 PROCEDURE — 83735 ASSAY OF MAGNESIUM: CPT | Performed by: SURGERY

## 2022-02-25 PROCEDURE — 97535 SELF CARE MNGMENT TRAINING: CPT | Mod: GO | Performed by: OCCUPATIONAL THERAPIST

## 2022-02-25 PROCEDURE — 36415 COLL VENOUS BLD VENIPUNCTURE: CPT | Performed by: SURGERY

## 2022-02-25 PROCEDURE — 97110 THERAPEUTIC EXERCISES: CPT | Mod: GO | Performed by: OCCUPATIONAL THERAPIST

## 2022-02-25 PROCEDURE — 85018 HEMOGLOBIN: CPT | Performed by: SURGERY

## 2022-02-25 PROCEDURE — 999N000285 HC STATISTIC VASC ACCESS LAB DRAW WITH PIV START

## 2022-02-25 PROCEDURE — 85610 PROTHROMBIN TIME: CPT | Performed by: NURSE PRACTITIONER

## 2022-02-25 PROCEDURE — 250N000013 HC RX MED GY IP 250 OP 250 PS 637: Performed by: STUDENT IN AN ORGANIZED HEALTH CARE EDUCATION/TRAINING PROGRAM

## 2022-02-25 PROCEDURE — 85730 THROMBOPLASTIN TIME PARTIAL: CPT | Performed by: SURGERY

## 2022-02-25 RX ADMIN — FUROSEMIDE 40 MG: 40 TABLET ORAL at 08:45

## 2022-02-25 RX ADMIN — NYSTATIN 1000000 UNITS: 100000 SUSPENSION ORAL at 11:43

## 2022-02-25 RX ADMIN — Medication 10 MG: at 21:17

## 2022-02-25 RX ADMIN — NYSTATIN 1000000 UNITS: 100000 SUSPENSION ORAL at 15:32

## 2022-02-25 RX ADMIN — WARFARIN SODIUM 14 MG: 10 TABLET ORAL at 18:30

## 2022-02-25 RX ADMIN — NYSTATIN 1000000 UNITS: 100000 SUSPENSION ORAL at 08:44

## 2022-02-25 RX ADMIN — POTASSIUM CHLORIDE 20 MEQ: 750 TABLET, EXTENDED RELEASE ORAL at 08:45

## 2022-02-25 RX ADMIN — BIVALIRUDIN 0.18 MG/KG/HR: 250 INJECTION, POWDER, LYOPHILIZED, FOR SOLUTION INTRAVENOUS at 19:04

## 2022-02-25 RX ADMIN — CARVEDILOL 50 MG: 25 TABLET, FILM COATED ORAL at 08:45

## 2022-02-25 RX ADMIN — ATORVASTATIN CALCIUM 40 MG: 40 TABLET, FILM COATED ORAL at 21:17

## 2022-02-25 RX ADMIN — LISINOPRIL 20 MG: 20 TABLET ORAL at 08:45

## 2022-02-25 RX ADMIN — FUROSEMIDE 40 MG: 40 TABLET ORAL at 15:32

## 2022-02-25 RX ADMIN — ASPIRIN 81 MG CHEWABLE TABLET 81 MG: 81 TABLET CHEWABLE at 08:45

## 2022-02-25 RX ADMIN — BIVALIRUDIN 0.18 MG/KG/HR: 250 INJECTION, POWDER, LYOPHILIZED, FOR SOLUTION INTRAVENOUS at 04:38

## 2022-02-25 RX ADMIN — CARVEDILOL 50 MG: 25 TABLET, FILM COATED ORAL at 21:17

## 2022-02-25 RX ADMIN — BIVALIRUDIN 0.18 MG/KG/HR: 250 INJECTION, POWDER, LYOPHILIZED, FOR SOLUTION INTRAVENOUS at 15:32

## 2022-02-25 RX ADMIN — NYSTATIN 1000000 UNITS: 100000 SUSPENSION ORAL at 21:16

## 2022-02-25 RX ADMIN — PANTOPRAZOLE SODIUM 40 MG: 40 TABLET, DELAYED RELEASE ORAL at 08:45

## 2022-02-25 RX ADMIN — ACETAMINOPHEN 650 MG: 325 TABLET, FILM COATED ORAL at 12:41

## 2022-02-25 ASSESSMENT — ACTIVITIES OF DAILY LIVING (ADL)
ADLS_ACUITY_SCORE: 19

## 2022-02-25 NOTE — CONSULTS
[facility]   PM&R CONSULT    Consulting Provider:   Reason for Consult: Assessment of rehabilitation   Location of Patient: 6236-02  Date of Encounter: 2/25/2022   Date of Admission: 1/28/2022  ASSESSMENT/PLAN:    Mr. Chuy Varner is a left handed  31 year old yo with a past medical history significant for back cuspid aortic valve, thoracic aortic aneurysm, testicular 6 cancer status post orchiectomy, heparin induced thrombocytopenia, lower extremity DVT and hypertension who was admitted to the [facility] on January 28, 2022 for Bentallprocedure with mechanical valve.  - Continue physical therapy to address mobility and balance rehab needs.  - Continue occupational therapy to address activities of daily living rehab needs.  - Regarding disposition, patient's rehab needs can be met at transitional care unit/subacute rehab when medically appropriate.  The patient and his family expressed preference to discharge to transitional care unit close to home in [facility].    HPI:      Mr. Chuy Varner is a left handed   31 year old yo with a past medical history significant for back cuspid aortic valve, thoracic aortic aneurysm, testicular 6 cancer status post orchiectomy, heparin induced thrombocytopenia, lower extremity DVT and hypertension who was admitted to the [facility] on January 28, 2022 for Bentallprocedure with mechanical valve.     The patient presented to his primary care provider with shortness of breath and cough for while. CT chest obtained and was found to have thoracic aortic aneurysm. He has family history of family with cardiac valvular disease including paternal uncle with aortic repair.  Replaced at the age of 10 year. Also his dad had valvular abnormalities.     Today, the patient reports doing fine.  He reports that therapies are going fine.  He reported that shoulder abduction are weaker.  He also  "reports bilateral fingertips tingling.  He denies any issues with vision or hearing.  He is able to state that\" he is adenopathy of [facility], it is 2022, and he is here for aortic valve repair\".  Of note his girlfriend was at the bedside.  Also his mother Daisy was on the phone throughout the encounter.  The patient and his family expressed highly prepared to be discharged to transitional care unit that close to home in [facility].  He reports that bowel and bladder are working fine.      PREVIOUS LEVEL OF FUNCTION   Independent     CURRENT FUNCTION   PT: dependent requires A x2 for mobility and transfers. He had only 10 minutes session with PT on 2/22/2022.    OT: was dependent with OT when evaluated in 2/10/2022. However, the patient was intubated and sedated at this time.    SLP: SLP evaluated the patient. No further SLP session indicated for dysphagia at this time.    LIVING SITUATION/SUPPORT  Patient lives with his girlfriend.  The patient's mom also available to help.  His dad is going to fly in from [place] to help as well.  Patient lives in house. He does have one stairs to enter the house.   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     Past Medical History:  HTN   Obesity   Aortic valve disease   Testicular cancer   HIT     Current Medications:  Current Facility-Administered Medications   Medication     acetaminophen (TYLENOL) tablet 650 mg     aspirin (ASA) chewable tablet 81 mg     atorvastatin (LIPITOR) tablet 40 mg     bisacodyl (DULCOLAX) Suppository 10 mg     [Held by provider] bivalirudin (ANGIOMAX) 250 mg in sodium chloride 0.9 % 250 mL ANTICOAGULANT infusion     carboxymethylcellulose PF (REFRESH PLUS) 0.5 % ophthalmic solution 1-2 drop     carvedilol (COREG) tablet 50 mg     dextrose 10% infusion     glucose gel 15-30 g    Or     dextrose 50 % injection 25-50 mL    Or     glucagon injection 1 mg     furosemide (LASIX) tablet 40 mg     HOLD: All Oral " "Medications     HYDROmorphone (DILAUDID) injection 0.2 mg    Or     HYDROmorphone (DILAUDID) injection 0.4 mg     lidocaine (LMX4) kit     lidocaine 1 % 0.1-1 mL     lisinopril (ZESTRIL) tablet 20 mg     loperamide (IMODIUM) capsule 2 mg     magnesium hydroxide (MILK OF MAGNESIA) suspension 30 mL     melatonin tablet 10 mg     methocarbamol (ROBAXIN) tablet 750 mg     naloxone (NARCAN) injection 0.2 mg    Or     naloxone (NARCAN) injection 0.4 mg    Or     naloxone (NARCAN) injection 0.2 mg    Or     naloxone (NARCAN) injection 0.4 mg     No lozenges or gum should be given while patient on BIPAP/AVAPS/AVAPS AE     nystatin (MYCOSTATIN) suspension 1,000,000 Units     ondansetron (ZOFRAN-ODT) ODT tab 4 mg    Or     ondansetron (ZOFRAN) injection 4 mg     oxyCODONE (ROXICODONE) tablet 5 mg    Or     oxyCODONE IR (ROXICODONE) tablet 10 mg     pantoprazole (PROTONIX) EC tablet 40 mg     polyethylene glycol-propylene glycol PF (SYSTANE ULTRA PF) opthalmic solution 1 drop     potassium chloride ER (KLOR-CON M) CR tablet 20 mEq     prochlorperazine (COMPAZINE) injection 10 mg    Or     prochlorperazine (COMPAZINE) tablet 10 mg     sodium chloride (PF) 0.9% PF flush 10-20 mL     sodium chloride (PF) 0.9% PF flush 10-40 mL     sodium chloride (PF) 0.9% PF flush 3 mL     traZODone (DESYREL) tablet 50 mg     Warfarin Therapy Reminder (Check START DATE - warfarin may be starting in the FUTURE)     Review Of Systems  Skin: positive for negative, bruising  Eyes: negative for, double vision  Ears/Nose/Throat: negative for, hearing loss  Respiratory: Shortness of breath-at baseline  Cardiovascular: negative for and chest pain  Gastrointestinal: negative for bowel changes  Genitourinary: negative for and incontinence  Neurologic: As noted above  Psychiatric: negative for and anxiety      On examination   /71 (BP Location: Right arm)   Pulse 87   Temp 97.5  F (36.4  C) (Oral)   Resp 18   Ht 1.905 m (6' 3\")   Wt 148.8 kg " (328 lb)   SpO2 96%   BMI 41.00 kg/m      GEN: NAD, pleasant and cooperative  HEENT: NC/AT  CVS: RRR.   PULM: Clear to auscultation bilaterally, no wheezing or crackles appreciated  ABD: Soft, nontender, nondistended, bowel sounds are present  EXT:  Free of edema.  Neuro: Answers appropriately, follows commands.  Muscle strength is 4+/5 throughout the upper and lower extremities and symmetrical.  Proximal muscle are weakened in the distal muscles.     Labs   Lab Results   Component Value Date    WBC 8.3 02/25/2022    HGB 9.9 (L) 02/25/2022    HCT 32.9 (L) 02/25/2022    MCV 97 02/25/2022     02/25/2022     Lab Results   Component Value Date     02/25/2022    POTASSIUM 4.0 02/25/2022    CHLORIDE 107 02/25/2022    CO2 26 02/25/2022     (H) 02/25/2022     Lab Results   Component Value Date    GFRESTIMATED >90 02/25/2022     Lab Results   Component Value Date    AST 28 02/25/2022    ALT 84 (H) 02/25/2022    ALKPHOS 88 02/25/2022    BILITOTAL 0.4 02/25/2022    ROMÁN 35 02/16/2022     Lab Results   Component Value Date    INR 2.05 (H) 02/25/2022     Lab Results   Component Value Date    BUN 21 02/25/2022    CR 0.60 (L) 02/25/2022     CT CHEST W/O CONTRAST 2/25/2022 1:32 PM     History: Fracture, sternum     Comparison: None.     Technique: CT of the chest was obtained without intravenous contrast.  Axial, coronal, and sagittal reconstructions were obtained and  reviewed.     Contrast: none     Findings:      Lungs: No pneumothorax, pleural effusion, focal airspace opacity, or  suspicious pulmonary nodule. Bibasilar linear atelectasis.  Airways: Central tracheobronchial tree is clear.  Vessels: Main pulmonary artery and aorta are normal in caliber. Normal  three-vessel arch  Heart: Postsurgical changes of aortic valve replacement, median  sternotomy wires are intact. Incomplete bony healing of the sternum.  Trace pericardial effusion.  Lymph nodes: No suspicious mediastinal or hilar  lymphadenopathy.  Thyroid: Within normal limits.  Esophagus: Within normal limits     Upper abdomen: Within normal limits.     Bones and soft tissues: No suspicious axillary lymphadenopathy or soft  tissue mass. No suspicious osseous lesion.                                                     Impression:   1. Postsurgical changes sternotomy, wires are intact, incomplete bony  healing of the sternum.  2. Trace pericardial effusion.    Patient was staffed with Dr. Prince, whom agrees with assessment and plan as described above.  Ang Pineda MD  PM&R resident  02/25/2022 2:16 PM    Thank you for the consult. Please call for any questions. Pager number [phone]   Total of * min spent in this encounter, more than 50% in counseling and coordination.   Kavita Hadley

## 2022-02-25 NOTE — PROVIDER NOTIFICATION
"Pg'd Dr Churchill in regards to angiomax being unheld \"Do you want me to restart it to starting dose or dose it was running at?\"     Per Keila Churchill restart at previous rate of 0.18 mg/kg/hr.     1525- Pg'd Zhao \"hardest time getting lab to come up here (pt needs vascular, lab has already missed) for his 1330 INR, supposedly someone is coming, do you want me to keep the Angiomax held until we get that lab or OK to start now?     - Per MD OK to start now.   "

## 2022-02-25 NOTE — PLAN OF CARE
A: A&Ox4. VS unchanged, maintaining goals. SR. Afebrile. Intermittent HA managed with prn tylenol. No nausea, reported. Tolerating Regular diet, fair appetite. Electrolytes WNL. Angiomax gtt continued after being held. Voiding via urinal, large amounts of UO. No BM during shift. No new skin deficits noted. Pt had chest CT today. Able to make needs known via call light. S.O. at bedside, very supportive.    1742- Report given to 6C nurse Kate, pts belongings sent with, S.O. at bedside and aware of transfer.     I/O this shift:  In: 263.95 [P.O.:250; I.V.:13.95]  Out: 300 [Urine:300]    Temp:  [97.5  F (36.4  C)-98.2  F (36.8  C)] 98.2  F (36.8  C)  Pulse:  [87-95] 87  Resp:  [14-18] 18  BP: ()/(58-75) 97/67  SpO2:  [95 %-99 %] 96 %     R: Continue with POC. Notify primary team with changes.

## 2022-02-25 NOTE — PROGRESS NOTES
Cardiovascular Surgery Progress Note  02/25/2022         Assessment and Plan:     Chuy Varner is a 30 yo M with a PMHx of bicuspid aortic valve, thoracic aortic aneurysm without rupture, hypertension, obesity, and testicular cancer s/p orchiectomy who underwent Bentall procedure with mechanical valve on 1/28 with Dr. Velasco and Dr. Hannah. 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      Cardiovascular:   # Ascending aortic aneurysm s/p Bentall w/ Jerry valve on 1/28 with Dr. Velasco and Dr. Hannah  # Hx of HTN  - Goal MAP>65, SBP <130  LVEF: 60-65%  - Coreg 50 BID  Lisinopril 10mg daily (increase to 20mg 2/24)  - ASA 81mg  Atorvastatin 40mg   - Anticoagulatin for valve, will need coumadin with INR goal 2-3 for three months, then 1.5-2.0 for life, with ASA. Started Coumadin 2/19, Continue Bival as bridge.   - Baseline TTE per Dr. Velasco 2/23     Chest tubes and TPW removed 2/4     Pulmonary:  # Acute hypoxemic respiratory failure (ARDS vs. PNA vs. Hypervolemia vs. TRALI vs. severe atelectasis) s/p extubation  - Extubated 2/14 to HFNC, now breathing on Room Air  - conditional VBGs - draw one if increased somnolence or distressed breathing  - continue respiratory therapy  - metanebs (percussive therapy)  - flutter valve QID to encourage coughing, clearance of airways     Neurology / MSK:  Neuro/ pain/ sedation:  # Acute Postoperative pain  - Monitor neurological status. Notify the MD for any acute changes in exam.  - Neurology following, appreciate recs  - Pain well controlled on PRN tylenol, dilaudid, robaxin, oxycodone,      # Dysarthria - improving   - speech therapy evaluation 02/17 for dysarthric  Reconsulted 2/23 or swallow evaluation  - ENT scoped, no reason for long term dysarthria/problems with phonation  - Sleep hygiene:  Melatonin, trazadone prn at bedtime     # Thalamic infarct on head CT 2/16, old, age unknown  - Not contributing to current dysarthria vs delirium vs deconditioning per Neuro  - Neurology would like an MRI: to be done outpatient per Dr. Velasco  - Lipid panel, HgbA1c ordered per neuro for stroke Hx     # Symptoms of depression with insomnia   - reports feeling down and not being able to sleep despite melatonin and prn Trazodone   - Mental health consult, may need antidepressant. Patient and SO agreeable to MH visit.       / Renal:  -Hypernatremia, improving  BL creat appears to be ~ 1.0  Most recent Creatinine: 0.58  - Daily weights and I/O's  - Avoid/limit nephrotoxins as able  Replete lytes PRN per protocol  - Pre-op weight 365 lbs  - Moderate amount of MARIAM, has wraps on with decreased edema. Has windy UE edema with windy non-occlusive DVTs- elevate both arms and limit B/p's    - Re Consulted PT/OT for right lymphedema wrap 2/19.   Diuresis: Furosemide 40mg bid with potassium supplementation      GI / FEN:   - Remove NJ Tube 2/23. Patient is tolerating regular diet without complication.  - Speech following okay to advanced to soft and bite sized diet with mild thickness 1/20  - started calorie counts 2/24     # Diarrhea/Incontinence  Suspect caused by large amounts of free water via NJ vs augmentin vs recent colonization of C. diff  - Less frequent loose stools, Cdiff-negative 2/20     # Elevated ALT, AST, imrpoving  - Monitor for now, trending down, no abdominal pain.       # Cholelithiasis  - Downtrending LFTs. Suspect elevations d/t Augmentin     Endocrine:  # Stress induced hyperglycemia, improving  # Obesity (BMI 46)  - HDSSI  - Hgb A1c 5.8 2/18  - Decrease Lantus to 20 U daily, plan to decrease as using less sliding scale insulin and good BG control   - Goal BG <180 for optimal healing  - -120's without any sliding scale insulin   - Continue to monitor BG    # Mild-Moderate Protein-calorie Malnutrition  -  Weight loss > 5% over 1 month  - Oral intake <75% for more than 7 days  -  Nutrition consulted for recommendations    Infectious Disease:  # Stress induced leukocytosis  # Possible ventilator associated pneumonia  # Sinusitis, treated with Augmentin   Pan cultures and re-cultures all remained unrevealing. Fever likely from previously untreated DVT vs sinusitis.  - Continue to monitor fever curve, WBC and inflammatory markers  - WBC WNL trending down, AF.  - Checked C. Difficile 2/21 negative, UA    - CXR no change       Hematology:   # History of testicular cancer s/p orchiectomy  # RLE, Cesar UE DVT's  # HIT positive   - Right LE Lymphedema wrap, reconsult PT/OT   - CBC daily, Hgb 9.9, stable   - Continue Bivalirudin gtt as bridge to coumadin, following Chromogenic Factor X  - started coumadin 2/19 with INR goal 2-3, pharmacy to dose   - Right upper extremity US 2/21 with occlusive thrombus of one of the paired right brachial veins at the upper arm, with PICC in place, which was removed 2/21.   - LUE US 2/21 wotj occlusive superficial thrombus in left cephalic vein at the antecubital fossa. No evidence of left upper extremity deep venous thrombosis     Anticoagulation:   - ASA 81 mg daily   - Bival drip bridge to therapeutic coumadin, INR goal 2-3 for three months, then 1.5-2.0 lifelong   - Follow Chromogenic Factor X, goal 20-40 while on Bival. Chromogenic Factor X - 79  - Most recent INR: 2.05. Bivalirudin continued and recheck 2/26 perpharmacy     MSK/ Skin:  # Sternotomy  # Surgical Incision  # Buttock pressure injury   - Sternal precautions  - Postoperative incision management per protocol  - PT/OT  - WOCN following for pressure injuries     # Maculopapular Rash, improving  Patient has maculopapular rash per Exam below.  - Likely represents folliculitis, monitor for now, improving     # Sternal Clicking and Popping   - Occurs with deep inspiration, but not appreciated with coughing. No drainage or erythema or  "increased pain. Noted evening of 2/24  - Dr. Velasco aware: Non Contrast CT: incomplete healing of bony sternum. Sternal wires intact. Plan pending     Prophylaxis:    Mechanical prophylaxis for DVT  - Chemical DVT prophylaxis- bivalirudin and Warfarin  - PPI - until 03/16 (30d from extubation) per institutional protocol  - HOB to 30 degrees     Disposition:   - Transferred to  on 2/18  - Therapies recommending discharge to ARU, possibly Friday pending INR     Discussed with Surgeon, Dr. Velasco via written and verbal commnication.     Keila Churchill PA-c  Pager: [phone]  8:11 AM February 25, 2022           Interval History:     Patient noticed some painless clicking and popping in his chest  States pain is well managed on current regimen. Slept well overnight.  Tolerating diet, is passing flatus, BM x 1. No nausea or vomiting.  Breathing well without complaints.   Working with therapies and ambulating in halls with assistance.   Denies chest pain, palpitations, dizziness, syncopal symptoms, fevers, chills, myalgias, or sternal popping/clicking.         Physical Exam:   Blood pressure 121/75, pulse 95, temperature 97.5  F (36.4  C), temperature source Oral, resp. rate 18, height 1.905 m (6' 3\"), weight 148.8 kg (328 lb), SpO2 96 %.  Vitals:    02/22/22 0453 02/23/22 0316 02/24/22 0500   Weight: 147.6 kg (325 lb 6.4 oz) 147.9 kg (326 lb 1.6 oz) 148.8 kg (328 lb)      Current Weight:148.8 Kg Trend: +0.9 Kg  Admit weight: 165.9 Kg    Daily Fluid status; net loss: -3502  mL    Net loss SA: +1377 mL  MAPs: 73-92  LVEF: 60-65%    Gen: A&Ox4, NAD  Neuro: no focal deficits   CV: RRR, normal S1 S2, no murmurs, rubs or gallops. No appreciable JVD   Vascular: Peripheral pulses present Radial 2+, Dorsalis Pedis 2+, Posterior Tibialis 2+.   Pulm: CTA, no wheezing or rhonchi, normal breathing on RA  Abd: nondistended, normal BS, soft, nontender  Ext: Positive peripheral edema, 1+ pitting. Warm.   Incision: clean, dry, intact, no " erythema, sternum stable  Tubes/drain sites: dressing clean and dry          Data:    Imaging:  reviewed recent imaging    Chest x-ray  Interpretation:    Echocardiogram  Interpretation:    CT scan:    Labs:  CBC  Recent Labs   Lab 02/25/22  0559 02/24/22  0453 02/23/22  0616 02/22/22  0636   WBC 8.3 8.9 11.3* 14.2*   RBC 3.39* 3.44* 3.31* 3.23*   HGB 9.9* 10.3* 9.6* 9.6*   HCT 32.9* 34.1* 33.5* 31.7*   MCV 97 99 101* 98   MCH 29.2 29.9 29.0 29.7   MCHC 30.1* 30.2* 28.7* 30.3*   RDW 15.4* 15.6* 15.5* 15.9*    317 293 344     CMP:  Last Comprehensive Metabolic Panel:  Sodium   Date Value Ref Range Status   02/25/2022 140 133 - 144 mmol/L Final     Potassium   Date Value Ref Range Status   02/25/2022 4.0 3.4 - 5.3 mmol/L Final   02/05/2022 4.3 3.4 - 5.3 mmol/L Final     Chloride   Date Value Ref Range Status   02/25/2022 107 94 - 109 mmol/L Final     Carbon Dioxide (CO2)   Date Value Ref Range Status   02/25/2022 26 20 - 32 mmol/L Final     Anion Gap   Date Value Ref Range Status   02/25/2022 7 3 - 14 mmol/L Final     Glucose   Date Value Ref Range Status   02/25/2022 87 70 - 99 mg/dL Final     GLUCOSE BY METER POCT   Date Value Ref Range Status   02/25/2022 82 70 - 99 mg/dL Final     Urea Nitrogen   Date Value Ref Range Status   02/25/2022 21 7 - 30 mg/dL Final     Creatinine   Date Value Ref Range Status   02/25/2022 0.60 (L) 0.66 - 1.25 mg/dL Final     GFR Estimate   Date Value Ref Range Status   02/25/2022 >90 >60 mL/min/1.73m2 Final     Comment:     Effective December 21, 2021 eGFRcr in adults is calculated using the 2021 CKD-EPI creatinine equation which includes age and gender (Renata rios al., NEJM, DOI: 10.1056/SIJXtv1806371)     Calcium   Date Value Ref Range Status   02/25/2022 8.8 8.5 - 10.1 mg/dL Final     Bilirubin Total   Date Value Ref Range Status   02/25/2022 0.4 0.2 - 1.3 mg/dL Final     Alkaline Phosphatase   Date Value Ref Range Status   02/25/2022 88 40 - 150 U/L Final     ALT   Date Value  Ref Range Status   02/25/2022 84 (H) 0 - 70 U/L Final     AST   Date Value Ref Range Status   02/25/2022 28 0 - 45 U/L Final     BMP  Recent Labs   Lab 02/25/22  0559 02/25/22 0430 02/24/22 2336 02/24/22 2000 02/24/22  0506 02/24/22 0453 02/23/22 0727 02/23/22  0616 02/22/22  0803 02/22/22  0636     --   --   --   --  139  --  136  --  139   POTASSIUM 4.0  --   --   --   --  3.8  --  4.1  --  4.0   CHLORIDE 107  --   --   --   --  108  --  108  --  108   MAYE 8.8  --   --   --   --  8.7  --  8.7  --  8.3*   CO2 26  --   --   --   --  27  --  21  --  25   BUN 21  --   --   --   --  23  --  25  --  32*   CR 0.60*  --   --   --   --  0.58*  --  0.58*  --  0.58*   GLC 87 82 81 82   < > 93   < > 110*   < > 112*    < > = values in this interval not displayed.     INR  Recent Labs   Lab 02/25/22 0559 02/24/22 0453 02/23/22  0616 02/22/22  0636   INR 2.05* 1.79* 1.66* 1.57*      Hepatic Panel  Recent Labs   Lab 02/25/22 0559 02/24/22 0453 02/23/22  0616 02/22/22  0636   AST 28 31 52* 76*   ALT 84* 96* 108* 130*   ALKPHOS 88 78 85 82   BILITOTAL 0.4 0.4 0.3 0.3   ALBUMIN 2.6* 2.6* 2.4* 2.6*     GLUCOSE:   Recent Labs   Lab 02/25/22  0559 02/25/22 0430 02/24/22 2336 02/24/22 2000 02/24/22  1923 02/24/22  1518   GLC 87 82 81 82 97 113*

## 2022-02-25 NOTE — PROGRESS NOTES
Care Management Follow Up    Length of Stay (days): 28    Expected Discharge Date: 02/27/2022      Concerns to be Addressed: Discharge planning  Patient plan of care discussed at interdisciplinary rounds: Yes     Anticipated Discharge Disposition:  ARU vs TCU     Anticipated Discharge Services: Inpatient rehabilitation vs post acute rehab     Anticipated Discharge DME:  TBD     Patient/family educated on Medicare website which has current facility and service quality ratings: Yes  Education Provided on the Discharge Plan:  Yes  Patient/Family in Agreement with the Plan:  Yes     Private pay costs discussed: Not at this time      Additional Information:  Pt and S/O provided 2 TCU options and one additional ARU option for SW to send referrals to. MERCEDES updated [facility] liaison, whose following pt for ARU. Pt's IV bivalirudin will be discontinued prior to discharge; bridging to warafin. S/O works full-time outside of the home, but pt's father will be staying with pt for an extended time, so pt will have 24 hr supervision/assistance once he returns home.    1400: MERCEDES updated by MD that pt is now considered TCU appropriate, as his rehab progression will be slower/longer. MERCEDES met with pt and S/O and bedside to provide referral update. Pt does not wish to be added to [facility] wait list. Pt selected 3 more TCUs close to his home that he'd like MERCEDES to send referrals to.    Referrals Placed by CM/MERCEDES:      ARU  [facility]  [address] [place] [address]  PH: [phone] (ext. [phone] for Admissions)  2/25: Referral faxed.  1230: MERCEDES received VM from [facility], stating that their IRF is at full-capacity and currently 12 pts on wait list w/in their own hospital; could add pt to wait list, but there are no openings next week.  1515: MERCEDES called back Admissions,spoke it Isha, who took a message for their team. MERCEDES requested that pt be added to wait list (to keep options open, anticipating that pt will be declined by TCUs  d/t age).    [facility]  [address] [place] [address]  [phone]  2/25: Kia Richards stated she paged out the PMR consult, hoping they will see him today; continuing to follow pt.      TCU  [facility]  [address] [place] [address]  PH: [phone]  Fax:  2/25: MERCEDES left VM for Lorin, requesting a call back for referral fax #.  1430: Multiple phone calls made by MERCEDES, trying to connect with someone, only goes to . SW called [phone] (# found on website for [facility]), left VM requesting call back for referral fax #.  1500: Johann in Admissions called SW to provide fax #. Johann stated that d/t pt's age, they do not think he would be a good fit for facility; no open beds until Tues or Wed next week. Johann stated she would still review referral. MERCEDES faxed referral    [facility]  [address] [place] [address]  PH: [phone]  Fax: [phone]  Admissions: [phone] (Shannan)  2/25: MERCEDES left  for Desirae, requesting a call back for referral fax #.  1220: MERCEDES received VM from Shannan in Admissions, who provided fax # and her direct dial.  1500: MERCEDES faxed referral.    [address] [address]  PH: [phone]  Fax: [phone]  Admissions: [phone] (Joaquina)  2/25: Referral faxed    [facility]  [address]   [address]  [place] [address]  PH: [phone]  Fax: [phone]  Admissions: [phone] (MERCEDES Nick)  2/25: Referral faxed    [address] [address]  PH: [phone] (ask for Leonie, Admissions)  Fax: [phone]  2/25: Referral faxed        ORVILLE Davalos, LSW  [facility]   Pager: [phone]

## 2022-02-25 NOTE — PLAN OF CARE
Neuro: AOx4, able to make needs known. Pupils not PERRLA at baseline, R > L, strengths equal both sides, unable to raise arms very much, generalized weakness  Cardiac: SR/ST with HR 80-100s, 1st deg AVB. Goal SBP < 130, VSS.     Respiratory: O2 sats > 94% on RA while awake, per report, may need 1-2L NC overnight when sleeping  GI/: Voiding frequently via urinal, prefers Primofit overnight. One large, loose, watery BM this shift   Diet/appetite: Tolerating regular diet with good appetite, needs assistance with eating d/t weakness.   Activity:  Up with lift.In recliner x 2 this shift. Turn/repo in bed  Pain: Denies pain.   Skin: Intact, no new deficits noted. Old midsternal incision, CT sites  LDA's: PIV wdl, infusing angiomax @ 0.18mg/kg/hr.      Plan: SW following for discharge planning. Continue with POC. Notify primary team with changes

## 2022-02-25 NOTE — PROGRESS NOTES
Calorie Count  Intake recorded for: 2/24  Total Kcals: 408 Total Protein: 12g  Kcals from Hospital Food: 408   Protein: 12g  Kcals from Outside Food (average):0 Protein: 0g  # Meals Ordered from Kitchen: 3 meals  # Meals Recorded: 1 meal (100% ice cream, 90% broccoli, 75% fruit platter, 50% chicken tenders)  # Supplements Recorded: 0

## 2022-02-26 ENCOUNTER — APPOINTMENT (OUTPATIENT)
Dept: OCCUPATIONAL THERAPY | Facility: CLINIC | Age: 32
End: 2022-02-26
Attending: SURGERY
Payer: COMMERCIAL

## 2022-02-26 LAB
ALBUMIN SERPL-MCNC: 2.7 G/DL (ref 3.4–5)
ALP SERPL-CCNC: 84 U/L (ref 40–150)
ALT SERPL W P-5'-P-CCNC: 69 U/L (ref 0–70)
ANION GAP SERPL CALCULATED.3IONS-SCNC: 6 MMOL/L (ref 3–14)
APTT PPP: 64 SECONDS (ref 22–38)
APTT PPP: 97 SECONDS (ref 22–38)
AST SERPL W P-5'-P-CCNC: 19 U/L (ref 0–45)
BILIRUB SERPL-MCNC: 0.4 MG/DL (ref 0.2–1.3)
BUN SERPL-MCNC: 17 MG/DL (ref 7–30)
CALCIUM SERPL-MCNC: 8.9 MG/DL (ref 8.5–10.1)
CHLORIDE BLD-SCNC: 108 MMOL/L (ref 94–109)
CO2 SERPL-SCNC: 24 MMOL/L (ref 20–32)
CREAT SERPL-MCNC: 0.6 MG/DL (ref 0.66–1.25)
ERYTHROCYTE [DISTWIDTH] IN BLOOD BY AUTOMATED COUNT: 15.2 % (ref 10–15)
FACT X ACT/NOR PPP CHRO: 56 % (ref 70–130)
GFR SERPL CREATININE-BSD FRML MDRD: >90 ML/MIN/1.73M2
GLUCOSE BLD-MCNC: 94 MG/DL (ref 70–99)
HCT VFR BLD AUTO: 32.3 % (ref 40–53)
HGB BLD-MCNC: 9.9 G/DL (ref 13.3–17.7)
INR PPP: 2.54 (ref 0.85–1.15)
MCH RBC QN AUTO: 29.5 PG (ref 26.5–33)
MCHC RBC AUTO-ENTMCNC: 30.7 G/DL (ref 31.5–36.5)
MCV RBC AUTO: 96 FL (ref 78–100)
PLATELET # BLD AUTO: 276 10E3/UL (ref 150–450)
POTASSIUM BLD-SCNC: 3.8 MMOL/L (ref 3.4–5.3)
PROT SERPL-MCNC: 6.4 G/DL (ref 6.8–8.8)
RBC # BLD AUTO: 3.36 10E6/UL (ref 4.4–5.9)
SODIUM SERPL-SCNC: 138 MMOL/L (ref 133–144)
WBC # BLD AUTO: 6.7 10E3/UL (ref 4–11)

## 2022-02-26 PROCEDURE — 85610 PROTHROMBIN TIME: CPT | Performed by: SURGERY

## 2022-02-26 PROCEDURE — 250N000013 HC RX MED GY IP 250 OP 250 PS 637: Performed by: SURGERY

## 2022-02-26 PROCEDURE — 258N000003 HC RX IP 258 OP 636: Performed by: SURGERY

## 2022-02-26 PROCEDURE — 36415 COLL VENOUS BLD VENIPUNCTURE: CPT | Performed by: SURGERY

## 2022-02-26 PROCEDURE — 250N000013 HC RX MED GY IP 250 OP 250 PS 637: Performed by: PHYSICIAN ASSISTANT

## 2022-02-26 PROCEDURE — 85260 CLOT FACTOR X STUART-POWER: CPT | Performed by: SURGERY

## 2022-02-26 PROCEDURE — 250N000013 HC RX MED GY IP 250 OP 250 PS 637: Performed by: NURSE PRACTITIONER

## 2022-02-26 PROCEDURE — 214N000001 HC R&B CCU UMMC

## 2022-02-26 PROCEDURE — 36415 COLL VENOUS BLD VENIPUNCTURE: CPT

## 2022-02-26 PROCEDURE — 97535 SELF CARE MNGMENT TRAINING: CPT | Mod: GO | Performed by: OCCUPATIONAL THERAPIST

## 2022-02-26 PROCEDURE — 250N000013 HC RX MED GY IP 250 OP 250 PS 637: Performed by: STUDENT IN AN ORGANIZED HEALTH CARE EDUCATION/TRAINING PROGRAM

## 2022-02-26 PROCEDURE — 80053 COMPREHEN METABOLIC PANEL: CPT | Performed by: SURGERY

## 2022-02-26 PROCEDURE — 85730 THROMBOPLASTIN TIME PARTIAL: CPT | Performed by: SURGERY

## 2022-02-26 PROCEDURE — 97530 THERAPEUTIC ACTIVITIES: CPT | Mod: GO | Performed by: OCCUPATIONAL THERAPIST

## 2022-02-26 PROCEDURE — 250N000011 HC RX IP 250 OP 636: Performed by: SURGERY

## 2022-02-26 PROCEDURE — 85027 COMPLETE CBC AUTOMATED: CPT | Performed by: SURGERY

## 2022-02-26 RX ORDER — POTASSIUM CHLORIDE 750 MG/1
20 TABLET, EXTENDED RELEASE ORAL ONCE
Status: COMPLETED | OUTPATIENT
Start: 2022-02-26 | End: 2022-02-26

## 2022-02-26 RX ORDER — FUROSEMIDE 40 MG
40 TABLET ORAL DAILY
Status: DISCONTINUED | OUTPATIENT
Start: 2022-02-26 | End: 2022-02-27

## 2022-02-26 RX ADMIN — Medication 10 MG: at 20:38

## 2022-02-26 RX ADMIN — NYSTATIN 1000000 UNITS: 100000 SUSPENSION ORAL at 09:42

## 2022-02-26 RX ADMIN — POTASSIUM CHLORIDE 20 MEQ: 750 TABLET, EXTENDED RELEASE ORAL at 09:44

## 2022-02-26 RX ADMIN — CARVEDILOL 50 MG: 25 TABLET, FILM COATED ORAL at 20:37

## 2022-02-26 RX ADMIN — NYSTATIN 1000000 UNITS: 100000 SUSPENSION ORAL at 16:06

## 2022-02-26 RX ADMIN — NYSTATIN 1000000 UNITS: 100000 SUSPENSION ORAL at 20:37

## 2022-02-26 RX ADMIN — POTASSIUM CHLORIDE 20 MEQ: 750 TABLET, EXTENDED RELEASE ORAL at 09:43

## 2022-02-26 RX ADMIN — CARVEDILOL 50 MG: 25 TABLET, FILM COATED ORAL at 09:43

## 2022-02-26 RX ADMIN — ATORVASTATIN CALCIUM 40 MG: 40 TABLET, FILM COATED ORAL at 20:38

## 2022-02-26 RX ADMIN — LISINOPRIL 20 MG: 20 TABLET ORAL at 09:43

## 2022-02-26 RX ADMIN — PANTOPRAZOLE SODIUM 40 MG: 40 TABLET, DELAYED RELEASE ORAL at 08:46

## 2022-02-26 RX ADMIN — WARFARIN SODIUM 14 MG: 10 TABLET ORAL at 18:24

## 2022-02-26 RX ADMIN — BIVALIRUDIN 0.09 MG/KG/HR: 250 INJECTION, POWDER, LYOPHILIZED, FOR SOLUTION INTRAVENOUS at 18:29

## 2022-02-26 RX ADMIN — BIVALIRUDIN 0.09 MG/KG/HR: 250 INJECTION, POWDER, LYOPHILIZED, FOR SOLUTION INTRAVENOUS at 06:57

## 2022-02-26 RX ADMIN — BIVALIRUDIN 0.18 MG/KG/HR: 250 INJECTION, POWDER, LYOPHILIZED, FOR SOLUTION INTRAVENOUS at 04:22

## 2022-02-26 RX ADMIN — FUROSEMIDE 40 MG: 40 TABLET ORAL at 09:43

## 2022-02-26 RX ADMIN — NYSTATIN 1000000 UNITS: 100000 SUSPENSION ORAL at 13:09

## 2022-02-26 RX ADMIN — ASPIRIN 81 MG CHEWABLE TABLET 81 MG: 81 TABLET CHEWABLE at 09:44

## 2022-02-26 ASSESSMENT — ACTIVITIES OF DAILY LIVING (ADL)
ADLS_ACUITY_SCORE: 19
ADLS_ACUITY_SCORE: 17
ADLS_ACUITY_SCORE: 19
ADLS_ACUITY_SCORE: 17
ADLS_ACUITY_SCORE: 19
ADLS_ACUITY_SCORE: 19
ADLS_ACUITY_SCORE: 17
ADLS_ACUITY_SCORE: 19
ADLS_ACUITY_SCORE: 17
ADLS_ACUITY_SCORE: 19
ADLS_ACUITY_SCORE: 17
ADLS_ACUITY_SCORE: 19
ADLS_ACUITY_SCORE: 17
ADLS_ACUITY_SCORE: 17
ADLS_ACUITY_SCORE: 15

## 2022-02-26 NOTE — PROGRESS NOTES
Cardiovascular Surgery Progress Note  02/26/2022         Assessment and Plan:     Chuy Varner is a 30 yo M with a PMHx of bicuspid aortic valve, thoracic aortic aneurysm without rupture, hypertension, obesity, and testicular cancer s/p orchiectomy who underwent Bentall procedure with mechanical valve on 1/28 with Dr. Velasco and Dr. Hannah. 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      Cardiovascular:   # Ascending aortic aneurysm s/p Bentall w/ Jerry valve on 1/28 with Dr. Velasco and Dr. Hannah  # Hx of HTN  - Goal MAP>65, SBP <130  LVEF: 60-65%  - Coreg 50 BID  Lisinopril 20mg daily  - ASA 81mg  Atorvastatin 40mg   - Anticoagulatin for valve, will need coumadin with INR goal 2-3 for three months, then 1.5-2.0 for life, with ASA. Started Coumadin 2/19, Continue Bival as bridge until Factor X therapeutic.   - Baseline TTE per Dr. Velasco 2/23: normal LVEF, AV well seated with mean gradient 7 mmHg      Chest tubes and TPW removed 2/4     Pulmonary:  # Acute hypoxemic respiratory failure (ARDS vs. PNA vs. Hypervolemia vs. TRALI vs. severe atelectasis) s/p extubation  - Extubated 2/14 to HFNC, now breathing on Room Air  - conditional VBGs - draw one if increased somnolence or distressed breathing  - continue respiratory therapy  - metanebs (percussive therapy)  - flutter valve QID to encourage coughing, clearance of airways     Neurology / MSK:  Neuro/ pain/ sedation:  # Acute Postoperative pain  - Monitor neurological status. Notify the MD for any acute changes in exam.  - Neurology last saw 2/17  - Pain well controlled on PRN tylenol, dilaudid, robaxin, oxycodone     # Dysarthria - improving   - speech therapy evaluation 02/17 for dysarthric. Reconsulted 2/23 for advancing diet. No new concerns, continue swallowing exercises and  sit upright when eating. SLP signed off 2/23.  - ENT scoped, no reason for long term dysarthria/problems with phonation  - Sleep hygiene: Melatonin, trazadone prn at bedtime     # Thalamic infarct on head CT 2/16, old, age unknown  - Not contributing to current dysarthria vs delirium vs deconditioning per Neuro  - Neurology would like an MRI: to be done outpatient per Dr. Velasco  - Lipid panel, HgbA1c ordered per neuro for stroke Hx     # Symptoms of depression with insomnia   - Mental health consult 2/22, will continue to follow up weekly  - sleeping better, more upbeat with plans to discharge soon to TCU        / Renal:  -Hypernatremia, resolved  BL creat appears to be ~ 1.0  Most recent Creatinine: 0.60  - Daily weights and I/O's, net 24 hr I/O -2.7 L.  - Avoid/limit nephrotoxins as able  Replete lytes PRN per protocol  - Pre-op weight 365 lbs, weight 2/24 328 lbs. Today's weight pending   - Moderate amount of MARIAM, has wraps on with decreased edema. Has windy UE edema with windy non-occlusive DVTs- elevate both arms and limit B/p's    - Re Consulted PT/OT for right lymphedema wrap 2/19.   Diuresis: Decrease Lasix to 40mg daily with potassium supplementation   - check BMP MWF      GI / FEN:   - Removed NJ Tube 2/23.  - Speech following okay to advanced to regular diet with thin liquids 1/23  - BM 2/24, prn BR   # Mild-Moderate Protein-calorie Malnutrition  - Weight loss > 5% over 1 month  - Oral intake <75% for more than 7 days  -  Nutrition consulted 2/23 for recommendations - Ensure Enlive BID between meals   -  Eating much better now, especially with bringing food in from outside   # Diarrhea/Incontinence  Suspect caused by large amounts of free water with tube feeds via NJ (now removed) vs augmentin   - Less frequent loose stools, Cdiff-negative 2/20  # Elevated ALT, AST, suspect d/t Augmentin vs volume overload with hepatic congestion, Resolved   - Monitor for now wnl, no abdominal pain.    - discontinue daily  CMP  - Abd CT 1/30 - No cholecystitis       Endocrine:  # Stress induced hyperglycemia, improving  # Obesity (BMI 46)  - HDSSI  - Hgb A1c 5.8 2/18  - 20 stopped 2/25 after am dose   - Goal BG <180 for optimal healing  - BG 's without any insulin       Infectious Disease:  # Stress induced leukocytosis  # Possible ventilator associated pneumonia  # Sinusitis, treated with Augmentin   Pan cultures and re-cultures all remained unrevealing. Fever likely from previously untreated DVT vs sinusitis.  - Continue to monitor fever curve, WBC and inflammatory markers  - WBC WNL trending down, AF.  - Checked C. Difficile 2/21 negative, UA    - CXR no change       Hematology:   # History of testicular cancer s/p orchiectomy  # RLE, Cesar UE DVT's  # HIT positive   - Right LE Lymphedema wrap, reconsult PT/OT   - CBC daily, Hgb 9.9, stable   - Continue Bivalirudin gtt as bridge to coumadin, following Chromogenic Factor X  - started coumadin 2/19 with INR goal 2-3, pharmacy to dose   - Right upper extremity US 2/21 with occlusive thrombus of one of the paired right brachial veins at the upper arm, with PICC in place, which was removed 2/21.   - LUE US 2/21 wotj occlusive superficial thrombus in left cephalic vein at the antecubital fossa. No evidence of left upper extremity deep venous thrombosis     Anticoagulation:   - ASA 81 mg daily   - Bival drip bridge to therapeutic coumadin, INR goal 2-3 for three months, then 1.5-2.0 lifelong   - Follow Chromogenic Factor X, goal 20-40 while on Bival. Chromogenic Factor X - Pending   - Most recent INR: 2.54.     MSK/ Skin:  # Sternotomy  # Surgical Incision  # Buttock pressure injury   - Sternal precautions  - Postoperative incision management per protocol  - PT/OT  - WOCN following for pressure injuries     # Maculopapular Rash, improving  Patient has maculopapular rash per Exam below.  - Likely represents folliculitis, monitor for now, improving     # Sternal Clicking and Popping   -  "Occurs with deep inspiration, but not appreciated with coughing. No drainage or erythema or increased pain. Noted evening of 2/24  - Non Contrast CT: incomplete healing of bony sternum. Sternal wires intact. This was reviewed with Dr. Velasco and with patient. Sternum still healing, continue sternal precautions and avoid aggressive coughing.      Prophylaxis:    Mechanical prophylaxis for DVT  - Chemical DVT prophylaxis- bivalirudin and Warfarin  - PPI - until 03/16 (30d from extubation) per institutional protocol  - HOB to 30 degrees     Disposition:   - Transferred to  on 2/18  - PM& R consult 2/25: recommending discharge to TCU or subacute ARU  - Discharge pending therapeutic INR off Bivalrudin, possible Monday     Discussed with Surgeon, Dr. Velasco via written and verbal commnication.     Qi Umaña, DNP, CNP  [facility]oth[facility]acic Surgery  O: [phone]  Pgr [phone]          Interval History:    Slept good last night. Continues to have sternal clicking with cough.     Tolerating diet, increasing appetite. Passing flatus, BM x 1. No nausea or vomiting.  Breathing well without complaints.   Working with therapies, is total lift.   Denies chest pain, palpitations, dizziness, syncopal symptoms, fevers, chills, myalgias, or sternal popping/clicking.         Physical Exam:   Blood pressure 114/75, pulse 94, temperature 98.7  F (37.1  C), temperature source Oral, resp. rate 20, height 1.905 m (6' 3\"), weight 148.8 kg (328 lb), SpO2 95 %.  Vitals:    02/22/22 0453 02/23/22 0316 02/24/22 0500   Weight: 147.6 kg (325 lb 6.4 oz) 147.9 kg (326 lb 1.6 oz) 148.8 kg (328 lb)      Current Weight:148.8 Kg 2/24, Today's weight pending   Admit weight: 165.9 Kg    Daily Fluid status; net loss: -2.7  mL      MAPs: 73-92  LVEF: 80's    Gen: A&Ox4, NAD. Significant other at the bedside.   Neuro: no focal deficits   CV: RRR, normal S1 S2, no murmurs, rubs or gallops. No appreciable JVD   Vascular: Peripheral pulses present Radial " 2+, Dorsalis Pedis 2+, Posterior Tibialis 2+.   Pulm: CTA, no wheezing or rhonchi, normal breathing on RA  Abd: nondistended, normal BS, soft, nontender  Ext: moderate amount of peripheral edema, warm. Has windy compression stockings on   Incision: clean, dry, intact, no erythema, sternum stable  Tubes/drain sites: clean and dry, healing well           Data:    Imaging:  reviewed recent imaging    Echocardiogram 2/23/2022: Interpretation Summary  S/P Aortic root replacement with 27-29 mm On-X ascending aortic prosthesis  with Vascutek Gelweave Valsalva graft (Bentall procedure with coronary  implantation).on 1/31/2022.  Global and regional left ventricular function is normal with an EF of 60-65%.  Right ventricular function, chamber size, wall motion, and thickness are normal.  A mechanical aortic valve is present.  Doppler interrogation of the aortic valve is normal.  The mean gradient across the aortic valve is7 mmHg.  Trivial pericardial effusion is present.  Interpretation:    CT scan 2/25/2022: Impression:   1. Postsurgical changes sternotomy, wires are intact, incomplete bony healing of the sternum.  2. Trace pericardial effusion.    Labs:  CBC  Recent Labs   Lab 02/26/22  0518 02/25/22  0559 02/24/22  0453 02/23/22  0616   WBC 6.7 8.3 8.9 11.3*   RBC 3.36* 3.39* 3.44* 3.31*   HGB 9.9* 9.9* 10.3* 9.6*   HCT 32.3* 32.9* 34.1* 33.5*   MCV 96 97 99 101*   MCH 29.5 29.2 29.9 29.0   MCHC 30.7* 30.1* 30.2* 28.7*   RDW 15.2* 15.4* 15.6* 15.5*    295 317 293     CMP:  Last Comprehensive Metabolic Panel:  Sodium   Date Value Ref Range Status   02/26/2022 138 133 - 144 mmol/L Final     Potassium   Date Value Ref Range Status   02/26/2022 3.8 3.4 - 5.3 mmol/L Final   02/05/2022 4.3 3.4 - 5.3 mmol/L Final     Chloride   Date Value Ref Range Status   02/26/2022 108 94 - 109 mmol/L Final     Carbon Dioxide (CO2)   Date Value Ref Range Status   02/26/2022 24 20 - 32 mmol/L Final     Anion Gap   Date Value Ref Range  Status   02/26/2022 6 3 - 14 mmol/L Final     Glucose   Date Value Ref Range Status   02/26/2022 94 70 - 99 mg/dL Final     GLUCOSE BY METER POCT   Date Value Ref Range Status   02/25/2022 95 70 - 99 mg/dL Final     Urea Nitrogen   Date Value Ref Range Status   02/26/2022 17 7 - 30 mg/dL Final     Creatinine   Date Value Ref Range Status   02/26/2022 0.60 (L) 0.66 - 1.25 mg/dL Final     GFR Estimate   Date Value Ref Range Status   02/26/2022 >90 >60 mL/min/1.73m2 Final     Comment:     Effective December 21, 2021 eGFRcr in adults is calculated using the 2021 CKD-EPI creatinine equation which includes age and gender (Renata et al., [place], DOI: 10.1056/ZHYKhl9332609)     Calcium   Date Value Ref Range Status   02/26/2022 8.9 8.5 - 10.1 mg/dL Final     Bilirubin Total   Date Value Ref Range Status   02/26/2022 0.4 0.2 - 1.3 mg/dL Final     Alkaline Phosphatase   Date Value Ref Range Status   02/26/2022 84 40 - 150 U/L Final     ALT   Date Value Ref Range Status   02/26/2022 69 0 - 70 U/L Final     AST   Date Value Ref Range Status   02/26/2022 19 0 - 45 U/L Final     BMP  Recent Labs   Lab 02/26/22  0518 02/25/22  2337 02/25/22  2000 02/25/22  1525 02/25/22  0819 02/25/22  0559 02/24/22  0506 02/24/22  0453 02/23/22  0727 02/23/22  0616     --   --   --   --  140  --  139  --  136   POTASSIUM 3.8  --   --   --   --  4.0  --  3.8  --  4.1   CHLORIDE 108  --   --   --   --  107  --  108  --  108   MAYE 8.9  --   --   --   --  8.8  --  8.7  --  8.7   CO2 24  --   --   --   --  26  --  27  --  21   BUN 17  --   --   --   --  21  --  23  --  25   CR 0.60*  --   --   --   --  0.60*  --  0.58*  --  0.58*   GLC 94 95 79 86   < > 87   < > 93   < > 110*    < > = values in this interval not displayed.     INR  Recent Labs   Lab 02/26/22  0518 02/25/22  1551 02/25/22  0559 02/24/22  0453   INR 2.54* 1.91* 2.05* 1.79*      Hepatic Panel  Recent Labs   Lab 02/26/22  0518 02/25/22  0559 02/24/22  0453 02/23/22  0616   AST 19 28 31  52*   ALT 69 84* 96* 108*   ALKPHOS 84 88 78 85   BILITOTAL 0.4 0.4 0.4 0.3   ALBUMIN 2.7* 2.6* 2.6* 2.4*     GLUCOSE:   Recent Labs   Lab 02/26/22  0518 02/25/22  2337 02/25/22 2000 02/25/22  1525 02/25/22  1141 02/25/22  0819   GLC 94 95 79 86 105* 90

## 2022-02-26 NOTE — SUMMARY OF CARE
Transfer  Transferred from: 6b  Via:bed  Reason for transfer: Pt appropriate for 6C- Stable condition.  Family: Aware of transfer  Belongings: Received with pt  Chart: Received with pt  Medications: Meds received from old unit with pt  Code Status verified on armband: Yes  2 RN Skin Assessment Completed By: CARINA Ewing and CARINA Wilkerson. Intact, incisional healing wounds and scattered rash.  Bed surface reassessed with algorithm and charted: Special bed  Suction/Ambu bag/Flowmeter at bedside: yes/no    Report received from: CARINA Paul  Pt status: STable

## 2022-02-26 NOTE — PLAN OF CARE
Goal Outcome Evaluation:    Plan of Care Reviewed With: patient, significant other     Neuro: A&Ox4.   Cardiac: Afebrile, VSS. SR, normotensive.   Respiratory: RA   GI/: Voiding large amounts often. Needs help with urinal. Used primofit overnight. Large BM this shift using bedpan.   Diet/appetite: Tolerating regular diet. Requires help eating due to muscle fatigue. Denies nausea   Activity: Up with lift assist. Awaiting transfer to ceiling lift room.   Pain: . Denies   Skin: Sternal incision approximated. Beard trimmed to avoid rubbing top of incision.  Lines: PIV with bivalirudin infusing @0.09mg/kg/hr. *Patient insistent on US guided lab draw and AM PTT recheck delayed, however is therapeutic range. INR 2-3 but Chromogenic X not in goal range yet.  Drains: None.  Replacements: K 3.6, extra 20meq given this AM.

## 2022-02-26 NOTE — PROGRESS NOTES
D/alert and in lift room now, family member present.continues on Angiomax drip  A/slow progress  P/monitor for changes

## 2022-02-26 NOTE — PLAN OF CARE
D:   PMHx of bicuspid aortic valve, thoracic aortic aneurysm without rupture, hypertension, obesity, and testicular cancer s/p orchiectomy who underwent Bentall procedure with mechanical valve on 1/28 with Dr. Velasco and Dr. Hannah. 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     I: Monitored vitals and assessed pt status. Encouraged activity.     Changed: new bag angiomax @ 0422  Held angiomax 1 hour (starting 0600) per order of a PTT of 97 restart at 0700 50% previous rate (0.09 mg/kg/hr)  Running: Angiomax 0.18 mg/kg/hr - L PIV     A: A&Ox4,  PERRLA abnormal at baseling R>L. . VSS on RA. Tele shows SR 80s. Afebrile. Urinating adequately via urinal and primofit. Pt. Has a difficult time moving upper and lower extremities d/t generalized wearness. Up with the use of lift, and will only stand with PT. Denies pain, has a sternal incision and CT sites, turned every 2 hours and is on a specialty mattress/ bed. BP's only to be done on right forearm d/t history of DVT in other extremities. LE edema present lymph wraps schedule for today.      P: Continue to monitor pt status and report changes to CVTS treatment team. Weighing options for discharge to ARU vs TCU.      [id number]

## 2022-02-26 NOTE — PROGRESS NOTES
Calorie Count  Intake recorded for: 2/25  Total Kcals: 0 Total Protein: 0g  Kcals from Hospital Food: 0   Protein: 0g  Kcals from Outside Food (average):0 Protein: 0g  # Meals Ordered from Kitchen: 2 meals   # Meals Recorded: no intake recorded.   # Supplements Recorded: no intake recorded.

## 2022-02-27 ENCOUNTER — APPOINTMENT (OUTPATIENT)
Dept: PHYSICAL THERAPY | Facility: CLINIC | Age: 32
End: 2022-02-27
Attending: SURGERY
Payer: COMMERCIAL

## 2022-02-27 ENCOUNTER — APPOINTMENT (OUTPATIENT)
Dept: OCCUPATIONAL THERAPY | Facility: CLINIC | Age: 32
End: 2022-02-27
Attending: SURGERY
Payer: COMMERCIAL

## 2022-02-27 LAB
APTT PPP: 68 SECONDS (ref 22–38)
ERYTHROCYTE [DISTWIDTH] IN BLOOD BY AUTOMATED COUNT: 14.9 % (ref 10–15)
FACT X ACT/NOR PPP CHRO: 40 % (ref 70–130)
HCT VFR BLD AUTO: 36.5 % (ref 40–53)
HGB BLD-MCNC: 10.9 G/DL (ref 13.3–17.7)
INR PPP: 2.86 (ref 0.85–1.15)
MCH RBC QN AUTO: 29.5 PG (ref 26.5–33)
MCHC RBC AUTO-ENTMCNC: 29.9 G/DL (ref 31.5–36.5)
MCV RBC AUTO: 99 FL (ref 78–100)
PLATELET # BLD AUTO: 312 10E3/UL (ref 150–450)
POTASSIUM BLD-SCNC: 4 MMOL/L (ref 3.4–5.3)
RBC # BLD AUTO: 3.69 10E6/UL (ref 4.4–5.9)
WBC # BLD AUTO: 7.2 10E3/UL (ref 4–11)

## 2022-02-27 PROCEDURE — 214N000001 HC R&B CCU UMMC

## 2022-02-27 PROCEDURE — 85610 PROTHROMBIN TIME: CPT | Performed by: SURGERY

## 2022-02-27 PROCEDURE — 97535 SELF CARE MNGMENT TRAINING: CPT | Mod: GO | Performed by: OCCUPATIONAL THERAPIST

## 2022-02-27 PROCEDURE — 97530 THERAPEUTIC ACTIVITIES: CPT | Mod: GP

## 2022-02-27 PROCEDURE — 250N000013 HC RX MED GY IP 250 OP 250 PS 637: Performed by: STUDENT IN AN ORGANIZED HEALTH CARE EDUCATION/TRAINING PROGRAM

## 2022-02-27 PROCEDURE — 250N000013 HC RX MED GY IP 250 OP 250 PS 637: Performed by: SURGERY

## 2022-02-27 PROCEDURE — 250N000013 HC RX MED GY IP 250 OP 250 PS 637: Performed by: PHYSICIAN ASSISTANT

## 2022-02-27 PROCEDURE — 97110 THERAPEUTIC EXERCISES: CPT | Mod: GP

## 2022-02-27 PROCEDURE — 84132 ASSAY OF SERUM POTASSIUM: CPT | Performed by: SURGERY

## 2022-02-27 PROCEDURE — 85048 AUTOMATED LEUKOCYTE COUNT: CPT | Performed by: SURGERY

## 2022-02-27 PROCEDURE — 85260 CLOT FACTOR X STUART-POWER: CPT | Performed by: SURGERY

## 2022-02-27 PROCEDURE — 85027 COMPLETE CBC AUTOMATED: CPT | Performed by: SURGERY

## 2022-02-27 PROCEDURE — 36415 COLL VENOUS BLD VENIPUNCTURE: CPT | Performed by: SURGERY

## 2022-02-27 PROCEDURE — 250N000013 HC RX MED GY IP 250 OP 250 PS 637: Performed by: NURSE PRACTITIONER

## 2022-02-27 PROCEDURE — 85730 THROMBOPLASTIN TIME PARTIAL: CPT | Performed by: SURGERY

## 2022-02-27 RX ADMIN — PANTOPRAZOLE SODIUM 40 MG: 40 TABLET, DELAYED RELEASE ORAL at 09:37

## 2022-02-27 RX ADMIN — ASPIRIN 81 MG CHEWABLE TABLET 81 MG: 81 TABLET CHEWABLE at 09:37

## 2022-02-27 RX ADMIN — CARVEDILOL 50 MG: 25 TABLET, FILM COATED ORAL at 09:37

## 2022-02-27 RX ADMIN — Medication 10 MG: at 19:32

## 2022-02-27 RX ADMIN — WARFARIN SODIUM 12.5 MG: 10 TABLET ORAL at 18:05

## 2022-02-27 RX ADMIN — NYSTATIN 1000000 UNITS: 100000 SUSPENSION ORAL at 09:44

## 2022-02-27 RX ADMIN — CARVEDILOL 50 MG: 25 TABLET, FILM COATED ORAL at 19:32

## 2022-02-27 RX ADMIN — ATORVASTATIN CALCIUM 40 MG: 40 TABLET, FILM COATED ORAL at 19:32

## 2022-02-27 RX ADMIN — POTASSIUM CHLORIDE 20 MEQ: 750 TABLET, EXTENDED RELEASE ORAL at 09:37

## 2022-02-27 RX ADMIN — LISINOPRIL 20 MG: 20 TABLET ORAL at 09:37

## 2022-02-27 RX ADMIN — FUROSEMIDE 40 MG: 40 TABLET ORAL at 09:37

## 2022-02-27 ASSESSMENT — ACTIVITIES OF DAILY LIVING (ADL)
ADLS_ACUITY_SCORE: 17
ADLS_ACUITY_SCORE: 15
ADLS_ACUITY_SCORE: 17
ADLS_ACUITY_SCORE: 19
ADLS_ACUITY_SCORE: 17

## 2022-02-27 NOTE — PLAN OF CARE
Goal Outcome Evaluation:    Plan of Care Reviewed With: patient, significant other     Neuro: A&Ox4.   Cardiac: Afebrile, VSS. SR, normotensive.     Respiratory: RA   GI/: Voiding large amounts often. Needs help with urinal. Used primofit overnight. Large BM this morning. Smaller one while working with therapy. Up to BSC for both using ceiling lift.  Diet/appetite: Tolerating regular diet. Requires help eating due to muscle fatigue. Denies nausea   Activity: Up with lift assist. Up in chair twice.  Pain: . Denies   Skin: Sternal incision approximated.   Lines: PIV Sl'd. Angiomax discontinued this afternoon.  Drains: None.  Replacements: None needed.  *Lab successful with venipuncture today.

## 2022-02-27 NOTE — PROGRESS NOTES
Cardiovascular Surgery Progress Note  02/27/2022         Assessment and Plan:     Chuy Varner is a 30 yo M with a PMHx of bicuspid aortic valve, thoracic aortic aneurysm without rupture, hypertension, obesity, and testicular cancer s/p orchiectomy who underwent Bentall procedure with mechanical valve on 1/28 with Dr. Velasco and Dr. Hannah. 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      Cardiovascular:   # Ascending aortic aneurysm s/p Bentall w/ Jerry valve on 1/28 with Dr. Velasco and Dr. Hannah  # Hx of HTN  - Goal MAP>65, SBP <130  LVEF: 60-65%  - Coreg 50 BID, Lisinopril 20mg daily  - ASA 81mg, Atorvastatin 40mg   - Anticoagulatin for valve, will need coumadin with INR goal 2-3 for three months, then 1.5-2.0 for life, with ASA. Started Coumadin 2/19, Continue Bival as bridge until Factor X therapeutic.   - Baseline TTE per Dr. Velasco 2/23: normal LVEF, AV well seated with mean gradient 7 mmHg      Chest tubes and TPW removed 2/4     Pulmonary:  # Acute hypoxemic respiratory failure (ARDS vs. PNA vs. Hypervolemia vs. TRALI vs. severe atelectasis) s/p extubation  - Extubated 2/14 to HFNC, now breathing on Room Air  - conditional VBGs - draw one if increased somnolence or distressed breathing  - Had required respiratory therapy, metanebs (percussive therapy), and flutter valve QID to encourage coughing, clearance of airways.      Neurology / MSK:  Neuro/ pain/ sedation:  # Acute Postoperative pain  - Monitor neurological status. Notify the MD for any acute changes in exam.  - Neurology signed off, last note 2/17  - Pain well controlled on PRN tylenol, dilaudid, robaxin, oxycodone     # Dysarthria - improving   - speech therapy evaluation 02/17 for dysarthric. Reconsulted 2/23 for advancing diet. No new concerns, continue  swallowing exercises and sit upright when eating. SLP signed off 2/23.  - ENT scoped, no reason for long term dysarthria/problems with phonation  - Sleep hygiene: Melatonin, trazadone prn at bedtime     # Thalamic infarct on head CT 2/16, old, age unknown  - Not contributing to current dysarthria vs delirium vs deconditioning per Neuro  - Neurology would like an MRI: to be done outpatient per Dr. Velasco  - Lipid panel, HgbA1c ordered per neuro for stroke Hx     # Symptoms of depression with insomnia   - Mental health consult 2/22, will continue to follow up weekly  - sleeping better, more upbeat with plans to discharge soon to TCU        / Renal:  -Hypernatremia, resolved  BL creat appears to be ~ 1.0  Most recent Creatinine: 0.60  - Daily weights and I/O's, net 24 hr I/O -2.7 L.  - Avoid/limit nephrotoxins as able  Replete lytes PRN per protocol  - Pre-op weight 365 lbs, weight 2/24 328 lbs. Today's weight 322 lbs   - Moderate amount of MARIAM, has wraps on with decreased edema. Has windy UE edema with windy non-occlusive DVTs- elevate both arms and limit B/p's    - Re Consulted PT/OT for right lymphedema wrap 2/19.   Diuresis: Lasix 40mg daily with K supplementation stopped 2/27. Check BMP MWF      GI / FEN:   - Required nutritional support while intubated. Removed NJ Tube 2/23.  - Speech following okay to advanced to regular diet with thin liquids 1/23  - BM 2/24, prn BR   # Mild-Moderate Protein-calorie Malnutrition  - Weight loss > 5% over 1 month  - Oral intake <75% for more than 7 days  -  Nutrition consulted 2/23 for recommendations - Ensure Enlive BID between meals   -  Eating much better now, especially with bringing food in from outside   # Diarrhea/Incontinence  - Suspect caused by large amounts of free water with tube feeds via NJ (now removed) vs augmentin   - Less frequent loose stools, Cdiff-negative 2/20  # Elevated ALT, AST  - Suspect d/t Augmentin vs volume overload with hepatic congestion, Resolved    - Monitor for now wnl, no abdominal pain.    - Abd CT 1/30 - No cholecystitis       Endocrine:  # Stress induced hyperglycemia, improving  # Obesity (BMI 46)  - HDSSI stopped 2/25 after am dose   - Hgb A1c 5.8 2/18  - Goal BG <180 for optimal healing.  BG 's without any insulin      Infectious Disease:  # Stress induced leukocytosis  # Possible ventilator associated pneumonia  # Sinusitis, treated with Augmentin   Pan cultures and re-cultures all remained unrevealing. Fever likely from previously untreated DVT vs sinusitis.  - Continue to monitor fever curve, WBC and inflammatory markers  - WBC WNL, remains afebrile.  - Checked C. Difficile 2/21 negative, UA      Hematology:   # History of testicular cancer s/p orchiectomy  # RLE, Cesar UE DVT's  # HIT positive   - Right LE Lymphedema wrap, reconsult PT/OT   - CBC daily, Hgb 10.9, stable   - Finished Bivalirudin gtt as bridge to therapeutic Chromogenic Factor X  - Started coumadin 2/19 with INR goal 2-3, pharmacy to dose   - Right upper extremity US 2/21 with occlusive thrombus of one of the paired right brachial veins at the upper arm, with PICC in place, which was removed 2/21.   - LUE US 2/21 wotj occlusive superficial thrombus in left cephalic vein at the antecubital fossa. No evidence of left upper extremity deep venous thrombosis     Anticoagulation:   - ASA 81 mg daily   - Mechanical On-X aortic valve; coumadin, INR goal 2-3 for three months, then 1.5-2.0 lifelong.  - Chromogenic Factor X goal 20-40 while on Bival. Chromogenic Factor X - 40%. Stop Bival 2/27.   - Most recent INR: 2.86      MSK/ Skin:  # Sternotomy  # Surgical Incision  # Buttock pressure injury   - Sternal precautions  - Postoperative incision management per protocol  - PT/OT  - WOCN following for pressure injuries     # Maculopapular Rash, improving  Patient has maculopapular rash per Exam below.  - Likely represents folliculitis, monitor for now, improving      # Sternal Clicking and  "Popping   - Occurs with deep inspiration, but not appreciated with coughing. No drainage or erythema or increased pain. Noted evening of 2/24  - Non Contrast CT: incomplete healing of bony sternum. Sternal wires intact. This was reviewed with Dr. Velasco and with patient. Sternum still healing, continue sternal precautions and avoid aggressive coughing.      Prophylaxis:    Mechanical prophylaxis for DVT   - Chemical DVT prophylaxis- bivalirudin and Warfarin  - PPI - until 03/16 (30d from extubation) per institutional protocol  - HOB to 30 degrees     Disposition:   - Transferred to  on 2/18  - PM& R consult 2/25: recommending discharge to TCU or ARU  - Discharge pending therapeutic INR off Bivalrudin, possible Monday   - Needs Brain MRI as outpatient and Neuro Follow up    Discussed with Dr Velasco through both written and verbal communication.      Burton Pearl PA-C  Cardiothoracic Surgery  Pager [phone]    11:51 AM   February 27, 2022        Interval History:     No overnight events.   States pain is well managed on current regimen. Slept well overnight.  Tolerating diet, is passing flatus, + BM. No nausea or vomiting.  Breathing well without complaints.   Working with therapies and required heavy assistance and lifts.  Denies chest pain, palpitations, dizziness, syncopal symptoms, fevers, chills, myalgias, or sternal popping/clicking.         Physical Exam:   Blood pressure 134/84, pulse 91, temperature 97.8  F (36.6  C), temperature source Oral, resp. rate 18, height 1.905 m (6' 3\"), weight 146.1 kg (322 lb 1.6 oz), SpO2 94 %.  Vitals:    02/23/22 0316 02/24/22 0500 02/27/22 0157   Weight: 147.9 kg (326 lb 1.6 oz) 148.8 kg (328 lb) 146.1 kg (322 lb 1.6 oz)      Weight; - 43 lbs since admit and trending down.   24 hr Fluid status; net loss 2.7 L. UOP 6.1 L  MAPs: 82 - 105    Gen: A&Ox4, NAD  Neuro: no focal deficits   CV: RRR, normal S1 S2, no murmurs, rubs or gallops.   Pulm: CTA, no wheezing or rhonchi, " normal breathing on RA  Abd: nondistended, normal BS, soft, nontender  Ext: R leg and upper extremity peripheral edema, 1-2+ pitting  Incision: clean, dry, intact, no erythema, sternum stable  Tubes/drain sites: dressing clean and dry         Data:    Imaging:  reviewed recent imaging, no acute concerns    Labs:  BMP  Recent Labs   Lab 02/26/22 0518 02/25/22  2337 02/25/22  2000 02/25/22  1525 02/25/22  0819 02/25/22  0559 02/24/22  0506 02/24/22  0453 02/23/22  0727 02/23/22  0616     --   --   --   --  140  --  139  --  136   POTASSIUM 3.8  --   --   --   --  4.0  --  3.8  --  4.1   CHLORIDE 108  --   --   --   --  107  --  108  --  108   MAYE 8.9  --   --   --   --  8.8  --  8.7  --  8.7   CO2 24  --   --   --   --  26  --  27  --  21   BUN 17  --   --   --   --  21  --  23  --  25   CR 0.60*  --   --   --   --  0.60*  --  0.58*  --  0.58*   GLC 94 95 79 86   < > 87   < > 93   < > 110*    < > = values in this interval not displayed.     CBC  Recent Labs   Lab 02/26/22 0518 02/25/22  0559 02/24/22  0453 02/23/22  0616   WBC 6.7 8.3 8.9 11.3*   RBC 3.36* 3.39* 3.44* 3.31*   HGB 9.9* 9.9* 10.3* 9.6*   HCT 32.3* 32.9* 34.1* 33.5*   MCV 96 97 99 101*   MCH 29.5 29.2 29.9 29.0   MCHC 30.7* 30.1* 30.2* 28.7*   RDW 15.2* 15.4* 15.6* 15.5*    295 317 293     INR  Recent Labs   Lab 02/26/22 0518 02/25/22  1551 02/25/22  0559 02/24/22  0453   INR 2.54* 1.91* 2.05* 1.79*      Hepatic Panel  Recent Labs   Lab 02/26/22  0518 02/25/22  0559 02/24/22  0453 02/23/22  0616   AST 19 28 31 52*   ALT 69 84* 96* 108*   ALKPHOS 84 88 78 85   BILITOTAL 0.4 0.4 0.4 0.3   ALBUMIN 2.7* 2.6* 2.6* 2.4*     GLUCOSE:   Recent Labs   Lab 02/26/22  0518 02/25/22  2337 02/25/22  2000 02/25/22  1525 02/25/22  1141 02/25/22  0819   GLC 94 95 79 86 105* 90

## 2022-02-27 NOTE — PLAN OF CARE
D: s/p Bentall procedure with mechanical valve on 1/28  Course c/b postop hypoxemic respiratory failure, HIT positivity on Bivalirudin, LE DVT   PMHx of bicuspid aortic valve, thoracic aortic aneurysm without rupture, hypertension, obesity, and testicular cancer s/p orchiectomy     I: Monitored vitals and assessed pt status.   Changed: Turned q2hr  Running: PIV w/angiomax @ 0.9mg/kg/hr  PRN: none    A: A0x4. VSS, on RA. Monitor NSR w/1st degree AVB. Denies c/o pain.Last BM 2/27. Good UO-primofit on overnight.       I/O this shift:  In: 300 [P.O.:300]  Out: 1350 [Urine:1350]    Temp:  [98.1  F (36.7  C)-98.7  F (37.1  C)] 98.5  F (36.9  C)  Pulse:  [84-94] 90  Resp:  [18-20] 18  BP: (111-121)/(70-75) 120/70  SpO2:  [93 %-98 %] 95 %      P: Continue to monitor Pt status and report changes to treatment team.

## 2022-02-27 NOTE — PROGRESS NOTES
Calorie Count  Intake recorded for: 2/26  Total Kcals: 0 Total Protein: 0g  Kcals from Hospital Food: 0   Protein: 0g  Kcals from Outside Food (average):0 Protein: 0g  # Meals Ordered from Kitchen: 3 meals  # Meals Recorded: No food intake recorded  # Supplements Recorded: No food intake recorded    Per Epic Food Record, Pt consumed 75% at 4PM but not enough information to calculate calories/protein.

## 2022-02-27 NOTE — PROGRESS NOTES
"D/His family member left for home. He says he ordered supper for later and needs to be fed for supper. has urinal in place and will call for us to empty this prn  He says is numbers are good, and if they are good tomorrow he can go to TCU in next few days if they have bed open.  D/yesterday days reported family said he was drinking lots of water,  Today I went to give him supper meds and within about an hour he drank 2 large cups dry and emptied the water ice pitcher  I/I had to get him a cup of water for the pills, I told him that he cannot drink that much water in an hour and needs to watch how much you drink or you will get edema.   A/His roommate told him he was \"found out\". The patient just looked at me as I commented on this  D/He told me later that he normally drinks over a gallon of water each day.  A/His arms continue to be weak with weak grasp, and yet he was able to drink the water on his bedside without help He does move himself around in bed a little  P/continue to assist and turn prn, monitor fluid intake per usual  He may be asking many people who come in his room for water and thus getting more water to drink than he should have post op  "

## 2022-02-28 ENCOUNTER — TELEPHONE (OUTPATIENT)
Dept: CARDIOLOGY | Facility: CLINIC | Age: 32
End: 2022-02-28
Payer: COMMERCIAL

## 2022-02-28 PROBLEM — I63.40 CEREBRAL INFARCTION DUE TO EMBOLISM OF CEREBRAL ARTERY (H): Status: ACTIVE | Noted: 2022-02-28

## 2022-02-28 LAB
ANION GAP SERPL CALCULATED.3IONS-SCNC: 6 MMOL/L (ref 3–14)
APTT PPP: 38 SECONDS (ref 22–38)
BUN SERPL-MCNC: 12 MG/DL (ref 7–30)
CALCIUM SERPL-MCNC: 8.6 MG/DL (ref 8.5–10.1)
CHLORIDE BLD-SCNC: 108 MMOL/L (ref 94–109)
CO2 SERPL-SCNC: 24 MMOL/L (ref 20–32)
CREAT SERPL-MCNC: 0.6 MG/DL (ref 0.66–1.25)
ERYTHROCYTE [DISTWIDTH] IN BLOOD BY AUTOMATED COUNT: 14.8 % (ref 10–15)
GFR SERPL CREATININE-BSD FRML MDRD: >90 ML/MIN/1.73M2
GLUCOSE BLD-MCNC: 104 MG/DL (ref 70–99)
HCT VFR BLD AUTO: 34.1 % (ref 40–53)
HGB BLD-MCNC: 10.5 G/DL (ref 13.3–17.7)
INR PPP: 2.42 (ref 0.85–1.15)
MAGNESIUM SERPL-MCNC: 2.1 MG/DL (ref 1.6–2.3)
MCH RBC QN AUTO: 29.2 PG (ref 26.5–33)
MCHC RBC AUTO-ENTMCNC: 30.8 G/DL (ref 31.5–36.5)
MCV RBC AUTO: 95 FL (ref 78–100)
PHOSPHATE SERPL-MCNC: 3.9 MG/DL (ref 2.5–4.5)
PLATELET # BLD AUTO: 273 10E3/UL (ref 150–450)
POTASSIUM BLD-SCNC: 3.8 MMOL/L (ref 3.4–5.3)
RBC # BLD AUTO: 3.59 10E6/UL (ref 4.4–5.9)
SARS-COV-2 RNA RESP QL NAA+PROBE: NEGATIVE
SODIUM SERPL-SCNC: 138 MMOL/L (ref 133–144)
WBC # BLD AUTO: 6.3 10E3/UL (ref 4–11)

## 2022-02-28 PROCEDURE — 85027 COMPLETE CBC AUTOMATED: CPT | Performed by: SURGERY

## 2022-02-28 PROCEDURE — 85730 THROMBOPLASTIN TIME PARTIAL: CPT | Performed by: SURGERY

## 2022-02-28 PROCEDURE — 250N000013 HC RX MED GY IP 250 OP 250 PS 637: Performed by: SURGERY

## 2022-02-28 PROCEDURE — 250N000013 HC RX MED GY IP 250 OP 250 PS 637: Performed by: PHYSICIAN ASSISTANT

## 2022-02-28 PROCEDURE — 85610 PROTHROMBIN TIME: CPT | Performed by: SURGERY

## 2022-02-28 PROCEDURE — 84100 ASSAY OF PHOSPHORUS: CPT | Performed by: INTERNAL MEDICINE

## 2022-02-28 PROCEDURE — 83735 ASSAY OF MAGNESIUM: CPT | Performed by: INTERNAL MEDICINE

## 2022-02-28 PROCEDURE — U0003 INFECTIOUS AGENT DETECTION BY NUCLEIC ACID (DNA OR RNA); SEVERE ACUTE RESPIRATORY SYNDROME CORONAVIRUS 2 (SARS-COV-2) (CORONAVIRUS DISEASE [COVID-19]), AMPLIFIED PROBE TECHNIQUE, MAKING USE OF HIGH THROUGHPUT TECHNOLOGIES AS DESCRIBED BY CMS-2020-01-R: HCPCS | Performed by: PHYSICIAN ASSISTANT

## 2022-02-28 PROCEDURE — 80048 BASIC METABOLIC PNL TOTAL CA: CPT | Performed by: NURSE PRACTITIONER

## 2022-02-28 PROCEDURE — 36415 COLL VENOUS BLD VENIPUNCTURE: CPT | Performed by: SURGERY

## 2022-02-28 PROCEDURE — 85260 CLOT FACTOR X STUART-POWER: CPT | Performed by: SURGERY

## 2022-02-28 PROCEDURE — 214N000001 HC R&B CCU UMMC

## 2022-02-28 PROCEDURE — 250N000013 HC RX MED GY IP 250 OP 250 PS 637: Performed by: STUDENT IN AN ORGANIZED HEALTH CARE EDUCATION/TRAINING PROGRAM

## 2022-02-28 RX ORDER — WARFARIN SODIUM 7.5 MG/1
15 TABLET ORAL
Status: COMPLETED | OUTPATIENT
Start: 2022-02-28 | End: 2022-02-28

## 2022-02-28 RX ADMIN — WARFARIN SODIUM 15 MG: 7.5 TABLET ORAL at 18:02

## 2022-02-28 RX ADMIN — ASPIRIN 81 MG CHEWABLE TABLET 81 MG: 81 TABLET CHEWABLE at 08:34

## 2022-02-28 RX ADMIN — CARVEDILOL 50 MG: 25 TABLET, FILM COATED ORAL at 20:29

## 2022-02-28 RX ADMIN — LISINOPRIL 20 MG: 20 TABLET ORAL at 08:34

## 2022-02-28 RX ADMIN — POTASSIUM CHLORIDE 20 MEQ: 750 TABLET, EXTENDED RELEASE ORAL at 08:34

## 2022-02-28 RX ADMIN — CARVEDILOL 50 MG: 25 TABLET, FILM COATED ORAL at 08:34

## 2022-02-28 RX ADMIN — ATORVASTATIN CALCIUM 40 MG: 40 TABLET, FILM COATED ORAL at 20:29

## 2022-02-28 RX ADMIN — Medication 10 MG: at 20:29

## 2022-02-28 RX ADMIN — PANTOPRAZOLE SODIUM 40 MG: 40 TABLET, DELAYED RELEASE ORAL at 08:34

## 2022-02-28 ASSESSMENT — ACTIVITIES OF DAILY LIVING (ADL)
ADLS_ACUITY_SCORE: 17
ADLS_ACUITY_SCORE: 19
ADLS_ACUITY_SCORE: 17
ADLS_ACUITY_SCORE: 19
ADLS_ACUITY_SCORE: 17
ADLS_ACUITY_SCORE: 19
ADLS_ACUITY_SCORE: 17
ADLS_ACUITY_SCORE: 19
ADLS_ACUITY_SCORE: 17
ADLS_ACUITY_SCORE: 19

## 2022-02-28 NOTE — DISCHARGE SUMMARY
[facility], [place]   Cardiothoracic Surgery Hospital Discharge Summary     Chuy Varner MRN# [id number]   Age: 31 year old YOB: 1990     Admitting Physician:  Nickolas Velasco MD  Discharge Physician:  Keila Churchill PA-C  Primary Care Physician:        Hernando Moore     DATE OF ADMISSION: 1/28/2022      DATE OF DISCHARGE: March 4th, 2022     Admit Wt: 165.9 Kg  Discharge Wt: 140.8 Kg          Primary Diagnoses:   1. Hx of Thoracic Aortic Aneurysm without Rupture s/p Aortic root replacement with 27-29 mm On-X ascending aortic prosthesis with Vascutek Gelweave Valsalva graft (Bentall procedure with coronary implantation).  2. Hx of Bicuspid Aortic Aneurysm  3. Acute Hypoxic respiratory Failure of multiple etiologies s/p proning technique, resolved  4. HTN, HLD  5. Thalamic infarct on head CT 2/16, old, age unknown  6. Delirium in post-operative setting, resolved  7. HIT Positive  8. Hypernatremia, resolved  9. RLE, and Bilateral UE DVTs, Discharge on Warfarin  10. Buttock pressure injuries, resolving well with WOC therapy  11. Transaminitis, resolved at discharge  12. Dysarthria, resolved  13. Mild-Moderate Protein-calorie Malnutrition, improving  14. Sinusitis, resolved after antibiotics          Secondary Diagnoses:   1. History of Testicular Cancer s/p Hx of orchiectomy  2. Obesity with BMI 41  3. Acute depressive episode with MDD  4. REGGIE    PROCEDURES PERFORMED:   Date: 1/28/22.  Surgeon: Dr. Velasco    Aortic root replacement with 27-29 mm On-X ascending aortic prosthesis with Vascutek Gelweave Valsalva graft (Bentall procedure with coronary implantation).    INTRAOPERATIVE FINDINGS:    The patient's sternum was of adequate quality.  Pericardial space was free of any adhesions. There was normal biventricular function.  The aortic valve was bicuspid with complete fusion of the right coronary and noncoronary leaflets.  There was some thickening and calcification of the  leaflets.  There was a severely aneurysmal ascending aorta with aneurysm of the root.    PATHOLOGY RESULTS:       A. Heart, aortic valve, replacement:  - Bicuspid valve with nodular fibrosis     B. Aorta, ascending, aneurysm repair:  - Elastic artery with medial degeneration     CULTURE RESULTS:    02/12/2022 1622 02/17/2022 0652 Blood Culture Hand, Left [[id number]]    (Abnormal)   Blood from Hand, Left    Edited Result - FINAL Component Value   Culture Positive on the 2nd day of incubation Abnormal     Staphylococcus epidermidis Panic  C    2 of 2 bottles         Susceptibility     Staphylococcus epidermidis     ARIANNA     Ciprofloxacin <=0.5 ug/mL Susceptible     Clindamycin <=0.25 ug/mL Susceptible 1     Erythromycin >=8.0 ug/mL Resistant     Gentamicin <=0.5 ug/mL Susceptible     Levofloxacin <=0.12 ug/mL Susceptible     Oxacillin >=4.0 ug/mL Resistant 2     Tetracycline <=1.0 ug/mL Susceptible     Vancomycin 2.0 ug/mL Susceptible                 CONSULTS:    1. PT/OT  2. WOC Nurse  3. Neurology  4. Infectious Disease  5. Anesthesia Pain team  6. SLP  7. ENT  8. Spiritual Health  9. Respiratory Therapy  10. Pulmonary  11. PM&R  12. Nutrition    BRIEF HISTORY OF ILLNESS:  Chuy Varner is a 30 yo M with a PMHx of bicuspid aortic valve, thoracic aortic aneurysm without rupture, hypertension, obesity, and testicular cancer s/p orchiectomy who underwent Bentall procedure with mechanical valve on 1/28 with Dr. Velasco and Dr. Hannah. 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     HOSPITAL COURSE: Chuy Varner is a 31 year old male who on 1/28/2022 underwent the above-named procedures. He tolerated the operation with a few complications delineated below.    Postoperatively was admitted to the CVICU.  Patient was extubated within  protocol on POD #1.  Blood pressure and cardiac index were managed with vasopressors and inotropic agents which were continuously weaned until no longer needed.     In the ICU his post-operative course was complicated by acute hypoxic respiratory failure suspected to be due to a multimodal etiology of ARDS versus pneumonia versus hypervolemia versus TRALI.  Course was treated with a ventilator and subsequent high flow nasal cannula as well as antibiotics.  Respiratory therapy and pulmonary were both consulted for treatment considerations and management of ventilator settings.  Antibiotics were empirically started to treat possible pneumonia.  He was subsequently extubated on 2/14 to high flow nasal cannula. Proning technique was also used with caution due to sternal precautions.  Patient was subsequently  transferred to the surgical telemetry floor and his respiratory status rebounded to his baseline status.  Most recent oxygen requirement was on 2/23-2/25 requiring 2 L/min at night.  He was discharged March 4 without any oxygen requirement.    After extubation patient experienced difficulty swallowing, dysarthria, as well as vocal cord pain.  Speech-language pathology and ENT were consulted for treatment considerations. Patient was subsequently placed on NJ tube feeding for nutritional sustenance. Dysarthria improved with time, and he was discharged without difficulty speaking. After clearance from SLP the NG tube was subsequently removed and the patient was able to tolerate p.o. intake with adequate calories. Mild-Moderate Protein-calorie Malnutrition developed with evidence of Weight loss > 5% over 1 month, Oral intake <75% for more than 7 days.  Nutrition consulted treatment considerations.    Lower extremity DVT and acute LMAC infarct also complicated cases Hospital course.  Heparin was started as a bridge to warfarin for anticoagulation.  Chuy developed heparin-induced thrombocytopenia which was treated as  described below.  Neurology was consulted for considerations of the CVA.  Imaging demonstrated CTH chronic L thalamic lacunar infarct.  Neurology recommended a follow-up brain MRI with and without contrast, and neurology outpatient follow-up.  The brain MRI as well as neurology follow-up have been ordered at discharge.  Appointments to be determined.    Right lower extremity and bilateral upper extremity DVTs were noted during his hospitalization.  Patient was treated with heparin drip which was subsequently discontinued due to heparin-induced thrombocytopenia as described below.  He was discharged on warfarin.    Heparin-induced thrombocytopenia occurred in the postoperative.  He was subsequently placed on bivalirudin as a bridge to warfarin.  Chromogenic factor Xa and INR were both used to measure therapeutic range of anticoagulation.  Chuy was discharged with an INR goal of 2-3 for 3 months and subsequently 1.5-2 for thereafter.  He was discharged on warfarin with an INR of 2.90.    Symptoms consistent with depression developed in the postoperative course.  Mental health was consulted for considerations of depression and insomnia. Patient was treated with Hydroxyzine prn, but no other pharmacologic interventions were necessary or recommended at that time. Depression resolved at time of discharge.    Pressure injuries were noted on the coccyx/perineal area that was treated with consultation of the wound ostomy care nurses. WOC nurse care continued until discharge.  Recommend continuation of care at transitional care facility.    Sternal Clicking and Popping was noted prior to discharge.  A noncontrast CT scan showed incomplete healing of the sternal bone.  Given the timeline of healing, this is somewhat to be expected.  Recommend follow-up noncontrast CT scan if the sternal clicking and popping continues or if sternal instability is suspected.    While on the surgical unit, the patient continued to progress well.  Chest tubes and temporary pacemaker wires were removed when deemed appropriate. Cardiac Medications were gradually reintroduced as tolerated. These medications include Carvedilol 50mg bid, Lisinopril 40mg daily, Atorvastatin 40mg, and ASA 81mg daily.     Patient was fluid overloaded and treated with diuretics. He was discharged 25.1 Kg below preoperative weight, was clinically euvolemic aside from trace amounts of lower extremity edema bilaterally. He was diuresing well without pharmacologic assistance for 3 days, but was discharged with gentle diuresis in the form of Furosemide 20mg daily to maintain euvolemic status in the setting of low mobility. Patient will have re-evaluation for further Diuretic therapy need in clinic follow-up.     If any dental procedures are to be performed it is recommended to use prophylactic antibiotics to prevent serious infections from occurring.  This includes any dental procedure including routine cleanings.     Patient was transiently hyperglycemic and treated with insulin infusion then transitioned to sliding scale insulin per protocol. Patients most recent Hemoglobin A1c is 5.8 and his blood sugars remained stable. No Further glycemic control was necessary at discharge.     Prior to discharge, his pain was controlled well, he was working well with therapies, able to perform most ADLs, ambulate with assistance, and had full return of bowel and bladder function. Patient will benefit greatly from intense physical therapy to regain functional status.  On March 4th, 2022, he was discharged to a TCU in stable condition. Follow up with cardiology and cardiac surgery have been arranged. Pt encouraged to follow up with PCP and cardiac rehab upon discharge.    Patient discharged on aspirin:  Yes 81 mg  Patient discharged on beta blocker: yes   Patient discharged on ACE Inhibitor/ARB: yes     Patient discharged on statin: yes         Discharge Disposition:     Discharged to short-term care  "facility            Condition on Discharge:     Discharge condition: Stable   Discharge vitals: Blood pressure 134/85, pulse 88, temperature 97.9  F (36.6  C), temperature source Oral, resp. rate 18, height 1.905 m (6' 3\"), weight 140.8 kg (310 lb 6.4 oz), SpO2 96 %.     Code status on discharge: Full Code     Vitals:    02/28/22 0500 03/03/22 0255 03/04/22 0932   Weight: 146.2 kg (322 lb 4.8 oz) 140.8 kg (310 lb 4.8 oz) 140.8 kg (310 lb 6.4 oz)       DAY OF DISCHARGE PHYSICAL EXAM:    Blood pressure 134/85, pulse 88, temperature 97.9  F (36.6  C), temperature source Oral, resp. rate 18, height 1.905 m (6' 3\"), weight 140.8 kg (310 lb 6.4 oz), SpO2 96 %.  Vitals:    02/28/22 0500 03/03/22 0255 03/04/22 0932   Weight: 146.2 kg (322 lb 4.8 oz) 140.8 kg (310 lb 4.8 oz) 140.8 kg (310 lb 6.4 oz)        Admit Wt: 165.9 Kg  Discharge Wt: 140.8 Kg      24 hr Fluid status; net loss -650 L.   MAPs: 77-92    Gen: A&Ox4, NAD  Neuro: no focal deficits   CV: RRR, normal S1 S2, no murmurs, rubs or gallops. No appreciable JVD  Pulm: CTA, no wheezing or rhonchi, normal breathing on RA  Abd: nondistended, normal BS, soft, nontender  Ext: negative peripheral edema, 0+ pitting  Incision: clean, dry, intact, no erythema, sternum stable  Tubes/drain sites: dressing clean and dry    BMP  Recent Labs   Lab 03/04/22  0603 03/02/22  0546 02/28/22  1459 02/27/22  1119 02/26/22  0518    139 138  --  138   POTASSIUM 3.9 3.7 3.8 4.0 3.8   CHLORIDE 111* 108 108  --  108   MAYE 8.9 9.2 8.6  --  8.9   CO2 24 22 24  --  24   BUN 8 8 12  --  17   CR 0.59* 0.62* 0.60*  --  0.60*   GLC 93 96 104*  --  94     CBC  Recent Labs   Lab 03/04/22  0603 03/02/22  0546 02/28/22  1459 02/27/22  1119   WBC 7.0 6.9 6.3 7.2   RBC 3.92* 3.84* 3.59* 3.69*   HGB 11.3* 11.2* 10.5* 10.9*   HCT 37.5* 37.4* 34.1* 36.5*   MCV 96 97 95 99   MCH 28.8 29.2 29.2 29.5   MCHC 30.1* 29.9* 30.8* 29.9*   RDW 14.7 15.0 14.8 14.9    293 273 312     INR  Recent Labs   Lab " 03/04/22  0603 03/02/22  0546 02/28/22  1459 02/27/22  1119   INR 2.90* 2.82* 2.42* 2.86*      Hepatic Panel  Recent Labs   Lab 02/26/22  0518   AST 19   ALT 69   ALKPHOS 84   BILITOTAL 0.4   ALBUMIN 2.7*         Recent Labs   Lab 03/04/22  0603 03/02/22  0546 02/28/22  1459 02/26/22  0518 02/25/22  2337 02/25/22  2000   GLC 93 96 104* 94 95 79       ECHOCARDIOGRAM, 2/23/2022-   Left Ventricle  S/P Aortic root replacement with 27-29 mm On-X ascending aortic prosthesis  with Vascutek Gelweave Valsalva graft (Bentall procedure with coronary  implantation).on 1/31/2022. Global and regional left ventricular function is  normal with an EF of 60-65%.     Right Ventricle  Right ventricular function, chamber size, wall motion, and thickness are  normal.     Aortic Valve  The mean gradient across the aortic valve is7 mmHg. A mechanical aortic valve  is present. Doppler interrogation of the aortic valve is normal.     Pericardium  Trivial pericardial effusion is present.    Chest CT w/o Contrast 2/25/22:  Findings:      Lungs: No pneumothorax, pleural effusion, focal airspace opacity, or  suspicious pulmonary nodule. Bibasilar linear atelectasis.  Airways: Central tracheobronchial tree is clear.  Vessels: Main pulmonary artery and aorta are normal in caliber. Normal  three-vessel arch  Heart: Postsurgical changes of aortic valve replacement, median  sternotomy wires are intact. Incomplete bony healing of the sternum.  Trace pericardial effusion.  Lymph nodes: No suspicious mediastinal or hilar lymphadenopathy.  Thyroid: Within normal limits.  Esophagus: Within normal limits     Upper abdomen: Within normal limits.     Bones and soft tissues: No suspicious axillary lymphadenopathy or soft  tissue mass. No suspicious osseous lesion.      DISCHARGE INSTRUCTIONS:  AFTER YOU GO HOME FROM YOUR HEART SURGERY  (Mechanical Aortic Valve Replacement and Aortic Aneurysm Repair surgery on 1/28/2022)    You had a sternotomy, avoid lifting  anything greater than ten pounds for 6 weeks after surgery and then less than 20 pounds for an additional 6 weeks.   Do not reach backwards or use arms to push out of chair.   Do not let people pull on your arms to assist with standing.   Avoid twisting or reaching too far across your body.    Avoid strenuous activities such as bowling, vacuuming, raking, shoveling, golf or tennis for 12 weeks after your surgery.   It is okay to resume sex if you feel comfortable in doing so. You may have to try different positions with your partner.    Splint your chest incision by hugging a pillow or bringing your arms across your chest when coughing or sneezing.     No driving for 4 weeks after surgery or while on pain medication.    Shower or wash your incisions daily with soap and water (or as instructed), pat dry.   Keep wound clean and dry, showers are okay after discharge, but don't let spray hit directly on incision.   No baths or swimming for 1 month.   Cover chest tube sites with dry gauze until they stop draining, then leave open to air. It is not abnormal for chest tube sites to drain yellowish/clear fluid for up to 2-3 weeks after surgery.   Watch for signs of infection: increased redness, tenderness, warmth or any drainage that appears infected (pus like) or is persistent.  Also a temperature > 100.5 F or chills. Call your surgeon or primary care provider's office immediately.   Remove any skin glue left on incisions after 10-14 days. This will not affect your incision and can speed up healing.    Exercise is very important in your recovery. Please follow the guidelines set up for you in your cardiac rehab classes at the hospital. If outpatient cardiac rehab was ordered for you, we highly recommend you participate. If you have problems arranging your cardiac rehab, please call [phone] for all locations, with the exception of [place], please call [phone] and [place], please call [phone].    Avoid  "sitting for prolonged periods of time, try to walk every hour during the day. If you have a leg incision, elevate your leg often when you are not walking.    Check your weight when you get home from the hospital and continue to check it daily through your recovery for at least a month. If you notice a weight gain of 2-3 pounds in a week, notify your primary care physician, cardiologist or surgeon.    Bowel activity may be slow after surgery. If necessary, you may take an over the counter laxative such as Milk of Magnesia or Miralax. You may have stool softeners prescribed (docusate sodium, Senokot). We recommend using stool softeners while using narcotics for pain (oxycodone/percocet, hydrocodone/vicodin, hydromorphone/dilaudid).      Wean OFF of narcotics (oxycodone, dilaudid, hydrocodone) as soon as possible. You should continue taking acetaminophen as long as you have any surgical pain as the first choice for pain control and add narcotics as necessary for pain to be tolerable.      DENTAL VISITS AFTER SURGERY  If you have had your heart valve repaired or replaced, we do not recommend having any dental work done for 6 months and you will need to take an antibiotic prior to dental visits from now on.  Please notify your dentist before any procedure for the proper treatment needed. The antibiotic is taken by mouth one hour prior to visit. This includes routine cleanings.  You can sometimes hear a mechanical valve \"clicking,\" this is normal and not a sign of something wrong.    DO NOT SMOKE.  IF YOU NEED HELP QUITTING, PLEASE TALK WITH YOUR CARDIOLOGIST OR PRIMARY DOCTOR.    You are on a blood thinner, follow the instructions you were given in the hospital and DO NOT SKIP this medication. Try and take it the same time everyday. Your primary care physician or coumadin clinic will manage the dosing. INR goal is 2-3.    REGARDING PRESCRIPTION REFILLS.  If you need a refill on your pain medication contact us to discuss " your pain and a possible one time refill.   All other medications will be adjusted, discontinued and re-filled by your primary care physician and/or your cardiologist as they were prior to your surgery. We have given you enough for one to three month with possibly one refill.    POST-OPERATIVE CLINIC VISITS  You have a follow up video visit with [facility] Advance Care Practitioners on 3/11/22.  You will then return to the care of your primary provider and your cardiologist. Future medication refills should come from your PCP or Cardiologist.   You should see your primary care provider in 1-2 weeks after discharge.   It is important to see your cardiologist, Dr. Mcnamara within 1-2 weeks after discharge.  You will have a neurology follow up with a Brain MRI scheduled outpatient.    PRE-ADMISSION MEDICATIONS:  No current facility-administered medications on file prior to encounter.  No current outpatient medications on file prior to encounter.       DISCHARGE MEDICATIONS:      Review of your medicines      START taking      Dose / Directions   acetaminophen 325 MG tablet  Commonly known as: TYLENOL  Used for: Thoracic aortic aneurysm without rupture (H)      Dose: 650 mg  Take 2 tablets (650 mg) by mouth every 6 hours as needed for mild pain or fever  Refills: 0     aspirin 81 MG chewable tablet  Commonly known as: ASA      Dose: 81 mg  1 tablet (81 mg) by Oral or NG Tube route daily  Refills: 0     atorvastatin 40 MG tablet  Commonly known as: LIPITOR  Used for: Thoracic aortic aneurysm without rupture (H)      Dose: 40 mg  Take 1 tablet (40 mg) by mouth every evening  Refills: 0     carvedilol 25 MG tablet  Commonly known as: COREG  Used for: Thoracic aortic aneurysm without rupture (H)      Dose: 50 mg  2 tablets (50 mg) by Oral or Feeding Tube route 2 times daily  Refills: 0     furosemide 20 MG tablet  Commonly known as: LASIX  Used for: Thoracic aortic aneurysm without rupture (H)      Dose: 20 mg  Take 1 tablet  (20 mg) by mouth daily  Refills: 0     lisinopril 20 MG tablet  Commonly known as: ZESTRIL  Used for: Thoracic aortic aneurysm without rupture (H)      Dose: 40 mg  Take 2 tablets (40 mg) by mouth daily  Refills: 0     Melatonin 10 MG Tabs tablet  Used for: Thoracic aortic aneurysm without rupture (H)      Dose: 10 mg  1 tablet (10 mg) by Oral or Feeding Tube route every evening  Refills: 0     methocarbamol 750 MG tablet  Commonly known as: ROBAXIN      Dose: 750 mg  Take 1 tablet (750 mg) by mouth every 6 hours as needed for muscle spasms  Refills: 0     pantoprazole 40 MG EC tablet  Commonly known as: PROTONIX  Used for: Thoracic aortic aneurysm without rupture (H)      Dose: 40 mg  Take 1 tablet (40 mg) by mouth every morning (before breakfast)  Refills: 0     traZODone 50 MG tablet  Commonly known as: DESYREL  Used for: Thoracic aortic aneurysm without rupture (H)      Dose: 50 mg  1 tablet (50 mg) by Oral or Feeding Tube route nightly as needed for sleep  Refills: 0        STOP taking    metoprolol succinate ER 25 MG 24 hr tablet  Commonly known as: TOPROL-XL        Quintabs Tabs                  CC:alex Moore [facility]   Cardiothoracic Surgery  Office phone: [phone]  Office fax: [phone]

## 2022-02-28 NOTE — PROGRESS NOTES
Cardiovascular Surgery Progress Note  02/28/2022         Assessment and Plan:     Chuy Varner is a 32 yo M with a PMHx of bicuspid aortic valve, thoracic aortic aneurysm without rupture, hypertension, obesity, and testicular cancer s/p orchiectomy who underwent Bentall procedure with mechanical valve on 1/28 with Dr. Velasco and Dr. Hannah. 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      Cardiovascular:   # Ascending aortic aneurysm s/p Bentall w/ Jerry valve on 1/28 with Dr. Velasco and Dr. Hannah  # Hx of HTN  - Goal MAP>65, SBP <130  LVEF: 60-65%  - Coreg 50 BID, Lisinopril 20mg daily  - ASA 81mg, Atorvastatin 40mg   - Anticoagulatin for valve, will need coumadin with INR goal 2-3 for three months, then 1.5-2.0 for life, with ASA. Started Coumadin 2/19, Continue Bival as bridge until Factor X therapeutic.   - Baseline TTE per Dr. Velasco 2/23: normal LVEF, AV well seated with mean gradient 7 mmHg      Chest tubes and TPW removed 2/4     Pulmonary:  # Acute hypoxemic respiratory failure (ARDS vs. PNA vs. Hypervolemia vs. TRALI vs. severe atelectasis) s/p extubation  - Extubated 2/14 to HFNC, now breathing on Room Air  - conditional VBGs - draw one if increased somnolence or distressed breathing  - Had required respiratory therapy, metanebs (percussive therapy), and flutter valve QID to encourage coughing, clearance of airways.      Neurology / MSK:  Neuro/ pain/ sedation:  # Acute Postoperative pain  - Monitor neurological status. Notify the MD for any acute changes in exam.  - Neurology signed off, last note 2/17  - Pain well controlled on PRN tylenol, dilaudid, robaxin, oxycodone     # Dysarthria - improving   - speech therapy evaluation 02/17 for dysarthric. Reconsulted 2/23 for advancing diet. No new concerns, continue  swallowing exercises and sit upright when eating. SLP signed off 2/23.  - ENT scoped, no reason for long term dysarthria/problems with phonation  - Sleep hygiene: Melatonin, trazadone prn at bedtime     # Thalamic infarct on head CT 2/16, old, age unknown  - Not contributing to current dysarthria vs delirium vs deconditioning per Neuro  - Neurology would like an MRI: to be done outpatient per Dr. Velasco  - Lipid panel, HgbA1c ordered per neuro for stroke Hx     # Symptoms of depression with insomnia   - Mental health consult 2/22, will continue to follow up weekly  - sleeping better, more upbeat with plans to discharge soon to TCU        / Renal:  -Hypernatremia, resolved  BL creat appears to be ~ 1.0  Most recent Creatinine: 0.60  - Daily weights and I/O's, net 24 hr I/O -2.7 L.  - Avoid/limit nephrotoxins as able  Replete lytes PRN per protocol  - Pre-op weight 365 lbs, weight 2/24 328 lbs. Today's weight 322 lbs   - Moderate amount of MARIAM, has wraps on with decreased edema. Has windy UE edema with windy non-occlusive DVTs- elevate both arms and limit B/p's    - Re Consulted PT/OT for right lymphedema wrap 2/19.   Diuresis: euvolemic clinically. No diuresis indicated.     GI / FEN:   - Required nutritional support while intubated. Removed NJ Tube 2/23.  - Speech following okay to advanced to regular diet with thin liquids 1/23  - BM 2/24, prn BR   # Mild-Moderate Protein-calorie Malnutrition  - Weight loss > 5% over 1 month  - Oral intake <75% for more than 7 days  -  Nutrition consulted 2/23 for recommendations - Ensure Enlive BID between meals   -  Eating much better now, especially with bringing food in from outside   # Diarrhea/Incontinence  - Suspect caused by large amounts of free water with tube feeds via NJ (now removed) vs augmentin   - Less frequent loose stools, Cdiff-negative 2/20  # Elevated ALT, AST  - Suspect d/t Augmentin vs volume overload with hepatic congestion, Resolved   - Monitor for now wnl,  no abdominal pain.    - Abd CT 1/30 - No cholecystitis       Endocrine:  # Stress induced hyperglycemia, improving  # Obesity (BMI 46)  - HDSSI stopped 2/25 after am dose   - Hgb A1c 5.8 2/18  - Goal BG <180 for optimal healing.  BG 's without any insulin      Infectious Disease:  # Stress induced leukocytosis  # Possible ventilator associated pneumonia  # Sinusitis, treated with Augmentin   Pan cultures and re-cultures all remained unrevealing. Fever likely from previously untreated DVT vs sinusitis.  - Continue to monitor fever curve, WBC and inflammatory markers  - WBC WNL, remains afebrile.  - Checked C. Difficile 2/21 negative, UA       Hematology:   # History of testicular cancer s/p orchiectomy  # RLE, Cesar UE DVT's  # HIT positive   - Right LE Lymphedema wrap, reconsult PT/OT   - CBC daily, Hgb 10.9, stable   - Finished Bivalirudin gtt as bridge to therapeutic Chromogenic Factor X  - Started coumadin 2/19 with INR goal 2-3, pharmacy to dose   - Right upper extremity US 2/21 with occlusive thrombus of one of the paired right brachial veins at the upper arm, with PICC in place, which was removed 2/21.   - LUE US 2/21 wotj occlusive superficial thrombus in left cephalic vein at the antecubital fossa. No evidence of left upper extremity deep venous thrombosis     Anticoagulation:   - ASA 81 mg daily   - Mechanical On-X aortic valve; coumadin, INR goal 2-3 for three months, then 1.5-2.0 lifelong.  - Chromogenic Factor X goal 20-40 while on Bival. Chromogenic Factor X - 40%. Stop Bival 2/27.   - Most recent INR: 2.86      MSK/ Skin:  # Sternotomy  # Surgical Incision  # Buttock pressure injury   - Sternal precautions  - Postoperative incision management per protocol  - PT/OT  - WOCN following for pressure injuries     # Maculopapular Rash, improving  Patient has maculopapular rash per Exam below.  - Likely represents folliculitis, monitor for now, improving      # Sternal Clicking and Popping   - Occurs with  "deep inspiration, but not appreciated with coughing. No drainage or erythema or increased pain. Noted evening of 2/24  - Non Contrast CT: incomplete healing of bony sternum. Sternal wires intact. This was reviewed with Dr. Velasco and with patient. Sternum still healing, continue sternal precautions and avoid aggressive coughing.      Prophylaxis:    Mechanical prophylaxis for DVT   - Chemical DVT prophylaxis- bivalirudin and Warfarin  - PPI - until 03/16 (30d from extubation) per institutional protocol  - HOB to 30 degrees     Disposition:   - Transferred to  on 2/18  - PM& R consult 2/25: recommending discharge to TCU or ARU  - Discharge pending therapeutic INR off Bivalrudin, possible Monday   - Needs Brain MRI as outpatient and Neuro Follow up  - Patient refused labs 2/28 due to frustration of no venous access and multiple attempts at blood draw.    Discussed with Surgeon, Dr. Velasco via written and verbal commnication.     Keila Churchill PA-c  Pager: [phone]  2:04 PM February 28, 2022           Interval History:     No overnight events.  States pain is well managed on current regimen. Slept well overnight.  Tolerating diet, is passing flatus, BM x 1. No nausea or vomiting.  Breathing well without complaints.   Working with therapies and ambulating in halls with assistance.   Denies chest pain, palpitations, dizziness, syncopal symptoms, fevers, chills, myalgias.         Physical Exam:   Blood pressure 103/63, pulse 90, temperature 98.2  F (36.8  C), temperature source Oral, resp. rate 16, height 1.905 m (6' 3\"), weight 146.2 kg (322 lb 4.8 oz), SpO2 93 %.  Vitals:    02/24/22 0500 02/27/22 0157 02/28/22 0500   Weight: 148.8 kg (328 lb) 146.1 kg (322 lb 1.6 oz) 146.2 kg (322 lb 4.8 oz)      Current Weight: 146.2 Kg Trend: +0.1 Kg  Admit weight: 165.9 Kg    Daily Fluid status; net loss: -3090  mL    Net loss SA: -2174 mL  MAPs: 70-87    Gen: A&Ox4, NAD  Neuro: no focal deficits   CV: RRR, normal S1 S2, no murmurs, " rubs or gallops. No appreciable JVD   Vascular: Peripheral pulses present Radial 2+, Dorsalis Pedis 2+, Posterior Tibialis 2+.   Pulm: CTA, no wheezing or rhonchi, normal breathing on RA  Abd: nondistended, normal BS, soft, nontender  Ext: negative peripheral edema, 0+ pitting. Warm.   Incision: clean, dry, intact, no erythema, sternum stable  Tubes/drain sites: dressing clean and dry       Data:    Imaging:  reviewed recent imaging    Chest x-ray  Interpretation:    Echocardiogram  Interpretation:    CT scan:    Labs:  CBC  Recent Labs   Lab 02/27/22  1119 02/26/22  0518 02/25/22  0559 02/24/22  0453   WBC 7.2 6.7 8.3 8.9   RBC 3.69* 3.36* 3.39* 3.44*   HGB 10.9* 9.9* 9.9* 10.3*   HCT 36.5* 32.3* 32.9* 34.1*   MCV 99 96 97 99   MCH 29.5 29.5 29.2 29.9   MCHC 29.9* 30.7* 30.1* 30.2*   RDW 14.9 15.2* 15.4* 15.6*    276 295 317     CMP:  Last Comprehensive Metabolic Panel:  Sodium   Date Value Ref Range Status   02/26/2022 138 133 - 144 mmol/L Final     Potassium   Date Value Ref Range Status   02/27/2022 4.0 3.4 - 5.3 mmol/L Final   02/05/2022 4.3 3.4 - 5.3 mmol/L Final     Chloride   Date Value Ref Range Status   02/26/2022 108 94 - 109 mmol/L Final     Carbon Dioxide (CO2)   Date Value Ref Range Status   02/26/2022 24 20 - 32 mmol/L Final     Anion Gap   Date Value Ref Range Status   02/26/2022 6 3 - 14 mmol/L Final     Glucose   Date Value Ref Range Status   02/26/2022 94 70 - 99 mg/dL Final     GLUCOSE BY METER POCT   Date Value Ref Range Status   02/25/2022 95 70 - 99 mg/dL Final     Urea Nitrogen   Date Value Ref Range Status   02/26/2022 17 7 - 30 mg/dL Final     Creatinine   Date Value Ref Range Status   02/26/2022 0.60 (L) 0.66 - 1.25 mg/dL Final     GFR Estimate   Date Value Ref Range Status   02/26/2022 >90 >60 mL/min/1.73m2 Final     Comment:     Effective December 21, 2021 eGFRcr in adults is calculated using the 2021 CKD-EPI creatinine equation which includes age and gender (Renata et al., NEJM,  DOI: 10.1056/WWGPtc7747866)     Calcium   Date Value Ref Range Status   02/26/2022 8.9 8.5 - 10.1 mg/dL Final     Bilirubin Total   Date Value Ref Range Status   02/26/2022 0.4 0.2 - 1.3 mg/dL Final     Alkaline Phosphatase   Date Value Ref Range Status   02/26/2022 84 40 - 150 U/L Final     ALT   Date Value Ref Range Status   02/26/2022 69 0 - 70 U/L Final     AST   Date Value Ref Range Status   02/26/2022 19 0 - 45 U/L Final     BMP  Recent Labs   Lab 02/27/22  1119 02/26/22  0518 02/25/22  2337 02/25/22  2000 02/25/22  1525 02/25/22  0819 02/25/22  0559 02/24/22  0506 02/24/22  0453 02/23/22  0727 02/23/22  0616   NA  --  138  --   --   --   --  140  --  139  --  136   POTASSIUM 4.0 3.8  --   --   --   --  4.0  --  3.8  --  4.1   CHLORIDE  --  108  --   --   --   --  107  --  108  --  108   MAYE  --  8.9  --   --   --   --  8.8  --  8.7  --  8.7   CO2  --  24  --   --   --   --  26  --  27  --  21   BUN  --  17  --   --   --   --  21  --  23  --  25   CR  --  0.60*  --   --   --   --  0.60*  --  0.58*  --  0.58*   GLC  --  94 95 79 86   < > 87   < > 93   < > 110*    < > = values in this interval not displayed.     INR  Recent Labs   Lab 02/27/22  1119 02/26/22  0518 02/25/22  1551 02/25/22  0559   INR 2.86* 2.54* 1.91* 2.05*      Hepatic Panel  Recent Labs   Lab 02/26/22  0518 02/25/22  0559 02/24/22  0453 02/23/22  0616   AST 19 28 31 52*   ALT 69 84* 96* 108*   ALKPHOS 84 88 78 85   BILITOTAL 0.4 0.4 0.4 0.3   ALBUMIN 2.7* 2.6* 2.6* 2.4*     GLUCOSE:   Recent Labs   Lab 02/26/22  0518 02/25/22  2337 02/25/22 2000 02/25/22  1525 02/25/22  1141 02/25/22  0819   GLC 94 95 79 86 105* 90

## 2022-02-28 NOTE — PROGRESS NOTES
Vascular access paged for labs. Called @310PM and told pt allowed lab to poke for blood; vascular service was not needed.  Labs was already drawn this morning per .

## 2022-02-28 NOTE — PROGRESS NOTES
A/He is awake and alert and has urinal in place. Days report am labs were just drawn  P/monitor for changes, awaiting placement options

## 2022-02-28 NOTE — PROGRESS NOTES
"CLINICAL NUTRITION SERVICES - REASSESSMENT NOTE     Nutrition Prescription    RECOMMENDATIONS FOR MDs/PROVIDERS TO ORDER:  None at this time.     Malnutrition Status:    Moderate malnutrition in the context of acute illness/injury    Recommendations already ordered by Registered Dietitian (RD):  None at this time.     Future/Additional Recommendations:  1. Continue current diet, as ordered. Encourage adequate nutrition intake and intake of oral supplements twice daily. Monitor potential need for a 3 g sodium diet and possibly a fluid restriction.   2. Encourage twice daily oral supplements.   3. Order a multivitamin with minerals if oral intake remains inadequate.   4. Monitor BG control. Hgb A1c was 5.8 on 2/18/2022. BG was 94 on 2/26.   5. Consider checking vitamin B12 lab.      EVALUATION OF THE PROGRESS TOWARD GOALS   Diet: Regular diet with thin liquids (level 0). Per SLP on 2/24, \"No additional IP SLP services indicated for dysphagia, pt may benefit from SLP consult at next level of care for cognitve-commuication evaluation, or defer to OT for eval/tx of functional cognition.\" [place] Ensure Enlive twice daily between meals. Room service appropriate with assist.   Intake: Tolerating diet. Per % intake flowsheets, pt consuming 50% with a fair appetite 2/21, 25-75% with a fair to good appetite 2/22, % with a good appetite 2/23, 75% with a good appetite 2/24, 75% with a good appetite 2/25, 75% on 2/26, and 50% with a good appetite 2/27. Per RN 2/27, \"He told me later that he normally drinks over a gallon of water each day.\" Total feed per RN on 2/27. Eating well/adequately PTA, per pt. Per pt, factors affecting oral intake include decreased appetite, taste changes, and food choices in the hospital. States he is typically drinking one, possibly two, oral supplements daily. His girlfriend is bringing some outside food (generally sides, snacks, or beverages). States he has been more independent with feeding " "himself but \"it isn't pretty.\" [employer] (meal ordering system) indicates food/beverages sent 2/25-2/27 totaled 2884 kcals and 118 g protein daily on average (two to three meals are being ordered daily).   Kcal counts:   2/21       Total Kcals: 106       Total Protein: 12g. # Meals Ordered from Kitchen: 2 # Meals Recorded: 1 meal. # Supplements Recorded: no intake recorded    2/22       Total Kcals: 523       Total Protein: 27g. # Meals Ordered from Kitchen: 2 # Meals Recorded: 1 meal. # Supplements Recorded: 100% 1 ensure enlive    2/24       Total Kcals: 408       Total Protein: 12g # Meals Ordered from Kitchen: 3 meals # Meals Recorded: 1 meal. # Supplements Recorded: 0   2/25   # Meals Ordered from Kitchen: 2 meals. # Meals Recorded: no intake recorded.  # Supplements Recorded: no intake recorded.    2/26       # Meals Ordered from Kitchen: 3 meals. # Meals Recorded: No food intake recorded # Supplements Recorded: No food intake recorded. Per Epic Food Record, Pt consumed 75% at 4PM but not enough information to calculate calories/protein.   * Pt consumed a three-day average of 346 kcals and 17 g protein daily. Not meeting estimated needs below. Note, not all data recorded.     Previous Nutrition Support: Cyclic TF x 14 hours, Two Isaiah HN @ 60 mL/hr (840 mL/evening) + 4 pkt Prosource daily, to provide: 1840 Kcals (18 Kcal/kg), 115 gm pro (1.1 gm/kg), 184 gm CHO, 5+ gm fiber, 588 mL free water. Banatrol, one packet twice daily. Last received TF on 2/23 (feeding tube pulled 2/23).      NEW FINDINGS   Stools: Having zero to two stools daily. Last stool was on 2/28. Stools have been loose and brown. C diff negative on 2/21. No nausea per RN 2/27.   WOC 2/23: \"Pressure Injury: on columbella bilateral nares, hospital acquired, This is a Medical Device Related Pressure Injury (MDRPI) due to Bridle string. Pressure Injury is Stage 2  And mucosal in bilateral nares. Contributing factor of the pressure injury: pressure, " "immobility and moisture  Proning. Status: healed. New: Right buttock superficial abrasions from unknown source Status:  healed. [facility] Nurse follow-up plan:signing off.\"  Wt Hx: 155.6 kg (4/30/17), 166.5 kg (1/10/2022), 158.2 kg (9/29/21), 164.7 kg (10/27/21), 168.8 kg (12/27/21), 165.9 kg (1/28/2022, admit), 146.2 kg (2/28) - Difficult to assess wt changes with changes in fluid status, post-op status, and diuresis. Possibly has lost some actual wt.     ASSESSED NUTRITION NEEDS (updated):  Dosing Weight: 103 kg (adjusted, based on lowest wt this admission of 146.1 kg on 2/27)  MREE (2/11) = 4871 kcal/day (equiv to 47 kcal/kg) with RQ = 0.69   Estimated Energy Needs: 2900 - 3900 kcal/day (28-38 kcal/kg/day)   Justification: 60-80% of MREE  Estimated Protein Needs: 124-155 g protein/day (1.2-1.5 g protein/kg/day)   Justification: Increased needs post-op    MALNUTRITION  % Intake: </= 50% for >/= 5 days (severe)  % Weight Loss: Difficult to assess wt changes with changes in fluid status, post-op status, and diuresis.   Subcutaneous Fat Loss: None observed  Muscle Loss: None observed. Pt may have lower muscle mass difficult to assess with BMI.   Fluid Accumulation/Edema: Trace to mild.   Malnutrition Diagnosis: Moderate malnutrition in the context of acute illness/injury    Previous Goals   Total oral intake of at least 1700 Kcals and 125 gm protein (~60% est needs) before considering end of nutrition support.   Evaluation: Not meeting consistently, but now improving.     Previous Nutrition Diagnosis  Inadequate oral intake related to difficulty swallowing post-extubation last week as evidenced by patient reliant on TF to meet needs, NPO status until 2/18, did not begin eating orally until 2/20.  Evaluation: Unresolved, updated below.     CURRENT NUTRITION DIAGNOSIS  Inadequate oral intake related to decreased appetite, taste changes, food choices in the hospital, and pt trying to lose wt as evidenced by three-day average " of 346 kcals and 17 g protein daily while estimated needs are 2900 - 3900 kcal/day (28-38 kcal/kg/day) and 124-155 g protein/day (1.2-1.5 g protein/kg/day).    INTERVENTIONS  Implementation  Nutrition Education: Discussed pt's increased needs while in the hospital. Rec adequate kcals/protein and avoid trying to lose weight for another month. Rec consume oral supplements as ordered. Discussed current diet order. Suggested limiting sodium and monitoring fluid intake if gaining fluid weight.   Medical food supplement therapy: Discussed oral supplements. Will continue current oral supplement regimen, as ordered.    Goals  Patient to consume % of nutritionally adequate meal trays TID, or the equivalent with supplements/snacks.    Monitoring/Evaluation  Progress toward goals will be monitored and evaluated per protocol.     Nutrition will continue to follow.     Shama Salinas, MS, RD, LD, [facility]   6C Pgr: [phone]

## 2022-02-28 NOTE — PLAN OF CARE
AO X 4, VSS, RA, sinus rhythm with 1st degree AVB, valve click audible upon assessment, denies pain, loose BM X 2, bed pan and urinal use with help, working with OT / PT, possible discharge 3/1 to TCU or ARU near [facility]. Labs to be drawn, per patient request, by vascular; CVTS ok with labs resulting in late afternoon or evening. Continuing to monitor.    1430 lab update: Patient agrees to have  try again to draw labs for the day, called for  who is on the way to the patient.

## 2022-02-28 NOTE — TELEPHONE ENCOUNTER
Called pts listed PCP and Cardiologist to schedule post op follow ups. Pt is scheduled with PCP Dr Villagomez march 7 @ 10am. Dr [facility] will call and inform pt and pt johnathan has MyChart and will be able to see.  Cardiologist Dr Mcnamara [facility] will call pt to schedule appointment.

## 2022-02-28 NOTE — PLAN OF CARE
D: s/p Bentall procedure with mechanical valve on 1/28  Course c/b postop hypoxemic respiratory failure, HIT positivity on Bivalirudin, LE DVT   PMHx of bicuspid aortic valve, thoracic aortic aneurysm without rupture, hypertension, obesity, and testicular cancer s/p orchiectomy      I: Monitored vitals and assessed pt status.   Changed: Linens  Running: PIV SL'd  PRN: none     A: A0x4. VSS, on RA. Monitor NSR w/1st degree AVB. Denies c/o pain.Last BM 2/27. Good UO-primofit on overnight.(At beginning of shift large amount of urine leaked out of primofit)       I/O this shift:  In: -   Out: 600 [Urine:600]    Temp:  [97.8  F (36.6  C)-100  F (37.8  C)] 97.9  F (36.6  C)  Pulse:  [] 92  Resp:  [16-20] 18  BP: (103-134)/(62-85) 105/71  SpO2:  [92 %-97 %] 92 %      P: Continue to monitor Pt status and report changes to treatment team.

## 2022-02-28 NOTE — PROGRESS NOTES
Care Management Follow Up    Length of Stay (days): 31    Expected Discharge Date: 02/27/2022      Concerns to be Addressed: Discharge planning  Patient plan of care discussed at interdisciplinary rounds: Yes     Anticipated Discharge Disposition:   TCU     Anticipated Discharge ServicesTCU  Anticipated Discharge DME:  TBD     Patient/family educated on Medicare website which has current facility and service quality ratings: Yes  Education Provided on the Discharge Plan:  Yes  Patient/Family in Agreement with the Plan:  Yes     Private pay costs discussed: Not at this time      Additional Information:  Patient now felt appropriate for TCU level of care and wish to discharge to facility closer to home.     First choice is [address] [address]  PH: [phone]  Fax:,[phone]    Phone call to Bhavani at [facility] [phone].  She requested we send update records. Faxed at noon today.  Messages left for Bhavani this afternoon to see if they could admit patient.  She has not returned call.      Discuss Transport with patient and his S.O. by phone.  Offered non-medical transport through [facility] who would bill $1000, but is unable to bill insurance.  [facility]  Transport would be around $3700.  They would bill insurance, but can not guarantee coverage.  Patient spoke with insurance this morning. He feels confident they will cover.  I cautioned him that it is often NOT covered when going from a higher level of care to a lower level of care.  Despite, this he wishes to go with [facility] transport BLS.  I have ride tentatively scheduled for 1 PM tomorrow.  But, as of yet, do not have a receiving facility.     Will contact [facility] first thing in a.m. to see if they can admit.  If not, will cancel ride and continue to work for placement.       Referrals Placed by CM/SW:      [facility]  [address] [place] [address] [phone]  2/25: Kia Richards stated she paged  out the PMR consult, hoping they will see him today; continuing to follow pt.      TCU  [facility]  [address] [place] [address]  PH: [phone]  Fax:  2/25: MERCEDES left VM for Lorin, requesting a call back for referral fax #.  1430: Multiple phone calls made by MERCEDES, trying to connect with someone, only goes to . MERCEDES called [phone] (# found on website for [facility]), left VM requesting call back for referral fax #.  1500: Johann in Admissions called SW to provide fax #. Johann stated that d/t pt's age, they do not think he would be a good fit for facility; no open beds until Tues or Wed next week. Johann stated she would still review referral. MERCEDES faxed referral    [facility]  [address] [place] [address]  PH: [phone]  Fax: [phone]  Admissions: [phone] (Shannan)  2/25: MERCEDES left VM for Desirae, requesting a call back for referral fax #.  1220: MERCEDES received VM from Shannan in Admissions, who provided fax # and her direct dial.  1500: MERCEDES faxed referral.    [address] [address]  PH: [phone]  Fax: [phone]  Admissions: [phone] (Joaquina)  2/25: Referral faxed    [facility]  [address]   [address]  [place] [address]  PH: [phone]  Fax: [phone]  Admissions: [phone] (Park, )  2/25: Referral faxed    [address] [address]  PH: [phone] (ask for Leonie, Admissions)  Fax: [phone]  2/25: Referral faxed        Fran [facility]  Lung Transplant   Phone: [phone] Pager: [phone]

## 2022-03-01 ENCOUNTER — APPOINTMENT (OUTPATIENT)
Dept: PHYSICAL THERAPY | Facility: CLINIC | Age: 32
End: 2022-03-01
Attending: SURGERY
Payer: COMMERCIAL

## 2022-03-01 PROBLEM — Z98.890 S/P AORTIC ANEURYSM REPAIR: Status: ACTIVE | Noted: 2022-03-01

## 2022-03-01 PROBLEM — Z86.79 S/P AORTIC ANEURYSM REPAIR: Status: ACTIVE | Noted: 2022-03-01

## 2022-03-01 LAB — FACT X ACT/NOR PPP CHRO: 38 % (ref 70–130)

## 2022-03-01 PROCEDURE — 97110 THERAPEUTIC EXERCISES: CPT | Mod: GP

## 2022-03-01 PROCEDURE — 250N000013 HC RX MED GY IP 250 OP 250 PS 637: Performed by: PHYSICIAN ASSISTANT

## 2022-03-01 PROCEDURE — 250N000013 HC RX MED GY IP 250 OP 250 PS 637: Performed by: STUDENT IN AN ORGANIZED HEALTH CARE EDUCATION/TRAINING PROGRAM

## 2022-03-01 PROCEDURE — 214N000001 HC R&B CCU UMMC

## 2022-03-01 PROCEDURE — 250N000013 HC RX MED GY IP 250 OP 250 PS 637: Performed by: SURGERY

## 2022-03-01 PROCEDURE — 97530 THERAPEUTIC ACTIVITIES: CPT | Mod: GP

## 2022-03-01 RX ORDER — LISINOPRIL 10 MG/1
10 TABLET ORAL ONCE
Status: COMPLETED | OUTPATIENT
Start: 2022-03-01 | End: 2022-03-01

## 2022-03-01 RX ORDER — PHENOL 1.4 %
10 AEROSOL, SPRAY (ML) MUCOUS MEMBRANE EVERY EVENING
Status: ON HOLD | DISCHARGE
Start: 2022-03-01 | End: 2022-03-09

## 2022-03-01 RX ORDER — ACETAMINOPHEN 325 MG/1
650 TABLET ORAL EVERY 6 HOURS PRN
DISCHARGE
Start: 2022-03-01

## 2022-03-01 RX ORDER — PANTOPRAZOLE SODIUM 40 MG/1
40 TABLET, DELAYED RELEASE ORAL
Status: ON HOLD | DISCHARGE
Start: 2022-03-01 | End: 2022-03-09

## 2022-03-01 RX ORDER — LISINOPRIL 20 MG/1
30 TABLET ORAL DAILY
DISCHARGE
Start: 2022-03-01 | End: 2022-03-04

## 2022-03-01 RX ORDER — METHOCARBAMOL 750 MG/1
750 TABLET, FILM COATED ORAL EVERY 6 HOURS PRN
Status: ON HOLD | DISCHARGE
Start: 2022-03-01 | End: 2022-03-09

## 2022-03-01 RX ORDER — ATORVASTATIN CALCIUM 40 MG/1
40 TABLET, FILM COATED ORAL EVERY EVENING
Status: ON HOLD | DISCHARGE
Start: 2022-03-01 | End: 2022-03-09

## 2022-03-01 RX ORDER — ASPIRIN 81 MG/1
81 TABLET, CHEWABLE ORAL DAILY
Status: ON HOLD | DISCHARGE
Start: 2022-03-01 | End: 2022-03-09

## 2022-03-01 RX ORDER — TRAZODONE HYDROCHLORIDE 50 MG/1
50 TABLET, FILM COATED ORAL
Status: ON HOLD | DISCHARGE
Start: 2022-03-01 | End: 2022-03-09

## 2022-03-01 RX ORDER — CARVEDILOL 25 MG/1
50 TABLET ORAL 2 TIMES DAILY
Status: ON HOLD | DISCHARGE
Start: 2022-03-01 | End: 2022-03-09

## 2022-03-01 RX ADMIN — Medication 10 MG: at 19:56

## 2022-03-01 RX ADMIN — CARVEDILOL 50 MG: 25 TABLET, FILM COATED ORAL at 19:57

## 2022-03-01 RX ADMIN — CARVEDILOL 50 MG: 25 TABLET, FILM COATED ORAL at 09:10

## 2022-03-01 RX ADMIN — POTASSIUM CHLORIDE 20 MEQ: 750 TABLET, EXTENDED RELEASE ORAL at 09:04

## 2022-03-01 RX ADMIN — LISINOPRIL 20 MG: 20 TABLET ORAL at 09:06

## 2022-03-01 RX ADMIN — ASPIRIN 81 MG CHEWABLE TABLET 81 MG: 81 TABLET CHEWABLE at 09:03

## 2022-03-01 RX ADMIN — ATORVASTATIN CALCIUM 40 MG: 40 TABLET, FILM COATED ORAL at 19:57

## 2022-03-01 RX ADMIN — WARFARIN SODIUM 12.5 MG: 10 TABLET ORAL at 18:18

## 2022-03-01 RX ADMIN — LISINOPRIL 10 MG: 10 TABLET ORAL at 09:05

## 2022-03-01 RX ADMIN — PANTOPRAZOLE SODIUM 40 MG: 40 TABLET, DELAYED RELEASE ORAL at 09:04

## 2022-03-01 ASSESSMENT — ACTIVITIES OF DAILY LIVING (ADL)
ADLS_ACUITY_SCORE: 16
ADLS_ACUITY_SCORE: 16
ADLS_ACUITY_SCORE: 17
ADLS_ACUITY_SCORE: 16
ADLS_ACUITY_SCORE: 17
ADLS_ACUITY_SCORE: 16
ADLS_ACUITY_SCORE: 17
ADLS_ACUITY_SCORE: 17
ADLS_ACUITY_SCORE: 15
ADLS_ACUITY_SCORE: 16
ADLS_ACUITY_SCORE: 17
ADLS_ACUITY_SCORE: 16
ADLS_ACUITY_SCORE: 16
ADLS_ACUITY_SCORE: 17
ADLS_ACUITY_SCORE: 17
ADLS_ACUITY_SCORE: 16
ADLS_ACUITY_SCORE: 16
ADLS_ACUITY_SCORE: 17
ADLS_ACUITY_SCORE: 16
ADLS_ACUITY_SCORE: 15
ADLS_ACUITY_SCORE: 17
ADLS_ACUITY_SCORE: 17

## 2022-03-01 NOTE — PROGRESS NOTES
Cardiovascular Surgery Progress Note  03/01/2022         Assessment and Plan:     Chuy Varner is a 32 yo M with a PMHx of bicuspid aortic valve, thoracic aortic aneurysm without rupture, hypertension, obesity, and testicular cancer s/p orchiectomy who underwent Bentall procedure with mechanical valve on 1/28 with Dr. Velasco and Dr. Hannah. 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      Cardiovascular:   # Ascending aortic aneurysm s/p Bentall w/ Jerry valve on 1/28 with Dr. Velasco and Dr. Hannah  # Hx of HTN  - Goal MAP>65, SBP <130  LVEF: 60-65%  - Coreg 50 BID, Lisinopril 30mg daily  - ASA 81mg, Atorvastatin 40mg   - Anticoagulatin for valve, will need coumadin with INR goal 2-3 for three months, then 1.5-2.0 for life, with ASA. Started Coumadin 2/19, Continue Bival as bridge until Factor X therapeutic.   - Baseline TTE per Dr. Velasco 2/23: normal LVEF, AV well seated with mean gradient 7 mmHg      Chest tubes and TPW removed 2/4     Pulmonary:  # Acute hypoxemic respiratory failure (ARDS vs. PNA vs. Hypervolemia vs. TRALI vs. severe atelectasis) s/p extubation  - Extubated 2/14 to HFNC, now breathing on Room Air  - conditional VBGs - draw one if increased somnolence or distressed breathing  - Had required respiratory therapy, metanebs (percussive therapy), and flutter valve QID to encourage coughing, clearance of airways.      Neurology / MSK:  Neuro/ pain/ sedation:  # Acute Postoperative pain  - Monitor neurological status. Notify the MD for any acute changes in exam.  - Neurology signed off, last note 2/17  - Pain well controlled on PRN tylenol, dilaudid, robaxin, oxycodone     # Dysarthria - improving   - speech therapy evaluation 02/17 for dysarthric. Reconsulted 2/23 for advancing diet. No new concerns, continue  swallowing exercises and sit upright when eating. SLP signed off 2/23.  - ENT scoped, no reason for long term dysarthria/problems with phonation  - Sleep hygiene: Melatonin, trazadone prn at bedtime     # Thalamic infarct on head CT 2/16, old, age unknown  - Not contributing to current dysarthria vs delirium vs deconditioning per Neuro  - Neurology would like an MRI: to be done outpatient per Dr. Velasco  - Lipid panel, HgbA1c ordered per neuro for stroke Hx     # Symptoms of depression with insomnia   - Mental health consult 2/22, will continue to follow up weekly  - sleeping better, more upbeat with plans to discharge soon to TCU        / Renal:  -Hypernatremia, resolved  BL creat appears to be ~ 1.0  Most recent Creatinine: 0.60  - Daily weights and I/O's.  - Avoid/limit nephrotoxins as able  Replete lytes PRN per protocol  - Pre-op weight 365 lbs.   Diuresis: euvolemic clinically. Is auto-diuresing, no diuresis indicated.     GI / FEN:   - Required nutritional support while intubated. Removed NJ Tube 2/23.  - Speech following okay to advanced to regular diet with thin liquids 1/23  - BM 2/24, prn BR   # Mild-Moderate Protein-calorie Malnutrition, stable  - Weight loss > 5% over 1 month  - Oral intake <75% for more than 7 days  -  Nutrition consulted 2/23 for recommendations - Ensure Enlive BID between meals   -  Eating much better now, especially with bringing food in from outside   # Diarrhea/Incontinence, improved  - Suspect caused by large amounts of free water with tube feeds via NJ (now removed) vs augmentin   - Less frequent loose stools, Cdiff-negative 2/20  # Elevated ALT, AST, improving  - Suspect d/t Augmentin vs volume overload with hepatic congestion, Resolved   - Monitor for now wnl, no abdominal pain.    - Abd CT 1/30 - No cholecystitis       Endocrine:  # Stress induced hyperglycemia, improving  # Obesity (BMI 46)  - HDSSI stopped 2/25 after am dose   - Hgb A1c 5.8 2/18  - Goal BG <180 for  optimal healing.  BG 's without any insulin      Infectious Disease:  # Stress induced leukocytosis  # Possible ventilator associated pneumonia  # Sinusitis, treated with Augmentin   Pan cultures and re-cultures all remained unrevealing. Fever likely from previously untreated DVT vs sinusitis.  - Continue to monitor fever curve, WBC and inflammatory markers  - WBC WNL, remains afebrile.  - Checked C. Difficile 2/21 negative, UA       Hematology:   # History of testicular cancer s/p orchiectomy  # RLE, Cesar UE DVT's  # HIT positive   - Right LE Lymphedema wrap, reconsult PT/OT   - CBC daily, Hgb Stable, stable   - Finished Bivalirudin gtt as bridge to therapeutic Chromogenic Factor X  - Started coumadin 2/19 with INR goal 2-3, pharmacy to dose   - Right upper extremity US 2/21 with occlusive thrombus of one of the paired right brachial veins at the upper arm, with PICC in place, which was removed 2/21.   - LUE US 2/21 wotj occlusive superficial thrombus in left cephalic vein at the antecubital fossa. No evidence of left upper extremity deep venous thrombosis     Anticoagulation:   - ASA 81 mg daily   - Mechanical On-X aortic valve; coumadin, INR goal 2-3 for three months, then 1.5-2.0 lifelong.  - Chromogenic Factor X goal 20-40 while on Bival. Chromogenic Factor X - 40%. Stop Bival 2/27.   - Most recent INR: 2.42      MSK/ Skin:  # Sternotomy  # Surgical Incision  # Buttock pressure injury   - Sternal precautions  - Postoperative incision management per protocol  - PT/OT  - WOCN following for pressure injuries     # Maculopapular Rash, improving  Patient has maculopapular rash per Exam below.  - Likely represents folliculitis, monitor for now, improving      # Sternal Clicking and Popping   - Occurs with deep inspiration, but not appreciated with coughing. No drainage or erythema or increased pain. Noted evening of 2/24  - Non Contrast CT: incomplete healing of bony sternum. Sternal wires intact. This was  "reviewed with Dr. Velasco and with patient. Sternum still healing, continue sternal precautions and avoid aggressive coughing.      Prophylaxis:    Mechanical prophylaxis for DVT   - Chemical DVT prophylaxis- bivalirudin and Warfarin  - PPI - until 03/16 (30d from extubation) per institutional protocol  - HOB to 30 degrees     Disposition:   - Transferred to  on 2/18  - PM& R consult 2/25: recommending discharge to TCU or ARU  - Discharge pending ARU availability and transportation  - Needs Brain MRI as outpatient and Neuro Follow up (ordered)  - Patient refused labs 3/1 due to frustration of no venous access and multiple attempts at blood draw.       Discussed with Surgeon, Dr. Velasco via written and verbal commnication.     Keila Churchill PA-c  Pager: [phone]  9:59 AM March 1, 2022           Interval History:     No overnight events.  States pain is well managed on current regimen. Slept well overnight.  Tolerating diet, is passing flatus, BM x 1. No nausea or vomiting.  Breathing well without complaints.   Working with therapies and ambulating in halls with assistance.   Denies chest pain, palpitations, dizziness, syncopal symptoms, fevers, chills, myalgias, or sternal popping/clicking.         Physical Exam:   Blood pressure (!) 141/85, pulse 96, temperature 99.2  F (37.3  C), temperature source Axillary, resp. rate 18, height 1.905 m (6' 3\"), weight 146.2 kg (322 lb 4.8 oz), SpO2 96 %.  Vitals:    02/24/22 0500 02/27/22 0157 02/28/22 0500   Weight: 148.8 kg (328 lb) 146.1 kg (322 lb 1.6 oz) 146.2 kg (322 lb 4.8 oz)      Current Weight: 146.2 Kg Trend: +0.1 Kg  Admit weight: 165.9 Kg    Daily Fluid status; net loss: -5295 (6255 O)  mL    Net loss SA: -7447 mL  MAPs:   LVEF: 60-65%    Gen: A&Ox4, NAD  Neuro: no focal deficits   CV: RRR, normal S1 S2, no murmurs, rubs or gallops. No appreciable JVD   Vascular: Peripheral pulses present Radial 2+, Dorsalis Pedis 2+, Posterior Tibialis 2+.   Pulm: CTA, no " wheezing or rhonchi, normal breathing on RA  Abd: nondistended, normal BS, soft, nontender  Ext: Negative peripheral edema, 0+ pitting. Warm.   Incision: clean, dry, intact, no erythema, sternum stable  Tubes/drain sites: dressing clean and dry         Data:    Imaging:  reviewed recent imaging    Chest x-ray  Interpretation:    Echocardiogram  Interpretation:    CT scan:    Labs:  CBC  Recent Labs   Lab 02/28/22  1459 02/27/22  1119 02/26/22  0518 02/25/22  0559   WBC 6.3 7.2 6.7 8.3   RBC 3.59* 3.69* 3.36* 3.39*   HGB 10.5* 10.9* 9.9* 9.9*   HCT 34.1* 36.5* 32.3* 32.9*   MCV 95 99 96 97   MCH 29.2 29.5 29.5 29.2   MCHC 30.8* 29.9* 30.7* 30.1*   RDW 14.8 14.9 15.2* 15.4*    312 276 295     CMP:  Last Comprehensive Metabolic Panel:  Sodium   Date Value Ref Range Status   02/28/2022 138 133 - 144 mmol/L Final     Potassium   Date Value Ref Range Status   02/28/2022 3.8 3.4 - 5.3 mmol/L Final   02/05/2022 4.3 3.4 - 5.3 mmol/L Final     Chloride   Date Value Ref Range Status   02/28/2022 108 94 - 109 mmol/L Final     Carbon Dioxide (CO2)   Date Value Ref Range Status   02/28/2022 24 20 - 32 mmol/L Final     Anion Gap   Date Value Ref Range Status   02/28/2022 6 3 - 14 mmol/L Final     Glucose   Date Value Ref Range Status   02/28/2022 104 (H) 70 - 99 mg/dL Final     Urea Nitrogen   Date Value Ref Range Status   02/28/2022 12 7 - 30 mg/dL Final     Creatinine   Date Value Ref Range Status   02/28/2022 0.60 (L) 0.66 - 1.25 mg/dL Final     GFR Estimate   Date Value Ref Range Status   02/28/2022 >90 >60 mL/min/1.73m2 Final     Comment:     Effective December 21, 2021 eGFRcr in adults is calculated using the 2021 CKD-EPI creatinine equation which includes age and gender ( et al., NEJM, DOI: 10.1056/NHCKnh5410223)     Calcium   Date Value Ref Range Status   02/28/2022 8.6 8.5 - 10.1 mg/dL Final     Bilirubin Total   Date Value Ref Range Status   02/26/2022 0.4 0.2 - 1.3 mg/dL Final     Alkaline Phosphatase   Date  Value Ref Range Status   02/26/2022 84 40 - 150 U/L Final     ALT   Date Value Ref Range Status   02/26/2022 69 0 - 70 U/L Final     AST   Date Value Ref Range Status   02/26/2022 19 0 - 45 U/L Final     BMP  Recent Labs   Lab 02/28/22  1459 02/27/22  1119 02/26/22  0518 02/25/22 2337 02/25/22 2000 02/25/22  0819 02/25/22  0559 02/24/22  0506 02/24/22  0453     --  138  --   --   --  140  --  139   POTASSIUM 3.8 4.0 3.8  --   --   --  4.0  --  3.8   CHLORIDE 108  --  108  --   --   --  107  --  108   MAYE 8.6  --  8.9  --   --   --  8.8  --  8.7   CO2 24  --  24  --   --   --  26  --  27   BUN 12  --  17  --   --   --  21  --  23   CR 0.60*  --  0.60*  --   --   --  0.60*  --  0.58*   *  --  94 95 79   < > 87   < > 93    < > = values in this interval not displayed.     INR  Recent Labs   Lab 02/28/22  1459 02/27/22  1119 02/26/22  0518 02/25/22  1551   INR 2.42* 2.86* 2.54* 1.91*      Hepatic Panel  Recent Labs   Lab 02/26/22  0518 02/25/22  0559 02/24/22  0453 02/23/22  0616   AST 19 28 31 52*   ALT 69 84* 96* 108*   ALKPHOS 84 88 78 85   BILITOTAL 0.4 0.4 0.4 0.3   ALBUMIN 2.7* 2.6* 2.6* 2.4*     GLUCOSE:   Recent Labs   Lab 02/28/22  1459 02/26/22  0518 02/25/22 2337 02/25/22 2000 02/25/22  1525 02/25/22  1141   * 94 95 79 86 105*

## 2022-03-01 NOTE — PROGRESS NOTES
Care Management Follow Up    Length of Stay (days): 32    Expected Discharge Date: 03/03/2022     Concerns to be Addressed: all concerns addressed in this encounter     Patient plan of care discussed at interdisciplinary rounds: Yes    Anticipated Discharge Disposition:    Anticipated Discharge Services:   Anticipated Discharge DME:      Patient/family educated on Medicare website which has current facility and service quality ratings: no  Education Provided on the Discharge Plan:    Patient/Family in Agreement with the Plan:  yes  Referrals Placed by CM/SW:     Private pay costs discussed: Not applicable    Additional Information:  The Pt's first choice, [facility] in [place], denied Pt's referral this morning so the SW cancelled the ride that was scheduled for 1pm today with [facility].     SW went into the Pt's room and Pt was agreeable in talking with the SW. SW let the Pt know that referrals were sent to [facility] and also [facility] as well. SW let the Pt know that tomorrow morning will have a clearer picture of potential acceptance or denial. Pt was agreeable to referral choices made by SW.    ___________________    HILDA Casper  6C   [facility]  Pager [phone]  Phone [phone]

## 2022-03-01 NOTE — PROGRESS NOTES
D/He is voiding well as usual and drinks a lot of water. He says he is going to rehab tomorrow. He continues on special bed  I/talked about that we need to see if ARU has a bed available tomorrow, as [facility] did not accept him. So, will have to wait for word from the discharge planner about this  P/TCU WBA, monitor for changes

## 2022-03-01 NOTE — PLAN OF CARE
D: who underwent Bentall procedure with mechanical valve on 1/28 with Dr. Velasco and Dr. Hannah. 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     PMHx: bicuspid aortic valve, thoracic aortic aneurysm without rupture, hypertension, obesity, and testicular cancer s/p orchiectomy    I: Monitored vitals and assessed pt status. Encouraged activity.      Changed: NA  IV Access: PIV  Running:  NA  PRN: NA  Tele: SR  O2: RA  Mobility: lift      A: A&Ox4. VSS. Afebrile. Pt sleeping well overnight. No pain reported. No SOB  Pt calls appropriately. Lungsclear to dim.      P: Continue to monitor pt status and report changes to treatment team. TCU hopefully near family.

## 2022-03-01 NOTE — PLAN OF CARE
DX: Aortic root repair  PMH: bicuspid aortic valve, thoracic aortic aneurysm without rupture, hypertension, obesity, and testicular cancer s/p orchiectomy who underwent Bentall procedure with mechanical valve      Code Status: Full   Team: CVTS    Cardiac: NSR, VSS, but SBP has been running high.   Respiratory: mild crackles in bases of lungs. IS encouraged  Neuro: AxO x4  Pain: Denies  GI/: Urinates adequately into a urinal. Was incontinent x1. Pt had 1 large BM on shift  Diet: 2L fluid restrictions, regular diet  Skin: incisions WDL.   Activity: assist of 2 with gate belt and walker    Gtts/fluid: n/a     Plan: discharge to TCU when bed is available.

## 2022-03-02 ENCOUNTER — APPOINTMENT (OUTPATIENT)
Dept: PHYSICAL THERAPY | Facility: CLINIC | Age: 32
End: 2022-03-02
Attending: SURGERY
Payer: COMMERCIAL

## 2022-03-02 LAB
ANION GAP SERPL CALCULATED.3IONS-SCNC: 9 MMOL/L (ref 3–14)
APTT PPP: 39 SECONDS (ref 22–38)
BUN SERPL-MCNC: 8 MG/DL (ref 7–30)
CALCIUM SERPL-MCNC: 9.2 MG/DL (ref 8.5–10.1)
CHLORIDE BLD-SCNC: 108 MMOL/L (ref 94–109)
CO2 SERPL-SCNC: 22 MMOL/L (ref 20–32)
CREAT SERPL-MCNC: 0.62 MG/DL (ref 0.66–1.25)
ERYTHROCYTE [DISTWIDTH] IN BLOOD BY AUTOMATED COUNT: 15 % (ref 10–15)
FACT X ACT/NOR PPP CHRO: 30 % (ref 70–130)
GFR SERPL CREATININE-BSD FRML MDRD: >90 ML/MIN/1.73M2
GLUCOSE BLD-MCNC: 96 MG/DL (ref 70–99)
HCT VFR BLD AUTO: 37.4 % (ref 40–53)
HGB BLD-MCNC: 11.2 G/DL (ref 13.3–17.7)
INR PPP: 2.82 (ref 0.85–1.15)
MAGNESIUM SERPL-MCNC: 2.1 MG/DL (ref 1.6–2.3)
MCH RBC QN AUTO: 29.2 PG (ref 26.5–33)
MCHC RBC AUTO-ENTMCNC: 29.9 G/DL (ref 31.5–36.5)
MCV RBC AUTO: 97 FL (ref 78–100)
PHOSPHATE SERPL-MCNC: 4.9 MG/DL (ref 2.5–4.5)
PLATELET # BLD AUTO: 293 10E3/UL (ref 150–450)
POTASSIUM BLD-SCNC: 3.7 MMOL/L (ref 3.4–5.3)
RBC # BLD AUTO: 3.84 10E6/UL (ref 4.4–5.9)
SODIUM SERPL-SCNC: 139 MMOL/L (ref 133–144)
WBC # BLD AUTO: 6.9 10E3/UL (ref 4–11)

## 2022-03-02 PROCEDURE — 99356 PR PROLONGED SERV,INPATIENT,1ST HR: CPT | Mod: 24 | Performed by: PHYSICAL MEDICINE & REHABILITATION

## 2022-03-02 PROCEDURE — 97110 THERAPEUTIC EXERCISES: CPT | Mod: GP

## 2022-03-02 PROCEDURE — 84100 ASSAY OF PHOSPHORUS: CPT | Performed by: SURGERY

## 2022-03-02 PROCEDURE — 97116 GAIT TRAINING THERAPY: CPT | Mod: GP

## 2022-03-02 PROCEDURE — 250N000013 HC RX MED GY IP 250 OP 250 PS 637: Performed by: SURGERY

## 2022-03-02 PROCEDURE — 214N000001 HC R&B CCU UMMC

## 2022-03-02 PROCEDURE — 83735 ASSAY OF MAGNESIUM: CPT | Performed by: SURGERY

## 2022-03-02 PROCEDURE — 99233 SBSQ HOSP IP/OBS HIGH 50: CPT | Mod: 24 | Performed by: PHYSICAL MEDICINE & REHABILITATION

## 2022-03-02 PROCEDURE — 36415 COLL VENOUS BLD VENIPUNCTURE: CPT | Performed by: SURGERY

## 2022-03-02 PROCEDURE — 250N000013 HC RX MED GY IP 250 OP 250 PS 637: Performed by: PHYSICIAN ASSISTANT

## 2022-03-02 PROCEDURE — 85027 COMPLETE CBC AUTOMATED: CPT | Performed by: SURGERY

## 2022-03-02 PROCEDURE — 80048 BASIC METABOLIC PNL TOTAL CA: CPT | Performed by: NURSE PRACTITIONER

## 2022-03-02 PROCEDURE — 97530 THERAPEUTIC ACTIVITIES: CPT | Mod: GP

## 2022-03-02 PROCEDURE — 85260 CLOT FACTOR X STUART-POWER: CPT | Performed by: SURGERY

## 2022-03-02 PROCEDURE — 85730 THROMBOPLASTIN TIME PARTIAL: CPT | Performed by: SURGERY

## 2022-03-02 PROCEDURE — 85610 PROTHROMBIN TIME: CPT | Performed by: SURGERY

## 2022-03-02 PROCEDURE — 250N000013 HC RX MED GY IP 250 OP 250 PS 637: Performed by: STUDENT IN AN ORGANIZED HEALTH CARE EDUCATION/TRAINING PROGRAM

## 2022-03-02 RX ORDER — POTASSIUM CHLORIDE 750 MG/1
20 TABLET, EXTENDED RELEASE ORAL ONCE
Status: COMPLETED | OUTPATIENT
Start: 2022-03-02 | End: 2022-03-02

## 2022-03-02 RX ORDER — LISINOPRIL 40 MG/1
40 TABLET ORAL DAILY
Status: DISCONTINUED | OUTPATIENT
Start: 2022-03-03 | End: 2022-03-04 | Stop reason: HOSPADM

## 2022-03-02 RX ADMIN — WARFARIN SODIUM 12.5 MG: 10 TABLET ORAL at 17:24

## 2022-03-02 RX ADMIN — ASPIRIN 81 MG CHEWABLE TABLET 81 MG: 81 TABLET CHEWABLE at 07:51

## 2022-03-02 RX ADMIN — ATORVASTATIN CALCIUM 40 MG: 40 TABLET, FILM COATED ORAL at 21:10

## 2022-03-02 RX ADMIN — CARVEDILOL 50 MG: 25 TABLET, FILM COATED ORAL at 21:09

## 2022-03-02 RX ADMIN — DICLOFENAC SODIUM 2 G: 10 GEL TOPICAL at 17:24

## 2022-03-02 RX ADMIN — PANTOPRAZOLE SODIUM 40 MG: 40 TABLET, DELAYED RELEASE ORAL at 07:51

## 2022-03-02 RX ADMIN — Medication 10 MG: at 21:09

## 2022-03-02 RX ADMIN — CARVEDILOL 50 MG: 25 TABLET, FILM COATED ORAL at 07:51

## 2022-03-02 RX ADMIN — POTASSIUM CHLORIDE 20 MEQ: 750 TABLET, EXTENDED RELEASE ORAL at 09:34

## 2022-03-02 RX ADMIN — POTASSIUM CHLORIDE 20 MEQ: 750 TABLET, EXTENDED RELEASE ORAL at 07:51

## 2022-03-02 RX ADMIN — LISINOPRIL 20 MG: 20 TABLET ORAL at 07:51

## 2022-03-02 ASSESSMENT — ACTIVITIES OF DAILY LIVING (ADL)
ADLS_ACUITY_SCORE: 15
ADLS_ACUITY_SCORE: 15
ADLS_ACUITY_SCORE: 13
ADLS_ACUITY_SCORE: 15

## 2022-03-02 NOTE — PROGRESS NOTES
Cardiovascular Surgery Progress Note  03/02/2022         Assessment and Plan:       Chuy Varner is a 32 yo M with a PMHx of bicuspid aortic valve, thoracic aortic aneurysm without rupture, hypertension, obesity, and testicular cancer s/p orchiectomy who underwent Bentall procedure with mechanical valve on 1/28 with Dr. Velasco and Dr. Hannah. 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      Medically appropriate for discharge since 3/1/22    Cardiovascular:   # Ascending aortic aneurysm s/p Bentall w/ [place] valve on 1/28 with Dr. Velasco and Dr. Hannah  # Hx of HTN  - Goal MAP>65, SBP <130  LVEF: 60-65%  - Coreg 50 BID, Lisinopril 30mg daily  - ASA 81mg, Atorvastatin 40mg   - Anticoagulatin for valve, will need coumadin with INR goal 2-3 for three months, then 1.5-2.0 for life, with ASA. Started Coumadin 2/19, Continue Bival as bridge until Factor X therapeutic.   - Baseline TTE per Dr. Velasco 2/23: normal LVEF, AV well seated with mean gradient 7 mmHg      Chest tubes and TPW removed 2/4     Pulmonary:  # Acute hypoxemic respiratory failure (ARDS vs. PNA vs. Hypervolemia vs. TRALI vs. severe atelectasis) s/p extubation  - Extubated 2/14 to HFNC, now breathing on Room Air  - conditional VBGs - draw one if increased somnolence or distressed breathing  - Had required respiratory therapy, metanebs (percussive therapy), and flutter valve QID to encourage coughing, clearance of airways.      Neurology / MSK:  Neuro/ pain/ sedation:  # Acute Postoperative pain  - Monitor neurological status. Notify the MD for any acute changes in exam.  - Neurology signed off, last note 2/17  - Pain well controlled on PRN tylenol, dilaudid, robaxin, oxycodone     # Dysarthria - improving   - speech therapy evaluation 02/17 for dysarthric. Reconsulted  2/23 for advancing diet. No new concerns, continue swallowing exercises and sit upright when eating. SLP signed off 2/23.  - ENT scoped, no reason for long term dysarthria/problems with phonation  - Sleep hygiene: Melatonin, trazadone prn at bedtime     # Thalamic infarct on head CT 2/16, old, age unknown  - Not contributing to current dysarthria vs delirium vs deconditioning per Neuro  - Neurology would like an MRI: to be done outpatient per Dr. Velasco  - Lipid panel, HgbA1c ordered per neuro for stroke Hx     # Symptoms of depression with insomnia   - Mental health consult 2/22, will continue to follow up weekly  - sleeping better, more upbeat with plans to discharge soon to TCU        / Renal:  -Hypernatremia, resolved  BL creat appears to be ~ 1.0  Most recent Creatinine: 0.60  - Daily weights and I/O's.  - Avoid/limit nephrotoxins as able  Replete lytes PRN per protocol  - Pre-op weight 365 lbs.   Diuresis: euvolemic clinically. Is auto-diuresing, no diuresis indicated.     GI / FEN:   - Required nutritional support while intubated. Removed NJ Tube 2/23.  - Speech following okay to advanced to regular diet with thin liquids 1/23  - BM 2/24, prn BR   # Mild-Moderate Protein-calorie Malnutrition, stable  - Weight loss > 5% over 1 month  - Oral intake <75% for more than 7 days  -  Nutrition consulted 2/23 for recommendations - Ensure Enlive BID between meals   -  Eating much better now, especially with bringing food in from outside   # Diarrhea/Incontinence, improved  - Suspect caused by large amounts of free water with tube feeds via NJ (now removed) vs augmentin   - Less frequent loose stools, Cdiff-negative 2/20  # Elevated ALT, AST, improving  - Suspect d/t Augmentin vs volume overload with hepatic congestion, Resolved   - Monitor for now wnl, no abdominal pain.    - Abd CT 1/30 - No cholecystitis       Endocrine:  # Stress induced hyperglycemia, improving  # Obesity (BMI 46)  - HDSSI stopped 2/25 after am  dose   - Hgb A1c 5.8 2/18  - Goal BG <180 for optimal healing.  BG 's without any insulin      Infectious Disease:  # Stress induced leukocytosis  # Possible ventilator associated pneumonia  # Sinusitis, treated with Augmentin   Pan cultures and re-cultures all remained unrevealing. Fever likely from previously untreated DVT vs sinusitis.  - Continue to monitor fever curve, WBC and inflammatory markers  - WBC WNL, remains afebrile.  - Checked C. Difficile 2/21 negative, UA       Hematology:   # History of testicular cancer s/p orchiectomy  # RLE, Cesar UE DVT's  # HIT positive   - Right LE Lymphedema wrap, reconsult PT/OT   - CBC daily, Hgb Stable, stable   - Finished Bivalirudin gtt as bridge to therapeutic Chromogenic Factor X  - Started coumadin 2/19 with INR goal 2-3, pharmacy to dose   - Right upper extremity US 2/21 with occlusive thrombus of one of the paired right brachial veins at the upper arm, with PICC in place, which was removed 2/21.   - LUE US 2/21 wotj occlusive superficial thrombus in left cephalic vein at the antecubital fossa. No evidence of left upper extremity deep venous thrombosis     Anticoagulation:   - ASA 81 mg daily   - Mechanical On-X aortic valve; coumadin, INR goal 2-3 for three months, then 1.5-2.0 lifelong.  - Chromogenic Factor X goal 20-40 while on Bival. Chromogenic Factor X - 40%. Stop Bival 2/27.   - Most recent INR: 2.82      MSK/ Skin:  # Sternotomy  # Surgical Incision  # Buttock pressure injury   - Sternal precautions  - Postoperative incision management per protocol  - PT/OT  - WOCN following for pressure injuries     # Maculopapular Rash, improving  Patient has maculopapular rash per Exam below.  - Likely represents folliculitis, monitor for now, improving      # Sternal Clicking and Popping   - Occurs with deep inspiration, but not appreciated with coughing. No drainage or erythema or increased pain. Noted evening of 2/24  - Non Contrast CT: incomplete healing of bony  "sternum. Sternal wires intact. This was reviewed with Dr. Velasco and with patient. Sternum still healing, continue sternal precautions and avoid aggressive coughing.      Prophylaxis:    Mechanical prophylaxis for DVT   - Chemical DVT prophylaxis- bivalirudin and Warfarin  - PPI - until 03/16 (30d from extubation) per institutional protocol  - HOB to 30 degrees     Disposition:   - Transferred to  on 2/18  - PM& R consult 2/25: recommending discharge to TCU or ARU  - Discharge pending ARU availability and transportation  - Needs Brain MRI as outpatient and Neuro Follow up (ordered)  - Labs q 48 hours    Discussed with Surgeon, Dr. Velasco via written and verbal commnication.     Keila Churchill PA-c  Pager: [phone]  11:13 AM March 2, 2022           Interval History:     No overnight events.  States pain is well managed on current regimen. Slept well overnight.  Tolerating diet, is passing flatus, BM x 0. No nausea or vomiting.  Breathing well without complaints.   Working with therapies and ambulating in halls with assistance.   Denies chest pain, palpitations, dizziness, syncopal symptoms, fevers, chills, myalgias, or sternal popping/clicking.         Physical Exam:   Blood pressure 131/89, pulse 102, temperature 97.7  F (36.5  C), temperature source Oral, resp. rate 16, height 1.905 m (6' 3\"), weight 146.2 kg (322 lb 4.8 oz), SpO2 93 %.  Vitals:    02/24/22 0500 02/27/22 0157 02/28/22 0500   Weight: 148.8 kg (328 lb) 146.1 kg (322 lb 1.6 oz) 146.2 kg (322 lb 4.8 oz)      Current Weight: 146.2 Kg Trend: -0.1 Kg  Admit weight: 165.9 Kg    Daily Fluid status; net loss: -1900  mL    Net loss SA: -87856 mL  MAPs: 79-97  LVEF: 55-60%    Gen: A&Ox4, NAD  Neuro: no focal deficits   CV: RRR, normal S1 S2, no murmurs, rubs or gallops. No appreciable JVD   Vascular: Peripheral pulses present Radial 2+, Dorsalis Pedis 2+, Posterior Tibialis 2+.   Pulm: CTA, no wheezing or rhonchi, normal breathing on RA  Abd: nondistended, " normal BS, soft, nontender  Ext: negative peripheral edema, 0+ pitting. Warm.   Incision: clean, dry, intact, no erythema, sternum stable  Tubes/drain sites: dressing clean and dry         Data:    Imaging:  reviewed recent imaging    Chest x-ray  Interpretation:    Echocardiogram  Interpretation:    CT scan:    Labs:  CBC  Recent Labs   Lab 03/02/22  0546 02/28/22  1459 02/27/22  1119 02/26/22  0518   WBC 6.9 6.3 7.2 6.7   RBC 3.84* 3.59* 3.69* 3.36*   HGB 11.2* 10.5* 10.9* 9.9*   HCT 37.4* 34.1* 36.5* 32.3*   MCV 97 95 99 96   MCH 29.2 29.2 29.5 29.5   MCHC 29.9* 30.8* 29.9* 30.7*   RDW 15.0 14.8 14.9 15.2*    273 312 276     CMP:  Last Comprehensive Metabolic Panel:  Sodium   Date Value Ref Range Status   03/02/2022 139 133 - 144 mmol/L Final     Potassium   Date Value Ref Range Status   03/02/2022 3.7 3.4 - 5.3 mmol/L Final   02/05/2022 4.3 3.4 - 5.3 mmol/L Final     Chloride   Date Value Ref Range Status   03/02/2022 108 94 - 109 mmol/L Final     Carbon Dioxide (CO2)   Date Value Ref Range Status   03/02/2022 22 20 - 32 mmol/L Final     Anion Gap   Date Value Ref Range Status   03/02/2022 9 3 - 14 mmol/L Final     Glucose   Date Value Ref Range Status   03/02/2022 96 70 - 99 mg/dL Final     Urea Nitrogen   Date Value Ref Range Status   03/02/2022 8 7 - 30 mg/dL Final     Creatinine   Date Value Ref Range Status   03/02/2022 0.62 (L) 0.66 - 1.25 mg/dL Final     GFR Estimate   Date Value Ref Range Status   03/02/2022 >90 >60 mL/min/1.73m2 Final     Comment:     Effective December 21, 2021 eGFRcr in adults is calculated using the 2021 CKD-EPI creatinine equation which includes age and gender (Renata rios al., NEJM, DOI: 10.1056/UVRCts7577729)     Calcium   Date Value Ref Range Status   03/02/2022 9.2 8.5 - 10.1 mg/dL Final     Bilirubin Total   Date Value Ref Range Status   02/26/2022 0.4 0.2 - 1.3 mg/dL Final     Alkaline Phosphatase   Date Value Ref Range Status   02/26/2022 84 40 - 150 U/L Final     ALT    Date Value Ref Range Status   02/26/2022 69 0 - 70 U/L Final     AST   Date Value Ref Range Status   02/26/2022 19 0 - 45 U/L Final     BMP  Recent Labs   Lab 03/02/22  0546 02/28/22  1459 02/27/22  1119 02/26/22  0518 02/25/22  2337 02/25/22  0819 02/25/22  0559    138  --  138  --   --  140   POTASSIUM 3.7 3.8 4.0 3.8  --   --  4.0   CHLORIDE 108 108  --  108  --   --  107   MAYE 9.2 8.6  --  8.9  --   --  8.8   CO2 22 24  --  24  --   --  26   BUN 8 12  --  17  --   --  21   CR 0.62* 0.60*  --  0.60*  --   --  0.60*   GLC 96 104*  --  94 95   < > 87    < > = values in this interval not displayed.     INR  Recent Labs   Lab 03/02/22  0546 02/28/22  1459 02/27/22  1119 02/26/22  0518   INR 2.82* 2.42* 2.86* 2.54*      Hepatic Panel  Recent Labs   Lab 02/26/22  0518 02/25/22  0559 02/24/22  0453   AST 19 28 31   ALT 69 84* 96*   ALKPHOS 84 88 78   BILITOTAL 0.4 0.4 0.4   ALBUMIN 2.7* 2.6* 2.6*     GLUCOSE:   Recent Labs   Lab 03/02/22  0546 02/28/22  1459 02/26/22  0518 02/25/22  2337 02/25/22  2000 02/25/22  1525   GLC 96 104* 94 95 79 86

## 2022-03-02 NOTE — PLAN OF CARE
D: who underwent Bentall procedure with mechanical valve on 1/28 with Dr. Velasco and Dr. Hannah. 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      PMHx: bicuspid aortic valve, thoracic aortic aneurysm without rupture, hypertension, obesity, and testicular cancer s/p orchiectomy     I: Monitored vitals and assessed pt status. Encouraged activity.      Changed: NA  IV Access: PIV  Running:  NA  PRN: NA  Tele: SR 90s 1st degree AV block   O2: RA  Mobility: lift      A: A&Ox4. VSS. Afebrile. Pt sleeping well overnight. No pain reported. No SOB.  Pt calls appropriately. Lungs clear to dim. Pt drinks a lot of water. Excited to leave.      P: Continue to monitor pt status and report changes to treatment team. TCU irene denied. Other TCUs contacted per care coordinator.

## 2022-03-02 NOTE — PROGRESS NOTES
[facility]   PM&R Progress Note         Assessment/Recommendations   Mr. Chuy Varner is a left handed  31 year old yo with a past medical history significant for back cuspid aortic valve, thoracic aortic aneurysm, testicular 6 cancer status post orchiectomy, heparin induced thrombocytopenia, lower extremity DVT and hypertension who was admitted to the [facility] on January 28, 2022 for Bentallprocedure with mechanical valve.  Regarding the disposition: given that the patient demonstrated the ability, tolerance and functional progression with therapies, he would benefit from inpatient acute rehabilitation unit to address ongoing functional deficits pending adminstrative approval.     Patient seen and discussed with PM&R attending Dr. Prince who agrees with the assessment and plan.  Ang Pineda MD  PM&R resident  03/02/2022 4:52 PM          Interval history:   Chuy Varner was seen and examined at bedside. Doing well participating in rehab plan of care.       Functionally,   He is making functional progression with therapies. He was able to transfer to the bedside chair with AX2. He was able to walker few step with FWW and CGA.       Medications:  Current Outpatient Medications   Medication Sig Dispense Refill     acetaminophen (TYLENOL) 325 MG tablet Take 2 tablets (650 mg) by mouth every 6 hours as needed for mild pain or fever       aspirin (ASA) 81 MG chewable tablet 1 tablet (81 mg) by Oral or NG Tube route daily       atorvastatin (LIPITOR) 40 MG tablet Take 1 tablet (40 mg) by mouth every evening       carvedilol (COREG) 25 MG tablet 2 tablets (50 mg) by Oral or Feeding Tube route 2 times daily       lisinopril (ZESTRIL) 20 MG tablet Take 1.5 tablets (30 mg) by mouth daily       melatonin 10 MG TABS tablet 1 tablet (10 mg) by Oral or Feeding Tube route every evening       methocarbamol (ROBAXIN) 750 MG tablet Take 1 tablet (750 mg) by  "mouth every 6 hours as needed for muscle spasms       pantoprazole (PROTONIX) 40 MG EC tablet Take 1 tablet (40 mg) by mouth every morning (before breakfast)       traZODone (DESYREL) 50 MG tablet 1 tablet (50 mg) by Oral or Feeding Tube route nightly as needed for sleep                Physical Exam:   /78 (BP Location: Right arm)   Pulse 88   Temp 98.7  F (37.1  C) (Oral)   Resp 16   Ht 1.905 m (6' 3\")   Wt 146.2 kg (322 lb 4.8 oz)   SpO2 98%   BMI 40.28 kg/m    Gen: NAD, laying comfortably in bed  HEENT: NA/NC  Pulm: breathing comfortably in room air.  Abd: NTND, BS present throughout  Ext: warm to touch, moving all extremities, no significant LE edema  Neuro: He is able to move all 4 extremities at least antigravity.     Labs:  Lab Results   Component Value Date    WBC 6.9 03/02/2022    HGB 11.2 (L) 03/02/2022    HCT 37.4 (L) 03/02/2022    MCV 97 03/02/2022     03/02/2022     Lab Results   Component Value Date     03/02/2022    POTASSIUM 3.7 03/02/2022    CHLORIDE 108 03/02/2022    CO2 22 03/02/2022    GLC 96 03/02/2022     Lab Results   Component Value Date    GFRESTIMATED >90 03/02/2022     Lab Results   Component Value Date    AST 19 02/26/2022    ALT 69 02/26/2022    ALKPHOS 84 02/26/2022    BILITOTAL 0.4 02/26/2022    ROMÁN 35 02/16/2022     Lab Results   Component Value Date    INR 2.82 (H) 03/02/2022     Lab Results   Component Value Date    BUN 8 03/02/2022    CR 0.62 (L) 03/02/2022           "

## 2022-03-02 NOTE — PROGRESS NOTES
Care Management Follow Up    Length of Stay (days): 33    Expected Discharge Date: 03/03/2022     Concerns to be Addressed: all concerns addressed in this encounter     Patient plan of care discussed at interdisciplinary rounds: Yes    Anticipated Discharge Disposition:    Anticipated Discharge Services:   Anticipated Discharge DME:      Patient/family educated on Medicare website which has current facility and service quality ratings: no  Education Provided on the Discharge Plan:    Patient/Family in Agreement with the Plan:  yes  Referrals Placed by CM/SW:     Private pay costs discussed: Not applicable    Additional Information:  Patient has been referred to [facility] admissions for consideration of ARU vs. TCU.  Awaiting re-evaluation by PM&R MD to determine which.  Patient still be considering for two local facilities, both who requested additional information today.  Info sent to:    [facility]  [address] [place] [id number]  PH: [phone]  Fax: [phone]  Admissions: [phone] (Shannan)  2/25: MERCEDES left VM for Desirae, requesting a call back for referral fax #.  1220: MERCEDES received VM from Shannan in Admissions, who provided fax # and her direct dial.  1500: MERCEDES faxed referral.     [address] [address]  PH: [phone]  Fax: [phone]  Admissions: [phone] (Joaquina)  2/25: Referral faxed    Update patient and his S.O. this afternoon.  They verbalized understanding.  Will continue to work for rehab placement when bed available.     [facility] Lilly [facility]  [facility]   Phone: [phone] Pager: [phone]    Assisting primary SWer:    HILDA Casper  6C   [facility]  Pager [phone]  Phone [phone]

## 2022-03-03 ENCOUNTER — APPOINTMENT (OUTPATIENT)
Dept: PHYSICAL THERAPY | Facility: CLINIC | Age: 32
End: 2022-03-03
Attending: SURGERY
Payer: COMMERCIAL

## 2022-03-03 PROCEDURE — 97110 THERAPEUTIC EXERCISES: CPT | Mod: GP

## 2022-03-03 PROCEDURE — 250N000013 HC RX MED GY IP 250 OP 250 PS 637: Performed by: STUDENT IN AN ORGANIZED HEALTH CARE EDUCATION/TRAINING PROGRAM

## 2022-03-03 PROCEDURE — 250N000013 HC RX MED GY IP 250 OP 250 PS 637: Performed by: SURGERY

## 2022-03-03 PROCEDURE — 97530 THERAPEUTIC ACTIVITIES: CPT | Mod: GP

## 2022-03-03 PROCEDURE — 97116 GAIT TRAINING THERAPY: CPT | Mod: GP

## 2022-03-03 PROCEDURE — 250N000013 HC RX MED GY IP 250 OP 250 PS 637: Performed by: PHYSICIAN ASSISTANT

## 2022-03-03 PROCEDURE — 214N000001 HC R&B CCU UMMC

## 2022-03-03 RX ORDER — WARFARIN SODIUM 7.5 MG/1
15 TABLET ORAL
Status: COMPLETED | OUTPATIENT
Start: 2022-03-03 | End: 2022-03-03

## 2022-03-03 RX ADMIN — CARVEDILOL 50 MG: 25 TABLET, FILM COATED ORAL at 08:17

## 2022-03-03 RX ADMIN — WARFARIN SODIUM 15 MG: 7.5 TABLET ORAL at 18:42

## 2022-03-03 RX ADMIN — DICLOFENAC SODIUM 2 G: 10 GEL TOPICAL at 15:39

## 2022-03-03 RX ADMIN — ASPIRIN 81 MG CHEWABLE TABLET 81 MG: 81 TABLET CHEWABLE at 08:18

## 2022-03-03 RX ADMIN — Medication 10 MG: at 21:55

## 2022-03-03 RX ADMIN — DICLOFENAC SODIUM 2 G: 10 GEL TOPICAL at 10:57

## 2022-03-03 RX ADMIN — CARVEDILOL 50 MG: 25 TABLET, FILM COATED ORAL at 20:23

## 2022-03-03 RX ADMIN — PANTOPRAZOLE SODIUM 40 MG: 40 TABLET, DELAYED RELEASE ORAL at 08:17

## 2022-03-03 RX ADMIN — DICLOFENAC SODIUM 2 G: 10 GEL TOPICAL at 21:57

## 2022-03-03 RX ADMIN — LISINOPRIL 40 MG: 40 TABLET ORAL at 08:18

## 2022-03-03 RX ADMIN — ATORVASTATIN CALCIUM 40 MG: 40 TABLET, FILM COATED ORAL at 20:23

## 2022-03-03 ASSESSMENT — ACTIVITIES OF DAILY LIVING (ADL)
ADLS_ACUITY_SCORE: 13
ADLS_ACUITY_SCORE: 12
ADLS_ACUITY_SCORE: 13
ADLS_ACUITY_SCORE: 13
ADLS_ACUITY_SCORE: 12
ADLS_ACUITY_SCORE: 13
ADLS_ACUITY_SCORE: 13
ADLS_ACUITY_SCORE: 12
ADLS_ACUITY_SCORE: 13
ADLS_ACUITY_SCORE: 13
ADLS_ACUITY_SCORE: 12
ADLS_ACUITY_SCORE: 12
ADLS_ACUITY_SCORE: 13
ADLS_ACUITY_SCORE: 12
ADLS_ACUITY_SCORE: 13
ADLS_ACUITY_SCORE: 13
ADLS_ACUITY_SCORE: 12
ADLS_ACUITY_SCORE: 13
ADLS_ACUITY_SCORE: 12
ADLS_ACUITY_SCORE: 13
ADLS_ACUITY_SCORE: 12
ADLS_ACUITY_SCORE: 13
ADLS_ACUITY_SCORE: 13
ADLS_ACUITY_SCORE: 12

## 2022-03-03 NOTE — PLAN OF CARE
Pt is A/O x4. VSS, RA. Ax2 walker and GB. PRN Voltaren gel given for shoulder/upper back pain. Tele SR with 1st degree AVB. Lung sounds diminished in bases. Encourage cough/deep breathing. Sternal incision ALICJA and CDI. K+ replaced this shift. Multiple attempts to ask and encourage patient to get up to chair later this afternoon/evening but pt declined. Regular diet, tolerates well. 2L FR. Patient currently awaiting placement in TCU/ARU, see SW note. Patient currently in bed with call light in reach.

## 2022-03-03 NOTE — PLAN OF CARE
D: who underwent Bentall procedure with mechanical valve on 1/28 with Dr. Velasco and Dr. Hannah. 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      PMHx: bicuspid aortic valve, thoracic aortic aneurysm without rupture, hypertension, obesity, and testicular cancer s/p orchiectomy     I: Monitored vitals and assessed pt status. Encouraged activity.      Changed: Primafit in place per pt preference.   IV Access: PIV  Running:  NA  PRN: NA  Tele: SR 90s 1st degree AV block   O2: RA  Mobility: lift      A: A&Ox4. VSS. Afebrile. Pt sleeping well overnight. No pain reported. No SOB.  Pt calls appropriately. Lungs clear to dim. Pt went slightly over 2L FR. Seems to be in good spirits. Hoping to leave soon for TCU/ARU. Primafit in place for overnight per pt request.      P: Continue to monitor pt status and report changes to treatment team. TCU social working working on finding placement for the patient.

## 2022-03-03 NOTE — PROGRESS NOTES
CLINICAL NUTRITION SERVICES    Request from RN to modify oral supplements    INTERVENTIONS:  Implementation:  Medical food supplement therapy: Modified strawberry Ensure Enlive at 10:00 to 14:00 to strawberry Ensure Shakes. Ordered Magic Cup at HS.     Follow up/Monitoring:  Will continue to follow pt.    Shama Salinas, MS, RD, LD, CNSC   6C Pgr: [phone]

## 2022-03-03 NOTE — PLAN OF CARE
D: Pt s/p Bentall procedure with mechanical valve on 1/28. Pt's post-op course was c/b hypoxemic respiratory failure, prolonged intubation, HIT positivity on Bivalirudin, LE DVT, hypernatremia, altered mental status/inability to speak with therapy, and an old thalmus stroke found on head CT.    Past Medical History: Bicuspid aortic valve, thoracic aortic aneurysm without rupture, testicular cancer s/p orchiectomy, obesity, and HTN.    I: Monitored and assessed pt status; nursing interventions provided, as well as positive reinforcement with pt's current progress.     A: A&Ox4, VSS, afebrile, on RA. Sinus Rhythm with 1st Degree AV Block on tele monitor. Pain in bilateral shoulders and mid-back relieved with Voltaren Gel x2.   Pt expressed motivation to work with therapy this am--he walked in the hernandez and sat up in chair for longer than usual. He is getting up and pivoting to chair with assist of 1-2 and demonstrating increased strength as well as improved activity tolerance. BMx1, voiding in urinal during the daytime hours. Medically stable, awaiting placement.    P: Continue with current plan of care while awaiting placement for rehab therapy.

## 2022-03-03 NOTE — PROGRESS NOTES
Rehab Admissions:  I spoke w/ Chuy this afternoon to discuss acute inpatient rehab. I discussed setup and structure of ARC level of care including physician oversight at least 3x/wk for his medical/rehab needs, skilled PT/OT services for 90 minutes each daily, and skilled rehab nursing cares. I also discussed differences between ARC and TCU level of cares. Chuy reports he wants to pursue ARC level of care either at [facility] or [facility]. I informed pt he requires insurance authorization for [facility] level of care and it will be initiated this afternoon.     Thank you for the referral, we will continue to follow this patient for post acute placement.     Determination of admission is based upon the patient's need for an intensive, interdisciplinary approach to rehabilitation, their ability to progress, their ability to tolerate intensive therapies, their need for daily physician supervision, their need for twenty four hour nursing assistance, and their ability and willingness to participate in such a program.    Gregoria Cleveland CM  Rehab Liaison/  [facility]  3/3/2022    1:06 PM

## 2022-03-03 NOTE — INTERIM SUMMARY
[facility] Pre-Admission Screen    Referral Source:  [facility] [address] [facility] 6415-02  Admit date to referring facility: 1/28/2022    Physical Medicine and Rehab Consult Completed: Yes, PMR recommending ARC on 3/2/22    Rehab Diagnosis:    09 Cardiac: ascending aortic aneurysm s/p Bentall procedure w/ Jerry valve    Justification for Acute Inpatient Rehabilitation  Chuy Varner is a 31 year old male with a PMH of bicuspid aortic valve, thoracic aortic aneurysm without rupture, aortic root aneurysm, HTN, obesity (BMI 46), and testicular cancer s/p orchiectomy, who underwent Bentall procedure with mechanical valve on 1/28/22. His hospital course has been c/b postop hypoxemic respiratory failure w/ profound hypoxemia d/t shunt physiology requiring intubation and proning 1/28-2/14/22. Other complications include fevers w/ leukocytosis, HIT positivity, BLE DVT and BUE DVTs, HTN, dysarthria, hypernatremia, delirium, depression w/ insomnia, mild-moderate protein-calorie malnutrition, stress induced hyperglycemia, sinusitis, buttock pressure injury, and maculopapular rash. He has also been noted to have sternal clicking and popping w/ CT showing incomplete healing of bony sternum. He is now medically stable and ready to discharge to acute inpatient rehab.   The patient requires an intensive inpatient rehab program to address the following acute impairments: fatigue, dizziness, dyspnea on exertion, significant proximal UE/LE weakness, impaired activity tolerance, impaired balance, RLE edema, post-surgical sternal precautions. These impairments are impacting his ability to safely and independently perform transfers, gait, stairs, ADLs, and IADLs.  He requires intensive therapies to improve in these areas and promote functional independence for a safe return home and eventual return to work.     Current Active Medical Management Needs/Risks for Clinical Complications  The patient  requires the high level of rehabilitation physician supervision that accompanies the provision of intensive rehabilitation therapy.  The patient needs the services of the rehabilitation physician to assess the patient medically and functionally and to modify the course of treatment as needed to maximize the patient's capacity to benefit from the rehabilitation process.  The patient will require physician oversight at least 3x/wk to medically manage and assess:    Cardiovascular status in setting of HTN, and s/p Bentall procedure w/ Jerry valve: On Coreg, Lisinopril, ASA 81 mg, Atorvastatin. Pt will need close medical mgmt of his blood pressure as pt w/ recent increase in Lisinopril to 40. Reinforce post-surgical sternal precautions. Goal MAP > 65, SBP <130. He will require ongoing instruction in safe use of adaptive equipment and assistive devices to facilitate his safe functional mobility.     Anticoagulation in setting of mechanical valve, RLE and BUE DVTS: Coumadin w/ INR goal 2-3 for 3 months, then 1.5-2.0 for life, with ASA.     Pulmonary status in setting of acute hypoxemic respiratory failure: pt at risk for respiratory decompensation. Currently stable on room air. Promote pulmonary hygiene and airway clearance. Pt using flutter valve QID. Promote splinting of sternal incision while coughing and avoid aggressive coughing d/t incomplete healing of sternum.    Sleep: Melatonin, Trazadone. Pt at risk for disordered sleep in setting of delirium, stroke, and prolonged hospitalization.     Neurologic status in setting of thalamic infarct (age unknown):  Assess neurologic status. Promote stroke education and risk factor reduction strategies.     Renal function, fluid, and electrolyte balance: pt w/ hypernatremia. Pt currently auto-diuresing, has needed intermittent diuresis w/ Lasix. Needs assessment of daily weights and strict I/Os; avoid/limit nephrotoxins, replaces electrolytes as appropriate. Baseline Cr ~1.0,  currently ~0.6.     Nutritional status in setting of mild-moderate protein-calorie malnutrition: pt w/ > 5% weight loss over 1 month. Dysphagia - resolved, on regular diet. Nutrition consulted w/ recommendations for Ensure Enlive BID between meals. Oral intake improving - continue to assess nutritional status.    Obesity: BMI 46 on admission, now 38.78. Obesity puts patient at increased risk for complications and mortality.    Endocrine needs in setting of stress induced hyperglycemia (improving): goal BG <180 for optimal healing. Pt currently w/ BG 's without insulin.     Mental health in setting of symptoms of depression w/ insomnia: mental health consulted 2/22. Note pt also w/ delirium during hospital stay (resolved). Promote normal sleep-wake cycle.     ID needs in setting of stress induced leukocytosis, fevers, and sinusitis: continue to trend fever curve, WBC, and inflammatory markers. Fever likely d/t previously untreated DVT or sinusitis.    Integument in setting of sternal incision and maculopapular rash: reinforce sternal precautions, assess skin integrity. Assist w/ mgmt of  RLE lymphedema.     Pain: Voltaren. Promote pain control for optimal participation in rehab therapies.     The patient is at risk for falls in setting of weakness and impaired balance. He is at risk for DVTs (on anticoagulation). He is also at risk for further skin breakdown and wound dehiscence.     Past Medical/Surgical History  Surgery in the past 100 days: Yes  Additional relevant past medical history: bicuspid aortic valve, thoracic aortic aneurysm without rupture, HTN, obesity, and testicular cancer s/p orchiectomy, s/p Bentall procedure with mechanical valve on 1/28     Level of Functioning Prior to Admission:    LIVING ENVIRONMENT  People in home: significant other Debbie   Current Living Arrangements: house  Home Accessibility: stairs to enter home, stairs within home   Number of Stairs, Main Entrance: 1   Number of  Stairs, Within Home, Primary: greater than 10    Stair Railings, Within Home, Primary: railings safe and in good condition  Transportation Anticipated: car, drives self, family or friend will provide  Living Environment Comments:  Laundry in basement which girlfriend does. Has four dogs.    SELF-CARE  Usual Activity Tolerance: good  Equipment Currently Used at Home: none  Activity/Exercise/Self-Care Comment: Patient IND with ADLs, share cooking/cleaning with girlfriend, patient primarily completes yardwork. No reglar exercise but has stationary bike at home. 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     DISABILITY/FUNCTION  Concentrating, Remembering or Making Decisions Difficulty: no  Difficulty Communicating: no  Difficulty Eating/Swallowing: no  Walking or Climbing Stairs Difficulty: no  Dressing/Bathing Difficulty: no  Toileting issues: no  Doing Errands Independently Difficulty (such as shopping): no  Fall history within last six months: no;    Change in Functional Status Since Onset of Current Illness/Injury: yes (Impaired ADL/IADL performance)    Level of Function: GG Scale (Section GG Functional Ability and Goals; CMS's TURCIOS Version 3.0 Manual effective 10.1.2019):  PT Current Function Goals for Rehab   Bed Rolling 4 Supervision or touching assitance 6 Independent   Supine to Sit 3 Partial/moderate assistance 6 Independent   Sit to Stand 3 Partial/moderate assistance 6 Independent   Transfer 1 Dependent 6 Independent   Ambulation 1 Dependent 6 Independent   Stairs Not completed 6 Independent     OT Current Function Goals for Rehab   Feeding 5 Setup or clean-up assistance 6 Independent   Grooming 4 Supervision or touching assitance 6 Independent   Bathing Not completed 6 Independent   Upper Body Dressing Not completed 6 Independent   Lower Body Dressing Not completed 6 Independent   Toileting 1 Dependent 6 Independent   Toilet Transfer 1 Dependent 6 Independent   Tub/Shower Transfer Not completed 6  Independent   Cognition Not Assessed Independent     SLP Current Function Goals for Rehab   Swallow Not Impaired Not applicable   Communication Not Impaired Not applicable       Current Diet:  0-Thin and 7-Regular/easy to chew    Summary Statement:  Chuy performs supine > sit w/ HOB elevated w/ CGA w/ cues for logroll technique in setting of sternal precautions. He performs STS transfers to WW w/ min A w/ cues for safe transfer technique and improved eccentric control during stand > sit. He performs toilet transfers w/ min A x2 and requires dependent A for pericares. He ambulates 20 ft + 8 ft + 10 ft w/ WW, CGA and w/c follow w/ ALVARADO and fatigue noted. He requires cues for improved gait mechanics as pt demos slowed vicente and small step length. He also requires cues for energy conservation and pacing strategies. He needs setup assist for grooming/hygiene tasks in sitting. He will need a full assessment of ADLs w/ instruction in how to safely modify ADLs in setting of post-surgical sternal precautions.     Expected Therapies and Services Required During Inpatient Rehab Admission  Intensity of Therapy: Patient requires intensive therapies not available in a lesser level of care. Patient is motivated, making gains, and can tolerate 3 hours of therapy a day.  Physical Therapy: 90 minutes per day, 7 days a week for 10 days  Occupational Therapy: 90 minutes per day, 7 days a week for 10 days  Speech and Language Therapy: no SLP needs anticipated at this time.  Rehabilitation Nursing Needs: Patient requires 24 hour Rehab Nursing to manage wound care in setting of sternal incision, vitals, medication education in pt w/ new medications including new lifelong anticoagulation needs and changes in dosages, carryover of new rehab techniques and reinforcement of sternal precautions, care coordination, skin integrity, pain management, provide safe environment for patient at falls risk, monitor nutritional intake and edema  management.    Precautions/restrictions/special needs:  Precautions: fall precautions and Sternal precautions  Restrictions: no pushing, pulling, lifting > 10# for 6 weeks post-op, > 20# for subsequent 6 weeks  Special Needs: none    Expected Level of Improvement: The patient will achieve a level of modified independence for transfers, gait, stairs, ADLs, and IADLs.   Expected Length of time to achieve: 10 days    Anticipated Discharge Needs:  Anticipated Discharge Destination: Home  Anticipated Discharge Support: Friends  24/7 support available : Unknown  Identified caregiver(s):  Significant other  Anticipated Discharge Needs: Home with outpatient therapy and Outpatient Cardiac/Pulmonary Rehab    Identified challenges/barriers: none    Rehab Admission Liaison Signature/Date/Time:     Physician statement of review and agreement:  I have reviewed and am in agreement of the need for IRF stay to address above functional and medical needs. In addition to above statements address, Patient requires intensive active and ongoing therapeutic intervention and multiple therapies; Patient requires medical supervision; Expected to actively participate in the intensive rehab program; Sufficiently stable to actively participate; Expectation for measurable improvement in functional capacity or adaption to impairments.    Physician Signature/Date/Time:

## 2022-03-03 NOTE — PROGRESS NOTES
Cardiovascular Surgery Progress Note  03/03/2022         Assessment and Plan:     Chuy Varner is a 30 yo M with a PMHx of bicuspid aortic valve, thoracic aortic aneurysm without rupture, hypertension, obesity, and testicular cancer s/p orchiectomy who underwent Bentall procedure with mechanical valve on 1/28 with Dr. Velasco and Dr. Hannah. 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      Medically appropriate for discharge since 3/1/22     Cardiovascular:   # Ascending aortic aneurysm s/p Bentall w/ Jerry valve on 1/28 with Dr. Velasco and Dr. Hannah  # Hx of HTN  - Goal MAP>65, SBP <130  LVEF: 60-65%  - Coreg 50 BID, Lisinopril 40mg daily  - ASA 81mg, Atorvastatin 40mg   - Anticoagulatin for valve, will need coumadin with INR goal 2-3 for three months, then 1.5-2.0 for life, with ASA. Started Coumadin 2/19, Continue Bival as bridge until Factor X therapeutic.   - Baseline TTE per Dr. Velasco 2/23: normal LVEF, AV well seated with mean gradient 7 mmHg      Chest tubes and TPW removed 2/4     Pulmonary:  # Acute hypoxemic respiratory failure (ARDS vs. PNA vs. Hypervolemia vs. TRALI vs. severe atelectasis) s/p extubation  - Extubated 2/14 to HFNC, now breathing on Room Air  - conditional VBGs - draw one if increased somnolence or distressed breathing  - Had required respiratory therapy, metanebs (percussive therapy), and flutter valve QID to encourage coughing, clearance of airways.      Neurology / MSK:  Neuro/ pain/ sedation:  # Acute Postoperative pain  - Monitor neurological status. Notify the MD for any acute changes in exam.  - Neurology signed off, last note 2/17  - Pain well controlled on PRN tylenol, dilaudid, robaxin, oxycodone     # Dysarthria - improving   - speech therapy evaluation 02/17 for dysarthric. Reconsulted  2/23 for advancing diet. No new concerns, continue swallowing exercises and sit upright when eating. SLP signed off 2/23.  - ENT scoped, no reason for long term dysarthria/problems with phonation  - Sleep hygiene: Melatonin, trazadone prn at bedtime     # Thalamic infarct on head CT 2/16, old, age unknown  # Acute Delirium, resolved  - Not contributing to current dysarthria  - Neurology would like an MRI: to be done outpatient per Dr. Velasco  - Lipid panel, HgbA1c ordered per neuro for stroke Hx     # Symptoms of depression with insomnia   - Mental health consult 2/22, will continue to follow up weekly  - sleeping better, more upbeat with plans to discharge soon to TCU        / Renal:  -Hypernatremia, resolved  BL creat appears to be ~ 1.0  Most recent Creatinine: 0.60  - Daily weights and I/O's.  - Avoid/limit nephrotoxins as able  Replete lytes PRN per protocol  - Pre-op weight 365 lbs.   Diuresis: euvolemic clinically. Is auto-diuresing, no diuresis indicated.     GI / FEN:   - Required nutritional support while intubated. Removed NJ Tube 2/23.  - Speech following okay to advanced to regular diet with thin liquids 1/23  - BM 2/24, prn BR   # Mild-Moderate Protein-calorie Malnutrition, stable  - Weight loss > 5% over 1 month  - Oral intake <75% for more than 7 days  -  Nutrition consulted 2/23 for recommendations - Ensure Enlive BID between meals   -  Eating much better now, especially with bringing food in from outside   # Diarrhea/Incontinence, improved  - Suspect caused by large amounts of free water with tube feeds via NJ (now removed) vs augmentin   - Less frequent loose stools, Cdiff-negative 2/20  # Elevated ALT, AST, improving  - Suspect d/t Augmentin vs volume overload with hepatic congestion, Resolved   - Monitor for now wnl, no abdominal pain.    - Abd CT 1/30 - No cholecystitis       Endocrine:  # Stress induced hyperglycemia, improving  # Obesity (BMI 46)  - HDSSI stopped 2/25 after am dose   - Hgb  A1c 5.8 2/18  - Goal BG <180 for optimal healing.  BG 's without any insulin      Infectious Disease:  # Stress induced leukocytosis  # Possible ventilator associated pneumonia  # Sinusitis, treated with Augmentin   Pan cultures and re-cultures all remained unrevealing. Fever likely from previously untreated DVT vs sinusitis.  - Continue to monitor fever curve, WBC and inflammatory markers  - WBC WNL, remains afebrile.  - Checked C. Difficile 2/21 negative, UA       Hematology:   # History of testicular cancer s/p orchiectomy  # RLE, Cesar UE DVT's  # HIT positive   - Right LE Lymphedema wrap, reconsult PT/OT   - CBC daily, Hgb Stable, stable   - Finished Bivalirudin gtt as bridge to therapeutic Chromogenic Factor X  - Started coumadin 2/19 with INR goal 2-3  - Right upper extremity US 2/21 with occlusive thrombus of one of the paired right brachial veins at the upper arm, with PICC in place, which was removed 2/21.   - LUE US 2/21 occlusive superficial thrombus in left cephalic vein at the antecubital fossa. No evidence of left upper extremity deep venous thrombosis     Anticoagulation:   - ASA 81 mg daily   - Mechanical On-X aortic valve; coumadin, INR goal 2-3 for three months, then 1.5-2.0 lifelong.  - Chromogenic Factor X goal 20-40 while on Bival. Chromogenic Factor X - 40%. Stop Bival 2/27.   - Most recent INR: 2.82      MSK/ Skin:  # Sternotomy  # Surgical Incision  # Buttock pressure injury   - Sternal precautions  - Postoperative incision management per protocol  - PT/OT  - WOCN following for pressure injuries     # Maculopapular Rash, improving  Patient has maculopapular rash per Exam below.  - Likely represents folliculitis, monitor for now, improving      # Sternal Clicking and Popping   - Occurs with deep inspiration, but not appreciated with coughing. No drainage or erythema or increased pain. Noted evening of 2/24  - Non Contrast CT: incomplete healing of bony sternum. Sternal wires intact. This  "was reviewed with Dr. Velasco and with patient. Sternum still healing, continue sternal precautions and avoid aggressive coughing.      Prophylaxis:    Mechanical prophylaxis for DVT   - Chemical DVT prophylaxis- bivalirudin and Warfarin  - PPI - until 03/16 (30d from extubation) per institutional protocol  - HOB to 30 degrees     Disposition:   - Transferred to  on 2/18  - PM& R consult recommending discharge to [facility]  - Discharge pending ARU availability and transportation  - Needs Brain MRI as outpatient and Neuro Follow up (ordered)  - Labs q 48 hours    Discussed with Surgeon, Dr. Velasco via written and verbal commnication.     Keila Churchill PA-c  Pager: [phone]  10:54 AM March 3, 2022           Interval History:     No overnight events.  States pain is well managed on current regimen. Slept well overnight.  Tolerating diet, is passing flatus, BM x 1. No nausea or vomiting.  Breathing well without complaints.   Working with therapies and ambulating in halls without assistance.   Denies chest pain, palpitations, dizziness, syncopal symptoms, fevers, chills, myalgias, or sternal popping/clicking.         Physical Exam:   Blood pressure 124/76, pulse 86, temperature 98.9  F (37.2  C), temperature source Oral, resp. rate 16, height 1.905 m (6' 3\"), weight 140.8 kg (310 lb 4.8 oz), SpO2 93 %.  Vitals:    02/27/22 0157 02/28/22 0500 03/03/22 0255   Weight: 146.1 kg (322 lb 1.6 oz) 146.2 kg (322 lb 4.8 oz) 140.8 kg (310 lb 4.8 oz)      Current Weight: 140.8 Kg Trend: -5.4 Kg  Admit weight: 165.9 Kg    Daily Fluid status; net loss: -1090  mL    Net loss SA: -92307 mL  MAPs:   LVEF: 60-65%    Gen: A&Ox4, NAD  Neuro: no focal deficits   CV: RRR, normal S1 S2, no murmurs, rubs or gallops. No appreciable JVD   Vascular: Peripheral pulses Present Radial 2+, Dorsalis Pedis 2+, Posterior Tibialis 2+.   Pulm: CTA, no wheezing or rhonchi, normal breathing on RA  Abd: nondistended, normal BS, soft, nontender  Ext: Negative " peripheral edema, 0+ pitting. Warm.   Incision: clean, dry, intact, no erythema, sternum stable  Tubes/drain sites: dressing clean and dry          Data:    Imaging:  reviewed recent imaging    Chest x-ray  Interpretation:    Echocardiogram  Interpretation:    CT scan:    Labs:  CBC  Recent Labs   Lab 03/02/22  0546 02/28/22  1459 02/27/22  1119 02/26/22  0518   WBC 6.9 6.3 7.2 6.7   RBC 3.84* 3.59* 3.69* 3.36*   HGB 11.2* 10.5* 10.9* 9.9*   HCT 37.4* 34.1* 36.5* 32.3*   MCV 97 95 99 96   MCH 29.2 29.2 29.5 29.5   MCHC 29.9* 30.8* 29.9* 30.7*   RDW 15.0 14.8 14.9 15.2*    273 312 276     CMP:  Last Comprehensive Metabolic Panel:  Sodium   Date Value Ref Range Status   03/02/2022 139 133 - 144 mmol/L Final     Potassium   Date Value Ref Range Status   03/02/2022 3.7 3.4 - 5.3 mmol/L Final   02/05/2022 4.3 3.4 - 5.3 mmol/L Final     Chloride   Date Value Ref Range Status   03/02/2022 108 94 - 109 mmol/L Final     Carbon Dioxide (CO2)   Date Value Ref Range Status   03/02/2022 22 20 - 32 mmol/L Final     Anion Gap   Date Value Ref Range Status   03/02/2022 9 3 - 14 mmol/L Final     Glucose   Date Value Ref Range Status   03/02/2022 96 70 - 99 mg/dL Final     Urea Nitrogen   Date Value Ref Range Status   03/02/2022 8 7 - 30 mg/dL Final     Creatinine   Date Value Ref Range Status   03/02/2022 0.62 (L) 0.66 - 1.25 mg/dL Final     GFR Estimate   Date Value Ref Range Status   03/02/2022 >90 >60 mL/min/1.73m2 Final     Comment:     Effective December 21, 2021 eGFRcr in adults is calculated using the 2021 CKD-EPI creatinine equation which includes age and gender (Renata rios al., NEJM, DOI: 10.1056/KYDCbt7044750)     Calcium   Date Value Ref Range Status   03/02/2022 9.2 8.5 - 10.1 mg/dL Final     Bilirubin Total   Date Value Ref Range Status   02/26/2022 0.4 0.2 - 1.3 mg/dL Final     Alkaline Phosphatase   Date Value Ref Range Status   02/26/2022 84 40 - 150 U/L Final     ALT   Date Value Ref Range Status   02/26/2022  69 0 - 70 U/L Final     AST   Date Value Ref Range Status   02/26/2022 19 0 - 45 U/L Final     BMP  Recent Labs   Lab 03/02/22  0546 02/28/22  1459 02/27/22  1119 02/26/22  0518 02/25/22 2337 02/25/22  0819 02/25/22  0559    138  --  138  --   --  140   POTASSIUM 3.7 3.8 4.0 3.8  --   --  4.0   CHLORIDE 108 108  --  108  --   --  107   MAYE 9.2 8.6  --  8.9  --   --  8.8   CO2 22 24  --  24  --   --  26   BUN 8 12  --  17  --   --  21   CR 0.62* 0.60*  --  0.60*  --   --  0.60*   GLC 96 104*  --  94 95   < > 87    < > = values in this interval not displayed.     INR  Recent Labs   Lab 03/02/22  0546 02/28/22  1459 02/27/22  1119 02/26/22  0518   INR 2.82* 2.42* 2.86* 2.54*      Hepatic Panel  Recent Labs   Lab 02/26/22  0518 02/25/22  0559   AST 19 28   ALT 69 84*   ALKPHOS 84 88   BILITOTAL 0.4 0.4   ALBUMIN 2.7* 2.6*     GLUCOSE:   Recent Labs   Lab 03/02/22  0546 02/28/22  1459 02/26/22  0518 02/25/22  2337 02/25/22  2000 02/25/22  1525   GLC 96 104* 94 95 79 86

## 2022-03-04 ENCOUNTER — HOSPITAL ENCOUNTER (INPATIENT)
Facility: CLINIC | Age: 32
LOS: 6 days | Discharge: HOME | End: 2022-03-10
Attending: PHYSICAL MEDICINE & REHABILITATION | Admitting: PHYSICAL MEDICINE & REHABILITATION
Payer: COMMERCIAL

## 2022-03-04 VITALS
HEIGHT: 75 IN | RESPIRATION RATE: 18 BRPM | WEIGHT: 310.4 LBS | DIASTOLIC BLOOD PRESSURE: 85 MMHG | OXYGEN SATURATION: 96 % | TEMPERATURE: 97.9 F | HEART RATE: 88 BPM | BODY MASS INDEX: 38.59 KG/M2 | SYSTOLIC BLOOD PRESSURE: 134 MMHG

## 2022-03-04 DIAGNOSIS — A04.72 C. DIFFICILE COLITIS: ICD-10-CM

## 2022-03-04 DIAGNOSIS — Z98.890 S/P AORTIC ANEURYSM REPAIR: Primary | ICD-10-CM

## 2022-03-04 DIAGNOSIS — Z86.79 S/P AORTIC ANEURYSM REPAIR: Primary | ICD-10-CM

## 2022-03-04 DIAGNOSIS — I71.20 THORACIC AORTIC ANEURYSM WITHOUT RUPTURE (H): ICD-10-CM

## 2022-03-04 DIAGNOSIS — Q23.81 BICUSPID AORTIC VALVE: ICD-10-CM

## 2022-03-04 LAB
ANION GAP SERPL CALCULATED.3IONS-SCNC: 6 MMOL/L (ref 3–14)
ATRIAL RATE - MUSE: 85 BPM
BUN SERPL-MCNC: 8 MG/DL (ref 7–30)
CALCIUM SERPL-MCNC: 8.9 MG/DL (ref 8.5–10.1)
CHLORIDE BLD-SCNC: 111 MMOL/L (ref 94–109)
CO2 SERPL-SCNC: 24 MMOL/L (ref 20–32)
CREAT SERPL-MCNC: 0.59 MG/DL (ref 0.66–1.25)
DIASTOLIC BLOOD PRESSURE - MUSE: NORMAL MMHG
ERYTHROCYTE [DISTWIDTH] IN BLOOD BY AUTOMATED COUNT: 14.7 % (ref 10–15)
GFR SERPL CREATININE-BSD FRML MDRD: >90 ML/MIN/1.73M2
GLUCOSE BLD-MCNC: 93 MG/DL (ref 70–99)
HCT VFR BLD AUTO: 37.5 % (ref 40–53)
HGB BLD-MCNC: 11.3 G/DL (ref 13.3–17.7)
INR PPP: 2.9 (ref 0.85–1.15)
INTERPRETATION ECG - MUSE: NORMAL
MAGNESIUM SERPL-MCNC: 2 MG/DL (ref 1.6–2.3)
MCH RBC QN AUTO: 28.8 PG (ref 26.5–33)
MCHC RBC AUTO-ENTMCNC: 30.1 G/DL (ref 31.5–36.5)
MCV RBC AUTO: 96 FL (ref 78–100)
P AXIS - MUSE: 37 DEGREES
PHOSPHATE SERPL-MCNC: 4.9 MG/DL (ref 2.5–4.5)
PLATELET # BLD AUTO: 287 10E3/UL (ref 150–450)
POTASSIUM BLD-SCNC: 3.9 MMOL/L (ref 3.4–5.3)
PR INTERVAL - MUSE: 220 MS
QRS DURATION - MUSE: 104 MS
QT - MUSE: 376 MS
QTC - MUSE: 447 MS
R AXIS - MUSE: 45 DEGREES
RBC # BLD AUTO: 3.92 10E6/UL (ref 4.4–5.9)
SODIUM SERPL-SCNC: 141 MMOL/L (ref 133–144)
SYSTOLIC BLOOD PRESSURE - MUSE: NORMAL MMHG
T AXIS - MUSE: -5 DEGREES
VENTRICULAR RATE- MUSE: 85 BPM
WBC # BLD AUTO: 7 10E3/UL (ref 4–11)

## 2022-03-04 PROCEDURE — 84100 ASSAY OF PHOSPHORUS: CPT | Performed by: SURGERY

## 2022-03-04 PROCEDURE — 99223 1ST HOSP IP/OBS HIGH 75: CPT | Mod: 24 | Performed by: PHYSICIAN ASSISTANT

## 2022-03-04 PROCEDURE — 83735 ASSAY OF MAGNESIUM: CPT | Performed by: PHYSICIAN ASSISTANT

## 2022-03-04 PROCEDURE — 128N000003 HC R&B REHAB

## 2022-03-04 PROCEDURE — 93005 ELECTROCARDIOGRAM TRACING: CPT

## 2022-03-04 PROCEDURE — 250N000013 HC RX MED GY IP 250 OP 250 PS 637: Performed by: PHYSICAL MEDICINE & REHABILITATION

## 2022-03-04 PROCEDURE — 93010 ELECTROCARDIOGRAM REPORT: CPT | Performed by: INTERNAL MEDICINE

## 2022-03-04 PROCEDURE — 250N000013 HC RX MED GY IP 250 OP 250 PS 637: Performed by: PHYSICIAN ASSISTANT

## 2022-03-04 PROCEDURE — 250N000013 HC RX MED GY IP 250 OP 250 PS 637: Performed by: STUDENT IN AN ORGANIZED HEALTH CARE EDUCATION/TRAINING PROGRAM

## 2022-03-04 PROCEDURE — 36415 COLL VENOUS BLD VENIPUNCTURE: CPT | Performed by: PHYSICIAN ASSISTANT

## 2022-03-04 PROCEDURE — 99207 PR SC NO CHARGE VISIT: CPT | Performed by: PHYSICIAN ASSISTANT

## 2022-03-04 PROCEDURE — 250N000013 HC RX MED GY IP 250 OP 250 PS 637: Performed by: SURGERY

## 2022-03-04 PROCEDURE — 85014 HEMATOCRIT: CPT | Performed by: PHYSICIAN ASSISTANT

## 2022-03-04 PROCEDURE — 85610 PROTHROMBIN TIME: CPT | Performed by: SURGERY

## 2022-03-04 PROCEDURE — 80048 BASIC METABOLIC PNL TOTAL CA: CPT | Performed by: PHYSICIAN ASSISTANT

## 2022-03-04 RX ORDER — CARVEDILOL 25 MG/1
50 TABLET ORAL 2 TIMES DAILY
Status: DISCONTINUED | OUTPATIENT
Start: 2022-03-04 | End: 2022-03-10 | Stop reason: HOSPADM

## 2022-03-04 RX ORDER — TRAZODONE HYDROCHLORIDE 50 MG/1
50 TABLET, FILM COATED ORAL
Status: DISCONTINUED | OUTPATIENT
Start: 2022-03-04 | End: 2022-03-10 | Stop reason: HOSPADM

## 2022-03-04 RX ORDER — MAGNESIUM OXIDE 400 MG/1
400 TABLET ORAL 2 TIMES DAILY
Status: DISCONTINUED | OUTPATIENT
Start: 2022-03-04 | End: 2022-03-04 | Stop reason: HOSPADM

## 2022-03-04 RX ORDER — POLYETHYLENE GLYCOL 3350 17 G/17G
17 POWDER, FOR SOLUTION ORAL DAILY PRN
Status: DISCONTINUED | OUTPATIENT
Start: 2022-03-04 | End: 2022-03-10 | Stop reason: HOSPADM

## 2022-03-04 RX ORDER — ATORVASTATIN CALCIUM 40 MG/1
40 TABLET, FILM COATED ORAL EVERY EVENING
Status: DISCONTINUED | OUTPATIENT
Start: 2022-03-04 | End: 2022-03-10 | Stop reason: HOSPADM

## 2022-03-04 RX ORDER — FUROSEMIDE 20 MG
20 TABLET ORAL DAILY
Status: ON HOLD | DISCHARGE
Start: 2022-03-04 | End: 2022-03-09

## 2022-03-04 RX ORDER — ASPIRIN 81 MG/1
81 TABLET, CHEWABLE ORAL DAILY
Status: DISCONTINUED | OUTPATIENT
Start: 2022-03-05 | End: 2022-03-10 | Stop reason: HOSPADM

## 2022-03-04 RX ORDER — FUROSEMIDE 20 MG
20 TABLET ORAL DAILY
Status: DISCONTINUED | OUTPATIENT
Start: 2022-03-05 | End: 2022-03-10 | Stop reason: HOSPADM

## 2022-03-04 RX ORDER — PANTOPRAZOLE SODIUM 40 MG/1
40 TABLET, DELAYED RELEASE ORAL
Status: DISCONTINUED | OUTPATIENT
Start: 2022-03-05 | End: 2022-03-10 | Stop reason: HOSPADM

## 2022-03-04 RX ORDER — ACETAMINOPHEN 325 MG/1
650 TABLET ORAL EVERY 6 HOURS PRN
Status: DISCONTINUED | OUTPATIENT
Start: 2022-03-04 | End: 2022-03-10 | Stop reason: HOSPADM

## 2022-03-04 RX ORDER — METHOCARBAMOL 750 MG/1
750 TABLET, FILM COATED ORAL EVERY 6 HOURS PRN
Status: DISCONTINUED | OUTPATIENT
Start: 2022-03-04 | End: 2022-03-10 | Stop reason: HOSPADM

## 2022-03-04 RX ORDER — LISINOPRIL 40 MG/1
40 TABLET ORAL DAILY
Status: DISCONTINUED | OUTPATIENT
Start: 2022-03-05 | End: 2022-03-10 | Stop reason: HOSPADM

## 2022-03-04 RX ORDER — LISINOPRIL 20 MG/1
40 TABLET ORAL DAILY
Status: ON HOLD | DISCHARGE
Start: 2022-03-04 | End: 2022-03-09

## 2022-03-04 RX ORDER — MAGNESIUM OXIDE 400 MG/1
400 TABLET ORAL 2 TIMES DAILY
Status: DISCONTINUED | OUTPATIENT
Start: 2022-03-04 | End: 2022-03-04

## 2022-03-04 RX ORDER — AMOXICILLIN 250 MG
1-4 CAPSULE ORAL 2 TIMES DAILY PRN
Status: DISCONTINUED | OUTPATIENT
Start: 2022-03-04 | End: 2022-03-10 | Stop reason: HOSPADM

## 2022-03-04 RX ADMIN — LISINOPRIL 40 MG: 40 TABLET ORAL at 09:06

## 2022-03-04 RX ADMIN — Medication 10 MG: at 21:32

## 2022-03-04 RX ADMIN — Medication 400 MG: at 09:06

## 2022-03-04 RX ADMIN — CARVEDILOL 50 MG: 25 TABLET, FILM COATED ORAL at 20:03

## 2022-03-04 RX ADMIN — WARFARIN SODIUM 12.5 MG: 5 TABLET ORAL at 18:02

## 2022-03-04 RX ADMIN — ASPIRIN 81 MG CHEWABLE TABLET 81 MG: 81 TABLET CHEWABLE at 09:06

## 2022-03-04 RX ADMIN — ATORVASTATIN CALCIUM 40 MG: 40 TABLET, FILM COATED ORAL at 20:03

## 2022-03-04 RX ADMIN — CARVEDILOL 50 MG: 25 TABLET, FILM COATED ORAL at 09:06

## 2022-03-04 RX ADMIN — DICLOFENAC SODIUM 2 G: 10 GEL TOPICAL at 18:32

## 2022-03-04 RX ADMIN — PANTOPRAZOLE SODIUM 40 MG: 40 TABLET, DELAYED RELEASE ORAL at 09:06

## 2022-03-04 ASSESSMENT — ACTIVITIES OF DAILY LIVING (ADL)
WEAR_GLASSES_OR_BLIND: NO
ADLS_ACUITY_SCORE: 11
ADLS_ACUITY_SCORE: 11
ADLS_ACUITY_SCORE: 12
ADLS_ACUITY_SCORE: 9
CHANGE_IN_FUNCTIONAL_STATUS_SINCE_ONSET_OF_CURRENT_ILLNESS/INJURY: YES
ADLS_ACUITY_SCORE: 9
TOILETING_ISSUES: NO
ADLS_ACUITY_SCORE: 11
FALL_HISTORY_WITHIN_LAST_SIX_MONTHS: NO
ADLS_ACUITY_SCORE: 11
CONCENTRATING,_REMEMBERING_OR_MAKING_DECISIONS_DIFFICULTY: NO
DOING_ERRANDS_INDEPENDENTLY_DIFFICULTY: NO
ADLS_ACUITY_SCORE: 9
ADLS_ACUITY_SCORE: 9
ADLS_ACUITY_SCORE: 12
ADLS_ACUITY_SCORE: 11
ADLS_ACUITY_SCORE: 12
ADLS_ACUITY_SCORE: 9
ADLS_ACUITY_SCORE: 11
ADLS_ACUITY_SCORE: 9
ADLS_ACUITY_SCORE: 11
DRESSING/BATHING_DIFFICULTY: NO
DIFFICULTY_EATING/SWALLOWING: NO
ADLS_ACUITY_SCORE: 12
ADLS_ACUITY_SCORE: 9
ADLS_ACUITY_SCORE: 11
ADLS_ACUITY_SCORE: 9
ADLS_ACUITY_SCORE: 12
WALKING_OR_CLIMBING_STAIRS_DIFFICULTY: NO
ADLS_ACUITY_SCORE: 12

## 2022-03-04 ASSESSMENT — PATIENT HEALTH QUESTIONNAIRE - PHQ9: SUM OF ALL RESPONSES TO PHQ9 QUESTIONS 1 & 2: 0

## 2022-03-04 NOTE — DISCHARGE SUMMARY
Care Management Discharge Note    Discharge Date: 03/03/2022       Discharge Disposition: Acute Rehab    Discharge Services: None    Discharge DME:      Discharge Transportation: [facility]  Private pay costs discussed: transportation costs    PAS Confirmation Code:    Patient/family educated on Medicare website which has current facility and service quality ratings: no    Education Provided on the Discharge Plan:    Persons Notified of Discharge Plans: Chuy (the Pt) and he said he will let his girlfriend know himself  Patient/Family in Agreement with the Plan: yes    Handoff Referral Completed: Yes    Additional Information:  MERCEDES got a call back this morning from Kacey Richards ([phone]) who is the liaison for 6C today over at [facility]. She relayed to the SW that they have a bed ready at 10am and asked if the SW could change his ride. SW called [place] and switched his scheduled ride to 10am from 2:30pm. SW went into Pt's room and let him know and Pt said that he is going to call and let his girlfriend know. SW put in the discharge handoff and this discharge note.    ___________________    HILDA Casper  6C   [facility]  Pager [phone]  Phone [phone]        Blake Romero

## 2022-03-04 NOTE — PROGRESS NOTES
DISCHARGE                      3/4/2022  ----------------------------------------------------------------------------  Discharged to:  [facility]  Accompanied by: Medical Transport   Discharge Instructions: Diet, activity, medications, follow up appointments, and when to call the MD reviewed with patient who voiced understanding.  Prescriptions: Medication list reviewed & sent with pt  Follow Up Appointments: Arranged; information given  Belongings: All sent with pt  IV: out  Telemetry: off  Pt exhibits understanding of above discharge instructions; all questions answered.    Discharge Paperwork: Signed, copied, and sent to [facility] with patient.   Report given to CARINA Fountain at [facility]

## 2022-03-04 NOTE — PHARMACY-ANTICOAGULATION SERVICE
Clinical Pharmacy - Warfarin Dosing Consult     Pharmacy has been consulted to manage this patient s warfarin therapy.  Indication: On-X Aortic Valve   Therapy Goal: INR 2-3 (Suggested goal INR 2.0-3.0 for first 3 months then INR 1.5-2.0), Chr Factor 10: 20-40%  While pt was on  bivalrudin.  Warfarin Prior to Admission: No (new since 2/19/22)  Significant drug interactions: ASA  Recent documented change in oral intake/nutrition: Unknown    INR   Date Value Ref Range Status   03/04/2022 2.90 (H) 0.85 - 1.15 Final   03/02/2022 2.82 (H) 0.85 - 1.15 Final     Factor 10 Chromogenic   Date Value Ref Range Status   03/02/2022 30 (L) 70 - 130 % Final       Recommend warfarin 12.5 mg today.  Pharmacy will monitor Chuy YESSICA EdmondsTelly daily and order warfarin doses to achieve specified goal.      Please contact pharmacy as soon as possible if the warfarin needs to be held for a procedure or if the warfarin goals change.

## 2022-03-04 NOTE — PLAN OF CARE
Goal Outcome Evaluation:  Admitted to the unit today from 6c, alert and oriented x 4, denies any pain. Has baseline piky and ring finger numbness and tingling, left hand also weaker todd the right. Has an unequal pupils right is bigger than the left, bot are reactive to light and patient denies any vision issues. Currently AO2 SPT with walker to the commode, continent of bowel and bladder, assisted with rai care. Room orientation completed, skin check completed and no pressure injury were noted, will continue POC.

## 2022-03-04 NOTE — PLAN OF CARE
Physical Therapy Discharge Summary    Reason for therapy discharge:    Discharged to acute rehabilitation facility.    Progress towards therapy goal(s). See goals on Care Plan in [facility] electronic health record for goal details.  Goals not met.  Barriers to achieving goals:   discharge from facility.    Therapy recommendation(s):    Continued therapy is recommended.  Rationale/Recommendations:  Patient will benefit from continued skilled therapies in intensive multidisciplinary setting to optimize functional mobility, independence and activity tolerance.

## 2022-03-04 NOTE — DISCHARGE INSTRUCTIONS
AFTER YOU GO HOME FROM YOUR HEART SURGERY  (Mechanical Aortic Valve Replacement and Aortic Aneurysm Repair surgery on 1/28/2022)    You had a sternotomy, avoid lifting anything greater than ten pounds for 6 weeks after surgery and then less than 20 pounds for an additional 6 weeks.   Do not reach backwards or use arms to push out of chair.   Do not let people pull on your arms to assist with standing.   Avoid twisting or reaching too far across your body.    Avoid strenuous activities such as bowling, vacuuming, raking, shoveling, golf or tennis for 12 weeks after your surgery.   It is okay to resume sex if you feel comfortable in doing so. You may have to try different positions with your partner.    Splint your chest incision by hugging a pillow or bringing your arms across your chest when coughing or sneezing.     No driving for 4 weeks after surgery or while on pain medication.    Shower or wash your incisions daily with soap and water (or as instructed), pat dry.   Keep wound clean and dry, showers are okay after discharge, but don't let spray hit directly on incision.   No baths or swimming for 1 month.   Cover chest tube sites with dry gauze until they stop draining, then leave open to air. It is not abnormal for chest tube sites to drain yellowish/clear fluid for up to 2-3 weeks after surgery.   Watch for signs of infection: increased redness, tenderness, warmth or any drainage that appears infected (pus like) or is persistent.  Also a temperature > 100.5 F or chills. Call your surgeon or primary care provider's office immediately.   Remove any skin glue left on incisions after 10-14 days. This will not affect your incision and can speed up healing.    Exercise is very important in your recovery. Please follow the guidelines set up for you in your cardiac rehab classes at the hospital. If outpatient cardiac rehab was ordered for you, we highly recommend you participate. If you have problems arranging  "your cardiac rehab, please call [phone] for all locations, with the exception of [place], please call [phone] and [place], please call [phone].    Avoid sitting for prolonged periods of time, try to walk every hour during the day. If you have a leg incision, elevate your leg often when you are not walking.    Check your weight when you get home from the hospital and continue to check it daily through your recovery for at least a month. If you notice a weight gain of 2-3 pounds in a week, notify your primary care physician, cardiologist or surgeon.    Bowel activity may be slow after surgery. If necessary, you may take an over the counter laxative such as Milk of Magnesia or Miralax. You may have stool softeners prescribed (docusate sodium, Senokot). We recommend using stool softeners while using narcotics for pain (oxycodone/percocet, hydrocodone/vicodin, hydromorphone/dilaudid).      Wean OFF of narcotics (oxycodone, dilaudid, hydrocodone) as soon as possible. You should continue taking acetaminophen as long as you have any surgical pain as the first choice for pain control and add narcotics as necessary for pain to be tolerable.      DENTAL VISITS AFTER SURGERY  If you have had your heart valve repaired or replaced, we do not recommend having any dental work done for 6 months and you will need to take an antibiotic prior to dental visits from now on.  Please notify your dentist before any procedure for the proper treatment needed. The antibiotic is taken by mouth one hour prior to visit. This includes routine cleanings.  You can sometimes hear a mechanical valve \"clicking,\" this is normal and not a sign of something wrong.    DO NOT SMOKE.  IF YOU NEED HELP QUITTING, PLEASE TALK WITH YOUR CARDIOLOGIST OR PRIMARY DOCTOR.    You are on a blood thinner, follow the instructions you were given in the hospital and DO NOT SKIP this medication. Try and take it the same time everyday. Your primary care " physician or coumadin clinic will manage the dosing. INR goal is 2-3.    REGARDING PRESCRIPTION REFILLS.  If you need a refill on your pain medication contact us to discuss your pain and a possible one time refill.   All other medications will be adjusted, discontinued and re-filled by your primary care physician and/or your cardiologist as they were prior to your surgery. We have given you enough for one to three month with possibly one refill.    POST-OPERATIVE CLINIC VISITS  You have a follow up video visit with [facility] Advance Care Practitioners on 3/11/22.  You will then return to the care of your primary provider and your cardiologist. Future medication refills should come from your PCP or Cardiologist.   You should see your primary care provider in 1-2 weeks after discharge.   It is important to see your cardiologist, Dr. Mcnamara within 1-2 weeks after discharge.  You will have a neurology follow up with a Brain MRI scheduled outpatient.  If you do not hear from a  in 7 days, please call [phone] (choose option 1) and request to be seen with a general cardiologist or someone that you have seen in the past.   If there is a need to return to see CT Surgery please call our  at [phone].    SURGICAL QUESTIONS  Please call one of the Cardiac Surgery Nurse Coordinators with surgical recovery and medication questions, their phone numbers are listed below.  They will assist you with your needs and contact other surgery care team members as indicated.    On weekends or after hours, please call [phone] and ask the  to page the Cardiothoracic Surgery fellow on call.      Thank you,    Your Cardiothoracic Surgery Team   Enzo Wilson RN Care Coordinator-  [phone]   Betty Castel RN Care Coordinator-  [phone]   Deborah Hernandez RN Care Coordinator- [phone]

## 2022-03-04 NOTE — H&P
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 Velasco and Dr. Hannah.       In the first several days following his procedure, patient experienced hypoxemic respiratory failure as well as fevers and leukocytosis.  PEEP titrated.  He was started on meropenem, vancomycin, steroids, and increased diuresis with concern for ARDS vs pneumonia vs hypervolemia vs TRALI.  COVID and respiratory viral panel negative, CTA negative for PE but with bilateral lower lung volumes with moderate bibasilar atelectasis/consolidation, cultures negative.  Pulmonology consulted on 2/2 for worsening ARDS, recommended proning and adjusted ventilator settings, in addition to ongoing antibiotics for pneumonia, diuresis, nebulizers, pulmonary hygiene.  ID was also consulted on 2/6 for persistent fevers and leukocytosis.  Recommended to stop antibiotics on 2/7 having completed 7-day course and felt picture most consistent with ARDS/pulmonary edema.  Given persistent fevers, additional work-up pursued on 2/9 with CT C/A/P and sinuses, with possible sinusitis, treated with Augmentin.  On 2/10, U/S revealed RLE DVT (occlusive thrombus at gastroc), and patient was started on Heparin, but stopped due to HIT and transitioned to bivalirudin, later bridged to warfarin.  He was successfully extubated on 2/14 to high flow nasal cannula, and to room air by time of discharge.    Following extubation, patient was  observed to have severe dysarthria, almost anarthria.  He also demonstrated proximal weakness, felt likely 2/2 deconditioning from critical illness polyneuropathy.  Neurology was consulted and felt anarthria likely 2/2 prolonged intubation, recommended CTH and speech vs ENT evaluation.  CTH revealed old left thalamic stroke, etiology likely small vessel disease.  Neurology recommended to continue aspirin and anticoagulation per cardiology.      In addition to hoarseness, SLP noting oropharyngeal dysphagia on evaluation.  ENT performed laryngoscopic exam on 2/17, with no anatomical etiology of dysphagia, recommended ongoing SLP.    On 2/21, patient noted to have bilateral upper extremity swelling.  Ultrasound examination revealed RUE occlusive DVT associated with PICC, as well as occlusive superficial thrombus in left cephalic vein.    Post-op course was also complicated by acute post-operative pain, acute blood loss anemia and thrombocytopenia, pressure injuries, stress-induced hyperglycemia, hypernatremia, malnutrition, dysarthria, dysphagia, elevated ALT/AST, depressive symptoms, sleep difficulties, encephalopathy, diarrhea, and sternal popping/clicking.    During acute hospitalization, patient was seen and evaluated by PT and OT, as well as PM&R consult service, who collectively recommended that patient would benefit from ongoing therapies in the acute inpatient rehabilitation setting.      In review of the therapy notes, patient performs supine > sit w/ HOB elevated w/ CGA w/ cues for logroll technique in setting of sternal precautions. He performs STS transfers to WW w/ min A w/ cues for safe transfer technique and improved eccentric control during stand > sit. He performs toilet transfers w/ min A x2 and requires dependent A for pericares. He ambulates 20 ft + 8 ft + 10 ft w/ WW, CGA and w/c follow w/ ALVARADO and fatigue noted. He requires cues for improved gait mechanics as pt demos slowed vicente and small step  length. He also requires cues for energy conservation and pacing strategies. He needs setup assist for grooming/hygiene tasks in sitting. He will need a full assessment of ADLs w/ instruction in how to safely modify ADLs in setting of post-surgical sternal precautions.     Upon arrival to the rehab unit, patient reports that overall he is feeling quite well and happy to be here at rehab.  He notes ongoing daily improvements.  He feels his main ongoing issue is generalized weakness and impaired activity tolerance.  He notes some soreness in sternal area, but very tolerable, some persistent clicking/popping of sternum.  He also reports mild shortness of breath.  He had some dizziness on the transport over from the hospital, but since resolved and otherwise none.  He reports regular bowel movements, no longer loose.  He had one episode of urinary incontinence at night, related to urgency.  He states sleep has not been great due to environmental factors.  He feels like mood is stable.  He reports improving appetite, though he does not like hospital food, but he is taking protein supplements.  He reports some numbness in L hand and pinky/ring fingers.    PAST MEDICAL HISTORY   Reviewed and updated in Epic.  No past medical history on file.    SURGICAL HISTORY  Reviewed and updated in Epic.  Past Surgical History:   Procedure Laterality Date     ORCHIECTOMY SCROTAL       PICC TRIPLE LUMEN PLACEMENT Right 02/14/2022    Right basilic, 48 cm, 4 cm external length     REPLACE VALVE AORTIC N/A 01/28/2022    Procedure: Median Sternotomy.  Cardiopulmonary bypass pump.  Bentall procedure using On-X Ascending Aortic Prosthesis with Vascutek Gelweave Valsa Graft  size 27-29MM .  Intraoperative transesophageal echocardiogram per anesthesia;  Surgeon: Nickolas Velasco MD;  Location:  OR       SOCIAL HISTORY  Reviewed and updated in Epic.  Marital Status: significant other, Debbie  Living situation: lives in house with significant  other, 2 stairs to enter, full flight within but can meet all needs on main  Family support: supportive mother and significant other  Vocational History: works for [employer] company, primarily out of his car  Tobacco use: denies  Alcohol use: about 3 drinks per week  Illicit drug use: denies  Social History     Socioeconomic History     Marital status: Patient Declined     Spouse name: Not on file     Number of children: Not on file     Years of education: Not on file     Highest education level: Not on file   Occupational History     Not on file   Tobacco Use     Smoking status: Never Smoker     Smokeless tobacco: Never Used   Substance and Sexual Activity     Alcohol use: Yes     Alcohol/week: 3.0 standard drinks     Types: 3 Standard drinks or equivalent per week     Drug use: Never     Sexual activity: Not on file   Other Topics Concern     Not on file   Social History Narrative     Not on file     Social Determinants of Health     Financial Resource Strain: Not on file   Food Insecurity: Not on file   Transportation Needs: Not on file   Physical Activity: Not on file   Stress: Not on file   Social Connections: Not on file   Intimate Partner Violence: Not on file   Housing Stability: Not on file       FAMILY HISTORY  Reviewed and updated in Epic.  Family History   Problem Relation Age of Onset     Anesthesia Reaction No family hx of      Deep Vein Thrombosis (DVT) No family hx of          PRIOR FUNCTIONAL HISTORY   Pt was independent with all ADLs/IADLs, transfers, mobility and gait.      MEDICATIONS  Scheduled meds  Medications Prior to Admission   Medication Sig Dispense Refill Last Dose     methocarbamol (ROBAXIN) 750 MG tablet Take 1 tablet (750 mg) by mouth every 6 hours as needed for muscle spasms   Unknown at Unknown time     traZODone (DESYREL) 50 MG tablet 1 tablet (50 mg) by Oral or Feeding Tube route nightly as needed for sleep   Unknown at Unknown time     acetaminophen (TYLENOL) 325 MG tablet Take 2  "tablets (650 mg) by mouth every 6 hours as needed for mild pain or fever   2/25/2022     aspirin (ASA) 81 MG chewable tablet 1 tablet (81 mg) by Oral or NG Tube route daily   3/4/2022 at 0906     atorvastatin (LIPITOR) 40 MG tablet Take 1 tablet (40 mg) by mouth every evening   3/3/2022 at 2023     carvedilol (COREG) 25 MG tablet 2 tablets (50 mg) by Oral or Feeding Tube route 2 times daily   3/4/2022 at 0906     furosemide (LASIX) 20 MG tablet Take 1 tablet (20 mg) by mouth daily   2/27/2022 at 0937     lisinopril (ZESTRIL) 20 MG tablet Take 2 tablets (40 mg) by mouth daily   3/4/2022 at 0906     melatonin 10 MG TABS tablet 1 tablet (10 mg) by Oral or Feeding Tube route every evening   3/3/2022 at 2155     pantoprazole (PROTONIX) 40 MG EC tablet Take 1 tablet (40 mg) by mouth every morning (before breakfast)   3/3/2022 at 0906       ALLERGIES     Allergies   Allergen Reactions     Heparin Heparin Induced Thrombocytopenia     HIT + 2/10/22, KATARZYNA confirmed         REVIEW OF SYSTEMS  A 10 point ROS was performed and negative unless otherwise noted in HPI.       PHYSICAL EXAM  VITAL SIGNS:  /79 (BP Location: Right arm)   Pulse 90   Temp 99.1  F (37.3  C) (Oral)   Resp 18   Ht 1.905 m (6' 3\")   Wt 141.3 kg (311 lb 6.4 oz)   SpO2 96%   BMI 38.92 kg/m    BMI:  Estimated body mass index is 38.78 kg/m  as calculated from the following:    Height as of 1/28/22: 1.905 m (6' 3\").    Weight as of 3/3/22: 140.8 kg (310 lb 4.8 oz).     General: NAD, pleasant, lying in bed  HEENT: NC/AT, MMM  Pulmonary: non-labored on room air, lungs CTA bilaterally  Cardiovascular: RRR, valve click  Abdominal: soft, non-tender, non-distended, bowel sounds present  Extremities: warm, well perfused, trace edema in bilateral lower extremities, no tenderness in calves   MSK/neuro:   Mental Status:  alert and oriented x3   Cranial Nerves: grossly normal    Sensory: Normal to light touch in bilateral upper and lower extremities with " exception of digits 4 and 5 on left hand and ulnar side of that hand   Strength: upper extremity strength testing limited by sternal precautions but at least 3/5 proximally, R  strength 4/5 and slightly weaker L  4-/5.  Bilateral HF and KE 4-/5, PF/DF 4/5   Speech: clear/fluent   Cognition: intact to conversation, responds appropriately, follows commands   Gait: not tested  Skin: sternal incision well-healed, faint diffuse maculopapular rash across chest and upper arms      LABS  CBC RESULTS: Recent Labs   Lab Test 03/04/22  0603 03/02/22  0546 02/28/22  1459   WBC 7.0 6.9 6.3   RBC 3.92* 3.84* 3.59*   HGB 11.3* 11.2* 10.5*   HCT 37.5* 37.4* 34.1*   MCV 96 97 95   MCH 28.8 29.2 29.2   MCHC 30.1* 29.9* 30.8*   RDW 14.7 15.0 14.8    293 273     Last Basic Metabolic Panel:  Recent Labs   Lab Test 03/04/22  0603 03/02/22  0546 02/28/22  1459    139 138   POTASSIUM 3.9 3.7 3.8   CHLORIDE 111* 108 108   CO2 24 22 24   ANIONGAP 6 9 6   GLC 93 96 104*   BUN 8 8 12   CR 0.59* 0.62* 0.60*   GFRESTIMATED >90 >90 >90   MAYE 8.9 9.2 8.6     Lab Results   Component Value Date    AST 19 02/26/2022     Lab Results   Component Value Date    ALT 69 02/26/2022     No results found for: BILICONJ   Lab Results   Component Value Date    BILITOTAL 0.4 02/26/2022     Lab Results   Component Value Date    ALBUMIN 2.7 02/26/2022     Lab Results   Component Value Date    PROTTOTAL 6.4 02/26/2022      Lab Results   Component Value Date    ALKPHOS 84 02/26/2022     LDL Cholesterol Calculated   Date Value Ref Range Status   02/18/2022 74 <=100 mg/dL Final     Hemoglobin A1C   Date Value Ref Range Status   02/18/2022 5.8 (H) 0.0 - 5.6 % Final     Comment:     Normal <5.7%   Prediabetes 5.7-6.4%    Diabetes 6.5% or higher     Note: Adopted from ADA consensus guidelines.       CT CHEST W/O CONTRAST 2/25/2022 1:32 PM  Impression:   1. Postsurgical changes sternotomy, wires are intact, incomplete bony  healing of the sternum.  2.  Trace pericardial effusion.    EXAMINATION: DOPPLER VENOUS ULTRASOUND OF THE LEFT UPPER EXTREMITY,  2/21/2022 4:04 PM   IMPRESSION:  1.  Occlusive superficial thrombus in left cephalic vein at the  antecubital fossa.  2.  No evidence of left upper extremity deep venous thrombosis.    EXAMINATION: Right upper extremity Doppler ultrasound  2/21/2022 2:27  PM    IMPRESSION:  Occlusive thrombus of one of the paired right brachial veins at the  upper arm, with PICC in place.     Exam: XR CHEST PORT 1 VIEW, 2/19/2022 9:41 AM  Impression: No significant change in perihilar and bibasilar prominent  pulmonary opacities likely representative of atelectasis and/or  pulmonary edema    CT HEAD W/O CONTRAST 2/16/2022 5:00 PM  Impression:  1. No acute intracranial pathology.   2. Old lacunar infarct in the left thalamus.    EXAMINATION: DOPPLER VENOUS ULTRASOUND OF BILATERAL LOWER EXTREMITIES,  2/10/2022 7:46 PM   IMPRESSION:  1. Acute DVT of the right lower extremity with nonocclusive thrombus  in the femoral vein and occlusive thrombus in the popliteal vein.  2. Occlusive thrombus of the right gastrocnemius vein from the level  of the upper calf to knee.  3. No evidence of deep venous thrombosis in the left lower extremity.    EXAMINATION: CT CHEST/ABDOMEN/PELVIS W CONTRAST, 2/9/2022 7:50 PM  IMPRESSION:   1. New (since 1/30/22)  small/moderate hemopericardium. Recommend  short-term follow-up.  2. Surgical changes of the chest with median sternotomy and mechanical  Bentall procedure. The aortic surgical graft replacement appears  widely patent.   3. Continued presumed post procedural edema about the graft. No  loculated fluid collection in the chest.  4. Decreased small pleural effusions, and decreased bibasilar  atelectasis/consolidations.  5. No loculated fluid collections in the abdomen or pelvis.  6. Low ET tube near the right mainstem bronchus.  Repositioning is  Recommended.    CT FACIAL BONES WITH CONTRAST 2/9/2022 7:49  PM  Impression: No suspicious osseous lesion or soft tissue infection.  Paranasal sinus and mastoid air cell effusions are nonspecific and  could be seen in an intubated patient, however sinusitis cannot be excluded.      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  He is now admitted to ARU on 3/4/22 for multidisciplinary rehabilitation and ongoing medical management.      Admission to acute inpatient rehab 03/04/22.    Impairment group code: 09 Cardiac: ascending aortic aneurysm s/p Bentall procedure w/ [place] valve      1. PT and OT 90 minutes of each on a daily basis, in addition to rehab nursing and close management of physiatrist.      2. Impairment of ADL's: Noted to have fatigue, dizziness, dyspnea on exertion, significant proximal UE/LE weakness, impaired activity tolerance, impaired balance, RLE edema, post-surgical sternal precautions, all affecting his ability to safely and independently perform basic ADLs.  Goal for mod I with basic ADLs.    3. Impairment of mobility:  Noted to have fatigue, dizziness, dyspnea on exertion, significant proximal UE/LE weakness, impaired activity tolerance, impaired balance, RLE edema, post-surgical sternal precautions, all affecting his ability to safely and independently perform basic mobility.  Goal for mod I with basic mobility.    4. Medical Conditions    Ascending aortic aneurysm and bicuspid aortic valve s/p Bentall w/ Jerry valve on 1/28 with   Rod and Dr. Hannah  Hx of HTN  Hypervolemia, improved  Sternal clicking and popping was noted beginning 2/24, occurring with deep inspiration, but not appreciated with coughing. No drainage or erythema or increased pain. CT scan showed incomplete healing of the sternal bone.  Given the timeline, this is somewhat to be expected.  Recommend follow-up noncontrast CT scan if the sternal clicking and popping continues or if sternal instability is suspected.  Fluid overloaded post-op and treated with diuretics. Discharged 25.1 kg below pre-op weight, clinically euvolemic except trace LE edema, recommended ongoing gentle PO diuresis.  - Goal MAP>65, SBP <130  LVEF: 60-65%  - Continue Coreg 50 BID, Lisinopril 40mg daily  - Continue ASA 81mg, Atorvastatin 40mg   - Continue warfarin for anticoagulation for valve.  Appreciate pharmacy assistance to dose.  INR goal 2-3 for three months, then 1.5-2.0 for life.   - Continue Lasix 20 mg daily.  2000 mL fluid restriction.  Monitor I/Os, daily weights, labs, clinical volume status to assess ongoing need.  - Sternal precautions.  As above, recommend repeat CT if sternal clicking/popping continues or if sternal instability suspected.  - Wound care: wash daily with soap and water, pat dry, keep clean/dry, ok to shower don't soak  - Pain management: Tylenol, methocarbamol PRN  - Follow up with CVTS - currently scheduled 3/11, though may need to be rescheduled pending rehab course  - Follow up with cardiology, Dr. Mcnamara, within 1-2 weeks after discharge    Acute hypoxemic respiratory failure/ARDS, multifactorial 2/2 hypervolemia, pneumonia, atelectasis, obesity hypoventilation.   Extubated 2/14 to HFNC, now stable on room air.  - Continue pulmonary toilet  - Monitor respiratory status, continue supplemental oxygen by NC PRN     Left thalamic infarct, old, age unknown  Revealed on CT 2/16 in evaluation for dysarthria s/p extubation.  Felt to be incidental chronic infarction 2/2  small vessel disease in setting of hypertension.  Neurology consulted.  - LDL 74, A1c 5.8%  - Continue aspirin, statin as above  - Recommend MRI brain wwo and MRA head/neck as outpatient  - Follow up with stroke neurology in 2-3 months, or after completion of above studies    RLE, RUE DVTs  HIT positive   On Heparin post-op.  HIT positive 2/10.  U/S bilat LE on 2/10 revealed acute DVT of RLE with nonocclusive thrombus in the femoral vein and occlusive thrombus in the popliteal vein, as well as occlusive thrombus of the right gastrocnemius vein from the level of the upper calf to knee.  Started on bivalirudin drip, then started warfarin 2/19 with ongoing bivalirudin bridge to therapeutic chromogenic factor X (now completed).  Bilat UE U/S 2/21 demonstrating occlusive thrombus of one of the paired right brachial veins at the upper arm, with PICC in place, which was removed 2/21; and occlusive superficial thrombus in left cephalic vein at the antecubital fossa. No evidence of left upper extremity deep venous thrombosis.  - Continue warfarin as above, current INR goal 2-3    Anemia  Stable in 10s-11s, steadily improving.  - Trend CBC    Depression/anxiety secondary to medical condition  Sleep disturbance  Noted to have increased depressive symptoms and difficulty sleeping.  Psychology consulted on 2/23.  At ARU admission, patient reports mood to be stable and sleep issues mostly related to environment.  - Continue melatonin at bedtime, trazodone PRN  - Monitor mood and sleep, consider consult for mental health supports at ARU if indicated    Moderate malnutrition in the context of acute illness/injury  NJ tube removed 2/23.  Improving intake.  - Continue regular diet, thin liquids.  Continue supplements per RD recs.  - Nutrition consulted, appreciate assistance    Maculopapular Rash, improving  Patient has maculopapular rash diffusely across chest and upper arms. ?medication-related vs follicultis.  Is steadily  improving per patient.  - Monitor, can try topical treatment if bothersome or persistent, currently declines    5. Adjustment to disability:  Clinical psychology to eval and treat if indicated  6. FEN: regular diet, thin liquids, 2000mL fluid restriction  7. Bowel: continent, monitor, PRN bowel meds available  8. Bladder: continent, monitor PVRs at admission  9. DVT Prophylaxis: warfarin  10. GI Prophylaxis: PPI (at least thru 3/16 - 30d from extubation)  11. Code: full  12. Disposition: goal for home  13. ELOS:  10 days.  14. Rehab prognosis:  good  15. Follow up Appointments on Discharge: PCP, CVTS (reschedule from 3/11), cardiology (Dr. Mcnamara) within 1-2 wks of discharge        Patient discussed with Dr. Kavita Hadley, PM&R staff physician     Ayaka Penaloza PA-C  Physical Medicine & Rehabilitation

## 2022-03-04 NOTE — PROGRESS NOTES
"CLINICAL NUTRITION SERVICES - ASSESSMENT NOTE     Nutrition Prescription    RECOMMENDATIONS FOR MDs/PROVIDERS TO ORDER:  None today     Malnutrition Status:    Pt does not meet 2 criteria necessary for diagnosing malnutrition     Recommendations already ordered by Registered Dietitian (RD):  Harwood Ensure Shake (w/ice cream) BID at 10 AM and 2 PM - Discontinued on 3/8 per pt request     Future/Additional Recommendations:  Monitor meal intakes, weight and lab trends     REASON FOR ASSESSMENT  Chuy Varner is a/an 31 year old male assessed by the dietitian for Provider Order - assess/optimize nutrition    NUTRITION/MEDICAL HISTORY  Per chart review: Pt admits to ARU for rehabilitation in the setting of post hospitalization for Bentall procedure with mechanical valve on 1/28/21 with course complicated by hypoxemic respiratory failure/ARDS, VAP, DVT, HIT, hypervolemia, hypernatremia, dysphagia, dysarthria, incidental finding of 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    Pt is newly admitted to ARU so will defer visit today, reviewed hospital RD notes and will start supplements as above, follow up with pt visit next week.     CURRENT NUTRITION ORDERS  Diet: Regular, 2000 ml FR   Intake/Tolerance: Nothing recorded thus far, fair appetite noted per last hospital RD note    LABS  Labs reviewed    MEDICATIONS  Coumadin, Lipitor, Lisinopril, Lasix, Protonix     ANTHROPOMETRICS  Height: 190.5 cm (6' 3\")  Most Recent Weight: 141.3 kg (311 lb 6.4 oz)    IBW: 89 kg  BMI: Obesity Grade II BMI 35-39.9  Weight History:   03/04/22 0932 140.8 kg (310 lb 6.4 oz)   02/24/22 0500 148.8 kg (328 lb)   02/18/22 0600 149.1 kg (328 lb 11.2 oz)   02/11/22 0400 159.9 kg (352 lb 9.6 oz)    02/03/22 0400 168 kg " (370 lb 6 oz)     01/28/22 0551 165.9 kg (365 lb 11.9 oz)       01/10/22 166.5 kg (367 lb)     168.8 kg (372 lb 3.2 oz) 12/27/2021 - Per CE     Weight assessment: Significant 5.2% weight loss in 1 week, significant 15.9% weight loss in 1 month, significant 16.6% weight loss in not quite 3 months.     Dosing Weight: 102.2 kg - adjusted BW     ASSESSED NUTRITION NEEDS  Estimated Energy Needs: 6072-7489 kcals/day (20 - 25 kcals/kg)  Justification: Maintenance, obese  Estimated Protein Needs: 100 grams protein/day (1 grams of pro/kg)  Justification: Maintenance  Estimated Fluid Needs: 2555 mL/day (25 mL/kg)   Justification: Maintenance    MALNUTRITION  % Intake: Difficult to assess today - none as of 3/8  % Weight Loss: > 2% in 1 week (severe)  Subcutaneous Fat Loss: Not assessed today - none noted on 3/8  Muscle Loss: Not assessed today - none noted on 3/8  Fluid Accumulation/Edema: None noted  Malnutrition Diagnosis: Pt does not meet 2 criteria necessary for diagnosing malnutrition     NUTRITION DIAGNOSIS  Predicted inadequate nutrient intake related to appetite vs post hospitalization    INTERVENTIONS  Implementation  Medical food supplement therapy - ordered as above     Goals  Patient to consume % of nutritionally adequate meal trays TID, or the equivalent with supplements/snacks.     Monitoring/Evaluation  Progress toward goals will be monitored and evaluated per protocol.    Ashley Mccarty RD, CNSC, LD  ARU RD pager: [phone]    Addendum 3/8/22: Pt was reviewed during team rounds, noted pt's discharge will be moved up to this Thursday, pt then visited at bedside, reports some dislike of some of the menu items, but feels intake is stable and would like supplements discontinued, RD removed orders after rounds, RD then returned later this afternoon per pt request, reviewed basic nutritional recommendations for home, encouraged heart healthy diet for long term benefits, pt doesn't want to gain weight back and  verbalizes understanding of healthful diet for home, RD also reviewed pt will likely have opportunity to have RD follow up at cardiac rehab, pt verbalizes understanding, appreciative of RD follow up.

## 2022-03-04 NOTE — PLAN OF CARE
Occupational Therapy Discharge Summary    Reason for therapy discharge:    Discharged to acute rehabilitation facility.    Progress towards therapy goal(s). See goals on Care Plan in [facility] electronic health record for goal details.  Goals partially met.  Barriers to achieving goals:   discharge from facility.    Therapy recommendation(s):    Continued therapy is recommended.  Rationale/Recommendations:  to progress I/ADL IND and func mobility.

## 2022-03-04 NOTE — PLAN OF CARE
D: S/p Bentall procedure with mechanical valve on 1/28. Pt's post-op course was c/b hypoxemic respiratory failure, prolonged intubation, HIT positivity on Bivalirudin, LE DVT, hypernatremia, altered mental status/inability to speak with therapy, and an old thalmus stroke found on head CT. Hx of bicuspid aortic valve, thoracic aortic aneurysm w/o rupture, HTN, obesity, and testicular cancer s/p orchiectomy.       I: Monitored vitals and assessed pt status.   Changed:  Saline locked PIV  PRN: voltaren gel   Tele: SR w/ 1st AVB  O2: Room air   Mobility: assist of 2, pivot      A: A0x4. VSS. Afebrile. Pain in shoulders, Voltaren gel applied. Regular diet, 2 L fluid restriction. Urinating adequately, uses primofit at noc. Sternal incision and old chest tube sites healing. Last BM 3/3.       P: Continue to monitor Pt status and report changes to CVTS. Discharge pending for ARU/TCU.

## 2022-03-05 ENCOUNTER — APPOINTMENT (OUTPATIENT)
Dept: EDUCATION SERVICES | Facility: CLINIC | Age: 32
End: 2022-03-05
Attending: PHYSICAL MEDICINE & REHABILITATION
Payer: COMMERCIAL

## 2022-03-05 ENCOUNTER — APPOINTMENT (OUTPATIENT)
Dept: PHYSICAL THERAPY | Facility: CLINIC | Age: 32
End: 2022-03-05
Attending: PHYSICAL MEDICINE & REHABILITATION
Payer: COMMERCIAL

## 2022-03-05 ENCOUNTER — APPOINTMENT (OUTPATIENT)
Dept: OCCUPATIONAL THERAPY | Facility: CLINIC | Age: 32
End: 2022-03-05
Attending: PHYSICAL MEDICINE & REHABILITATION
Payer: COMMERCIAL

## 2022-03-05 LAB
C DIFF TOX B STL QL: POSITIVE
FACT X ACT/NOR PPP CHRO: 24 % (ref 70–130)
INR PPP: 3.27 (ref 0.86–1.14)

## 2022-03-05 PROCEDURE — 97166 OT EVAL MOD COMPLEX 45 MIN: CPT | Mod: GO | Performed by: OCCUPATIONAL THERAPIST

## 2022-03-05 PROCEDURE — 128N000003 HC R&B REHAB

## 2022-03-05 PROCEDURE — 85260 CLOT FACTOR X STUART-POWER: CPT | Performed by: PHYSICAL MEDICINE & REHABILITATION

## 2022-03-05 PROCEDURE — 250N000013 HC RX MED GY IP 250 OP 250 PS 637: Performed by: PHYSICIAN ASSISTANT

## 2022-03-05 PROCEDURE — 85610 PROTHROMBIN TIME: CPT | Performed by: PHYSICIAN ASSISTANT

## 2022-03-05 PROCEDURE — 97535 SELF CARE MNGMENT TRAINING: CPT | Mod: GO | Performed by: OCCUPATIONAL THERAPIST

## 2022-03-05 PROCEDURE — 250N000013 HC RX MED GY IP 250 OP 250 PS 637

## 2022-03-05 PROCEDURE — 99233 SBSQ HOSP IP/OBS HIGH 50: CPT | Mod: 24 | Performed by: PHYSICAL MEDICINE & REHABILITATION

## 2022-03-05 PROCEDURE — 97163 PT EVAL HIGH COMPLEX 45 MIN: CPT | Mod: GP

## 2022-03-05 PROCEDURE — 87493 C DIFF AMPLIFIED PROBE: CPT

## 2022-03-05 PROCEDURE — 36415 COLL VENOUS BLD VENIPUNCTURE: CPT | Performed by: PHYSICAL MEDICINE & REHABILITATION

## 2022-03-05 PROCEDURE — 97530 THERAPEUTIC ACTIVITIES: CPT | Mod: GP

## 2022-03-05 RX ORDER — WARFARIN SODIUM 5 MG/1
10 TABLET ORAL
Status: COMPLETED | OUTPATIENT
Start: 2022-03-05 | End: 2022-03-05

## 2022-03-05 RX ADMIN — LISINOPRIL 40 MG: 40 TABLET ORAL at 08:38

## 2022-03-05 RX ADMIN — Medication 10 MG: at 21:41

## 2022-03-05 RX ADMIN — DICLOFENAC SODIUM 2 G: 10 GEL TOPICAL at 01:39

## 2022-03-05 RX ADMIN — ASPIRIN 81 MG: 81 TABLET, CHEWABLE ORAL at 08:38

## 2022-03-05 RX ADMIN — WARFARIN SODIUM 10 MG: 5 TABLET ORAL at 17:42

## 2022-03-05 RX ADMIN — FUROSEMIDE 20 MG: 20 TABLET ORAL at 08:38

## 2022-03-05 RX ADMIN — PANTOPRAZOLE SODIUM 40 MG: 40 TABLET, DELAYED RELEASE ORAL at 05:39

## 2022-03-05 RX ADMIN — CARVEDILOL 50 MG: 25 TABLET, FILM COATED ORAL at 20:28

## 2022-03-05 RX ADMIN — DICLOFENAC SODIUM 2 G: 10 GEL TOPICAL at 21:41

## 2022-03-05 RX ADMIN — CARVEDILOL 50 MG: 25 TABLET, FILM COATED ORAL at 08:38

## 2022-03-05 RX ADMIN — ATORVASTATIN CALCIUM 40 MG: 40 TABLET, FILM COATED ORAL at 20:28

## 2022-03-05 RX ADMIN — DICLOFENAC SODIUM 2 G: 10 GEL TOPICAL at 08:13

## 2022-03-05 ASSESSMENT — ACTIVITIES OF DAILY LIVING (ADL)
ADLS_ACUITY_SCORE: 8
ADLS_ACUITY_SCORE: 8
ADLS_ACUITY_SCORE: 12
ADLS_ACUITY_SCORE: 8
ADLS_ACUITY_SCORE: 8
PREVIOUS_RESPONSIBILITIES: MEAL PREP;SHOPPING;YARDWORK;MEDICATION MANAGEMENT;FINANCES;DRIVING;WORK
ADLS_ACUITY_SCORE: 9
ADLS_ACUITY_SCORE: 12
ADLS_ACUITY_SCORE: 8
ADLS_ACUITY_SCORE: 9
ADLS_ACUITY_SCORE: 12
ADLS_ACUITY_SCORE: 12
ADLS_ACUITY_SCORE: 8
ADLS_ACUITY_SCORE: 9
ADLS_ACUITY_SCORE: 8
ADLS_ACUITY_SCORE: 8
ADLS_ACUITY_SCORE: 12
ADLS_ACUITY_SCORE: 9
ADLS_ACUITY_SCORE: 12
ADLS_ACUITY_SCORE: 12
ADLS_ACUITY_SCORE: 8
ADLS_ACUITY_SCORE: 12

## 2022-03-05 NOTE — PLAN OF CARE
"  VS: Blood pressure 112/67, pulse 87, temperature (!) 96.7  F (35.9  C), temperature source Oral, resp. rate 16, height 1.905 m (6' 3\"), weight 141 kg (310 lb 12.8 oz), SpO2 97 %.    O2: 97% RA   Output: Voiding without difficulty    Last BM: 3/5/22 pt had to loose very urgent stools today.   Activity: Limited. Assist x2   Skin: CDI   Pain: Pt states 4/10    CMS: A/Ox4    Dressing: N/A   Diet: Regular    LDA: Godfrey    Equipment: Commode    Plan: Continue with care    Additional Info:                            "

## 2022-03-05 NOTE — PLAN OF CARE
FOCUS/GOAL  Bowel management, Bladder management, Nutrition/Feeding/Swallowing precautions, Pain management, Wound care management, Mobility, Skin integrity, and Cognition/Memory/Judgment/Problem solving    ASSESSMENT, INTERVENTIONS AND CONTINUING PLAN FOR GOAL:    Patient A&Ox4, calls appropriately, makes needs known. Denies headache, CP, SOB, nausea, and fever. Complained of mild soreness to bilateral shoulders, PRN voltaren gel applied with relief. Complains of baseline numbness to pinkie and ring finger. Transfers Ax1-2 Stand Pivot Assist, up to bedside commode. Cont of bladder and bowel, uses urinal. Medium loose BM during shift. Regular diet, thin liquids, 2000cc fluid restriction, ate well, takes pills whole. Sternal incision approximated and open to air. Skin unremarkable. No tubes, no lines, no drains. VSS. Continue POC.

## 2022-03-05 NOTE — PLAN OF CARE
Discharge Planner Post-Acute Rehab PT:      Discharge Plan: Home to live with Significant Other; Cardiac Rehab vs. OP PT?      Precautions: falls, alarms, sternal precautions, orthostatic/nauseated (3/5/22)     Current Status:  Bed Mobility: CGA  Transfer: CGA with FWW  Gait: 5'x1 from recliner>EOB with CGA and FWW  Stairs: DNT  Balance: SUPV seated at EOB with B LE support; CGAx1 in static stance with 2WW (orthostatic)     Assessment: Pt presenting to PT s/p aortic root replacement/Bentall procedure with mechanical valve (1/28/22). Hospital course complicated by hypoxemic respiratory failure, need for proning, fevers/leukocytosis, R LE DVT, left thalamic stroke (likely small vessel disease), R UE DVT associated with PICC line, and L UE DVT. Pt demonstrating decreased strength in B UEs/LEs, need to learn/follow spinal precuations, poor static/dynamic balance, inc edema in B LEs, and inability to complete transfers/ambluation with IND/PLOF. Pt will benefit from 10-14 days of skilled PT to reach highest practical level safe for d/c to home environment with his significant other. Expecting need for FWW, family training, and Cardiac Rehab vs. OP PT upon d/c.      Other Barriers to Discharge (DME, Family Training, etc):   FWW  Family training  Need to install HR on x2 ROMÁN home?

## 2022-03-05 NOTE — PLAN OF CARE
Discharge Planner Post-Acute Rehab OT:     Discharge Plan: Living on single level of home with significant other, 4 dogs, and 3 cats.  Pt with 2 stairs to get into home and sunken living room, but otherwise bedroom, bathroom, kitchen, and living room all one level.  Pt with step in shower and was previously fully independent including working full time for [employer].    Precautions: sternal, monitor BP and O2 saturation    Current Status:  ADLs:  Mobility: CGA functional transfers with RW  Grooming: Independent while seated, CGA while standing for brief periods of time.  Dressing: Set-up upper body, min A LB   Bathing: NT  Toileting: NT  IADLs: Previously was sharing cooking/shopping responsibilities with significant other, completed yard care, and worked full time.  SO does cleaning and laundry at baseline.  Vision/Cognition: Cognition requires further testing.    Assessment: Pt with extended hospital stay (see H&P) with significant deconditioning, decreased RUE sensation/strength, possible changes in cognition.  Pt  currently at min A level with very basic ADL and needs to be independent with self cares to be home alone during the day upon discharge.  Pt very motivated to return home 3/13 with family support.  Pt benefits from skilled OT to address deficits, teach compensatory strategies, and provide caregiver training.    Other Barriers to Discharge (DME, Family Training, etc): shower DME, family training,

## 2022-03-05 NOTE — PROGRESS NOTES
"  [facility]   Acute Rehabilitation Unit  Weekend / [place] Cover Note     Subjective     24 Hours Summary  - No acute events overnight. Patient reports sleeping well.   - Therapies are going well, no issues.  - Last bowel movement on 03/04.    Review of Systems  10 point review of systems was otherwise negative other     Objective     Vital Signs  /67 (BP Location: Right arm, Patient Position: Sitting)   Pulse 87   Temp (!) 96.7  F (35.9  C) (Oral)   Resp 16   Ht 1.905 m (6' 3\")   Wt 141 kg (310 lb 12.8 oz)   SpO2 97%   BMI 38.85 kg/m      Physical Examination  General: Awake, alert, NAD.  Cardiovascular: RRR, no extra heart sounds, no murmurs.  Pulmonary: Non-labored breathing, CTAB, no wheezing, no crackles, no rhonchi.  Abdominal: Non-distended, NABS, Soft, non-tender.  MSK/Extremities: Warm, well perfused, no lower extremity edema b/l.  NEURO: Awake, alert, appropriate. Fluent, comprehensible speech. No changes in deficits.    LABS  Recent Results (from the past 24 hour(s))   INR    Collection Time: 03/05/22  5:41 AM   Result Value Ref Range    INR 3.27 (H) 0.86 - 1.14   Factor 10 chromogenic    Collection Time: 03/05/22  5:41 AM   Result Value Ref Range    Factor 10 Chromogenic 24 (L) 70 - 130 %     IMAGING  No recent images to review.      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  He is now admitted to ARU on 3/4/22 for " multidisciplinary rehabilitation and ongoing medical management.     Admission to acute inpatient rehab 03/04/22.    Impairment group code: 09 Cardiac: ascending aortic aneurysm s/p Bentall procedure w/ [place] valve    Changes Made Today  - No changes made today, continue rehab cares.   - INR elevated today, pharmacy dosing warfarin         Asad Camp MD  Physical Medicine and Rehabilitation PGY-3  Pager: [phone]

## 2022-03-05 NOTE — CONSULTS
03/05/22 1154 Isha Purcell RN      Patient and S.O. seen at bedside for Warfarin education and a brief summary of stroke education including controllable and uncontrollable risk factors, Stoke signs and when to call care team. Literature given: Guide to Warfarin Therapy, and Warfarin Therapy Calendar.           03/05/22 1152 Isha Purcell RN     Stroke Education Note     The following information has been reviewed with the patient and family:     1. Warning signs of stroke     2. Calling 911 if having warning signs of stroke     3. All modifiable risk factors: hypertension, CAD, atrial fib, diabetes, hypercholesterolemia, smoking, substance abuse, diet, physical inactivity, obesity, sleep apnea.     4. Patient's risk factors for stroke which include: DIET, HTN, HLD, PHYSICAL INACTIVITY, OBESITY     5. Follow-up plan for after discharge     6. Discharge medications which include: HTN, HLD, ANTI-COAGULATION     In addition, the above information was given to the patient and family in writing as a part of the [facility] Stroke Class Handout.     Learner's response to risk factors / lifestyle modification education: Ability to change Reasons to change Need to change      Isha Purcell RN

## 2022-03-05 NOTE — PLAN OF CARE
FOCUS/GOAL  Medical management    ASSESSMENT, INTERVENTIONS AND CONTINUING PLAN FOR GOAL:  Patient was admitted yesterday, he slept well. He c/o bilateral shoulder pain greater on the right side, relief with Voltaren gel. He is independent with bed mobility, turning to his side and compliant with sternal precautions, he stood up to be weighed with assistance of 1. He has a condom catheter on for nocturnal incontinence, he was continent during the day/evening, he needs 1 more PVR.    Goal Outcome Evaluation:

## 2022-03-05 NOTE — PROGRESS NOTES
03/05/22 1100   Quick Adds   Type of Visit Initial PT Evaluation   Living Environment   People in Home significant other   Current Living Arrangements house   Home Accessibility stairs to enter home;stairs within home  (pt has potential for first-floor setup upon d/c)   Number of Stairs, Main Entrance 2   Stair Railings, Main Entrance none   Number of Stairs, Within Home, Primary ten;other (see comments)  (pt does not need to go down; SO to assist with laundry)   Stair Railings, Within Home, Primary railing on right side (ascending)   Transportation Anticipated family or friend will provide   Living Environment Comments Pt lives in a home with his significant other; x2 ROMÁN with no HR. Pt has potential for first floor setup once inside the home; bedroom/walk in shower on the main level. Pt has laundry down flight of x10 stairs with R HR in the basement. Pt will not have to go downstairs, SO to complete laundry. Pt previously IND with all bed mob, sup<>sit, and STS/SPT with no AD. Pt ambluatory at baseline; no difficulty with stair negotiation. Pt does not use AD for any mobility.    Self-Care   Usual Activity Tolerance good   Current Activity Tolerance poor   Regular Exercise No   Equipment Currently Used at Home none   Fall history within last six months no   Activity/Exercise/Self-Care Comment Pt remains active with household/garage/workshop tasks; no formal exercise.   Post-Acute Assessment Only   Post-Acute Functional Assessment See IP Rehab Daily Documentation Flowsheet for Functional Mobility/ADL Assessment   Previous Level of Function/Home Environm   Bed Mobility, Premorbid Functional Level independent   Transfers, Premorbid Functional Level independent   Household Ambulation, Premorbid Functional Level independent   Stairs, Premorbid Functional Level independent   Community Ambulation, Premorbid Functional Level independent   Functional Cognition, Premorbid Functional Level WFL   General Information    Onset of Illness/Injury or Date of Surgery 01/28/22   Referring Physician Dr. Barrientos, DO   Patient/Family Therapy Goals Statement (PT) I want to go home, as soon as I safely can.    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 Pt underwent aortic root replacement/Bentall procedure with mechanical valve (1/28/22). Hospital course complicated by hypoxemic respiratory failure, need for proning, fevers/leukocytosis, R LE DVT, left thalamic stroke (likely small vessel disease), R UE DVT associated with PICC line, and L UE DVT.    Existing Precautions/Restrictions sternal   Weight-Bearing Status - LUE full weight-bearing   Weight-Bearing Status - RUE full weight-bearing   Weight-Bearing Status - LLE full weight-bearing   Weight-Bearing Status - RLE full weight-bearing   Heart Disease Risk Factors Overweight;Medical history;Gender   General Observations Pt with symptomatic orthostasis, with transitions from sup>sit>standing.    Cognition   Affect/Mental Status (Cognition) WFL   Orientation Status (Cognition) oriented x 4   Safety Deficit (Cognition) minimal deficit   Cognitive Status Comments Pt cooperative with PT evaluation, answered/responded to all questions appropriately.   Pain Assessment   Patient Currently in Pain No   Integumentary/Edema   Integumentary/Edema Comments Slight edema in pt's B LEs; would likely benefit from compression.   Posture    Posture Comments Upright head/trunk with both seated/standing positions with B UE support at EOB/2WW.   Range of Motion (ROM)   Range of Motion ROM is WFL   Strength Comprehensive (MMT)   Comment, General Manual Muscle Testing (MMT) Assessment 4/5 in pt's B LEs; DNT B UEs 2' sternal precautions   Balance   Balance Comments Tolerated seated posture at EOB with B LE support with SUPV, and static  stance/SPT with 2WW with CGAx1.    Sensory Examination   Sensory Perception Comments Pt with numbness/tingling in 4th/5th digits on R UE.   Coordination   Coordination Comments WFL   Muscle Tone   Muscle Tone Comments WFL   Clinical Impression   Criteria for Skilled Therapeutic Intervention Yes, treatment indicated   PT Diagnosis (PT) deconditioning 2' cardiac intervention/prolonged hopitalization   Influenced by the following impairments decreased strength in B UEs/LEs, reduced/painful mobility 2' sternal precautions, poor seated/standing balance, sensory changes   Functional limitations due to impairments inability to complete transfers/ambulation at OF   Clinical Presentation (PT Evaluation Complexity) Evolving/Changing   Clinical Presentation Rationale orthostasis, weakness, and reduced functional mobility related to cardiac intervetion/prolonged hospitalization   Clinical Decision Making (Complexity) high complexity   Planned Therapy Interventions (PT) balance training;gait training;home exercise program;manual therapy techniques;neuromuscular re-education;patient/family education;stair training;strengthening;stretching;transfer training;wheelchair management/propulsion training   Anticipated Equipment Needs at Discharge (PT) walker, rolling   Risk & Benefits of therapy have been explained evaluation/treatment results reviewed;care plan/treatment goals reviewed;risks/benefits reviewed;current/potential barriers reviewed;participants voiced agreement with care plan;participants included;patient;spouse/significant other   Clinical Impression Comments Pt presenting to PT s/p aortic root replacement/Bentall procedure with mechanical valve (1/28/22). Hospital course complicated by hypoxemic respiratory failure, need for proning, fevers/leukocytosis, R LE DVT, left thalamic stroke (likely small vessel disease), R UE DVT associated with PICC line, and L UE DVT. Pt demonstrating decreased strength in B UEs/LEs, need  to learn/follow spinal precuations, poor static/dynamic balance, inc edema in B LEs, and inability to complete transfers/ambluation with IND/PLOF. Pt will benefit from 10-14 days of skilled PT to reach highest practical level safe for d/c to home environment with his significant other. Expecting need for FWW, family training, and OP PT vs. Cardiac Rehab upon d/c.    PT Discharge Planning   PT Discharge Recommendation (DC Rec) home with outpatient cardiac rehab   Total Evaluation Time   Total Evaluation Time (Minutes) 35   Physical Therapy Goals   PT Frequency Daily   PT Predicated Duration/Target Date for Goal Attainment 03/13/22   PT Goals Bed Mobility;Transfers;Gait;Stairs;PT Goal 1;PT Goal 2   PT: Bed Mobility Modified independent;Supine to/from sit;Rolling;Within precautions   PT: Transfers Modified independent;Sit to/from stand;Bed to/from chair;Assistive device;Within precautions   PT: Gait Modified independent;Rolling walker;150 feet   PT: Stairs Supervision/stand-by assist;Within precautions;2 stairs  (no HR for entry into home)   PT: Goal 1 Pt score >50/56 with YANCEY Balance Assessment to demonstrate decreased risk for falling prior to d/c to home environment.   PT: Goal 2 Pt will be able to complete B LE strengthening program with IND.

## 2022-03-05 NOTE — PHARMACY-MEDICATION REGIMEN REVIEW
Pharmacy Medication Regimen Review  Chuy Varner is a 31 year old male who is currently in the Acute Rehab Unit.    Assessment: All medications have an appropriate indications, durations and no unnecessary use was found.    Continue to monitor potassium levels, patient on lisinopril and was restarted on furosemide yesterday. A BMP has been ordered every Monday and Thursday.    Pharmacy to dose warfarin. Currently monitoring both INR and Chromogenic Factor X due to patient receiving bivalirudin. Will discontinue Chromogenic Factor X once INR correlates.     Plan:   Continue current medication regimen.  Continue to monitor potassium levels and replace potassium as needed.    Attending provider will be sent this note for review.  If there are any emergent issues noted above, pharmacist will contact provider directly by phone.      Pharmacy will periodically review the resident's medication regimen for any PRN medications not administered in > 72 hours and discontinue them. The pharmacist will discuss gradual dose reductions of psychopharmacologic medications with interdisciplinary team on a regular basis.    Please contact pharmacy if the above does not answer specific medication questions/concerns.    Background:  A pharmacist has reviewed all medications and pertinent medical history today.  Medications were reviewed for appropriate use and any irregularities found are listed with recommendations.      Current Facility-Administered Medications:      acetaminophen (TYLENOL) tablet 650 mg, 650 mg, Oral, Q6H PRN, Ayaka Penaloza PA-C     aspirin (ASA) chewable tablet 81 mg, 81 mg, Oral, Daily, Ayaka Penaloza PA-C, 81 mg at 03/05/22 0838     atorvastatin (LIPITOR) tablet 40 mg, 40 mg, Oral, QPM, Ayaka Penaloza PA-C, 40 mg at 03/04/22 2003     carvedilol (COREG) tablet 50 mg, 50 mg, Oral, BID, Ayaka Penaloza PA-C, 50 mg at 03/05/22 0838     diclofenac (VOLTAREN) 1 % topical gel 2 g, 2 g, Topical,  4x Daily PRN, Ayaka Penaloza PA-C, 2 g at 03/05/22 0813     furosemide (LASIX) tablet 20 mg, 20 mg, Oral, Daily, Ayaka Penaloza PA-C, 20 mg at 03/05/22 0838     lisinopril (ZESTRIL) tablet 40 mg, 40 mg, Oral, Daily, Ayaka Penaloza PA-C, 40 mg at 03/05/22 0838     melatonin tablet 10 mg, 10 mg, Oral, At Bedtime, Ayaka Penaloza PA-C, 10 mg at 03/04/22 2132     methocarbamol (ROBAXIN) tablet 750 mg, 750 mg, Oral, Q6H PRN, Ayaka Penaloza PA-C     pantoprazole (PROTONIX) EC tablet 40 mg, 40 mg, Oral, QAM AC, Ayaka Penaloza PA-C, 40 mg at 03/05/22 0539     Patient is already receiving anticoagulation with heparin, enoxaparin (LOVENOX), warfarin (COUMADIN)  or other anticoagulant medication, , Does not apply, Continuous PRN, Ayaka Penaloza PA-C     polyethylene glycol (MIRALAX) Packet 17 g, 17 g, Oral, Daily PRN, Ayaka Penaloza PA-C     senna-docusate (SENOKOT-S/PERICOLACE) 8.6-50 MG per tablet 1-4 tablet, 1-4 tablet, Oral, BID PRN, Ayaka Penaloza PA-C     traZODone (DESYREL) tablet 50 mg, 50 mg, Oral, At Bedtime PRN, Ayaka Penaloza PA-C     Warfarin Therapy Reminder (Check START DATE - warfarin may be starting in the FUTURE), 1 each, Does not apply, Continuous PRN, Ayaka Penaloza PA-C

## 2022-03-06 ENCOUNTER — APPOINTMENT (OUTPATIENT)
Dept: PHYSICAL THERAPY | Facility: CLINIC | Age: 32
End: 2022-03-06
Attending: PHYSICAL MEDICINE & REHABILITATION
Payer: COMMERCIAL

## 2022-03-06 ENCOUNTER — APPOINTMENT (OUTPATIENT)
Dept: OCCUPATIONAL THERAPY | Facility: CLINIC | Age: 32
End: 2022-03-06
Attending: PHYSICAL MEDICINE & REHABILITATION
Payer: COMMERCIAL

## 2022-03-06 LAB
FACT X ACT/NOR PPP CHRO: 20 % (ref 70–130)
HOLD SPECIMEN: NORMAL
HOLD SPECIMEN: NORMAL
INR PPP: 3.24 (ref 0.86–1.14)

## 2022-03-06 PROCEDURE — 250N000013 HC RX MED GY IP 250 OP 250 PS 637

## 2022-03-06 PROCEDURE — 97530 THERAPEUTIC ACTIVITIES: CPT | Mod: GP

## 2022-03-06 PROCEDURE — 128N000003 HC R&B REHAB

## 2022-03-06 PROCEDURE — 99233 SBSQ HOSP IP/OBS HIGH 50: CPT | Mod: 24 | Performed by: PHYSICAL MEDICINE & REHABILITATION

## 2022-03-06 PROCEDURE — 250N000013 HC RX MED GY IP 250 OP 250 PS 637: Performed by: PHYSICIAN ASSISTANT

## 2022-03-06 PROCEDURE — 97535 SELF CARE MNGMENT TRAINING: CPT | Mod: GO | Performed by: OCCUPATIONAL THERAPIST

## 2022-03-06 PROCEDURE — 250N000013 HC RX MED GY IP 250 OP 250 PS 637: Performed by: PHYSICAL MEDICINE & REHABILITATION

## 2022-03-06 PROCEDURE — 36415 COLL VENOUS BLD VENIPUNCTURE: CPT | Performed by: PHYSICAL MEDICINE & REHABILITATION

## 2022-03-06 PROCEDURE — 85610 PROTHROMBIN TIME: CPT | Performed by: PHYSICAL MEDICINE & REHABILITATION

## 2022-03-06 PROCEDURE — 97116 GAIT TRAINING THERAPY: CPT | Mod: GP

## 2022-03-06 PROCEDURE — 85260 CLOT FACTOR X STUART-POWER: CPT | Performed by: PHYSICAL MEDICINE & REHABILITATION

## 2022-03-06 RX ORDER — SACCHAROMYCES BOULARDII 250 MG
250 CAPSULE ORAL 2 TIMES DAILY
Status: DISCONTINUED | OUTPATIENT
Start: 2022-03-06 | End: 2022-03-10 | Stop reason: HOSPADM

## 2022-03-06 RX ORDER — VANCOMYCIN HYDROCHLORIDE 125 MG/1
125 CAPSULE ORAL 4 TIMES DAILY
Status: DISCONTINUED | OUTPATIENT
Start: 2022-03-06 | End: 2022-03-10 | Stop reason: HOSPADM

## 2022-03-06 RX ADMIN — ASPIRIN 81 MG: 81 TABLET, CHEWABLE ORAL at 09:11

## 2022-03-06 RX ADMIN — WARFARIN SODIUM 7.5 MG: 5 TABLET ORAL at 17:55

## 2022-03-06 RX ADMIN — ATORVASTATIN CALCIUM 40 MG: 40 TABLET, FILM COATED ORAL at 20:24

## 2022-03-06 RX ADMIN — PANTOPRAZOLE SODIUM 40 MG: 40 TABLET, DELAYED RELEASE ORAL at 09:12

## 2022-03-06 RX ADMIN — LISINOPRIL 40 MG: 40 TABLET ORAL at 09:11

## 2022-03-06 RX ADMIN — VANCOMYCIN HYDROCHLORIDE 125 MG: 125 CAPSULE ORAL at 20:24

## 2022-03-06 RX ADMIN — DICLOFENAC SODIUM 2 G: 10 GEL TOPICAL at 21:39

## 2022-03-06 RX ADMIN — VANCOMYCIN HYDROCHLORIDE 125 MG: 125 CAPSULE ORAL at 15:55

## 2022-03-06 RX ADMIN — CARVEDILOL 50 MG: 25 TABLET, FILM COATED ORAL at 09:11

## 2022-03-06 RX ADMIN — FUROSEMIDE 20 MG: 20 TABLET ORAL at 09:11

## 2022-03-06 RX ADMIN — Medication 250 MG: at 20:24

## 2022-03-06 RX ADMIN — DICLOFENAC SODIUM 2 G: 10 GEL TOPICAL at 15:56

## 2022-03-06 RX ADMIN — CARVEDILOL 50 MG: 25 TABLET, FILM COATED ORAL at 20:24

## 2022-03-06 RX ADMIN — Medication 10 MG: at 21:32

## 2022-03-06 RX ADMIN — Medication 250 MG: at 15:55

## 2022-03-06 ASSESSMENT — ACTIVITIES OF DAILY LIVING (ADL)
ADLS_ACUITY_SCORE: 8
ADLS_ACUITY_SCORE: 9
ADLS_ACUITY_SCORE: 8
ADLS_ACUITY_SCORE: 9
ADLS_ACUITY_SCORE: 8
ADLS_ACUITY_SCORE: 8
ADLS_ACUITY_SCORE: 9
ADLS_ACUITY_SCORE: 8
ADLS_ACUITY_SCORE: 9
ADLS_ACUITY_SCORE: 8
ADLS_ACUITY_SCORE: 8
ADLS_ACUITY_SCORE: 9
ADLS_ACUITY_SCORE: 8
ADLS_ACUITY_SCORE: 9
ADLS_ACUITY_SCORE: 8
ADLS_ACUITY_SCORE: 9
ADLS_ACUITY_SCORE: 8
ADLS_ACUITY_SCORE: 9
ADLS_ACUITY_SCORE: 9

## 2022-03-06 NOTE — PROGRESS NOTES
03/05/22 0900   Living Environment   People in Home significant other  (4 dogs and 3 cats)   Current Living Arrangements house   Home Accessibility stairs to enter home   Number of Stairs, Main Entrance 2   Stair Railings, Main Entrance none   Number of Stairs, Within Home, Primary ten  (but does not need to use stairs to access basement)   Transportation Anticipated family or friend will provide   Living Environment Comments OT:Pt plans to live primarily on main level with only a sunken living room to navigate steps for.  Previously pt was fully indepenent, but SO did all laundry/cleaning so pt did not need to access basement.  Upper level of home is unfinished so main level single floor living is his plan for discharge.   Self-Care   Usual Activity Tolerance fair   Current Activity Tolerance fair   Instrumental Activities of Daily Living (IADL)   Previous Responsibilities meal prep;shopping;yardwork;medication management;finances;driving;work   IADL Comments Pt also has multiple pets that he cares for.  Friends are installing a fence so he can let the dogs outside safely on their own.   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 Velasco and Dr. Hannah.   Post - op complicated by hypoxemic respiratory failure as well as fevers and leukocytosis, intubation.  Following extubation, patient was observed to have severe dysarthria, almost anarthria.  He also  demonstrated proximal weakness, felt likely 2/2 deconditioning from critical illness polyneuropathy.  Pt noted to have b/l UE and LLE  DVT.  Further complications included: acute post-operative pain, acute blood loss anemia and thrombocytopenia, pressure injuries, stress-induced hyperglycemia, hypernatremia, malnutrition, dysarthria, dysphagia, elevated ALT/AST, depressive symptoms, sleep difficulties, encephalopathy, diarrhea, and sternal popping/clicking   Existing Precautions/Restrictions sternal   General Observations and Info Conversationally aphasia appears mostly resolved.     Cognitive Status Examination   Orientation Status orientation to person, place and time   Cognitive Status Comments able to recall 5 items after 5 minutes.  Pt with some inconsistencies and recommend [place] screen next week.   Visual Perception   Visual Impairment/Limitations WNL   Strength Comprehensive (MMT)   Comment, General Manual Muscle Testing (MMT) Assessment full MMT deferred d/t sternal precautions   Hand Strength   Right hand  (pounds) 20   Left hand  (pounds) 40   Coordination   Left hand, nine hole peg test (seconds) 38  (dominant hand)   Right hand, nine hole peg test (seconds) 28   Clinical Impression   Criteria for Skilled Therapeutic Interventions Met (OT) Yes, treatment indicated   OT Diagnosis below baseline ADL function   Influenced by the following impairments weakness, decreased UE sensation, possible cognitive changes, poor endurance   OT Problem List-Impairments impacting ADL problems related to;activity tolerance impaired;cognition;communication;coordination;mobility;sensation;strength;pain;post-surgical precautions   ADL comments/analysis Currently pt limited by above impairments and requiring assistance with all functioanl mobility, LB dressing, FB bathing, toileting, and all IADL.  Pt has good support upon discharge but will need to be fully independent with basic self cares including toileting,  dressing, functional mobility.  Pt will benefit from skilled OT to address deficits, teach compensatory strategies, provideDME recommendations, and provide caregiver training.   Assessment of Occupational Performance 3-5 Performance Deficits   Identified Performance Deficits Currently pt requires assistance with toileting, dressing, functional mobiltiy, IADL   Planned Therapy Interventions (OT) ADL retraining;IADL retraining;balance training;cognition;fine motor coordination training;strengthening;stretching;transfer training;home program guidelines;progressive activity/exercise;risk factor education   Clinical Decision Making Complexity (OT) moderate complexity   Anticipated Equipment Needs Upon Discharge (OT) bathing equipment   Risk & Benefits of therapy have been explained evaluation/treatment results reviewed;care plan/treatment goals reviewed;risks/benefits reviewed;current/potential barriers reviewed;participants voiced agreement with care plan;participants included;patient;spouse/significant other   Total Evaluation Time (Minutes)   Total Evaluation Time (Minutes) 30  (30 E 30 ADL)

## 2022-03-06 NOTE — PROGRESS NOTES
"  General acute hospital   Acute Rehabilitation Unit     Subjective     24 Hours Summary  - No acute events overnight. Patient reports sleeping well.   - Therapies are going well, no issues.  -Denies HA/SOB.   -Encouraged Yoghurt, probiotics.    INR 3.24 CFX 20  C dif +ve  Review of Systems  10 point review of system, was otherwise negative other     Objective     Vital Signs  /76 (BP Location: Right arm)   Pulse 89   Temp 98.6  F (37  C) (Oral)   Resp 16   Ht 1.905 m (6' 3\")   Wt 141 kg (310 lb 12.8 oz)   SpO2 93%   BMI 38.85 kg/m      Physical Examination  General: Awake, alert, NAD.  Cardiovascular: RRR, no extra heart sounds, no murmurs.  Pulmonary: Non-labored breathing, CTAB, no wheezing, no crackles, no rhonchi.  Abdominal: Non-distended, NABS, Soft, non-tender.  MSK/Extremities: Warm, well perfused, no lower extremity edema b/l.  NEURO: Awake, alert, appropriate. Fluent, comprehensible speech. No changes in deficits.    LABS  Recent Results (from the past 24 hour(s))   Clostridium difficile toxin B PCR    Collection Time: 03/05/22  4:13 PM    Specimen: Per Rectum; Stool   Result Value Ref Range    C Difficile Toxin B by PCR Positive (A) Negative   Factor 10 chromogenic    Collection Time: 03/06/22 10:34 AM   Result Value Ref Range    Factor 10 Chromogenic 20 (L) 70 - 130 %   INR    Collection Time: 03/06/22 10:34 AM   Result Value Ref Range    INR 3.24 (H) 0.86 - 1.14   Extra Green Top (Lithium Heparin) Tube    Collection Time: 03/06/22 10:34 AM   Result Value Ref Range    Hold Specimen JIC    Extra Purple Top Tube    Collection Time: 03/06/22 10:34 AM   Result Value Ref Range    Hold Specimen JIC      IMAGING  No recent images to review.      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  He is now admitted to ARU on 3/4/22 for multidisciplinary rehabilitation and ongoing medical management.     Admission to acute inpatient rehab 03/04/22.    Impairment group code: 09 Cardiac: ascending aortic aneurysm s/p Bentall procedure w/ [place] valve    Ascending aortic aneurysm and bicuspid aortic valve s/p Bentall w/ [place] valve on 1/28 with Dr. Velasco and Dr. Hannah  Hx of HTN  Hypervolemia, improved  Sternal clicking and popping was noted beginning 2/24, occurring with deep inspiration, but not appreciated with coughing. No drainage or erythema or increased pain. CT scan showed incomplete healing of the sternal bone.  Given the timeline, this is somewhat to be expected.  Recommend follow-up noncontrast CT scan if the sternal clicking and popping continues or if sternal instability is suspected.  Fluid overloaded post-op and treated with diuretics. Discharged 25.1 kg below pre-op weight, clinically euvolemic except trace LE edema, recommended ongoing gentle PO diuresis.  - Goal MAP>65, SBP <130  LVEF: 60-65%  - Continue Coreg 50 BID, Lisinopril 40mg daily  - Continue ASA 81mg, Atorvastatin 40mg   - Continue warfarin for anticoagulation for valve.  Appreciate pharmacy assistance to dose.  INR goal 2-3 for three months, then 1.5-2.0 for life.   - Continue Lasix 20 mg daily.  2000 mL fluid restriction.  Monitor I/Os, daily weights, labs, clinical volume status to assess ongoing need.  - Sternal precautions.  As above, recommend repeat CT if sternal clicking/popping continues or if sternal instability suspected.  - Wound care: wash daily with soap and water, pat  dry, keep clean/dry, ok to shower don't soak  - Pain management: Tylenol, methocarbamol PRN  - Follow up with CVTS - currently scheduled 3/11, though may need to be rescheduled pending rehab course  - Follow up with cardiology, Dr. Mcnamara, within 1-2 weeks after discharge     Acute hypoxemic respiratory failure/ARDS, multifactorial 2/2 hypervolemia, pneumonia, atelectasis, obesity hypoventilation.   Extubated 2/14 to HFNC, now stable on room air.  - Continue pulmonary toilet  - Monitor respiratory status, continue supplemental oxygen by NC PRN     Left thalamic infarct, old, age unknown  Revealed on CT 2/16 in evaluation for dysarthria s/p extubation.  Felt to be incidental chronic infarction 2/2 small vessel disease in setting of hypertension.  Neurology consulted.  - LDL 74, A1c 5.8%  - Continue aspirin, statin as above  - Recommend MRI brain wwo and MRA head/neck as outpatient  - Follow up with stroke neurology in 2-3 months, or after completion of above studies     RLE, RUE DVTs  HIT positive   On Heparin post-op.  HIT positive 2/10.  U/S bilat LE on 2/10 revealed acute DVT of RLE with nonocclusive thrombus in the femoral vein and occlusive thrombus in the popliteal vein, as well as occlusive thrombus of the right gastrocnemius vein from the level of the upper calf to knee.  Started on bivalirudin drip, then started warfarin 2/19 with ongoing bivalirudin bridge to therapeutic chromogenic factor X (now completed).  Bilat UE U/S 2/21 demonstrating occlusive thrombus of one of the paired right brachial veins at the upper arm, with PICC in place, which was removed 2/21; and occlusive superficial thrombus in left cephalic vein at the antecubital fossa. No evidence of left upper extremity deep venous thrombosis.  - Continue warfarin as above, current INR goal 2-3     Anemia  Stable in 10s-11s, steadily improving.  - Trend CBC     Depression/anxiety secondary to medical condition  Sleep disturbance  Noted to have  increased depressive symptoms and difficulty sleeping.  Psychology consulted on 2/23.  At ARU admission, patient reports mood to be stable and sleep issues mostly related to environment.  - Continue melatonin at bedtime, trazodone PRN  - Monitor mood and sleep, consider consult for mental health supports at ARU if indicated     Moderate malnutrition in the context of acute illness/injury  NJ tube removed 2/23.  Improving intake.  - Continue regular diet, thin liquids.  Continue supplements per RD recs.  - Nutrition consulted, appreciate assistance     Maculopapular Rash, improving  Patient has maculopapular rash diffusely across chest and upper arms. ?medication-related vs follicultis.  Is steadily improving per patient.  - Monitor, can try topical treatment if bothersome or persistent, currently declines     5. Adjustment to disability:  Clinical psychology to eval and treat if indicated  6. FEN: regular diet, thin liquids, 2000mL fluid restriction  7. Bowel: continent, monitor, PRN bowel meds available  8. Bladder: continent, monitor PVRs at admission  9. DVT Prophylaxis: warfarin  10. GI Prophylaxis: PPI (at least thru 3/16 - 30d from extubation)  11. Code: full  12. Disposition: goal for home  13. ELOS:  10 days.  14. Rehab prognosis:  good  15. Follow up Appointments on Discharge: PCP, CVTS (reschedule from 3/11), cardiology (Dr. Mcnamara) within 1-2 wks of discharge     Doing well. Continue cares and plans outlined.      Liban Cates MD

## 2022-03-06 NOTE — PLAN OF CARE
FOCUS/GOAL  Bowel management, Bladder management, Nutrition/Feeding/Swallowing precautions, Pain management, Medical management, Mobility, Skin integrity, and Cognition/Memory/Judgment/Problem solving    ASSESSMENT, INTERVENTIONS AND CONTINUING PLAN FOR GOAL:    Patient A&Ox4, calls appropriately, makes needs known. Denies headache, CP, SOB, nausea, and fever. Complained of mild soreness to bilateral shoulders, PRN voltaren gel applied with relief. Complained of nausea during AM, sips of water given, nausea subsided during shift. Baseline numbness to pinkie and ring finger noted. Transfers Ax1-2 Stand Pivot Assist, up to bedside commode. Cont of bladder and bowel, uses urinal. No Bm during shift. Patient incont at night, condom catheter in place. Regular diet, thin liquids, 2000cc fluid restriction, ate well, takes pills whole. Sternal incision approximated and open to air. Skin unremarkable. Stool sample collected and sent to lab, C-DIFF positive. Patient informed of lab results and enteric precautions initiated. No tubes, no lines, no drains. VSS. Continue POC.

## 2022-03-06 NOTE — PROGRESS NOTES
"/76 (BP Location: Right arm)   Pulse 89   Temp 98.6  F (37  C) (Oral)   Resp 16   Ht 1.905 m (6' 3\")   Wt 141 kg (310 lb 12.8 oz)   SpO2 93%   BMI 38.85 kg/m       Patient is convinced he does not have cdiff based on the lack of foul smell and the infreq of stools.  States GF is a nurse and they have discussed this and the specimen sat out \"for 5 hours\" before it was sent to the lab yesterday.  Thinks it was contaminated.  Requesting to test another sample.  Writer called lab and spoke w/ microbiologist re: the likelihoood of a false positive - states it is a very sensitive PCR test, but it could be norovirus and suggested testing.  Will update MD.  New orders for a probiotic and levaquin po.  Enc fluids - remains on 2K FR, which patient wants to have discontinued.  Will chk in w/ physician on how to proceed.      1110 New rash appears on the trunk, upper arm, (nothing on belly, legs, etc.) only back to the waist area, top of arms, chest, pinpoint bumps, some raised, some not.    MD updated re: rash, reports of lightheaded, diaphoretic w/ slow position changes while working w/ therapy but orthostatics showed no significant drop (less than 20points), see flowsheet and therapy notes. MD made aware  Also discussed patient desire to retest for cdiff and discussion w/ lab - states no retest needed.  Will update patient who has had an additional two loose stools this morning. Education on Cdiff provided.    WRITTEN EDUCATION PROVIDED TO PATIENT RE: CDIFF  "

## 2022-03-06 NOTE — CONSULTS
Social Work: Initial Assessment with Discharge Plan    Patient Name: Chuy Varner  : 1990  Age: 31 year old  MRN: [id number]  Completed assessment with: Chart review and interview with patient   Admitted to ARU: 3/4/2022    Presenting Information   Date of SW assessment: 2022  Health Care Directive: Provided education and Declined completing  Primary Health Care Agent: Pt  Secondary Health Care Agent: Parents are NOK in absence of HCD. Pt declined blank copy of HCD.   Living Situation: Lives in a 3 story house in [place], [place] w/ his SO, Debbie, and their 4 dogs and 4 cats. 2 ROMÁN. All needs met on main level. Walk in shower.   Previous Functional Status: Independent w/ mobility, ADLs, IADLs, transfers and gait. Pt was driving and working FT PTA.   DME available: None reported  Patient and family understanding of hospitalization: Appropriate. A&Ox4.  Cultural/Language/Spiritual Considerations: English speaking male.       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  He is now admitted to ARU on 3/4/22 for multidisciplinary rehabilitation and ongoing medical management.    Provider Information   Primary Care Physician:Hernando Moore --See face sheet for demographics.  ARU HUC will schedule PCP apt at discharge.   : None reported    Mental Health/Chemical Dependency:   Diagnosis: Depression per chart. Pt denied any mental health  concerns/diagnosis during assessment.  Alcohol/Tobacco/Narcotis: Reports 2 drinks/week. Denied tobacco and illicit drug use.  Support/Services in Place: None reported  Services Needed/Recommended: Supportive services available during ARU stay.  Sexuality/Intimacy: Not discussed     Support System  Marital Status: S/O Debbie is involved and supportive. She is an RN. Pt states she can provide help/support at home if needed.  Family support: Parents live out of state.  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   Income Source: Wages  Education: College degree  Financial Concerns:  None reported  Insurance: BCBS out of state      Discharge Plan   Patient and family discharge goal: Home, pending progress.   Provided Education on discharge plan: Yes. Evaluations and discharge recommendations pending.   Patient agreeable to discharge plan:  Pending further discussion. Evaluations and discharge recommendations pending.   Provided education and attained signature for Medicare IM and IRF Patient Rights and Privacy Information provided to patient : NA  Provided patient with [place] [facility] Resources: NA  Barriers to discharge: Medical clearance, therapy goals met.    Discharge Recommendations   Disposition: See above   Transportation Needs: Debbie will provide  Name of Transportation Company and Phone: N/A     Additional comments   Discharge TBD, ELOS 10 days. Pt's goals is to discharge on Saturday 3/12. BIMS 15/15.     SW will remain available and continue to follow as needs arise.     Please invite to Care Conference:  N/A at this time     Segun STEVENS, KOLTON Torre   Phone: [phone]  Pager: [phone]

## 2022-03-06 NOTE — PLAN OF CARE
Discharge Planner Post-Acute Rehab OT:      Discharge Plan: Living on single level of home with significant other, 4 dogs, and 3 cats.  Pt with 2 stairs to get into home and sunken living room, but otherwise bedroom, bathroom, kitchen, and living room all one level.  Pt with step in shower and was previously fully independent including working full time for the [employer].    Precautions: sternal, monitor BP and O2 saturation   d/t some symptomatic orthostatic BP.  New c-diff 3/6/22  Current Status:  ADLs:    Mobility: CGA functional transfers with RW    Grooming: Independent while seated, CGA while standing for brief periods of time.    Dressing: Set-up upper body, min A LB     Bathing: NT    Toileting: min A  IADLs: Previously was sharing cooking/shopping responsibilities with significant other, completed yard care, and worked full time.  SO does cleaning and laundry at baseline.  Vision/Cognition: Cognition requires further testing.     Assessment: Pt with extended hospital stay (see H&P) with significant deconditioning, decreased RUE sensation/strength, possible changes in cognition.  Pt  currently at min A level with very basic ADL and needs to be independent with self cares to be home alone during the day upon discharge.  Pt very motivated to return home 3/13 with family support. Today pt limited by new c-diff and continued dizzy with position changes which had resolved by the PM. Pt also limited by poor activity tolerance.  Pt benefits from skilled OT to address deficits, teach compensatory strategies, and provide caregiver training.     Other Barriers to Discharge (DME, Family Training, etc): shower DME, family training,

## 2022-03-06 NOTE — PLAN OF CARE
Alert and oriented  X 4. Able to make needs known. Call light in reach. Offers no C/O pain/discomfort. Condom catheter in place, draining sufficiently.  No loose stools so far this shift. Enteric precautions maintained. Appears to be sleeping during rounds. No new concerns/issues overnight.  Continue with plan of care.

## 2022-03-06 NOTE — PLAN OF CARE
"Discharge Planner Post-Acute Rehab PT:      Discharge Plan: Home to live with Significant Other; Cardiac Rehab upon discharge (need to arrange)     Precautions: falls, alarms, sternal precautions, orthostatic/nauseated (3/5/22)     Current Status:  Bed Mobility: Mod I  Transfer: SBA with FWW; no B UEs  Gait: 130'x1 with FWW and CGAx1 and WC follow   Stairs: x12, 4\" steps with B HR and CGAx1; ascend fwd, descend bkwd  Balance: SUPV seated at EOB with B LE support; CGAx1 in static stance with 2WW     Assessment: Pt with improved orthostasis today, though new c-diff dx. Pt with improved tolerance to ambulation and stair negotiation; improved performance with bariatric 2WW. Pt showed PT photo of stairs to enter home. Pt likely can complete with 2WW per PT clinical judgment, with no need for railing installation. Pt would like to discharge by 3/11/22 if ok per PT/IDT.      Other Barriers to Discharge (DME, Family Training, etc):   Need bariatric 2WW  Family training      "

## 2022-03-07 ENCOUNTER — APPOINTMENT (OUTPATIENT)
Dept: OCCUPATIONAL THERAPY | Facility: CLINIC | Age: 32
End: 2022-03-07
Attending: PHYSICAL MEDICINE & REHABILITATION
Payer: COMMERCIAL

## 2022-03-07 ENCOUNTER — APPOINTMENT (OUTPATIENT)
Dept: PHYSICAL THERAPY | Facility: CLINIC | Age: 32
End: 2022-03-07
Attending: PHYSICAL MEDICINE & REHABILITATION
Payer: COMMERCIAL

## 2022-03-07 LAB
ANION GAP SERPL CALCULATED.3IONS-SCNC: 8 MMOL/L (ref 3–14)
BUN SERPL-MCNC: 8 MG/DL (ref 7–30)
CALCIUM SERPL-MCNC: 9.6 MG/DL (ref 8.5–10.1)
CHLORIDE BLD-SCNC: 109 MMOL/L (ref 94–109)
CO2 SERPL-SCNC: 22 MMOL/L (ref 20–32)
CREAT SERPL-MCNC: 0.65 MG/DL (ref 0.66–1.25)
ERYTHROCYTE [DISTWIDTH] IN BLOOD BY AUTOMATED COUNT: 14 % (ref 10–15)
GFR SERPL CREATININE-BSD FRML MDRD: >90 ML/MIN/1.73M2
GLUCOSE BLD-MCNC: 111 MG/DL (ref 70–99)
HCT VFR BLD AUTO: 40.2 % (ref 40–53)
HGB BLD-MCNC: 13 G/DL (ref 13.3–17.7)
INR PPP: 2.99 (ref 0.86–1.14)
MAGNESIUM SERPL-MCNC: 1.9 MG/DL (ref 1.6–2.3)
MCH RBC QN AUTO: 29.7 PG (ref 26.5–33)
MCHC RBC AUTO-ENTMCNC: 32.3 G/DL (ref 31.5–36.5)
MCV RBC AUTO: 92 FL (ref 78–100)
PHOSPHATE SERPL-MCNC: 3.9 MG/DL (ref 2.5–4.5)
PLATELET # BLD AUTO: 301 10E3/UL (ref 150–450)
POTASSIUM BLD-SCNC: 4 MMOL/L (ref 3.4–5.3)
RBC # BLD AUTO: 4.37 10E6/UL (ref 4.4–5.9)
SODIUM SERPL-SCNC: 139 MMOL/L (ref 133–144)
WBC # BLD AUTO: 6.4 10E3/UL (ref 4–11)

## 2022-03-07 PROCEDURE — 82310 ASSAY OF CALCIUM: CPT | Performed by: PHYSICIAN ASSISTANT

## 2022-03-07 PROCEDURE — 85610 PROTHROMBIN TIME: CPT | Performed by: PHYSICAL MEDICINE & REHABILITATION

## 2022-03-07 PROCEDURE — 97530 THERAPEUTIC ACTIVITIES: CPT | Mod: GP

## 2022-03-07 PROCEDURE — 128N000003 HC R&B REHAB

## 2022-03-07 PROCEDURE — 83735 ASSAY OF MAGNESIUM: CPT | Performed by: PHYSICIAN ASSISTANT

## 2022-03-07 PROCEDURE — 36415 COLL VENOUS BLD VENIPUNCTURE: CPT | Performed by: PHYSICAL MEDICINE & REHABILITATION

## 2022-03-07 PROCEDURE — 97110 THERAPEUTIC EXERCISES: CPT | Mod: GP

## 2022-03-07 PROCEDURE — 97535 SELF CARE MNGMENT TRAINING: CPT | Mod: GO | Performed by: OCCUPATIONAL THERAPIST

## 2022-03-07 PROCEDURE — 97535 SELF CARE MNGMENT TRAINING: CPT | Mod: GO

## 2022-03-07 PROCEDURE — 85027 COMPLETE CBC AUTOMATED: CPT | Performed by: PHYSICIAN ASSISTANT

## 2022-03-07 PROCEDURE — 84100 ASSAY OF PHOSPHORUS: CPT | Performed by: PHYSICIAN ASSISTANT

## 2022-03-07 PROCEDURE — 250N000013 HC RX MED GY IP 250 OP 250 PS 637: Performed by: PHYSICAL MEDICINE & REHABILITATION

## 2022-03-07 PROCEDURE — 99232 SBSQ HOSP IP/OBS MODERATE 35: CPT | Performed by: PHYSICIAN ASSISTANT

## 2022-03-07 PROCEDURE — 250N000013 HC RX MED GY IP 250 OP 250 PS 637: Performed by: PHYSICIAN ASSISTANT

## 2022-03-07 PROCEDURE — 97530 THERAPEUTIC ACTIVITIES: CPT | Mod: GP | Performed by: STUDENT IN AN ORGANIZED HEALTH CARE EDUCATION/TRAINING PROGRAM

## 2022-03-07 RX ORDER — WARFARIN SODIUM 5 MG/1
10 TABLET ORAL
Status: COMPLETED | OUTPATIENT
Start: 2022-03-07 | End: 2022-03-07

## 2022-03-07 RX ADMIN — DICLOFENAC SODIUM 2 G: 10 GEL TOPICAL at 08:01

## 2022-03-07 RX ADMIN — CARVEDILOL 50 MG: 25 TABLET, FILM COATED ORAL at 09:49

## 2022-03-07 RX ADMIN — VANCOMYCIN HYDROCHLORIDE 125 MG: 125 CAPSULE ORAL at 12:24

## 2022-03-07 RX ADMIN — WARFARIN SODIUM 10 MG: 5 TABLET ORAL at 18:05

## 2022-03-07 RX ADMIN — ATORVASTATIN CALCIUM 40 MG: 40 TABLET, FILM COATED ORAL at 20:17

## 2022-03-07 RX ADMIN — VANCOMYCIN HYDROCHLORIDE 125 MG: 125 CAPSULE ORAL at 07:59

## 2022-03-07 RX ADMIN — Medication 250 MG: at 20:18

## 2022-03-07 RX ADMIN — CARVEDILOL 50 MG: 25 TABLET, FILM COATED ORAL at 20:18

## 2022-03-07 RX ADMIN — VANCOMYCIN HYDROCHLORIDE 125 MG: 125 CAPSULE ORAL at 20:17

## 2022-03-07 RX ADMIN — Medication 250 MG: at 09:33

## 2022-03-07 RX ADMIN — ASPIRIN 81 MG: 81 TABLET, CHEWABLE ORAL at 07:59

## 2022-03-07 RX ADMIN — PANTOPRAZOLE SODIUM 40 MG: 40 TABLET, DELAYED RELEASE ORAL at 06:12

## 2022-03-07 RX ADMIN — LISINOPRIL 40 MG: 40 TABLET ORAL at 07:59

## 2022-03-07 RX ADMIN — VANCOMYCIN HYDROCHLORIDE 125 MG: 125 CAPSULE ORAL at 16:08

## 2022-03-07 RX ADMIN — FUROSEMIDE 20 MG: 20 TABLET ORAL at 07:59

## 2022-03-07 RX ADMIN — Medication 10 MG: at 21:23

## 2022-03-07 ASSESSMENT — ACTIVITIES OF DAILY LIVING (ADL)
ADLS_ACUITY_SCORE: 9

## 2022-03-07 NOTE — PLAN OF CARE
FOCUS/GOAL  Bowel management, Bladder management and Pain management    ASSESSMENT, INTERVENTIONS AND CONTINUING PLAN FOR GOAL:  Pt is alert and oriented x4, denies fever, chills, chest pain, SOB, N/V, abdominal pain, or new weakness/numbness/tingling. Pt using condom cath overnight and went without BM. transferring with assist of 1 ww , sleeping well throughout the night, no tubes, lines or drains, vs stable, no further care concerns at this time continue with POC.

## 2022-03-07 NOTE — PLAN OF CARE
"Discharge Planner Post-Acute Rehab PT:      Discharge Plan: Home. OP CR - [facility] in [place]     Precautions: falls, sternal precautions     Current Status:  Bed Mobility: Mod I  Transfer: SBA 4WW  Gait: Supvervision 200'+ 4WW  Stairs: 6\"x4 B hand rail CGA, difficulty with eccentric control/descent  Balance: Fair unsupported, benefits from UE support for balance and endurance  6MWT 3/7=672.5' 4WW     Assessment: after collaborate with OT, pt now mod I in room with 4WW.    Donned size E spandigrip to LLE for edema, needs size F for RLE, unit currently out.  Other Barriers to Discharge (DME, Family Training, etc):   4WW  Stairs to enter - will be able to perform as one platform step      "

## 2022-03-07 NOTE — PLAN OF CARE
"Discharge Planner Post-Acute Rehab PT:      Discharge Plan: Home. OP CR - [facility] in [place]     Precautions: falls, sternal precautions     Current Status:  Bed Mobility: Mod I  Transfer: SBA 4WW  Gait: Supvervision 200'+ 4WW  Stairs: 6\"x4 B hand rail CGA, difficulty with eccentric control/descent  Balance: Fair unsupported, benefits from UE support for balance and endurance  6MWT 3/7=672.5' 4WW     Assessment: Pt is on track to be able to discharge this week, will target Thursday with cleared with medical team. OT to assess ADLs this PM with plan for possible mod-I tomorrow following room arrangement. Donned size E spandigrip to LLE for edema, needs size F for RLE, unit currently out.    Other Barriers to Discharge (DME, Family Training, etc):   4WW  Stairs to enter - will be able to perform as one platform step      "

## 2022-03-07 NOTE — PLAN OF CARE
"SIX MINUTE WALK TEST  Physical Therapy  3/7/2022    Chuy Varner MRN# [id number]   YOB: 1990 Age: 31 year old     Height: 6' 3\"  Weight (lbs): 310 lbs 12.8 oz Weight (kg): 141 kg (actual weight)    Supplemental oxygen during the test: No    Oxygen Appliance: None    Oximetry: Finger Probe    Gait Aid: 4WW     Pre-test Post-test   Time 8:30 -   Blood Pressure (mm Hg) 123/73 128/77   Heart rate (bpm) 101 117   Rated Perceived Dyspnea (Anne Scale) 3 -- Moderate shortness of breath or breathing difficulty  8 -- Very severe shortness of breath   Rated Perceived Exertion (Anne Scale) 2 -- Weak (light)  8 -- Severe   SpO2 (%) 97 97     Total distance walked in 6 minutes: 672.5 feet, 205 meters    Paused during test?No    Stopped test before 6 minutes? No    Did the patient experience any pain or discomfort during the test? No    Oxygen Titration Required: No    Performing Staff: Ron Oakes, PT            "

## 2022-03-07 NOTE — PROGRESS NOTES
Individualized Overall Plan Of Care (IOPOC)      Rehab diagnosis/Impairment Group Code: 09 cardiac: ascending aortic aneurysm s/p bentall procedure w/ [place] valve  S/p aortic aneurysm repair       Expected functional outcome: reach a level of mod I     Clinical Impression Comments: debility post ascending aortic aneurysm s/p Bentall procedure w/ [place] valve        Mobility: Pt presenting to PT s/p aortic root replacement/Bentall procedure with mechanical valve (1/28/22). Hospital course complicated by hypoxemic respiratory failure, need for proning, fevers/leukocytosis, R LE DVT, left thalamic stroke (likely small vessel disease), R UE DVT associated with PICC line, and L UE DVT. Pt demonstrating decreased strength in B UEs/LEs, need to learn/follow spinal precuations, poor static/dynamic balance, inc edema in B LEs, and inability to complete transfers/ambluation with IND/PLOF. Pt will benefit from 10-14 days of skilled PT to reach highest practical level safe for d/c to home environment with his significant other. Expecting need for FWW, family training, and OP PT vs. Cardiac Rehab upon d/c.     ADL:  Goal is to reach a level of mod I     Communication/Cognition/Swallow:   Intact     Intensity of therapy:   PT 90 minutes, Daily, for  7 - 10 days   OT 90 minutes, Daily, for  7 - 10 days         Education anticoagulation  Neuropsychology Testing: No        Medical Prognosis: good       Physician summary statement: debility post ascending aortic aneurysm s/p Bentall procedure w/ [place] valve, goal is to reach a level of mod I with all ADLs    Discharge destination: prior home  Discharge rehabilitation needs: outpatient, PT, OT and Cardiac      Estimated length of stay: 10 days       Rehabilitation Physician Tom Spann DO

## 2022-03-07 NOTE — PLAN OF CARE
Discharge Planner Post-Acute Rehab OT:      Discharge Plan: Single level home with SO. OP CR.     Precautions: sternal, C-diff, monitor BP    Current Status:  ADLs:    Mobility: Mod IND 4WW.    Grooming: IND seated.     Dressing: IND UB/LB. Max A for doffing/donning socks.     Bathing: SBA for UB wash, feet was dep A for wash/dry.     Toileting: Mod IND transfer 4WW. Mod IND clothing.  IADLs: TBA. Previously IND, working full time and driving.  Vision/Cognition: Cognition requires further testing.     Assessment: Progressing with ADLs and functional mobility. Advancing to Mod IND in room with 4ww. Room modifications made for improved accessibility. BP and dizziness not a barrier to safety during sessions today. Reviewed IP goals and recommendations. Updated POC date to 3/10.      Other Barriers to Discharge (DME, Family Training, etc):   Equipment: 4ww, shower bench, sock aid  Caregiver training: Schedule with SO as needed.

## 2022-03-07 NOTE — PLAN OF CARE
Discharge Planner Post-Acute Rehab OT:      Discharge Plan: Living on single level of home with significant other, 4 dogs, and 3 cats.  Pt with 2 stairs to get into home and sunken living room, but otherwise bedroom, bathroom, kitchen, and living room all one level.  Pt with step in shower and was previously fully independent including working full time for [employer].    Precautions: sternal, monitor BP and O2 saturation   d/t some symptomatic orthostatic BP.  New c-diff 3/6/22  Current Status:  ADLs:    Mobility: SBA/CGA for transfers. CGA functional transfers with RW    Grooming: Independent while seated, CGA while standing for brief periods of time.    Dressing: SBA for UB/LB dressing. Max-dep A for doffing/donning socks.     Bathing: SBA for UB wash, feet was dep A for wash/dry.     Toileting: min A  IADLs: Previously was sharing cooking/shopping responsibilities with significant other, completed yard care, and worked full time.  SO does cleaning and laundry at baseline.  Vision/Cognition: Cognition requires further testing.     Assessment: Pt had no symptoms this date. BP was better per nsg and pt was agreeable to shower. Pt was fatigued by end of shower. Pt would continue to benefit from increasing activity tolerance, energy conservation tech and increasing I w/ ADLs within his sternal precautions.       Pt very motivated to return home 3/13 with family support.  Pt benefits from skilled OT to address deficits, teach compensatory strategies, and provide caregiver training.     Other Barriers to Discharge (DME, Family Training, etc): shower DME, family training,

## 2022-03-07 NOTE — PLAN OF CARE
A&Ox4. Pain in bilateral shoulders, PRN volraren gel given. Denies SOB, chest pain, numbness or tingling. SBA with gait belt, walker. Regular diet, thin liquids, pills whole. Fluid restriction discontinued. Urinal at bedside. External catheter at night. LBM 3/6; bedside commode. Pt showered today.

## 2022-03-07 NOTE — PROGRESS NOTES
"  [facility]   Acute Rehabilitation Unit  Daily progress note    INTERVAL HISTORY  Chuy Varner was seen and examined at bedside.  Weekend therapy and progress notes reviewed.  Tested positive for C diff and started on PO antibiotics.  Today, patient reports that after 1 day of loose stools and nausea, he has had no recurrent abdominal pain, nausea.  He has not had BM yet today.  He reports some orthostatic hypotension over the weekend, but denies any dizziness or lightheadedness.  He reports slow position changes.  He is requesting to discontinue fluid restriction, as he feels this is too limiting, feeling quite thirsty especially in setting of increased activity.  He feels LE edema is stable, minimal.  He denies any increased shortness of breath, just mild with activity.  He reports ongoing significant improvement with therapies, noting a good amount of walking and stairs.  He is hopeful for discharge later this week.    Functionally, he is currently needing SBA for transfers and supervision for ambulation up to 200' with 4WW.  He needs CGA with stairs, 6\" x4 with bilateral hand rails.    MEDICATIONS    aspirin  81 mg Oral Daily     atorvastatin  40 mg Oral QPM     carvedilol  50 mg Oral BID     furosemide  20 mg Oral Daily     lisinopril  40 mg Oral Daily     melatonin  10 mg Oral At Bedtime     pantoprazole  40 mg Oral QAM AC     saccharomyces boulardii  250 mg Oral BID     vancomycin  125 mg Oral 4x Daily        acetaminophen, diclofenac, methocarbamol, - MEDICATION INSTRUCTIONS -, polyethylene glycol, senna-docusate, traZODone, Warfarin Therapy Reminder     PHYSICAL EXAM  /74 (BP Location: Right arm, Patient Position: Supine, Cuff Size: Adult Regular)   Pulse 87   Temp 97.9  F (36.6  C) (Oral)   Resp 16   Ht 1.905 m (6' 3\")   Wt 141 kg (310 lb 12.8 oz)   SpO2 95%   BMI 38.85 kg/m    Gen: NAD, pleasant, lying in bed   HEENT: NC/AT, MMM  Cardio: appears " well-perfused, audible valve click  Pulm: non-labored on room air  Abd: soft, non-tender, non-distended  Ext: trace edema in LLE with tubigrip in place  Neuro/MSK: awake, alert, responds appropriately, moving all 4 extremities in bed    LABS  CBC RESULTS: Recent Labs   Lab Test 03/07/22  0911 03/04/22  0603 03/02/22  0546   WBC 6.4 7.0 6.9   RBC 4.37* 3.92* 3.84*   HGB 13.0* 11.3* 11.2*   HCT 40.2 37.5* 37.4*   MCV 92 96 97   MCH 29.7 28.8 29.2   MCHC 32.3 30.1* 29.9*   RDW 14.0 14.7 15.0    287 293     Last Basic Metabolic Panel:  Recent Labs   Lab Test 03/07/22  0911 03/04/22  0603 03/02/22  0546    141 139   POTASSIUM 4.0 3.9 3.7   CHLORIDE 109 111* 108   CO2 22 24 22   ANIONGAP 8 6 9   * 93 96   BUN 8 8 8   CR 0.65* 0.59* 0.62*   GFRESTIMATED >90 >90 >90   MAYE 9.6 8.9 9.2       Rehabilitation - continue comprehensive acute inpatient rehabilitation program with multidisciplinary approach including therapies, rehab nursing, and physiatry following. See interval history for updates.      ASSESSMENT AND PLAN  Chuy Varner is a 31 year old left hand dominant male with a past medical history of recently (10/2021) discovered asymptomatic aortic root aneurysm (5.3 cm) with bicuspid aortic valve, hypertension, obesity, testicular cancer s/p orchiectomy who was admitted on 1/28/21 for Bentall procedure with mechanical valve with hospital course complicated by hypoxemic respiratory failure/ARDS, VAP, DVT, HIT, hypervolemia, hypernatremia, dysphagia, dysarthria, incidental finding of 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  He is now admitted to ARU on 3/4/22 for multidisciplinary rehabilitation and ongoing medical management.      Ascending aortic aneurysm and bicuspid aortic valve s/p Bentall w/ [place] valve on 1/28 with Dr. Velasco and Dr. Hannah  Hx of HTN  Hypervolemia, improved  Sternal clicking and popping was noted beginning 2/24, occurring with deep  inspiration, but not appreciated with coughing. No drainage or erythema or increased pain. CT scan showed incomplete healing of the sternal bone.  Given the timeline, this is somewhat to be expected.  Recommend follow-up noncontrast CT scan if the sternal clicking and popping continues or if sternal instability is suspected.  Fluid overloaded post-op and treated with diuretics. Discharged 25.1 kg below pre-op weight, clinically euvolemic except trace LE edema, recommended ongoing gentle PO diuresis.  - Goal MAP>65, SBP <130  LVEF: 60-65%  - Continue Coreg 50 BID, Lisinopril 40mg daily  - Continue ASA 81mg, Atorvastatin 40mg   - Continue warfarin for anticoagulation for valve.  Appreciate pharmacy assistance to dose.  INR goal 2-3 for three months, then 1.5-2.0 for life.  INR 2.99 today.  - Continue Lasix 20 mg daily.  2000 mL fluid restriction.  Monitor I/Os, daily weights, labs, clinical volume status to assess ongoing need.  Noted to have some orthostasis over the weekend, possibly in setting of resumption of Lasix, ongoing fluid restriction.  Weights have been stable since admission, labs stable, trace LE edema, no SOB.  Patient requests to discontinue fluid restriction, feeling quite thirsty in setting of increased activity.  Will lift restriction and continue to monitor.  - Sternal precautions.  As above, recommend repeat CT if sternal clicking/popping continues or if sternal instability suspected.  - Wound care: wash daily with soap and water, pat dry, keep clean/dry, ok to shower don't soak  - Pain management: Tylenol, methocarbamol PRN  - Follow up with CVTS - currently scheduled 3/11, though may need to be rescheduled pending rehab course  - Follow up with cardiology, Dr. Mcnamara, within 1-2 weeks after discharge     Acute hypoxemic respiratory failure/ARDS, multifactorial 2/2 hypervolemia, pneumonia, atelectasis, obesity hypoventilation.   Extubated 2/14 to [facility], now stable on room air.  - Continue  pulmonary toilet  - Monitor respiratory status, continue supplemental oxygen by NC PRN     Left thalamic infarct, old, age unknown  Revealed on CT 2/16 in evaluation for dysarthria s/p extubation.  Felt to be incidental chronic infarction 2/2 small vessel disease in setting of hypertension.  Neurology consulted.  - LDL 74, A1c 5.8%  - Continue aspirin, statin as above  - Recommend MRI brain wwo and MRA head/neck as outpatient  - Follow up with stroke neurology in 2-3 months, or after completion of above studies     RLE, RUE DVTs  HIT positive   On Heparin post-op.  HIT positive 2/10.  U/S bilat LE on 2/10 revealed acute DVT of RLE with nonocclusive thrombus in the femoral vein and occlusive thrombus in the popliteal vein, as well as occlusive thrombus of the right gastrocnemius vein from the level of the upper calf to knee.  Started on bivalirudin drip, then started warfarin 2/19 with ongoing bivalirudin bridge to therapeutic chromogenic factor X (now completed).  Bilat UE U/S 2/21 demonstrating occlusive thrombus of one of the paired right brachial veins at the upper arm, with PICC in place, which was removed 2/21; and occlusive superficial thrombus in left cephalic vein at the antecubital fossa. No evidence of left upper extremity deep venous thrombosis.  - Continue warfarin as above, current INR goal 2-3     Anemia  Stable in 10s-11s, steadily improving.  - Hgb improved today at 13.0  - Trend CBC     Depression/anxiety secondary to medical condition  Sleep disturbance  Noted to have increased depressive symptoms and difficulty sleeping.  Psychology consulted on 2/23.  At ARU admission, patient reports mood to be stable and sleep issues mostly related to environment.  - Continue melatonin at bedtime, trazodone PRN  - Monitor mood and sleep, consider consult for mental health supports at ARU if indicated     Moderate malnutrition in the context of acute illness/injury  NJ tube removed 2/23.  Improving intake.  -  Continue regular diet, thin liquids.  Continue supplements per RD recs.  - Nutrition consulted, appreciate assistance    C difficile colitis  Shortly after ARU admission, noted to have watery stools, nausea, urgency.  +C diff 3/5, started on vancomycin PO.  - Today, patient denies any abdominal pain, nausea, no BM today.  - Continue vancomycin for 10-day course  - Enteric precautions     Maculopapular Rash, improving  Patient has maculopapular rash diffusely across chest and upper arms. ?medication-related vs follicultis.  Is steadily improving per patient.  - Monitor, can try topical treatment if bothersome or persistent, currently declines       1. Adjustment to disability:  Clinical psychology to eval and treat if indicated  2. FEN: regular diet, thin liquids, 2000mL fluid restriction  3. Bowel: continent, monitor, PRN bowel meds available  4. Bladder: continent, PVRs at admission without evidence of retention, using condom cath at night, should wean prior to discharge  5. DVT Prophylaxis: warfarin  6. GI Prophylaxis: PPI (at least thru 3/16 - 30d from extubation)  7. Code: full  8. Disposition: goal for home  9. ELOS:  10 days.  10. Follow up Appointments on Discharge: PCP, CVTS (reschedule from 3/11 if still at ARU), cardiology (Dr. Mcnamara) within 1-2 wks of discharge        Patient seen and discussed with Dr. Tom Spann, PM&R Staff Physician    Ayaka Penaloza PA-C  Physical Medicine & Rehabilitation

## 2022-03-07 NOTE — PLAN OF CARE
FOCUS/GOAL  Bowel management, Bladder management, Nutrition/Feeding/Swallowing precautions, Pain management, Mobility, Skin integrity, and Cognition/Memory/Judgment/Problem solving    ASSESSMENT, INTERVENTIONS AND CONTINUING PLAN FOR GOAL:    Patient A&Ox4, calls appropriately, makes needs known. Denies headache, CP, SOB, nausea, and fever. Complained of mild soreness to bilateral shoulders, PRN voltaren gel x2 applied with relief. Baseline numbness to pinkie and ring finger noted. Transfers Ax1-2 Stand Pivot Assist, up to bedside commode. Cont of bladder and bowel, uses urinal. No Bm during shift. LBM 3/6. Patient incont at night, condom catheter in place. Regular diet, thin liquids, 2000cc fluid restriction, ate well, takes pills whole. Sternal incision approximated and open to air. Skin unremarkable. Enteric precautions maintained. No tubes, no lines, no drains. VSS. Continue POC.

## 2022-03-08 ENCOUNTER — APPOINTMENT (OUTPATIENT)
Dept: PHYSICAL THERAPY | Facility: CLINIC | Age: 32
End: 2022-03-08
Attending: PHYSICAL MEDICINE & REHABILITATION
Payer: COMMERCIAL

## 2022-03-08 ENCOUNTER — APPOINTMENT (OUTPATIENT)
Dept: OCCUPATIONAL THERAPY | Facility: CLINIC | Age: 32
End: 2022-03-08
Attending: PHYSICAL MEDICINE & REHABILITATION
Payer: COMMERCIAL

## 2022-03-08 LAB
HOLD SPECIMEN: NORMAL
HOLD SPECIMEN: NORMAL
INR PPP: 2.84 (ref 0.86–1.14)

## 2022-03-08 PROCEDURE — 250N000013 HC RX MED GY IP 250 OP 250 PS 637: Performed by: PHYSICAL MEDICINE & REHABILITATION

## 2022-03-08 PROCEDURE — 999N000150 HC STATISTIC PT MED CONFERENCE < 30 MIN

## 2022-03-08 PROCEDURE — 99233 SBSQ HOSP IP/OBS HIGH 50: CPT | Performed by: PHYSICIAN ASSISTANT

## 2022-03-08 PROCEDURE — 97110 THERAPEUTIC EXERCISES: CPT | Mod: GO | Performed by: OCCUPATIONAL THERAPIST

## 2022-03-08 PROCEDURE — 97110 THERAPEUTIC EXERCISES: CPT | Mod: GP

## 2022-03-08 PROCEDURE — 97530 THERAPEUTIC ACTIVITIES: CPT | Mod: GP

## 2022-03-08 PROCEDURE — 128N000003 HC R&B REHAB

## 2022-03-08 PROCEDURE — 250N000013 HC RX MED GY IP 250 OP 250 PS 637: Performed by: PHYSICIAN ASSISTANT

## 2022-03-08 PROCEDURE — 999N000125 HC STATISTIC PATIENT MED CONFERENCE < 30 MIN: Performed by: OCCUPATIONAL THERAPIST

## 2022-03-08 PROCEDURE — 36415 COLL VENOUS BLD VENIPUNCTURE: CPT | Performed by: PHYSICAL MEDICINE & REHABILITATION

## 2022-03-08 PROCEDURE — 97535 SELF CARE MNGMENT TRAINING: CPT | Mod: GO | Performed by: OCCUPATIONAL THERAPIST

## 2022-03-08 PROCEDURE — 85610 PROTHROMBIN TIME: CPT | Performed by: PHYSICAL MEDICINE & REHABILITATION

## 2022-03-08 PROCEDURE — 99207 PR SC NO CHARGE VISIT: CPT | Performed by: PHYSICIAN ASSISTANT

## 2022-03-08 RX ORDER — WARFARIN SODIUM 5 MG/1
10 TABLET ORAL
Status: COMPLETED | OUTPATIENT
Start: 2022-03-08 | End: 2022-03-08

## 2022-03-08 RX ADMIN — ATORVASTATIN CALCIUM 40 MG: 40 TABLET, FILM COATED ORAL at 20:20

## 2022-03-08 RX ADMIN — Medication 250 MG: at 07:47

## 2022-03-08 RX ADMIN — PANTOPRAZOLE SODIUM 40 MG: 40 TABLET, DELAYED RELEASE ORAL at 06:11

## 2022-03-08 RX ADMIN — DICLOFENAC SODIUM 2 G: 10 GEL TOPICAL at 07:47

## 2022-03-08 RX ADMIN — ASPIRIN 81 MG: 81 TABLET, CHEWABLE ORAL at 07:47

## 2022-03-08 RX ADMIN — VANCOMYCIN HYDROCHLORIDE 125 MG: 125 CAPSULE ORAL at 11:51

## 2022-03-08 RX ADMIN — VANCOMYCIN HYDROCHLORIDE 125 MG: 125 CAPSULE ORAL at 20:20

## 2022-03-08 RX ADMIN — CARVEDILOL 50 MG: 25 TABLET, FILM COATED ORAL at 20:20

## 2022-03-08 RX ADMIN — ACETAMINOPHEN 650 MG: 325 TABLET, FILM COATED ORAL at 19:24

## 2022-03-08 RX ADMIN — Medication 10 MG: at 21:56

## 2022-03-08 RX ADMIN — VANCOMYCIN HYDROCHLORIDE 125 MG: 125 CAPSULE ORAL at 17:00

## 2022-03-08 RX ADMIN — DICLOFENAC SODIUM 2 G: 10 GEL TOPICAL at 19:24

## 2022-03-08 RX ADMIN — LISINOPRIL 40 MG: 40 TABLET ORAL at 07:47

## 2022-03-08 RX ADMIN — FUROSEMIDE 20 MG: 20 TABLET ORAL at 07:47

## 2022-03-08 RX ADMIN — VANCOMYCIN HYDROCHLORIDE 125 MG: 125 CAPSULE ORAL at 07:47

## 2022-03-08 RX ADMIN — CARVEDILOL 50 MG: 25 TABLET, FILM COATED ORAL at 07:47

## 2022-03-08 RX ADMIN — Medication 250 MG: at 20:20

## 2022-03-08 RX ADMIN — ACETAMINOPHEN 650 MG: 325 TABLET, FILM COATED ORAL at 08:05

## 2022-03-08 RX ADMIN — WARFARIN SODIUM 10 MG: 5 TABLET ORAL at 17:00

## 2022-03-08 ASSESSMENT — ACTIVITIES OF DAILY LIVING (ADL)
ADLS_ACUITY_SCORE: 10
ADLS_ACUITY_SCORE: 9
ADLS_ACUITY_SCORE: 10
ADLS_ACUITY_SCORE: 9
ADLS_ACUITY_SCORE: 10
ADLS_ACUITY_SCORE: 9
ADLS_ACUITY_SCORE: 10
ADLS_ACUITY_SCORE: 9
ADLS_ACUITY_SCORE: 10
ADLS_ACUITY_SCORE: 10
ADLS_ACUITY_SCORE: 9
ADLS_ACUITY_SCORE: 10
ADLS_ACUITY_SCORE: 9
ADLS_ACUITY_SCORE: 10
ADLS_ACUITY_SCORE: 9
ADLS_ACUITY_SCORE: 9
ADLS_ACUITY_SCORE: 10
ADLS_ACUITY_SCORE: 9
ADLS_ACUITY_SCORE: 10
ADLS_ACUITY_SCORE: 9

## 2022-03-08 NOTE — PLAN OF CARE
Acute Rehab Care Conference/Team Rounds      Type: Team Rounds    Present: Dr. Tom Spann MD, Ayaka Penaloza PA, Ron Oakes PT, Becca Grider OT, Adan Singletary SLP, Marleni LONG, Ashley Mccarty RD, Katie Pantoja RN, and Patient Chuy Varner.       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 Use call light appropriately. sternal and fall precautions in observance Enteric precautions for c-diff.    Pain: shoulder and back pain from hospital mattress, prn voltaren gel and tylenol given. Egg crate foam mattress pad ordered today.    Medications, Skin, Tubes/Lines: Takes medications whole with water.  Skin is cdi. Surgical incision healing well. No lines/tubes.    Swallowing/Nutrition:    Bowel/Bladder: continent of bowel and bladder using urinal NOC and toilet during day. BM soft and formed today    Psychosocial: , lives with s/o in a home in [place], [place]. 4 dogs and 4 cats. All needs on main level. Indep PTA. Depression, denied any concerns or needs for support/resources. No substance abuse concerns. No financial concerns. Good support from s/o who works as a RN.     ADLs/IADLs: Pt making good progress with ADLs/IADLs. Pt is Mod IND transfers and mobility using 4WW. Pt is IND UB dressing, IND LB dressing (needs assist compression stockings), IND standing grooming, and Mod IND toileting. Pt requiring SBA shower transfer and bathing using a tub bench. Initiated simple IADLs with 4WW  including meal preparation and housekeeping. Pt completes these tasks Mod IND, however they are effortful and fatiguing. Recommend pt have assistance heavier IADLs. Plan to reinforce EC strategies, issue HEP, and further practice strategies for IND IADLs. Pt is ready to discharge from IP setting 3/10. No follow up OT needed, recommend OP CR. Equipment: 4WW, shower chair.    Mobility: Pt has made good progress during ARU stay. Mod-I in room with 4WW. Ambulates consistently 200'+ with 4WW. CGA on stairs due to some decrease in eccentric control strength with B hand rail. Edema improving, may not need compression at discharge. Will need gil 4WW for home. OP CR. On track to discharge this week.    Cognition/Language:No concerns. Not a barrier to discharge.    Community Re-Entry: Would benefit from initial support for community based tasks due to deconditioning and activity precautions.     Transportation: Family to provide, transfer not a barrier    Decision maker: self    Plan of Care and goals reviewed and updated.    Discharge Plan/Recommendations    Fall Precautions: continue    Patient/Family input to goals: yes     Estimated length of stay: 7-10 days     Overall plan for the patient: reach a level of mod I       Utilization Review and Continued Stay Justification    Medical Necessity Criteria:    For any criteria that is not met, please document reason and plan for discharge, transfer, or modification of plan of care to address.    Requires intensive rehabilitation program to treat functional deficits?: Yes    Requires 3x per week or greater involvement of rehabilitation physician to oversee rehabilitation program?: Yes    Requires rehabilitation nursing interventions?: Yes    Patient is making functional progress?: Yes    There is a potential for additional functional progress? Yes    Patient is participating in therapy 3 hours per day a minimum of 5 days per week or 15 hours per week in 7 day period?:Yes    Has  discharge needs that require coordinated discharge planning approach?:Yes      Barriers/Concerns related to meeting medical necessity criteria:  None     Team Plan to Address Concern:  As needed       Final Physician Sign off    Statement of Approval:  Tom Spann, DO      Patient Goals  Social Work Goals: Home this week with OP therapy. No concerns or needs at this time.     OT: Hygiene/Grooming: independent, while standing, within precautions  OT: Upper Body Dressing: Independent, within precautions, including set-up/clothing retrieval  OT: Lower Body Dressing: Modified independent, using adaptive equipment, within precautions, including set-up/clothing retrieval  OT: Upper Body Bathing: Modified independent, within precautions, using adaptive equipment  OT: Lower Body Bathing: Modified independent, using adaptive equipment, with precautions  OT: Bed Mobility: Modified independent  OT: Transfer: Modified independent, within precautions, with assistive device  OT: Toilet Transfer/Toileting: Modified independent, toilet transfer, cleaning and garment management, within precautions  OT: Meal Preparation: with simple meal preparation, Supervision/stand-by assist, within precautions, ambulatory level  OT: Cognitive: Patient/caregiver will verbalize understanding of cognitive assessment results/recommendations as needed for safe discharge planning    PT Predicated Duration/Target Date for Goal Attainment: 03/10/22     PT: Bed Mobility: Modified independent, Supine to/from sit, Rolling, Within precautions  PT: Transfers: Modified independent, Sit to/from stand, Bed to/from chair, Assistive device, Within precautions  PT: Gait: Modified independent, Rolling walker, 150 feet  PT: Stairs: Supervision/stand-by assist, Within precautions, 2 stairs (no HR for entry into home)           RN goal 1. Pain Management: Patient will be nowledgeable about pain prevention strategies as evidenced by advocating for medication by  3/8/22     RN goal 2. Medication Management: Patient will be knowledgeable about his medications by 3/8/22. Pt reports MAP is not necessary. Provided medication list and reviewed with patient today.     RN goal 3. Patient/significant other will attend PLC coumadin class prior to discharge.      RN goal 4. Patient will demonstrate sternal precautions.

## 2022-03-08 NOTE — PLAN OF CARE
Goal Outcome Evaluation:    Plan of Care Reviewed With: patient     Overall Patient Progress: improving    Outcome Evaluation: Patient improving in ARC nursing goals, FLOR in room with walker.    FOCUS/GOAL  Bowel management, Bladder management, Nutrition/Feeding/Swallowing precautions, Pain management, Wound care management, Mobility, Skin integrity, and Cognition/Memory/Judgment/Problem solving    ASSESSMENT, INTERVENTIONS AND CONTINUING PLAN FOR GOAL:    Patient A&Ox4, calls appropriately, makes needs known. Denies pain, headache, CP, SOB, nausea, and fever. Baseline numbness to pinkie and ring finger noted. Transfers FLOR in room with walker, up to toilet. Cont of bladder and bowel. No Bm during shift. LBM 3/6. Regular diet, thin liquids, ate well, takes pills whole. Sternal incision approximated and open to air. Skin unremarkable. Enteric precautions maintained. No tubes, no lines, no drains. VSS. Continue POC.

## 2022-03-08 NOTE — PLAN OF CARE
FOCUS/GOAL  Medical management    ASSESSMENT, INTERVENTIONS AND CONTINUING PLAN FOR GOAL:  Good night of sleep.  50 ml fluid intake this morning. Voided using urinal this shift. Patient remain on enteric precautions for c-diff. No abdominal discomfort reported this shift. No diarrhea.  LBM 3/6. Denied pain. Preferred to be weight after 0800. AM staff informed. Will continue with POC.  Goal Outcome Evaluation:

## 2022-03-08 NOTE — PLAN OF CARE
Discharge Planner Post-Acute Rehab OT:      Discharge Plan: Single level home with SO. OP CR.     Precautions: sternal, C-diff, monitor BP    Current Status:  ADLs:    Mobility: Mod IND 4WW.    Grooming: IND seated.     Dressing: IND UB/LB.    Bathing: SBA for UB wash, feet was dep A for wash/dry.     Toileting: Mod IND transfer 4WW. Mod IND clothing.  IADLs: Mod IND simple IADLs 4WW. Recommend assist heavier IADLs.  Vision/Cognition: No concerns. Not a barrier for discharge.     Assessment: Practiced simple IADLs with focus on adherence to precautions and self initiation of EC strategies. Pt completing simple meal prep and housekeeping tasks Mod IND with 4WW, needing increased rest breaks and some modifications due to fatigue. Continues to be on track to discharge home on 3/10.   Pt requested egg crate mattress top to increase comfort with bed positioning. Therapist providing education on risks for overheating and increased skin moisture with longer term use of the mattress. Pt acknowledged education but requested to still trial the mattress top.      Other Barriers to Discharge (DME, Family Training, etc):   Equipment: 4ww, shower bench, sock aid  Caregiver training: Schedule with SO as needed

## 2022-03-08 NOTE — PLAN OF CARE
"Discharge Planner Post-Acute Rehab PT:      Discharge Plan: Home. OP CR - [facility] in [place]     Precautions: falls, sternal precautions     Current Status:  Bed Mobility: Mod I  Transfer: Mod-I  Gait: Mod-I 200'+ 4WW  Stairs: 6\"x4 B hand rail SBA  Balance: Fair unsupported, benefits from UE support for balance and endurance  6MWT 3/7=672.5' 4WW     Assessment: Pt has been self limiting today following discharge being moved up, despite this, no physical barriers at this time. Planning to manage edema conservatively at home, remains present, but pt would prefer to not have compressions and is improving as he is able to move in his room more.    Other Barriers to Discharge (DME, Family Training, etc):   4WW - ordering through [facility]  Stairs to enter - will be able to perform as one platform step, not a concern      "

## 2022-03-09 ENCOUNTER — APPOINTMENT (OUTPATIENT)
Dept: EDUCATION SERVICES | Facility: CLINIC | Age: 32
End: 2022-03-09
Attending: PHYSICAL MEDICINE & REHABILITATION
Payer: COMMERCIAL

## 2022-03-09 ENCOUNTER — APPOINTMENT (OUTPATIENT)
Dept: PHYSICAL THERAPY | Facility: CLINIC | Age: 32
End: 2022-03-09
Attending: PHYSICAL MEDICINE & REHABILITATION
Payer: COMMERCIAL

## 2022-03-09 ENCOUNTER — APPOINTMENT (OUTPATIENT)
Dept: OCCUPATIONAL THERAPY | Facility: CLINIC | Age: 32
End: 2022-03-09
Attending: PHYSICAL MEDICINE & REHABILITATION
Payer: COMMERCIAL

## 2022-03-09 LAB
ANION GAP SERPL CALCULATED.3IONS-SCNC: 8 MMOL/L (ref 3–14)
BUN SERPL-MCNC: 8 MG/DL (ref 7–30)
CALCIUM SERPL-MCNC: 9.4 MG/DL (ref 8.5–10.1)
CHLORIDE BLD-SCNC: 109 MMOL/L (ref 94–109)
CO2 SERPL-SCNC: 23 MMOL/L (ref 20–32)
CREAT SERPL-MCNC: 0.65 MG/DL (ref 0.66–1.25)
ERYTHROCYTE [DISTWIDTH] IN BLOOD BY AUTOMATED COUNT: 13.6 % (ref 10–15)
GFR SERPL CREATININE-BSD FRML MDRD: >90 ML/MIN/1.73M2
GLUCOSE BLD-MCNC: 95 MG/DL (ref 70–99)
HCT VFR BLD AUTO: 37.7 % (ref 40–53)
HGB BLD-MCNC: 12 G/DL (ref 13.3–17.7)
INR PPP: 2.95 (ref 0.86–1.14)
MAGNESIUM SERPL-MCNC: 2.1 MG/DL (ref 1.6–2.3)
MCH RBC QN AUTO: 28.8 PG (ref 26.5–33)
MCHC RBC AUTO-ENTMCNC: 31.8 G/DL (ref 31.5–36.5)
MCV RBC AUTO: 91 FL (ref 78–100)
PHOSPHATE SERPL-MCNC: 4.4 MG/DL (ref 2.5–4.5)
PLATELET # BLD AUTO: 239 10E3/UL (ref 150–450)
POTASSIUM BLD-SCNC: 3.8 MMOL/L (ref 3.4–5.3)
RBC # BLD AUTO: 4.16 10E6/UL (ref 4.4–5.9)
SODIUM SERPL-SCNC: 140 MMOL/L (ref 133–144)
WBC # BLD AUTO: 4.8 10E3/UL (ref 4–11)

## 2022-03-09 PROCEDURE — 97110 THERAPEUTIC EXERCISES: CPT | Mod: GP

## 2022-03-09 PROCEDURE — 85610 PROTHROMBIN TIME: CPT | Performed by: PHYSICAL MEDICINE & REHABILITATION

## 2022-03-09 PROCEDURE — 97535 SELF CARE MNGMENT TRAINING: CPT | Mod: GO

## 2022-03-09 PROCEDURE — 97530 THERAPEUTIC ACTIVITIES: CPT | Mod: GP

## 2022-03-09 PROCEDURE — 250N000013 HC RX MED GY IP 250 OP 250 PS 637: Performed by: PHYSICAL MEDICINE & REHABILITATION

## 2022-03-09 PROCEDURE — 84100 ASSAY OF PHOSPHORUS: CPT | Performed by: PHYSICIAN ASSISTANT

## 2022-03-09 PROCEDURE — 83735 ASSAY OF MAGNESIUM: CPT | Performed by: PHYSICIAN ASSISTANT

## 2022-03-09 PROCEDURE — 80048 BASIC METABOLIC PNL TOTAL CA: CPT | Performed by: PHYSICIAN ASSISTANT

## 2022-03-09 PROCEDURE — 36415 COLL VENOUS BLD VENIPUNCTURE: CPT | Performed by: PHYSICIAN ASSISTANT

## 2022-03-09 PROCEDURE — 99232 SBSQ HOSP IP/OBS MODERATE 35: CPT | Performed by: PHYSICIAN ASSISTANT

## 2022-03-09 PROCEDURE — 128N000003 HC R&B REHAB

## 2022-03-09 PROCEDURE — 85014 HEMATOCRIT: CPT | Performed by: PHYSICIAN ASSISTANT

## 2022-03-09 PROCEDURE — 250N000013 HC RX MED GY IP 250 OP 250 PS 637: Performed by: PHYSICIAN ASSISTANT

## 2022-03-09 RX ORDER — FUROSEMIDE 20 MG
20 TABLET ORAL DAILY
Qty: 30 TABLET | Refills: 0 | Status: SHIPPED | OUTPATIENT
Start: 2022-03-09 | End: 2022-03-11

## 2022-03-09 RX ORDER — PANTOPRAZOLE SODIUM 40 MG/1
40 TABLET, DELAYED RELEASE ORAL
Qty: 14 TABLET | Refills: 0 | Status: SHIPPED | OUTPATIENT
Start: 2022-03-10 | End: 2022-03-24

## 2022-03-09 RX ORDER — ASPIRIN 81 MG/1
81 TABLET, CHEWABLE ORAL DAILY
Qty: 30 TABLET | Refills: 0 | Status: SHIPPED | OUTPATIENT
Start: 2022-03-09

## 2022-03-09 RX ORDER — WARFARIN SODIUM 4 MG/1
8 TABLET ORAL
Status: DISCONTINUED | OUTPATIENT
Start: 2022-03-09 | End: 2022-03-09

## 2022-03-09 RX ORDER — WARFARIN SODIUM 5 MG/1
TABLET ORAL
Qty: 50 TABLET | Refills: 0 | Status: SHIPPED | OUTPATIENT
Start: 2022-03-09

## 2022-03-09 RX ORDER — ATORVASTATIN CALCIUM 40 MG/1
40 TABLET, FILM COATED ORAL EVERY EVENING
Qty: 30 TABLET | Refills: 0 | Status: SHIPPED | OUTPATIENT
Start: 2022-03-09

## 2022-03-09 RX ORDER — VANCOMYCIN HYDROCHLORIDE 125 MG/1
125 CAPSULE ORAL 4 TIMES DAILY
Qty: 24 CAPSULE | Refills: 0 | Status: SHIPPED | OUTPATIENT
Start: 2022-03-09 | End: 2022-03-15

## 2022-03-09 RX ORDER — CARVEDILOL 25 MG/1
50 TABLET ORAL 2 TIMES DAILY
Qty: 120 TABLET | Refills: 0 | Status: SHIPPED | OUTPATIENT
Start: 2022-03-09

## 2022-03-09 RX ORDER — LISINOPRIL 40 MG/1
40 TABLET ORAL DAILY
Qty: 30 TABLET | Refills: 0 | Status: SHIPPED | OUTPATIENT
Start: 2022-03-10

## 2022-03-09 RX ADMIN — PANTOPRAZOLE SODIUM 40 MG: 40 TABLET, DELAYED RELEASE ORAL at 06:40

## 2022-03-09 RX ADMIN — Medication 250 MG: at 07:41

## 2022-03-09 RX ADMIN — CARVEDILOL 50 MG: 25 TABLET, FILM COATED ORAL at 21:29

## 2022-03-09 RX ADMIN — Medication 250 MG: at 21:29

## 2022-03-09 RX ADMIN — DICLOFENAC SODIUM 2 G: 10 GEL TOPICAL at 07:43

## 2022-03-09 RX ADMIN — WARFARIN SODIUM 7.5 MG: 5 TABLET ORAL at 17:23

## 2022-03-09 RX ADMIN — VANCOMYCIN HYDROCHLORIDE 125 MG: 125 CAPSULE ORAL at 07:42

## 2022-03-09 RX ADMIN — VANCOMYCIN HYDROCHLORIDE 125 MG: 125 CAPSULE ORAL at 16:42

## 2022-03-09 RX ADMIN — Medication 10 MG: at 21:31

## 2022-03-09 RX ADMIN — VANCOMYCIN HYDROCHLORIDE 125 MG: 125 CAPSULE ORAL at 21:29

## 2022-03-09 RX ADMIN — FUROSEMIDE 20 MG: 20 TABLET ORAL at 07:42

## 2022-03-09 RX ADMIN — ATORVASTATIN CALCIUM 40 MG: 40 TABLET, FILM COATED ORAL at 21:31

## 2022-03-09 RX ADMIN — DICLOFENAC SODIUM 2 G: 10 GEL TOPICAL at 21:31

## 2022-03-09 RX ADMIN — CARVEDILOL 50 MG: 25 TABLET, FILM COATED ORAL at 07:41

## 2022-03-09 RX ADMIN — VANCOMYCIN HYDROCHLORIDE 125 MG: 125 CAPSULE ORAL at 12:12

## 2022-03-09 RX ADMIN — LISINOPRIL 40 MG: 40 TABLET ORAL at 07:42

## 2022-03-09 RX ADMIN — ASPIRIN 81 MG: 81 TABLET, CHEWABLE ORAL at 07:41

## 2022-03-09 ASSESSMENT — ACTIVITIES OF DAILY LIVING (ADL)
ADLS_ACUITY_SCORE: 9

## 2022-03-09 NOTE — DISCHARGE INSTRUCTIONS
Follow-up Appointments      -  Primary Care Provider  You are scheduled to see Dr. Hernando Moore on March 11th at 10:40 am. Please arrive by 10:05 am for a INR lab. If you have any questions regarding either appointments, please contact the clinic directly.     Address  [facility]                          [address] [place] [address]  Phone   [phone]  Fax                  [phone]    - CVTS   You have a follow-up appointment on March 11th at 2:30 pm. This is a virtual appointment so there is no need to visit the office.     Address  [facility]                        [address] [place] [address]                        [address]                        [place] [address]  Phone   [phone]    - Cardiology within 1-2 wks of discharge  You are scheduled to see Dr. Amadeo Mcnamara on April 1st at 9:40 am.    Address         [facility] [address] [address]    Phone   [phone]

## 2022-03-09 NOTE — CONSULTS
Met with patient for warfarin teaching. Discussed importance or INR checks, how to take the medication, and diet. Also discussed signs of clotting/stroke and bleeding. Using teach back, patient demonstrated understanding of the education given.    Literature given: Guide to Warfarin Therapy, Warfarin Therapy Calendar.

## 2022-03-09 NOTE — PLAN OF CARE
Occupational Therapy Discharge Summary    Reason for therapy discharge:    Discharged to home with outpatient therapy.    Progress towards therapy goal(s). See goals on Care Plan in [facility] electronic health record for goal details.  Goals met    Therapy recommendation(s):    Continued therapy is recommended.  Rationale/Recommendations:  OP OT not recommended. Recommend follow up CR rehab for ongoing strengthening and endurance.      Precautions: sternal, C-diff, monitor BP     Current Status:  ADLs:    Mobility: Mod IND 4WW.    Grooming: IND seated.     Dressing: IND UB/LB.    Bathing: Mod IND shower on bench.    Toileting: Mod IND transfer 4WW. Mod IND clothing.  IADLs: Mod IND simple IADLs 4WW. Recommend assist heavier IADLs.  Vision/Cognition: No concerns. Not a barrier for discharge.    HEP: Issued calesthenics and dowel HEP with modifications for sternal precautions.     Equipment: 4ww, shower bench    Caregiver training: Patient declining caregiver training. Friends and family supportive, able to provide assistance for heavier IADLs until strength and endurance have improved.

## 2022-03-09 NOTE — PLAN OF CARE
FOCUS/GOAL  Medical management    ASSESSMENT, INTERVENTIONS AND CONTINUING PLAN FOR GOAL:  Slept throughout the night w/o concerns.  Declined weight this morning (preferred to be done AM shift). MOD I in his room, he voided using urinal. Denied pain. Patient remain on enteric precaution for c-diff.  Will continue with POC.  Goal Outcome Evaluation:

## 2022-03-09 NOTE — DISCHARGE SUMMARY
[facility]   Acute Rehabilitation Unit  Discharge summary     Date of Admission: 3/4/2022  Date of Discharge: 3/10/2022  Disposition: home with outpatient cardiac rehab  Primary Care Physician: Hernando Moore  Attending physician: Tom Spann, DO      DISCHARGE DIAGNOSIS      Ascending aortic aneurysm and bicuspid aortic valve s/p Bentall w/ Jerry valve on 1/28    Hypertension    Left thalamic infarct, old, age unknown    RLE, RUE DVTs    HIT positive    Anemia, mild, improving    C difficile colitis       BRIEF SUMMARY  Chuy Varner is a 31 year old left hand dominant male with a past medical history of recently (10/2021) discovered asymptomatic aortic root aneurysm (5.3 cm) with bicuspid aortic valve, hypertension, obesity, testicular cancer s/p orchiectomy who was admitted on 1/28/21 for Bentall procedure with mechanical valve with hospital course complicated by hypoxemic respiratory failure/ARDS, VAP, DVT, HIT, hypervolemia, hypernatremia, dysphagia, dysarthria, incidental finding of 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  He is now admitted to ARU on 3/4/22 for multidisciplinary rehabilitation and ongoing medical management.    REHABILITATION COURSE  PT: All goals and outcomes met, no further needs identified.  Progress towards therapy goal(s). See goals on Care Plan in [place] electronic health record for goal details.  Goals met  Therapy recommendation(s):    Continued therapy is recommended.  Rationale/Recommendations:  OP CR to progress functional strength and endurance. At discharge, pt is mod-I with 4WW with transfers and gait up to 200'. Has HEP for home. Supervision on stairs due to some eccentric control weakness. Has HEP for home progression. 6MWT = 672.5' with 4WW.    OT: Discharged to home with outpatient therapy.  Progress towards therapy goal(s). See goals on Care Plan in [place] electronic health record for goal  details.  Goals met.  Therapy recommendation(s): Continued therapy is recommended. Rationale/Recommendations:  OP OT not recommended. Recommend follow up CR rehab for ongoing strengthening and endurance.   Precautions: sternal, C-diff, monitor BP  Current Status:  ADLs:    Mobility: Mod IND 4WW.    Grooming: IND seated.     Dressing: IND UB/LB.    Bathing: Mod IND shower on bench.    Toileting: Mod IND transfer 4WW. Mod IND clothing.  IADLs: Mod IND simple IADLs 4WW. Recommend assist heavier IADLs.  Vision/Cognition: No concerns. Not a barrier for discharge.  HEP: Issued calesthenics and dowel HEP with modifications for sternal precautions.  Equipment: 4ww, shower bench  Caregiver training: Patient declining caregiver training. Friends and family supportive, able to provide assistance for heavier IADLs until strength and endurance have improved.    MEDICAL COURSE    Ascending aortic aneurysm and bicuspid aortic valve s/p Bentall w/ [place] valve on 1/28 with Dr. Velasco and Dr. Hannah  Hx of HTN  Hypervolemia, improved  Sternal clicking and popping was noted beginning 2/24, occurring with deep inspiration, but not appreciated with coughing. No drainage or erythema or increased pain. CT scan showed incomplete healing of the sternal bone.  Given the timeline, this is somewhat to be expected.  Recommend follow-up noncontrast CT scan if the sternal clicking and popping continues or if sternal instability is suspected.  Fluid overloaded post-op and treated with diuretics. Discharged to ARU 25.1 kg below pre-op weight, clinically euvolemic except trace LE edema, recommended ongoing gentle PO diuresis.  Weight stable throughout ARU stay.  - Goal MAP>65, SBP <130  LVEF: 60-65%  - Continue Coreg 50 BID, Lisinopril 40mg daily  - Continue ASA 81mg, Atorvastatin 40mg   - Continue warfarin for anticoagulation for valve.  Appreciate pharmacy assistance to dose: at discharge, recommend 7.5 mg daily MWF and 10 mg daily all other  days TTSS.  INR goal 2-3 for three months, then 1.5-2.0 for life.  INR 2.95 3/9.  PLC for anticoagulation completed  and patient referred to anticoagulation clinic for ongoing monitoring (next INR scheduled for 3/11).   - Continue Lasix 20 mg daily.    - Sternal precautions  - Wound care: wash daily with soap and water, pat dry, keep clean/dry, ok to shower don't soak  - Pain management: Tylenol, methocarbamol PRN.  Pain well-controlled at ARU.  - Follow up with CVTS on 3/11 as scheduled  - Follow up with cardiology, Dr. Mcnamara, within 1-2 weeks after discharge     Acute hypoxemic respiratory failure/ARDS, resolved.    Multifactorial 2/2 hypervolemia, pneumonia, atelectasis, obesity hypoventilation.   Extubated 2/14 to [facility], now stable on room air.  No respiratory concerns noted at ARU other than mild ongoing dyspnea with exertion, improving.     Left thalamic infarct, old, age unknown  Revealed on CT 2/16 in evaluation for dysarthria s/p extubation.  Felt to be incidental chronic infarction 2/2 small vessel disease in setting of hypertension.  Neurology consulted.  - LDL 74, A1c 5.8%  - Continue aspirin, statin as above  - Recommend MRI brain wwo and MRA head/neck as outpatient  - Follow up with stroke neurology in 2-3 months, or after completion of above studies     RLE, RUE DVTs  HIT positive   On Heparin post-op.  HIT positive 2/10.  U/S bilat LE on 2/10 revealed acute DVT of RLE with nonocclusive thrombus in the femoral vein and occlusive thrombus in the popliteal vein, as well as occlusive thrombus of the right gastrocnemius vein from the level of the upper calf to knee.  Started on bivalirudin drip, then started warfarin 2/19 with ongoing bivalirudin bridge to therapeutic chromogenic factor X (now completed).  Bilat UE U/S 2/21 demonstrating occlusive thrombus of one of the paired right brachial veins at the upper arm, with PICC in place, which was removed 2/21; and occlusive superficial thrombus in left  cephalic vein at the antecubital fossa. No evidence of left upper extremity deep venous thrombosis.  - Continue warfarin as above, current INR goal 2-3     Anemia  Stable in 10s-11s, steadily improving.  - Hgb improved 3/7 at 13.0  - Follow up with PCP, repeat CBC as indicated     Depression/anxiety secondary to medical condition  Sleep disturbance  Noted to have increased depressive symptoms and difficulty sleeping during inpatient stay.  Psychology consulted on 2/23.  At ARU admission, patient reports mood to be stable and sleep issues mostly related to environment.  No ongoing concerns.     Moderate malnutrition in the context of acute illness/injury - resolved  NJ tube removed 2/23.   - Continue regular diet, thin liquids     C difficile colitis  Shortly after ARU admission, noted to have watery stools, nausea, urgency.  +C diff 3/5, started on vancomycin PO.  Denies any recurrent abdominal pain, nausea.  Stools now soft/formed.  - Continue vancomycin to complete 10-day course     Maculopapular Rash - nearly resolved  Patient has maculopapular rash diffusely across chest and upper arms. ?medication-related vs follicultis.  Steadily improving per patient, only very faint rash remaining at discharge.    DISCHARGE MEDICATIONS  Current Discharge Medication List      START taking these medications    Details   vancomycin (VANCOCIN) 125 MG capsule Take 1 capsule (125 mg) by mouth 4 times daily for 6 days  Qty: 24 capsule, Refills: 0    Associated Diagnoses: C. difficile colitis      warfarin ANTICOAGULANT (COUMADIN) 5 MG tablet Take 7.5 mg (1.5 tabs) on MWF and 10 mg (2 tabs) all other days (Tues, Thurs, Sat, Sun)  Qty: 50 tablet, Refills: 0    Associated Diagnoses: S/P aortic aneurysm repair; Bicuspid aortic valve         CONTINUE these medications which have CHANGED    Details   aspirin (ASA) 81 MG chewable tablet Take 1 tablet (81 mg) by mouth daily  Qty: 30 tablet, Refills: 0    Associated Diagnoses: S/P aortic  aneurysm repair      atorvastatin (LIPITOR) 40 MG tablet Take 1 tablet (40 mg) by mouth every evening  Qty: 30 tablet, Refills: 0    Associated Diagnoses: Thoracic aortic aneurysm without rupture (H); S/P aortic aneurysm repair      carvedilol (COREG) 25 MG tablet Take 2 tablets (50 mg) by mouth 2 times daily  Qty: 120 tablet, Refills: 0    Associated Diagnoses: S/P aortic aneurysm repair      furosemide (LASIX) 20 MG tablet Take 1 tablet (20 mg) by mouth daily  Qty: 30 tablet, Refills: 0    Associated Diagnoses: Thoracic aortic aneurysm without rupture (H); S/P aortic aneurysm repair      lisinopril (ZESTRIL) 40 MG tablet Take 1 tablet (40 mg) by mouth daily  Qty: 30 tablet, Refills: 0    Associated Diagnoses: S/P aortic aneurysm repair      pantoprazole (PROTONIX) 40 MG EC tablet Take 1 tablet (40 mg) by mouth every morning (before breakfast) for 14 days  Qty: 14 tablet, Refills: 0    Associated Diagnoses: S/P aortic aneurysm repair         CONTINUE these medications which have NOT CHANGED    Details   acetaminophen (TYLENOL) 325 MG tablet Take 2 tablets (650 mg) by mouth every 6 hours as needed for mild pain or fever    Associated Diagnoses: Thoracic aortic aneurysm without rupture (H); S/P aortic aneurysm repair         STOP taking these medications       melatonin 10 MG TABS tablet Comments:   Reason for Stopping:         methocarbamol (ROBAXIN) 750 MG tablet Comments:   Reason for Stopping:         traZODone (DESYREL) 50 MG tablet Comments:   Reason for Stopping:                 DISCHARGE INSTRUCTIONS AND FOLLOW UP  Discharge Procedure Orders   Cardiac Rehab Referral   Standing Status: Future   Referral Priority: Routine: Next available opening Referral Type: Rehab Therapy Cardiac Therapy   Number of Visits Requested: 1     Anticoagulation Clinic Referral   Referral Priority: Routine: Next available opening Referral Type: Laboratory Tests and Services   Referral Location: [facility]  ([facility])   Number of Visits Requested: 1     Wound care and dressings   Order Comments: Instructions to care for your wound at home: Shower or wash your incisions daily with soap and water (or as instructed), pat dry. Keep wound clean and dry, showers are okay after discharge, but don't let spray hit directly on incision.  No baths or swimming for 1 month. .     Monitor and record   Order Comments: blood pressure daily and bring record to next appointment  weight every day     Activity   Order Comments: Your activity upon discharge: activity as tolerated and as directed by therapies.  We recommend ongoing use of walker for transfers and ambulation, with supervision on stairs.    Per your surgeon: You had a sternotomy, avoid lifting anything greater than ten pounds for 6 weeks after surgery and then less than 20 pounds for an additional 6 weeks. Do not reach backwards or use arms to push out of chair. Do not let people pull on your arms to assist with standing.  Avoid twisting or reaching too far across your body. Avoid strenuous activities such as bowling, vacuuming, raking, shoveling, golf or tennis for 12 weeks after your surgery. It is okay to resume sex if you feel comfortable in doing so. You may have to try different positions with your partner.    Splint your chest incision by hugging a pillow or bringing your arms across your chest when coughing or sneezing. No driving for 4 weeks after surgery or while on pain medication.     Order Specific Question Answer Comments   Is discharge order? Yes      Adult [facility] Follow-up and recommended labs and tests   Order Comments: Follow up with primary care provider, Hernando Moore, within 7 days for hospital follow- up.  The following labs/tests are recommended: next INR due 3/11, repeat CBC, BMP as indicated.      Follow up with cardiothoracic surgery as scheduled on 3/11.    Follow up with cardiology in 1-2 weeks.    Follow up with neurology, including brain MRI with  Abdomen soft, non-tender, no guarding. and without contrast.    Appointments on [place] and/or [facility] (with [facility] or [facility] provider or service). Call [phone] if you haven't heard regarding these appointments within 7 days of discharge.     Reason for your hospital stay   Order Comments: You were admitted for rehabilitation following surgery for aortic aneurysm and complicated hospital course.     Diet   Order Comments: Follow this diet upon discharge: Regular Diet; Thin Liquids (level 0)     Order Specific Question Answer Comments   Is discharge order? Yes           PHYSICAL EXAMINATION    Most recent Vital Signs:   Vitals:    03/09/22 0738 03/09/22 1646 03/09/22 2128 03/09/22 2129   BP: (!) 119/92 (!) 140/90 130/72    BP Location: Right arm Right arm Right arm    Patient Position: Semi-Jerez's Sitting Supine    Cuff Size: Adult Regular      Pulse: 88 85  98   Resp: 16 16     Temp: 98  F (36.7  C) 97.1  F (36.2  C)     TempSrc: Oral Oral     SpO2: 97% 99%     Weight:       Height:           Gen: NAD, seated upright edge of bed  HEENT: NC/AT, MMM  Cardio: RRR, valve click, sternal incision well-healed no erythema or drainage, chest tube sites with thick eschar no erythema or drainage  Pulm: non-labored on room air, lungs CTA bilaterally  Abd: soft, non-tender, non-distended, bowel sounds present  Extr: trace edema in bilateral lower extremities  Neuro/MSK: alert, oriented, speech clear/fluent, responds appropriately, follows commands, strength at least 4/5 in all 4 extremities  Skin: very faint pink maculopapular rash on upper chest/arms    40 minutes spent in discharge, including >50% in counseling and coordination of care, medication review and plan of care recommended on follow up.     Patient was evaluated on day of discharge by attending physician, Tom Spann DO, who agrees with plan of care.    Discharge summary was forwarded to Hernando Moore (PCP) at the time of discharge, so as to bridge from hospital to outpatient care.      It was our pleasure to care for Chuy Varner during this hospitalization. Please do not hesitate to contact me should there be questions regarding the hospital course or discharge plan.          Ayaka Penaloza PA-C  Physical Medicine and Rehabilitation

## 2022-03-09 NOTE — PLAN OF CARE
Pt A&Ox4, MOD I in the room with walker. Continent of B&B, LBM 3/7. Sternal incision ALICJA, WDL. Pt denies pain, SOB, headache, or new numbness/tingling. Enteric precautions maintained. Call light in reach, continue POC.

## 2022-03-09 NOTE — PROGRESS NOTES
"  [facility]   Acute Rehabilitation Unit  Daily progress note    INTERVAL HISTORY  Chuy Varner was seen and examined at bedside this morning.  No acute events reported overnight.  Today, patient reports to be feeling well.  He is pleased with progress with therapies and looking forward to discharge tomorrow, though he also recognizes ongoing recovery and need for gradual resumption of activities.  Reviewed discharge meds and follow-up recommendations.  He denies any increased chest pain, palpitations, shortness of breath, dizziness, nausea, bowel or bladder concerns.    Functionally, he is mod I with bed mobility, transfers, gait up to 200' with 4WW.  He is able to complete 4x6\" stairs with SBA and bilateral rails.    MEDICATIONS    aspirin  81 mg Oral Daily     atorvastatin  40 mg Oral QPM     carvedilol  50 mg Oral BID     furosemide  20 mg Oral Daily     lisinopril  40 mg Oral Daily     melatonin  10 mg Oral At Bedtime     pantoprazole  40 mg Oral QAM AC     saccharomyces boulardii  250 mg Oral BID     vancomycin  125 mg Oral 4x Daily     warfarin ANTICOAGULANT  8 mg Oral ONCE at 18:00        acetaminophen, diclofenac, methocarbamol, - MEDICATION INSTRUCTIONS -, polyethylene glycol, senna-docusate, traZODone, Warfarin Therapy Reminder     PHYSICAL EXAM  BP (!) 119/92 (BP Location: Right arm, Patient Position: Semi-Jerez's, Cuff Size: Adult Regular)   Pulse 88   Temp 98  F (36.7  C) (Oral)   Resp 16   Ht 1.905 m (6' 3\")   Wt 141.3 kg (311 lb 6.4 oz)   SpO2 97%   BMI 38.92 kg/m    Gen: NAD, pleasant, sitting up in chair  HEENT: NC/AT, MMM  Cardio: appears well-perfused  Pulm: non-labored on room air  Abd: soft, non-tender, non-distended  Ext: trace edema in LLE   Neuro/MSK: awake, alert, responds appropriately, moving all 4 extremities against gravity    LABS  CBC RESULTS:   Recent Labs   Lab Test 03/09/22  0826 03/07/22  0911 03/04/22  0603   WBC 4.8 6.4 7.0   RBC " 4.16* 4.37* 3.92*   HGB 12.0* 13.0* 11.3*   HCT 37.7* 40.2 37.5*   MCV 91 92 96   MCH 28.8 29.7 28.8   MCHC 31.8 32.3 30.1*   RDW 13.6 14.0 14.7    301 287     Last Basic Metabolic Panel:  Recent Labs   Lab Test 03/09/22  0826 03/07/22  0911 03/04/22  0603    139 141   POTASSIUM 3.8 4.0 3.9   CHLORIDE 109 109 111*   CO2 23 22 24   ANIONGAP 8 8 6   GLC 95 111* 93   BUN 8 8 8   CR 0.65* 0.65* 0.59*   GFRESTIMATED >90 >90 >90   MAYE 9.4 9.6 8.9       Rehabilitation - continue comprehensive acute inpatient rehabilitation program with multidisciplinary approach including therapies, rehab nursing, and physiatry following. See interval history for updates.      ASSESSMENT AND PLAN  Chuy Varner is a 31 year old left hand dominant male with a past medical history of recently (10/2021) discovered asymptomatic aortic root aneurysm (5.3 cm) with bicuspid aortic valve, hypertension, obesity, testicular cancer s/p orchiectomy who was admitted on 1/28/21 for Bentall procedure with mechanical valve with hospital course complicated by hypoxemic respiratory failure/ARDS, VAP, DVT, HIT, hypervolemia, hypernatremia, dysphagia, dysarthria, incidental finding of 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  He is now admitted to ARU on 3/4/22 for multidisciplinary rehabilitation and ongoing medical management.      Ascending aortic aneurysm and bicuspid aortic valve s/p Bentall w/ Jerry valve on 1/28 with Dr. Velasco and Dr. Hannah  Hx of HTN  Hypervolemia, improved  Sternal clicking and popping was noted beginning 2/24, occurring with deep inspiration, but not appreciated with coughing. No drainage or erythema or increased pain. CT scan showed incomplete healing of the sternal bone.  Given the timeline, this is somewhat to be expected.  Recommend follow-up noncontrast CT scan if the sternal clicking and popping continues or if sternal instability is suspected.  Fluid overloaded post-op and  treated with diuretics. Discharged 25.1 kg below pre-op weight, clinically euvolemic except trace LE edema, recommended ongoing gentle PO diuresis.  - Goal MAP>65, SBP <130  LVEF: 60-65%  - Continue Coreg 50 BID, Lisinopril 40mg daily  - Continue ASA 81mg, Atorvastatin 40mg   - Continue warfarin for anticoagulation for valve.  Appreciate pharmacy assistance to dose.  INR goal 2-3 for three months, then 1.5-2.0 for life.  INR 2.95 today.  PLC for anticoagulation completed today and will refer to anticoagulation clinic for ongoing monitoring (next INR scheduled for 3/11).  - Continue Lasix 20 mg daily.  Monitor I/Os, daily weights, labs, clinical volume status to assess ongoing need.  Noted to have some orthostasis over the weekend, possibly in setting of resumption of Lasix, ongoing fluid restriction.  Weights have been stable since admission, labs stable, trace LE edema, no SOB.  Patient requests to discontinue fluid restriction 3/7, feeling quite thirsty in setting of increased activity.  Weight remains stable.  - Sternal precautions.  As above, recommend repeat CT if sternal clicking/popping continues or if sternal instability suspected.  - Wound care: wash daily with soap and water, pat dry, keep clean/dry, ok to shower don't soak  - Pain management: Tylenol, methocarbamol PRN  - Follow up with CVTS on 3/11 as scheduled  - Follow up with cardiology, Dr. Mcnamara, within 1-2 weeks after discharge     Acute hypoxemic respiratory failure/ARDS, multifactorial 2/2 hypervolemia, pneumonia, atelectasis, obesity hypoventilation.   Extubated 2/14 to [facility], now stable on room air.  - Continue pulmonary toilet  - Monitor respiratory status, continue supplemental oxygen by NC PRN     Left thalamic infarct, old, age unknown  Revealed on CT 2/16 in evaluation for dysarthria s/p extubation.  Felt to be incidental chronic infarction 2/2 small vessel disease in setting of hypertension.  Neurology consulted.  - LDL 74, A1c 5.8%  -  Continue aspirin, statin as above  - Recommend MRI brain wwo and MRA head/neck as outpatient  - Follow up with stroke neurology in 2-3 months, or after completion of above studies     RLE, RUE DVTs  HIT positive   On Heparin post-op.  HIT positive 2/10.  U/S bilat LE on 2/10 revealed acute DVT of RLE with nonocclusive thrombus in the femoral vein and occlusive thrombus in the popliteal vein, as well as occlusive thrombus of the right gastrocnemius vein from the level of the upper calf to knee.  Started on bivalirudin drip, then started warfarin 2/19 with ongoing bivalirudin bridge to therapeutic chromogenic factor X (now completed).  Bilat UE U/S 2/21 demonstrating occlusive thrombus of one of the paired right brachial veins at the upper arm, with PICC in place, which was removed 2/21; and occlusive superficial thrombus in left cephalic vein at the antecubital fossa. No evidence of left upper extremity deep venous thrombosis.  - Continue warfarin as above, current INR goal 2-3     Anemia  Stable in 10s-11s, steadily improving.  - Hgb improved 3/7 at 13.0  - Trend CBC     Depression/anxiety secondary to medical condition  Sleep disturbance  Noted to have increased depressive symptoms and difficulty sleeping.  Psychology consulted on 2/23.  At ARU admission, patient reports mood to be stable and sleep issues mostly related to environment.  - Continue melatonin at bedtime, trazodone PRN  - Monitor mood and sleep, consider consult for mental health supports at ARU if indicated     Moderate malnutrition in the context of acute illness/injury  NJ tube removed 2/23.  Improving intake.  - Continue regular diet, thin liquids.  Continue supplements per RD recs.  - Nutrition consulted, appreciate assistance    C difficile colitis  Shortly after ARU admission, noted to have watery stools, nausea, urgency.  +C diff 3/5, started on vancomycin PO.  - Denies any recurrent abdominal pain, nausea.  Last BM 3/8 soft/formed.  -  Continue vancomycin for 10-day course  - Enteric precautions     Maculopapular Rash, improving  Patient has maculopapular rash diffusely across chest and upper arms. ?medication-related vs follicultis.  Is steadily improving per patient.  - Monitor, can try topical treatment if bothersome or persistent, currently declines       1. Adjustment to disability:  Clinical psychology to eval and treat if indicated  2. FEN: regular diet, thin liquids  3. Bowel: continent, monitor, PRN bowel meds available  4. Bladder: continent, PVRs at admission without evidence of retention  5. DVT Prophylaxis: warfarin  6. GI Prophylaxis: PPI (at least thru 3/16 - 30d from extubation)  7. Code: full  8. Disposition: goal for home  9. ELOS:  10 days.  10. Follow up Appointments on Discharge: PCP, CVTS (3/11), cardiology (Dr. Mcnamara) within 1-2 wks of discharge      Patient seen and discussed with Dr. Tom Spann, PM&R Staff Physician    GEM Fonseca-C  Physical Medicine & Rehabilitation

## 2022-03-09 NOTE — PLAN OF CARE
Goal Outcome Evaluation:    Plan of Care Reviewed With: patient     Overall Patient Progress: improving    Outcome Evaluation: Pt still transferring Mod-I with walker in room. He is able to make needs known and call when appropriate.    Priya Zheng RN on 3/8/2022 at 10:37 PM

## 2022-03-09 NOTE — PLAN OF CARE
Physical Therapy Discharge Summary    Reason for therapy discharge:    All goals and outcomes met, no further needs identified.    Progress towards therapy goal(s). See goals on Care Plan in [facility] electronic health record for goal details.  Goals met    Therapy recommendation(s):    Continued therapy is recommended.  Rationale/Recommendations:  OP CR to progress functional strength and endurance. At discharge, pt is mod-I with 4WW with transfers and gait up to 200'. Has HEP for home. Supervision on stairs due to some eccentric control weakness. Has HEP for home progression. 6MWT = 672.5' with 4WW.

## 2022-03-09 NOTE — PHARMACY-ANTICOAGULATION SERVICE
Clinical Pharmacy- Warfarin Discharge Note  This patient is currently on warfarin for the treatment of On-X Aortic Valve, DVTs, HIT+.  INR Goal= 2-3 for 3 months then 1.5-2 thereafter      Anticoagulation Dose History     Recent Dosing and Labs Latest Ref Rng & Units 3/3/2022 3/4/2022 3/5/2022 3/6/2022 3/7/2022 3/8/2022 3/9/2022    ZZ IMS TEMPLATE - - 12.5 mg - 7.5 mg - - -    Warfarin 5 mg - - - 10 mg - 10 mg 10 mg -    Warfarin 7.5 mg - 15 mg - - - - - -    [id number] 70 - 130 % - - 24(L) 20(L) - - -    INR 0.86 - 1.14 - 2.90(H) 3.27(H) 3.24(H) 2.99(H) 2.84(H) 2.95(H)          Vitamin K doses administered during the last 7 days: none  FFP administered during the last 7 days: none  Recommend discharging the patient on a warfarin regimen of 7.5 mg MWF and 10 mg all other days (Tues, Thurs, Sat, Sun) with a prescription for warfarin 5mg tablets.      The patient should have an INR checked Friday, 3/11.

## 2022-03-10 ENCOUNTER — DOCUMENTATION ONLY (OUTPATIENT)
Dept: ANTICOAGULATION | Facility: CLINIC | Age: 32
End: 2022-03-10
Payer: COMMERCIAL

## 2022-03-10 VITALS
HEIGHT: 75 IN | WEIGHT: 311.4 LBS | TEMPERATURE: 96.5 F | OXYGEN SATURATION: 93 % | DIASTOLIC BLOOD PRESSURE: 95 MMHG | BODY MASS INDEX: 38.72 KG/M2 | RESPIRATION RATE: 16 BRPM | SYSTOLIC BLOOD PRESSURE: 135 MMHG | HEART RATE: 96 BPM

## 2022-03-10 DIAGNOSIS — Z86.79 S/P AORTIC ANEURYSM REPAIR: ICD-10-CM

## 2022-03-10 DIAGNOSIS — Q23.81 BICUSPID AORTIC VALVE: Primary | ICD-10-CM

## 2022-03-10 DIAGNOSIS — Z98.890 S/P AORTIC ANEURYSM REPAIR: ICD-10-CM

## 2022-03-10 PROCEDURE — 250N000013 HC RX MED GY IP 250 OP 250 PS 637: Performed by: PHYSICIAN ASSISTANT

## 2022-03-10 PROCEDURE — 99239 HOSP IP/OBS DSCHRG MGMT >30: CPT | Performed by: PHYSICIAN ASSISTANT

## 2022-03-10 PROCEDURE — 250N000013 HC RX MED GY IP 250 OP 250 PS 637: Performed by: PHYSICAL MEDICINE & REHABILITATION

## 2022-03-10 RX ADMIN — PANTOPRAZOLE SODIUM 40 MG: 40 TABLET, DELAYED RELEASE ORAL at 06:37

## 2022-03-10 RX ADMIN — VANCOMYCIN HYDROCHLORIDE 125 MG: 125 CAPSULE ORAL at 09:37

## 2022-03-10 RX ADMIN — DICLOFENAC SODIUM 2 G: 10 GEL TOPICAL at 09:38

## 2022-03-10 RX ADMIN — FUROSEMIDE 20 MG: 20 TABLET ORAL at 09:37

## 2022-03-10 RX ADMIN — CARVEDILOL 50 MG: 25 TABLET, FILM COATED ORAL at 09:37

## 2022-03-10 RX ADMIN — LISINOPRIL 40 MG: 40 TABLET ORAL at 09:37

## 2022-03-10 RX ADMIN — ASPIRIN 81 MG: 81 TABLET, CHEWABLE ORAL at 09:37

## 2022-03-10 RX ADMIN — Medication 250 MG: at 09:37

## 2022-03-10 ASSESSMENT — ACTIVITIES OF DAILY LIVING (ADL)
ADLS_ACUITY_SCORE: 9

## 2022-03-10 NOTE — PROGRESS NOTES
Anticoagulation Clinic received new referral from hospital, to be managed by [facility]. Goal range 2-3 x3 months then 1.5-2.0 for lifetime. Dx: s/p aortic aneurysm repair, bicuspid aortic valve. Aortic root replacement with 27-29 mm On-X ascending aortic prosthesis with Vascutek Gelweave Valsalva graft (Bentall procedure with coronary implantation). RLE, and Bilateral UE DVTs, Discharge on Warfarin with INR of 2.90. Heparin-induced thrombocytopenia, subsequently placed on bivalirudin as a bridge to warfarin. Pressure injuries were noted on the coccyx/perineal area that was treated with consultation of the wound ostomy care nurses. WOC nurse care continued until discharge.  Recommend continuation of care at transitional care facility. On March 4th, 2022, he was discharged to a TCU in stable condition, contact: Kacey Richards ([phone]) who [facility] for 6[facility]. C-diff  With 10-day course of vacomycin (3/9-3/15).     Warfarin education given at bedside 3/5/22, reviewed again 3/9/22. Given guide to warfarin therapy.  Per hosp pharmacist: discharge from ARU on warfarin 7.5 mg MWF, 10 mg all other days with a tablet size of 5 mg (50 tabs). Next INR 3/11/22.     At [facility] 1/28-22 - 3-4/22 (35 days). Patient's PCP is an outside provider, practices at [facility]. Called patient to see if he plans to follow up with INRs and have PCP manage warfarin or if he will establish care with an [facility] provider for us to follow-up with warfarin dosing.      Chuy states he lives 3-hrs away from here and will continue with his local provider and has his first INR already scheduled at [facility]. ACC episode of care resolved. Anticoagulation referral was unnecessary.     Asya POST RN  Anticoagulation Team

## 2022-03-10 NOTE — PLAN OF CARE
Goal Outcome Evaluation:    Overall Patient Progress: improving    Outcome Evaluation: Pt sleeping throughout shift. No c/o pain. Discharging 3/10.    Pt is alert and oriented. Continent of B&B. Uses urinal with staff assistance in emptying NOC. LBM 3/9. Mod I in room with walker. No c/o pain. Call light within reach. Enteric precautions maintained. Pt discharging 3/10. Will continue with POC.

## 2022-03-10 NOTE — PLAN OF CARE
Goal Outcome Evaluation:    Plan of Care Reviewed With: patient     Overall Patient Progress: improving    Outcome Evaluation: ready for discharge tomorrow.    A/O x4, still up MOD I.  No changes this shift.  Ready for discharge tomorrow am.  Cont w/ POC.

## 2022-03-10 NOTE — PLAN OF CARE
Goal Outcome Evaluation:     Patient discharged around 1145. AVS, medications, follow up appts, and infection prevent r/t to cdiff infection. Patient was given opportinutiy to ask questions. No further questions after AVS reviewed. Walker was not delivered to unit, but patient was able to reach company and patient and SO will  from site at 1215. Patient escorted down by JESSICA to vehicle with all belongings.

## 2022-03-11 ENCOUNTER — VIRTUAL VISIT (OUTPATIENT)
Dept: CARDIOLOGY | Facility: CLINIC | Age: 32
End: 2022-03-11
Attending: SURGERY
Payer: COMMERCIAL

## 2022-03-11 DIAGNOSIS — Z98.890 S/P AORTIC ANEURYSM REPAIR: Primary | ICD-10-CM

## 2022-03-11 DIAGNOSIS — Z86.79 S/P AORTIC ANEURYSM REPAIR: Primary | ICD-10-CM

## 2022-03-11 PROCEDURE — 99024 POSTOP FOLLOW-UP VISIT: CPT | Mod: 95 | Performed by: PHYSICIAN ASSISTANT

## 2022-03-11 NOTE — PATIENT INSTRUCTIONS
1. op lasix. Continue to weight yourself daily. Call our RN care coordinator Enzo with any increased SOB, LE edema, weight gain of 3 lbs overnight or 5 lbs within a week.   2. Continue other medications  3. Take your blood pressure twice daily for a week, if your systolic blood pressure remains greater than 130, call Enzo and we will consider adding another blood pressure medication  4. Follow-up with your cardiologist as scheduled

## 2022-03-11 NOTE — LETTER
3/11/2022      RE: Chuy Varner  [address]  [place] [place] [facility] [address]       Dear Colleague,    Thank you for the opportunity to participate in the care of your patient, Chuy Varner, at the [facility]. Please see a copy of my visit note below.    Chuy is a 31 year old who is being evaluated via a billable video visit.      How would you like to obtain your AVS? MyChart  If the video visit is dropped, the invitation should be resent by: Text to cell phone: [phone]  Will anyone else be joining your video visit? Yes: Spouse Debbie may be listening in today.. How would they like to receive their invitation? Text to cell phone: [phone]      Ester Wiggins Visit Abhijeet/MA.      Video Start Time: 2:34pm  Video-Visit Details    Type of service:  Video Visit    Video End Time:2:57    Originating Location (pt. Location): Home    Distant Location (provider location):  [facility]     Platform used for Video Visit: [facility]    CARDIOTHORACIC SURGERY FOLLOW-UP VISIT  3/11/2022     Chuy Varner   1990   [id number]      Reason for visit: Post-Op aortic root replacement with On-X aortic valve/valsalva graft with Dr. Velasco on 1/28/2022     HPI: Chuy Varnre is a 31 year old year old male seen in clinic for a routine follow-up appointment after surgery. Hospital course was remarkable for postop hypoxemic respiratory failure, HIT positivity on Bivalirudin, LE DVT, AMS/aphasia, old thalmic stroke on head CT.    In the ICU his post-operative course was complicated by acute hypoxic respiratory failure suspected to be due to a multimodal etiology of ARDS versus pneumonia versus hypervolemia versus TRALI. He was extubated on 2/14 and oxygen was subsequently weaned on discharged on RA.     Patient develops HIT and treated with bival bridged to therapeutic INR. He was found to have right lower an  bilateral upper extremity DVTs. He also had a CT head for speech difficulties which showed a chronic left thalamic lacunar infarct. Neuro evaluated and recommended brain MRI. Plan on performing as an outpatient per Dr. Velasco with neurology follow-up.     Patient was discharged to TCU on 3/4 and progressed well with therapies and was discharged home 3/9.    Patient has been doing well since discharge. Patient reports incisions are healing well, pain is well controlled on current medications. Patient does continue to have clicking when he takes a deep breath, but this is unchanged since discharge. Denies any rocking or instability in his sternum. Patient otherwise denies any fever, chills, chest pain, palpitations, edema, SOB, lightheadedness and nausea. Reports his edema is improving. Weight has remained stable since being on the lasix, has not noticed a large difference in urine output while being on the lasix compared to the 3 days he was off it just prior to discharge. He is having Normal bowel movements and voiding without problems. INR is therapeutic at 2.6    PAST MEDICAL HISTORY:  No past medical history on file.    PAST SURGICAL HISTORY:  Past Surgical History:   Procedure Laterality Date     ORCHIECTOMY SCROTAL       PICC TRIPLE LUMEN PLACEMENT Right 02/14/2022    Right basilic, 48 cm, 4 cm external length     REPLACE VALVE AORTIC N/A 01/28/2022    Procedure: Median Sternotomy.  Cardiopulmonary bypass pump.  Bentall procedure using On-X Ascending Aortic Prosthesis with Vascutek Gelweave Valsa Graft  size 27-29MM .  Intraoperative transesophageal echocardiogram per anesthesia;  Surgeon: Nickolas Velasco MD;  Location: [facility] OR       CURRENT MEDICATIONS:   Current Outpatient Medications   Medication     acetaminophen (TYLENOL) 325 MG tablet     aspirin (ASA) 81 MG chewable tablet     atorvastatin (LIPITOR) 40 MG tablet     carvedilol (COREG) 25 MG tablet     furosemide (LASIX) 20 MG tablet     lisinopril (ZESTRIL) 40  MG tablet     pantoprazole (PROTONIX) 40 MG EC tablet     vancomycin (VANCOCIN) 125 MG capsule     warfarin ANTICOAGULANT (COUMADIN) 5 MG tablet     No current facility-administered medications for this visit.       ALLERGIES:      Allergies   Allergen Reactions     Heparin Heparin Induced Thrombocytopenia     HIT + 2/10/22, KATARZYNA confirmed       ROS:  10 point ROS neg other than the symptoms noted above in the HPI.    PHYSICAL EXAM:   There were no vitals taken for this visit.  No physical exam given video visit.   Incision: c/d/i, no erythema or drainage.     LABS:  Last Basic Metabolic Panel:  Lab Results   Component Value Date     03/09/2022      Lab Results   Component Value Date    POTASSIUM 3.8 03/09/2022    POTASSIUM 4.3 02/05/2022     Lab Results   Component Value Date    CHLORIDE 109 03/09/2022     Lab Results   Component Value Date    MAYE 9.4 03/09/2022     Lab Results   Component Value Date    CO2 23 03/09/2022     Lab Results   Component Value Date    BUN 8 03/09/2022     Lab Results   Component Value Date    CR 0.65 03/09/2022     Lab Results   Component Value Date    GLC 95 03/09/2022       Last CBC:   Lab Results   Component Value Date    WBC 4.8 03/09/2022     Lab Results   Component Value Date    RBC 4.16 03/09/2022     Lab Results   Component Value Date    HGB 12.0 03/09/2022     Lab Results   Component Value Date    HCT 37.7 03/09/2022     No components found for: MCT  Lab Results   Component Value Date    MCV 91 03/09/2022     Lab Results   Component Value Date    MCH 28.8 03/09/2022     Lab Results   Component Value Date    MCHC 31.8 03/09/2022     Lab Results   Component Value Date    RDW 13.6 03/09/2022     Lab Results   Component Value Date     03/09/2022       INR:  Lab Results   Component Value Date    INR 2.95 03/09/2022    INR 2.84 03/08/2022    INR 2.99 03/07/2022    INR 3.24 03/06/2022    INR 3.27 03/05/2022    INR 2.90 03/04/2022    INR 2.82 03/02/2022    INR 2.42  02/28/2022    INR 2.86 02/27/2022    INR 2.54 02/26/2022    INR 1.91 02/25/2022    INR 2.05 02/25/2022       IMAGING: None    PROCEDURES: None         ASSESSMENT/PLAN:  Chuy Varner is a 31 year old year old male with a past medical history of thoracic aortic aneurysm, bicuspid aortic valve who is now s/p aortic root replacement with mechanical (On-X) bentall procedure. Patient's post-operative course was complicated by postop hypoxemic respiratory failure, HIT positivity on Bivalirudin, LE DVT, AMS/aphasia, old thalmic stroke on head CT. Patient now presents for post-operative follow-up visit via video visit.     1. Ascending aortic aneurysm - s/p mechanical (On-X) bentall procedure. Doing well surgically, pain is well controlled, incision is healing well without sign of infection. Clicking is unchanged, only when taking deep breaths. No rocking or instability of the sternum. Post-operative echo 2/23 with good valve function, normal LVEF, trace pericardial effusion. Continue on ASA 81 mg, lipitor 40 mg daily, coreg 50 mg BID and lisinopril 40 mg daily. Was started on lasix 20 mg PO daily on discharge (3/4), weight has remained stable. Creatinine and electrolytes stable on 3/9. Will stop lasix - euvolemic, normal LVEF, moving around independently. Patient will weight himself daily, and call with any increased weight of 3 lbs overnight or 5 lbs in a week. Follow-up with cardiology as scheduled in 2-4 weeks.   2. Hypertension - well controlled per patient at rehab, but last couple readings have been in 130s. Continue coreg and lisinopril as able. Continue to monitor BP and will call if SBP greater than 130 and we can add a third agent.  3. Hyperlipidemia - on statin  4. HIT/RLE DVT/Bilateral UE DVT - on coumadin, INR therapeutic. Continue with INR goal 2-3.   5. Thalamic infarct - Brain MRI ordered, has referral for neurology, patient to make appointment for 2-3 months from discharge.    Plan:   1. Stop lasix  2.  Daily weights and call with any increase of weight more than 3 lbs overnight or 5 lbs in a week.   3. Take BP twice daily for the next week and if SBP consistently greater than 130, can add third agent.  4. Follow up with cardiology as scheduled in 2-4 weeks.   5. Continue Cardiac Rehab until completed.   6. Continue sternal precautions for 12 weeks from surgery date.          The total time spent with the patient was 30 minutes, > 50% of which was spent in counseling.    CC  Hernando Moore         Please do not hesitate to contact me if you have any questions/concerns.     Sincerely,     Cardiovascular Thoracic Surgery

## 2022-03-11 NOTE — NURSING NOTE
Chief Complaint   Patient presents with     Follow Up     1 month f/u per the pt. Reviewed medications with pt, no changes.      Ester Wiggins, Visit Facilitator/MA.

## 2022-03-11 NOTE — PROGRESS NOTES
Chuy is a 31 year old who is being evaluated via a billable video visit.      How would you like to obtain your AVS? MyChart  If the video visit is dropped, the invitation should be resent by: Text to cell phone: [phone]  Will anyone else be joining your video visit? Yes: Spouse Debbie may be listening in today.. How would they like to receive their invitation? Text to cell phone: [phone]      Ester Wiggins Visit Abhijeet/MA.      Video Start Time: 2:34pm  Video-Visit Details    Type of service:  Video Visit    Video End Time:2:57    Originating Location (pt. Location): Home    Distant Location (provider location):  [facility] HEART [facility]     Platform used for Video Visit: [employer]    CARDIOTHORACIC SURGERY FOLLOW-UP VISIT  3/11/2022     Chuy Varner   1990   [id number]      Reason for visit: Post-Op aortic root replacement with On-X aortic valve/valsalva graft with Dr. Velasco on 1/28/2022     HPI: Chuy Varner is a 31 year old year old male seen in clinic for a routine follow-up appointment after surgery. Hospital course was remarkable for postop hypoxemic respiratory failure, HIT positivity on Bivalirudin, LE DVT, AMS/aphasia, old thalmic stroke on head CT.    In the ICU his post-operative course was complicated by acute hypoxic respiratory failure suspected to be due to a multimodal etiology of ARDS versus pneumonia versus hypervolemia versus TRALI. He was extubated on 2/14 and oxygen was subsequently weaned on discharged on RA.     Patient develops HIT and treated with bival bridged to therapeutic INR. He was found to have right lower an bilateral upper extremity DVTs. He also had a CT head for speech difficulties which showed a chronic left thalamic lacunar infarct. Neuro evaluated and recommended brain MRI. Plan on performing as an outpatient per Dr. Velasco with neurology follow-up.     Patient was discharged to TCU on 3/4 and progressed well with therapies and was discharged  home 3/9.    Patient has been doing well since discharge. Patient reports incisions are healing well, pain is well controlled on current medications. Patient does continue to have clicking when he takes a deep breath, but this is unchanged since discharge. Denies any rocking or instability in his sternum. Patient otherwise denies any fever, chills, chest pain, palpitations, edema, SOB, lightheadedness and nausea. Reports his edema is improving. Weight has remained stable since being on the lasix, has not noticed a large difference in urine output while being on the lasix compared to the 3 days he was off it just prior to discharge. He is having Normal bowel movements and voiding without problems. INR is therapeutic at 2.6    PAST MEDICAL HISTORY:  No past medical history on file.    PAST SURGICAL HISTORY:  Past Surgical History:   Procedure Laterality Date     ORCHIECTOMY SCROTAL       PICC TRIPLE LUMEN PLACEMENT Right 02/14/2022    Right basilic, 48 cm, 4 cm external length     REPLACE VALVE AORTIC N/A 01/28/2022    Procedure: Median Sternotomy.  Cardiopulmonary bypass pump.  Bentall procedure using On-X Ascending Aortic Prosthesis with Vascutek Gelweave Valsa Graft  size 27-29MM .  Intraoperative transesophageal echocardiogram per anesthesia;  Surgeon: Nickolas Velasco MD;  Location: [facility] OR       CURRENT MEDICATIONS:   Current Outpatient Medications   Medication     acetaminophen (TYLENOL) 325 MG tablet     aspirin (ASA) 81 MG chewable tablet     atorvastatin (LIPITOR) 40 MG tablet     carvedilol (COREG) 25 MG tablet     furosemide (LASIX) 20 MG tablet     lisinopril (ZESTRIL) 40 MG tablet     pantoprazole (PROTONIX) 40 MG EC tablet     vancomycin (VANCOCIN) 125 MG capsule     warfarin ANTICOAGULANT (COUMADIN) 5 MG tablet     No current facility-administered medications for this visit.       ALLERGIES:      Allergies   Allergen Reactions     Heparin Heparin Induced Thrombocytopenia     HIT + 2/10/22, KATARZYNA confirmed        ROS:  10 point ROS neg other than the symptoms noted above in the HPI.    PHYSICAL EXAM:   There were no vitals taken for this visit.  No physical exam given video visit.   Incision: c/d/i, no erythema or drainage.     LABS:  Last Basic Metabolic Panel:  Lab Results   Component Value Date     03/09/2022      Lab Results   Component Value Date    POTASSIUM 3.8 03/09/2022    POTASSIUM 4.3 02/05/2022     Lab Results   Component Value Date    CHLORIDE 109 03/09/2022     Lab Results   Component Value Date    MAYE 9.4 03/09/2022     Lab Results   Component Value Date    CO2 23 03/09/2022     Lab Results   Component Value Date    BUN 8 03/09/2022     Lab Results   Component Value Date    CR 0.65 03/09/2022     Lab Results   Component Value Date    GLC 95 03/09/2022       Last CBC:   Lab Results   Component Value Date    WBC 4.8 03/09/2022     Lab Results   Component Value Date    RBC 4.16 03/09/2022     Lab Results   Component Value Date    HGB 12.0 03/09/2022     Lab Results   Component Value Date    HCT 37.7 03/09/2022     No components found for: MCT  Lab Results   Component Value Date    MCV 91 03/09/2022     Lab Results   Component Value Date    MCH 28.8 03/09/2022     Lab Results   Component Value Date    MCHC 31.8 03/09/2022     Lab Results   Component Value Date    RDW 13.6 03/09/2022     Lab Results   Component Value Date     03/09/2022       INR:  Lab Results   Component Value Date    INR 2.95 03/09/2022    INR 2.84 03/08/2022    INR 2.99 03/07/2022    INR 3.24 03/06/2022    INR 3.27 03/05/2022    INR 2.90 03/04/2022    INR 2.82 03/02/2022    INR 2.42 02/28/2022    INR 2.86 02/27/2022    INR 2.54 02/26/2022    INR 1.91 02/25/2022    INR 2.05 02/25/2022       IMAGING: None    PROCEDURES: None         ASSESSMENT/PLAN:  Chuy Varner is a 31 year old year old male with a past medical history of thoracic aortic aneurysm, bicuspid aortic valve who is now s/p aortic root replacement with mechanical  (On-X) bentall procedure. Patient's post-operative course was complicated by postop hypoxemic respiratory failure, HIT positivity on Bivalirudin, LE DVT, AMS/aphasia, old thalmic stroke on head CT. Patient now presents for post-operative follow-up visit via video visit.     1. Ascending aortic aneurysm - s/p mechanical (On-X) bentall procedure. Doing well surgically, pain is well controlled, incision is healing well without sign of infection. Clicking is unchanged, only when taking deep breaths. No rocking or instability of the sternum. Post-operative echo 2/23 with good valve function, normal LVEF, trace pericardial effusion. Continue on ASA 81 mg, lipitor 40 mg daily, coreg 50 mg BID and lisinopril 40 mg daily. Was started on lasix 20 mg PO daily on discharge (3/4), weight has remained stable. Creatinine and electrolytes stable on 3/9. Will stop lasix - euvolemic, normal LVEF, moving around independently. Patient will weight himself daily, and call with any increased weight of 3 lbs overnight or 5 lbs in a week. Follow-up with cardiology as scheduled in 2-4 weeks.   2. Hypertension - well controlled per patient at rehab, but last couple readings have been in 130s. Continue coreg and lisinopril as able. Continue to monitor BP and will call if SBP greater than 130 and we can add a third agent.  3. Hyperlipidemia - on statin  4. HIT/RLE DVT/Bilateral UE DVT - on coumadin, INR therapeutic. Continue with INR goal 2-3.   5. Thalamic infarct - Brain MRI ordered, has referral for neurology, patient to make appointment for 2-3 months from discharge.    Plan:   1. Stop lasix  2. Daily weights and call with any increase of weight more than 3 lbs overnight or 5 lbs in a week.   3. Take BP twice daily for the next week and if SBP consistently greater than 130, can add third agent.  4. Follow up with cardiology as scheduled in 2-4 weeks.   5. Continue Cardiac Rehab until completed.   6. Continue sternal precautions for 12 weeks  from surgery date.          The total time spent with the patient was 30 minutes, > 50% of which was spent in counseling.    CC  Hernando Moore

## 2022-03-15 ENCOUNTER — DOCUMENTATION ONLY (OUTPATIENT)
Dept: CARDIOLOGY | Facility: CLINIC | Age: 32
End: 2022-03-15
Payer: COMMERCIAL

## 2022-03-15 NOTE — PROGRESS NOTES
Patient discharged from ARU on 03/10/22.  Followed up with PC for INR on 03/11 with result of 2.6.  Patient scheduled with PC on 03/15, Cardiology 04/01, Radiology 04/14 and Hematology/Oncology 04/16.  No post discharge call placed.

## 2022-04-03 ENCOUNTER — HEALTH MAINTENANCE LETTER (OUTPATIENT)
Age: 32
End: 2022-04-03

## 2022-04-04 ENCOUNTER — TELEPHONE (OUTPATIENT)
Dept: CARDIOLOGY | Facility: CLINIC | Age: 32
End: 2022-04-04
Payer: COMMERCIAL

## 2022-04-04 NOTE — TELEPHONE ENCOUNTER
Patient called back to answer questions regarding extending short therm disability due to prolonged rehab; patient has developed complications that require him to attend cardiac rehab until the May. Patient informed to have the forms faxed to  [facility] Enzo Wilson RN and we will go over the forms and fill them out appropriately. Patient verbalized understanding and agreed to the plan.

## 2022-05-16 ENCOUNTER — MYC MEDICAL ADVICE (OUTPATIENT)
Dept: CARDIOLOGY | Facility: CLINIC | Age: 32
End: 2022-05-16
Payer: COMMERCIAL

## 2022-05-16 NOTE — TELEPHONE ENCOUNTER
Patient had prolonged rehab due to DVT in the RLE,, L thalamic infarct and HIT positive on 2/10 following surgery which complicated his healing process.   Patient had sternotomy on 1/28/22 and with no complications regarding wound healing; patient is 17 weeks out from surgery date; his steranl lifting restrictions regarding the sternotomy should be lifted as patient/rehab do not reports any complications regarding his sternum.   [facility]; he has been working with patient at cardiac rehab. Madi reports that patient is exercising with METS at 5-6; patient scheduled for release from rehab on 5/20/22, patient's goals are met;  patient would like to go back to work on 5/23/22.

## 2022-05-16 NOTE — LETTER
[facility] [place]  [address] [facility] [address]  [phone]          May 16, 2022    RE:  Chuy Varner                                                                                                                                                       [address] [facility] [address]            To whom it may concern:    Chuy Varner is under my professional care from 01/28/2022 He may return to work with the following: The employee is UNABLE to return to work until 5/23/22    When the patient returns to work, the following restrictions apply: No restrictions  .      Sincerely,        Nickolas Velasco MD

## 2022-10-03 ENCOUNTER — HEALTH MAINTENANCE LETTER (OUTPATIENT)
Age: 32
End: 2022-10-03

## 2023-05-20 ENCOUNTER — HEALTH MAINTENANCE LETTER (OUTPATIENT)
Age: 33
End: 2023-05-20

## 2024-10-05 ENCOUNTER — HEALTH MAINTENANCE LETTER (OUTPATIENT)
Age: 34
End: 2024-10-05

## (undated) DEVICE — SU PROLENE 4-0 RB-1DA 36" 8557H

## (undated) DEVICE — TOURNIQUET VASCULAR KIT 7 1/2" 79012

## (undated) DEVICE — SU PROLENE 5-0 RB-2DA 30" 8710H

## (undated) DEVICE — SU VICRYL 2-0 CT-1 27" UND J259H

## (undated) DEVICE — BILEAFLET MECHANICAL HEART VALVE ATTACHED TO A VASCULAR PROSTHESIS IMPLANTED IN THE AORTIC POSITION
Type: IMPLANTABLE DEVICE | Site: HEART | Status: NON-FUNCTIONAL
Brand: ON-X ASCENDING AORTIC PROSTHESIS

## (undated) DEVICE — BLADE SAW STERNAL 20X30MM KM-32

## (undated) DEVICE — DRSG DRAIN 4X4" 7086

## (undated) DEVICE — WIPES FOLEY CARE SURESTEP PROVON DFC100

## (undated) DEVICE — SU ETHIBOND 3-0 BBDA 36" X588H

## (undated) DEVICE — SU ETHIBOND 0 CT-1 CR 8X18" CX21D

## (undated) DEVICE — DEFIB PRO-PADZ LVP LQD GEL ADULT 8900-2105-01

## (undated) DEVICE — SU STEEL 6 CCS 4X18" M654G

## (undated) DEVICE — SU PROLENE 4-0 SHDA 36" 8521H

## (undated) DEVICE — PREP CHLORAPREP 26ML TINTED ORANGE  260815

## (undated) DEVICE — TIES BANDING T50R

## (undated) DEVICE — SU VICRYL 0 CTX 36" J370H

## (undated) DEVICE — SU PLEDGET SOFT TFE 3/8"X3/26"X1/16" PCP40

## (undated) DEVICE — LINEN TOWEL PACK X30 5481

## (undated) DEVICE — DRAIN CHEST TUBE RIGHT ANGLED 32FR 8132

## (undated) DEVICE — DRAIN CHEST TUBE 36FR STR 8036

## (undated) DEVICE — TUBING INSUFFLATION W/FILTER CPC TO LUER 620-030-301

## (undated) DEVICE — SUCTION MANIFOLD NEPTUNE 2 SYS 4 PORT 0702-020-000

## (undated) DEVICE — SU PROLENE 3-0 SHDA 36" 8522H

## (undated) DEVICE — BONE WAX 2.5GM W31G

## (undated) DEVICE — LINEN TOWEL PACK X6 WHITE 5487

## (undated) DEVICE — SU PROLENE 6-0 C-1DA 30" 8706H

## (undated) DEVICE — SOL NACL 0.9% IRRIG 1000ML BOTTLE 2F7124

## (undated) DEVICE — DECANTER BAG 2002S

## (undated) DEVICE — PROTECTOR ARM ONE-STEP TRENDELENBURG 40418

## (undated) DEVICE — SYR 10ML FINGER CONTROL W/O NDL 309695

## (undated) DEVICE — DRSG WOUND VAC SPONGE MED BLACK M8275052/5

## (undated) DEVICE — SURGICEL HEMOSTAT 4X8" 1952

## (undated) DEVICE — SPONGE RAY-TEC 4X8" 7318

## (undated) DEVICE — SU VICRYL 3-0 FS-1 27" J442H

## (undated) DEVICE — SU ETHIBOND 2-0 V-5DA 10X30" PXX52

## (undated) DEVICE — DRAPE IOBAN INCISE 23X17" 6650EZ

## (undated) DEVICE — SU SILK 0 TIE 6X30" A306H

## (undated) DEVICE — DRAPE FLUID WARMING 52 X 60" ORS-321

## (undated) DEVICE — SU STEEL MYO/WIRE II STERNOTOMY 8 BE-1 3X14" 048-217

## (undated) DEVICE — DRSG TELFA 3X8" 1238

## (undated) DEVICE — CLIP HORIZON MED BLUE 002200

## (undated) DEVICE — SU ETHIBOND 2-0 SHDA 30" X563H

## (undated) DEVICE — ESU ELEC BLADE 2.75" COATED/INSULATED E1455

## (undated) DEVICE — TUBING SUCTION DRAINAGE PLEURAL DUAL 8884714200

## (undated) DEVICE — ESU ELEC BLADE 6" COATED/INSULATED E1455-6

## (undated) DEVICE — ESU EYE LOW TEMP AA17

## (undated) DEVICE — SOL NACL 0.9% IRRIG 3000ML BAG 2B7477

## (undated) DEVICE — DRSG ABDOMINAL 07 1/2X8" 7197D

## (undated) DEVICE — RX SURGIFLO HEMOSTATIC MATRIX W/THROMBIN 8ML 2994

## (undated) DEVICE — DRSG TEGADERM 2 3/8X2 3/4" 1624W

## (undated) DEVICE — Device

## (undated) DEVICE — NDL COUNTER 40CT  31142311

## (undated) DEVICE — SUCTION CATH AIRLIFE TRI-FLO W/CONTROL PORT 14FR  T60C

## (undated) DEVICE — TAPE MEDIPORE 4"X2YD 2864

## (undated) DEVICE — PACK ADULT HEART UMMC PV15CG92D

## (undated) DEVICE — CANISTER WOUND VAC W/GEL 1000ML M8275093/5

## (undated) RX ORDER — HYDROMORPHONE HYDROCHLORIDE 1 MG/ML
INJECTION, SOLUTION INTRAMUSCULAR; INTRAVENOUS; SUBCUTANEOUS
Status: DISPENSED
Start: 2022-01-28

## (undated) RX ORDER — FENTANYL CITRATE 50 UG/ML
INJECTION, SOLUTION INTRAMUSCULAR; INTRAVENOUS
Status: DISPENSED
Start: 2022-01-28

## (undated) RX ORDER — HEPARIN SODIUM 1000 [USP'U]/ML
INJECTION, SOLUTION INTRAVENOUS; SUBCUTANEOUS
Status: DISPENSED
Start: 2022-01-28

## (undated) RX ORDER — ACETAMINOPHEN 325 MG/1
TABLET ORAL
Status: DISPENSED
Start: 2022-01-28

## (undated) RX ORDER — GABAPENTIN 300 MG/1
CAPSULE ORAL
Status: DISPENSED
Start: 2022-01-28

## (undated) RX ORDER — CEFAZOLIN SODIUM 1 G/3ML
INJECTION, POWDER, FOR SOLUTION INTRAMUSCULAR; INTRAVENOUS
Status: DISPENSED
Start: 2022-01-28

## (undated) RX ORDER — CHLORHEXIDINE GLUCONATE ORAL RINSE 1.2 MG/ML
SOLUTION DENTAL
Status: DISPENSED
Start: 2022-01-28

## (undated) RX ORDER — CEFAZOLIN SODIUM IN 0.9 % NACL 3 G/100 ML
INTRAVENOUS SOLUTION, PIGGYBACK (ML) INTRAVENOUS
Status: DISPENSED
Start: 2022-01-28